# Patient Record
Sex: FEMALE | Race: WHITE | NOT HISPANIC OR LATINO
[De-identification: names, ages, dates, MRNs, and addresses within clinical notes are randomized per-mention and may not be internally consistent; named-entity substitution may affect disease eponyms.]

---

## 2018-03-03 ENCOUNTER — TRANSCRIPTION ENCOUNTER (OUTPATIENT)
Age: 33
End: 2018-03-03

## 2023-05-13 ENCOUNTER — INPATIENT (INPATIENT)
Facility: HOSPITAL | Age: 38
LOS: 2 days | Discharge: ANOTHER HOSPITAL | DRG: 871 | End: 2023-05-16
Attending: INTERNAL MEDICINE | Admitting: INTERNAL MEDICINE
Payer: MEDICAID

## 2023-05-13 VITALS
TEMPERATURE: 98 F | HEART RATE: 116 BPM | DIASTOLIC BLOOD PRESSURE: 66 MMHG | WEIGHT: 121.92 LBS | RESPIRATION RATE: 20 BRPM | OXYGEN SATURATION: 98 % | SYSTOLIC BLOOD PRESSURE: 107 MMHG | HEIGHT: 67 IN

## 2023-05-13 DIAGNOSIS — H05.239 HEMORRHAGE OF UNSPECIFIED ORBIT: ICD-10-CM

## 2023-05-13 DIAGNOSIS — E87.6 HYPOKALEMIA: ICD-10-CM

## 2023-05-13 DIAGNOSIS — E80.6 OTHER DISORDERS OF BILIRUBIN METABOLISM: ICD-10-CM

## 2023-05-13 DIAGNOSIS — J18.9 PNEUMONIA, UNSPECIFIED ORGANISM: ICD-10-CM

## 2023-05-13 DIAGNOSIS — R18.8 OTHER ASCITES: ICD-10-CM

## 2023-05-13 DIAGNOSIS — R10.9 UNSPECIFIED ABDOMINAL PAIN: ICD-10-CM

## 2023-05-13 DIAGNOSIS — E03.9 HYPOTHYROIDISM, UNSPECIFIED: ICD-10-CM

## 2023-05-13 DIAGNOSIS — Z29.9 ENCOUNTER FOR PROPHYLACTIC MEASURES, UNSPECIFIED: ICD-10-CM

## 2023-05-13 DIAGNOSIS — N39.0 URINARY TRACT INFECTION, SITE NOT SPECIFIED: ICD-10-CM

## 2023-05-13 LAB
ALBUMIN SERPL ELPH-MCNC: 1.3 G/DL — LOW (ref 3.5–5)
ALBUMIN SERPL ELPH-MCNC: 1.4 G/DL — LOW (ref 3.5–5)
ALBUMIN SERPL ELPH-MCNC: 1.4 G/DL — LOW (ref 3.5–5)
ALP SERPL-CCNC: 219 U/L — HIGH (ref 40–120)
ALP SERPL-CCNC: 221 U/L — HIGH (ref 40–120)
ALP SERPL-CCNC: 226 U/L — HIGH (ref 40–120)
ALT FLD-CCNC: 14 U/L DA — SIGNIFICANT CHANGE UP (ref 10–60)
ALT FLD-CCNC: 14 U/L DA — SIGNIFICANT CHANGE UP (ref 10–60)
ALT FLD-CCNC: 15 U/L DA — SIGNIFICANT CHANGE UP (ref 10–60)
ANION GAP SERPL CALC-SCNC: 6 MMOL/L — SIGNIFICANT CHANGE UP (ref 5–17)
ANION GAP SERPL CALC-SCNC: 6 MMOL/L — SIGNIFICANT CHANGE UP (ref 5–17)
ANION GAP SERPL CALC-SCNC: 7 MMOL/L — SIGNIFICANT CHANGE UP (ref 5–17)
ANION GAP SERPL CALC-SCNC: 9 MMOL/L — SIGNIFICANT CHANGE UP (ref 5–17)
ANISOCYTOSIS BLD QL: SLIGHT — SIGNIFICANT CHANGE UP
APAP SERPL-MCNC: <2 UG/ML — LOW (ref 10–30)
APPEARANCE UR: ABNORMAL
APTT BLD: 44.6 SEC — HIGH (ref 27.5–35.5)
AST SERPL-CCNC: 129 U/L — HIGH (ref 10–40)
AST SERPL-CCNC: 129 U/L — HIGH (ref 10–40)
AST SERPL-CCNC: 132 U/L — HIGH (ref 10–40)
B PERT IGG+IGM PNL SER: CLEAR — SIGNIFICANT CHANGE UP
BACTERIA # UR AUTO: ABNORMAL /HPF
BASE EXCESS BLDV CALC-SCNC: 1.3 MMOL/L — SIGNIFICANT CHANGE UP
BASE EXCESS BLDV CALC-SCNC: 6.1 MMOL/L — SIGNIFICANT CHANGE UP
BASOPHILS # BLD AUTO: 0.03 K/UL — SIGNIFICANT CHANGE UP (ref 0–0.2)
BASOPHILS NFR BLD AUTO: 0.2 % — SIGNIFICANT CHANGE UP (ref 0–2)
BILIRUB DIRECT SERPL-MCNC: 15.9 MG/DL — HIGH (ref 0–0.3)
BILIRUB SERPL-MCNC: 18.7 MG/DL — HIGH (ref 0.2–1.2)
BILIRUB SERPL-MCNC: 18.9 MG/DL — HIGH (ref 0.2–1.2)
BILIRUB SERPL-MCNC: 19.1 MG/DL — HIGH (ref 0.2–1.2)
BILIRUB UR-MCNC: ABNORMAL
BUN SERPL-MCNC: 2 MG/DL — LOW (ref 7–18)
BUN SERPL-MCNC: 3 MG/DL — LOW (ref 7–18)
CA-I SERPL-SCNC: SIGNIFICANT CHANGE UP MMOL/L (ref 1.15–1.33)
CA-I SERPL-SCNC: SIGNIFICANT CHANGE UP MMOL/L (ref 1.15–1.33)
CALCIUM SERPL-MCNC: 7.1 MG/DL — LOW (ref 8.4–10.5)
CALCIUM SERPL-MCNC: 7.3 MG/DL — LOW (ref 8.4–10.5)
CALCIUM SERPL-MCNC: 7.3 MG/DL — LOW (ref 8.4–10.5)
CALCIUM SERPL-MCNC: 8.1 MG/DL — LOW (ref 8.4–10.5)
CHLORIDE SERPL-SCNC: 100 MMOL/L — SIGNIFICANT CHANGE UP (ref 96–108)
CHLORIDE SERPL-SCNC: 100 MMOL/L — SIGNIFICANT CHANGE UP (ref 96–108)
CHLORIDE SERPL-SCNC: 93 MMOL/L — LOW (ref 96–108)
CHLORIDE SERPL-SCNC: 98 MMOL/L — SIGNIFICANT CHANGE UP (ref 96–108)
CO2 SERPL-SCNC: 23 MMOL/L — SIGNIFICANT CHANGE UP (ref 22–31)
CO2 SERPL-SCNC: 29 MMOL/L — SIGNIFICANT CHANGE UP (ref 22–31)
COLOR FLD: YELLOW — SIGNIFICANT CHANGE UP
COLOR SPEC: ABNORMAL
COMMENT - URINE: SIGNIFICANT CHANGE UP
CREAT SERPL-MCNC: 0.71 MG/DL — SIGNIFICANT CHANGE UP (ref 0.5–1.3)
CREAT SERPL-MCNC: 0.78 MG/DL — SIGNIFICANT CHANGE UP (ref 0.5–1.3)
CREAT SERPL-MCNC: 0.8 MG/DL — SIGNIFICANT CHANGE UP (ref 0.5–1.3)
CREAT SERPL-MCNC: 0.8 MG/DL — SIGNIFICANT CHANGE UP (ref 0.5–1.3)
DIFF PNL FLD: ABNORMAL
EGFR: 111 ML/MIN/1.73M2 — SIGNIFICANT CHANGE UP
EGFR: 96 ML/MIN/1.73M2 — SIGNIFICANT CHANGE UP
EGFR: 96 ML/MIN/1.73M2 — SIGNIFICANT CHANGE UP
EGFR: 99 ML/MIN/1.73M2 — SIGNIFICANT CHANGE UP
EOSINOPHIL # BLD AUTO: 0.02 K/UL — SIGNIFICANT CHANGE UP (ref 0–0.5)
EOSINOPHIL NFR BLD AUTO: 0.1 % — SIGNIFICANT CHANGE UP (ref 0–6)
EPI CELLS # UR: SIGNIFICANT CHANGE UP /HPF
ETHANOL SERPL-MCNC: <3 MG/DL — SIGNIFICANT CHANGE UP (ref 0–10)
FLUID INTAKE SUBSTANCE CLASS: SIGNIFICANT CHANGE UP
GAS PNL BLDV: 126 MMOL/L — LOW (ref 136–145)
GAS PNL BLDV: 127 MMOL/L — LOW (ref 136–145)
GAS PNL BLDV: SIGNIFICANT CHANGE UP
GAS PNL BLDV: SIGNIFICANT CHANGE UP
GLUCOSE BLDC GLUCOMTR-MCNC: 119 MG/DL — HIGH (ref 70–99)
GLUCOSE SERPL-MCNC: 100 MG/DL — HIGH (ref 70–99)
GLUCOSE SERPL-MCNC: 105 MG/DL — HIGH (ref 70–99)
GLUCOSE SERPL-MCNC: 106 MG/DL — HIGH (ref 70–99)
GLUCOSE SERPL-MCNC: 124 MG/DL — HIGH (ref 70–99)
GLUCOSE UR QL: NEGATIVE — SIGNIFICANT CHANGE UP
HCG SERPL-ACNC: <1 MIU/ML — SIGNIFICANT CHANGE UP
HCO3 BLDV-SCNC: 25 MMOL/L — SIGNIFICANT CHANGE UP (ref 22–29)
HCO3 BLDV-SCNC: 30 MMOL/L — HIGH (ref 22–29)
HCT VFR BLD CALC: 26.1 % — LOW (ref 34.5–45)
HGB BLD-MCNC: 9 G/DL — LOW (ref 11.5–15.5)
HIV 1 & 2 AB SERPL IA.RAPID: SIGNIFICANT CHANGE UP
HYALINE CASTS # UR AUTO: ABNORMAL /LPF
IMM GRANULOCYTES NFR BLD AUTO: 1.1 % — HIGH (ref 0–0.9)
INR BLD: 2.15 RATIO — HIGH (ref 0.88–1.16)
KETONES UR-MCNC: ABNORMAL
LACTATE BLDV-MCNC: 3.8 MMOL/L — HIGH (ref 0.5–2)
LACTATE BLDV-MCNC: 4.1 MMOL/L — CRITICAL HIGH (ref 0.5–2)
LEUKOCYTE ESTERASE UR-ACNC: ABNORMAL
LG PLATELETS BLD QL AUTO: SLIGHT — SIGNIFICANT CHANGE UP
LIDOCAIN IGE QN: 58 U/L — LOW (ref 73–393)
LYMPHOCYTES # BLD AUTO: 1.39 K/UL — SIGNIFICANT CHANGE UP (ref 1–3.3)
LYMPHOCYTES # BLD AUTO: 8.4 % — LOW (ref 13–44)
LYMPHOCYTES # FLD: 21 % — SIGNIFICANT CHANGE UP
MACROCYTES BLD QL: SIGNIFICANT CHANGE UP
MANUAL SMEAR VERIFICATION: SIGNIFICANT CHANGE UP
MCHC RBC-ENTMCNC: 34.5 GM/DL — SIGNIFICANT CHANGE UP (ref 32–36)
MCHC RBC-ENTMCNC: 37.2 PG — HIGH (ref 27–34)
MCV RBC AUTO: 107.9 FL — HIGH (ref 80–100)
MESOTHL CELL # FLD: 7 % — SIGNIFICANT CHANGE UP
MONOCYTES # BLD AUTO: 1.36 K/UL — HIGH (ref 0–0.9)
MONOCYTES NFR BLD AUTO: 8.2 % — SIGNIFICANT CHANGE UP (ref 2–14)
MONOS+MACROS # FLD: 45 % — SIGNIFICANT CHANGE UP
NEUTROPHILS # BLD AUTO: 13.51 K/UL — HIGH (ref 1.8–7.4)
NEUTROPHILS NFR BLD AUTO: 82 % — HIGH (ref 43–77)
NEUTROPHILS-BODY FLUID: 27 % — SIGNIFICANT CHANGE UP
NITRITE UR-MCNC: NEGATIVE — SIGNIFICANT CHANGE UP
NRBC # BLD: 0 /100 WBCS — SIGNIFICANT CHANGE UP (ref 0–0)
PCO2 BLDV: 36 MMHG — LOW (ref 39–42)
PCO2 BLDV: 38 MMHG — LOW (ref 39–42)
PH BLDV: 7.45 — HIGH (ref 7.32–7.43)
PH BLDV: 7.5 — HIGH (ref 7.32–7.43)
PH UR: 6.5 — SIGNIFICANT CHANGE UP (ref 5–8)
PLAT MORPH BLD: NORMAL — SIGNIFICANT CHANGE UP
PLATELET # BLD AUTO: 181 K/UL — SIGNIFICANT CHANGE UP (ref 150–400)
PLATELET COUNT - ESTIMATE: NORMAL — SIGNIFICANT CHANGE UP
PO2 BLDV: 35 MMHG — SIGNIFICANT CHANGE UP
PO2 BLDV: 37 MMHG — SIGNIFICANT CHANGE UP
POTASSIUM BLDV-SCNC: 2.6 MMOL/L — CRITICAL LOW (ref 3.5–5.1)
POTASSIUM BLDV-SCNC: 2.6 MMOL/L — CRITICAL LOW (ref 3.5–5.1)
POTASSIUM SERPL-MCNC: 2.5 MMOL/L — CRITICAL LOW (ref 3.5–5.3)
POTASSIUM SERPL-MCNC: 3 MMOL/L — LOW (ref 3.5–5.3)
POTASSIUM SERPL-MCNC: 4.1 MMOL/L — SIGNIFICANT CHANGE UP (ref 3.5–5.3)
POTASSIUM SERPL-MCNC: 4.1 MMOL/L — SIGNIFICANT CHANGE UP (ref 3.5–5.3)
POTASSIUM SERPL-SCNC: 2.5 MMOL/L — CRITICAL LOW (ref 3.5–5.3)
POTASSIUM SERPL-SCNC: 3 MMOL/L — LOW (ref 3.5–5.3)
POTASSIUM SERPL-SCNC: 4.1 MMOL/L — SIGNIFICANT CHANGE UP (ref 3.5–5.3)
POTASSIUM SERPL-SCNC: 4.1 MMOL/L — SIGNIFICANT CHANGE UP (ref 3.5–5.3)
PROT SERPL-MCNC: 6.4 G/DL — SIGNIFICANT CHANGE UP (ref 6–8.3)
PROT SERPL-MCNC: 6.5 G/DL — SIGNIFICANT CHANGE UP (ref 6–8.3)
PROT SERPL-MCNC: 6.6 G/DL — SIGNIFICANT CHANGE UP (ref 6–8.3)
PROT UR-MCNC: 100 MG/DL
PROTHROM AB SERPL-ACNC: 25.8 SEC — HIGH (ref 10.5–13.4)
RBC # BLD: 2.42 M/UL — LOW (ref 3.8–5.2)
RBC # FLD: 20.1 % — HIGH (ref 10.3–14.5)
RBC BLD AUTO: ABNORMAL
RBC CASTS # UR COMP ASSIST: ABNORMAL /HPF (ref 0–2)
RCV VOL RI: 39 /UL — HIGH (ref 0–5)
SAO2 % BLDV: 51.2 % — SIGNIFICANT CHANGE UP
SAO2 % BLDV: 55.2 % — SIGNIFICANT CHANGE UP
SODIUM SERPL-SCNC: 129 MMOL/L — LOW (ref 135–145)
SODIUM SERPL-SCNC: 130 MMOL/L — LOW (ref 135–145)
SP GR SPEC: 1.01 — SIGNIFICANT CHANGE UP (ref 1.01–1.02)
TARGETS BLD QL SMEAR: SLIGHT — SIGNIFICANT CHANGE UP
TOTAL NUCLEATED CELL COUNT, BODY FLUID: 50 /UL — SIGNIFICANT CHANGE UP
TROPONIN I, HIGH SENSITIVITY RESULT: 4.7 NG/L — SIGNIFICANT CHANGE UP
TUBE TYPE: SIGNIFICANT CHANGE UP
UROBILINOGEN FLD QL: 12 MG/DL
WBC # BLD: 16.49 K/UL — HIGH (ref 3.8–10.5)
WBC # FLD AUTO: 16.49 K/UL — HIGH (ref 3.8–10.5)
WBC UR QL: ABNORMAL /HPF (ref 0–5)

## 2023-05-13 PROCEDURE — 99285 EMERGENCY DEPT VISIT HI MDM: CPT

## 2023-05-13 PROCEDURE — 70486 CT MAXILLOFACIAL W/O DYE: CPT | Mod: 26,MA

## 2023-05-13 PROCEDURE — 72125 CT NECK SPINE W/O DYE: CPT | Mod: 26,MA

## 2023-05-13 PROCEDURE — 74177 CT ABD & PELVIS W/CONTRAST: CPT | Mod: 26,MA

## 2023-05-13 PROCEDURE — 70450 CT HEAD/BRAIN W/O DYE: CPT | Mod: 26,MA

## 2023-05-13 PROCEDURE — 71260 CT THORAX DX C+: CPT | Mod: 26,MA

## 2023-05-13 PROCEDURE — 76705 ECHO EXAM OF ABDOMEN: CPT | Mod: 26

## 2023-05-13 RX ORDER — AZITHROMYCIN 500 MG/1
500 TABLET, FILM COATED ORAL EVERY 24 HOURS
Refills: 0 | Status: COMPLETED | OUTPATIENT
Start: 2023-05-13 | End: 2023-05-15

## 2023-05-13 RX ORDER — POTASSIUM CHLORIDE 20 MEQ
40 PACKET (EA) ORAL ONCE
Refills: 0 | Status: DISCONTINUED | OUTPATIENT
Start: 2023-05-13 | End: 2023-05-13

## 2023-05-13 RX ORDER — SODIUM CHLORIDE 9 MG/ML
250 INJECTION INTRAMUSCULAR; INTRAVENOUS; SUBCUTANEOUS ONCE
Refills: 0 | Status: COMPLETED | OUTPATIENT
Start: 2023-05-13 | End: 2023-05-13

## 2023-05-13 RX ORDER — CEFTRIAXONE 500 MG/1
1000 INJECTION, POWDER, FOR SOLUTION INTRAMUSCULAR; INTRAVENOUS ONCE
Refills: 0 | Status: COMPLETED | OUTPATIENT
Start: 2023-05-13 | End: 2023-05-13

## 2023-05-13 RX ORDER — SODIUM CHLORIDE 9 MG/ML
1000 INJECTION INTRAMUSCULAR; INTRAVENOUS; SUBCUTANEOUS ONCE
Refills: 0 | Status: COMPLETED | OUTPATIENT
Start: 2023-05-13 | End: 2023-05-13

## 2023-05-13 RX ORDER — MORPHINE SULFATE 50 MG/1
4 CAPSULE, EXTENDED RELEASE ORAL ONCE
Refills: 0 | Status: DISCONTINUED | OUTPATIENT
Start: 2023-05-13 | End: 2023-05-13

## 2023-05-13 RX ORDER — POTASSIUM CHLORIDE 20 MEQ
40 PACKET (EA) ORAL ONCE
Refills: 0 | Status: COMPLETED | OUTPATIENT
Start: 2023-05-13 | End: 2023-05-13

## 2023-05-13 RX ORDER — LEVOTHYROXINE SODIUM 125 MCG
100 TABLET ORAL DAILY
Refills: 0 | Status: DISCONTINUED | OUTPATIENT
Start: 2023-05-13 | End: 2023-05-16

## 2023-05-13 RX ORDER — KETOROLAC TROMETHAMINE 30 MG/ML
15 SYRINGE (ML) INJECTION ONCE
Refills: 0 | Status: DISCONTINUED | OUTPATIENT
Start: 2023-05-13 | End: 2023-05-13

## 2023-05-13 RX ORDER — POTASSIUM CHLORIDE 20 MEQ
10 PACKET (EA) ORAL
Refills: 0 | Status: COMPLETED | OUTPATIENT
Start: 2023-05-13 | End: 2023-05-13

## 2023-05-13 RX ORDER — MORPHINE SULFATE 50 MG/1
2 CAPSULE, EXTENDED RELEASE ORAL ONCE
Refills: 0 | Status: DISCONTINUED | OUTPATIENT
Start: 2023-05-13 | End: 2023-05-13

## 2023-05-13 RX ORDER — CEFTRIAXONE 500 MG/1
2000 INJECTION, POWDER, FOR SOLUTION INTRAMUSCULAR; INTRAVENOUS EVERY 24 HOURS
Refills: 0 | Status: DISCONTINUED | OUTPATIENT
Start: 2023-05-13 | End: 2023-05-16

## 2023-05-13 RX ORDER — ENOXAPARIN SODIUM 100 MG/ML
40 INJECTION SUBCUTANEOUS EVERY 24 HOURS
Refills: 0 | Status: DISCONTINUED | OUTPATIENT
Start: 2023-05-13 | End: 2023-05-15

## 2023-05-13 RX ADMIN — MORPHINE SULFATE 4 MILLIGRAM(S): 50 CAPSULE, EXTENDED RELEASE ORAL at 13:02

## 2023-05-13 RX ADMIN — Medication 100 MILLIEQUIVALENT(S): at 12:37

## 2023-05-13 RX ADMIN — Medication 40 MILLIEQUIVALENT(S): at 14:05

## 2023-05-13 RX ADMIN — SODIUM CHLORIDE 250 MILLILITER(S): 9 INJECTION INTRAMUSCULAR; INTRAVENOUS; SUBCUTANEOUS at 11:08

## 2023-05-13 RX ADMIN — Medication 100 MILLIEQUIVALENT(S): at 14:33

## 2023-05-13 RX ADMIN — SODIUM CHLORIDE 1000 MILLILITER(S): 9 INJECTION INTRAMUSCULAR; INTRAVENOUS; SUBCUTANEOUS at 11:39

## 2023-05-13 RX ADMIN — CEFTRIAXONE 100 MILLIGRAM(S): 500 INJECTION, POWDER, FOR SOLUTION INTRAMUSCULAR; INTRAVENOUS at 16:19

## 2023-05-13 RX ADMIN — Medication 15 MILLIGRAM(S): at 11:45

## 2023-05-13 RX ADMIN — Medication 100 MILLIEQUIVALENT(S): at 13:04

## 2023-05-13 RX ADMIN — MORPHINE SULFATE 4 MILLIGRAM(S): 50 CAPSULE, EXTENDED RELEASE ORAL at 13:32

## 2023-05-13 RX ADMIN — Medication 15 MILLIGRAM(S): at 12:15

## 2023-05-13 RX ADMIN — SODIUM CHLORIDE 1000 MILLILITER(S): 9 INJECTION INTRAMUSCULAR; INTRAVENOUS; SUBCUTANEOUS at 11:46

## 2023-05-13 RX ADMIN — ENOXAPARIN SODIUM 40 MILLIGRAM(S): 100 INJECTION SUBCUTANEOUS at 17:34

## 2023-05-13 RX ADMIN — MORPHINE SULFATE 2 MILLIGRAM(S): 50 CAPSULE, EXTENDED RELEASE ORAL at 20:27

## 2023-05-13 RX ADMIN — AZITHROMYCIN 255 MILLIGRAM(S): 500 TABLET, FILM COATED ORAL at 17:36

## 2023-05-13 RX ADMIN — MORPHINE SULFATE 2 MILLIGRAM(S): 50 CAPSULE, EXTENDED RELEASE ORAL at 17:35

## 2023-05-13 RX ADMIN — Medication 40 MILLIEQUIVALENT(S): at 16:19

## 2023-05-13 NOTE — H&P ADULT - ATTENDING COMMENTS
PATIENT IS SEEN AND EXAMINED, D/W ER MD, RMD, PATIENT     # ABDOMINAL COLIC    # ACUTE HEPATITIS - LIKELY ALCOHOLIC HEPATITIS, R/O HEPATITIS DUE TO CONNECTIVE TISSUE DISORDER     # HYPOKALEMIA     # H/O HYPOTHYROIDISM     - GI AND DVT PROPHYLAXIS    DR.B. KELLEY PATIENT IS SEEN AND EXAMINED, D/W ER MD, GOYO, PATIENT     # ABDOMINAL COLIC    # ACUTE HEPATITIS - LIKELY ALCOHOLIC HEPATITIS, R/O HEPATITIS DUE TO CONNECTIVE TISSUE DISORDER , HEPATITIS PANEL - W/UP ORDERED , HEPATOLOGY CONSULT, MAY NEED OUT PATIENT REFERRAL FOR LIVER TRANSPLANT   # LIKELY CIRRHOSIS OF LIVER   # HYPOALBUMINEMIA, COAGULOPATHY DUE TO HEPATIC DISEASE   - MAY ORDER VIT K AS NEEDED     # LIKELY SEPSIS DUE TO SBP ( SPONTANEOUS BACTERIAL PERITONITIS ) - ORDERED ASCITIC FLUID CXS, ORDERED IV. ROCEPHIN     # HYPOKALEMIA     # H/O HYPOTHYROIDISM     - GI AND DVT PROPHYLAXIS    DR.B. KELLEY

## 2023-05-13 NOTE — ED PROVIDER NOTE - PROGRESS NOTE DETAILS
Felix Morgan, DO: Patient reassessed, NAD, non-toxic appearing. results dw pt/family, questions answered. improved sx. Consented to diagnostic paracentesis.  Low suspicion for SBP.  However does have white count.  All results explained to patient, unclear etiology.  Possible genetic component.  Patient once again denies alcohol abuse.  Denies drug abuse.  Endorsed to hospitalist.

## 2023-05-13 NOTE — H&P ADULT - NSHPPHYSICALEXAM_GEN_ALL_CORE
PHYSICAL EXAM:  GENERAL: NAD, speaks in full sentences, no signs of respiratory distress, noted with yellow skin   HEAD:  Atraumatic, Normocephalic, right periorbital hematoma noted   EYES: EOMI, PERRLA, yellow conjunctiva noted   NECK: Supple   CHEST/LUNG: Clear to auscultation bilaterally; No wheeze; No crackles; No accessory muscles used  HEART: Regular rate and rhythm; No murmurs; gallops or rubs  ABDOMEN: Tense distended, tender to touch. Bowel sounds present   EXTREMITIES:  2+ Peripheral Pulses, No cyanosis or edema  PSYCH: AAOx3  NEUROLOGY: non-focal  SKIN: spider angioma noted throughout body
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
## 2023-05-13 NOTE — ED PROVIDER NOTE - CARE PLAN
Principal Discharge DX:	Hypokalemia  Secondary Diagnosis:	Hyperbilirubinemia  Secondary Diagnosis:	Abdominal pain   1
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
## 2023-05-13 NOTE — H&P ADULT - PROBLEM SELECTOR PLAN 1
- patient presenting with diffuse abdominal pain   - abdominal examination noted to be tense diffuse tenderness noted  - CT abdomen showing hepatomegaly. signs of portal hypertension with ascites   - s/p diagnostic paracentesis by ED  - admit to medicine   - noted to have WBC of 16  - will start rocephin for SBP prophylaxis   - pain control with morphine   - f/u fluid cytology, cell count LDH protein   - f/u blood and urine cultures - patient presenting with diffuse abdominal pain   - abdominal examination noted to be tense diffuse tenderness noted  - CT abdomen showing hepatomegaly. 

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
## 2023-05-13 NOTE — H&P ADULT - PROBLEM SELECTOR PLAN 8
To Whom It May Concern:    Emy Gibson has been under our care. She is okay to return to work without restrictions on March 8, 2024.     Please feel free to contact us if there are any questions.      Sincerely,    Ktaie Fuller PA-C    [facility] [place] [address] [place] [address] [place] [id number]  [phone]        Document generated by:  Katie Fuller PA-C        
- Lovenox for DVT prophylaxis

## 2023-05-13 NOTE — ED ADULT NURSE NOTE - ED STAT RN HANDOFF DETAILS
Patient endorsed to 12N RN. pt. remained stable. admitted to medicine for hypokalemia,.  transfer to holding area. report given to maryjane elias. no acute distress noted

## 2023-05-13 NOTE — ED ADULT NURSE NOTE - NSFALLRISKINTERV_ED_ALL_ED

## 2023-05-13 NOTE — H&P ADULT - ASSESSMENT
Patient is a 39 yo female with hx of hypothyroidism presents with abdominal pain.  Upon evaluation in ED, patient afebrile and hemodynamically stable. Patient found to have juandice of he skin and scleral icterus with a tense and tender abdomen. ED performed diagnostic paracentesis. Labs significant for leukocytosis of 16, potassium noted to be 2.5, bilirubin noted to 18.7, alk phos of 226 with transaminitis of  and ALT14. EKG NSR at 95 bpm. CT head negative for acute infarct or hemorrhage. CT maxillofacial showing right lateral periorbital hematoma. CT cervical spine negative for fracture. CT chest showing MAY consolidation suspicious for PNA. CT abdomen showing contracted gallbladder, Hepatomegaly and diffuse upper extremity ptosis with signs of portal hypertension and moderate ascites. Patient admitted to medicine for hyperbilirubinemia r/o liver failure and hypokalemia.
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
## 2023-05-13 NOTE — H&P ADULT - NSHPSOCIALHISTORY_GEN_ALL_CORE
Patient denies illicit drug use, or tobacco use. Patient reports occasional ETOH use. Denies ETOH abuse.

## 2023-05-13 NOTE — H&P ADULT - NSHPREVIEWOFSYSTEMS_GEN_ALL_CORE
CONSTITUTIONAL: + decreased appetite, generalized weakness.  No fever, weight loss, or fatigue  EYES: No eye pain, visual disturbances, or discharge  ENT:  No difficulty hearing, tinnitus, vertigo; No sinus or throat pain  NECK: No pain or stiffness  RESPIRATORY: No cough, wheezing, chills or hemoptysis; No Shortness of Breath  CARDIOVASCULAR: No chest pain, palpitations, passing out, dizziness, or leg swelling  GASTROINTESTINAL: Has abdominal pain  nausea, vomiting. No hematemesis; No diarrhea or constipation. No melena or hematochezia.  GENITOURINARY: No dysuria, frequency, hematuria, or incontinence  NEUROLOGICAL: No headaches, memory loss, loss of strength, numbness, or tremors  SKIN: No skin lesions   LYMPH Nodes: No enlarged glands  ENDOCRINE: No heat or cold intolerance; No hair loss  MUSCULOSKELETAL: No joint pain or swelling; No muscle, back, No extremity pain  PSYCHIATRIC: No depression, anxiety, mood swings, or difficulty sleeping  HEME/LYMPH: No easy bruising, or bleeding gums  ALLERGY AND IMMUNOLOGIC: No hives or eczema

## 2023-05-13 NOTE — H&P ADULT - PROBLEM SELECTOR PLAN 3
- patient noted to have K of 3, repeat 2.5   - s/p 10meq x3 and 40meq x1  - repeat BMP   - replace if needed

## 2023-05-13 NOTE — H&P ADULT - PROBLEM SELECTOR PLAN 2
- patient with bilirubin of 18  - noted to have scleral icterus, jaundice of skin along with spider angioma on physical examination   - can be 2/2 to early onset liver failure   - MELD score noted to be 29 points 27-32% estimated 90 day mortality  - rocephin for SBP prophylaxis   - f/u bilirubin panel in AM   - Hepatology consult - patient with bilirubin of 18  - noted to have scleral icterus, jaundice of skin along with spider angioma on physical examination   - can be 2/2 to early onset liver failure   - MELD score noted to be 29 points 27-32% estimated 90 day mortality  - rocephin for SBP prophylaxis   - f/u bilirubin panel in AM   - Hepatology consulted Dr. Garcia  - f/u peth, ISHAN, ANCA hepatic panel, ammonia

## 2023-05-13 NOTE — ED PROVIDER NOTE - PHYSICAL EXAMINATION
General: jaundiced, cachectic  HEENT: EOMI, PERRLA, normal mucosa, normal oropharynx, no lesions on the lips or on oral mucosa, normal external ear  head: no CSF rhinorrhea, no mendez sign, raccoon eyes or csf rhinorrhea, no e/o entrapment, no diplopia on EOM, PERRL, EOMI. No facial tenderness over zygoma, mandible or maxilla. TMJ intact. Midface stable. Nose midline without significant deviation. No ML C-spine TTP , bruising r austin orbital area  Neck: supple, no lymphadenopathy, full range of motion, no nuchal rigidity  CV: RRR, normal S1 and S2 with no murmur, capillary refill less than two seconds  Resp: lungs CTA b/l, good aeration bilaterally, symmetric chest wall   Abd: distended, diffuse ttp  : no CVA tenderness  MSK: full range of motion, no cyanosis, no edema, no clubbing, no immobility  Neuro: CN II-XII grossly intact, muscle strength 5/5 in all extremities, normal gait  Skin: no rashes, skin intact

## 2023-05-13 NOTE — H&P ADULT - PROBLEM SELECTOR PLAN 5
- CT chest showing signs of MAY pneumonia  - has non productive cough with leukocytosis   - continue rocephin  - f/u procalcitonin, strep and legionella

## 2023-05-14 LAB
AFP-TM SERPL-MCNC: 2.9 NG/ML — SIGNIFICANT CHANGE UP
ALBUMIN FLD-MCNC: 0.3 G/DL — SIGNIFICANT CHANGE UP
ALBUMIN SERPL ELPH-MCNC: 1.3 G/DL — LOW (ref 3.5–5)
ALP SERPL-CCNC: 212 U/L — HIGH (ref 40–120)
ALT FLD-CCNC: 12 U/L DA — SIGNIFICANT CHANGE UP (ref 10–60)
AMMONIA BLD-MCNC: 34 UMOL/L — HIGH (ref 11–32)
ANION GAP SERPL CALC-SCNC: 8 MMOL/L — SIGNIFICANT CHANGE UP (ref 5–17)
AST SERPL-CCNC: 133 U/L — HIGH (ref 10–40)
BILIRUB DIRECT SERPL-MCNC: 15 MG/DL — HIGH (ref 0–0.3)
BILIRUB DIRECT SERPL-MCNC: 15 MG/DL — HIGH (ref 0–0.3)
BILIRUB INDIRECT FLD-MCNC: 2.4 MG/DL — HIGH (ref 0.2–1)
BILIRUB SERPL-MCNC: 17.4 MG/DL — HIGH (ref 0.2–1.2)
BILIRUB SERPL-MCNC: 17.4 MG/DL — HIGH (ref 0.2–1.2)
BUN SERPL-MCNC: 3 MG/DL — LOW (ref 7–18)
CALCIUM SERPL-MCNC: 7.3 MG/DL — LOW (ref 8.4–10.5)
CHLORIDE SERPL-SCNC: 101 MMOL/L — SIGNIFICANT CHANGE UP (ref 96–108)
CO2 SERPL-SCNC: 21 MMOL/L — LOW (ref 22–31)
CREAT SERPL-MCNC: 0.67 MG/DL — SIGNIFICANT CHANGE UP (ref 0.5–1.3)
EGFR: 114 ML/MIN/1.73M2 — SIGNIFICANT CHANGE UP
GLUCOSE FLD-MCNC: 115 MG/DL — SIGNIFICANT CHANGE UP
GLUCOSE SERPL-MCNC: 103 MG/DL — HIGH (ref 70–99)
GRAM STN FLD: SIGNIFICANT CHANGE UP
HAV IGM SER-ACNC: SIGNIFICANT CHANGE UP
HBV CORE IGM SER-ACNC: SIGNIFICANT CHANGE UP
HBV SURFACE AG SER-ACNC: SIGNIFICANT CHANGE UP
HCT VFR BLD CALC: 23.9 % — LOW (ref 34.5–45)
HCV AB S/CO SERPL IA: 0.15 S/CO — SIGNIFICANT CHANGE UP (ref 0–0.99)
HCV AB SERPL-IMP: SIGNIFICANT CHANGE UP
HGB BLD-MCNC: 8 G/DL — LOW (ref 11.5–15.5)
LDH SERPL L TO P-CCNC: 33 U/L — SIGNIFICANT CHANGE UP
LEGIONELLA AG UR QL: NEGATIVE — SIGNIFICANT CHANGE UP
MAGNESIUM SERPL-MCNC: 1.9 MG/DL — SIGNIFICANT CHANGE UP (ref 1.6–2.6)
MCHC RBC-ENTMCNC: 33.5 GM/DL — SIGNIFICANT CHANGE UP (ref 32–36)
MCHC RBC-ENTMCNC: 37.2 PG — HIGH (ref 27–34)
MCV RBC AUTO: 111.2 FL — HIGH (ref 80–100)
METHOD TYPE: SIGNIFICANT CHANGE UP
MRSA PCR RESULT.: SIGNIFICANT CHANGE UP
MSSA DNA SPEC QL NAA+PROBE: SIGNIFICANT CHANGE UP
NRBC # BLD: 0 /100 WBCS — SIGNIFICANT CHANGE UP (ref 0–0)
PHOSPHATE SERPL-MCNC: 1 MG/DL — CRITICAL LOW (ref 2.5–4.5)
PLATELET # BLD AUTO: 186 K/UL — SIGNIFICANT CHANGE UP (ref 150–400)
POTASSIUM SERPL-MCNC: 3.9 MMOL/L — SIGNIFICANT CHANGE UP (ref 3.5–5.3)
POTASSIUM SERPL-SCNC: 3.9 MMOL/L — SIGNIFICANT CHANGE UP (ref 3.5–5.3)
PROCALCITONIN SERPL-MCNC: 0.31 NG/ML — HIGH (ref 0.02–0.1)
PROT FLD-MCNC: 1 G/DL — SIGNIFICANT CHANGE UP
PROT SERPL-MCNC: 6.1 G/DL — SIGNIFICANT CHANGE UP (ref 6–8.3)
RBC # BLD: 2.15 M/UL — LOW (ref 3.8–5.2)
RBC # FLD: 20.7 % — HIGH (ref 10.3–14.5)
RHEUMATOID FACT SERPL-ACNC: <10 IU/ML — SIGNIFICANT CHANGE UP (ref 0–13)
S AUREUS DNA NOSE QL NAA+PROBE: DETECTED
S PNEUM AG UR QL: NEGATIVE — SIGNIFICANT CHANGE UP
SODIUM SERPL-SCNC: 130 MMOL/L — LOW (ref 135–145)
SPECIMEN SOURCE: SIGNIFICANT CHANGE UP
SPECIMEN SOURCE: SIGNIFICANT CHANGE UP
TSH SERPL-MCNC: 1.02 UU/ML — SIGNIFICANT CHANGE UP (ref 0.34–4.82)
WBC # BLD: 16.76 K/UL — HIGH (ref 3.8–10.5)
WBC # FLD AUTO: 16.76 K/UL — HIGH (ref 3.8–10.5)

## 2023-05-14 PROCEDURE — 99223 1ST HOSP IP/OBS HIGH 75: CPT

## 2023-05-14 PROCEDURE — 99255 IP/OBS CONSLTJ NEW/EST HI 80: CPT

## 2023-05-14 RX ORDER — MORPHINE SULFATE 50 MG/1
2 CAPSULE, EXTENDED RELEASE ORAL ONCE
Refills: 0 | Status: DISCONTINUED | OUTPATIENT
Start: 2023-05-14 | End: 2023-05-14

## 2023-05-14 RX ORDER — LACTULOSE 10 G/15ML
20 SOLUTION ORAL THREE TIMES A DAY
Refills: 0 | Status: DISCONTINUED | OUTPATIENT
Start: 2023-05-14 | End: 2023-05-16

## 2023-05-14 RX ORDER — ONDANSETRON 8 MG/1
8 TABLET, FILM COATED ORAL
Refills: 0 | Status: DISCONTINUED | OUTPATIENT
Start: 2023-05-14 | End: 2023-05-14

## 2023-05-14 RX ORDER — THIAMINE MONONITRATE (VIT B1) 100 MG
100 TABLET ORAL DAILY
Refills: 0 | Status: DISCONTINUED | OUTPATIENT
Start: 2023-05-14 | End: 2023-05-16

## 2023-05-14 RX ORDER — ONDANSETRON 8 MG/1
4 TABLET, FILM COATED ORAL EVERY 8 HOURS
Refills: 0 | Status: DISCONTINUED | OUTPATIENT
Start: 2023-05-14 | End: 2023-05-16

## 2023-05-14 RX ORDER — ACETYLCYSTEINE 200 MG/ML
8 VIAL (ML) MISCELLANEOUS ONCE
Refills: 0 | Status: COMPLETED | OUTPATIENT
Start: 2023-05-14 | End: 2023-05-14

## 2023-05-14 RX ORDER — MORPHINE SULFATE 50 MG/1
15 CAPSULE, EXTENDED RELEASE ORAL EVERY 8 HOURS
Refills: 0 | Status: DISCONTINUED | OUTPATIENT
Start: 2023-05-14 | End: 2023-05-16

## 2023-05-14 RX ORDER — KETOROLAC TROMETHAMINE 30 MG/ML
15 SYRINGE (ML) INJECTION ONCE
Refills: 0 | Status: DISCONTINUED | OUTPATIENT
Start: 2023-05-14 | End: 2023-05-14

## 2023-05-14 RX ORDER — ACETYLCYSTEINE 200 MG/ML
2.8 VIAL (ML) MISCELLANEOUS ONCE
Refills: 0 | Status: COMPLETED | OUTPATIENT
Start: 2023-05-14 | End: 2023-05-14

## 2023-05-14 RX ORDER — FOLIC ACID 0.8 MG
1 TABLET ORAL DAILY
Refills: 0 | Status: DISCONTINUED | OUTPATIENT
Start: 2023-05-14 | End: 2023-05-16

## 2023-05-14 RX ORDER — ACETYLCYSTEINE 200 MG/ML
8 VIAL (ML) MISCELLANEOUS EVERY 24 HOURS
Refills: 0 | Status: DISCONTINUED | OUTPATIENT
Start: 2023-05-14 | End: 2023-05-14

## 2023-05-14 RX ORDER — ACETYLCYSTEINE 200 MG/ML
8 VIAL (ML) MISCELLANEOUS EVERY 24 HOURS
Refills: 0 | Status: DISCONTINUED | OUTPATIENT
Start: 2023-05-14 | End: 2023-05-16

## 2023-05-14 RX ORDER — SODIUM CHLORIDE 9 MG/ML
1000 INJECTION, SOLUTION INTRAVENOUS
Refills: 0 | Status: DISCONTINUED | OUTPATIENT
Start: 2023-05-14 | End: 2023-05-15

## 2023-05-14 RX ADMIN — Medication 62.5 MILLIMOLE(S): at 13:32

## 2023-05-14 RX ADMIN — MORPHINE SULFATE 15 MILLIGRAM(S): 50 CAPSULE, EXTENDED RELEASE ORAL at 20:49

## 2023-05-14 RX ADMIN — Medication 100 MILLIGRAM(S): at 03:13

## 2023-05-14 RX ADMIN — MORPHINE SULFATE 2 MILLIGRAM(S): 50 CAPSULE, EXTENDED RELEASE ORAL at 13:45

## 2023-05-14 RX ADMIN — CEFTRIAXONE 100 MILLIGRAM(S): 500 INJECTION, POWDER, FOR SOLUTION INTRAMUSCULAR; INTRAVENOUS at 19:42

## 2023-05-14 RX ADMIN — ENOXAPARIN SODIUM 40 MILLIGRAM(S): 100 INJECTION SUBCUTANEOUS at 17:35

## 2023-05-14 RX ADMIN — MORPHINE SULFATE 15 MILLIGRAM(S): 50 CAPSULE, EXTENDED RELEASE ORAL at 21:21

## 2023-05-14 RX ADMIN — Medication 100 MILLIGRAM(S): at 15:04

## 2023-05-14 RX ADMIN — Medication 15 MILLIGRAM(S): at 03:41

## 2023-05-14 RX ADMIN — AZITHROMYCIN 255 MILLIGRAM(S): 500 TABLET, FILM COATED ORAL at 17:35

## 2023-05-14 RX ADMIN — MORPHINE SULFATE 2 MILLIGRAM(S): 50 CAPSULE, EXTENDED RELEASE ORAL at 13:30

## 2023-05-14 RX ADMIN — Medication 290 GRAM(S): at 17:02

## 2023-05-14 RX ADMIN — Medication 15 MILLIGRAM(S): at 03:13

## 2023-05-14 RX ADMIN — LACTULOSE 20 GRAM(S): 10 SOLUTION ORAL at 23:33

## 2023-05-14 RX ADMIN — Medication 128.5 GRAM(S): at 21:31

## 2023-05-14 RX ADMIN — Medication 100 MICROGRAM(S): at 05:58

## 2023-05-14 NOTE — PATIENT PROFILE ADULT - FALL HARM RISK - HARM RISK INTERVENTIONS
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
## 2023-05-14 NOTE — PHARMACOTHERAPY INTERVENTION NOTE - COMMENTS
Recommended consulting infectious diseases in the setting of MSSA bacteremia.    Miguel Lambert, PharmD  Clinical Pharmacy Specialist, Infectious Diseases  Tele-Antimicrobial Stewardship Program (Tele-ASP)  Tele-ASP Phone: [phone]
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
## 2023-05-14 NOTE — PATIENT PROFILE ADULT - FUNCTIONAL ASSESSMENT - DAILY ACTIVITY 2.
Dear Saritha Pearson    After reviewing your responses, I've been able to diagnose you with?a sinus infection caused by bacteria.?     Based on your responses and diagnosis, I have prescribed augmentin to treat your symptoms. I have sent this to your pharmacy.?     It is also important to stay well hydrated, get lots of rest and take over-the-counter decongestants,?tylenol?or ibuprofen if you?are able to?take those medications per your primary care provider to help relieve discomfort.?     It is important that you take?all of?your prescribed medication even if your symptoms are improving after a few doses.? Taking?all of?your medicine helps prevent the symptoms from returning.?     If your symptoms worsen, you develop severe headache, vomiting, high fever (>102), or are not improving in 7 days, please contact your primary care provider for an appointment or visit any of our convenient Walk-in Care or Urgent Care Centers to be seen which can be found on our website?here.?     Thanks again for choosing?us?as your health care partner,?   ?  Aristides Mcnally PA-C?   
4 = No assist / stand by assistance
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
## 2023-05-15 DIAGNOSIS — D64.9 ANEMIA, UNSPECIFIED: ICD-10-CM

## 2023-05-15 DIAGNOSIS — R78.81 BACTEREMIA: ICD-10-CM

## 2023-05-15 DIAGNOSIS — A41.9 SEPSIS, UNSPECIFIED ORGANISM: ICD-10-CM

## 2023-05-15 DIAGNOSIS — Z02.9 ENCOUNTER FOR ADMINISTRATIVE EXAMINATIONS, UNSPECIFIED: ICD-10-CM

## 2023-05-15 DIAGNOSIS — E83.39 OTHER DISORDERS OF PHOSPHORUS METABOLISM: ICD-10-CM

## 2023-05-15 DIAGNOSIS — K72.00 ACUTE AND SUBACUTE HEPATIC FAILURE WITHOUT COMA: ICD-10-CM

## 2023-05-15 DIAGNOSIS — R74.01 ELEVATION OF LEVELS OF LIVER TRANSAMINASE LEVELS: ICD-10-CM

## 2023-05-15 LAB
ALBUMIN SERPL ELPH-MCNC: 1.2 G/DL — LOW (ref 3.5–5)
ALP SERPL-CCNC: 205 U/L — HIGH (ref 40–120)
ALT FLD-CCNC: 15 U/L DA — SIGNIFICANT CHANGE UP (ref 10–60)
ANA PAT FLD IF-IMP: ABNORMAL
ANA TITR SER: ABNORMAL
ANION GAP SERPL CALC-SCNC: 11 MMOL/L — SIGNIFICANT CHANGE UP (ref 5–17)
APAP SERPL-MCNC: <2 UG/ML — LOW (ref 10–30)
AST SERPL-CCNC: 120 U/L — HIGH (ref 10–40)
AUTO DIFF PNL BLD: NEGATIVE — SIGNIFICANT CHANGE UP
BILIRUB DIRECT SERPL-MCNC: 13.6 MG/DL — HIGH (ref 0–0.3)
BILIRUB SERPL-MCNC: 16.9 MG/DL — HIGH (ref 0.2–1.2)
BUN SERPL-MCNC: 2 MG/DL — LOW (ref 7–18)
C-ANCA SER-ACNC: NEGATIVE — SIGNIFICANT CHANGE UP
CALCIUM SERPL-MCNC: 7.6 MG/DL — LOW (ref 8.4–10.5)
CERULOPLASMIN SERPL-MCNC: 25 MG/DL — SIGNIFICANT CHANGE UP (ref 16–45)
CHLORIDE SERPL-SCNC: 98 MMOL/L — SIGNIFICANT CHANGE UP (ref 96–108)
CMV DNA CSF QL NAA+PROBE: ABNORMAL IU/ML
CMV DNA SPEC NAA+PROBE-LOG#: ABNORMAL LOG10IU/ML
CO2 SERPL-SCNC: 20 MMOL/L — LOW (ref 22–31)
CREAT SERPL-MCNC: 0.7 MG/DL — SIGNIFICANT CHANGE UP (ref 0.5–1.3)
DSDNA AB SER-ACNC: 12 IU/ML — SIGNIFICANT CHANGE UP
EBV EA AB SER IA-ACNC: <5 U/ML — SIGNIFICANT CHANGE UP
EBV EA AB TITR SER IF: ABNORMAL
EBV EA IGG SER-ACNC: NEGATIVE — SIGNIFICANT CHANGE UP
EBV NA IGG SER IA-ACNC: 19.6 U/ML — HIGH
EBV PATRN SPEC IB-IMP: SIGNIFICANT CHANGE UP
EBV VCA IGG AVIDITY SER QL IA: POSITIVE
EBV VCA IGM SER IA-ACNC: 353 U/ML — HIGH
EBV VCA IGM SER IA-ACNC: <10 U/ML — SIGNIFICANT CHANGE UP
EBV VCA IGM TITR FLD: NEGATIVE — SIGNIFICANT CHANGE UP
EGFR: 113 ML/MIN/1.73M2 — SIGNIFICANT CHANGE UP
GLUCOSE SERPL-MCNC: 171 MG/DL — HIGH (ref 70–99)
GRAM STN FLD: SIGNIFICANT CHANGE UP
HAV IGG SER QL IA: SIGNIFICANT CHANGE UP
HBV CORE AB SER-ACNC: SIGNIFICANT CHANGE UP
HBV SURFACE AB SER-ACNC: REACTIVE
HCT VFR BLD CALC: 25.3 % — LOW (ref 34.5–45)
HCV RNA FLD QL NAA+PROBE: SIGNIFICANT CHANGE UP
HCV RNA SPEC QL PROBE+SIG AMP: SIGNIFICANT CHANGE UP
HGB BLD-MCNC: 8.4 G/DL — LOW (ref 11.5–15.5)
HSV DNA1: SIGNIFICANT CHANGE UP
HSV DNA2: SIGNIFICANT CHANGE UP
HSV1 DNA BLD QL NAA+PROBE: SIGNIFICANT CHANGE UP
HSV2 DNA BLD QL NAA+PROBE: SIGNIFICANT CHANGE UP
IGA FLD-MCNC: 870 MG/DL — HIGH (ref 84–499)
IGG FLD-MCNC: 2275 MG/DL — HIGH (ref 610–1660)
IGM SERPL-MCNC: 126 MG/DL — SIGNIFICANT CHANGE UP (ref 35–242)
INR BLD: 2.6 RATIO — HIGH (ref 0.88–1.16)
KAPPA LC SER QL IFE: 5.24 MG/DL — HIGH (ref 0.33–1.94)
KAPPA/LAMBDA FREE LIGHT CHAIN RATIO, SERUM: 1.44 RATIO — SIGNIFICANT CHANGE UP (ref 0.26–1.65)
LACTATE SERPL-SCNC: 2.5 MMOL/L — HIGH (ref 0.7–2)
LACTATE SERPL-SCNC: 3.5 MMOL/L — HIGH (ref 0.7–2)
LAMBDA LC SER QL IFE: 3.65 MG/DL — HIGH (ref 0.57–2.63)
MAGNESIUM SERPL-MCNC: 1.9 MG/DL — SIGNIFICANT CHANGE UP (ref 1.6–2.6)
MCHC RBC-ENTMCNC: 33.2 GM/DL — SIGNIFICANT CHANGE UP (ref 32–36)
MCHC RBC-ENTMCNC: 36.5 PG — HIGH (ref 27–34)
MCV RBC AUTO: 110 FL — HIGH (ref 80–100)
NRBC # BLD: 0 /100 WBCS — SIGNIFICANT CHANGE UP (ref 0–0)
P-ANCA SER-ACNC: NEGATIVE — SIGNIFICANT CHANGE UP
PHOSPHATE SERPL-MCNC: 0.9 MG/DL — CRITICAL LOW (ref 2.5–4.5)
PLATELET # BLD AUTO: 179 K/UL — SIGNIFICANT CHANGE UP (ref 150–400)
POTASSIUM SERPL-MCNC: 2.8 MMOL/L — CRITICAL LOW (ref 3.5–5.3)
POTASSIUM SERPL-MCNC: 3 MMOL/L — LOW (ref 3.5–5.3)
POTASSIUM SERPL-SCNC: 2.8 MMOL/L — CRITICAL LOW (ref 3.5–5.3)
POTASSIUM SERPL-SCNC: 3 MMOL/L — LOW (ref 3.5–5.3)
PROT SERPL-MCNC: 6 G/DL — SIGNIFICANT CHANGE UP (ref 6–8.3)
PROTHROM AB SERPL-ACNC: 31.2 SEC — HIGH (ref 10.5–13.4)
RBC # BLD: 2.3 M/UL — LOW (ref 3.8–5.2)
RBC # FLD: 19.6 % — HIGH (ref 10.3–14.5)
SALICYLATES SERPL-MCNC: <1.7 MG/DL — LOW (ref 2.8–20)
SODIUM SERPL-SCNC: 129 MMOL/L — LOW (ref 135–145)
WBC # BLD: 16.36 K/UL — HIGH (ref 3.8–10.5)
WBC # FLD AUTO: 16.36 K/UL — HIGH (ref 3.8–10.5)

## 2023-05-15 PROCEDURE — 99233 SBSQ HOSP IP/OBS HIGH 50: CPT

## 2023-05-15 PROCEDURE — 71045 X-RAY EXAM CHEST 1 VIEW: CPT | Mod: 26

## 2023-05-15 RX ORDER — MUPIROCIN 20 MG/G
1 OINTMENT TOPICAL
Refills: 0 | Status: DISCONTINUED | OUTPATIENT
Start: 2023-05-15 | End: 2023-05-16

## 2023-05-15 RX ORDER — POTASSIUM PHOSPHATE, MONOBASIC POTASSIUM PHOSPHATE, DIBASIC 236; 224 MG/ML; MG/ML
30 INJECTION, SOLUTION INTRAVENOUS ONCE
Refills: 0 | Status: COMPLETED | OUTPATIENT
Start: 2023-05-15 | End: 2023-05-15

## 2023-05-15 RX ORDER — CHLORHEXIDINE GLUCONATE 213 G/1000ML
1 SOLUTION TOPICAL
Refills: 0 | Status: DISCONTINUED | OUTPATIENT
Start: 2023-05-15 | End: 2023-05-16

## 2023-05-15 RX ORDER — SODIUM,POTASSIUM PHOSPHATES 278-250MG
1 POWDER IN PACKET (EA) ORAL
Refills: 0 | Status: DISCONTINUED | OUTPATIENT
Start: 2023-05-15 | End: 2023-05-16

## 2023-05-15 RX ORDER — SODIUM CHLORIDE 9 MG/ML
500 INJECTION INTRAMUSCULAR; INTRAVENOUS; SUBCUTANEOUS ONCE
Refills: 0 | Status: COMPLETED | OUTPATIENT
Start: 2023-05-15 | End: 2023-05-15

## 2023-05-15 RX ORDER — DIPHENHYDRAMINE HCL 50 MG
25 CAPSULE ORAL ONCE
Refills: 0 | Status: COMPLETED | OUTPATIENT
Start: 2023-05-15 | End: 2023-05-15

## 2023-05-15 RX ORDER — ALBUMIN HUMAN 25 %
50 VIAL (ML) INTRAVENOUS ONCE
Refills: 0 | Status: COMPLETED | OUTPATIENT
Start: 2023-05-15 | End: 2023-05-15

## 2023-05-15 RX ORDER — POTASSIUM CHLORIDE 20 MEQ
10 PACKET (EA) ORAL
Refills: 0 | Status: COMPLETED | OUTPATIENT
Start: 2023-05-15 | End: 2023-05-15

## 2023-05-15 RX ORDER — POTASSIUM CHLORIDE 20 MEQ
40 PACKET (EA) ORAL EVERY 4 HOURS
Refills: 0 | Status: COMPLETED | OUTPATIENT
Start: 2023-05-15 | End: 2023-05-15

## 2023-05-15 RX ORDER — POTASSIUM CHLORIDE 20 MEQ
10 PACKET (EA) ORAL
Refills: 0 | Status: DISCONTINUED | OUTPATIENT
Start: 2023-05-15 | End: 2023-05-15

## 2023-05-15 RX ADMIN — Medication 1 MILLIGRAM(S): at 12:57

## 2023-05-15 RX ADMIN — LACTULOSE 20 GRAM(S): 10 SOLUTION ORAL at 14:44

## 2023-05-15 RX ADMIN — MORPHINE SULFATE 15 MILLIGRAM(S): 50 CAPSULE, EXTENDED RELEASE ORAL at 15:50

## 2023-05-15 RX ADMIN — Medication 100 MILLIEQUIVALENT(S): at 13:02

## 2023-05-15 RX ADMIN — Medication 1 TABLET(S): at 13:01

## 2023-05-15 RX ADMIN — Medication 1 PACKET(S): at 11:53

## 2023-05-15 RX ADMIN — MORPHINE SULFATE 15 MILLIGRAM(S): 50 CAPSULE, EXTENDED RELEASE ORAL at 14:19

## 2023-05-15 RX ADMIN — CHLORHEXIDINE GLUCONATE 1 APPLICATION(S): 213 SOLUTION TOPICAL at 13:02

## 2023-05-15 RX ADMIN — Medication 50 MILLILITER(S): at 18:34

## 2023-05-15 RX ADMIN — POTASSIUM PHOSPHATE, MONOBASIC POTASSIUM PHOSPHATE, DIBASIC 83.33 MILLIMOLE(S): 236; 224 INJECTION, SOLUTION INTRAVENOUS at 14:44

## 2023-05-15 RX ADMIN — SODIUM CHLORIDE 75 MILLILITER(S): 9 INJECTION, SOLUTION INTRAVENOUS at 02:02

## 2023-05-15 RX ADMIN — AZITHROMYCIN 255 MILLIGRAM(S): 500 TABLET, FILM COATED ORAL at 18:36

## 2023-05-15 RX ADMIN — Medication 100 MILLIGRAM(S): at 12:58

## 2023-05-15 RX ADMIN — Medication 43.33 GRAM(S): at 02:01

## 2023-05-15 RX ADMIN — Medication 40 MILLIEQUIVALENT(S): at 14:27

## 2023-05-15 RX ADMIN — Medication 1 PACKET(S): at 21:34

## 2023-05-15 RX ADMIN — CEFTRIAXONE 100 MILLIGRAM(S): 500 INJECTION, POWDER, FOR SOLUTION INTRAMUSCULAR; INTRAVENOUS at 21:46

## 2023-05-15 RX ADMIN — MORPHINE SULFATE 15 MILLIGRAM(S): 50 CAPSULE, EXTENDED RELEASE ORAL at 22:05

## 2023-05-15 RX ADMIN — Medication 40 MILLIEQUIVALENT(S): at 18:35

## 2023-05-15 RX ADMIN — Medication 100 MILLIEQUIVALENT(S): at 10:58

## 2023-05-15 RX ADMIN — Medication 40 MILLIEQUIVALENT(S): at 21:34

## 2023-05-15 RX ADMIN — Medication 20 MILLIEQUIVALENT(S): at 14:22

## 2023-05-15 RX ADMIN — MORPHINE SULFATE 15 MILLIGRAM(S): 50 CAPSULE, EXTENDED RELEASE ORAL at 21:34

## 2023-05-15 RX ADMIN — Medication 1 PACKET(S): at 18:35

## 2023-05-15 RX ADMIN — MUPIROCIN 1 APPLICATION(S): 20 OINTMENT TOPICAL at 18:36

## 2023-05-15 RX ADMIN — LACTULOSE 20 GRAM(S): 10 SOLUTION ORAL at 21:36

## 2023-05-15 RX ADMIN — MORPHINE SULFATE 15 MILLIGRAM(S): 50 CAPSULE, EXTENDED RELEASE ORAL at 07:00

## 2023-05-15 RX ADMIN — Medication 100 MILLIEQUIVALENT(S): at 10:57

## 2023-05-15 RX ADMIN — Medication 25 MILLIGRAM(S): at 05:55

## 2023-05-15 RX ADMIN — MORPHINE SULFATE 15 MILLIGRAM(S): 50 CAPSULE, EXTENDED RELEASE ORAL at 05:56

## 2023-05-15 RX ADMIN — Medication 100 MICROGRAM(S): at 05:56

## 2023-05-15 RX ADMIN — LACTULOSE 20 GRAM(S): 10 SOLUTION ORAL at 05:56

## 2023-05-15 NOTE — PROGRESS NOTE ADULT - PROBLEM SELECTOR PLAN 6
Replete and monitor p/w Tbili 18.7  noted to have scleral icterus, jaundice of skin along with spider angioma on physical examination   can be 2/2 to early onset liver failure   possibly multifactorial, EtOH, alcoholic hepatitis, + cholestasis of sepsis? (BCx pos for Gram pos cocci in clusters) +/- DILI (unable to give estimate of daily use), and need to r/o viral autoimmune, vascular process etc.  MELD score noted to be 29 points 27-32% estimated 90 day mortality  Continue Rocephin for SBP prophylaxis  Continue NAC #3 dose over 24 hrs.   consult IR in am for therapeutic paracentesis

## 2023-05-15 NOTE — CONSULT NOTE ADULT - ASSESSMENT
ASSESSMENT            _________CNS___________  #Pain  #Sedation      _________CVS___________      _________RESP__________      ___________GI____________      ________ RENAL__________      __________MSK___________      ___________ID____________      _________ENDO__________      ______HEME/ONC_______      _________SKIN____________      ________PROPHY_______  #DVT  #GI     ______GOC/DISPO___________         ASSESSMENT    38 year old F with PMH of hypothyroidism p/w abdominal pain/distension associated with jaundice, s/p paracentesis in ED. Admitted for sepsis 2/2 acute liver failure w/ c/o SBP.         _________CNS___________  #no active issues      _________CVS___________  # no active issues    _________RESP__________    #dyspnea 2/2 abdominal ascites, fluid overload state confirmed on CXR with bilateral pulm edema  - dc IV hydration for now  - c/w oxygen supplementation as needed  - give IV lasix 40 mg push with albumin as needed  - monitor urine output for response to lasix    ___________GI____________    #hyperbilirubinemia (18.7 > 16.9) a/w transaminitis  -MELD 29  -Hepatology consulted, on ceftriaxone for c/o SBP. On NAC for liver failure.  -primary team planning for IR guided therapeutic paracentesis in AM    ________ RENAL__________    #hypokalemia/hypophosphatemia  - monitor and replete as necessary    #monitor urine output s/p lasix/albumin    __________MSK___________    no active issues noted  ___________ID____________    #UTI -  c/w ceftriaxone, follow up cultures and blood work  #c/o SBP - c/w ceftriaxone, follow up cultures and blood work    _________ENDO__________    #hypothyroidism  - c/w home dose of synthroid    ______HEME/ONC_______    #anemia  - Hgb low , stable >8  No overt bleeding noted, monitor CBC, maintain active T&S, transfuse for Hgb < 7.0  _________SKIN____________    no active issues   ________PROPHY_______  #DVT held due to elevated INR  #GI     ______GOC/DISPO___________    not for transfer to ICU, low threshold for call back if any change in respiratory status

## 2023-05-15 NOTE — CONSULT NOTE ADULT - GASTROINTESTINAL
details… soft/normal active bowel sounds/no guarding/no rigidity/no organomegaly/no masses palpable/tender/distended/ascites

## 2023-05-15 NOTE — PROGRESS NOTE ADULT - REASON FOR ADMISSION
Hyperbilirubinemia, Hypokalemia

## 2023-05-15 NOTE — PROGRESS NOTE ADULT - SUBJECTIVE AND OBJECTIVE BOX
Chief Complaint:  Patient is a 38y old  Female who presents with a chief complaint of Hyperbilirubinemia, Hypokalemia (15 May 2023 09:44)      Reason for consult:    Interval Events:     Hospital Medications:  acetylcysteine IVPB 8 Gram(s) IV Intermittent every 24 hours  azithromycin  IVPB 500 milliGRAM(s) IV Intermittent every 24 hours  cefTRIAXone   IVPB 2000 milliGRAM(s) IV Intermittent every 24 hours  chlorhexidine 2% Cloths 1 Application(s) Topical <User Schedule>  dextrose 5% + sodium chloride 0.9%. 1000 milliLiter(s) IV Continuous <Continuous>  enoxaparin Injectable 40 milliGRAM(s) SubCutaneous every 24 hours  folic acid 1 milliGRAM(s) Oral daily  guaiFENesin Oral Liquid (Sugar-Free) 100 milliGRAM(s) Oral every 6 hours PRN  lactulose Syrup 20 Gram(s) Oral three times a day  levothyroxine 100 MICROGram(s) Oral daily  LORazepam   Injectable 1 milliGRAM(s) IV Push every 4 hours PRN  morphine  IR 15 milliGRAM(s) Oral every 8 hours  multivitamin 1 Tablet(s) Oral daily  mupirocin 2% Ointment 1 Application(s) Both Nostrils two times a day  ondansetron Injectable 4 milliGRAM(s) IV Push every 8 hours PRN  potassium chloride    Tablet ER 40 milliEquivalent(s) Oral every 4 hours  potassium phosphate / sodium phosphate Powder (PHOS-NaK) 1 Packet(s) Oral three times a day with meals  potassium phosphate IVPB 30 milliMole(s) IV Intermittent once  thiamine 100 milliGRAM(s) Oral daily      ROS:   General:  No  fevers, chills, night sweats, fatigue  Eyes:  Good vision, no reported pain  ENT:  No sore throat, pain, runny nose  CV:  No pain, palpitations  Pulm:  No dyspnea, cough  GI:  See HPI, otherwise negative  :  No  incontinence, nocturia  Muscle:  No pain, weakness  Neuro:  No memory problems  Psych:  No insomnia, mood problems, depression  Endocrine:  No polyuria, polydipsia, cold/heat intolerance  Heme:  No petechiae, ecchymosis, easy bruisability  Skin:  No rash    PHYSICAL EXAM:   Vital Signs:  Vital Signs Last 24 Hrs  T(C): 36.8 (15 May 2023 11:32), Max: 37 (14 May 2023 20:59)  T(F): 98.2 (15 May 2023 11:32), Max: 98.6 (14 May 2023 20:59)  HR: 105 (15 May 2023 11:32) (100 - 117)  BP: 101/72 (15 May 2023 11:32) (101/72 - 124/76)  BP(mean): --  RR: 18 (15 May 2023 11:32) (18 - 20)  SpO2: 95% (15 May 2023 11:32) (94% - 98%)    Parameters below as of 15 May 2023 11:32  Patient On (Oxygen Delivery Method): room air      Daily     Daily     GENERAL: no acute distress  NEURO: alert, no asterixis  HEENT: anicteric sclera, no conjunctival pallor appreciated  CHEST: no respiratory distress, no accessory muscle use  CARDIAC: regular rate, rhythm  ABDOMEN: soft, non-tender, non-distended, no rebound or guarding  EXTREMITIES: warm, well perfused, no edema  SKIN: no lesions noted    LABS: reviewed                        8.4    16.36 )-----------( 179      ( 15 May 2023 07:32 )             25.3     05-15    129<L>  |  98  |  2<L>  ----------------------------<  171<H>  2.8<LL>   |  20<L>  |  0.70    Ca    7.6<L>      15 May 2023 07:32  Phos  0.9     05-15  Mg     1.9     05-15    TPro  6.0  /  Alb  1.2<L>  /  TBili  16.9<H>  /  DBili  13.6<H>  /  AST  120<H>  /  ALT  15  /  AlkPhos  205<H>  05-15    LIVER FUNCTIONS - ( 15 May 2023 07:32 )  Alb: 1.2 g/dL / Pro: 6.0 g/dL / ALK PHOS: 205 U/L / ALT: 15 U/L DA / AST: 120 U/L / GGT: x             Interval Diagnostic Studies: see sunrise for full report   Chief Complaint:  Patient is a 38y old  Female who presents with a chief complaint of Hyperbilirubinemia, Hypokalemia (15 May 2023 09:44)      Reason for consult:    Interval Events: Patient was seen and examined at bedside. Remains AAOx3. MELD-Na today 31. Bilirubin downtrending, but Na, INR worsened. She is on NAC. She was found to have MSSA in BCx, pending sensitivity.  She is on CIWA. She still requires morphine for abdominal pain.     Hospital Medications:  acetylcysteine IVPB 8 Gram(s) IV Intermittent every 24 hours  azithromycin  IVPB 500 milliGRAM(s) IV Intermittent every 24 hours  cefTRIAXone   IVPB 2000 milliGRAM(s) IV Intermittent every 24 hours  chlorhexidine 2% Cloths 1 Application(s) Topical <User Schedule>  dextrose 5% + sodium chloride 0.9%. 1000 milliLiter(s) IV Continuous <Continuous>  enoxaparin Injectable 40 milliGRAM(s) SubCutaneous every 24 hours  folic acid 1 milliGRAM(s) Oral daily  guaiFENesin Oral Liquid (Sugar-Free) 100 milliGRAM(s) Oral every 6 hours PRN  lactulose Syrup 20 Gram(s) Oral three times a day  levothyroxine 100 MICROGram(s) Oral daily  LORazepam   Injectable 1 milliGRAM(s) IV Push every 4 hours PRN  morphine  IR 15 milliGRAM(s) Oral every 8 hours  multivitamin 1 Tablet(s) Oral daily  mupirocin 2% Ointment 1 Application(s) Both Nostrils two times a day  ondansetron Injectable 4 milliGRAM(s) IV Push every 8 hours PRN  potassium chloride    Tablet ER 40 milliEquivalent(s) Oral every 4 hours  potassium phosphate / sodium phosphate Powder (PHOS-NaK) 1 Packet(s) Oral three times a day with meals  potassium phosphate IVPB 30 milliMole(s) IV Intermittent once  thiamine 100 milliGRAM(s) Oral daily      ROS:   General:  No  fevers, chills, night sweats, fatigue  Eyes:  Good vision, no reported pain  ENT:  No sore throat, pain, runny nose  CV:  No pain, palpitations  Pulm:  No dyspnea, cough  GI:  See HPI, otherwise negative  :  No  incontinence, nocturia  Muscle:  No pain, weakness  Neuro:  No memory problems  Psych:  No insomnia, mood problems, depression  Endocrine:  No polyuria, polydipsia, cold/heat intolerance  Heme:  No petechiae, ecchymosis, easy bruisability  Skin:  No rash    PHYSICAL EXAM:   Vital Signs:  Vital Signs Last 24 Hrs  T(C): 36.8 (15 May 2023 11:32), Max: 37 (14 May 2023 20:59)  T(F): 98.2 (15 May 2023 11:32), Max: 98.6 (14 May 2023 20:59)  HR: 105 (15 May 2023 11:32) (100 - 117)  BP: 101/72 (15 May 2023 11:32) (101/72 - 124/76)  BP(mean): --  RR: 18 (15 May 2023 11:32) (18 - 20)  SpO2: 95% (15 May 2023 11:32) (94% - 98%)    Parameters below as of 15 May 2023 11:32  Patient On (Oxygen Delivery Method): room air      Daily     Daily     GENERAL: no acute distress  NEURO: alert, no asterixis  HEENT: anicteric sclera, no conjunctival pallor appreciated  CHEST: no respiratory distress, no accessory muscle use  CARDIAC: regular rate, rhythm  ABDOMEN: soft, non-tender, non-distended, no rebound or guarding  EXTREMITIES: warm, well perfused, no edema  SKIN: no lesions noted    LABS: reviewed                        8.4    16.36 )-----------( 179      ( 15 May 2023 07:32 )             25.3     05-15    129<L>  |  98  |  2<L>  ----------------------------<  171<H>  2.8<LL>   |  20<L>  |  0.70    Ca    7.6<L>      15 May 2023 07:32  Phos  0.9     05-15  Mg     1.9     05-15    TPro  6.0  /  Alb  1.2<L>  /  TBili  16.9<H>  /  DBili  13.6<H>  /  AST  120<H>  /  ALT  15  /  AlkPhos  205<H>  05-15    LIVER FUNCTIONS - ( 15 May 2023 07:32 )  Alb: 1.2 g/dL / Pro: 6.0 g/dL / ALK PHOS: 205 U/L / ALT: 15 U/L DA / AST: 120 U/L / GGT: x             Interval Diagnostic Studies: see sunrise for full report   Chief Complaint:  Patient is a 38y old  Female who presents with a chief complaint of Hyperbilirubinemia, Hypokalemia (15 May 2023 09:44)      Reason for consult: Jaundice    Interval Events: Patient was seen and examined at bedside. Remains AAOx3. MELD-Na today 31. Bilirubin downtrending, but Na, INR worsened. She is on NAC. She was found to have MSSA in BCx, pending sensitivity.  She is on CIWA. She still requires morphine for abdominal pain.     Hospital Medications:  acetylcysteine IVPB 8 Gram(s) IV Intermittent every 24 hours  azithromycin  IVPB 500 milliGRAM(s) IV Intermittent every 24 hours  cefTRIAXone   IVPB 2000 milliGRAM(s) IV Intermittent every 24 hours  chlorhexidine 2% Cloths 1 Application(s) Topical <User Schedule>  dextrose 5% + sodium chloride 0.9%. 1000 milliLiter(s) IV Continuous <Continuous>  enoxaparin Injectable 40 milliGRAM(s) SubCutaneous every 24 hours  folic acid 1 milliGRAM(s) Oral daily  guaiFENesin Oral Liquid (Sugar-Free) 100 milliGRAM(s) Oral every 6 hours PRN  lactulose Syrup 20 Gram(s) Oral three times a day  levothyroxine 100 MICROGram(s) Oral daily  LORazepam   Injectable 1 milliGRAM(s) IV Push every 4 hours PRN  morphine  IR 15 milliGRAM(s) Oral every 8 hours  multivitamin 1 Tablet(s) Oral daily  mupirocin 2% Ointment 1 Application(s) Both Nostrils two times a day  ondansetron Injectable 4 milliGRAM(s) IV Push every 8 hours PRN  potassium chloride    Tablet ER 40 milliEquivalent(s) Oral every 4 hours  potassium phosphate / sodium phosphate Powder (PHOS-NaK) 1 Packet(s) Oral three times a day with meals  potassium phosphate IVPB 30 milliMole(s) IV Intermittent once  thiamine 100 milliGRAM(s) Oral daily      ROS:   General:  No  fevers, chills  Eyes:  Good vision, no reported pain. Icterus.   ENT:  No sore throat, pain, runny nose  CV:  No pain, palpitations  Pulm:  No dyspnea, cough on exam  GI:  Abdomen distended, still reports diffuse pain, no vomiting, poor appetite, reports large loose BM, denies blood, mucus or melena  :  No dysuria  Muscle:  Generalized  weakness  Neuro:  No memory problems  Skin:  Jaundice    PHYSICAL EXAM:   Vital Signs:  Vital Signs Last 24 Hrs  T(C): 36.8 (15 May 2023 11:32), Max: 37 (14 May 2023 20:59)  T(F): 98.2 (15 May 2023 11:32), Max: 98.6 (14 May 2023 20:59)  HR: 105 (15 May 2023 11:32) (100 - 117)  BP: 101/72 (15 May 2023 11:32) (101/72 - 124/76)  BP(mean): --  RR: 18 (15 May 2023 11:32) (18 - 20)  SpO2: 95% (15 May 2023 11:32) (94% - 98%)    Parameters below as of 15 May 2023 11:32  Patient On (Oxygen Delivery Method): room air      Daily     Daily     GENERAL: no acute distress but ill appearing  NEURO: awake, alert, oriented x3, no asterixis  HEENT: icteric sclera  CHEST: no respiratory distress, no accessory muscle use, on RA at the time of exam  CARDIAC: mildly tachycardic  ABDOMEN: soft, distended, and diffuse tenderness, no rebound or guarding, BS+  EXTREMITIES: warm, well perfused, no edema  SKIN: Icterus    LABS: reviewed                        8.4    16.36 )-----------( 179      ( 15 May 2023 07:32 )             25.3     05-15    129<L>  |  98  |  2<L>  ----------------------------<  171<H>  2.8<LL>   |  20<L>  |  0.70    Ca    7.6<L>      15 May 2023 07:32  Phos  0.9     05-15  Mg     1.9     05-15    TPro  6.0  /  Alb  1.2<L>  /  TBili  16.9<H>  /  DBili  13.6<H>  /  AST  120<H>  /  ALT  15  /  AlkPhos  205<H>  05-15    LIVER FUNCTIONS - ( 15 May 2023 07:32 )  Alb: 1.2 g/dL / Pro: 6.0 g/dL / ALK PHOS: 205 U/L / ALT: 15 U/L DA / AST: 120 U/L / GGT: x             Interval Diagnostic Studies: see sunrise for full report
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
## 2023-05-15 NOTE — PROGRESS NOTE ADULT - PROBLEM SELECTOR PLAN 3
UA (+)   Continue Ceftriaxone   F/u UC possibly multifactorial, EtOH, alcoholic hepatitis, + cholestasis of sepsis? (BCx pos for Gram pos cocci in clusters) +/- DILI (unable to give estimate of daily use), and need to r/o viral autoimmune, vascular process etc.  Continue NAC #3 dose   Continue abx per ID   Continue CIWA protocol   Consult IR for therapeutic paracentesis   Trend LFTs, INR  Accepted by Liver Transplant Team at  [facility]   Hepatology Dr. Garcia following
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
## 2023-05-15 NOTE — PROGRESS NOTE ADULT - PROBLEM SELECTOR PLAN 5
p/w Tbili 18.7  noted to have scleral icterus, jaundice of skin along with spider angioma on physical examination   - can be 2/2 to early onset liver failure   - MELD score noted to be 29 points 27-32% estimated 90 day mortality  - rocephin for SBP prophylaxis CT chest showing signs of MAY pneumonia  Procal elevated  Strep negative  Legionella negative.   continue rocephin  Continue supplemental oxygen.   Monitor oxygenation  CXR

## 2023-05-15 NOTE — CONSULT NOTE ADULT - CRITICAL CARE SERVICES PROVIDED
Patient is critically ill, requiring critical care services. S/P 5 days of chemotherapy. Remains pancytopenic. No new change. Will continue present care.

## 2023-05-15 NOTE — CONSULT NOTE ADULT - CRITICAL CARE ATTENDING COMMENT
dyspnea 2/2 abdominal ascites, fluid overload state confirmed on CXR with bilateral pulm edema  alert awke, no acute distress, need paracentesis, alb, lasix, abx  - dc IV hydration for now  - c/w oxygen supplementation as needed  - give IV lasix 40 mg push with albumin as needed  - monitor urine output for response to lasix    #hyperbilirubinemia (18.7 > 16.9) a/w transaminitis  -MELD 29  -Hepatology consulted, on ceftriaxone for c/o SBP. On NAC for liver failure.  -primary team planning for IR guided therapeutic paracentesis in AM      #hypokalemia/hypophosphatemia  - monitor and replete as necessary    #monitor urine output s/p lasix/albumin          #UTI -  c/w ceftriaxone, follow up cultures and blood work  #c/o SBP - c/w ceftriaxone, follow up cultures and blood work    _________ENDO__________    #hypothyroidism

## 2023-05-15 NOTE — CHART NOTE - NSCHARTNOTEFT_GEN_A_CORE
Called Dr. Conte to clarify prn ativan orders.  Lorazepam to be every 4 hours prn IVP for CIWA 8 or greater
CXR performed. Prelim read per  Radiologist Dr. Ritter: No pulm edema. small left pleural effusion.   -Hold off on lasix for now.   -F/u official report.     Roma Hall NP Medicine
Pt noted with mild tachypnea. Now on O2 2L NC. Pt endorses "some SOB"  Breath sounds with bilat crackles.   -IVF discontinued.   -CXR urgent   -Will give lasix 40mg IV if pulm congestion   -Consult IR in am for therapeutic paracentesis.   -Low thresh-hold for ICU.   -D/w attending Dr. Conte   -D/w Hepatolgoy.     Roma Hall NP Medicine
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
## 2023-05-15 NOTE — CONSULT NOTE ADULT - ASSESSMENT
Pneumonia  MSSA Bacteremia  No evidence  of SBP on tap  Leukocytosis      Plan - Cont Rocephin 2gms iv q24hrs for now will switch to Kefzol once treatment for pneumonia is complete  Cont Azithromycin 500mgs iv q24hrs  repeat blood cultures in 48hrs.

## 2023-05-15 NOTE — PROGRESS NOTE ADULT - PROBLEM SELECTOR PLAN 1
concerning for SBP    CT abdomen showing hepatomegaly. signs of portal hypertension with ascites   s/p diagnostic paracentesis by ED  Lactate 3.5. NS bolus 500ml  F/u repeat lactate  BC growing GP coccin in clusters.   f/u fluid cytology, cell count LDH protein    f/u blood and urine cultures    ID Dr. Boggs consulted.

## 2023-05-15 NOTE — PROVIDER CONTACT NOTE (CRITICAL VALUE NOTIFICATION) - TEST AND RESULT REPORTED:
K 2,8 PHOSPHORUS LEVEL 0.8
lact 3,5
Positive blood culture 5/13 - gram positive cocci in clusters in aerobic and anaerobic bottles

## 2023-05-15 NOTE — PROGRESS NOTE ADULT - PROBLEM SELECTOR PLAN 4
CT chest showing signs of MAY pneumonia  Procal elevated  Strep negative  Legionella negative.   continue rocephin  Continue supplemental oxygen.   Monitor oxygenation UA (+)   Continue Ceftriaxone   F/u UC

## 2023-05-15 NOTE — PROGRESS NOTE ADULT - PROBLEM SELECTOR PLAN 2
BC growing Gram Positive Cocci in Clusters in aerobic and anaerobic bottles.   Continue Ceftriaxone   F/u BC sensitivity  F/u ID consult with Dr. Boggs BC growing Gram Positive Cocci in Clusters in aerobic and anaerobic bottles.   Continue Ceftriaxone   F/u BC sensitivity  Echo   F/u ID consult with Dr. Boggs
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
## 2023-05-16 ENCOUNTER — INPATIENT (INPATIENT)
Facility: HOSPITAL | Age: 38
LOS: 40 days | Discharge: HOME HEALTH | DRG: 5 | End: 2023-06-26
Attending: TRANSPLANT SURGERY | Admitting: STUDENT IN AN ORGANIZED HEALTH CARE EDUCATION/TRAINING PROGRAM
Payer: MEDICAID

## 2023-05-16 VITALS
RESPIRATION RATE: 20 BRPM | DIASTOLIC BLOOD PRESSURE: 68 MMHG | HEART RATE: 114 BPM | TEMPERATURE: 98 F | OXYGEN SATURATION: 95 % | SYSTOLIC BLOOD PRESSURE: 110 MMHG

## 2023-05-16 VITALS
TEMPERATURE: 98 F | OXYGEN SATURATION: 98 % | RESPIRATION RATE: 16 BRPM | DIASTOLIC BLOOD PRESSURE: 67 MMHG | WEIGHT: 141.1 LBS | HEART RATE: 108 BPM | SYSTOLIC BLOOD PRESSURE: 104 MMHG | HEIGHT: 67 IN

## 2023-05-16 DIAGNOSIS — B25.9 CYTOMEGALOVIRAL DISEASE, UNSPECIFIED: ICD-10-CM

## 2023-05-16 DIAGNOSIS — Z29.9 ENCOUNTER FOR PROPHYLACTIC MEASURES, UNSPECIFIED: ICD-10-CM

## 2023-05-16 DIAGNOSIS — K72.00 ACUTE AND SUBACUTE HEPATIC FAILURE WITHOUT COMA: ICD-10-CM

## 2023-05-16 DIAGNOSIS — R78.81 BACTEREMIA: ICD-10-CM

## 2023-05-16 DIAGNOSIS — F10.10 ALCOHOL ABUSE, UNCOMPLICATED: ICD-10-CM

## 2023-05-16 DIAGNOSIS — K74.60 UNSPECIFIED CIRRHOSIS OF LIVER: ICD-10-CM

## 2023-05-16 DIAGNOSIS — J18.9 PNEUMONIA, UNSPECIFIED ORGANISM: ICD-10-CM

## 2023-05-16 DIAGNOSIS — K72.90 HEPATIC FAILURE, UNSPECIFIED WITHOUT COMA: ICD-10-CM

## 2023-05-16 PROBLEM — E03.9 HYPOTHYROIDISM, UNSPECIFIED: Chronic | Status: ACTIVE | Noted: 2023-05-13

## 2023-05-16 LAB
ALBUMIN SERPL ELPH-MCNC: 2.4 G/DL — LOW (ref 3.3–5)
ALP SERPL-CCNC: 240 U/L — HIGH (ref 40–120)
ALT FLD-CCNC: 18 U/L — SIGNIFICANT CHANGE UP (ref 10–45)
ANION GAP SERPL CALC-SCNC: 11 MMOL/L — SIGNIFICANT CHANGE UP (ref 5–17)
ANISOCYTOSIS BLD QL: SLIGHT — SIGNIFICANT CHANGE UP
APTT BLD: 43.9 SEC — HIGH (ref 27.5–35.5)
AST SERPL-CCNC: 146 U/L — HIGH (ref 10–40)
BASOPHILS # BLD AUTO: 0.17 K/UL — SIGNIFICANT CHANGE UP (ref 0–0.2)
BASOPHILS NFR BLD AUTO: 0.9 % — SIGNIFICANT CHANGE UP (ref 0–2)
BILIRUB DIRECT SERPL-MCNC: >10 MG/DL — HIGH (ref 0–0.3)
BILIRUB INDIRECT FLD-MCNC: <10.6 MG/DL — HIGH (ref 0.2–1)
BILIRUB SERPL-MCNC: 20.6 MG/DL — HIGH (ref 0.2–1.2)
BILIRUB SERPL-MCNC: 20.6 MG/DL — HIGH (ref 0.2–1.2)
BUN SERPL-MCNC: <4 MG/DL — LOW (ref 7–23)
CALCIUM SERPL-MCNC: 8.4 MG/DL — SIGNIFICANT CHANGE UP (ref 8.4–10.5)
CHLORIDE SERPL-SCNC: 96 MMOL/L — SIGNIFICANT CHANGE UP (ref 96–108)
CO2 SERPL-SCNC: 19 MMOL/L — LOW (ref 22–31)
CREAT SERPL-MCNC: 0.56 MG/DL — SIGNIFICANT CHANGE UP (ref 0.5–1.3)
EGFR: 119 ML/MIN/1.73M2 — SIGNIFICANT CHANGE UP
EOSINOPHIL # BLD AUTO: 0 K/UL — SIGNIFICANT CHANGE UP (ref 0–0.5)
EOSINOPHIL NFR BLD AUTO: 0 % — SIGNIFICANT CHANGE UP (ref 0–6)
GLUCOSE SERPL-MCNC: 118 MG/DL — HIGH (ref 70–99)
HCT VFR BLD CALC: 25.4 % — LOW (ref 34.5–45)
HGB BLD-MCNC: 8.5 G/DL — LOW (ref 11.5–15.5)
INR BLD: 2.28 RATIO — HIGH (ref 0.88–1.16)
LACTATE SERPL-SCNC: 3.2 MMOL/L — HIGH (ref 0.7–2)
LKM AB SER-ACNC: <20.1 UNITS — SIGNIFICANT CHANGE UP (ref 0–20)
LYMPHOCYTES # BLD AUTO: 0.49 K/UL — LOW (ref 1–3.3)
LYMPHOCYTES # BLD AUTO: 2.6 % — LOW (ref 13–44)
MACROCYTES BLD QL: SIGNIFICANT CHANGE UP
MAGNESIUM SERPL-MCNC: 1.5 MG/DL — LOW (ref 1.6–2.6)
MANUAL SMEAR VERIFICATION: SIGNIFICANT CHANGE UP
MCHC RBC-ENTMCNC: 33.5 GM/DL — SIGNIFICANT CHANGE UP (ref 32–36)
MCHC RBC-ENTMCNC: 37.3 PG — HIGH (ref 27–34)
MCV RBC AUTO: 111.4 FL — HIGH (ref 80–100)
METAMYELOCYTES # FLD: 0.9 % — HIGH (ref 0–0)
MONOCYTES # BLD AUTO: 1.79 K/UL — HIGH (ref 0–0.9)
MONOCYTES NFR BLD AUTO: 9.5 % — SIGNIFICANT CHANGE UP (ref 2–14)
NEUTROPHILS # BLD AUTO: 16.09 K/UL — HIGH (ref 1.8–7.4)
NEUTROPHILS NFR BLD AUTO: 84.3 % — HIGH (ref 43–77)
NEUTS BAND # BLD: 0.9 % — SIGNIFICANT CHANGE UP (ref 0–8)
PHOSPHATE SERPL-MCNC: 1.9 MG/DL — LOW (ref 2.5–4.5)
PLAT MORPH BLD: NORMAL — SIGNIFICANT CHANGE UP
PLATELET # BLD AUTO: 176 K/UL — SIGNIFICANT CHANGE UP (ref 150–400)
POIKILOCYTOSIS BLD QL AUTO: SLIGHT — SIGNIFICANT CHANGE UP
POTASSIUM SERPL-MCNC: 4.4 MMOL/L — SIGNIFICANT CHANGE UP (ref 3.5–5.3)
POTASSIUM SERPL-MCNC: 4.8 MMOL/L — SIGNIFICANT CHANGE UP (ref 3.5–5.3)
POTASSIUM SERPL-SCNC: 4.4 MMOL/L — SIGNIFICANT CHANGE UP (ref 3.5–5.3)
POTASSIUM SERPL-SCNC: 4.8 MMOL/L — SIGNIFICANT CHANGE UP (ref 3.5–5.3)
PROT SERPL-MCNC: 6.3 G/DL — SIGNIFICANT CHANGE UP (ref 6–8.3)
PROTHROM AB SERPL-ACNC: 26.4 SEC — HIGH (ref 10.5–13.4)
RBC # BLD: 2.28 M/UL — LOW (ref 3.8–5.2)
RBC # FLD: 19.7 % — HIGH (ref 10.3–14.5)
RBC BLD AUTO: ABNORMAL
SODIUM SERPL-SCNC: 126 MMOL/L — LOW (ref 135–145)
TARGETS BLD QL SMEAR: SLIGHT — SIGNIFICANT CHANGE UP
VARIANT LYMPHS # BLD: 0.9 % — SIGNIFICANT CHANGE UP (ref 0–6)
WBC # BLD: 18.89 K/UL — HIGH (ref 3.8–10.5)
WBC # FLD AUTO: 18.89 K/UL — HIGH (ref 3.8–10.5)

## 2023-05-16 PROCEDURE — 93005 ELECTROCARDIOGRAM TRACING: CPT

## 2023-05-16 PROCEDURE — 85025 COMPLETE CBC W/AUTO DIFF WBC: CPT

## 2023-05-16 PROCEDURE — 85027 COMPLETE CBC AUTOMATED: CPT

## 2023-05-16 PROCEDURE — 99285 EMERGENCY DEPT VISIT HI MDM: CPT | Mod: 25

## 2023-05-16 PROCEDURE — 87449 NOS EACH ORGANISM AG IA: CPT

## 2023-05-16 PROCEDURE — G0480: CPT

## 2023-05-16 PROCEDURE — 82945 GLUCOSE OTHER FLUID: CPT

## 2023-05-16 PROCEDURE — 82042 OTHER SOURCE ALBUMIN QUAN EA: CPT

## 2023-05-16 PROCEDURE — 86703 HIV-1/HIV-2 1 RESULT ANTBDY: CPT

## 2023-05-16 PROCEDURE — 83735 ASSAY OF MAGNESIUM: CPT

## 2023-05-16 PROCEDURE — 76705 ECHO EXAM OF ABDOMEN: CPT | Mod: 26

## 2023-05-16 PROCEDURE — 86664 EPSTEIN-BARR NUCLEAR ANTIGEN: CPT

## 2023-05-16 PROCEDURE — 86225 DNA ANTIBODY NATIVE: CPT

## 2023-05-16 PROCEDURE — 89051 BODY FLUID CELL COUNT: CPT

## 2023-05-16 PROCEDURE — 70486 CT MAXILLOFACIAL W/O DYE: CPT | Mod: MA

## 2023-05-16 PROCEDURE — 86663 EPSTEIN-BARR ANTIBODY: CPT

## 2023-05-16 PROCEDURE — 87899 AGENT NOS ASSAY W/OPTIC: CPT

## 2023-05-16 PROCEDURE — 86255 FLUORESCENT ANTIBODY SCREEN: CPT

## 2023-05-16 PROCEDURE — 87040 BLOOD CULTURE FOR BACTERIA: CPT

## 2023-05-16 PROCEDURE — 83690 ASSAY OF LIPASE: CPT

## 2023-05-16 PROCEDURE — 86431 RHEUMATOID FACTOR QUANT: CPT

## 2023-05-16 PROCEDURE — 82330 ASSAY OF CALCIUM: CPT

## 2023-05-16 PROCEDURE — 87075 CULTR BACTERIA EXCEPT BLOOD: CPT

## 2023-05-16 PROCEDURE — 87205 SMEAR GRAM STAIN: CPT

## 2023-05-16 PROCEDURE — 82962 GLUCOSE BLOOD TEST: CPT

## 2023-05-16 PROCEDURE — 99232 SBSQ HOSP IP/OBS MODERATE 35: CPT

## 2023-05-16 PROCEDURE — 99233 SBSQ HOSP IP/OBS HIGH 50: CPT

## 2023-05-16 PROCEDURE — 96376 TX/PRO/DX INJ SAME DRUG ADON: CPT

## 2023-05-16 PROCEDURE — 70450 CT HEAD/BRAIN W/O DYE: CPT | Mod: MA

## 2023-05-16 PROCEDURE — 86738 MYCOPLASMA ANTIBODY: CPT

## 2023-05-16 PROCEDURE — 87798 DETECT AGENT NOS DNA AMP: CPT

## 2023-05-16 PROCEDURE — 87070 CULTURE OTHR SPECIMN AEROBIC: CPT

## 2023-05-16 PROCEDURE — P9047: CPT

## 2023-05-16 PROCEDURE — 86381 MITOCHONDRIAL ANTIBODY EACH: CPT

## 2023-05-16 PROCEDURE — 80307 DRUG TEST PRSMV CHEM ANLYZR: CPT

## 2023-05-16 PROCEDURE — 86704 HEP B CORE ANTIBODY TOTAL: CPT

## 2023-05-16 PROCEDURE — 87186 SC STD MICRODIL/AGAR DIL: CPT

## 2023-05-16 PROCEDURE — 72125 CT NECK SPINE W/O DYE: CPT | Mod: MA

## 2023-05-16 PROCEDURE — 84157 ASSAY OF PROTEIN OTHER: CPT

## 2023-05-16 PROCEDURE — 80053 COMPREHEN METABOLIC PANEL: CPT

## 2023-05-16 PROCEDURE — 96374 THER/PROPH/DIAG INJ IV PUSH: CPT

## 2023-05-16 PROCEDURE — 86038 ANTINUCLEAR ANTIBODIES: CPT

## 2023-05-16 PROCEDURE — 36415 COLL VENOUS BLD VENIPUNCTURE: CPT

## 2023-05-16 PROCEDURE — 86706 HEP B SURFACE ANTIBODY: CPT

## 2023-05-16 PROCEDURE — 83615 LACTATE (LD) (LDH) ENZYME: CPT

## 2023-05-16 PROCEDURE — 80074 ACUTE HEPATITIS PANEL: CPT

## 2023-05-16 PROCEDURE — 87640 STAPH A DNA AMP PROBE: CPT

## 2023-05-16 PROCEDURE — 74177 CT ABD & PELVIS W/CONTRAST: CPT | Mod: MA

## 2023-05-16 PROCEDURE — 84484 ASSAY OF TROPONIN QUANT: CPT

## 2023-05-16 PROCEDURE — 86708 HEPATITIS A ANTIBODY: CPT

## 2023-05-16 PROCEDURE — 82105 ALPHA-FETOPROTEIN SERUM: CPT

## 2023-05-16 PROCEDURE — 87529 HSV DNA AMP PROBE: CPT

## 2023-05-16 PROCEDURE — 84100 ASSAY OF PHOSPHORUS: CPT

## 2023-05-16 PROCEDURE — 86665 EPSTEIN-BARR CAPSID VCA: CPT

## 2023-05-16 PROCEDURE — 82248 BILIRUBIN DIRECT: CPT

## 2023-05-16 PROCEDURE — 87641 MR-STAPH DNA AMP PROBE: CPT

## 2023-05-16 PROCEDURE — 83605 ASSAY OF LACTIC ACID: CPT

## 2023-05-16 PROCEDURE — 87799 DETECT AGENT NOS DNA QUANT: CPT

## 2023-05-16 PROCEDURE — 81001 URINALYSIS AUTO W/SCOPE: CPT

## 2023-05-16 PROCEDURE — 99253 IP/OBS CNSLTJ NEW/EST LOW 45: CPT

## 2023-05-16 PROCEDURE — 86036 ANCA SCREEN EACH ANTIBODY: CPT

## 2023-05-16 PROCEDURE — 84443 ASSAY THYROID STIM HORMONE: CPT

## 2023-05-16 PROCEDURE — 84145 PROCALCITONIN (PCT): CPT

## 2023-05-16 PROCEDURE — 85730 THROMBOPLASTIN TIME PARTIAL: CPT

## 2023-05-16 PROCEDURE — 96375 TX/PRO/DX INJ NEW DRUG ADDON: CPT

## 2023-05-16 PROCEDURE — 82247 BILIRUBIN TOTAL: CPT

## 2023-05-16 PROCEDURE — 86376 MICROSOMAL ANTIBODY EACH: CPT

## 2023-05-16 PROCEDURE — 85610 PROTHROMBIN TIME: CPT

## 2023-05-16 PROCEDURE — 84132 ASSAY OF SERUM POTASSIUM: CPT

## 2023-05-16 PROCEDURE — 87086 URINE CULTURE/COLONY COUNT: CPT

## 2023-05-16 PROCEDURE — 82803 BLOOD GASES ANY COMBINATION: CPT

## 2023-05-16 PROCEDURE — 76705 ECHO EXAM OF ABDOMEN: CPT

## 2023-05-16 PROCEDURE — 87150 DNA/RNA AMPLIFIED PROBE: CPT

## 2023-05-16 PROCEDURE — 82784 ASSAY IGA/IGD/IGG/IGM EACH: CPT

## 2023-05-16 PROCEDURE — 99255 IP/OBS CONSLTJ NEW/EST HI 80: CPT

## 2023-05-16 PROCEDURE — 82390 ASSAY OF CERULOPLASMIN: CPT

## 2023-05-16 PROCEDURE — 84702 CHORIONIC GONADOTROPIN TEST: CPT

## 2023-05-16 PROCEDURE — 71260 CT THORAX DX C+: CPT | Mod: MA

## 2023-05-16 PROCEDURE — 84295 ASSAY OF SERUM SODIUM: CPT

## 2023-05-16 PROCEDURE — 80048 BASIC METABOLIC PNL TOTAL CA: CPT

## 2023-05-16 PROCEDURE — 87521 HEPATITIS C PROBE&RVRS TRNSC: CPT

## 2023-05-16 PROCEDURE — 71045 X-RAY EXAM CHEST 1 VIEW: CPT

## 2023-05-16 PROCEDURE — 82140 ASSAY OF AMMONIA: CPT

## 2023-05-16 RX ORDER — ONDANSETRON 8 MG/1
4 TABLET, FILM COATED ORAL EVERY 6 HOURS
Refills: 0 | Status: DISCONTINUED | OUTPATIENT
Start: 2023-05-16 | End: 2023-06-09

## 2023-05-16 RX ORDER — ONDANSETRON 8 MG/1
4 TABLET, FILM COATED ORAL EVERY 8 HOURS
Refills: 0 | Status: DISCONTINUED | OUTPATIENT
Start: 2023-05-16 | End: 2023-05-16

## 2023-05-16 RX ORDER — LANOLIN ALCOHOL/MO/W.PET/CERES
3 CREAM (GRAM) TOPICAL AT BEDTIME
Refills: 0 | Status: DISCONTINUED | OUTPATIENT
Start: 2023-05-16 | End: 2023-05-16

## 2023-05-16 RX ORDER — FOLIC ACID 0.8 MG
1 TABLET ORAL DAILY
Refills: 0 | Status: DISCONTINUED | OUTPATIENT
Start: 2023-05-16 | End: 2023-06-09

## 2023-05-16 RX ORDER — LEVOTHYROXINE SODIUM 125 MCG
1 TABLET ORAL
Refills: 0 | DISCHARGE

## 2023-05-16 RX ORDER — MORPHINE SULFATE 50 MG/1
1 CAPSULE, EXTENDED RELEASE ORAL
Qty: 0 | Refills: 0 | DISCHARGE
Start: 2023-05-16

## 2023-05-16 RX ORDER — FOLIC ACID 0.8 MG
1 TABLET ORAL
Qty: 0 | Refills: 0 | DISCHARGE
Start: 2023-05-16

## 2023-05-16 RX ORDER — ONDANSETRON 8 MG/1
4 TABLET, FILM COATED ORAL
Qty: 0 | Refills: 0 | DISCHARGE
Start: 2023-05-16

## 2023-05-16 RX ORDER — MUPIROCIN 20 MG/G
1 OINTMENT TOPICAL
Qty: 0 | Refills: 0 | DISCHARGE
Start: 2023-05-16

## 2023-05-16 RX ORDER — MORPHINE SULFATE 50 MG/1
15 CAPSULE, EXTENDED RELEASE ORAL EVERY 6 HOURS
Refills: 0 | Status: DISCONTINUED | OUTPATIENT
Start: 2023-05-16 | End: 2023-05-18

## 2023-05-16 RX ORDER — ACETYLCYSTEINE 200 MG/ML
40 VIAL (ML) MISCELLANEOUS
Qty: 0 | Refills: 0 | DISCHARGE
Start: 2023-05-16

## 2023-05-16 RX ORDER — ACETYLCYSTEINE 200 MG/ML
6 VIAL (ML) MISCELLANEOUS ONCE
Refills: 0 | Status: COMPLETED | OUTPATIENT
Start: 2023-05-16 | End: 2023-05-16

## 2023-05-16 RX ORDER — AZITHROMYCIN 500 MG/1
500 TABLET, FILM COATED ORAL EVERY 24 HOURS
Refills: 0 | Status: DISCONTINUED | OUTPATIENT
Start: 2023-05-16 | End: 2023-05-16

## 2023-05-16 RX ORDER — THIAMINE MONONITRATE (VIT B1) 100 MG
100 TABLET ORAL DAILY
Refills: 0 | Status: DISCONTINUED | OUTPATIENT
Start: 2023-05-16 | End: 2023-06-09

## 2023-05-16 RX ORDER — CHLORHEXIDINE GLUCONATE 213 G/1000ML
1 SOLUTION TOPICAL
Qty: 0 | Refills: 0 | DISCHARGE
Start: 2023-05-16

## 2023-05-16 RX ORDER — CEFTRIAXONE 500 MG/1
2 INJECTION, POWDER, FOR SOLUTION INTRAMUSCULAR; INTRAVENOUS
Qty: 0 | Refills: 0 | DISCHARGE
Start: 2023-05-16

## 2023-05-16 RX ORDER — CEFTRIAXONE 500 MG/1
2000 INJECTION, POWDER, FOR SOLUTION INTRAMUSCULAR; INTRAVENOUS EVERY 24 HOURS
Refills: 0 | Status: DISCONTINUED | OUTPATIENT
Start: 2023-05-16 | End: 2023-05-16

## 2023-05-16 RX ORDER — MORPHINE SULFATE 50 MG/1
2 CAPSULE, EXTENDED RELEASE ORAL ONCE
Refills: 0 | Status: DISCONTINUED | OUTPATIENT
Start: 2023-05-16 | End: 2023-05-16

## 2023-05-16 RX ORDER — SODIUM,POTASSIUM PHOSPHATES 278-250MG
1 POWDER IN PACKET (EA) ORAL
Qty: 0 | Refills: 0 | DISCHARGE
Start: 2023-05-16

## 2023-05-16 RX ORDER — KETOROLAC TROMETHAMINE 30 MG/ML
15 SYRINGE (ML) INJECTION ONCE
Refills: 0 | Status: DISCONTINUED | OUTPATIENT
Start: 2023-05-16 | End: 2023-05-16

## 2023-05-16 RX ORDER — LACTULOSE 10 G/15ML
20 SOLUTION ORAL EVERY 6 HOURS
Refills: 0 | Status: DISCONTINUED | OUTPATIENT
Start: 2023-05-16 | End: 2023-05-21

## 2023-05-16 RX ORDER — HEPARIN SODIUM 5000 [USP'U]/ML
5000 INJECTION INTRAVENOUS; SUBCUTANEOUS EVERY 8 HOURS
Refills: 0 | Status: DISCONTINUED | OUTPATIENT
Start: 2023-05-16 | End: 2023-06-09

## 2023-05-16 RX ORDER — LEVOTHYROXINE SODIUM 125 MCG
100 TABLET ORAL DAILY
Refills: 0 | Status: DISCONTINUED | OUTPATIENT
Start: 2023-05-16 | End: 2023-06-09

## 2023-05-16 RX ORDER — CEFAZOLIN SODIUM 1 G
2000 VIAL (EA) INJECTION EVERY 8 HOURS
Refills: 0 | Status: DISCONTINUED | OUTPATIENT
Start: 2023-05-16 | End: 2023-05-19

## 2023-05-16 RX ORDER — LACTULOSE 10 G/15ML
30 SOLUTION ORAL
Qty: 0 | Refills: 0 | DISCHARGE
Start: 2023-05-16

## 2023-05-16 RX ORDER — LEVOTHYROXINE SODIUM 125 MCG
1 TABLET ORAL
Qty: 0 | Refills: 0 | DISCHARGE
Start: 2023-05-16

## 2023-05-16 RX ORDER — THIAMINE MONONITRATE (VIT B1) 100 MG
1 TABLET ORAL
Qty: 0 | Refills: 0 | DISCHARGE
Start: 2023-05-16

## 2023-05-16 RX ADMIN — HEPARIN SODIUM 5000 UNIT(S): 5000 INJECTION INTRAVENOUS; SUBCUTANEOUS at 14:16

## 2023-05-16 RX ADMIN — Medication 64.38 GRAM(S): at 14:17

## 2023-05-16 RX ADMIN — CEFTRIAXONE 100 MILLIGRAM(S): 500 INJECTION, POWDER, FOR SOLUTION INTRAMUSCULAR; INTRAVENOUS at 14:17

## 2023-05-16 RX ADMIN — Medication 100 MICROGRAM(S): at 14:17

## 2023-05-16 RX ADMIN — ONDANSETRON 4 MILLIGRAM(S): 8 TABLET, FILM COATED ORAL at 17:57

## 2023-05-16 RX ADMIN — MORPHINE SULFATE 15 MILLIGRAM(S): 50 CAPSULE, EXTENDED RELEASE ORAL at 15:49

## 2023-05-16 RX ADMIN — Medication 1 MILLIGRAM(S): at 17:40

## 2023-05-16 RX ADMIN — MORPHINE SULFATE 2 MILLIGRAM(S): 50 CAPSULE, EXTENDED RELEASE ORAL at 03:21

## 2023-05-16 RX ADMIN — LACTULOSE 20 GRAM(S): 10 SOLUTION ORAL at 23:02

## 2023-05-16 RX ADMIN — MORPHINE SULFATE 2 MILLIGRAM(S): 50 CAPSULE, EXTENDED RELEASE ORAL at 03:55

## 2023-05-16 RX ADMIN — MORPHINE SULFATE 15 MILLIGRAM(S): 50 CAPSULE, EXTENDED RELEASE ORAL at 14:16

## 2023-05-16 RX ADMIN — MUPIROCIN 1 APPLICATION(S): 20 OINTMENT TOPICAL at 05:32

## 2023-05-16 RX ADMIN — LACTULOSE 20 GRAM(S): 10 SOLUTION ORAL at 05:09

## 2023-05-16 RX ADMIN — Medication 100 MILLIGRAM(S): at 14:16

## 2023-05-16 RX ADMIN — Medication 100 MICROGRAM(S): at 05:08

## 2023-05-16 RX ADMIN — CHLORHEXIDINE GLUCONATE 1 APPLICATION(S): 213 SOLUTION TOPICAL at 05:33

## 2023-05-16 RX ADMIN — LACTULOSE 20 GRAM(S): 10 SOLUTION ORAL at 17:40

## 2023-05-16 RX ADMIN — MORPHINE SULFATE 15 MILLIGRAM(S): 50 CAPSULE, EXTENDED RELEASE ORAL at 05:45

## 2023-05-16 RX ADMIN — MORPHINE SULFATE 15 MILLIGRAM(S): 50 CAPSULE, EXTENDED RELEASE ORAL at 22:19

## 2023-05-16 RX ADMIN — HEPARIN SODIUM 5000 UNIT(S): 5000 INJECTION INTRAVENOUS; SUBCUTANEOUS at 21:20

## 2023-05-16 RX ADMIN — MORPHINE SULFATE 15 MILLIGRAM(S): 50 CAPSULE, EXTENDED RELEASE ORAL at 05:08

## 2023-05-16 RX ADMIN — Medication 100 MILLIGRAM(S): at 21:20

## 2023-05-16 RX ADMIN — MORPHINE SULFATE 15 MILLIGRAM(S): 50 CAPSULE, EXTENDED RELEASE ORAL at 21:19

## 2023-05-16 NOTE — PATIENT PROFILE ADULT - FUNCTIONAL ASSESSMENT - BASIC MOBILITY 6.
3 = A little assistance Olanzapine Counseling- I discussed with the patient the common side effects of olanzapine including but are not limited to: lack of energy, dry mouth, increased appetite, sleepiness, tremor, constipation, dizziness, changes in behavior, or restlessness.  Explained that teenagers are more likely to experience headaches, abdominal pain, pain in the arms or legs, tiredness, and sleepiness.  Serious side effects include but are not limited: increased risk of death in elderly patients who are confused, have memory loss, or dementia-related psychosis; hyperglycemia; increased cholesterol and triglycerides; and weight gain.
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
## 2023-05-16 NOTE — H&P ADULT - PROBLEM SELECTOR PLAN 2
Distended abdomen, s/p paracentesis ( was only Dx in ED), SAAG 1.1, total protein 1.0, consistent w/ cirrhotic/portal hypertensive  - already on IV CTX  - repeat US abdomen ordered, may need repeat paracentesis. CT abd- moderate ascites  - IR consult called

## 2023-05-16 NOTE — PATIENT PROFILE ADULT - FALL HARM RISK - HARM RISK INTERVENTIONS

## 2023-05-16 NOTE — ACUTE INTERFACILITY TRANSFER NOTE - HOSPITAL COURSE
Patient is a 37 yo female with hx of hypothyroidism presents with abdominal pain.  Upon evaluation in ED, patient afebrile and hemodynamically stable. Patient found to have juandice of he skin and scleral icterus with a tense and tender abdomen. ED performed diagnostic paracentesis. Labs significant for leukocytosis of 16, potassium noted to be 2.5, bilirubin noted to 18.7, alk phos of 226 with transaminitis of  and ALT14. EKG NSR at 95 bpm. CT head negative for acute infarct or hemorrhage. CT maxillofacial showing right lateral periorbital hematoma. CT cervical spine negative for fracture. CT chest showing MAY consolidation suspicious for PNA. CT abdomen showing contracted gallbladder, Hepatomegaly and diffuse upper extremity ptosis with signs of portal hypertension and moderate ascites. Patient admitted to medicine for hyperbilirubinemia r/o liver failure and hypokalemia.   # Hyperbilirubinemia. Pt  p/w Tbili 18.7 noted to have scleral icterus, jaundice of skin along with spider angioma on physical examination   can be 2/2 to early onset liver failure possibly multifactorial, EtOH, alcoholic hepatitis, + cholestasis of sepsis? (BCx pos for Gram pos cocci in clusters) +/- DILI (unable to give estimate of daily use), and need to r/o viral autoimmune, vascular process etc. MELD score noted to be 29 points 27-32% estimated 90 day mortality  Continue Rocephin for SBP prophylaxis; continue NAC #3 dose over 24 hrs.  IR to be consulted in AM for therapeutic paracentesis.  Hepatology, Dr Garcia was consulted & is following pt.    # Acute liver failure. Possibly multifactorial, EtOH, alcoholic hepatitis, + cholestasis of sepsis? (BCx pos for Gram pos cocci in clusters) +/- DILI (unable to give estimate of daily use), and need to r/o viral autoimmune, vascular process etc. Accepted by Liver Transplant Team at  [facility]. Continue NAC #3 dose   Continue abx per ID. Continue CIWA protocol. Trend INR & LFT's.    #  Bacteremia.  BC growing Gram Positive Cocci in Clusters in aerobic and anaerobic bottles. Continue Ceftriaxone. F/u BC sensitivity  Echo. F/u ID consult with Dr. Boggs. Cont ceftriaxone 2GM Q24 hours. F/u UCx.     # Left upper lobe pneumonia. CT chest showing signs of MAY pneumonia.  Procal elevated. Cont Rocephin & Oxygen supplementation.                                                                                                                                                                                                                                                                                                                                                                                                                                                                                                                                                                                                                                                                                             Strep negative  Legionella negative.   continue rocephin  Continue supplemental oxygen.

## 2023-05-16 NOTE — H&P ADULT - NEUROLOGICAL
AAOx 3, no tremor/cranial nerves II-XII intact/sensation intact/responds to pain/responds to verbal commands/deep reflexes intact/cranial nerves intact details…
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
## 2023-05-16 NOTE — H&P ADULT - PROBLEM SELECTOR PLAN 5
Has been drinking alcohol- ALEKSANDRA Camarillo neg, admitted at [facility] on 5/13, no s/s of withdrawal  - SW eval  - advised to stop drinking alcohol  - Thiamin, Folate MAY consolidation on CT chest  - on IV CTX and Azithromycin per ID at [facility]
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
## 2023-05-16 NOTE — H&P ADULT - ASSESSMENT
This is a 39 yo Polish Female with Hx of hypothyroidism (on Levothyroxine), and no other known medical Hx, presented to [facility] ED on 5/13/23 with ~ 2 weeks Hx of progressively worsening abdominal pain ("stabbing, diffuse, non radiating") and distention,  as well as 1 day Hx of jaundice, and was admitted to medicine with liver failure (possibly acute on chronic), with MELD-Na 29 on admission, hyperbilirubinemia (Se bi 18.9, direct 15.9), coagulopathy (INR 2.15), mild liver enzyme elevation in AST>>ALT pattern (, ALT 14, ), severe hypoalbuminemia (alb 1.3), as well as leukocytosis (WBC 16k), anemia (Hb 8.0).     She was empirically started on NAC per protocol, hepatology evaluated. ID evaluated for MAY Pna and later MSSA bacteremia, has been on IV ceftriaxone and azithromycin. Ascitic fluid analysis (Dx paracentesis 5/13), was portal hypertensive (SAAG 1.1, total protein 1.0), and was not suggestive of SBP (PMN < 50). However, she continued to c/o abdominal pain, requiring morphine, getting every 8 hours. The patient is transferred to [facility] for Liver Transplant eval.

## 2023-05-16 NOTE — H&P ADULT - PROBLEM SELECTOR PLAN 6
Heparin Sc Has been drinking alcohol- ALEKSANDRA Camarillo neg, admitted at [facility] on 5/13, no s/s of withdrawal  - SW eval  - advised to stop drinking alcohol  - Thiamin, Folate

## 2023-05-16 NOTE — CONSULT NOTE ADULT - ASSESSMENT
Interventional Radiology    Evaluate for Procedure: Paracentesis      HPI: 38y Female with This is a 37 yo Polish Female with Hx of hypothyroidism (on Levothyroxine), and no other known medical Hx, presented to [facility] ED on 5/13/23 with ~ 2 weeks Hx of progressively worsening abdominal pain ("stabbing, diffuse, non radiating") and distention,  as well as 1 day Hx of jaundice, and was admitted to medicine with liver failure (possibly acute on chronic), with MELD-Na 29 on admission, hyperbilirubinemia (Se bi 18.9, direct 15.9), coagulopathy (INR 2.15), mild liver enzyme elevation in AST>>ALT pattern (, ALT 14, ), severe hypoalbuminemia (alb 1.3), as well as leukocytosis (WBC 16k), anemia (Hb 8.0).     She was empirically started on NAC per protocol, hepatology evaluated. ID evaluated for MAY Pna and later MSSA bacteremia, has been on IV ceftriaxone and azithromycin. Ascitic fluid analysis (Dx paracentesis 5/13), was portal hypertensive (SAAG 1.1, total protein 1.0), and was not suggestive of SBP (PMN < 50). However, she continued to c/o abdominal pain, requiring morphine, getting every 8 hours. The patient is transferred to [facility] for Liver Transplant eval    Allergies: No Known Allergies    Medications (Abx/Cardiac/Anticoagulation/Blood Products)    azithromycin  IVPB: 255 mL/Hr IV Intermittent (05-15 @ 18:36)  cefTRIAXone   IVPB: 100 mL/Hr IV Intermittent (05-16 @ 14:17)  cefTRIAXone   IVPB: 100 mL/Hr IV Intermittent (05-15 @ 21:46)  enoxaparin Injectable: 40 milliGRAM(s) SubCutaneous (05-14 @ 17:35)  heparin   Injectable: 5000 Unit(s) SubCutaneous (05-16 @ 14:16)    Data:  170.2  64  T(C): 36.6  HR: 111  BP: 103/69  RR: 16  SpO2: 98%    -WBC 16.36 / HgB 8.4 / Hct 25.3 / Plt 179  -Na -- / Cl -- / BUN -- / Glucose --  -K 4.4 / CO2 -- / Cr --  -ALT -- / Alk Phos -- / T.Bili --  -INR 2.60 / PTT --      Radiology:     Assessment/Plan: This is a 37 yo Polish Female with Hx of hypothyroidism (on Levothyroxine), and no other known medical Hx, presented to [facility] ED on 5/13/23 with ~ 2 weeks Hx of progressively worsening abdominal pain ("stabbing, diffuse, non radiating") and distention,  as well as 1 day Hx of jaundice, and was admitted to medicine with liver failure (possibly acute on chronic), with MELD-Na 29 on admission, hyperbilirubinemia (Se bi 18.9, direct 15.9), coagulopathy (INR 2.15), mild liver enzyme elevation in AST>>ALT pattern (, ALT 14, ), severe hypoalbuminemia (alb 1.3), as well as leukocytosis (WBC 16k), anemia (Hb 8.0).     She was empirically started on NAC per protocol, hepatology evaluated. ID evaluated for MAY Pna and later MSSA bacteremia, has been on IV ceftriaxone and azithromycin. Ascitic fluid analysis (Dx paracentesis 5/13), was portal hypertensive (SAAG 1.1, total protein 1.0), and was not suggestive of SBP (PMN < 50). However, she continued to c/o abdominal pain, requiring morphine, getting every 8 hours. The patient is transferred to [facility] for Liver Transplant eval.    IR consulted for paracentesis.    -- IR will plan to perform paracentesis   -- No NPO required  -- hold a.m. anticoagulation at midnight on 5/17/2023   -- please place IR procedure request order under [facility] [place]    --  Tino Mauricio MD PGY-2  Vascular and Interventional Radiology   Available on [employer] Teams    - Non-emergent consults: Place IR consult order in [place]  - Emergent issues (pager): [facility] [phone]; [facility] [phone]; [phone]  - Scheduling questions: [facility] [phone]; [facility] [phone]  - Clinic/outpatient booking: [facility] [phone]; [facility] [phone]

## 2023-05-16 NOTE — H&P ADULT - HISTORY OF PRESENT ILLNESS
This is a 39 yo Polish Female with Hx of hypothyroidism (on Levothyroxine), and no other known medical Hx, presented to [facility] ED on 5/13/23 with ~ 2 weeks Hx of progressively worsening abdominal pain ("stabbing, diffuse, non radiating") and distention,  as well as 1 day Hx of jaundice, and was admitted to medicine with liver failure (possibly acute on chronic), with MELD-Na 29 on admission, hyperbilirubinemia (Se bi 18.9, direct 15.9), coagulopathy (INR 2.15), mild liver enzyme elevation in AST>>ALT pattern (, ALT 14, ), severe hypoalbuminemia (alb 1.3), as well as leukocytosis (WBC 16k), anemia (Hb 8.0).     She was empirically started on NAC per protocol, hepatology evaluated. ID evaluated for MAY Pna and later MSSA bacteremia, has been on IV ceftriaxone and azithromycin. Ascitic fluid analysis (Dx paracentesis 5/13), was portal hypertensive (SAAG 1.1, total protein 1.0), and was not suggestive of SBP (PMN < 50). However, she continued to c/o abdominal pain, requiring morphine, getting every 8 hours. The patient is transferred to [facility] for Liver Transplant eval. This is a 39 yo Polish Female with Hx of hypothyroidism (on Levothyroxine), and no other known medical Hx, presented to [facility] ED on 5/13/23 with ~ 2 weeks Hx of progressively worsening abdominal pain ("stabbing, diffuse, non radiating") and distention,  as well as 1 day Hx of jaundice, and was admitted to medicine with liver failure (possibly acute on chronic), with MELD-Na 29 on admission, hyperbilirubinemia (Se bi 18.9, direct 15.9), coagulopathy (INR 2.15), mild liver enzyme elevation in AST>>ALT pattern (, ALT 14, ), severe hypoalbuminemia (alb 1.3), as well as leukocytosis (WBC 16k), anemia (Hb 8.0).     She was empirically started on NAC per protocol, hepatology evaluated. ID evaluated for MAY Pna and later MSSA bacteremia, has been on IV ceftriaxone and azithromycin. Ascitic fluid analysis (Dx paracentesis 5/13), was portal hypertensive (SAAG 1.1, total protein 1.0), and was not suggestive of SBP (PMN < 50). However, she continued to c/o abdominal pain, requiring morphine, getting every 8 hours. The patient is transferred to [facility] for Liver Transplant eval.

## 2023-05-16 NOTE — H&P ADULT - PROBLEM SELECTOR PLAN 1
Acute vs acute on chronic liver failure. Bili 16, PT/INR 31/2.6  - Etiology is multifactorial, EtOH, alcoholic hepatitis, sepsis from MSSA bacteremia, PNA).  - Was started on NAC on 5/14/23. C/w liver failure protocol NAC  - Liver workup so far at [facility] per Hepatology-:   ---Hep A IgM neg / IgG neg (not immune), HBsAg neg / HBcAb neg/HBcAb IgM neg, HBsAb pos (immune, vaccinated), HCV ab neg.   ---EBV recent or past infection?, CMV PCR detectable, but technically under the detection limit. HSV 1/2 PCR neg. Legionella neg.   ---Utox neg, BAL neg. Tylenol, salicylate levels neg.   ---Lipase 58.   ---ISHAN weak pos 1:80, IgG and IgA elevated, ANCA, dsDNA neg  ---Ceruloplasmin 25  - Hepatology consult requested  - will start Rifaximin, Lactulose 10gm po q 8 hrs Acute vs acute on chronic liver failure. Bili 16, PT/INR 31/2.6  - Etiology is multifactorial, EtOH, alcoholic hepatitis, sepsis from MSSA bacteremia, PNA).  - Was started on NAC on 5/14/23.   - Liver workup so far at [facility] per Hepatology-:   ---Hep A IgM neg / IgG neg (not immune), HBsAg neg / HBcAb neg/HBcAb IgM neg, HBsAb pos (immune, vaccinated), HCV ab neg.   ---EBV recent or past infection?, CMV PCR detectable, but technically under the detection limit. HSV 1/2 PCR neg. Legionella neg.   ---Utox neg, BAL neg. Tylenol, salicylate levels neg.   ---Lipase 58. ISHAN weak pos 1:80, IgG and IgA elevated, ANCA, dsDNA neg, Ceruloplasmin 25    Plan at [facility]-   Hepatology/Transplant consult.. MELD- Na 31, no steroid indicated for bacteremia/PNA  - C/w liver failure protocol NAC  - c/w Rifaximin, Lactulose 20gm po q 6 hrs  - IR consult for therapeutic Paracentesis

## 2023-05-16 NOTE — ACUTE INTERFACILITY TRANSFER NOTE - SECONDARY DIAGNOSIS.
Hyperbilirubinemia Abdominal pain You can access the FollowMyHealth Patient Portal offered by [facility] by registering at the following website: [email or web address]. By joining [facility]’s FollowMyHealth portal, you will also be able to view your health information using other applications (apps) compatible with our system.
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
## 2023-05-16 NOTE — H&P ADULT - PROBLEM SELECTOR PLAN 3
Afebrile, WBC 16, repeat blood c/s neg  - c/w IV CTX  - ID consult called,   - Echo ordered to r/o Saryg
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
## 2023-05-16 NOTE — H&P ADULT - PROBLEM SELECTOR PLAN 4
MAY consolidation on CT chest  - on IV CTX and Azithromycin per ID at [facility] detected <34.5 on PCR 5/14/2023 at [facility]  - ID input awaiting

## 2023-05-17 DIAGNOSIS — K70.10 ALCOHOLIC HEPATITIS WITHOUT ASCITES: ICD-10-CM

## 2023-05-17 LAB
-  AMPICILLIN/SULBACTAM: SIGNIFICANT CHANGE UP
-  CEFAZOLIN: SIGNIFICANT CHANGE UP
-  CLINDAMYCIN: SIGNIFICANT CHANGE UP
-  ERYTHROMYCIN: SIGNIFICANT CHANGE UP
-  GENTAMICIN: SIGNIFICANT CHANGE UP
-  OXACILLIN: SIGNIFICANT CHANGE UP
-  PENICILLIN: SIGNIFICANT CHANGE UP
-  RIFAMPIN: SIGNIFICANT CHANGE UP
-  TETRACYCLINE: SIGNIFICANT CHANGE UP
-  TRIMETHOPRIM/SULFAMETHOXAZOLE: SIGNIFICANT CHANGE UP
-  VANCOMYCIN: SIGNIFICANT CHANGE UP
A1C WITH ESTIMATED AVERAGE GLUCOSE RESULT: 4.6 % — SIGNIFICANT CHANGE UP (ref 4–5.6)
ALBUMIN FLD-MCNC: 0.3 G/DL — SIGNIFICANT CHANGE UP
ALBUMIN SERPL ELPH-MCNC: 2.1 G/DL — LOW (ref 3.3–5)
ALP SERPL-CCNC: 214 U/L — HIGH (ref 40–120)
ALT FLD-CCNC: 15 U/L — SIGNIFICANT CHANGE UP (ref 10–45)
AMMONIA BLD-MCNC: 70 UMOL/L — HIGH (ref 11–55)
ANION GAP SERPL CALC-SCNC: 18 MMOL/L — HIGH (ref 5–17)
ANNOTATION COMMENT IMP: SIGNIFICANT CHANGE UP
APPEARANCE UR: ABNORMAL
AST SERPL-CCNC: 115 U/L — HIGH (ref 10–40)
AT III ACT/NOR PPP CHRO: 20 % — LOW (ref 85–135)
B PERT IGG+IGM PNL SER: CLEAR — SIGNIFICANT CHANGE UP
BACTERIA # UR AUTO: NEGATIVE — SIGNIFICANT CHANGE UP
BASOPHILS # BLD AUTO: 0.03 K/UL — SIGNIFICANT CHANGE UP (ref 0–0.2)
BASOPHILS NFR BLD AUTO: 0.2 % — SIGNIFICANT CHANGE UP (ref 0–2)
BILIRUB DIRECT SERPL-MCNC: >10 MG/DL — HIGH (ref 0–0.3)
BILIRUB SERPL-MCNC: 18.2 MG/DL — HIGH (ref 0.2–1.2)
BILIRUB UR-MCNC: ABNORMAL
BLD GP AB SCN SERPL QL: NEGATIVE — SIGNIFICANT CHANGE UP
BUN SERPL-MCNC: <4 MG/DL — LOW (ref 7–23)
CALCIUM SERPL-MCNC: 7.9 MG/DL — LOW (ref 8.4–10.5)
CERULOPLASMIN SERPL-MCNC: 25 MG/DL — SIGNIFICANT CHANGE UP (ref 16–45)
CHLORIDE SERPL-SCNC: 88 MMOL/L — LOW (ref 96–108)
CHOLEST SERPL-MCNC: 127 MG/DL — SIGNIFICANT CHANGE UP
CMV DNA CSF QL NAA+PROBE: ABNORMAL IU/ML
CMV DNA SPEC NAA+PROBE-LOG#: ABNORMAL LOG10IU/ML
CO2 SERPL-SCNC: 17 MMOL/L — LOW (ref 22–31)
COLOR FLD: YELLOW — SIGNIFICANT CHANGE UP
COLOR SPEC: ABNORMAL
COVID-19 SPIKE DOMAIN AB INTERP: POSITIVE
COVID-19 SPIKE DOMAIN ANTIBODY RESULT: 4.82 U/ML — HIGH
CREAT SERPL-MCNC: 0.58 MG/DL — SIGNIFICANT CHANGE UP (ref 0.5–1.3)
CULTURE RESULTS: SIGNIFICANT CHANGE UP
CULTURE RESULTS: SIGNIFICANT CHANGE UP
DIFF PNL FLD: NEGATIVE — SIGNIFICANT CHANGE UP
EBV DNA SERPL NAA+PROBE-ACNC: SIGNIFICANT CHANGE UP IU/ML
EBVPCR LOG: SIGNIFICANT CHANGE UP LOG10IU/ML
EGFR: 118 ML/MIN/1.73M2 — SIGNIFICANT CHANGE UP
EOSINOPHIL # BLD AUTO: 0.06 K/UL — SIGNIFICANT CHANGE UP (ref 0–0.5)
EOSINOPHIL NFR BLD AUTO: 0.3 % — SIGNIFICANT CHANGE UP (ref 0–6)
EPI CELLS # UR: 4 /HPF — SIGNIFICANT CHANGE UP
ESTIMATED AVERAGE GLUCOSE: 85 MG/DL — SIGNIFICANT CHANGE UP (ref 68–114)
ETHANOL SERPL-MCNC: <10 MG/DL — SIGNIFICANT CHANGE UP (ref 0–10)
FACT V ACT/NOR PPP: 68 % — SIGNIFICANT CHANGE UP (ref 50–150)
FACT VIII ACT/NOR PPP: 238 % — HIGH (ref 60–125)
FERRITIN SERPL-MCNC: 349 NG/ML — HIGH (ref 15–150)
FIBRINOGEN PPP-MCNC: 161 MG/DL — LOW (ref 200–445)
FLUID INTAKE SUBSTANCE CLASS: SIGNIFICANT CHANGE UP
GGT SERPL-CCNC: 333 U/L — HIGH (ref 8–40)
GI PCR PANEL: SIGNIFICANT CHANGE UP
GLUCOSE FLD-MCNC: 146 MG/DL — SIGNIFICANT CHANGE UP
GLUCOSE SERPL-MCNC: 355 MG/DL — HIGH (ref 70–99)
GLUCOSE UR QL: ABNORMAL
GRAM STN FLD: SIGNIFICANT CHANGE UP
HBV DNA # SERPL NAA+PROBE: SIGNIFICANT CHANGE UP
HBV DNA SERPL NAA+PROBE-LOG#: SIGNIFICANT CHANGE UP LOGIU/ML
HCG SERPL-ACNC: <2 MIU/ML — SIGNIFICANT CHANGE UP
HCT VFR BLD CALC: 24 % — LOW (ref 34.5–45)
HCV RNA FLD QL NAA+PROBE: SIGNIFICANT CHANGE UP
HCV RNA SPEC QL PROBE+SIG AMP: SIGNIFICANT CHANGE UP
HCYS SERPL-MCNC: <3 UMOL/L — SIGNIFICANT CHANGE UP
HDLC SERPL-MCNC: 11 MG/DL — LOW
HEV RNA SERPL NAA+PROBE-ACNC: SIGNIFICANT CHANGE UP IU/ML
HEV RNA SERPL NAA+PROBE-LOG IU: <3.3 — SIGNIFICANT CHANGE UP
HGB BLD-MCNC: 7.9 G/DL — LOW (ref 11.5–15.5)
HIV 1+2 AB+HIV1 P24 AG SERPL QL IA: SIGNIFICANT CHANGE UP
HIV 1+2 AB+HIV1 P24 AG SERPL QL IA: SIGNIFICANT CHANGE UP
HYALINE CASTS # UR AUTO: 5 /LPF — SIGNIFICANT CHANGE UP (ref 0–7)
IMM GRANULOCYTES NFR BLD AUTO: 1.5 % — HIGH (ref 0–0.9)
INR BLD: 2.75 RATIO — HIGH (ref 0.88–1.16)
IRON SATN MFR SERPL: 78 UG/DL — SIGNIFICANT CHANGE UP (ref 30–160)
IRON SATN MFR SERPL: SIGNIFICANT CHANGE UP % (ref 14–50)
KETONES UR-MCNC: SIGNIFICANT CHANGE UP
LACTATE SERPL-SCNC: 1.5 MMOL/L — SIGNIFICANT CHANGE UP (ref 0.5–2)
LDH SERPL L TO P-CCNC: 37 U/L — SIGNIFICANT CHANGE UP
LEUKOCYTE ESTERASE UR-ACNC: NEGATIVE — SIGNIFICANT CHANGE UP
LIPID PNL WITH DIRECT LDL SERPL: 91 MG/DL — SIGNIFICANT CHANGE UP
LYMPHOCYTES # BLD AUTO: 1.54 K/UL — SIGNIFICANT CHANGE UP (ref 1–3.3)
LYMPHOCYTES # BLD AUTO: 8.9 % — LOW (ref 13–44)
LYMPHOCYTES # FLD: 10 % — SIGNIFICANT CHANGE UP
M PNEUMO IGG SER IA-ACNC: 0.5 INDEX — SIGNIFICANT CHANGE UP (ref 0–0.9)
M PNEUMO IGG SER IA-ACNC: NEGATIVE — SIGNIFICANT CHANGE UP
MCHC RBC-ENTMCNC: 32.9 GM/DL — SIGNIFICANT CHANGE UP (ref 32–36)
MCHC RBC-ENTMCNC: 37.6 PG — HIGH (ref 27–34)
MCV RBC AUTO: 114.3 FL — HIGH (ref 80–100)
MELD SCORE WITH DIALYSIS: >40 POINTS — SIGNIFICANT CHANGE UP
MELD SCORE WITHOUT DIALYSIS: 33 POINTS — SIGNIFICANT CHANGE UP
MESOTHL CELL # FLD: 8 % — SIGNIFICANT CHANGE UP
METHOD TYPE: SIGNIFICANT CHANGE UP
MITOCHONDRIA AB SER-ACNC: SIGNIFICANT CHANGE UP
MONOCYTES # BLD AUTO: 1.27 K/UL — HIGH (ref 0–0.9)
MONOCYTES NFR BLD AUTO: 7.4 % — SIGNIFICANT CHANGE UP (ref 2–14)
MONOS+MACROS # FLD: 79 % — SIGNIFICANT CHANGE UP
NEUTROPHILS # BLD AUTO: 14.05 K/UL — HIGH (ref 1.8–7.4)
NEUTROPHILS NFR BLD AUTO: 81.7 % — HIGH (ref 43–77)
NEUTROPHILS-BODY FLUID: 3 % — SIGNIFICANT CHANGE UP
NITRITE UR-MCNC: NEGATIVE — SIGNIFICANT CHANGE UP
NON HDL CHOLESTEROL: 116 MG/DL — SIGNIFICANT CHANGE UP
NRBC # BLD: 0 /100 WBCS — SIGNIFICANT CHANGE UP (ref 0–0)
ORGANISM # SPEC MICROSCOPIC CNT: SIGNIFICANT CHANGE UP
PH UR: 5.5 — SIGNIFICANT CHANGE UP (ref 5–8)
PLATELET # BLD AUTO: 175 K/UL — SIGNIFICANT CHANGE UP (ref 150–400)
POTASSIUM SERPL-MCNC: 3.6 MMOL/L — SIGNIFICANT CHANGE UP (ref 3.5–5.3)
POTASSIUM SERPL-SCNC: 3.6 MMOL/L — SIGNIFICANT CHANGE UP (ref 3.5–5.3)
PROT C ACT/NOR PPP: 25 % — LOW (ref 74–150)
PROT FLD-MCNC: 0.7 G/DL — SIGNIFICANT CHANGE UP
PROT S FREE AG PPP IA-ACNC: 64 % — SIGNIFICANT CHANGE UP (ref 61–131)
PROT SERPL-MCNC: 5.5 G/DL — LOW (ref 6–8.3)
PROT UR-MCNC: SIGNIFICANT CHANGE UP
PROTHROM AB SERPL-ACNC: 32.2 SEC — HIGH (ref 10.5–13.4)
PSA SERPL-MCNC: <0.01 NG/ML — SIGNIFICANT CHANGE UP (ref 0–4)
RBC # BLD: 2.1 M/UL — LOW (ref 3.8–5.2)
RBC # FLD: 19.7 % — HIGH (ref 10.3–14.5)
RBC CASTS # UR COMP ASSIST: 8 /HPF — HIGH (ref 0–4)
RCV VOL RI: < 2000 /UL — SIGNIFICANT CHANGE UP (ref 0–0)
RH IG SCN BLD-IMP: POSITIVE — SIGNIFICANT CHANGE UP
SARS-COV-2 IGG+IGM SERPL QL IA: 4.82 U/ML — HIGH
SARS-COV-2 IGG+IGM SERPL QL IA: POSITIVE
SMOOTH MUSCLE AB SER-ACNC: ABNORMAL
SODIUM SERPL-SCNC: 123 MMOL/L — LOW (ref 135–145)
SP GR SPEC: 1.02 — SIGNIFICANT CHANGE UP (ref 1.01–1.02)
SPECIMEN SOURCE: SIGNIFICANT CHANGE UP
T PALLIDUM AB TITR SER: NEGATIVE — SIGNIFICANT CHANGE UP
T4 FREE SERPL-MCNC: 1.8 NG/DL — SIGNIFICANT CHANGE UP (ref 0.9–1.8)
TIBC SERPL-MCNC: SIGNIFICANT CHANGE UP UG/DL (ref 220–430)
TOTAL NUCLEATED CELL COUNT, BODY FLUID: 28 /UL — SIGNIFICANT CHANGE UP
TRIGL SERPL-MCNC: 128 MG/DL — SIGNIFICANT CHANGE UP
TSH SERPL-MCNC: 0.62 UIU/ML — SIGNIFICANT CHANGE UP (ref 0.27–4.2)
TUBE TYPE: SIGNIFICANT CHANGE UP
UIBC SERPL-MCNC: <20 UG/DL — LOW (ref 110–370)
URATE SERPL-MCNC: 2 MG/DL — LOW (ref 2.5–7)
UROBILINOGEN FLD QL: NEGATIVE — SIGNIFICANT CHANGE UP
WBC # BLD: 17.21 K/UL — HIGH (ref 3.8–10.5)
WBC # FLD AUTO: 17.21 K/UL — HIGH (ref 3.8–10.5)
WBC UR QL: 1 /HPF — SIGNIFICANT CHANGE UP (ref 0–5)

## 2023-05-17 PROCEDURE — 49083 ABD PARACENTESIS W/IMAGING: CPT

## 2023-05-17 PROCEDURE — 99252 IP/OBS CONSLTJ NEW/EST SF 35: CPT

## 2023-05-17 PROCEDURE — 93970 EXTREMITY STUDY: CPT | Mod: 26

## 2023-05-17 PROCEDURE — 99232 SBSQ HOSP IP/OBS MODERATE 35: CPT

## 2023-05-17 PROCEDURE — 93356 MYOCRD STRAIN IMG SPCKL TRCK: CPT

## 2023-05-17 PROCEDURE — 93306 TTE W/DOPPLER COMPLETE: CPT | Mod: 26

## 2023-05-17 PROCEDURE — 99233 SBSQ HOSP IP/OBS HIGH 50: CPT

## 2023-05-17 PROCEDURE — 99221 1ST HOSP IP/OBS SF/LOW 40: CPT

## 2023-05-17 RX ORDER — ALBUMIN HUMAN 25 %
50 VIAL (ML) INTRAVENOUS ONCE
Refills: 0 | Status: DISCONTINUED | OUTPATIENT
Start: 2023-05-17 | End: 2023-05-17

## 2023-05-17 RX ORDER — FUROSEMIDE 40 MG
40 TABLET ORAL DAILY
Refills: 0 | Status: DISCONTINUED | OUTPATIENT
Start: 2023-05-17 | End: 2023-05-19

## 2023-05-17 RX ADMIN — HEPARIN SODIUM 5000 UNIT(S): 5000 INJECTION INTRAVENOUS; SUBCUTANEOUS at 05:51

## 2023-05-17 RX ADMIN — Medication 1 MILLIGRAM(S): at 12:03

## 2023-05-17 RX ADMIN — Medication 40 MILLIGRAM(S): at 12:03

## 2023-05-17 RX ADMIN — HEPARIN SODIUM 5000 UNIT(S): 5000 INJECTION INTRAVENOUS; SUBCUTANEOUS at 15:46

## 2023-05-17 RX ADMIN — LACTULOSE 20 GRAM(S): 10 SOLUTION ORAL at 05:55

## 2023-05-17 RX ADMIN — MORPHINE SULFATE 15 MILLIGRAM(S): 50 CAPSULE, EXTENDED RELEASE ORAL at 15:30

## 2023-05-17 RX ADMIN — MORPHINE SULFATE 15 MILLIGRAM(S): 50 CAPSULE, EXTENDED RELEASE ORAL at 02:23

## 2023-05-17 RX ADMIN — Medication 100 MILLIGRAM(S): at 12:03

## 2023-05-17 RX ADMIN — Medication 100 MILLIGRAM(S): at 05:50

## 2023-05-17 RX ADMIN — MORPHINE SULFATE 15 MILLIGRAM(S): 50 CAPSULE, EXTENDED RELEASE ORAL at 03:23

## 2023-05-17 RX ADMIN — MORPHINE SULFATE 15 MILLIGRAM(S): 50 CAPSULE, EXTENDED RELEASE ORAL at 14:59

## 2023-05-17 RX ADMIN — Medication 1 TABLET(S): at 15:40

## 2023-05-17 RX ADMIN — HEPARIN SODIUM 5000 UNIT(S): 5000 INJECTION INTRAVENOUS; SUBCUTANEOUS at 21:43

## 2023-05-17 RX ADMIN — MORPHINE SULFATE 15 MILLIGRAM(S): 50 CAPSULE, EXTENDED RELEASE ORAL at 21:45

## 2023-05-17 RX ADMIN — Medication 100 MILLIGRAM(S): at 21:43

## 2023-05-17 RX ADMIN — Medication 100 MILLIGRAM(S): at 15:40

## 2023-05-17 RX ADMIN — MORPHINE SULFATE 15 MILLIGRAM(S): 50 CAPSULE, EXTENDED RELEASE ORAL at 22:15

## 2023-05-17 RX ADMIN — Medication 100 MICROGRAM(S): at 05:50
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
## 2023-05-17 NOTE — PROGRESS NOTE ADULT - SUBJECTIVE AND OBJECTIVE BOX
Patient is a 38y old  Female who presents with a chief complaint of Liver Failure (17 May 2023 09:05)      SUBJECTIVE / OVERNIGHT EVENTS:    feels ok. no fevers, chills, abdominal pain. feels a lot of swelling     ROS:  14 point ROS negative in detail except stated as above    MEDICATIONS  (STANDING):  ceFAZolin   IVPB 2000 milliGRAM(s) IV Intermittent every 8 hours  folic acid 1 milliGRAM(s) Oral daily  furosemide   Injectable 40 milliGRAM(s) IV Push daily  heparin   Injectable 5000 Unit(s) SubCutaneous every 8 hours  lactulose Syrup 20 Gram(s) Oral every 6 hours  levothyroxine 100 MICROGram(s) Oral daily  multivitamin 1 Tablet(s) Oral daily  rifAXIMin 550 milliGRAM(s) Oral two times a day  thiamine 100 milliGRAM(s) Oral daily    MEDICATIONS  (PRN):  guaiFENesin Oral Liquid (Sugar-Free) 100 milliGRAM(s) Oral every 6 hours PRN Cough  LORazepam   Injectable 1 milliGRAM(s) IV Push every 6 hours PRN withdrawal ETOH  morphine  IR 15 milliGRAM(s) Oral every 6 hours PRN Severe Pain (7 - 10)  ondansetron Injectable 4 milliGRAM(s) IV Push every 6 hours PRN Nausea and/or Vomiting      CAPILLARY BLOOD GLUCOSE        I&O's Summary    16 May 2023 07:01  -  17 May 2023 07:00  --------------------------------------------------------  IN: 1210 mL / OUT: 0 mL / NET: 1210 mL        PHYSICAL EXAM:  Vital Signs Last 24 Hrs  T(C): 36.8 (17 May 2023 05:39), Max: 36.8 (17 May 2023 05:39)  T(F): 98.2 (17 May 2023 05:39), Max: 98.2 (17 May 2023 05:39)  HR: 109 (17 May 2023 05:39) (109 - 114)  BP: 111/70 (17 May 2023 05:39) (107/71 - 111/70)  BP(mean): --  RR: 18 (17 May 2023 05:39) (18 - 18)  SpO2: 95% (17 May 2023 05:39) (94% - 95%)    Parameters below as of 17 May 2023 05:39  Patient On (Oxygen Delivery Method): room air    GENERAL: no acute distress  NEURO: alert  HEENT: scleral icterus, NCAT, no conjunctival pallor appreciated  CHEST: no respiratory distress, no accessory muscle use  CARDIAC: regular rate, +S1/S2  ABDOMEN: soft, distended, nontender, no rebound or guarding  EXTREMITIES: warm, well perfused  SKIN: no rashes    LABS:                        7.9    17. )-----------( 175      ( 17 May 2023 07:12 )             24.0     05-17    123<L>  |  88<L>  |  <4<L>  ----------------------------<  355<H>  3.6   |  17<L>  |  0.58    Ca    7.9<L>      17 May 2023 07:12  Phos  1.9     05-16  Mg     1.5     05-16    TPro  5.5<L>  /  Alb  2.1<L>  /  TBili  18.2<H>  /  DBili  >10.0<H>  /  AST  115<H>  /  ALT  15  /  AlkPhos  214<H>  -    PT/INR - ( 17 May 2023 07:20 )   PT: 32.2 sec;   INR: 2.75 ratio         PTT - ( 16 May 2023 14:57 )  PTT:43.9 sec      Urinalysis Basic - ( 17 May 2023 09:37 )    Color: Dark Yellow / Appearance: Slightly Turbid / S.020 / pH: x  Gluc: x / Ketone: Trace  / Bili: Large >10 / Urobili: Negative   Blood: x / Protein: Trace / Nitrite: Negative   Leuk Esterase: Negative / RBC: 8 /hpf / WBC 1 /HPF   Sq Epi: x / Non Sq Epi: x / Bacteria: Negative        RADIOLOGY & ADDITIONAL TESTS:    Imaging Personally Reviewed:    Consultant(s) Notes Reviewed:      Care Discussed with Consultants/Other Providers:  med NP Abby

## 2023-05-17 NOTE — BH CONSULTATION LIAISON ASSESSMENT NOTE - SUMMARY
This is a 39 yo [place] Female with Hx of hypothyroidism (on Levothyroxine), and no other known medical Hx, presented to [facility] ED on 5/13/23 with ~ 2 weeks Hx of progressively worsening abdominal pain ("stabbing, diffuse, non radiating") and distention,  as well as 1 day Hx of jaundice, and was admitted to medicine with liver failure (possibly acute on chronic), with MELD-Na 29 on admission, hyperbilirubinemia (Se bi 18.9, direct 15.9), coagulopathy (INR 2.15), mild liver enzyme elevation in AST>>ALT pattern (, ALT 14, ), severe hypoalbuminemia (alb 1.3), as well as leukocytosis (WBC 16k), anemia (Hb 8.0).  Pt was seen for consultation because she is a liver transplant candidate and stated that she is aware of all that is required and has been speaking to multiple team members for clearance but is not sure she would be accepted as a candidate.  She states that she stopped drinking alcohol three weeks ago.  She has attended alcohol rehab and AA meetings in the past but is not very interested in attending in the future.  She currently lives with her father who is not in good health.  She says she has two children ages 6 and 9 who live with their father.  She complains that she is short of breath and unable to speak much more. She agrees that she may continue this evaluation at another time.  This is a 39 yo [place] Female with Hx of hypothyroidism (on Levothyroxine), and no other known medical Hx, presented to [facility] ED on 5/13/23 with ~ 2 weeks Hx of progressively worsening abdominal pain ("stabbing, diffuse, non radiating") and distention,  as well as 1 day Hx of jaundice, and was admitted to medicine with liver failure (possibly acute on chronic), with MELD-Na 29 on admission, hyperbilirubinemia (Se bi 18.9, direct 15.9), coagulopathy (INR 2.15), mild liver enzyme elevation in AST>>ALT pattern (, ALT 14, ), severe hypoalbuminemia (alb 1.3), as well as leukocytosis (WBC 16k), anemia (Hb 8.0).  Pt was seen for consultation because she is a liver transplant candidate and stated that she is aware of all that is required and has been speaking to multiple team members for clearance but is not sure she would be accepted as a candidate.  She states that she stopped drinking alcohol three weeks ago.  She has attended alcohol rehab and AA meetings in the past but is not very interested in attending in the future.  She currently lives with her father who is not in good health.  She says she has two children ages 6 and 9 who live with their father.    She complained that she is short of breath and unable to speak much more. She agreed that she may continue this evaluation and was able to speak more on a second meeting but then said she was too tired because she had been up much of the night because of lactulose.  She has some understanding of the requirements for liver transplant and some knowledge but says no one discussed with her the nature of being on immunosuppressive medications and details about the surgery.   Pt may benefit from having further evaluation after she has been able to speak to the liver transplant team and ask questions about the surgery and management after surgery.

## 2023-05-17 NOTE — BH CONSULTATION LIAISON ASSESSMENT NOTE - HPI (INCLUDE ILLNESS QUALITY, SEVERITY, DURATION, TIMING, CONTEXT, MODIFYING FACTORS, ASSOCIATED SIGNS AND SYMPTOMS)
This is a 39 yo Polish Female with Hx of hypothyroidism (on Levothyroxine), and no other known medical Hx, presented to [facility] ED on 5/13/23 with ~ 2 weeks Hx of progressively worsening abdominal pain ("stabbing, diffuse, non radiating") and distention,  as well as 1 day Hx of jaundice, and was admitted to medicine with liver failure (possibly acute on chronic), with MELD-Na 29 on admission, hyperbilirubinemia (Se bi 18.9, direct 15.9), coagulopathy (INR 2.15), mild liver enzyme elevation in AST>>ALT pattern (, ALT 14, ), severe hypoalbuminemia (alb 1.3), as well as leukocytosis (WBC 16k), anemia (Hb 8.0).  Pt was seen for consultation because she is a liver transplant candidate and stated that she is aware of all that is required and has been speaking to multiple team members for clearance but is not sure she would be accepted as a candidate.  She states that she stopped drinking alcohol three weeks ago.  She has attended alcohol rehab and AA meetings in the past but is not very interested in attending in the future.  She currently lives with her father who is not in good health.  She says she has two children ages 6 and 9 who live with their father.  She complains that she is short of breath and unable to speak much more. She agrees that she may continue this evaluation at another time.  This is a 39 yo Polish Female with Hx of hypothyroidism (on Levothyroxine), and no other known medical Hx, presented to [facility] ED on 5/13/23 with ~ 2 weeks Hx of progressively worsening abdominal pain ("stabbing, diffuse, non radiating") and distention,  as well as 1 day Hx of jaundice, and was admitted to medicine with liver failure (possibly acute on chronic), with MELD-Na 29 on admission, hyperbilirubinemia (Se bi 18.9, direct 15.9), coagulopathy (INR 2.15), mild liver enzyme elevation in AST>>ALT pattern (, ALT 14, ), severe hypoalbuminemia (alb 1.3), as well as leukocytosis (WBC 16k), anemia (Hb 8.0).  Pt was seen for consultation because she is a liver transplant candidate and stated that she is aware of all that is required and has been speaking to multiple team members for clearance but is not sure she would be accepted as a candidate.  She states that she stopped drinking alcohol three weeks ago.  She has attended alcohol rehab and AA meetings in the past but is not very interested in attending in the future.  She currently lives with her father who is not in good health.  She says she has two children ages 6 and 9 who live with their father.  She complained that she is short of breath and unable to speak much more. She agreed that she may continue this evaluation and was able to speak more on a second meeting but then said she was too tired because she had been up much of the night because of lactulose.  She has some understanding of the requirements for liver transplant and some knowledge but says no one discussed with her the nature of being on immunosuppressive medications and details about the surgery.

## 2023-05-17 NOTE — PROGRESS NOTE ADULT - SUBJECTIVE AND OBJECTIVE BOX
Patient is a 38y old  Female who presents with a chief complaint of Liver Failure (16 May 2023 14:43)    f/u MSSA bacteremia    Interval History/ROS:  feels bloated.  no fever.  Remainder of ROS otherwise negative.    PAST MEDICAL & SURGICAL HISTORY:  Hypothyroidism  Alcoholic liver disease    Allergies  No Known Allergies    ANTIMICROBIALS:  azithromycin  IVPB 500 every 24 hours (5/13-5/15)  cefTRIAXone   IVPB 2000 every 24 hours (5/13-5/16)    active:  ceFAZolin   IVPB 2000 every 8 hours (5/16-)  rifAXIMin 550 two times a day    MEDICATIONS  (STANDING):  furosemide   Injectable 40 daily  heparin   Injectable 5000 every 8 hours  lactulose Syrup 20 every 6 hours  levothyroxine 100 daily    Vital Signs Last 24 Hrs  T(F): 98.2 (05-17-23 @ 05:39), Max: 98.2 (05-17-23 @ 05:39)  HR: 109 (05-17-23 @ 05:39)  BP: 111/70 (05-17-23 @ 05:39)  RR: 18 (05-17-23 @ 05:39)  SpO2: 95% (05-17-23 @ 05:39) (94% - 95%)    PHYSICAL EXAM:  Constitutional: deeply jaundiced  HEAD/EYES:  icteric  ENT:  supple  Cardiovascular:   normal S1, S2  Respiratory:  clear BS bilaterally  GI:  soft, distended  :  no [place]  Musculoskeletal:  no synovitis  Neurologic: awake and alert, normal strength  Skin:  jaundice  Psychiatric:  awake, agitated                                 7.9    17.21 )-----------( 175      ( 17 May 2023 07:12 )             24.0 05-17    123  |  88  |  <4  ----------------------------<  355  3.6   |  17  |  0.58  Ca    7.9      17 May 2023 07:12Phos  1.9     05-16Mg     1.5     05-16  TPro  5.5  /  Alb  2.1  /  TBili  18.2  /  DBili  >10.0  /  AST  115  /  ALT  15  /  AlkPhos  214  05-17    MICROBIOLOGY:  5/17 BC pending    Culture - Body Fluid with Gram Stain (collected 13 May 2023 15:07)  Source: Ascites Fl Ascites Fluid  Gram Stain (14 May 2023 00:06):    No polymorphonuclear cells seen seen    No organisms seen    by cytocentrifuge  Preliminary Report (14 May 2023 18:07):    No growth    Culture - Blood (collected 13 May 2023 10:15)  Source: .Blood Blood-Peripheral  Gram Stain (15 May 2023 03:08):    Growth in aerobic bottle: Gram Positive Cocci in Clusters    Growth in anaerobic bottle: Gram Positive Cocci in Clusters  Preliminary Report (15 May 2023 07:43):    Growth in anaerobic bottle: Gram Positive Cocci in Clusters    Growth in aerobic bottle: Staphylococcus aureus Susceptibility to follow.    ***Blood Panel PCR results on this specimen are available    approximately 3 hours after the Gram stain result.***    Gram stain, PCR, and/or culture results may not always    correspond due to difference in methodologies.    ************************************************************    This PCR assay was performed by multiplex PCR. This    Assay tests for 66 bacterial and resistance gene targets.    Please refer to the [facility] test directory    at [email or web address] for details.  Organism: Blood Culture PCR (14 May 2023 14:36)  Organism: Blood Culture PCR (14 May 2023 14:36)      Method Type: PCR      -  Methicillin SENSITIVE Staphylococcus aureus (MSSA): Detec Any isolate of Staphylococcus aureus from a blood culture is NOT considered a contaminant.    Culture - Blood (collected 13 May 2023 10:00)  Source: .Blood Blood-Peripheral  Gram Stain (14 May 2023 17:38):    Growth in aerobic bottle: Gram Positive Cocci in Clusters  Preliminary Report (15 May 2023 10:39):    Growth in aerobic bottle: Staphylococcus aureus    See previous culture [id number]    RADIOLOGY:  imaging below personally reviewed and agree with findings    US Abdomen Limited (05.16.23 @ 13:11) >  Findings/impression: moderate ascites is seen in the lower quadrants with a small to moderate amount of ascites in the right upper quadrant.  Note is made of left pleural effusion    US Abdomen Upper Quadrant Right (05.13.23 @ 20:22) >  IMPRESSION:  Enlarged, fatty liver. Ascites. Contracted gallbladder.    CT Chest w/ IV Cont (05.13.23 @ 12:26) >  IMPRESSION:  *  Small focal consolidation in the posterior left upper lobe. Given history of trauma, small pulmonary contusion cannot be excluded. Correlate for pneumonia.  *  Hepatomegaly and diffuse upper extremity ptosis with signs of portal hypertension. Correlate for steatohepatitis. Moderate ascites.    CT Head No Cont (05.13.23 @ 12:19) >  IMPRESSION:  CT BRAIN  1. No evidence of acute calvarial fracture or intracranial hemorrhage.  2. Additional findings above.  CT FACIAL BONES  1. No definitive evidence of acute facial bone fracture. Right lateral periorbital hematoma.  2. Additional findings, including dental and paranasal sinus disease, as above; the significance of which should be determined on a clinical basis.  CT CERVICAL SPINE  1. No evidence of acute fracture or subluxation.  2. Straightening of lordosis may reflect muscle spasm.  3. Additional findings above.        ECHO  TTE Congenital Anomalies W or WO Ultrasound Enhancing Agent (05.17.23 @ 09:29) >  CONCLUSIONS:   1. The left ventricular systolic function is normal with an ejection fraction of 69 % by Ibrahim's method of disks.   2. Normal right ventricular cavity size and normal systolic function.   3. Unable to rule out endocarditis.   4. No prior echocardiogram is available for comparison.   5. Global longitudinal strain is -24.8 % (normal < -18%). GLS was assessed on [employer] echo machine with a heart rate of 104 bpm and a blood pressure of 104/67 mmHg.    < end of copied text >
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
## 2023-05-17 NOTE — PROGRESS NOTE ADULT - ASSESSMENT
This is a 39 yo [place] Female with Hx of hypothyroidism (on Levothyroxine), and no other known medical Hx, presented to [facility] ED on 5/13/23 with ~ 2 weeks Hx of progressively worsening abdominal pain ("stabbing, diffuse, non radiating") and distention,  as well as 1 day Hx of jaundice, and was admitted to medicine with liver failure (possibly acute on chronic), with MELD-Na 29 on admission, hyperbilirubinemia (Se bi 18.9, direct 15.9), coagulopathy (INR 2.15), mild liver enzyme elevation in AST>>ALT pattern (, ALT 14, ), severe hypoalbuminemia (alb 1.3), as well as leukocytosis (WBC 16k), anemia (Hb 8.0). She was empirically started on NAC per protocol, hepatology evaluated. ID evaluated for MAY Pna and later MSSA bacteremia, has been on IV ceftriaxone and azithromycin. Ascitic fluid analysis (Dx paracentesis 5/13), was portal hypertensive (SAAG 1.1, total protein 1.0), and was not suggestive of SBP

## 2023-05-17 NOTE — BH CONSULTATION LIAISON ASSESSMENT NOTE - CURRENT MEDICATION
MEDICATIONS  (STANDING):  ceFAZolin   IVPB 2000 milliGRAM(s) IV Intermittent every 8 hours  folic acid 1 milliGRAM(s) Oral daily  furosemide   Injectable 40 milliGRAM(s) IV Push daily  heparin   Injectable 5000 Unit(s) SubCutaneous every 8 hours  lactulose Syrup 20 Gram(s) Oral every 6 hours  levothyroxine 100 MICROGram(s) Oral daily  multivitamin 1 Tablet(s) Oral daily  rifAXIMin 550 milliGRAM(s) Oral two times a day  thiamine 100 milliGRAM(s) Oral daily    MEDICATIONS  (PRN):  guaiFENesin Oral Liquid (Sugar-Free) 100 milliGRAM(s) Oral every 6 hours PRN Cough  morphine  IR 15 milliGRAM(s) Oral every 6 hours PRN Severe Pain (7 - 10)  ondansetron Injectable 4 milliGRAM(s) IV Push every 6 hours PRN Nausea and/or Vomiting
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
## 2023-05-17 NOTE — PROGRESS NOTE ADULT - SUBJECTIVE AND OBJECTIVE BOX
Interval Events:   No acute events overnight.  Patient without acute symptoms at this time.    ROS:   12 point review of systems performed and negative except otherwise noted in HPI.    Hospital Medications:  ceFAZolin   IVPB 2000 milliGRAM(s) IV Intermittent every 8 hours  folic acid 1 milliGRAM(s) Oral daily  guaiFENesin Oral Liquid (Sugar-Free) 100 milliGRAM(s) Oral every 6 hours PRN  heparin   Injectable 5000 Unit(s) SubCutaneous every 8 hours  lactulose Syrup 20 Gram(s) Oral every 6 hours  levothyroxine 100 MICROGram(s) Oral daily  LORazepam   Injectable 1 milliGRAM(s) IV Push every 6 hours PRN  morphine  IR 15 milliGRAM(s) Oral every 6 hours PRN  multivitamin 1 Tablet(s) Oral daily  ondansetron Injectable 4 milliGRAM(s) IV Push every 6 hours PRN  rifAXIMin 550 milliGRAM(s) Oral two times a day  thiamine 100 milliGRAM(s) Oral daily    MAR over past 24 hours:  acetylcysteine IVPB   64.38 mL/Hr IV Intermittent (05-16-23 @ 14:17)    ceFAZolin   IVPB   100 mL/Hr IV Intermittent (05-17-23 @ 05:50)   100 mL/Hr IV Intermittent (05-16-23 @ 21:20)    cefTRIAXone   IVPB   100 mL/Hr IV Intermittent (05-16-23 @ 14:17)    folic acid   1 milliGRAM(s) Oral (05-16-23 @ 17:40)    heparin   Injectable   5000 Unit(s) SubCutaneous (05-17-23 @ 05:51)   5000 Unit(s) SubCutaneous (05-16-23 @ 21:20)   5000 Unit(s) SubCutaneous (05-16-23 @ 14:16)    lactulose Syrup   20 Gram(s) Oral (05-17-23 @ 05:55)   20 Gram(s) Oral (05-16-23 @ 23:02)   20 Gram(s) Oral (05-16-23 @ 17:40)    levothyroxine   100 MICROGram(s) Oral (05-17-23 @ 05:50)   100 MICROGram(s) Oral (05-16-23 @ 14:17)    morphine  IR   15 milliGRAM(s) Oral (05-17-23 @ 02:23)   15 milliGRAM(s) Oral (05-16-23 @ 21:19)   15 milliGRAM(s) Oral (05-16-23 @ 14:16)    ondansetron Injectable   4 milliGRAM(s) IV Push (05-16-23 @ 17:57)    rifAXIMin   550 milliGRAM(s) Oral (05-17-23 @ 05:50)   550 milliGRAM(s) Oral (05-16-23 @ 17:40)    thiamine   100 milliGRAM(s) Oral (05-16-23 @ 14:16)      Home Medications:  Last Order Reconciliation Date: 05-16-23 @ 13:29 (Admission Reconciliation)  acetylcysteine 20% intravenous solution: 40 milliliter(s) intravenous every 24 hours  cefTRIAXone: 2 gram(s) intravenously once a day  Constulose 10 g/15 mL oral liquid: 30 milliliter(s) orally 3 times a day  folic acid 1 mg oral tablet: 1 tab(s) orally once a day  guaiFENesin 100 mg/5 mL oral liquid: 5 milliliter(s) orally every 6 hours As needed Cough  levothyroxine 100 mcg (0.1 mg) oral tablet: 1 tab(s) orally once a day  LORazepam: 1 milliliter(s) intravenous every 4 hours as needed for Anxiety  morphine 15 mg oral tablet: 1 tab(s) orally every 8 hours  Multiple Vitamins oral tablet: 1 tab(s) orally once a day  mupirocin 2% topical ointment: Apply topically to affected area 2 times a day to both nostrils  ondansetron 2 mg/mL injectable solution: 4 milligram(s) injectable every 8 hours as needed for as needed  sodium/potassium/phosphorus 160 mg-280 mg-250 mg oral powder for reconstitution: 1 packet(s) orally 3 times a day (with meals)  thiamine 100 mg oral tablet: 1 tab(s) orally once a day    PHYSICAL EXAM:   Vital Signs last 24 hours:  T(F): 98.2 (05-17-23 @ 05:39), Max: 98.2 (05-17-23 @ 05:39)  HR: 109 (05-17-23 @ 05:39) (109 - 114)  BP: 111/70 (05-17-23 @ 05:39) (103/69 - 111/70)  BP(mean): --  ABP: --  ABP(mean): --  RR: 18 (05-17-23 @ 05:39) (16 - 18)  SpO2: 95% (05-17-23 @ 05:39) (94% - 98%)    I&Os:    05-16-23 @ 07:01  -  05-17-23 @ 07:00  --------------------------------------------------------  IN:    IV PiggyBack: 100 mL    IV PiggyBack: 750 mL    Oral Fluid: 360 mL  Total IN: 1210 mL    OUT:  Total OUT: 0 mL    Total NET: 1210 mL        BMI (kg/m2): 22.1 (05-16-23 @ 06:46)  GENERAL: no acute distress  NEURO: alert  HEENT: NCAT, no conjunctival pallor appreciated  CHEST: no respiratory distress, no accessory muscle use  CARDIAC: regular rate, +S1/S2  ABDOMEN: soft, distended, nontender, no rebound or guarding  EXTREMITIES: warm, well perfused  SKIN: facial bruising/wound noted    DIAGNOSTICS:  WBC      Hg       PLT      Na       K        CO2     BUN      Cr       ALT      AST      TB       ALP  17.21    7.9      175      123      3.6      17       <4       0.58     15       115      18.2     214      05-17-23 @ 07:12  18.89    8.5      176      126      4.8      19       <4       0.56     18       146      20.6     240      05-16-23 @ 14:57  ------   ------   ------   ------   4.4      ------   ------   ------   ------   ------   ------   ------   05-16-23 @ 00:26  ------   ------   ------   ------   3.0      ------   ------   ------   ------   ------   ------   ------   05-15-23 @ 16:10  16.36    8.4      179      129      2.8      20       2        0.70     15       120      16.9     205      05-15-23 @ 07:32    PT             INR            MELDwith  MELDw/o  32.2           2.75           --             --             05-17-23 @ 07:20  --             --             >40            33             05-17-23 @ 07:12  26.4           2.28           --             --             05-16-23 @ 14:57  31.2           2.60           --             --             05-15-23 @ 07:32  25.8           2.15           --             --             05-13-23 @ 10:00
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
## 2023-05-18 DIAGNOSIS — E87.1 HYPO-OSMOLALITY AND HYPONATREMIA: ICD-10-CM

## 2023-05-18 DIAGNOSIS — R06.02 SHORTNESS OF BREATH: ICD-10-CM

## 2023-05-18 LAB
-  AMIKACIN: SIGNIFICANT CHANGE UP
-  AMOXICILLIN/CLAVULANIC ACID: SIGNIFICANT CHANGE UP
-  AMPICILLIN/SULBACTAM: SIGNIFICANT CHANGE UP
-  AMPICILLIN: SIGNIFICANT CHANGE UP
-  AZTREONAM: SIGNIFICANT CHANGE UP
-  CEFAZOLIN: SIGNIFICANT CHANGE UP
-  CEFEPIME: SIGNIFICANT CHANGE UP
-  CEFOXITIN: SIGNIFICANT CHANGE UP
-  CEFTRIAXONE: SIGNIFICANT CHANGE UP
-  CEFUROXIME: SIGNIFICANT CHANGE UP
-  CIPROFLOXACIN: SIGNIFICANT CHANGE UP
-  ERTAPENEM: SIGNIFICANT CHANGE UP
-  GENTAMICIN: SIGNIFICANT CHANGE UP
-  IMIPENEM: SIGNIFICANT CHANGE UP
-  LEVOFLOXACIN: SIGNIFICANT CHANGE UP
-  MEROPENEM: SIGNIFICANT CHANGE UP
-  NITROFURANTOIN: SIGNIFICANT CHANGE UP
-  PIPERACILLIN/TAZOBACTAM: SIGNIFICANT CHANGE UP
-  TOBRAMYCIN: SIGNIFICANT CHANGE UP
-  TRIMETHOPRIM/SULFAMETHOXAZOLE: SIGNIFICANT CHANGE UP
ALBUMIN SERPL ELPH-MCNC: 2.1 G/DL — LOW (ref 3.3–5)
ALP SERPL-CCNC: 254 U/L — HIGH (ref 40–120)
ALT FLD-CCNC: 10 U/L — SIGNIFICANT CHANGE UP (ref 10–45)
ANION GAP SERPL CALC-SCNC: 14 MMOL/L — SIGNIFICANT CHANGE UP (ref 5–17)
ANION GAP SERPL CALC-SCNC: 15 MMOL/L — SIGNIFICANT CHANGE UP (ref 5–17)
AST SERPL-CCNC: 130 U/L — HIGH (ref 10–40)
AT III AG PPP IA-MCNC: 6 MG/DL — LOW (ref 19–31)
BASOPHILS # BLD AUTO: 0.07 K/UL — SIGNIFICANT CHANGE UP (ref 0–0.2)
BASOPHILS NFR BLD AUTO: 0.3 % — SIGNIFICANT CHANGE UP (ref 0–2)
BILIRUB SERPL-MCNC: 19.8 MG/DL — HIGH (ref 0.2–1.2)
BUN SERPL-MCNC: <4 MG/DL — LOW (ref 7–23)
BUN SERPL-MCNC: <4 MG/DL — LOW (ref 7–23)
CALCIUM SERPL-MCNC: 8.4 MG/DL — SIGNIFICANT CHANGE UP (ref 8.4–10.5)
CALCIUM SERPL-MCNC: 8.5 MG/DL — SIGNIFICANT CHANGE UP (ref 8.4–10.5)
CHLORIDE SERPL-SCNC: 91 MMOL/L — LOW (ref 96–108)
CHLORIDE SERPL-SCNC: 92 MMOL/L — LOW (ref 96–108)
CMV IGG FLD QL: >10 U/ML — HIGH
CMV IGG SERPL-IMP: POSITIVE
CO2 SERPL-SCNC: 19 MMOL/L — LOW (ref 22–31)
CO2 SERPL-SCNC: 20 MMOL/L — LOW (ref 22–31)
CREAT SERPL-MCNC: 0.95 MG/DL — SIGNIFICANT CHANGE UP (ref 0.5–1.3)
CREAT SERPL-MCNC: 0.99 MG/DL — SIGNIFICANT CHANGE UP (ref 0.5–1.3)
CULTURE RESULTS: SIGNIFICANT CHANGE UP
CULTURE RESULTS: SIGNIFICANT CHANGE UP
EBV EA AB SER IA-ACNC: <5 U/ML — SIGNIFICANT CHANGE UP
EBV EA AB TITR SER IF: ABNORMAL
EBV EA IGG SER-ACNC: NEGATIVE — SIGNIFICANT CHANGE UP
EBV NA IGG SER IA-ACNC: 20 U/ML — HIGH
EBV PATRN SPEC IB-IMP: SIGNIFICANT CHANGE UP
EBV VCA IGG AVIDITY SER QL IA: POSITIVE
EBV VCA IGM SER IA-ACNC: 401 U/ML — HIGH
EBV VCA IGM SER IA-ACNC: <10 U/ML — SIGNIFICANT CHANGE UP
EBV VCA IGM TITR FLD: NEGATIVE — SIGNIFICANT CHANGE UP
EGFR: 74 ML/MIN/1.73M2 — SIGNIFICANT CHANGE UP
EGFR: 78 ML/MIN/1.73M2 — SIGNIFICANT CHANGE UP
EOSINOPHIL # BLD AUTO: 0.06 K/UL — SIGNIFICANT CHANGE UP (ref 0–0.5)
EOSINOPHIL NFR BLD AUTO: 0.3 % — SIGNIFICANT CHANGE UP (ref 0–6)
GLUCOSE SERPL-MCNC: 113 MG/DL — HIGH (ref 70–99)
GLUCOSE SERPL-MCNC: 92 MG/DL — SIGNIFICANT CHANGE UP (ref 70–99)
HCT VFR BLD CALC: 24.5 % — LOW (ref 34.5–45)
HGB BLD-MCNC: 8.1 G/DL — LOW (ref 11.5–15.5)
HSV1 IGG SER-ACNC: 31 INDEX — HIGH
HSV1 IGG SER-ACNC: 31 INDEX — HIGH
HSV1 IGG SERPL QL IA: POSITIVE
HSV1 IGG SERPL QL IA: POSITIVE
HSV2 IGG FLD-ACNC: 2.75 INDEX — HIGH
HSV2 IGG FLD-ACNC: 2.75 INDEX — HIGH
HSV2 IGG SERPL QL IA: POSITIVE
HSV2 IGG SERPL QL IA: POSITIVE
IMM GRANULOCYTES NFR BLD AUTO: 1.8 % — HIGH (ref 0–0.9)
INR BLD: 2.53 RATIO — HIGH (ref 0.88–1.16)
LYMPHOCYTES # BLD AUTO: 1.72 K/UL — SIGNIFICANT CHANGE UP (ref 1–3.3)
LYMPHOCYTES # BLD AUTO: 8.3 % — LOW (ref 13–44)
MCHC RBC-ENTMCNC: 33.1 GM/DL — SIGNIFICANT CHANGE UP (ref 32–36)
MCHC RBC-ENTMCNC: 37 PG — HIGH (ref 27–34)
MCV RBC AUTO: 111.9 FL — HIGH (ref 80–100)
METHOD TYPE: SIGNIFICANT CHANGE UP
MEV IGG SER-ACNC: <5 AU/ML — SIGNIFICANT CHANGE UP
MEV IGG SER-ACNC: <5 AU/ML — SIGNIFICANT CHANGE UP
MEV IGG+IGM SER-IMP: NEGATIVE — SIGNIFICANT CHANGE UP
MEV IGG+IGM SER-IMP: NEGATIVE — SIGNIFICANT CHANGE UP
MONOCYTES # BLD AUTO: 1.69 K/UL — HIGH (ref 0–0.9)
MONOCYTES NFR BLD AUTO: 8.1 % — SIGNIFICANT CHANGE UP (ref 2–14)
MUV AB SER-ACNC: NEGATIVE — SIGNIFICANT CHANGE UP
MUV IGG FLD-ACNC: 6.7 AU/ML — SIGNIFICANT CHANGE UP
NEUTROPHILS # BLD AUTO: 16.82 K/UL — HIGH (ref 1.8–7.4)
NEUTROPHILS NFR BLD AUTO: 81.2 % — HIGH (ref 43–77)
NRBC # BLD: 0 /100 WBCS — SIGNIFICANT CHANGE UP (ref 0–0)
ORGANISM # SPEC MICROSCOPIC CNT: SIGNIFICANT CHANGE UP
ORGANISM # SPEC MICROSCOPIC CNT: SIGNIFICANT CHANGE UP
OSMOLALITY SERPL: 262 MOSMOL/KG — LOW (ref 275–300)
PLATELET # BLD AUTO: 214 K/UL — SIGNIFICANT CHANGE UP (ref 150–400)
POTASSIUM SERPL-MCNC: 2.6 MMOL/L — CRITICAL LOW (ref 3.5–5.3)
POTASSIUM SERPL-MCNC: 3.2 MMOL/L — LOW (ref 3.5–5.3)
POTASSIUM SERPL-SCNC: 2.6 MMOL/L — CRITICAL LOW (ref 3.5–5.3)
POTASSIUM SERPL-SCNC: 3.2 MMOL/L — LOW (ref 3.5–5.3)
PROT SERPL-MCNC: 5.8 G/DL — LOW (ref 6–8.3)
PROTHROM AB SERPL-ACNC: 29.4 SEC — HIGH (ref 10.5–13.4)
RBC # BLD: 2.19 M/UL — LOW (ref 3.8–5.2)
RBC # FLD: 19.2 % — HIGH (ref 10.3–14.5)
RUBV IGG SER-ACNC: 1.8 INDEX — SIGNIFICANT CHANGE UP
RUBV IGG SER-ACNC: 1.8 INDEX — SIGNIFICANT CHANGE UP
RUBV IGG SER-IMP: POSITIVE — SIGNIFICANT CHANGE UP
RUBV IGG SER-IMP: POSITIVE — SIGNIFICANT CHANGE UP
SODIUM SERPL-SCNC: 125 MMOL/L — LOW (ref 135–145)
SODIUM SERPL-SCNC: 126 MMOL/L — LOW (ref 135–145)
SPECIMEN SOURCE: SIGNIFICANT CHANGE UP
SPECIMEN SOURCE: SIGNIFICANT CHANGE UP
T GONDII IGG SER QL: <3 IU/ML — SIGNIFICANT CHANGE UP
T GONDII IGG SER QL: <3 IU/ML — SIGNIFICANT CHANGE UP
T GONDII IGG SER QL: NEGATIVE — SIGNIFICANT CHANGE UP
T GONDII IGG SER QL: NEGATIVE — SIGNIFICANT CHANGE UP
T PALLIDUM AB TITR SER: NEGATIVE — SIGNIFICANT CHANGE UP
VZV IGG FLD QL IA: 91.8 INDEX — SIGNIFICANT CHANGE UP
VZV IGG FLD QL IA: NEGATIVE — SIGNIFICANT CHANGE UP
WBC # BLD: 20.74 K/UL — HIGH (ref 3.8–10.5)
WBC # FLD AUTO: 20.74 K/UL — HIGH (ref 3.8–10.5)

## 2023-05-18 PROCEDURE — 99232 SBSQ HOSP IP/OBS MODERATE 35: CPT

## 2023-05-18 PROCEDURE — 99223 1ST HOSP IP/OBS HIGH 75: CPT

## 2023-05-18 PROCEDURE — 71275 CT ANGIOGRAPHY CHEST: CPT | Mod: 26

## 2023-05-18 PROCEDURE — 93010 ELECTROCARDIOGRAM REPORT: CPT

## 2023-05-18 PROCEDURE — 99233 SBSQ HOSP IP/OBS HIGH 50: CPT

## 2023-05-18 RX ORDER — POTASSIUM CHLORIDE 20 MEQ
40 PACKET (EA) ORAL EVERY 4 HOURS
Refills: 0 | Status: COMPLETED | OUTPATIENT
Start: 2023-05-18 | End: 2023-05-18

## 2023-05-18 RX ORDER — CIPROFLOXACIN LACTATE 400MG/40ML
500 VIAL (ML) INTRAVENOUS DAILY
Refills: 0 | Status: DISCONTINUED | OUTPATIENT
Start: 2023-05-18 | End: 2023-05-18

## 2023-05-18 RX ORDER — PIPERACILLIN AND TAZOBACTAM 4; .5 G/20ML; G/20ML
3.38 INJECTION, POWDER, LYOPHILIZED, FOR SOLUTION INTRAVENOUS ONCE
Refills: 0 | Status: COMPLETED | OUTPATIENT
Start: 2023-05-18 | End: 2023-05-18

## 2023-05-18 RX ORDER — PIPERACILLIN AND TAZOBACTAM 4; .5 G/20ML; G/20ML
3.38 INJECTION, POWDER, LYOPHILIZED, FOR SOLUTION INTRAVENOUS EVERY 6 HOURS
Refills: 0 | Status: DISCONTINUED | OUTPATIENT
Start: 2023-05-19 | End: 2023-05-19

## 2023-05-18 RX ORDER — PIPERACILLIN AND TAZOBACTAM 4; .5 G/20ML; G/20ML
3.38 INJECTION, POWDER, LYOPHILIZED, FOR SOLUTION INTRAVENOUS ONCE
Refills: 0 | Status: COMPLETED | OUTPATIENT
Start: 2023-05-19 | End: 2023-05-19

## 2023-05-18 RX ORDER — ALBUMIN HUMAN 25 %
100 VIAL (ML) INTRAVENOUS EVERY 8 HOURS
Refills: 0 | Status: COMPLETED | OUTPATIENT
Start: 2023-05-18 | End: 2023-05-20

## 2023-05-18 RX ADMIN — HEPARIN SODIUM 5000 UNIT(S): 5000 INJECTION INTRAVENOUS; SUBCUTANEOUS at 05:47

## 2023-05-18 RX ADMIN — Medication 100 MILLIGRAM(S): at 05:53

## 2023-05-18 RX ADMIN — Medication 50 MILLILITER(S): at 22:39

## 2023-05-18 RX ADMIN — Medication 40 MILLIEQUIVALENT(S): at 17:59

## 2023-05-18 RX ADMIN — PIPERACILLIN AND TAZOBACTAM 200 GRAM(S): 4; .5 INJECTION, POWDER, LYOPHILIZED, FOR SOLUTION INTRAVENOUS at 20:47

## 2023-05-18 RX ADMIN — Medication 100 MILLIGRAM(S): at 12:33

## 2023-05-18 RX ADMIN — Medication 100 MILLIGRAM(S): at 22:40

## 2023-05-18 RX ADMIN — Medication 100 MICROGRAM(S): at 05:47

## 2023-05-18 RX ADMIN — Medication 1 TABLET(S): at 12:33

## 2023-05-18 RX ADMIN — MORPHINE SULFATE 15 MILLIGRAM(S): 50 CAPSULE, EXTENDED RELEASE ORAL at 05:45

## 2023-05-18 RX ADMIN — Medication 1 MILLIGRAM(S): at 12:33

## 2023-05-18 RX ADMIN — HEPARIN SODIUM 5000 UNIT(S): 5000 INJECTION INTRAVENOUS; SUBCUTANEOUS at 22:40

## 2023-05-18 RX ADMIN — Medication 100 MILLIGRAM(S): at 22:49

## 2023-05-18 RX ADMIN — MORPHINE SULFATE 15 MILLIGRAM(S): 50 CAPSULE, EXTENDED RELEASE ORAL at 06:41

## 2023-05-18 RX ADMIN — LACTULOSE 20 GRAM(S): 10 SOLUTION ORAL at 18:01

## 2023-05-18 RX ADMIN — Medication 100 MILLIGRAM(S): at 13:49

## 2023-05-18 RX ADMIN — LACTULOSE 20 GRAM(S): 10 SOLUTION ORAL at 05:53

## 2023-05-18 RX ADMIN — Medication 40 MILLIEQUIVALENT(S): at 22:41

## 2023-05-18 RX ADMIN — Medication 50 MILLILITER(S): at 17:59

## 2023-05-18 RX ADMIN — Medication 40 MILLIGRAM(S): at 05:48

## 2023-05-18 RX ADMIN — HEPARIN SODIUM 5000 UNIT(S): 5000 INJECTION INTRAVENOUS; SUBCUTANEOUS at 13:49

## 2023-05-18 RX ADMIN — LACTULOSE 20 GRAM(S): 10 SOLUTION ORAL at 12:32

## 2023-05-18 NOTE — DIETITIAN INITIAL EVALUATION ADULT - PROBLEM SELECTOR PLAN 5
MAY consolidation on CT chest  - on IV CTX and Azithromycin per ID at [facility]

## 2023-05-18 NOTE — DIETITIAN INITIAL EVALUATION ADULT - PROBLEM SELECTOR PLAN 6
Has been drinking alcohol- ALEKSANDRA [place] neg, admitted at [facility] on 5/13, no s/s of withdrawal  - SW eval  - advised to stop drinking alcohol  - Thiamin, Folate

## 2023-05-18 NOTE — DIETITIAN INITIAL EVALUATION ADULT - ADD RECOMMEND
3) Encourage PO intake, obtain food preferences, provide feeding assistance as needed.   4) Continue micronutrient supplementation: multivitamin, folic acid , thiamine  5) Monitor nutritional intake/tolerance, weights, labs, hydration status, skin integrity, BM, GI symptoms.   6) New malnutrition notification sent.

## 2023-05-18 NOTE — DIETITIAN INITIAL EVALUATION ADULT - PROBLEM SELECTOR PLAN 1
Acute vs acute on chronic liver failure. Bili 16, PT/INR 31/2.6  - Etiology is multifactorial, EtOH, alcoholic hepatitis, sepsis from MSSA bacteremia, PNA).  - Was started on NAC on 5/14/23.   - Liver workup so far at [facility] per Hepatology-:   ---Hep A IgM neg / IgG neg (not immune), HBsAg neg / HBcAb neg/HBcAb IgM neg, HBsAb pos (immune, vaccinated), HCV ab neg.   ---EBV recent or past infection?, CMV PCR detectable, but technically under the detection limit. HSV 1/2 PCR neg. Legionella neg.   ---Utox neg, BAL neg. Tylenol, salicylate levels neg.   ---Lipase 58. ISHAN weak pos 1:80, IgG and IgA elevated, ANCA, dsDNA neg, Ceruloplasmin 25    Plan at [facility]-   Hepatology/Transplant consult.. MELD- Na 31, no steroid indicated for bacteremia/PNA  - C/w liver failure protocol NAC  - c/w Rifaximin, Lactulose 20gm po q 6 hrs  - IR consult for therapeutic Paracentesis

## 2023-05-18 NOTE — PROGRESS NOTE ADULT - SUBJECTIVE AND OBJECTIVE BOX
Patient is a 38y old  Female who presents with a chief complaint of Liver Failure (18 May 2023 08:46)      SUBJECTIVE / OVERNIGHT EVENTS:    endorsing sob this morning. no chest pain, palpitations, headache.     ROS:  14 point ROS negative in detail except stated as above    MEDICATIONS  (STANDING):  ceFAZolin   IVPB 2000 milliGRAM(s) IV Intermittent every 8 hours  folic acid 1 milliGRAM(s) Oral daily  furosemide   Injectable 40 milliGRAM(s) IV Push daily  heparin   Injectable 5000 Unit(s) SubCutaneous every 8 hours  lactulose Syrup 20 Gram(s) Oral every 6 hours  levothyroxine 100 MICROGram(s) Oral daily  multivitamin 1 Tablet(s) Oral daily  rifAXIMin 550 milliGRAM(s) Oral two times a day  thiamine 100 milliGRAM(s) Oral daily    MEDICATIONS  (PRN):  guaiFENesin Oral Liquid (Sugar-Free) 100 milliGRAM(s) Oral every 6 hours PRN Cough  ondansetron Injectable 4 milliGRAM(s) IV Push every 6 hours PRN Nausea and/or Vomiting      CAPILLARY BLOOD GLUCOSE        I&O's Summary    17 May 2023 07:01  -  18 May 2023 07:00  --------------------------------------------------------  IN: 480 mL / OUT: 0 mL / NET: 480 mL        PHYSICAL EXAM:  Vital Signs Last 24 Hrs  T(C): 36.7 (18 May 2023 06:26), Max: 36.7 (17 May 2023 13:12)  T(F): 98 (18 May 2023 06:26), Max: 98 (17 May 2023 13:12)  HR: 116 (18 May 2023 06:26) (110 - 116)  BP: 102/65 (18 May 2023 06:26) (100/63 - 114/74)  BP(mean): --  RR: 18 (18 May 2023 06:26) (18 - 18)  SpO2: 96% (18 May 2023 06:26) (96% - 97%)    Parameters below as of 18 May 2023 06:26  Patient On (Oxygen Delivery Method): room air      GENERAL: no acute distress, comfortable. jaundiced  HEENT: scleral icterus, NCAT, no conjunctival pallor appreciated  CHEST: no respiratory distress, CTA b/l no wheezes, rales or rhonchil no accessory muscle use  CARDIAC: regular rate, +S1/S2 no murmurs   ABDOMEN: soft, distended, nontender, no rebound or guarding +BS in all 4 quadrants   EXTREMITIES: warm, well perfuse, + 1pitting edema b/l to knee  SKIN: no rashes      LABS:                        8.1    20.74 )-----------( 214      ( 18 May 2023 07:17 )             24.5     05-18    126<L>  |  92<L>  |  <4<L>  ----------------------------<  92  3.2<L>   |  19<L>  |  0.95    Ca    8.5      18 May 2023 07:17  Phos  1.9     05-16  Mg     1.5     05-16    TPro  5.8<L>  /  Alb  2.1<L>  /  TBili  19.8<H>  /  DBili  x   /  AST  130<H>  /  ALT  10  /  AlkPhos  254<H>  05-18    PT/INR - ( 18 May 2023 07:17 )   PT: 29.4 sec;   INR: 2.53 ratio         PTT - ( 16 May 2023 14:57 )  PTT:43.9 sec      Urinalysis Basic - ( 17 May 2023 09:37 )    Color: Dark Yellow / Appearance: Slightly Turbid / S.020 / pH: x  Gluc: x / Ketone: Trace  / Bili: Large >10 / Urobili: Negative   Blood: x / Protein: Trace / Nitrite: Negative   Leuk Esterase: Negative / RBC: 8 /hpf / WBC 1 /HPF   Sq Epi: x / Non Sq Epi: x / Bacteria: Negative        RADIOLOGY & ADDITIONAL TESTS:    Imaging Personally Reviewed:    Consultant(s) Notes Reviewed:      Care Discussed with Consultants/Other Providers:  constantine Kebede

## 2023-05-18 NOTE — DIETITIAN INITIAL EVALUATION ADULT - PERTINENT MEDS FT
MEDICATIONS  (STANDING):  ceFAZolin   IVPB 2000 milliGRAM(s) IV Intermittent every 8 hours  ciprofloxacin     Tablet 500 milliGRAM(s) Oral daily  folic acid 1 milliGRAM(s) Oral daily  furosemide   Injectable 40 milliGRAM(s) IV Push daily  heparin   Injectable 5000 Unit(s) SubCutaneous every 8 hours  lactulose Syrup 20 Gram(s) Oral every 6 hours  levothyroxine 100 MICROGram(s) Oral daily  multivitamin 1 Tablet(s) Oral daily  rifAXIMin 550 milliGRAM(s) Oral two times a day  thiamine 100 milliGRAM(s) Oral daily    MEDICATIONS  (PRN):  guaiFENesin Oral Liquid (Sugar-Free) 100 milliGRAM(s) Oral every 6 hours PRN Cough  ondansetron Injectable 4 milliGRAM(s) IV Push every 6 hours PRN Nausea and/or Vomiting
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
## 2023-05-18 NOTE — DIETITIAN INITIAL EVALUATION ADULT - DIET TYPE
1) Continue diet as ordered/tolerated: Regular.   2) Add oral nutrition supplement: Ensure Shake 2x/day

## 2023-05-18 NOTE — PROGRESS NOTE ADULT - SUBJECTIVE AND OBJECTIVE BOX
This is a 38 year old female being evaluated for a liver transplant. PMH ETOH cirrhosis. Currently MSSA bacteremia. No known allergies.    Home Medications:  acetylcysteine 20% intravenous solution: 40 milliliter(s) intravenous every 24 hours (16 May 2023 05:59)  cefTRIAXone: 2 gram(s) intravenously once a day (16 May 2023 05:59)  Constulose 10 g/15 mL oral liquid: 30 milliliter(s) orally 3 times a day (16 May 2023 02:51)  folic acid 1 mg oral tablet: 1 tab(s) orally once a day (16 May 2023 02:51)  guaiFENesin 100 mg/5 mL oral liquid: 5 milliliter(s) orally every 6 hours As needed Cough (16 May 2023 02:51)  levothyroxine 100 mcg (0.1 mg) oral tablet: 1 tab(s) orally once a day (16 May 2023 05:59)  LORazepam: 1 milliliter(s) intravenous every 4 hours as needed for Anxiety (16 May 2023 05:59)  morphine 15 mg oral tablet: 1 tab(s) orally every 8 hours (16 May 2023 02:51)  Multiple Vitamins oral tablet: 1 tab(s) orally once a day (16 May 2023 02:51)  mupirocin 2% topical ointment: Apply topically to affected area 2 times a day to both nostrils (16 May 2023 05:59)  ondansetron 2 mg/mL injectable solution: 4 milligram(s) injectable every 8 hours as needed for as needed (16 May 2023 05:59)  sodium/potassium/phosphorus 160 mg-280 mg-250 mg oral powder for reconstitution: 1 packet(s) orally 3 times a day (with meals) (16 May 2023 02:51)  thiamine 100 mg oral tablet: 1 tab(s) orally once a day (16 May 2023 02:51)    MEDICATIONS  (STANDING):  ceFAZolin   IVPB 2000 milliGRAM(s) IV Intermittent every 8 hours  folic acid 1 milliGRAM(s) Oral daily  furosemide   Injectable 40 milliGRAM(s) IV Push daily  heparin   Injectable 5000 Unit(s) SubCutaneous every 8 hours  lactulose Syrup 20 Gram(s) Oral every 6 hours  levothyroxine 100 MICROGram(s) Oral daily  multivitamin 1 Tablet(s) Oral daily  rifAXIMin 550 milliGRAM(s) Oral two times a day  thiamine 100 milliGRAM(s) Oral daily    The patient has no pharmacologic contraindication to transplant and is cleared.

## 2023-05-18 NOTE — PROGRESS NOTE ADULT - SUBJECTIVE AND OBJECTIVE BOX
INFECTIOUS DISEASE FOLLOW UP NOTE:    Interval History/ROS: Patient is a 38y old  Female who presents with a chief complaint of Liver Failure (18 May 2023 07:49)      Overnight events:    REVIEW OF SYSTEMS:  CONSTITUTIONAL: No fever or chills  HEENT: No sore throat  RESPIRATORY: No cough, no shortness of breath  CARDIOVASCULAR: No chest pain or palpitations  GASTROINTESTINAL: No abdominal or epigastric pain  GENITOURINARY: No dysuria  NEUROLOGICAL: No headache/dizziness  MSK: No joint pain, erythema, or swelling; no back pain  SKIN: No itching, rashes  All other ROS negative except noted above    Prior hospital charts reviewed [Yes]  Primary team notes reviewed [Yes]  Other consultant notes reviewed [Yes]    Allergies:  No Known Allergies      ANTIMICROBIALS:   ceFAZolin   IVPB 2000 every 8 hours  rifAXIMin 550 two times a day      OTHER MEDS: MEDICATIONS  (STANDING):  furosemide   Injectable 40 daily  guaiFENesin Oral Liquid (Sugar-Free) 100 every 6 hours PRN  heparin   Injectable 5000 every 8 hours  lactulose Syrup 20 every 6 hours  levothyroxine 100 daily  morphine  IR 15 every 6 hours PRN  ondansetron Injectable 4 every 6 hours PRN      Vital Signs Last 24 Hrs  T(F): 98 (23 @ 06:26), Max: 98.6 (23 @ 20:59)    Vital Signs Last 24 Hrs  HR: 116 (23 @ 06:26) (110 - 116)  BP: 102/65 (23 @ 06:26) (100/63 - 114/74)  RR: 18 (23 @ 06:26)  SpO2: 96% (23 @ 06:26) (96% - 97%)  Wt(kg): --    EXAM:  GENERAL: NAD, lying in bed comfortably  HEAD: No head lesions  EYES: Conjunctiva pink and cornea white  ENT: Normal external ears and nose, no discharges; moist mucous membranes  NECK: Supple, nontender to palpation  CHEST/LUNG: Clear to auscultation bilaterally  HEART: S1 S2  ABDOMEN: Soft, nontender, nondistended; normoactive bowel sounds  EXTREMITIES: No clubbing, cyanosis, or petal edema  NERVOUS SYSTEM: Alert and oriented to person, time, place and situation, speech clear. No focal deficits   MSK: No joint erythema, swelling or pain  SKIN: No rashes or lesions, no superficial thrombophlebitis    Labs:                        8.1    20.74 )-----------( 214      ( 18 May 2023 07:17 )             24.5         126<L>  |  92<L>  |  <4<L>  ----------------------------<  92  3.2<L>   |  19<L>  |  0.95    Ca    8.5      18 May 2023 07:17  Phos  1.9     -  Mg     1.5         TPro  5.8<L>  /  Alb  2.1<L>  /  TBili  19.8<H>  /  DBili  x   /  AST  130<H>  /  ALT  10  /  AlkPhos  254<H>        WBC Trend:  WBC Count: 20.74 (23 @ 07:17)  WBC Count: 17.21 (23 @ 07:12)  WBC Count: 18.89 (23 @ 14:57)  WBC Count: 16.36 (05-15-23 @ 07:32)      Creatine Trend:  Creatinine, Serum: 0.95 ()  Creatinine, Serum: 0.58 ()  Creatinine, Serum: 0.56 ()  Creatinine, Serum: 0.70 (05-15)      Liver Biochemical Testing Trend:  Alanine Aminotransferase (ALT/SGPT): 10 ()  Alanine Aminotransferase (ALT/SGPT): 15 (17)  Alanine Aminotransferase (ALT/SGPT): 18 ()  Alanine Aminotransferase (ALT/SGPT): 15 (05-15)  Alanine Aminotransferase (ALT/SGPT): 12 (14)  Aspartate Aminotransferase (AST/SGOT): 130 (23 @ 07:17)  Aspartate Aminotransferase (AST/SGOT): 115 (23 @ 07:12)  Aspartate Aminotransferase (AST/SGOT): 146 (23 @ 14:57)  Aspartate Aminotransferase (AST/SGOT): 120 (05-15-23 @ 07:32)  Aspartate Aminotransferase (AST/SGOT): 133 (23 @ 08:26)  Bilirubin Total, Serum: 19.8 (05-18)  Bilirubin Total, Serum: 18.2 (05-17)  Bilirubin Direct, Serum: >10.0 (05-17)  Bilirubin Total, Serum: 20.6 (05-16)  Bilirubin Total, Serum: 20.6 (05-16)  Trend LDH  Urinalysis Basic - ( 17 May 2023 09:37 )    Color: Dark Yellow / Appearance: Slightly Turbid / S.020 / pH: x  Gluc: x / Ketone: Trace  / Bili: Large >10 / Urobili: Negative   Blood: x / Protein: Trace / Nitrite: Negative   Leuk Esterase: Negative / RBC: 8 /hpf / WBC 1 /HPF   Sq Epi: x / Non Sq Epi: x / Bacteria: Negative  MICROBIOLOGY:    MRSA PCR Result.: NotDetec (23 @ 07:42)      Culture - Body Fluid with Gram Stain (collected 17 May 2023 17:20)  Source: Peritoneal Peritoneal Fluid    Culture - Blood (collected 16 May 2023 15:22)  Source: .Blood Blood-Peripheral  Preliminary Report:    No growth to date.    Culture - Blood (collected 16 May 2023 15:22)  Source: .Blood Blood-Peripheral  Preliminary Report:    No growth to date.    Culture - Body Fluid with Gram Stain (collected 13 May 2023 15:07)  Source: Ascites Fl Ascites Fluid  Preliminary Report:    No growth    Culture - Urine (collected 13 May 2023 12:36)  Source: Clean Catch Clean Catch (Midstream)  Preliminary Report:    >100,000 CFU/ml Escherichia coli Susceptibility to follow.    Culture - Blood (collected 13 May 2023 10:15)  Source: .Blood Blood-Peripheral  Final Report:    Growth in aerobic and anaerobic bottles: Staphylococcus aureus    ***Blood Panel PCR results on this specimen are available    approximately 3 hours after the Gram stain result.***    Gram stain, PCR, and/or culture results may not always    correspond dueto difference in methodologies.    ************************************************************    This PCR assay was performed by multiplex PCR. This    Assay tests for 66 bacterial and resistance gene targets.    Please refer to the [facility] test directory    at [email or web address] for details.  Organism: Blood Culture PCR  Staphylococcus aureus  Organism: Staphylococcus aureus    Sensitivities:      -  Clindamycin: R 0.5 This isolate is presumed to be clindamycin resistant based on detection of inducible resistance. Clindamycin may still be effective in some patients.      -  Oxacillin: S 0.5 Oxacillin predicts susceptibility for dicloxacillin, methicillin, and nafcillin      -  Gentamicin: S <=1 Should not be used as monotherapy      -  Cefazolin: S <=4      -  Vancomycin: S 2      -  Tetracycline: S <=1      Method Type: BAKARI      -  Ampicillin/Sulbactam: S <=8/4      -  Penicillin: R >8      -  Rifampin: S <=1 Should not be used as monotherapy      -  Erythromycin: R >4      -  Trimethoprim/Sulfamethoxazole: S <=0.5/9.5  Organism: Blood Culture PCR    Sensitivities:      Method Type: PCR      -  Methicillin SENSITIVE Staphylococcus aureus (MSSA): Detec Any isolate of Staphylococcus aureus from a blood culture is NOT considered a contaminant.    Culture - Blood (collected 13 May 2023 10:00)  Source: .Blood Blood-Peripheral  Final Report:    Growth in aerobic bottle: Staphylococcus aureus    See previous culture [id number]    HIV-1/2 Combo Result: Nonreact (23 @ 07:31)  HIV-1/2 Combo Result: Nonreact (23 @ 07:14)  Cytomegalovirus By PCR: Det <34.5 IU/mL (23 @ 07:20)  Treponema Pallidum Antibody Interpretation: Negative (23 @ 07:14)  Toxoplasma IgG Interpretation: Negative (23 @ 07:14)  CMV IgG Interpretation: Positive (23 @ 07:14)  Treponema Pallidum Antibody Interpretation: Negative (23 @ 07:14)  Toxoplasma IgG Interpretation: Negative (23 @ 07:14)  Cytomegalovirus By PCR: Det <34.5 IU/mL (23 @ 08:26)  Legionella Antigen, Urine: Negative ( @ 23:20)  COVID-19 Abhi Domain AB Interp: Positive (23 @ 07:14)  Procalcitonin, Serum: 0.31 ()  Ferritin, Serum: 349 ()  Lactate, Blood: 1.5 ( @ 07:20)  Lactate, Blood: 3.2 ( @ 00:26)  Lactate, Blood: 2.5 (05-15 @ 16:10)      RADIOLOGY:    US Abdomen Limited (23 @ 13:11) >  Findings/impression: moderate ascites is seen in the lower quadrants with a small to moderate amount of ascites in the right upper quadrant.  Note is made of left pleural effusion    US Abdomen Upper Quadrant Right (23 @ 20:22) >  IMPRESSION:  Enlarged, fatty liver. Ascites. Contracted gallbladder.    CT Chest w/ IV Cont (23 @ 12:26) >  IMPRESSION:  *  Small focal consolidation in the posterior left upper lobe. Given history of trauma, small pulmonary contusion cannot be excluded. Correlate for pneumonia.  *  Hepatomegaly and diffuse upper extremity ptosis with signs of portal hypertension. Correlate for steatohepatitis. Moderate ascites.    CT Head No Cont (23 @ 12:19) >  IMPRESSION:  CT BRAIN  1. No evidence of acute calvarial fracture or intracranial hemorrhage.  2. Additional findings above.  CT FACIAL BONES  1. No definitive evidence of acute facial bone fracture. Right lateral periorbital hematoma.  2. Additional findings, including dental and paranasal sinus disease, as above; the significance of which should be determined on a clinical basis.  CT CERVICAL SPINE  1. No evidence of acute fracture or subluxation.  2. Straightening of lordosis may reflect muscle spasm.  3. Additional findings above.        ECHO  TTE Congenital Anomalies W or WO Ultrasound Enhancing Agent (23 @ 09:29) >  CONCLUSIONS:   1. The left ventricular systolic function is normal with an ejection fraction of 69 % by Ibrahim's method of disks.   2. Normal right ventricular cavity size and normal systolic function.   3. Unable to rule out endocarditis.   4. No prior echocardiogram is available for comparison.   5. Global longitudinal strain is -24.8 % (normal < -18%). GLS was assessed on [employer] echo machine with a heart rate of 104 bpm and a blood pressure of 104/67 mmHg.     INFECTIOUS DISEASE FOLLOW UP NOTE:    Interval History/ROS: Patient is a 38y old  Female who presents with a chief complaint of Liver Failure (18 May 2023 07:49)      Overnight events:    REVIEW OF SYSTEMS:  CONSTITUTIONAL: No fever or chills  HEENT: No sore throat  RESPIRATORY: No cough, + shortness of breath  CARDIOVASCULAR: No chest pain or palpitations  GASTROINTESTINAL: No abdominal or epigastric pain  GENITOURINARY: No dysuria  NEUROLOGICAL: No headache/dizziness  MSK: No joint pain, erythema, + swelling; no back pain  SKIN: No itching, rashes  All other ROS negative except noted above    Prior hospital charts reviewed [Yes]  Primary team notes reviewed [Yes]  Other consultant notes reviewed [Yes]    Allergies:  No Known Allergies      ANTIMICROBIALS:   ceFAZolin   IVPB 2000 every 8 hours  rifAXIMin 550 two times a day      OTHER MEDS: MEDICATIONS  (STANDING):  furosemide   Injectable 40 daily  guaiFENesin Oral Liquid (Sugar-Free) 100 every 6 hours PRN  heparin   Injectable 5000 every 8 hours  lactulose Syrup 20 every 6 hours  levothyroxine 100 daily  morphine  IR 15 every 6 hours PRN  ondansetron Injectable 4 every 6 hours PRN      Vital Signs Last 24 Hrs  T(F): 98 (23 @ 06:26), Max: 98.6 (23 @ 20:59)    Vital Signs Last 24 Hrs  HR: 116 (23 @ 06:26) (110 - 116)  BP: 102/65 (23 @ 06:26) (100/63 - 114/74)  RR: 18 (23 @ 06:26)  SpO2: 96% (23 @ 06:26) (96% - 97%)  Wt(kg): --    EXAM:  GENERAL: NAD, lying in bed comfortably  HEAD: No head lesions  EYES: Conjunctiva pink and cornea white  ENT: Normal external ears and nose, no discharges; moist mucous membranes  NECK: Supple, nontender to palpation  CHEST/LUNG: Clear to auscultation bilaterally  HEART: S1 S2  ABDOMEN: Soft, nontender, nondistended; normoactive bowel sounds  EXTREMITIES: No clubbing, cyanosis, or petal edema  NERVOUS SYSTEM: Alert and oriented to person, time, place and situation, speech clear. No focal deficits   MSK: No joint erythema, +swelling b/l upper extrem, + lower back  pain  SKIN: No rashes + lesions R forehead- healing, no superficial thrombophlebitis-jaundiced    Labs:                        8.1    20.74 )-----------( 214      ( 18 May 2023 07:17 )             24.5         126<L>  |  92<L>  |  <4<L>  ----------------------------<  92  3.2<L>   |  19<L>  |  0.95    Ca    8.5      18 May 2023 07:17  Phos  1.9     -  Mg     1.5     -    TPro  5.8<L>  /  Alb  2.1<L>  /  TBili  19.8<H>  /  DBili  x   /  AST  130<H>  /  ALT  10  /  AlkPhos  254<H>        WBC Trend:  WBC Count: 20.74 (23 @ 07:17)  WBC Count: 17.21 (23 @ 07:12)  WBC Count: 18.89 (23 @ 14:57)  WBC Count: 16.36 (05-15-23 @ 07:32)      Creatine Trend:  Creatinine, Serum: 0.95 ()  Creatinine, Serum: 0.58 ()  Creatinine, Serum: 0.56 ()  Creatinine, Serum: 0.70 (05-15)      Liver Biochemical Testing Trend:  Alanine Aminotransferase (ALT/SGPT): 10 (-18)  Alanine Aminotransferase (ALT/SGPT): 15 (17)  Alanine Aminotransferase (ALT/SGPT): 18 (16)  Alanine Aminotransferase (ALT/SGPT): 15 (-15)  Alanine Aminotransferase (ALT/SGPT): 12 (-14)  Aspartate Aminotransferase (AST/SGOT): 130 (23 @ 07:17)  Aspartate Aminotransferase (AST/SGOT): 115 (23 @ 07:12)  Aspartate Aminotransferase (AST/SGOT): 146 (23 @ 14:57)  Aspartate Aminotransferase (AST/SGOT): 120 (05-15-23 @ 07:32)  Aspartate Aminotransferase (AST/SGOT): 133 (05-14-23 @ 08:26)  Bilirubin Total, Serum: 19.8 (05-18)  Bilirubin Total, Serum: 18.2 (05-17)  Bilirubin Direct, Serum: >10.0 (05-17)  Bilirubin Total, Serum: 20.6 (05-16)  Bilirubin Total, Serum: 20.6 (05-16)  Trend LDH  Urinalysis Basic - ( 17 May 2023 09:37 )    Color: Dark Yellow / Appearance: Slightly Turbid / S.020 / pH: x  Gluc: x / Ketone: Trace  / Bili: Large >10 / Urobili: Negative   Blood: x / Protein: Trace / Nitrite: Negative   Leuk Esterase: Negative / RBC: 8 /hpf / WBC 1 /HPF   Sq Epi: x / Non Sq Epi: x / Bacteria: Negative  MICROBIOLOGY:    MRSA PCR Result.: NotDetec (23 @ 07:42)      Culture - Body Fluid with Gram Stain (collected 17 May 2023 17:20)  Source: Peritoneal Peritoneal Fluid    Culture - Blood (collected 16 May 2023 15:22)  Source: .Blood Blood-Peripheral  Preliminary Report:    No growth to date.    Culture - Blood (collected 16 May 2023 15:22)  Source: .Blood Blood-Peripheral  Preliminary Report:    No growth to date.    Culture - Body Fluid with Gram Stain (collected 13 May 2023 15:07)  Source: Ascites Fl Ascites Fluid  Preliminary Report:    No growth    Culture - Urine (collected 13 May 2023 12:36)  Source: Clean Catch Clean Catch (Midstream)  Preliminary Report:    >100,000 CFU/ml Escherichia coli Susceptibility to follow.    Culture - Blood (collected 13 May 2023 10:15)  Source: .Blood Blood-Peripheral  Final Report:    Growth in aerobic and anaerobic bottles: Staphylococcus aureus    ***Blood Panel PCR results on this specimen are available    approximately 3 hours after the Gram stain result.***    Gram stain, PCR, and/or culture results may not always    correspond dueto difference in methodologies.    ************************************************************    This PCR assay was performed by multiplex PCR. This    Assay tests for 66 bacterial and resistance gene targets.    Please refer to the [facility] test directory    at [email or web address] for details.  Organism: Blood Culture PCR  Staphylococcus aureus  Organism: Staphylococcus aureus    Sensitivities:      -  Clindamycin: R 0.5 This isolate is presumed to be clindamycin resistant based on detection of inducible resistance. Clindamycin may still be effective in some patients.      -  Oxacillin: S 0.5 Oxacillin predicts susceptibility for dicloxacillin, methicillin, and nafcillin      -  Gentamicin: S <=1 Should not be used as monotherapy      -  Cefazolin: S <=4      -  Vancomycin: S 2      -  Tetracycline: S <=1      Method Type: BAKARI      -  Ampicillin/Sulbactam: S <=8/4      -  Penicillin: R >8      -  Rifampin: S <=1 Should not be used as monotherapy      -  Erythromycin: R >4      -  Trimethoprim/Sulfamethoxazole: S <=0.5/9.5  Organism: Blood Culture PCR    Sensitivities:      Method Type: PCR      -  Methicillin SENSITIVE Staphylococcus aureus (MSSA): Detec Any isolate of Staphylococcus aureus from a blood culture is NOT considered a contaminant.    Culture - Blood (collected 13 May 2023 10:00)  Source: .Blood Blood-Peripheral  Final Report:    Growth in aerobic bottle: Staphylococcus aureus    See previous culture [id number]    HIV-1/2 Combo Result: Nonreact (23 @ 07:31)  HIV-1/2 Combo Result: Nonreact (23 @ 07:14)  Cytomegalovirus By PCR: Det <34.5 IU/mL (23 @ 07:20)  Treponema Pallidum Antibody Interpretation: Negative (23 @ 07:14)  Toxoplasma IgG Interpretation: Negative (23 @ 07:14)  CMV IgG Interpretation: Positive (23 @ 07:14)  Treponema Pallidum Antibody Interpretation: Negative (23 @ 07:14)  Toxoplasma IgG Interpretation: Negative (23 @ 07:14)  Cytomegalovirus By PCR: Det <34.5 IU/mL (23 @ 08:26)  Legionella Antigen, Urine: Negative ( @ 23:20)  COVID-19 Abhi Domain AB Interp: Positive (23 @ 07:14)  Procalcitonin, Serum: 0.31 (05-14)  Ferritin, Serum: 349 (-17)  Lactate, Blood: 1.5 (17 @ 07:20)  Lactate, Blood: 3.2 (16 @ 00:26)  Lactate, Blood: 2.5 (15 @ 16:10)      RADIOLOGY:    US Abdomen Limited (23 @ 13:11) >  Findings/impression: moderate ascites is seen in the lower quadrants with a small to moderate amount of ascites in the right upper quadrant.  Note is made of left pleural effusion    US Abdomen Upper Quadrant Right (23 @ 20:22) >  IMPRESSION:  Enlarged, fatty liver. Ascites. Contracted gallbladder.    CT Chest w/ IV Cont (23 @ 12:26) >  IMPRESSION:  *  Small focal consolidation in the posterior left upper lobe. Given history of trauma, small pulmonary contusion cannot be excluded. Correlate for pneumonia.  *  Hepatomegaly and diffuse upper extremity ptosis with signs of portal hypertension. Correlate for steatohepatitis. Moderate ascites.    CT Head No Cont (23 @ 12:19) >  IMPRESSION:  CT BRAIN  1. No evidence of acute calvarial fracture or intracranial hemorrhage.  2. Additional findings above.  CT FACIAL BONES  1. No definitive evidence of acute facial bone fracture. Right lateral periorbital hematoma.  2. Additional findings, including dental and paranasal sinus disease, as above; the significance of which should be determined on a clinical basis.  CT CERVICAL SPINE  1. No evidence of acute fracture or subluxation.  2. Straightening of lordosis may reflect muscle spasm.  3. Additional findings above.        ECHO  TTE Congenital Anomalies W or WO Ultrasound Enhancing Agent (23 @ 09:29) >  CONCLUSIONS:   1. The left ventricular systolic function is normal with an ejection fraction of 69 % by Ibrahim's method of disks.   2. Normal right ventricular cavity size and normal systolic function.   3. Unable to rule out endocarditis.   4. No prior echocardiogram is available for comparison.   5. Global longitudinal strain is -24.8 % (normal < -18%). GLS was assessed on [employer] echo machine with a heart rate of 104 bpm and a blood pressure of 104/67 mmHg.     INFECTIOUS DISEASE FOLLOW UP NOTE:    Interval History/ROS: Patient is a 38y old  Female who presents with a chief complaint of Liver Failure (18 May 2023 07:49)    Overnight events:    REVIEW OF SYSTEMS:  CONSTITUTIONAL: No fever or chills  HEENT: No sore throat  RESPIRATORY: No cough, + shortness of breath  CARDIOVASCULAR: No chest pain or palpitations  GASTROINTESTINAL: No abdominal or epigastric pain  GENITOURINARY: No dysuria  NEUROLOGICAL: No headache/dizziness  MSK: No joint pain, erythema, + swelling; no back pain  SKIN: No itching, rashes  All other ROS negative except noted above    PAST MEDICAL & SURGICAL HISTORY:  Hypothyroidism  Alcoholic liver disease    Allergies  No Known Allergies    ANTIMICROBIALS:  azithromycin  IVPB 500 every 24 hours (5/13-5/15)  cefTRIAXone   IVPB 2000 every 24 hours (5/13-5/16)    active:  ceFAZolin   IVPB 2000 every 8 hours (5/16-)  rifAXIMin 550 two times a day    MEDICATIONS  (STANDING):  furosemide   Injectable 40 daily  heparin   Injectable 5000 every 8 hours  lactulose Syrup 20 every 6 hours  levothyroxine 100 daily    Vital Signs Last 24 Hrs  T(F): 98 (05-18-23 @ 06:26), Max: 98 (05-17-23 @ 13:12)  HR: 116 (05-18-23 @ 06:26)  BP: 102/65 (05-18-23 @ 06:26)  RR: 18 (05-18-23 @ 06:26)  SpO2: 96% (05-18-23 @ 06:26) (96% - 97%)  Wt(kg): --    GENERAL: NAD, lying in bed comfortably  HEAD: No head lesions  EYES: Conjunctiva pink and cornea white  ENT: Normal external ears and nose, no discharges; moist mucous membranes  NECK: Supple, nontender to palpation  CHEST/LUNG: Clear to auscultation bilaterally  HEART: S1 S2  ABDOMEN: Soft, nontender, nondistended; normoactive bowel sounds  EXTREMITIES: No clubbing, cyanosis, or petal edema  NERVOUS SYSTEM: Alert and oriented to person, time, place and situation, speech clear. No focal deficits   MSK: No joint erythema, +swelling b/l upper extrem, + lower back  pain  SKIN: No rashes + lesions R forehead- healing, no superficial thrombophlebitis-jaundiced                        8.1    20.74 )-----------( 214      ( 18 May 2023 07:17 )             24.5 05-18    126  |  92  |  <4  ----------------------------<  92  3.2   |  19  |  0.95  Ca    8.5      18 May 2023 07:17Phos  1.9     05-16Mg     1.5     05-16  TPro  5.8  /  Alb  2.1  /  TBili  19.8  /  DBili  x   /  AST  130  /  ALT  10  /  AlkPhos  254  05-18 5/17/2023  Total Nucleated Cell Count, Body Fluid: 28: Reference Ranges:   5/13/2023  Total Nucleated Cell Count, Body Fluid: 50: Reference Ranges:     MICROBIOLOGY:  MRSA PCR Result.: Britton (05-14-23 @ 07:42)    Culture - Body Fluid with Gram Stain (collected 17 May 2023 17:20)  Source: Peritoneal Peritoneal Fluid    5/16 BC (-)  5/13 Ascites Fl Ascites Fluid (-)  5/13 UC  >100,000 CFU/ml Escherichia coli Susceptibility to follow.  5/13 BC (+) MSSA    HIV-1/2 Combo Result: Nonreact (05-17-23 @ 07:31)  HIV-1/2 Combo Result: Nonreact (05-17-23 @ 07:14)    Cytomegalovirus By PCR: Det <34.5 IU/mL (05-17-23 @ 07:20)    Treponema Pallidum Antibody Interpretation: Negative (05-17-23 @ 07:14)  Toxoplasma IgG Interpretation: Negative (05-17-23 @ 07:14)  CMV IgG Interpretation: Positive (05-17-23 @ 07:14)  Treponema Pallidum Antibody Interpretation: Negative (05-17-23 @ 07:14)  Toxoplasma IgG Interpretation: Negative (05-17-23 @ 07:14)  Cytomegalovirus By PCR: Det <34.5 IU/mL (05-14-23 @ 08:26)    RADIOLOGY:  US Abdomen Limited (05.16.23 @ 13:11) >  Findings/impression: moderate ascites is seen in the lower quadrants with a small to moderate amount of ascites in the right upper quadrant.  Note is made of left pleural effusion    US Abdomen Upper Quadrant Right (05.13.23 @ 20:22) >  IMPRESSION:  Enlarged, fatty liver. Ascites. Contracted gallbladder.    CT Chest w/ IV Cont (05.13.23 @ 12:26) >  IMPRESSION:  *  Small focal consolidation in the posterior left upper lobe. Given history of trauma, small pulmonary contusion cannot be excluded. Correlate for pneumonia.  *  Hepatomegaly and diffuse upper extremity ptosis with signs of portal hypertension. Correlate for steatohepatitis. Moderate ascites.    CT Head No Cont (05.13.23 @ 12:19) >  IMPRESSION:  CT BRAIN  1. No evidence of acute calvarial fracture or intracranial hemorrhage.  2. Additional findings above.  CT FACIAL BONES  1. No definitive evidence of acute facial bone fracture. Right lateral periorbital hematoma.  2. Additional findings, including dental and paranasal sinus disease, as above; the significance of which should be determined on a clinical basis.  CT CERVICAL SPINE  1. No evidence of acute fracture or subluxation.  2. Straightening of lordosis may reflect muscle spasm.  3. Additional findings above.        ECHO  TTE Congenital Anomalies W or WO Ultrasound Enhancing Agent (05.17.23 @ 09:29) >  CONCLUSIONS:   1. The left ventricular systolic function is normal with an ejection fraction of 69 % by Ibrahim's method of disks.   2. Normal right ventricular cavity size and normal systolic function.   3. Unable to rule out endocarditis.   4. No prior echocardiogram is available for comparison.   5. Global longitudinal strain is -24.8 % (normal < -18%). GLS was assessed on [employer] echo machine with a heart rate of 104 bpm and a blood pressure of 104/67 mmHg.
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
## 2023-05-18 NOTE — DIETITIAN INITIAL EVALUATION ADULT - PHYSCIAL ASSESSMENT
Weight Hx Per:  - Source: Pt   - UBW: 115 pounds    - Reported weight changes: unsure     Current Admission Weights:  - Dosing weight: 141.2  pounds (05/16) ? accuracy, present with ascites now s/p paracentesis  - Unable to obtain accurate bed scale weight at time of RD visit (? bed calibration)    Weight Change:  - Unable to assess. Will continue to monitor weight trends as available/able.     IBW: 142 pounds   %IBW: 81% (based on reported  pounds )/underweight
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
## 2023-05-18 NOTE — DIETITIAN INITIAL EVALUATION ADULT - ORAL INTAKE PTA/DIET HISTORY
Pt reports suboptimal appetite/PO intake at baseline, eats 2-3x/day but consumes ~50% of meals.  - lives with father who grocery shops and prepares meals  - NKFA/intolerances reported. No therapeutic/Druze restrictions.   - Micronutrient/Other supplementation: none, Protein-energy supplementation: none
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
## 2023-05-18 NOTE — DIETITIAN INITIAL EVALUATION ADULT - PERSON TAUGHT/METHOD
Provided brief introduction to education on post transplant nutrition therapy and food safety guidelines for transplant recipients. Discussed needs to follow food safety guidelines with medications, increased needs for proper post-surgical healing, and moderate intake of sodium and carbohydrates; pt made aware detailed education with written material will be provided post-transplant. Pt amenable - denies having questions/concerns about diet and nutrition at this time; made aware RD remains available. Discussed importance of adequate consumption of meals/supplements to optimize protein-energy intake. Encouraged small/frequent meals, nutrient dense snacks, prioritizing protein foods at meal time./verbal instruction/individual instruction/patient instructed

## 2023-05-18 NOTE — DIETITIAN INITIAL EVALUATION ADULT - PERTINENT LABORATORY DATA
05-18    126<L>  |  92<L>  |  <4<L>  ----------------------------<  92  3.2<L>   |  19<L>  |  0.95    Ca    8.5      18 May 2023 07:17  Phos  1.9     05-16  Mg     1.5     05-16    TPro  5.8<L>  /  Alb  2.1<L>  /  TBili  19.8<H>  /  DBili  x   /  AST  130<H>  /  ALT  10  /  AlkPhos  254<H>  05-18  A1C with Estimated Average Glucose Result: 4.6 % (05-17-23 @ 07:12)

## 2023-05-18 NOTE — DIETITIAN INITIAL EVALUATION ADULT - ENERGY INTAKE
Pt reports poor appetite/PO intake, <50% of meals consumed x 3 days since admission.  - Amenable to receiving oral nutritional supplement to optimize PO intake: Ensure Shake 2x/day   - RD provided menu to Pt at bedside to order preferred foods PRN.  Poor (<50%)

## 2023-05-18 NOTE — CONSULT NOTE ADULT - ASSESSMENT
Patient is a 38 year-old woman with history as above including alcohol use disorder, now admitted with worsening liver function, being evaluated for liver transplant.   ECG and TTE reviewed.  Patient is low risk for ischemic heart disease.    No further cardiovascular testing is necessary prior to consideration for liver transplant.

## 2023-05-18 NOTE — PROGRESS NOTE ADULT - ASSESSMENT
This is a 39 yo Polish Female with Hx of hypothyroidism (on Levothyroxine), and no other known medical Hx, presented to [facility] ED on 5/13/23 with ~ 2 weeks Hx of progressively worsening abdominal pain ("stabbing, diffuse, non radiating") and distention,  as well as 1 day Hx of jaundice, and was admitted to medicine with liver failure (possibly acute on chronic), with MELD-Na 29 on admission, hyperbilirubinemia (Se bi 18.9, direct 15.9), coagulopathy (INR 2.15), mild liver enzyme elevation in AST>>ALT pattern (, ALT 14, ), severe hypoalbuminemia (alb 1.3), as well as leukocytosis (WBC 16k), anemia (Hb 8.0). She was empirically started on NAC per protocol, hepatology evaluated. ID evaluated for MAY Pna and later MSSA bacteremia, has been on IV ceftriaxone and azithromycin. Ascitic fluid analysis (Dx paracentesis 5/13), was portal hypertensive (SAAG 1.1, total protein 1.0), and was not suggestive of SBP
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
## 2023-05-18 NOTE — DIETITIAN INITIAL EVALUATION ADULT - OTHER INFO
- AUD p/w decompensated cirrhosis C/b ascites (last paracentesis 05/17 2L removed), Liver transplant evaluation started in-house.     - GI: No GI distress reported at time of RD visit. Last BM: 05/17. Bowel regimen: lactulose syrup.    - Renal: hyponatremia in setting of fluid retention, K+ low today 05/18 (replete PRN). Lasix for diuresis.     - Endo: hypothyroidism, continues on PO synthroid     - Micronutrient/Other supplementation: multivitamin, thiamine, folic acid     * LIVER TRANSPLANT EVALUATION *   -Pt resides with father who grocery shops and prepares meals at home. Of note, Pt reports plan to live with friend Fausto after discharge.  -BMI: 18 kg/m2 (based on reported  pounds)  -HbA1c:  4.6%  -Frailty: [pending PT evaluation]   -MELD: 33 (05/18)    Education:    -Reviewed post-transplant nutrition therapy and food safety guidelines for transplant recipients   -Reviewed recommendations to avoid grapefruit, pomegranate and star fruit while taking immunosuppressant medication.    -Reviewed recommendations for moderate intake of sodium and carbohydrates with transplant medications.      Pt was receptive and expressed understanding.     Pt with no absolute nutrition contraindication to transplant. Of concern are Malnutrition status which is being addressed with PO diet, oral nutritional supplement, diet education reinforcement. Pt advised RD to remain available for ongoing nutrition interventions PRN; will follow-up per protocol.     Nutrition assessment communicated with Transplant Hepatology Team and outpatient Transplant RDs

## 2023-05-18 NOTE — PROGRESS NOTE ADULT - SUBJECTIVE AND OBJECTIVE BOX
Interval Events:   No acute events overnight following paracentesis.  Patient anxious however without acute symptoms at this time.    ROS:   12 point review of systems performed and negative except otherwise noted in HPI.    Hospital Medications:  ceFAZolin   IVPB 2000 milliGRAM(s) IV Intermittent every 8 hours  folic acid 1 milliGRAM(s) Oral daily  furosemide   Injectable 40 milliGRAM(s) IV Push daily  guaiFENesin Oral Liquid (Sugar-Free) 100 milliGRAM(s) Oral every 6 hours PRN  heparin   Injectable 5000 Unit(s) SubCutaneous every 8 hours  lactulose Syrup 20 Gram(s) Oral every 6 hours  levothyroxine 100 MICROGram(s) Oral daily  morphine  IR 15 milliGRAM(s) Oral every 6 hours PRN  multivitamin 1 Tablet(s) Oral daily  ondansetron Injectable 4 milliGRAM(s) IV Push every 6 hours PRN  rifAXIMin 550 milliGRAM(s) Oral two times a day  thiamine 100 milliGRAM(s) Oral daily    MAR over past 24 hours:    ceFAZolin   IVPB   100 mL/Hr IV Intermittent (05-18-23 @ 05:53)   100 mL/Hr IV Intermittent (05-17-23 @ 21:43)   100 mL/Hr IV Intermittent (05-17-23 @ 15:40)    folic acid   1 milliGRAM(s) Oral (05-17-23 @ 12:03)    furosemide   Injectable   40 milliGRAM(s) IV Push (05-18-23 @ 05:48)   40 milliGRAM(s) IV Push (05-17-23 @ 12:03)    heparin   Injectable   5000 Unit(s) SubCutaneous (05-18-23 @ 05:47)   5000 Unit(s) SubCutaneous (05-17-23 @ 21:43)   5000 Unit(s) SubCutaneous (05-17-23 @ 15:46)    lactulose Syrup   20 Gram(s) Oral (05-18-23 @ 05:53)    levothyroxine   100 MICROGram(s) Oral (05-18-23 @ 05:47)    morphine  IR   15 milliGRAM(s) Oral (05-18-23 @ 05:45)   15 milliGRAM(s) Oral (05-17-23 @ 21:45)   15 milliGRAM(s) Oral (05-17-23 @ 14:59)    multivitamin   1 Tablet(s) Oral (05-17-23 @ 15:40)    rifAXIMin   550 milliGRAM(s) Oral (05-18-23 @ 05:47)   550 milliGRAM(s) Oral (05-17-23 @ 17:43)    thiamine   100 milliGRAM(s) Oral (05-17-23 @ 12:03)      Home Medications:  Last Order Reconciliation Date: 05-16-23 @ 13:29 (Admission Reconciliation)  acetylcysteine 20% intravenous solution: 40 milliliter(s) intravenous every 24 hours  cefTRIAXone: 2 gram(s) intravenously once a day  Constulose 10 g/15 mL oral liquid: 30 milliliter(s) orally 3 times a day  folic acid 1 mg oral tablet: 1 tab(s) orally once a day  guaiFENesin 100 mg/5 mL oral liquid: 5 milliliter(s) orally every 6 hours As needed Cough  levothyroxine 100 mcg (0.1 mg) oral tablet: 1 tab(s) orally once a day  LORazepam: 1 milliliter(s) intravenous every 4 hours as needed for Anxiety  morphine 15 mg oral tablet: 1 tab(s) orally every 8 hours  Multiple Vitamins oral tablet: 1 tab(s) orally once a day  mupirocin 2% topical ointment: Apply topically to affected area 2 times a day to both nostrils  ondansetron 2 mg/mL injectable solution: 4 milligram(s) injectable every 8 hours as needed for as needed  sodium/potassium/phosphorus 160 mg-280 mg-250 mg oral powder for reconstitution: 1 packet(s) orally 3 times a day (with meals)  thiamine 100 mg oral tablet: 1 tab(s) orally once a day    PHYSICAL EXAM:   Vital Signs last 24 hours:  T(F): 98 (05-18-23 @ 06:26), Max: 98 (05-17-23 @ 13:12)  HR: 116 (05-18-23 @ 06:26) (110 - 116)  BP: 102/65 (05-18-23 @ 06:26) (100/63 - 114/74)  BP(mean): --  ABP: --  ABP(mean): --  RR: 18 (05-18-23 @ 06:26) (18 - 18)  SpO2: 96% (05-18-23 @ 06:26) (96% - 97%)    I&Os:    05-17-23 @ 07:01  -  05-18-23 @ 07:00  --------------------------------------------------------  IN:    Oral Fluid: 480 mL  Total IN: 480 mL    OUT:  Total OUT: 0 mL    Total NET: 480 mL        BMI (kg/m2): 22.1 (05-16-23 @ 06:46)  GENERAL: no acute distress  NEURO: alert  HEENT: bruises noted over face, NCAT, no conjunctival pallor appreciated  CHEST: no respiratory distress, no accessory muscle use  CARDIAC: regular rate, +S1/S2  ABDOMEN: soft, nontender, no rebound or guarding  EXTREMITIES: warm, well perfused  SKIN: no lesions noted    DIAGNOSTICS:  WBC      Hg       PLT      Na       K        CO2     BUN      Cr       ALT      AST      TB       ALP  20.74    8.1      214      ------   ------   ------   ------   ------   ------   ------   ------   ------   05-18-23 @ 07:17  17.21    7.9      175      123      3.6      17       <4       0.58     15       115      18.2     214      05-17-23 @ 07:12  18.89    8.5      176      126      4.8      19       <4       0.56     18       146      20.6     240      05-16-23 @ 14:57  ------   ------   ------   ------   4.4      ------   ------   ------   ------   ------   ------   ------   05-16-23 @ 00:26  ------   ------   ------   ------   3.0      ------   ------   ------   ------   ------   ------   ------   05-15-23 @ 16:10    PT             INR            MELDwith  MELDw/o  32.2           2.75           --             --             05-17-23 @ 07:20  --             --             >40            33             05-17-23 @ 07:12  26.4           2.28           --             --             05-16-23 @ 14:57  31.2           2.60           --             --             05-15-23 @ 07:32  25.8           2.15           --             --             05-13-23 @ 10:00

## 2023-05-18 NOTE — DIETITIAN INITIAL EVALUATION ADULT - REASON INDICATOR FOR ASSESSMENT
RD assessment warranted for: liver transplant evaluation  Source: electronic medical record, Pt interview

## 2023-05-19 LAB
ANION GAP SERPL CALC-SCNC: 15 MMOL/L — SIGNIFICANT CHANGE UP (ref 5–17)
APPEARANCE UR: CLEAR — SIGNIFICANT CHANGE UP
BILIRUB UR-MCNC: ABNORMAL
BUN SERPL-MCNC: <4 MG/DL — LOW (ref 7–23)
CALCIUM SERPL-MCNC: 8.8 MG/DL — SIGNIFICANT CHANGE UP (ref 8.4–10.5)
CARDIOLIPIN AB SER-ACNC: NEGATIVE — SIGNIFICANT CHANGE UP
CHLORIDE SERPL-SCNC: 94 MMOL/L — LOW (ref 96–108)
CO2 SERPL-SCNC: 20 MMOL/L — LOW (ref 22–31)
COLOR SPEC: ABNORMAL
CREAT ?TM UR-MCNC: 70 MG/DL — SIGNIFICANT CHANGE UP
CREAT SERPL-MCNC: 0.92 MG/DL — SIGNIFICANT CHANGE UP (ref 0.5–1.3)
DIFF PNL FLD: NEGATIVE — SIGNIFICANT CHANGE UP
EBV DNA SERPL NAA+PROBE-ACNC: SIGNIFICANT CHANGE UP IU/ML
EBVPCR LOG: SIGNIFICANT CHANGE UP LOG10IU/ML
EGFR: 81 ML/MIN/1.73M2 — SIGNIFICANT CHANGE UP
GLUCOSE SERPL-MCNC: 101 MG/DL — HIGH (ref 70–99)
GLUCOSE UR QL: NEGATIVE — SIGNIFICANT CHANGE UP
HCT VFR BLD CALC: 22.4 % — LOW (ref 34.5–45)
HEV IGM SER QL: NEGATIVE — SIGNIFICANT CHANGE UP
HGB BLD-MCNC: 7.8 G/DL — LOW (ref 11.5–15.5)
KETONES UR-MCNC: NEGATIVE — SIGNIFICANT CHANGE UP
LEUKOCYTE ESTERASE UR-ACNC: NEGATIVE — SIGNIFICANT CHANGE UP
MAGNESIUM SERPL-MCNC: 1.7 MG/DL — SIGNIFICANT CHANGE UP (ref 1.6–2.6)
MCHC RBC-ENTMCNC: 34.8 GM/DL — SIGNIFICANT CHANGE UP (ref 32–36)
MCHC RBC-ENTMCNC: 37.9 PG — HIGH (ref 27–34)
MCV RBC AUTO: 108.7 FL — HIGH (ref 80–100)
NITRITE UR-MCNC: NEGATIVE — SIGNIFICANT CHANGE UP
NRBC # BLD: 0 /100 WBCS — SIGNIFICANT CHANGE UP (ref 0–0)
OSMOLALITY UR: 360 MOS/KG — SIGNIFICANT CHANGE UP (ref 300–900)
PH UR: 6 — SIGNIFICANT CHANGE UP (ref 5–8)
PHOSPHATE SERPL-MCNC: 2.6 MG/DL — SIGNIFICANT CHANGE UP (ref 2.5–4.5)
PLATELET # BLD AUTO: 197 K/UL — SIGNIFICANT CHANGE UP (ref 150–400)
POTASSIUM SERPL-MCNC: 3.4 MMOL/L — LOW (ref 3.5–5.3)
POTASSIUM SERPL-SCNC: 3.4 MMOL/L — LOW (ref 3.5–5.3)
POTASSIUM UR-SCNC: 57 MMOL/L — SIGNIFICANT CHANGE UP
PROT ?TM UR-MCNC: 36 MG/DL — HIGH (ref 0–12)
PROT UR-MCNC: ABNORMAL
PROT/CREAT UR-RTO: 0.5 RATIO — HIGH (ref 0–0.2)
RBC # BLD: 2.06 M/UL — LOW (ref 3.8–5.2)
RBC # FLD: 19.5 % — HIGH (ref 10.3–14.5)
SODIUM SERPL-SCNC: 129 MMOL/L — LOW (ref 135–145)
SODIUM UR-SCNC: 13 MMOL/L — SIGNIFICANT CHANGE UP
SP GR SPEC: 1.03 — HIGH (ref 1.01–1.02)
STRONGYLOIDES AB SER-ACNC: NEGATIVE — SIGNIFICANT CHANGE UP
UROBILINOGEN FLD QL: NEGATIVE — SIGNIFICANT CHANGE UP
UUN UR-MCNC: 86 MG/DL — SIGNIFICANT CHANGE UP
VZV DNA, PCR RESULT: NEGATIVE — SIGNIFICANT CHANGE UP
WBC # BLD: 19.53 K/UL — HIGH (ref 3.8–10.5)
WBC # FLD AUTO: 19.53 K/UL — HIGH (ref 3.8–10.5)

## 2023-05-19 PROCEDURE — 99232 SBSQ HOSP IP/OBS MODERATE 35: CPT

## 2023-05-19 PROCEDURE — 99233 SBSQ HOSP IP/OBS HIGH 50: CPT

## 2023-05-19 PROCEDURE — 76705 ECHO EXAM OF ABDOMEN: CPT | Mod: 26

## 2023-05-19 RX ORDER — CEFAZOLIN SODIUM 1 G
2000 VIAL (EA) INJECTION EVERY 8 HOURS
Refills: 0 | Status: DISCONTINUED | OUTPATIENT
Start: 2023-05-19 | End: 2023-06-06

## 2023-05-19 RX ORDER — MORPHINE SULFATE 50 MG/1
15 CAPSULE, EXTENDED RELEASE ORAL ONCE
Refills: 0 | Status: DISCONTINUED | OUTPATIENT
Start: 2023-05-19 | End: 2023-05-19

## 2023-05-19 RX ORDER — POTASSIUM CHLORIDE 20 MEQ
40 PACKET (EA) ORAL ONCE
Refills: 0 | Status: COMPLETED | OUTPATIENT
Start: 2023-05-19 | End: 2023-05-19

## 2023-05-19 RX ORDER — MORPHINE SULFATE 50 MG/1
15 CAPSULE, EXTENDED RELEASE ORAL ONCE
Refills: 0 | Status: DISCONTINUED | OUTPATIENT
Start: 2023-05-19 | End: 2023-05-20

## 2023-05-19 RX ADMIN — Medication 40 MILLIEQUIVALENT(S): at 17:44

## 2023-05-19 RX ADMIN — LACTULOSE 20 GRAM(S): 10 SOLUTION ORAL at 17:42

## 2023-05-19 RX ADMIN — Medication 1 MILLIGRAM(S): at 11:06

## 2023-05-19 RX ADMIN — PIPERACILLIN AND TAZOBACTAM 25 GRAM(S): 4; .5 INJECTION, POWDER, LYOPHILIZED, FOR SOLUTION INTRAVENOUS at 00:53

## 2023-05-19 RX ADMIN — Medication 50 MILLILITER(S): at 05:03

## 2023-05-19 RX ADMIN — Medication 100 MILLIGRAM(S): at 17:40

## 2023-05-19 RX ADMIN — PIPERACILLIN AND TAZOBACTAM 25 GRAM(S): 4; .5 INJECTION, POWDER, LYOPHILIZED, FOR SOLUTION INTRAVENOUS at 06:23

## 2023-05-19 RX ADMIN — Medication 100 MILLIGRAM(S): at 11:05

## 2023-05-19 RX ADMIN — LACTULOSE 20 GRAM(S): 10 SOLUTION ORAL at 05:04

## 2023-05-19 RX ADMIN — Medication 50 MILLILITER(S): at 14:29

## 2023-05-19 RX ADMIN — MORPHINE SULFATE 15 MILLIGRAM(S): 50 CAPSULE, EXTENDED RELEASE ORAL at 06:22

## 2023-05-19 RX ADMIN — HEPARIN SODIUM 5000 UNIT(S): 5000 INJECTION INTRAVENOUS; SUBCUTANEOUS at 22:15

## 2023-05-19 RX ADMIN — PIPERACILLIN AND TAZOBACTAM 25 GRAM(S): 4; .5 INJECTION, POWDER, LYOPHILIZED, FOR SOLUTION INTRAVENOUS at 13:04

## 2023-05-19 RX ADMIN — Medication 1 TABLET(S): at 11:06

## 2023-05-19 RX ADMIN — Medication 100 MILLIGRAM(S): at 06:23

## 2023-05-19 RX ADMIN — Medication 50 MILLILITER(S): at 22:15

## 2023-05-19 RX ADMIN — HEPARIN SODIUM 5000 UNIT(S): 5000 INJECTION INTRAVENOUS; SUBCUTANEOUS at 05:04

## 2023-05-19 RX ADMIN — LACTULOSE 20 GRAM(S): 10 SOLUTION ORAL at 00:53

## 2023-05-19 RX ADMIN — Medication 100 MICROGRAM(S): at 05:04

## 2023-05-19 NOTE — PROGRESS NOTE ADULT - SUBJECTIVE AND OBJECTIVE BOX
Carolina Ruiz MD  Division of Hospital Medicine  Reachable on MS Teams  After hours please page [phone]    PROGRESS NOTE:     Patient is a 38y old  Female who presents with a chief complaint of Liver Failure (19 May 2023 17:11)      SUBJECTIVE / OVERNIGHT EVENTS: No acute events overnight, pt seen and evaluated this AM, main concern she voiced was reaccumulation of her ascites causing SOB.     ADDITIONAL REVIEW OF SYSTEMS: neg    MEDICATIONS  (STANDING):  albumin human 25% IVPB 100 milliLiter(s) IV Intermittent every 8 hours  ceFAZolin   IVPB 2000 milliGRAM(s) IV Intermittent every 8 hours  folic acid 1 milliGRAM(s) Oral daily  heparin   Injectable 5000 Unit(s) SubCutaneous every 8 hours  lactulose Syrup 20 Gram(s) Oral every 6 hours  levothyroxine 100 MICROGram(s) Oral daily  multivitamin 1 Tablet(s) Oral daily  rifAXIMin 550 milliGRAM(s) Oral two times a day  thiamine 100 milliGRAM(s) Oral daily    MEDICATIONS  (PRN):  guaiFENesin Oral Liquid (Sugar-Free) 100 milliGRAM(s) Oral every 6 hours PRN Cough  ondansetron Injectable 4 milliGRAM(s) IV Push every 6 hours PRN Nausea and/or Vomiting      CAPILLARY BLOOD GLUCOSE        I&O's Summary    18 May 2023 07:01  -  19 May 2023 07:00  --------------------------------------------------------  IN: 290 mL / OUT: 0 mL / NET: 290 mL        PHYSICAL EXAM:  Vital Signs Last 24 Hrs  T(C): 36.4 (19 May 2023 10:49), Max: 36.7 (18 May 2023 20:56)  T(F): 97.5 (19 May 2023 10:49), Max: 98 (18 May 2023 20:56)  HR: 105 (19 May 2023 10:49) (102 - 105)  BP: 106/65 (19 May 2023 10:49) (100/65 - 106/65)  BP(mean): --  RR: 18 (19 May 2023 10:49) (17 - 18)  SpO2: 97% (19 May 2023 10:49) (95% - 97%)    Parameters below as of 19 May 2023 10:49  Patient On (Oxygen Delivery Method): room air        CONSTITUTIONAL: NAD, jaundiced, scleral icterus  RESPIRATORY: Normal respiratory effort; lungs are clear to auscultation bilaterally  CARDIOVASCULAR: Regular rate and rhythm, normal S1 and S2, no murmur/rub/gallop; b/l LE edema; Peripheral pulses are 2+ bilaterally  ABDOMEN: distended, Nontender to palpation, normoactive bowel sounds, no rebound/guarding; No hepatosplenomegaly  MUSCLOSKELETAL: no clubbing or cyanosis of digits; no joint swelling or tenderness to palpation  PSYCH: A+O to person, place, and time; affect appropriate    LABS:                        7.8    19.53 )-----------( 197      ( 19 May 2023 09:49 )             22.4     05-19    129<L>  |  94<L>  |  <4<L>  ----------------------------<  101<H>  3.4<L>   |  20<L>  |  0.92    Ca    8.8      19 May 2023 09:49  Phos  2.6     05-19  Mg     1.7     05-19    TPro  5.8<L>  /  Alb  2.1<L>  /  TBili  19.8<H>  /  DBili  x   /  AST  130<H>  /  ALT  10  /  AlkPhos  254<H>  05-18    PT/INR - ( 18 May 2023 07:17 )   PT: 29.4 sec;   INR: 2.53 ratio               Urinalysis Basic - ( 19 May 2023 05:21 )    Color: Dark Yellow / Appearance: Clear / S.034 / pH: x  Gluc: x / Ketone: Negative  / Bili: Large >10 / Urobili: Negative   Blood: x / Protein: Trace / Nitrite: Negative   Leuk Esterase: Negative / RBC: 6 /hpf / WBC 7 /HPF   Sq Epi: x / Non Sq Epi: x / Bacteria: Negative        Culture - Blood (collected 18 May 2023 06:00)  Source: .Blood Blood-Peripheral  Preliminary Report (19 May 2023 12:01):    No growth to date.    Culture - Fungal, Body Fluid (collected 17 May 2023 17:20)  Source: Peritoneal Peritoneal Fluid  Preliminary Report (18 May 2023 11:40):    Testing in progress    Culture - Body Fluid with Gram Stain (collected 17 May 2023 17:20)  Source: Peritoneal Peritoneal Fluid  Gram Stain (17 May 2023 23:41):    No polymorphonuclear cells seen    No organisms seen    by cytocentrifuge  Preliminary Report (18 May 2023 15:40):    No growth    Culture - Blood (collected 17 May 2023 07:07)  Source: .Blood Blood-Peripheral  Preliminary Report (18 May 2023 10:04):    No growth to date.        RADIOLOGY & ADDITIONAL TESTS:  Results Reviewed:   Imaging Personally Reviewed:  Electrocardiogram Personally Reviewed:    COORDINATION OF CARE:  Care Discussed with Consultants/Other Providers [Y/N]:  Prior or Outpatient Records Reviewed [Y/N]:

## 2023-05-19 NOTE — PROGRESS NOTE ADULT - SUBJECTIVE AND OBJECTIVE BOX
Follow Up: Bacteremia     Interval History/ROS: Examined at bedside     REVIEW OF SYSTEMS  Limited i/s/o lethargy     Allergies  No Known Allergies    ANTIMICROBIALS:    ceFAZolin   IVPB 2000 every 8 hours  piperacillin/tazobactam IVPB.. 3.375 every 6 hours  rifAXIMin 550 two times a day    OTHER MEDS: MEDICATIONS  (STANDING):  guaiFENesin Oral Liquid (Sugar-Free) 100 every 6 hours PRN  heparin   Injectable 5000 every 8 hours  lactulose Syrup 20 every 6 hours  levothyroxine 100 daily  ondansetron Injectable 4 every 6 hours PRN    Vital Signs Last 24 Hrs  T(F): 97.9 (23 @ 05:44), Max: 98.6 (23 @ 20:59)    Vital Signs Last 24 Hrs  HR: 103 (23 @ 05:44) (97 - 103)  BP: 100/65 (23 @ 05:44) (91/50 - 104/68)  RR: 18 (23 @ 05:44)  SpO2: 96% (23 @ 05:44) (95% - 99%)  Wt(kg): --    EXAM:  GENERAL: NAD  ENT: Normal external ears and nose, no discharges; moist mucous membranes  NECK: Supple  CHEST/LUNG: Clear to auscultation bilaterally  HEART: S1 S2  ABDOMEN: Soft, no guarding   EXTREMITIES: No cyanosis   NERVOUS SYSTEM:  Lethargic but responds appropriately to verbal stimuli, A&O  SKIN: No rash, jaundiced    Labs:                        7.8    .53 )-----------( 197      ( 19 May 2023 09:49 )             22.4         129<L>  |  94<L>  |  <4<L>  ----------------------------<  101<H>  3.4<L>   |  20<L>  |  0.92    Ca    8.8      19 May 2023 09:49  Phos  2.6       Mg     1.7         TPro  5.8<L>  /  Alb  2.1<L>  /  TBili  19.8<H>  /  DBili  x   /  AST  130<H>  /  ALT  10  /  AlkPhos  254<H>  05-18    WBC Trend:  WBC Count: 19.53 (23 @ 09:49)  WBC Count: 20.74 (23 @ 07:17)  WBC Count: 17.21 (23 @ 07:12)  WBC Count: 18.89 (23 @ 14:57)    Creatine Trend:  Creatinine, Serum: 0.92 ()  Creatinine, Serum: 0.99 ()  Creatinine, Serum: 0.95 ()  Creatinine, Serum: 0.58 (-17)    Liver Biochemical Testing Trend:  Alanine Aminotransferase (ALT/SGPT): 10 (-18)  Alanine Aminotransferase (ALT/SGPT): 15 (-17)  Alanine Aminotransferase (ALT/SGPT): 18 (-16)  Alanine Aminotransferase (ALT/SGPT): 15 (-15)  Alanine Aminotransferase (ALT/SGPT): 12 (-14)  Aspartate Aminotransferase (AST/SGOT): 130 (23 @ 07:17)  Aspartate Aminotransferase (AST/SGOT): 115 (23 @ 07:12)  Aspartate Aminotransferase (AST/SGOT): 146 (23 @ 14:57)  Aspartate Aminotransferase (AST/SGOT): 120 (05-15-23 @ 07:32)  Aspartate Aminotransferase (AST/SGOT): 133 (23 @ 08:26)  Bilirubin Total, Serum: 19.8 (05-18)  Bilirubin Total, Serum: 18.2 (05-17)  Bilirubin Direct, Serum: >10.0 (-17)  Bilirubin Total, Serum: 20.6 (05-16)  Bilirubin Total, Serum: 20.6 (05-16)    Urinalysis Basic - ( 19 May 2023 05:21 )    Color: Dark Yellow / Appearance: Clear / S.034 / pH: x  Gluc: x / Ketone: Negative  / Bili: Large >10 / Urobili: Negative   Blood: x / Protein: Trace / Nitrite: Negative   Leuk Esterase: Negative / RBC: 6 /hpf / WBC 7 /HPF   Sq Epi: x / Non Sq Epi: x / Bacteria: Negative    MICROBIOLOGY:    MRSA PCR Result.: Britton (23 @ 07:42)    Culture - Fungal, Body Fluid (collected 17 May 2023 17:20)  Source: Peritoneal Peritoneal Fluid  Preliminary Report:    Testing in progress    Culture - Body Fluid with Gram Stain (collected 17 May 2023 17:20)  Source: Peritoneal Peritoneal Fluid  Preliminary Report:    No growth    Culture - Blood (collected 17 May 2023 07:07)  Source: .Blood Blood-Peripheral  Preliminary Report:    No growth to date.    Culture - Blood (collected 16 May 2023 15:22)  Source: .Blood Blood-Peripheral  Preliminary Report:    No growth to date.    Culture - Blood (collected 16 May 2023 15:22)  Source: .Blood Blood-Peripheral  Preliminary Report:    No growth to date.    Culture - Body Fluid with Gram Stain (collected 13 May 2023 15:07)  Source: Ascites Fl Ascites Fluid  Final Report:    No growth at 5 days    Culture - Urine (collected 13 May 2023 12:36)  Source: Clean Catch Clean Catch (Midstream)  Final Report:    >100,000 CFU/ml Escherichia coli  Organism: Escherichia coli  Organism: Escherichia coli    Sensitivities:      Method Type: BAKARI      -  Amikacin: S <=16      -  Amoxicillin/Clavulanic Acid: R >16/8      -  Ampicillin: R >16 These ampicillin results predict results for amoxicillin      -  Ampicillin/Sulbactam: S 8/4 Enterobacter, Klebsiella aerogenes, Citrobacter, and Serratia may develop resistance during prolonged therapy (3-4 days)      -  Aztreonam: S <=4      -  Cefazolin: S 4 For uncomplicated UTI with K. pneumoniae, E. coli, or P. mirablis: BAKARI <=16 is sensitive and BAKARI >=32 is resistant. This also predicts results for oral agents cefaclor, cefdinir, cefpodoxime, cefprozil, cefuroxime axetil, cephalexin and locarbef for uncomplicated UTI. Note that some isolates may be susceptible to these agents while testing resistant to cefazolin.      -  Cefepime: S <=2      -  Cefoxitin: S <=8      -  Ceftriaxone: S <=1 Enterobacter, Klebsiella aerogenes, Citrobacter, and Serratia may develop resistance during prolonged therapy      -  Cefuroxime: S <=4      -  Ciprofloxacin: S <=0.25      -  Ertapenem: S <=0.5      -  Gentamicin: S <=2      -  Imipenem: S <=1      -  Levofloxacin: S <=0.5      -  Meropenem: S <=1      -  Nitrofurantoin: S <=32 Should not be used to treat pyelonephritis      -  Piperacillin/Tazobactam: S <=8      -  Tobramycin: S <=2      -  Trimethoprim/Sulfamethoxazole: S <=0.5/9.5    Culture - Blood (collected 13 May 2023 10:15)  Source: .Blood Blood-Peripheral  Final Report:    Growth in aerobic and anaerobic bottles: Staphylococcus aureus    ***Blood Panel PCR results on this specimen are available    approximately 3 hours after the Gram stain result.***    Gram stain, PCR, and/or culture results may not always    correspond dueto difference in methodologies.    ************************************************************    This PCR assay was performed by multiplex PCR. This    Assay tests for 66 bacterial and resistance gene targets.    Please refer to the [facility] test directory    at [email or web address] for details.  Organism: Blood Culture PCR  Staphylococcus aureus  Organism: Staphylococcus aureus    Sensitivities:      Method Type: BAKARI      -  Ampicillin/Sulbactam: S <=8/4      -  Cefazolin: S <=4      -  Clindamycin: R 0.5 This isolate is presumed to be clindamycin resistant based on detection of inducible resistance. Clindamycin may still be effective in some patients.      -  Erythromycin: R >4      -  Gentamicin: S <=1 Should not be used as monotherapy      -  Oxacillin: S 0.5 Oxacillin predicts susceptibility for dicloxacillin, methicillin, and nafcillin      -  Penicillin: R >8      -  Rifampin: S <=1 Should not be used as monotherapy      -  Tetracycline: S <=1      -  Trimethoprim/Sulfamethoxazole: S <=0.5/9.5      -  Vancomycin: S 2  Organism: Blood Culture PCR    Sensitivities:      Method Type: PCR      -  Methicillin SENSITIVE Staphylococcus aureus (MSSA): Detec Any isolate of Staphylococcus aureus from a blood culture is NOT considered a contaminant.    Culture - Blood (collected 13 May 2023 10:00)  Source: .Blood Blood-Peripheral  Final Report:    Growth in aerobic bottle: Staphylococcus aureus    See previous culture [id number]    HIV-1/2 Combo Result: Nonreact (23 @ 07:31)  HIV-1/2 Combo Result: Nonreact (23 07:14)    Cytomegalovirus By PCR: Det <34.5 IU/mL (23 @ 07:20)  Treponema Pallidum Antibody Interpretation: Negative (23 @ 07:14)  Toxoplasma IgG Interpretation: Negative (23 07:14)  CMV IgG Interpretation: Positive (23 07:14)  Treponema Pallidum Antibody Interpretation: Negative (23 07:14)  Toxoplasma IgG Interpretation: Negative (23 07:14)  Cytomegalovirus By PCR: Det <34.5 IU/mL (23 @ 08:26)    Legionella Antigen, Urine: Negative ( @ 23:20)    COVID-19 Abhi Domain AB Interp: Positive (23 @ 07:14)    Procalcitonin, Serum: 0.31 ()    Ferritin, Serum: 349 ()    Lactate, Blood: 1.5 ( @ 07:20)    RADIOLOGY:  imaging below personally reviewed    < from: CT Angio Chest PE Protocol w/ IV Cont (23 @ 14:24) >  IMPRESSION:  Multifocal bilateral groundglass opacities, suspicious for pneumonia.    Small bilateral pleural effusions, left greater the right, increased   compared toprior exam.    No pulmonary embolism.    --- End of Report ---    < end of copied text >

## 2023-05-19 NOTE — PROGRESS NOTE ADULT - ATTENDING COMMENTS
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    Suspect source is either respiratory or right forehead soft tissue lesion  CTA Chest (5/18) with ground glass infiltrates and increase in pleural effusions  Favor that this is secondary to pulmonary edema  Would switch Zosyn back to Cefazolin  If worsening clinical status would rather add Zosyn on top of Cefazolin  Pending Collection of QuantiFeron gold    I will continue to follow. Please feel free to contact me with any further questions.    Irving Deras M.D.  [facility] Division of Infectious Disease  8AM-5PM Monday - Friday: Available on [employer] Teams  After Hours and Holidays (or if no response on Microsoft Teams): Please contact the Infectious Diseases Office at [phone]

## 2023-05-19 NOTE — PROGRESS NOTE ADULT - ASSESSMENT
This is a 39 yo [place] Female with Hx of hypothyroidism (on Levothyroxine), and no other known medical Hx, presented to [facility] ED on 5/13/23 with ~ 2 weeks Hx of progressively worsening abdominal pain ("stabbing, diffuse, non radiating") and distention,  as well as 1 day Hx of jaundice, and was admitted to medicine with liver failure (possibly acute on chronic), with MELD-Na 29 on admission, hyperbilirubinemia (Se bi 18.9, direct 15.9), coagulopathy (INR 2.15), mild liver enzyme elevation in AST>>ALT pattern (, ALT 14, ), severe hypoalbuminemia (alb 1.3), as well as leukocytosis (WBC 16k), anemia (Hb 8.0). She was empirically started on NAC per protocol, hepatology evaluated. ID evaluated for MAY Pna and later MSSA bacteremia, has been on IV ceftriaxone and azithromycin. Ascitic fluid analysis (Dx paracentesis 5/13), was portal hypertensive (SAAG 1.1, total protein 1.0), and was not suggestive of SBP

## 2023-05-19 NOTE — CONSULT NOTE ADULT - SUBJECTIVE AND OBJECTIVE BOX
Interventional Radiology    Evaluate for Procedure: paracentesis    HPI:  39 yo [place] Female with Hx of hypothyroidism (on Levothyroxine), presented to [facility] ED on 5/13/23 with ~ 2 weeks Hx of progressively worsening abdominal pain and distention, admitted to medicine with liver failure (possibly acute on chronic), hyperbilirubinemia, coagulopathy, mild liver enzyme elevation, severe hypoalbuminemia, leukocytosis, anemia.  Empirically started on NAC per protocol, hepatology evaluated. ID evaluated for MAY Pna and later MSSA bacteremia, has been on IV ceftriaxone and azithromycin. Ascitic fluid analysis (Dx paracentesis 5/13), was portal hypertensive (SAAG 1.1, total protein 1.0), and was not suggestive of SBP (PMN < 50). However, continued to c/o abdominal pain, requiring morphine. Transferred to [facility] for Liver Transplant eval.  Last paracentesis done 5/17 with 2L removed. 5/19 u/s showing small to moderate ascites. IR consulted for repeat paracentesis.     Allergies: No Known Allergies    Medications (Abx/Cardiac/Anticoagulation/Blood Products)    ceFAZolin   IVPB: 100 mL/Hr IV Intermittent (05-19 @ 06:23)  furosemide   Injectable: 40 milliGRAM(s) IV Push (05-18 @ 05:48)  heparin   Injectable: 5000 Unit(s) SubCutaneous (05-19 @ 05:04)  piperacillin/tazobactam IVPB.: 200 mL/Hr IV Intermittent (05-18 @ 20:47)  piperacillin/tazobactam IVPB.-: 25 mL/Hr IV Intermittent (05-19 @ 00:53)  piperacillin/tazobactam IVPB..: 25 mL/Hr IV Intermittent (05-19 @ 13:04)  rifAXIMin: 550 milliGRAM(s) Oral (05-19 @ 05:03)    Data:    T(C): 36.4  HR: 105  BP: 106/65  RR: 18  SpO2: 97%    -WBC 19.53 / HgB 7.8 / Hct 22.4 / Plt 197  -Na 129 / Cl 94 / BUN <4 / Glucose 101  -K 3.4 / CO2 20 / Cr 0.92  -ALT -- / Alk Phos -- / T.Bili --  -INR 2.53 / PTT --      Radiology: reviewed      Assessment/Plan: 39 yo Polish Female with Hx of hypothyroidism (on Levothyroxine), presented to [facility] ED on 5/13/23 with ~ 2 weeks Hx of progressively worsening abdominal pain and distention, admitted to medicine with liver failure (possibly acute on chronic), hyperbilirubinemia, coagulopathy, mild liver enzyme elevation, severe hypoalbuminemia, leukocytosis, anemia.  Empirically started on NAC per protocol, hepatology evaluated. ID evaluated for MAY Pna and later MSSA bacteremia, has been on IV ceftriaxone and azithromycin. Ascitic fluid analysis (Dx paracentesis 5/13), was portal hypertensive (SAAG 1.1, total protein 1.0), and was not suggestive of SBP (PMN < 50). However, continued to c/o abdominal pain, requiring morphine. Transferred to [facility] for Liver Transplant eval.  Last paracentesis done 5/17 with 2L removed. 5/19 u/s showing small to moderate ascites. IR consulted for repeat paracentesis.     Given paracentesis just performed on 5/17 with 2L removed, recommend waiting to repeat paracentesis until next week to reduce infection risk.   - IR will plan to perform paracentesis Tuesday 5/23   - Please place order for IR Procedure, approving SALO Blackman  - No need to be NPO  - hold DVT ppx on day of procedure  - maintain active type and screen x 2  - Please draw AM labs on day of procedure - CBC/coags/BMP  - d/w primary team    --  Please call IR extension [phone] with any questions, concerns or issues regarding above.

## 2023-05-19 NOTE — PROGRESS NOTE ADULT - SUBJECTIVE AND OBJECTIVE BOX
Interval Events:   No acute events overnight.  Underwent CTPA negative for PE.  Remains mildly tachycardic however afebrile and hemodynamically stable.    ROS:   12 point review of systems performed and negative except otherwise noted in HPI.    Hospital Medications:  albumin human 25% IVPB 100 milliLiter(s) IV Intermittent every 8 hours  ceFAZolin   IVPB 2000 milliGRAM(s) IV Intermittent every 8 hours  folic acid 1 milliGRAM(s) Oral daily  guaiFENesin Oral Liquid (Sugar-Free) 100 milliGRAM(s) Oral every 6 hours PRN  heparin   Injectable 5000 Unit(s) SubCutaneous every 8 hours  lactulose Syrup 20 Gram(s) Oral every 6 hours  levothyroxine 100 MICROGram(s) Oral daily  multivitamin 1 Tablet(s) Oral daily  ondansetron Injectable 4 milliGRAM(s) IV Push every 6 hours PRN  piperacillin/tazobactam IVPB.. 3.375 Gram(s) IV Intermittent every 6 hours  rifAXIMin 550 milliGRAM(s) Oral two times a day  thiamine 100 milliGRAM(s) Oral daily    MAR over past 24 hours:    albumin human 25% IVPB   50 mL/Hr IV Intermittent (05-19-23 @ 05:03)   50 mL/Hr IV Intermittent (05-18-23 @ 22:39)   50 mL/Hr IV Intermittent (05-18-23 @ 17:59)    ceFAZolin   IVPB   100 mL/Hr IV Intermittent (05-19-23 @ 06:23)   100 mL/Hr IV Intermittent (05-18-23 @ 22:40)   100 mL/Hr IV Intermittent (05-18-23 @ 13:49)    folic acid   1 milliGRAM(s) Oral (05-18-23 @ 12:33)    guaiFENesin Oral Liquid (Sugar-Free)   100 milliGRAM(s) Oral (05-18-23 @ 22:49)    heparin   Injectable   5000 Unit(s) SubCutaneous (05-19-23 @ 05:04)   5000 Unit(s) SubCutaneous (05-18-23 @ 22:40)   5000 Unit(s) SubCutaneous (05-18-23 @ 13:49)    lactulose Syrup   20 Gram(s) Oral (05-19-23 @ 05:04)   20 Gram(s) Oral (05-19-23 @ 00:53)   20 Gram(s) Oral (05-18-23 @ 18:01)   20 Gram(s) Oral (05-18-23 @ 12:32)    levothyroxine   100 MICROGram(s) Oral (05-19-23 @ 05:04)    morphine  IR   15 milliGRAM(s) Oral (05-19-23 @ 06:22)    multivitamin   1 Tablet(s) Oral (05-18-23 @ 12:33)    piperacillin/tazobactam IVPB.   200 mL/Hr IV Intermittent (05-18-23 @ 20:47)    piperacillin/tazobactam IVPB.-   25 mL/Hr IV Intermittent (05-19-23 @ 00:53)    piperacillin/tazobactam IVPB..   25 mL/Hr IV Intermittent (05-19-23 @ 06:23)    potassium chloride    Tablet ER   40 milliEquivalent(s) Oral (05-18-23 @ 22:41)   40 milliEquivalent(s) Oral (05-18-23 @ 17:59)    rifAXIMin   550 milliGRAM(s) Oral (05-19-23 @ 05:03)   550 milliGRAM(s) Oral (05-18-23 @ 18:01)    thiamine   100 milliGRAM(s) Oral (05-18-23 @ 12:33)      Home Medications:  Last Order Reconciliation Date: 05-16-23 @ 13:29 (Admission Reconciliation)  acetylcysteine 20% intravenous solution: 40 milliliter(s) intravenous every 24 hours  cefTRIAXone: 2 gram(s) intravenously once a day  Constulose 10 g/15 mL oral liquid: 30 milliliter(s) orally 3 times a day  folic acid 1 mg oral tablet: 1 tab(s) orally once a day  guaiFENesin 100 mg/5 mL oral liquid: 5 milliliter(s) orally every 6 hours As needed Cough  levothyroxine 100 mcg (0.1 mg) oral tablet: 1 tab(s) orally once a day  LORazepam: 1 milliliter(s) intravenous every 4 hours as needed for Anxiety  morphine 15 mg oral tablet: 1 tab(s) orally every 8 hours  Multiple Vitamins oral tablet: 1 tab(s) orally once a day  mupirocin 2% topical ointment: Apply topically to affected area 2 times a day to both nostrils  ondansetron 2 mg/mL injectable solution: 4 milligram(s) injectable every 8 hours as needed for as needed  sodium/potassium/phosphorus 160 mg-280 mg-250 mg oral powder for reconstitution: 1 packet(s) orally 3 times a day (with meals)  thiamine 100 mg oral tablet: 1 tab(s) orally once a day    PHYSICAL EXAM:   Vital Signs last 24 hours:  T(F): 97.9 (05-19-23 @ 05:44), Max: 98 (05-18-23 @ 13:18)  HR: 103 (05-19-23 @ 05:44) (97 - 103)  BP: 100/65 (05-19-23 @ 05:44) (91/50 - 104/68)  BP(mean): --  ABP: --  ABP(mean): --  RR: 18 (05-19-23 @ 05:44) (17 - 18)  SpO2: 96% (05-19-23 @ 05:44) (95% - 99%)    I&Os:    05-18-23 @ 07:01  -  05-19-23 @ 07:00  --------------------------------------------------------  IN:    Oral Fluid: 290 mL  Total IN: 290 mL    OUT:  Total OUT: 0 mL    Total NET: 290 mL        BMI (kg/m2): 22.1 (05-16-23 @ 06:46)  GENERAL: no acute distress  NEURO: alert  HEENT: ecchymoses noted over right sided of face, NCAT, no conjunctival pallor appreciated  CHEST: no respiratory distress, no accessory muscle use  CARDIAC: regular rate, +S1/S2  ABDOMEN: soft, nontender, no rebound or guarding  EXTREMITIES: warm, well perfused  SKIN: no lesions noted    DIAGNOSTICS:  WBC      Hg       PLT      Na       K        CO2     BUN      Cr       ALT      AST      TB       ALP  19.53    7.8      197      ------   ------   ------   ------   ------   ------   ------   ------   ------   05-19-23 @ 09:49  ------   ------   ------   125      2.6      20       <4       0.99     ------   ------   ------   ------   05-18-23 @ 17:03  20.74    8.1      214      126      3.2      19       <4       0.95     10       130      19.8     254      05-18-23 @ 07:17  17.21    7.9      175      123      3.6      17       <4       0.58     15       115      18.2     214      05-17-23 @ 07:12  18.89    8.5      176      126      4.8      19       <4       0.56     18       146      20.6     240      05-16-23 @ 14:57    PT             INR            MELDwith  MELDw/o  29.4           2.53           --             --             05-18-23 @ 07:17  32.2           2.75           --             --             05-17-23 @ 07:20  --             --             >40            33             05-17-23 @ 07:12  26.4           2.28           --             --             05-16-23 @ 14:57  31.2           2.60           --             --             05-15-23 @ 07:32
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
## 2023-05-19 NOTE — PROGRESS NOTE ADULT - ASSESSMENT
39 yo F with hypothyroidism (on Levothyroxine) who initially presented to [facility] on 5/13 with abd pain.     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)  And MSSA bacteremia  On Ancef 2g IV Q8h, + blood cultures on 5/13, negative on 5/16  TTE unable to r/o endocarditis   S/p paracentesis x 2, negative for SBP  Recent CT Chest with pneumonia, now on Zosyn (5/18-)    Suggest;  Continue Ancef   SAHARA  Zosyn 5/18- for pneumonia, suspect aspiration    -----------------------------------------------------------------------------------------    Pre Transplant Evaluation    Social Hx:  Born in [place], moved to  22 years ago  Lives in [place] with Father  Not working currently     Vaccination Hx:  S/p Td booster 2021  Denies having received any other vaccines including but not limited to Covid-19 vaccine, Influenza vaccine     Serologies;  Hep A non immune   HBsAg negative   HBsAb +  HBc Ab negative   Hep C Ab negative   HSV 1/2 IgG +/+  VZV IgG negative   EBV IgG +  CMV IgG +  Toxoplasma IgG negative   MMR titres negative except for Rubella   HIV screen negative   Syphilis screen negative   QuantiFeron pending collection 39 yo F with hypothyroidism (on Levothyroxine) who initially presented to [facility] on 5/13 with abd pain.     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)  And MSSA bacteremia  On Ancef 2g IV Q8h, + blood cultures on 5/13, negative on 5/16  TTE unable to r/o endocarditis   S/p paracentesis x 2, negative for SBP  Recent CT Chest with pneumonia, now on Zosyn (5/18-)    Suggest;  Please continue Ancef 2g IV Q8h for MSSA bacteremia  SAHARA  On Zosyn (5/18-) for pneumonia by Primary Team     -----------------------------------------------------------------------------------------    Pre Transplant Evaluation    Social Hx:  Born in [place], moved to  22 years ago  Lives in [place] with Father  Not working currently     Vaccination Hx:  S/p Td booster 2021  Denies having received any other vaccines including but not limited to Covid-19 vaccine, Influenza vaccine     Serologies;  Hep A non immune   HBsAg negative   HBsAb +  HBc Ab negative   Hep C Ab negative   HSV 1/2 IgG +/+  VZV IgG negative   EBV IgG +  CMV IgG +  Toxoplasma IgG negative   MMR titres negative except for Rubella   HIV screen negative   Syphilis screen negative   QuantiFeron pending collection 39 yo F with hypothyroidism (on Levothyroxine) who initially presented to [facility] on 5/13 with abd pain.     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)  And MSSA bacteremia  On Ancef 2g IV Q8h, + blood cultures on 5/13, negative on 5/16  TTE unable to r/o endocarditis   S/p paracentesis x 2, negative for SBP  Recent CT Chest with pneumonia, now on Zosyn (5/18-)    Suggest;  Please continue Ancef 2g IV Q8h for MSSA bacteremia  SAHARA    -----------------------------------------------------------------------------------------    Pre Transplant Evaluation    Social Hx:  Born in [place], moved to  22 years ago  Lives in [place] with Father  Not working currently     Vaccination Hx:  S/p Td booster 2021  Denies having received any other vaccines including but not limited to Covid-19 vaccine, Influenza vaccine     Serologies;  Hep A non immune   HBsAg negative   HBsAb +  HBc Ab negative   Hep C Ab negative   HSV 1/2 IgG +/+  VZV IgG negative   EBV IgG +  CMV IgG +  Toxoplasma IgG negative   MMR titres negative except for Rubella   HIV screen negative   Syphilis screen negative   QuantiFeron pending collection 37 yo F with hypothyroidism (on Levothyroxine) who initially presented to [facility] on 5/13 with abd pain.     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)  And MSSA bacteremia  On Ancef 2g IV Q8h, + blood cultures on 5/13, negative on 5/16  TTE unable to r/o endocarditis   S/p paracentesis x 2, negative for SBP  Recent CT Chest with ? pneumonia vs ? fluid     Suggest;  Please continue Ancef 2g IV Q8h for MSSA bacteremia  SAHARA    -----------------------------------------------------------------------------------------    Pre Transplant Evaluation    Social Hx:  Born in [place], moved to  22 years ago  Lives in [place] with Father  Not working currently     Vaccination Hx:  S/p Td booster 2021  Denies having received any other vaccines including but not limited to Covid-19 vaccine, Influenza vaccine     Serologies;  Hep A non immune   HBsAg negative   HBsAb +  HBc Ab negative   Hep C Ab negative   HSV 1/2 IgG +/+  VZV IgG negative   EBV IgG +  CMV IgG +  Toxoplasma IgG negative   MMR titres negative except for Rubella   HIV screen negative   Syphilis screen negative   QuantiFeron pending collection 37 yo F with hypothyroidism (on Levothyroxine) who initially presented to [facility] on 5/13 with abd pain.     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)  And MSSA bacteremia  On Ancef 2g IV Q8h, + blood cultures on 5/13, negative on 5/16  TTE unable to r/o endocarditis   S/p paracentesis x 2, negative for SBP  Recent CT Chest with ground glass infiltrates (favor pulmonary edema) and increase in pleural effusions    Suggest;  Switch Zosyn back to Cefazolin 2g IV Q8H to optimize MSSA coverage  If any further clinical decline would add Zosyn in combination with Cefazolin  SAHARA    -----------------------------------------------------------------------------------------    Pre Transplant Evaluation    Social Hx:  Born in [place], moved to  22 years ago  Lives in [place] with Father  Not working currently     Vaccination Hx:  S/p Td booster 2021  Denies having received any other vaccines including but not limited to Covid-19 vaccine, Influenza vaccine     Serologies;  Hep A non immune   HBsAg negative   HBsAb +  HBc Ab negative   Hep C Ab negative   HSV 1/2 IgG +/+  VZV IgG negative   EBV IgG +  CMV IgG +  Toxoplasma IgG negative   MMR titres negative except for Rubella   HIV screen negative   Syphilis screen negative   QuantiFeron pending collection

## 2023-05-19 NOTE — PROGRESS NOTE ADULT - ATTENDING COMMENTS
I have reviewed the fellow's note and the patient's history. The 38-year-old female with a history of hypothyroidism and AUD presents with abdominal pain since 5/13/2023, along with worsening distention for the past few weeks. She also reports intermittent fevers over the past 4 weeks, dyspnea without a cough, and a history of alcohol use disorder (AUD).    Imaging studies, including a CT A/P with IV contrast on 5/13/2023, revealed hepatomegaly, diffuse hepatic steatosis with varices, and a recanalized umbilical vein. Moderate ascites was also observed. The patient has been diagnosed with alcohol-associated hepatitis, now s/p N-acetylcysteine (NAC). The MELD-Na score on 5/18/2023 was 33.    Further investigations revealed MSSA bacteremia (with the first positive blood culture on 5/13/2023), pneumonia on CT chest, and CMV viremia detected through PCR on 5/14/2023. The patient has not undergone prior esophagogastroduodenoscopy (EGD) to evaluate the varices.    Her current management includes rifaximin 550mg orally twice a day since 05-16-23 for hepatic encephalopathy, as well as prophylaxis for spontaneous bacterial peritonitis (SBP), hepatic encephalopathy (HE), and variceal bleeding. Paracentesis performed on 5/13/2023 was negative for SBP, and a repeat paracentesis on 5/17/2023 removed 2L of ascitic fluid, which was also negative for SBP.    Patient has been consented for a potential for liver transplantation. Work up in progress.   Infectious disease (ID) consultation for MSSA bacteremia and pneumonia.   Considering the inability to rule out endocarditis on transthoracic echocardiography (TTE), a transesophageal echocardiography (SAHARA) may be required.     The patient has already received NAC therapy; however, no steroids were administered due to  bacteremia.

## 2023-05-20 LAB
ALBUMIN SERPL ELPH-MCNC: 2.9 G/DL — LOW (ref 3.3–5)
ALBUMIN SERPL ELPH-MCNC: 3 G/DL — LOW (ref 3.3–5)
ALP SERPL-CCNC: 184 U/L — HIGH (ref 40–120)
ALP SERPL-CCNC: 202 U/L — HIGH (ref 40–120)
ALT FLD-CCNC: <5 U/L — LOW (ref 10–45)
ALT FLD-CCNC: <5 U/L — LOW (ref 10–45)
ANA TITR SER: NEGATIVE — SIGNIFICANT CHANGE UP
ANION GAP SERPL CALC-SCNC: 12 MMOL/L — SIGNIFICANT CHANGE UP (ref 5–17)
ANION GAP SERPL CALC-SCNC: 15 MMOL/L — SIGNIFICANT CHANGE UP (ref 5–17)
AST SERPL-CCNC: 103 U/L — HIGH (ref 10–40)
AST SERPL-CCNC: 111 U/L — HIGH (ref 10–40)
BILIRUB SERPL-MCNC: 20 MG/DL — HIGH (ref 0.2–1.2)
BILIRUB SERPL-MCNC: 21.4 MG/DL — HIGH (ref 0.2–1.2)
BLD GP AB SCN SERPL QL: NEGATIVE — SIGNIFICANT CHANGE UP
BUN SERPL-MCNC: <4 MG/DL — LOW (ref 7–23)
BUN SERPL-MCNC: <4 MG/DL — LOW (ref 7–23)
CALCIUM SERPL-MCNC: 8.5 MG/DL — SIGNIFICANT CHANGE UP (ref 8.4–10.5)
CALCIUM SERPL-MCNC: 8.9 MG/DL — SIGNIFICANT CHANGE UP (ref 8.4–10.5)
CHLORIDE SERPL-SCNC: 95 MMOL/L — LOW (ref 96–108)
CHLORIDE SERPL-SCNC: 98 MMOL/L — SIGNIFICANT CHANGE UP (ref 96–108)
CO2 SERPL-SCNC: 20 MMOL/L — LOW (ref 22–31)
CO2 SERPL-SCNC: 21 MMOL/L — LOW (ref 22–31)
CREAT SERPL-MCNC: 0.61 MG/DL — SIGNIFICANT CHANGE UP (ref 0.5–1.3)
CREAT SERPL-MCNC: 0.72 MG/DL — SIGNIFICANT CHANGE UP (ref 0.5–1.3)
EGFR: 109 ML/MIN/1.73M2 — SIGNIFICANT CHANGE UP
EGFR: 117 ML/MIN/1.73M2 — SIGNIFICANT CHANGE UP
GLUCOSE SERPL-MCNC: 115 MG/DL — HIGH (ref 70–99)
GLUCOSE SERPL-MCNC: 91 MG/DL — SIGNIFICANT CHANGE UP (ref 70–99)
HCT VFR BLD CALC: 20.5 % — CRITICAL LOW (ref 34.5–45)
HCT VFR BLD CALC: 21.9 % — LOW (ref 34.5–45)
HGB BLD-MCNC: 6.8 G/DL — CRITICAL LOW (ref 11.5–15.5)
HGB BLD-MCNC: 7.2 G/DL — LOW (ref 11.5–15.5)
INR BLD: 3.79 RATIO — HIGH (ref 0.88–1.16)
MAGNESIUM SERPL-MCNC: 1.6 MG/DL — SIGNIFICANT CHANGE UP (ref 1.6–2.6)
MAGNESIUM SERPL-MCNC: 1.8 MG/DL — SIGNIFICANT CHANGE UP (ref 1.6–2.6)
MCHC RBC-ENTMCNC: 32.9 GM/DL — SIGNIFICANT CHANGE UP (ref 32–36)
MCHC RBC-ENTMCNC: 33.2 GM/DL — SIGNIFICANT CHANGE UP (ref 32–36)
MCHC RBC-ENTMCNC: 36.5 PG — HIGH (ref 27–34)
MCHC RBC-ENTMCNC: 37 PG — HIGH (ref 27–34)
MCV RBC AUTO: 111.2 FL — HIGH (ref 80–100)
MCV RBC AUTO: 111.4 FL — HIGH (ref 80–100)
NRBC # BLD: 0 /100 WBCS — SIGNIFICANT CHANGE UP (ref 0–0)
NRBC # BLD: 0 /100 WBCS — SIGNIFICANT CHANGE UP (ref 0–0)
PHOSPHATE SERPL-MCNC: 1.7 MG/DL — LOW (ref 2.5–4.5)
PHOSPHATE SERPL-MCNC: 2.5 MG/DL — SIGNIFICANT CHANGE UP (ref 2.5–4.5)
PLATELET # BLD AUTO: 177 K/UL — SIGNIFICANT CHANGE UP (ref 150–400)
PLATELET # BLD AUTO: 188 K/UL — SIGNIFICANT CHANGE UP (ref 150–400)
POTASSIUM SERPL-MCNC: 3.2 MMOL/L — LOW (ref 3.5–5.3)
POTASSIUM SERPL-MCNC: 3.8 MMOL/L — SIGNIFICANT CHANGE UP (ref 3.5–5.3)
POTASSIUM SERPL-SCNC: 3.2 MMOL/L — LOW (ref 3.5–5.3)
POTASSIUM SERPL-SCNC: 3.8 MMOL/L — SIGNIFICANT CHANGE UP (ref 3.5–5.3)
PROT SERPL-MCNC: 5.6 G/DL — LOW (ref 6–8.3)
PROT SERPL-MCNC: 5.8 G/DL — LOW (ref 6–8.3)
PROTHROM AB SERPL-ACNC: 44.6 SEC — HIGH (ref 10.5–13.4)
RBC # BLD: 1.84 M/UL — LOW (ref 3.8–5.2)
RBC # BLD: 1.97 M/UL — LOW (ref 3.8–5.2)
RBC # FLD: 19.8 % — HIGH (ref 10.3–14.5)
RBC # FLD: 19.9 % — HIGH (ref 10.3–14.5)
RH IG SCN BLD-IMP: POSITIVE — SIGNIFICANT CHANGE UP
SODIUM SERPL-SCNC: 130 MMOL/L — LOW (ref 135–145)
SODIUM SERPL-SCNC: 131 MMOL/L — LOW (ref 135–145)
WBC # BLD: 15.6 K/UL — HIGH (ref 3.8–10.5)
WBC # BLD: 17.37 K/UL — HIGH (ref 3.8–10.5)
WBC # FLD AUTO: 15.6 K/UL — HIGH (ref 3.8–10.5)
WBC # FLD AUTO: 17.37 K/UL — HIGH (ref 3.8–10.5)

## 2023-05-20 PROCEDURE — 70450 CT HEAD/BRAIN W/O DYE: CPT | Mod: 26

## 2023-05-20 PROCEDURE — 99232 SBSQ HOSP IP/OBS MODERATE 35: CPT

## 2023-05-20 PROCEDURE — 99233 SBSQ HOSP IP/OBS HIGH 50: CPT

## 2023-05-20 RX ORDER — MAGNESIUM SULFATE 500 MG/ML
1 VIAL (ML) INJECTION ONCE
Refills: 0 | Status: COMPLETED | OUTPATIENT
Start: 2023-05-20 | End: 2023-05-20

## 2023-05-20 RX ORDER — POTASSIUM CHLORIDE 20 MEQ
40 PACKET (EA) ORAL ONCE
Refills: 0 | Status: COMPLETED | OUTPATIENT
Start: 2023-05-20 | End: 2023-05-20

## 2023-05-20 RX ORDER — MORPHINE SULFATE 50 MG/1
2 CAPSULE, EXTENDED RELEASE ORAL ONCE
Refills: 0 | Status: DISCONTINUED | OUTPATIENT
Start: 2023-05-20 | End: 2023-05-20

## 2023-05-20 RX ORDER — POTASSIUM PHOSPHATE, MONOBASIC POTASSIUM PHOSPHATE, DIBASIC 236; 224 MG/ML; MG/ML
30 INJECTION, SOLUTION INTRAVENOUS ONCE
Refills: 0 | Status: COMPLETED | OUTPATIENT
Start: 2023-05-20 | End: 2023-05-20

## 2023-05-20 RX ADMIN — LACTULOSE 20 GRAM(S): 10 SOLUTION ORAL at 06:42

## 2023-05-20 RX ADMIN — Medication 50 MILLILITER(S): at 06:42

## 2023-05-20 RX ADMIN — LACTULOSE 20 GRAM(S): 10 SOLUTION ORAL at 17:04

## 2023-05-20 RX ADMIN — Medication 100 MILLIGRAM(S): at 17:03

## 2023-05-20 RX ADMIN — MORPHINE SULFATE 2 MILLIGRAM(S): 50 CAPSULE, EXTENDED RELEASE ORAL at 20:08

## 2023-05-20 RX ADMIN — Medication 100 MILLIGRAM(S): at 23:35

## 2023-05-20 RX ADMIN — Medication 100 MILLIGRAM(S): at 17:02

## 2023-05-20 RX ADMIN — Medication 100 GRAM(S): at 10:23

## 2023-05-20 RX ADMIN — LACTULOSE 20 GRAM(S): 10 SOLUTION ORAL at 12:51

## 2023-05-20 RX ADMIN — Medication 100 MILLIGRAM(S): at 00:18

## 2023-05-20 RX ADMIN — Medication 100 MILLIGRAM(S): at 12:51

## 2023-05-20 RX ADMIN — MORPHINE SULFATE 2 MILLIGRAM(S): 50 CAPSULE, EXTENDED RELEASE ORAL at 19:46

## 2023-05-20 RX ADMIN — Medication 1 TABLET(S): at 12:51

## 2023-05-20 RX ADMIN — HEPARIN SODIUM 5000 UNIT(S): 5000 INJECTION INTRAVENOUS; SUBCUTANEOUS at 12:50

## 2023-05-20 RX ADMIN — Medication 100 MILLIGRAM(S): at 09:04

## 2023-05-20 RX ADMIN — HEPARIN SODIUM 5000 UNIT(S): 5000 INJECTION INTRAVENOUS; SUBCUTANEOUS at 06:41

## 2023-05-20 RX ADMIN — Medication 100 MICROGRAM(S): at 06:41

## 2023-05-20 RX ADMIN — MORPHINE SULFATE 15 MILLIGRAM(S): 50 CAPSULE, EXTENDED RELEASE ORAL at 01:04

## 2023-05-20 RX ADMIN — MORPHINE SULFATE 15 MILLIGRAM(S): 50 CAPSULE, EXTENDED RELEASE ORAL at 00:04

## 2023-05-20 RX ADMIN — HEPARIN SODIUM 5000 UNIT(S): 5000 INJECTION INTRAVENOUS; SUBCUTANEOUS at 21:43

## 2023-05-20 RX ADMIN — LACTULOSE 20 GRAM(S): 10 SOLUTION ORAL at 00:04

## 2023-05-20 RX ADMIN — Medication 1 MILLIGRAM(S): at 12:51

## 2023-05-20 RX ADMIN — POTASSIUM PHOSPHATE, MONOBASIC POTASSIUM PHOSPHATE, DIBASIC 83.33 MILLIMOLE(S): 236; 224 INJECTION, SOLUTION INTRAVENOUS at 12:30

## 2023-05-20 RX ADMIN — Medication 40 MILLIEQUIVALENT(S): at 10:23

## 2023-05-20 NOTE — PROGRESS NOTE ADULT - SUBJECTIVE AND OBJECTIVE BOX
Carolina Ruiz MD  Division of Hospital Medicine  Reachable on MS Teams  After hours please page [phone]    PROGRESS NOTE:     Patient is a 38y old  Female who presents with a chief complaint of Liver Failure (20 May 2023 14:58)      SUBJECTIVE / OVERNIGHT EVENTS: No acute events overnight, seen this AM, drowsy. Still having abdominal discomfort and subsequent mild shortness of breath.    ADDITIONAL REVIEW OF SYSTEMS: neg     MEDICATIONS  (STANDING):  ceFAZolin   IVPB 2000 milliGRAM(s) IV Intermittent every 8 hours  folic acid 1 milliGRAM(s) Oral daily  heparin   Injectable 5000 Unit(s) SubCutaneous every 8 hours  lactulose Syrup 20 Gram(s) Oral every 6 hours  levothyroxine 100 MICROGram(s) Oral daily  multivitamin 1 Tablet(s) Oral daily  rifAXIMin 550 milliGRAM(s) Oral two times a day  thiamine 100 milliGRAM(s) Oral daily    MEDICATIONS  (PRN):  guaiFENesin Oral Liquid (Sugar-Free) 100 milliGRAM(s) Oral every 6 hours PRN Cough  ondansetron Injectable 4 milliGRAM(s) IV Push every 6 hours PRN Nausea and/or Vomiting      CAPILLARY BLOOD GLUCOSE        I&O's Summary    19 May 2023 07:01  -  20 May 2023 07:00  --------------------------------------------------------  IN: 850 mL / OUT: 0 mL / NET: 850 mL        PHYSICAL EXAM:  Vital Signs Last 24 Hrs  T(C): 36.7 (20 May 2023 13:56), Max: 37 (19 May 2023 21:03)  T(F): 98.1 (20 May 2023 13:56), Max: 98.6 (19 May 2023 21:03)  HR: 102 (20 May 2023 13:56) (68 - 102)  BP: 104/67 (20 May 2023 13:56) (101/66 - 104/67)  BP(mean): --  RR: 18 (20 May 2023 13:56) (18 - 18)  SpO2: 96% (20 May 2023 13:56) (96% - 96%)    Parameters below as of 20 May 2023 13:56  Patient On (Oxygen Delivery Method): room air        CONSTITUTIONAL: NAD, jaundiced, scleral icterus, lesion on temple w/ central eschar  RESPIRATORY: Normal respiratory effort; lungs are clear to auscultation bilaterally  CARDIOVASCULAR: Regular rate and rhythm, normal S1 and S2, no murmur/rub/gallop; b/l LE edema; Peripheral pulses are 2+ bilaterally  ABDOMEN: distended, Nontender to palpation, normoactive bowel sounds, no rebound/guarding; No hepatosplenomegaly  MUSCLOSKELETAL: no clubbing or cyanosis of digits; no joint swelling or tenderness to palpation  PSYCH: A+O to person, place, and time; affect appropriate      LABS:                        7.2    15.60 )-----------( 188      ( 20 May 2023 07:19 )             21.9     05-20    130<L>  |  95<L>  |  <4<L>  ----------------------------<  91  3.2<L>   |  20<L>  |  0.72    Ca    8.9      20 May 2023 07:17  Phos  1.7     05-20  Mg     1.6     05-20    TPro  5.8<L>  /  Alb  3.0<L>  /  TBili  21.4<H>  /  DBili  x   /  AST  111<H>  /  ALT  <5<L>  /  AlkPhos  202<H>  05-20    PT/INR - ( 20 May 2023 07:21 )   PT: 44.6 sec;   INR: 3.79 ratio               Urinalysis Basic - ( 19 May 2023 05:21 )    Color: Dark Yellow / Appearance: Clear / S.034 / pH: x  Gluc: x / Ketone: Negative  / Bili: Large >10 / Urobili: Negative   Blood: x / Protein: Trace / Nitrite: Negative   Leuk Esterase: Negative / RBC: 6 /hpf / WBC 7 /HPF   Sq Epi: x / Non Sq Epi: x / Bacteria: Negative        Culture - Blood (collected 18 May 2023 06:00)  Source: .Blood Blood-Peripheral  Preliminary Report (19 May 2023 12:01):    No growth to date.    Culture - Fungal, Body Fluid (collected 17 May 2023 17:20)  Source: Peritoneal Peritoneal Fluid  Preliminary Report (20 May 2023 15:03):    Culture is being performed. Fungal cultures are held for 4 weeks.    Culture - Body Fluid with Gram Stain (collected 17 May 2023 17:20)  Source: Peritoneal Peritoneal Fluid  Gram Stain (17 May 2023 23:41):    No polymorphonuclear cells seen    No organisms seen    by cytocentrifuge  Preliminary Report (18 May 2023 15:40):    No growth        RADIOLOGY & ADDITIONAL TESTS:  Results Reviewed:   Imaging Personally Reviewed:  Electrocardiogram Personally Reviewed:    COORDINATION OF CARE:  Care Discussed with Consultants/Other Providers [Y/N]:  Prior or Outpatient Records Reviewed [Y/N]:

## 2023-05-20 NOTE — PROGRESS NOTE ADULT - ASSESSMENT
This is a 39 yo Polish Female with Hx of hypothyroidism (on Levothyroxine), and no other known medical Hx, presented to [facility] ED on 5/13/23 with ~ 2 weeks Hx of progressively worsening abdominal pain ("stabbing, diffuse, non radiating") and distention,  as well as 1 day Hx of jaundice, and was admitted to medicine with liver failure (possibly acute on chronic), with MELD-Na 29 on admission, hyperbilirubinemia (Se bi 18.9, direct 15.9), coagulopathy (INR 2.15), mild liver enzyme elevation in AST>>ALT pattern (, ALT 14, ), severe hypoalbuminemia (alb 1.3), as well as leukocytosis (WBC 16k), anemia (Hb 8.0). She was empirically started on NAC per protocol, hepatology evaluated. ID evaluated for MAY Pna and later MSSA bacteremia, has been on IV ceftriaxone and azithromycin. Ascitic fluid analysis (Dx paracentesis 5/13), was portal hypertensive (SAAG 1.1, total protein 1.0), and was not suggestive of SBP

## 2023-05-20 NOTE — PROGRESS NOTE ADULT - SUBJECTIVE AND OBJECTIVE BOX
Follow Up:      Interval History:    REVIEW OF SYSTEMS  [  ] ROS unobtainable because:    [  ] All other systems negative except as noted below    Constitutional:  [ ] fever [ ] chills  [ ] weight loss  [ ] weakness  Skin:  [ ] rash [ ] phlebitis	  Eyes: [ ] icterus [ ] pain  [ ] discharge	  ENMT: [ ] sore throat  [ ] thrush [ ] ulcers [ ] exudates  Respiratory: [ ] dyspnea [ ] hemoptysis [ ] cough [ ] sputum	  Cardiovascular:  [ ] chest pain [ ] palpitations [ ] edema	  Gastrointestinal:  [ ] nausea [ ] vomiting [ ] diarrhea [ ] constipation [ ] pain	  Genitourinary:  [ ] dysuria [ ] frequency [ ] hematuria [ ] discharge [ ] flank pain  [ ] incontinence  Musculoskeletal:  [ ] myalgias [ ] arthralgias [ ] arthritis  [ ] back pain  Neurological:  [ ] headache [ ] seizures  [ ] confusion/altered mental status    Allergies  No Known Allergies        ANTIMICROBIALS:  ceFAZolin   IVPB 2000 every 8 hours  rifAXIMin 550 two times a day      OTHER MEDS:  MEDICATIONS  (STANDING):  guaiFENesin Oral Liquid (Sugar-Free) 100 every 6 hours PRN  heparin   Injectable 5000 every 8 hours  lactulose Syrup 20 every 6 hours  levothyroxine 100 daily  ondansetron Injectable 4 every 6 hours PRN      Vital Signs Last 24 Hrs  T(C): 36.7 (20 May 2023 13:56), Max: 37 (19 May 2023 21:03)  T(F): 98.1 (20 May 2023 13:56), Max: 98.6 (19 May 2023 21:03)  HR: 102 (20 May 2023 13:56) (68 - 102)  BP: 104/67 (20 May 2023 13:56) (101/66 - 104/67)  BP(mean): --  RR: 18 (20 May 2023 13:56) (18 - 18)  SpO2: 96% (20 May 2023 13:56) (96% - 96%)    Parameters below as of 20 May 2023 13:56  Patient On (Oxygen Delivery Method): room air        PHYSICAL EXAMINATION:  General: Alert and Awake, NAD  HEENT: PERRL, EOMI  Neck: Supple  Cardiac: RRR, No M/R/G  Resp: CTAB, No Wh/Rh/Ra  Abdomen: NBS, NT/ND, No HSM, No rigidity or guarding  MSK: No LE edema. No Calf tenderness  : No [place]  Skin: No rashes or lesions. Skin is warm and dry to the touch.   Neuro: Alert and Awake. CN 2-12 Grossly intact. Moves all four extremities spontaneously.  Psych: Calm, Pleasant, Cooperative                          7.2    15.60 )-----------( 188      ( 20 May 2023 07:19 )             21.9       05-20    130<L>  |  95<L>  |  <4<L>  ----------------------------<  91  3.2<L>   |  20<L>  |  0.72    Ca    8.9      20 May 2023 07:17  Phos  1.7       Mg     1.6         TPro  5.8<L>  /  Alb  3.0<L>  /  TBili  21.4<H>  /  DBili  x   /  AST  111<H>  /  ALT  <5<L>  /  AlkPhos  202<H>        Urinalysis Basic - ( 19 May 2023 05:21 )    Color: Dark Yellow / Appearance: Clear / S.034 / pH: x  Gluc: x / Ketone: Negative  / Bili: Large >10 / Urobili: Negative   Blood: x / Protein: Trace / Nitrite: Negative   Leuk Esterase: Negative / RBC: 6 /hpf / WBC 7 /HPF   Sq Epi: x / Non Sq Epi: x / Bacteria: Negative        MICROBIOLOGY:  v  .Blood Blood-Peripheral  23   No growth to date.  --  --      Peritoneal Peritoneal Fluid  23   No growth  --    No polymorphonuclear cells seen  No organisms seen  by cytocentrifuge      .Blood Blood-Peripheral  23   No growth to date.  --  --      .Blood Blood-Peripheral  23   No growth to date.  --  --      Ascites Fl Ascites Fluid  23   No growth at 5 days  --    No polymorphonuclear cells seen seen  No organisms seen  by cytocentrifuge      Clean Catch Clean Catch (Midstream)  23   >100,000 CFU/ml Escherichia coli  --  Escherichia coli      .Blood Blood-Peripheral  23   Growth in aerobic and anaerobic bottles: Staphylococcus aureus  ***Blood Panel PCR results on this specimen are available  approximately 3 hours after the Gram stain result.***  Gram stain, PCR, and/or culture results may not always  correspond dueto difference in methodologies.  ************************************************************  This PCR assay was performed by multiplex PCR. This  Assay tests for 66 bacterial and resistance gene targets.  Please refer to the [facility] test directory  at [email or web address] for details.  --  Blood Culture PCR  Staphylococcus aureus      .Blood Blood-Peripheral  23   Growth in aerobic bottle: Staphylococcus aureus  See previous culture [id number]  --    Growth in aerobic bottle: Gram Positive Cocci in Clusters        Toxoplasma IgG Screen: <3.0 IU/mL (23 @ 07:14)  CMV IgG Antibody: >10.00 U/mL (23 @ 07:14)  Toxoplasma IgG Screen: <3.0 IU/mL (23 @ 07:14)    CMVPCR Log: Det <1.54 Assay Dynamic Range: 34.5 to 1.0E+07 IU/mL (1.54 to 7.00 Log10 IU/mL)  Assay lower limit of quantification (LLOQ) is 34.5 IU/mL (1.54 Log10  IU/mL)  CMV DNA detected below the LLOQ will be reported as Detected < 34.5 IU/mL  (<1.54 Log10 IU/mL)  [facility] Cytomegalovirus (CMV) is an FDA-cleared quantitative test that  enables the detection and quantitation of CMV DNA in EDTA plasma of  infected transplant patients on the harvey 8800 system. This test was  verified by [employer]. Results should be interpreted  with consideration of all clinical findings and laboratory findings and  should not form the sole basis for a diagnosis or treatment decision. Efk34NY/mL ( @ 07:20)  CMVPCR Log: Det <1.54 Assay Dynamic Range: 34.5 to 1.0E+07 IU/mL (1.54 to 7.00 Log10 IU/mL)  Assay lower limit of quantification (LLOQ) is 34.5 IU/mL (1.54 Log10  IU/mL)  CMV DNA detected below the LLOQ will be reported as Detected < 34.5 IU/mL  (<1.54 Log10 IU/mL)  [facility] Cytomegalovirus (CMV) is an FDA-cleared quantitative test that  enables the detection and quantitation of CMV DNA in EDTA plasma of  infected transplant patients on the harvey 8800 system. This test was  verified by [employer]. Results should be interpreted  with consideration of all clinical findings and laboratory findings and  should not form the sole basis for a diagnosis or treatment decision. Xjq11KO/mL ( @ 08:26)        RADIOLOGY:    <The imaging below has been reviewed and visualized by me independently. Findings as detailed in report below> Follow Up:  Pre-OLT    Interval History: minimal cough. afebrile. continued abdominal discomfort.     REVIEW OF SYSTEMS  [  ] ROS unobtainable because:    [x  ] All other systems negative except as noted below    Constitutional:  [ ] fever [ ] chills  [ ] weight loss  [ ] weakness  Skin:  [ ] rash [ ] phlebitis	  Eyes: [ ] icterus [ ] pain  [ ] discharge	  ENMT: [ ] sore throat  [ ] thrush [ ] ulcers [ ] exudates  Respiratory: [ ] dyspnea [ ] hemoptysis [ ] cough [ ] sputum	  Cardiovascular:  [ ] chest pain [ ] palpitations [ ] edema	  Gastrointestinal:  [ ] nausea [ ] vomiting [ ] diarrhea [ ] constipation [ ] pain	  Genitourinary:  [ ] dysuria [ ] frequency [ ] hematuria [ ] discharge [ ] flank pain  [ ] incontinence  Musculoskeletal:  [ ] myalgias [ ] arthralgias [ ] arthritis  [ ] back pain  Neurological:  [ ] headache [ ] seizures  [ ] confusion/altered mental status    Allergies  No Known Allergies        ANTIMICROBIALS:  ceFAZolin   IVPB 2000 every 8 hours  rifAXIMin 550 two times a day      OTHER MEDS:  MEDICATIONS  (STANDING):  guaiFENesin Oral Liquid (Sugar-Free) 100 every 6 hours PRN  heparin   Injectable 5000 every 8 hours  lactulose Syrup 20 every 6 hours  levothyroxine 100 daily  ondansetron Injectable 4 every 6 hours PRN      Vital Signs Last 24 Hrs  T(C): 36.7 (20 May 2023 13:56), Max: 37 (19 May 2023 21:03)  T(F): 98.1 (20 May 2023 13:56), Max: 98.6 (19 May 2023 21:03)  HR: 102 (20 May 2023 13:56) (68 - 102)  BP: 104/67 (20 May 2023 13:56) (101/66 - 104/67)  BP(mean): --  RR: 18 (20 May 2023 13:56) (18 - 18)  SpO2: 96% (20 May 2023 13:56) (96% - 96%)    Parameters below as of 20 May 2023 13:56  Patient On (Oxygen Delivery Method): room air        PHYSICAL EXAMINATION:  General: Alert and Awake, NAD  HEENT: PERRL, EOMI  Neck: Supple  Cardiac: RRR, No M/R/G  Resp: CTAB, No Wh/Rh/Ra  Abdomen: NBS, NT/ND, No HSM, No rigidity or guarding  MSK: No LE edema. No Calf tenderness  : No [place]  Skin: No rashes or lesions. Skin is warm and dry to the touch.   Neuro: Alert and Awake. CN 2-12 Grossly intact. Moves all four extremities spontaneously.  Psych: Calm, Pleasant, Cooperative                          7.2    15.60 )-----------( 188      ( 20 May 2023 07:19 )             21.9       05-20    130<L>  |  95<L>  |  <4<L>  ----------------------------<  91  3.2<L>   |  20<L>  |  0.72    Ca    8.9      20 May 2023 07:17  Phos  1.7       Mg     1.6         TPro  5.8<L>  /  Alb  3.0<L>  /  TBili  21.4<H>  /  DBili  x   /  AST  111<H>  /  ALT  <5<L>  /  AlkPhos  202<H>        Urinalysis Basic - ( 19 May 2023 05:21 )    Color: Dark Yellow / Appearance: Clear / S.034 / pH: x  Gluc: x / Ketone: Negative  / Bili: Large >10 / Urobili: Negative   Blood: x / Protein: Trace / Nitrite: Negative   Leuk Esterase: Negative / RBC: 6 /hpf / WBC 7 /HPF   Sq Epi: x / Non Sq Epi: x / Bacteria: Negative        MICROBIOLOGY:  v  .Blood Blood-Peripheral  23   No growth to date.  --  --      Peritoneal Peritoneal Fluid  23   No growth  --    No polymorphonuclear cells seen  No organisms seen  by cytocentrifuge      .Blood Blood-Peripheral  23   No growth to date.  --  --      .Blood Blood-Peripheral  23   No growth to date.  --  --      Ascites Fl Ascites Fluid  23   No growth at 5 days  --    No polymorphonuclear cells seen seen  No organisms seen  by cytocentrifuge      Clean Catch Clean Catch (Midstream)  23   >100,000 CFU/ml Escherichia coli  --  Escherichia coli      .Blood Blood-Peripheral  23   Growth in aerobic and anaerobic bottles: Staphylococcus aureus  ***Blood Panel PCR results on this specimen are available  approximately 3 hours after the Gram stain result.***  Gram stain, PCR, and/or culture results may not always  correspond dueto difference in methodologies.  ************************************************************  This PCR assay was performed by multiplex PCR. This  Assay tests for 66 bacterial and resistance gene targets.  Please refer to the [facility] [employer] test directory  at [email or web address] for details.  --  Blood Culture PCR  Staphylococcus aureus      .Blood Blood-Peripheral  23   Growth in aerobic bottle: Staphylococcus aureus  See previous culture [id number]  --    Growth in aerobic bottle: Gram Positive Cocci in Clusters        Toxoplasma IgG Screen: <3.0 IU/mL (23 @ 07:14)  CMV IgG Antibody: >10.00 U/mL (23 @ 07:14)  Toxoplasma IgG Screen: <3.0 IU/mL (23 @ 07:14)    CMVPCR Log: Det <1.54 Assay Dynamic Range: 34.5 to 1.0E+07 IU/mL (1.54 to 7.00 Log10 IU/mL)  Assay lower limit of quantification (LLOQ) is 34.5 IU/mL (1.54 Log10  IU/mL)  CMV DNA detected below the LLOQ will be reported as Detected < 34.5 IU/mL  (<1.54 Log10 IU/mL)  [facility] Cytomegalovirus (CMV) is an FDA-cleared quantitative test that  enables the detection and quantitation of CMV DNA in EDTA plasma of  infected transplant patients on the harvey 8800 system. This test was  verified by [employer]. Results should be interpreted  with consideration of all clinical findings and laboratory findings and  should not form the sole basis for a diagnosis or treatment decision. Avm19UP/mL ( @ 07:20)  CMVPCR Log: Det <1.54 Assay Dynamic Range: 34.5 to 1.0E+07 IU/mL (1.54 to 7.00 Log10 IU/mL)  Assay lower limit of quantification (LLOQ) is 34.5 IU/mL (1.54 Log10  IU/mL)  CMV DNA detected below the LLOQ will be reported as Detected < 34.5 IU/mL  (<1.54 Log10 IU/mL)  [facility] Cytomegalovirus (CMV) is an FDA-cleared quantitative test that  enables the detection and quantitation of CMV DNA in EDTA plasma of  infected transplant patients on the harvey 8800 system. This test was  verified by [employer]. Results should be interpreted  with consideration of all clinical findings and laboratory findings and  should not form the sole basis for a diagnosis or treatment decision. Swi59FM/mL ( @ 08:26)        RADIOLOGY:    <The imaging below has been reviewed and visualized by me independently. Findings as detailed in report below> Follow Up:  Pre-OLT    Interval History: minimal cough. afebrile. continued abdominal discomfort.     REVIEW OF SYSTEMS  [  ] ROS unobtainable because:    [x  ] All other systems negative except as noted below    Constitutional:  [ ] fever [ ] chills  [ ] weight loss  [ ] weakness  Skin:  [ ] rash [ ] phlebitis	  Eyes: [ ] icterus [ ] pain  [ ] discharge	  ENMT: [ ] sore throat  [ ] thrush [ ] ulcers [ ] exudates  Respiratory: [ ] dyspnea [ ] hemoptysis [ ] cough [ ] sputum	  Cardiovascular:  [ ] chest pain [ ] palpitations [ ] edema	  Gastrointestinal:  [ ] nausea [ ] vomiting [ ] diarrhea [ ] constipation [ ] pain	  Genitourinary:  [ ] dysuria [ ] frequency [ ] hematuria [ ] discharge [ ] flank pain  [ ] incontinence  Musculoskeletal:  [ ] myalgias [ ] arthralgias [ ] arthritis  [ ] back pain  Neurological:  [ ] headache [ ] seizures  [ ] confusion/altered mental status    Allergies  No Known Allergies        ANTIMICROBIALS:  ceFAZolin   IVPB 2000 every 8 hours  rifAXIMin 550 two times a day      OTHER MEDS:  MEDICATIONS  (STANDING):  guaiFENesin Oral Liquid (Sugar-Free) 100 every 6 hours PRN  heparin   Injectable 5000 every 8 hours  lactulose Syrup 20 every 6 hours  levothyroxine 100 daily  ondansetron Injectable 4 every 6 hours PRN      Vital Signs Last 24 Hrs  T(C): 36.7 (20 May 2023 13:56), Max: 37 (19 May 2023 21:03)  T(F): 98.1 (20 May 2023 13:56), Max: 98.6 (19 May 2023 21:03)  HR: 102 (20 May 2023 13:56) (68 - 102)  BP: 104/67 (20 May 2023 13:56) (101/66 - 104/67)  BP(mean): --  RR: 18 (20 May 2023 13:56) (18 - 18)  SpO2: 96% (20 May 2023 13:56) (96% - 96%)    Parameters below as of 20 May 2023 13:56  Patient On (Oxygen Delivery Method): room air    PHYSICAL EXAMINATION:  General: Alert and Awake, NAD, jaundice  HEENT: Normocephalic / Atraumatic, Lesion superior to right eyebrow with some associated erythema and central eschar.   Resp: No accessory muscles of respiration utilized  Abdomen: Moderate abdominal distension  MSK: No LE edema.   : No [place]  Skin: No rashes or lesions.    Neuro: Alert and Awake. CN 2-12 Grossly intact. Moves all four extremities spontaneously.  Psych: Calm, Pleasant, Cooperative                          7.2    15.60 )-----------( 188      ( 20 May 2023 07:19 )             21.9       05-20    130<L>  |  95<L>  |  <4<L>  ----------------------------<  91  3.2<L>   |  20<L>  |  0.72    Ca    8.9      20 May 2023 07:17  Phos  1.7       Mg     1.6         TPro  5.8<L>  /  Alb  3.0<L>  /  TBili  21.4<H>  /  DBili  x   /  AST  111<H>  /  ALT  <5<L>  /  AlkPhos  202<H>        Urinalysis Basic - ( 19 May 2023 05:21 )    Color: Dark Yellow / Appearance: Clear / S.034 / pH: x  Gluc: x / Ketone: Negative  / Bili: Large >10 / Urobili: Negative   Blood: x / Protein: Trace / Nitrite: Negative   Leuk Esterase: Negative / RBC: 6 /hpf / WBC 7 /HPF   Sq Epi: x / Non Sq Epi: x / Bacteria: Negative        MICROBIOLOGY:  v  .Blood Blood-Peripheral  23   No growth to date.  --  --      Peritoneal Peritoneal Fluid  23   No growth  --    No polymorphonuclear cells seen  No organisms seen  by cytocentrifuge      .Blood Blood-Peripheral  23   No growth to date.  --  --      .Blood Blood-Peripheral  23   No growth to date.  --  --      Ascites Fl Ascites Fluid  23   No growth at 5 days  --    No polymorphonuclear cells seen seen  No organisms seen  by cytocentrifuge      Clean Catch Clean Catch (Midstream)  23   >100,000 CFU/ml Escherichia coli  --  Escherichia coli      .Blood Blood-Peripheral  23   Growth in aerobic and anaerobic bottles: Staphylococcus aureus  ***Blood Panel PCR results on this specimen are available  approximately 3 hours after the Gram stain result.***  Gram stain, PCR, and/or culture results may not always  correspond dueto difference in methodologies.  ************************************************************  This PCR assay was performed by multiplex PCR. This  Assay tests for 66 bacterial and resistance gene targets.  Please refer to the [facility] [employer] test directory  at [email or web address] for details.  --  Blood Culture PCR  Staphylococcus aureus      .Blood Blood-Peripheral  23   Growth in aerobic bottle: Staphylococcus aureus  See previous culture [id number]  --    Growth in aerobic bottle: Gram Positive Cocci in Clusters        Toxoplasma IgG Screen: <3.0 IU/mL (23 @ 07:14)  CMV IgG Antibody: >10.00 U/mL (23 @ 07:14)  Toxoplasma IgG Screen: <3.0 IU/mL (23 @ 07:14)    CMVPCR Log: Det <1.54 Assay Dynamic Range: 34.5 to 1.0E+07 IU/mL (1.54 to 7.00 Log10 IU/mL)  Assay lower limit of quantification (LLOQ) is 34.5 IU/mL (1.54 Log10  IU/mL)  CMV DNA detected below the LLOQ will be reported as Detected < 34.5 IU/mL  (<1.54 Log10 IU/mL)  [facility] Cytomegalovirus (CMV) is an FDA-cleared quantitative test that  enables the detection and quantitation of CMV DNA in EDTA plasma of  infected transplant patients on the harvey 8800 system. This test was  verified by [employer]. Results should be interpreted  with consideration of all clinical findings and laboratory findings and  should not form the sole basis for a diagnosis or treatment decision. [id number]/mL ( @ 07:20)  CMVPCR Log: Det <1.54 Assay Dynamic Range: 34.5 to 1.0E+07 IU/mL (1.54 to 7.00 Log10 IU/mL)  Assay lower limit of quantification (LLOQ) is 34.5 IU/mL (1.54 Log10  IU/mL)  CMV DNA detected below the LLOQ will be reported as Detected < 34.5 IU/mL  (<1.54 Log10 IU/mL)  [facility] Cytomegalovirus (CMV) is an FDA-cleared quantitative test that  enables the detection and quantitation of CMV DNA in EDTA plasma of  infected transplant patients on the harvey 8800 system. This test was  verified by [employer]. Results should be interpreted  with consideration of all clinical findings and laboratory findings and  should not form the sole basis for a diagnosis or treatment decision. Lhe99IJ/mL ( @ 08:26)        RADIOLOGY:    <The imaging below has been reviewed and visualized by me independently. Findings as detailed in report below>    < from: US Abdomen Limited (23 @ 13:37) >  IMPRESSION:  Small to moderate volume ascites.    < end of copied text >

## 2023-05-20 NOTE — PROGRESS NOTE ADULT - ASSESSMENT
39 yo F with hypothyroidism (on Levothyroxine) who initially presented to [facility] on 5/13 with abd pain.     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)  And MSSA bacteremia  On Ancef 2g IV Q8h, + blood cultures on 5/13, negative on 5/16  TTE unable to r/o endocarditis   S/p paracentesis x 2, negative for SBP  Recent CT Chest with ground glass infiltrates (favor pulmonary edema) and increase in pleural effusions    Suggest;  --Would consult plastic surgery for possible drainage of lesion above right eyebrow (possible initial skin / soft tissue source of MSSA)   --Will require SAHARA  --Continue Cefazolin 2g IV Q8H  --Continue to follow CBC with diff  --Continue to follow temperature curve  --Follow up on preliminary blood cultures    -----------------------------------------------------------------------------------------    Pre Transplant Evaluation    Social Hx:  Born in [place], moved to [place] 22 years ago  Lives in [place] with Father  Not working currently     Vaccination Hx:  S/p Td booster 2021  Denies having received any other vaccines including but not limited to Covid-19 vaccine, Influenza vaccine     Serologies;  Hep A non immune   HBsAg negative   HBsAb +  HBc Ab negative   Hep C Ab negative   HSV 1/2 IgG +/+  VZV IgG negative   EBV IgG +  CMV IgG +  Toxoplasma IgG negative   MMR titres negative except for Rubella   HIV screen negative   Syphilis screen negative   QuantiFeron pending     I will continue to follow. Please feel free to contact me with any further questions.    Irving Deras M.D.  [facility] Division of Infectious Disease  8AM-5PM Monday - Friday: Available on [employer]  After Hours and Holidays (or if no response on [employer] Teams): Please contact the Infectious Diseases Office at [phone]    The above assessment and plan were discussed with medicine NP

## 2023-05-21 DIAGNOSIS — D64.9 ANEMIA, UNSPECIFIED: ICD-10-CM

## 2023-05-21 LAB
ALBUMIN SERPL ELPH-MCNC: 3.1 G/DL — LOW (ref 3.3–5)
ALP SERPL-CCNC: 189 U/L — HIGH (ref 40–120)
ALT FLD-CCNC: 7 U/L — LOW (ref 10–45)
ANION GAP SERPL CALC-SCNC: 17 MMOL/L — SIGNIFICANT CHANGE UP (ref 5–17)
AST SERPL-CCNC: 140 U/L — HIGH (ref 10–40)
BILIRUB SERPL-MCNC: 20.9 MG/DL — HIGH (ref 0.2–1.2)
BUN SERPL-MCNC: <4 MG/DL — LOW (ref 7–23)
CALCIUM SERPL-MCNC: 8.6 MG/DL — SIGNIFICANT CHANGE UP (ref 8.4–10.5)
CHLORIDE SERPL-SCNC: 94 MMOL/L — LOW (ref 96–108)
CO2 SERPL-SCNC: 18 MMOL/L — LOW (ref 22–31)
CREAT SERPL-MCNC: 0.58 MG/DL — SIGNIFICANT CHANGE UP (ref 0.5–1.3)
CULTURE RESULTS: SIGNIFICANT CHANGE UP
CULTURE RESULTS: SIGNIFICANT CHANGE UP
EGFR: 118 ML/MIN/1.73M2 — SIGNIFICANT CHANGE UP
GLUCOSE SERPL-MCNC: 85 MG/DL — SIGNIFICANT CHANGE UP (ref 70–99)
HCT VFR BLD CALC: 26.8 % — LOW (ref 34.5–45)
HGB BLD-MCNC: 9.1 G/DL — LOW (ref 11.5–15.5)
MAGNESIUM SERPL-MCNC: 1.7 MG/DL — SIGNIFICANT CHANGE UP (ref 1.6–2.6)
MCHC RBC-ENTMCNC: 34 GM/DL — SIGNIFICANT CHANGE UP (ref 32–36)
MCHC RBC-ENTMCNC: 36.3 PG — HIGH (ref 27–34)
MCV RBC AUTO: 106.8 FL — HIGH (ref 80–100)
MITOCHONDRIA AB SER-ACNC: SIGNIFICANT CHANGE UP
NRBC # BLD: 0 /100 WBCS — SIGNIFICANT CHANGE UP (ref 0–0)
PHOSPHATE SERPL-MCNC: 2 MG/DL — LOW (ref 2.5–4.5)
PLATELET # BLD AUTO: 184 K/UL — SIGNIFICANT CHANGE UP (ref 150–400)
POTASSIUM SERPL-MCNC: 4.2 MMOL/L — SIGNIFICANT CHANGE UP (ref 3.5–5.3)
POTASSIUM SERPL-SCNC: 4.2 MMOL/L — SIGNIFICANT CHANGE UP (ref 3.5–5.3)
PROT SERPL-MCNC: 5.9 G/DL — LOW (ref 6–8.3)
RBC # BLD: 2.51 M/UL — LOW (ref 3.8–5.2)
RBC # FLD: 21.2 % — HIGH (ref 10.3–14.5)
SMOOTH MUSCLE AB SER-ACNC: ABNORMAL
SODIUM SERPL-SCNC: 129 MMOL/L — LOW (ref 135–145)
SPECIMEN SOURCE: SIGNIFICANT CHANGE UP
SPECIMEN SOURCE: SIGNIFICANT CHANGE UP
WBC # BLD: 18.55 K/UL — HIGH (ref 3.8–10.5)
WBC # FLD AUTO: 18.55 K/UL — HIGH (ref 3.8–10.5)

## 2023-05-21 PROCEDURE — 99233 SBSQ HOSP IP/OBS HIGH 50: CPT | Mod: GC

## 2023-05-21 PROCEDURE — 99233 SBSQ HOSP IP/OBS HIGH 50: CPT

## 2023-05-21 RX ORDER — ALBUMIN HUMAN 25 %
100 VIAL (ML) INTRAVENOUS EVERY 12 HOURS
Refills: 0 | Status: COMPLETED | OUTPATIENT
Start: 2023-05-21 | End: 2023-05-22

## 2023-05-21 RX ORDER — FUROSEMIDE 40 MG
40 TABLET ORAL DAILY
Refills: 0 | Status: DISCONTINUED | OUTPATIENT
Start: 2023-05-21 | End: 2023-05-22

## 2023-05-21 RX ORDER — LACTULOSE 10 G/15ML
20 SOLUTION ORAL EVERY 12 HOURS
Refills: 0 | Status: DISCONTINUED | OUTPATIENT
Start: 2023-05-21 | End: 2023-05-22

## 2023-05-21 RX ORDER — PANTOPRAZOLE SODIUM 20 MG/1
40 TABLET, DELAYED RELEASE ORAL
Refills: 0 | Status: DISCONTINUED | OUTPATIENT
Start: 2023-05-21 | End: 2023-06-09

## 2023-05-21 RX ORDER — PHYTONADIONE (VIT K1) 5 MG
10 TABLET ORAL DAILY
Refills: 0 | Status: COMPLETED | OUTPATIENT
Start: 2023-05-21 | End: 2023-05-23

## 2023-05-21 RX ORDER — MORPHINE SULFATE 50 MG/1
2 CAPSULE, EXTENDED RELEASE ORAL ONCE
Refills: 0 | Status: DISCONTINUED | OUTPATIENT
Start: 2023-05-21 | End: 2023-05-21

## 2023-05-21 RX ADMIN — Medication 100 MILLIGRAM(S): at 12:35

## 2023-05-21 RX ADMIN — Medication 100 MICROGRAM(S): at 05:07

## 2023-05-21 RX ADMIN — Medication 100 MILLIGRAM(S): at 05:06

## 2023-05-21 RX ADMIN — MORPHINE SULFATE 2 MILLIGRAM(S): 50 CAPSULE, EXTENDED RELEASE ORAL at 21:00

## 2023-05-21 RX ADMIN — Medication 1 TABLET(S): at 12:34

## 2023-05-21 RX ADMIN — MORPHINE SULFATE 2 MILLIGRAM(S): 50 CAPSULE, EXTENDED RELEASE ORAL at 12:34

## 2023-05-21 RX ADMIN — MORPHINE SULFATE 2 MILLIGRAM(S): 50 CAPSULE, EXTENDED RELEASE ORAL at 20:25

## 2023-05-21 RX ADMIN — Medication 100 MILLIGRAM(S): at 17:10

## 2023-05-21 RX ADMIN — MORPHINE SULFATE 2 MILLIGRAM(S): 50 CAPSULE, EXTENDED RELEASE ORAL at 05:06

## 2023-05-21 RX ADMIN — Medication 1 MILLIGRAM(S): at 12:34

## 2023-05-21 RX ADMIN — Medication 102 MILLIGRAM(S): at 13:52

## 2023-05-21 RX ADMIN — Medication 50 MILLILITER(S): at 17:10

## 2023-05-21 RX ADMIN — Medication 100 MILLIGRAM(S): at 01:03

## 2023-05-21 RX ADMIN — HEPARIN SODIUM 5000 UNIT(S): 5000 INJECTION INTRAVENOUS; SUBCUTANEOUS at 05:07

## 2023-05-21 RX ADMIN — LACTULOSE 20 GRAM(S): 10 SOLUTION ORAL at 12:34

## 2023-05-21 RX ADMIN — HEPARIN SODIUM 5000 UNIT(S): 5000 INJECTION INTRAVENOUS; SUBCUTANEOUS at 13:53

## 2023-05-21 RX ADMIN — Medication 40 MILLIGRAM(S): at 13:52

## 2023-05-21 RX ADMIN — MORPHINE SULFATE 2 MILLIGRAM(S): 50 CAPSULE, EXTENDED RELEASE ORAL at 05:30

## 2023-05-21 RX ADMIN — Medication 100 MILLIGRAM(S): at 08:58

## 2023-05-21 RX ADMIN — PANTOPRAZOLE SODIUM 40 MILLIGRAM(S): 20 TABLET, DELAYED RELEASE ORAL at 13:52

## 2023-05-21 RX ADMIN — HEPARIN SODIUM 5000 UNIT(S): 5000 INJECTION INTRAVENOUS; SUBCUTANEOUS at 21:20

## 2023-05-21 RX ADMIN — Medication 100 MILLIGRAM(S): at 21:19
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
## 2023-05-21 NOTE — PROGRESS NOTE ADULT - SUBJECTIVE AND OBJECTIVE BOX
Hepatology Progress Note    Interval Events:   Pt had hematoma from her right forehead evacuated by surgery team with no underlying purulence. Complaining of a lot of abd pain and distention. Denies any overt bleeding with no episodes of melena or hematochezia     Allergies:  No Known Allergies      Hospital Medications:  ceFAZolin   IVPB 2000 milliGRAM(s) IV Intermittent every 8 hours  folic acid 1 milliGRAM(s) Oral daily  guaiFENesin Oral Liquid (Sugar-Free) 100 milliGRAM(s) Oral every 6 hours PRN  heparin   Injectable 5000 Unit(s) SubCutaneous every 8 hours  lactulose Syrup 20 Gram(s) Oral every 6 hours  levothyroxine 100 MICROGram(s) Oral daily  multivitamin 1 Tablet(s) Oral daily  ondansetron Injectable 4 milliGRAM(s) IV Push every 6 hours PRN  rifAXIMin 550 milliGRAM(s) Oral two times a day  thiamine 100 milliGRAM(s) Oral daily      ROS: 14 point ROS negative unless otherwise state in subjective    PHYSICAL EXAM:   Vital Signs:  Vital Signs Last 24 Hrs  T(C): 36.9 (21 May 2023 04:41), Max: 37.1 (20 May 2023 22:15)  T(F): 98.5 (21 May 2023 04:41), Max: 98.7 (20 May 2023 22:15)  HR: 100 (21 May 2023 04:41) (98 - 102)  BP: 106/69 (21 May 2023 04:41) (100/68 - 108/68)  BP(mean): --  RR: 18 (21 May 2023 04:41) (18 - 18)  SpO2: 94% (21 May 2023 04:41) (94% - 96%)    Parameters below as of 21 May 2023 04:41  Patient On (Oxygen Delivery Method): room air      GENERAL:  No acute distress  HEENT:  +scleral icterus  CHEST: coasre breath sounds v/l   HEART:  RRR  ABDOMEN:  [place] with fluid large fluid wave   EXTREMITIES:  1+ edema b/l   NEURO:  Alert and oriented x 3, no asterixis    LABS:                        9.1    18.55 )-----------( 184      ( 21 May 2023 07:23 )             26.8     Mean Cell Volume: 106.8 fl (05-21-23 @ 07:23)    05-21    129<L>  |  94<L>  |  <4<L>  ----------------------------<  85  4.2   |  18<L>  |  0.58    Ca    8.6      21 May 2023 07:19  Phos  2.0     05-21  Mg     1.7     05-21    TPro  5.9<L>  /  Alb  3.1<L>  /  TBili  20.9<H>  /  DBili  x   /  AST  140<H>  /  ALT  7<L>  /  AlkPhos  189<H>  05-21    LIVER FUNCTIONS - ( 21 May 2023 07:19 )  Alb: 3.1 g/dL / Pro: 5.9 g/dL / ALK PHOS: 189 U/L / ALT: 7 U/L / AST: 140 U/L / GGT: x           PT/INR - ( 20 May 2023 07:21 )   PT: 44.6 sec;   INR: 3.79 ratio          Imaging:  < from: CT Head No Cont (05.20.23 @ 18:20) >  IMPRESSION:    No hydrocephalus, acute intracranial hemorrhage, mass effect, or brain   edema.    Atrophy for the patient's stated age, likely related to chronic illness   and EtOH abuse.    Similar-appearing 1.5 x 1.1 cm soft tissue density extra calvarial lesion   in the right superolateral periorbital soft tissues. New small defect   within the lateral aspect of the lesion, suggesting interval biopsy.    < end of copied text >

## 2023-05-21 NOTE — PROGRESS NOTE ADULT - TIME BILLING
Chart review, discussion with patient and family, counseling, management changes, discussion with consultants, and documentation.

## 2023-05-21 NOTE — PROGRESS NOTE ADULT - ASSESSMENT
This is a 39 yo Polish Female with Hx of hypothyroidism (on Levothyroxine), and no other known medical Hx, presented to [facility] ED on 5/13/23 with ~ 2 weeks Hx of progressively worsening abdominal pain ("stabbing, diffuse, non radiating") and distention,  as well as 1 day Hx of jaundice, and was admitted to medicine with liver failure (possibly acute on chronic), with MELD-Na 29 on admission, hyperbilirubinemia (Se bi 18.9, direct 15.9), coagulopathy (INR 2.15), mild liver enzyme elevation in AST>>ALT pattern (, ALT 14, ), severe hypoalbuminemia (alb 1.3), as well as leukocytosis (WBC 16k), anemia (Hb 8.0). She was empirically started on NAC per protocol, hepatology evaluated. ID evaluated for MAY Pna and later MSSA bacteremia, has been on IV ceftriaxone and azithromycin. Ascitic fluid analysis (Dx paracentesis 5/13), was portal hypertensive (SAAG 1.1, total protein 1.0), and was not suggestive of SBP
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
## 2023-05-21 NOTE — PROGRESS NOTE ADULT - SUBJECTIVE AND OBJECTIVE BOX
Carolina Ruiz MD  Division of Hospital Medicine  Reachable on MS Teams  After hours please page [phone]    PROGRESS NOTE:     Patient is a 38y old  Female who presents with a chief complaint of Liver Failure (21 May 2023 09:35)      SUBJECTIVE / OVERNIGHT EVENTS: seen by plastics this AM, hematoma evacuated. Pt still having stomach discomfort, only had two small BMs so far today. Got 1U pRBC last night for anemia to 6.8    ADDITIONAL REVIEW OF SYSTEMS: neg     MEDICATIONS  (STANDING):  albumin human 25% IVPB 100 milliLiter(s) IV Intermittent every 12 hours  ceFAZolin   IVPB 2000 milliGRAM(s) IV Intermittent every 8 hours  folic acid 1 milliGRAM(s) Oral daily  furosemide   Injectable 40 milliGRAM(s) IV Push daily  heparin   Injectable 5000 Unit(s) SubCutaneous every 8 hours  lactulose Syrup 20 Gram(s) Oral every 12 hours  levothyroxine 100 MICROGram(s) Oral daily  multivitamin 1 Tablet(s) Oral daily  pantoprazole    Tablet 40 milliGRAM(s) Oral before breakfast  phytonadione  IVPB 10 milliGRAM(s) IV Intermittent daily  thiamine 100 milliGRAM(s) Oral daily    MEDICATIONS  (PRN):  guaiFENesin Oral Liquid (Sugar-Free) 100 milliGRAM(s) Oral every 6 hours PRN Cough  ondansetron Injectable 4 milliGRAM(s) IV Push every 6 hours PRN Nausea and/or Vomiting      CAPILLARY BLOOD GLUCOSE        I&O's Summary    20 May 2023 07:01  -  21 May 2023 07:00  --------------------------------------------------------  IN: 1150 mL / OUT: 750 mL / NET: 400 mL        PHYSICAL EXAM:  Vital Signs Last 24 Hrs  T(C): 36.8 (21 May 2023 11:14), Max: 37.1 (20 May 2023 22:15)  T(F): 98.3 (21 May 2023 11:14), Max: 98.7 (20 May 2023 22:15)  HR: 97 (21 May 2023 11:14) (97 - 100)  BP: 101/65 (21 May 2023 11:14) (100/68 - 108/68)  BP(mean): --  RR: 18 (21 May 2023 11:14) (18 - 18)  SpO2: 95% (21 May 2023 11:14) (94% - 96%)    Parameters below as of 21 May 2023 11:14  Patient On (Oxygen Delivery Method): room air        CONSTITUTIONAL: NAD, jaundiced, scleral icterus, lesion on temple w/ dressing in place  RESPIRATORY: Normal respiratory effort; lungs are clear to auscultation bilaterally  CARDIOVASCULAR: Regular rate and rhythm, normal S1 and S2, no murmur/rub/gallop; b/l LE edema; Peripheral pulses are 2+ bilaterally  ABDOMEN: distended, Nontender to palpation, normoactive bowel sounds, no rebound/guarding; No hepatosplenomegaly  MUSCLOSKELETAL: no clubbing or cyanosis of digits; no joint swelling or tenderness to palpation  PSYCH: A+O to person, place, and time; affect appropriate      LABS:                        9.1    18.55 )-----------( 184      ( 21 May 2023 07:23 )             26.8     05-21    129<L>  |  94<L>  |  <4<L>  ----------------------------<  85  4.2   |  18<L>  |  0.58    Ca    8.6      21 May 2023 07:19  Phos  2.0     05-21  Mg     1.7     05-21    TPro  5.9<L>  /  Alb  3.1<L>  /  TBili  20.9<H>  /  DBili  x   /  AST  140<H>  /  ALT  7<L>  /  AlkPhos  189<H>  05-21    PT/INR - ( 20 May 2023 07:21 )   PT: 44.6 sec;   INR: 3.79 ratio                     RADIOLOGY & ADDITIONAL TESTS:  Results Reviewed:   Imaging Personally Reviewed:  Electrocardiogram Personally Reviewed:    COORDINATION OF CARE:  Care Discussed with Consultants/Other Providers [Y/N]:  Prior or Outpatient Records Reviewed [Y/N]:

## 2023-05-22 LAB
ALBUMIN SERPL ELPH-MCNC: 2.9 G/DL — LOW (ref 3.3–5)
ALP SERPL-CCNC: 175 U/L — HIGH (ref 40–120)
ALT FLD-CCNC: <5 U/L — LOW (ref 10–45)
ANION GAP SERPL CALC-SCNC: 17 MMOL/L — SIGNIFICANT CHANGE UP (ref 5–17)
APPEARANCE UR: ABNORMAL
APTT BLD: 57.4 SEC — HIGH (ref 27.5–35.5)
AST SERPL-CCNC: 110 U/L — HIGH (ref 10–40)
BACTERIA # UR AUTO: NEGATIVE — SIGNIFICANT CHANGE UP
BILIRUB SERPL-MCNC: 21.5 MG/DL — HIGH (ref 0.2–1.2)
BILIRUB UR-MCNC: ABNORMAL
BUN SERPL-MCNC: <4 MG/DL — LOW (ref 7–23)
CALCIUM SERPL-MCNC: 8.5 MG/DL — SIGNIFICANT CHANGE UP (ref 8.4–10.5)
CHLORIDE SERPL-SCNC: 93 MMOL/L — LOW (ref 96–108)
CO2 SERPL-SCNC: 21 MMOL/L — LOW (ref 22–31)
COLOR SPEC: ABNORMAL
CREAT SERPL-MCNC: 0.62 MG/DL — SIGNIFICANT CHANGE UP (ref 0.5–1.3)
CULTURE RESULTS: SIGNIFICANT CHANGE UP
CULTURE RESULTS: SIGNIFICANT CHANGE UP
DIFF PNL FLD: NEGATIVE — SIGNIFICANT CHANGE UP
EGFR: 116 ML/MIN/1.73M2 — SIGNIFICANT CHANGE UP
EPI CELLS # UR: 14 /HPF — HIGH
GLUCOSE SERPL-MCNC: 105 MG/DL — HIGH (ref 70–99)
GLUCOSE UR QL: NEGATIVE — SIGNIFICANT CHANGE UP
HCG UR QL: NEGATIVE — SIGNIFICANT CHANGE UP
HCT VFR BLD CALC: 23.5 % — LOW (ref 34.5–45)
HGB BLD-MCNC: 8.1 G/DL — LOW (ref 11.5–15.5)
HYALINE CASTS # UR AUTO: 11 /LPF — HIGH (ref 0–2)
INR BLD: 2.46 RATIO — HIGH (ref 0.88–1.16)
KETONES UR-MCNC: NEGATIVE — SIGNIFICANT CHANGE UP
LEUKOCYTE ESTERASE UR-ACNC: NEGATIVE — SIGNIFICANT CHANGE UP
MAGNESIUM SERPL-MCNC: 1.5 MG/DL — LOW (ref 1.6–2.6)
MCHC RBC-ENTMCNC: 34.5 GM/DL — SIGNIFICANT CHANGE UP (ref 32–36)
MCHC RBC-ENTMCNC: 36.5 PG — HIGH (ref 27–34)
MCV RBC AUTO: 105.9 FL — HIGH (ref 80–100)
NITRITE UR-MCNC: NEGATIVE — SIGNIFICANT CHANGE UP
NRBC # BLD: 0 /100 WBCS — SIGNIFICANT CHANGE UP (ref 0–0)
PH UR: 6 — SIGNIFICANT CHANGE UP (ref 5–8)
PHOSPHATE SERPL-MCNC: 1.5 MG/DL — LOW (ref 2.5–4.5)
PLATELET # BLD AUTO: 168 K/UL — SIGNIFICANT CHANGE UP (ref 150–400)
POTASSIUM SERPL-MCNC: 3 MMOL/L — LOW (ref 3.5–5.3)
POTASSIUM SERPL-SCNC: 3 MMOL/L — LOW (ref 3.5–5.3)
PROT SERPL-MCNC: 5.2 G/DL — LOW (ref 6–8.3)
PROT UR-MCNC: ABNORMAL
PROTHROM AB SERPL-ACNC: 28.8 SEC — HIGH (ref 10.5–13.4)
RBC # BLD: 2.22 M/UL — LOW (ref 3.8–5.2)
RBC # FLD: 21.6 % — HIGH (ref 10.3–14.5)
RBC CASTS # UR COMP ASSIST: 8 /HPF — HIGH (ref 0–4)
SODIUM SERPL-SCNC: 131 MMOL/L — LOW (ref 135–145)
SP GR SPEC: 1.02 — SIGNIFICANT CHANGE UP (ref 1.01–1.02)
SPECIMEN SOURCE: SIGNIFICANT CHANGE UP
SPECIMEN SOURCE: SIGNIFICANT CHANGE UP
UROBILINOGEN FLD QL: NEGATIVE — SIGNIFICANT CHANGE UP
WBC # BLD: 22.02 K/UL — HIGH (ref 3.8–10.5)
WBC # FLD AUTO: 22.02 K/UL — HIGH (ref 3.8–10.5)
WBC UR QL: 2 /HPF — SIGNIFICANT CHANGE UP (ref 0–5)

## 2023-05-22 PROCEDURE — 99233 SBSQ HOSP IP/OBS HIGH 50: CPT

## 2023-05-22 PROCEDURE — 99358 PROLONG SERVICE W/O CONTACT: CPT

## 2023-05-22 PROCEDURE — 99233 SBSQ HOSP IP/OBS HIGH 50: CPT | Mod: GC

## 2023-05-22 PROCEDURE — 99359 PROLONG SERV W/O CONTACT ADD: CPT

## 2023-05-22 PROCEDURE — 71045 X-RAY EXAM CHEST 1 VIEW: CPT | Mod: 26

## 2023-05-22 RX ORDER — FUROSEMIDE 40 MG
80 TABLET ORAL DAILY
Refills: 0 | Status: DISCONTINUED | OUTPATIENT
Start: 2023-05-23 | End: 2023-05-25

## 2023-05-22 RX ORDER — MORPHINE SULFATE 50 MG/1
15 CAPSULE, EXTENDED RELEASE ORAL ONCE
Refills: 0 | Status: DISCONTINUED | OUTPATIENT
Start: 2023-05-22 | End: 2023-05-22

## 2023-05-22 RX ORDER — PIPERACILLIN AND TAZOBACTAM 4; .5 G/20ML; G/20ML
3.38 INJECTION, POWDER, LYOPHILIZED, FOR SOLUTION INTRAVENOUS EVERY 8 HOURS
Refills: 0 | Status: DISCONTINUED | OUTPATIENT
Start: 2023-05-23 | End: 2023-05-23

## 2023-05-22 RX ORDER — MORPHINE SULFATE 50 MG/1
2 CAPSULE, EXTENDED RELEASE ORAL ONCE
Refills: 0 | Status: DISCONTINUED | OUTPATIENT
Start: 2023-05-22 | End: 2023-05-22

## 2023-05-22 RX ORDER — MAGNESIUM SULFATE 500 MG/ML
1 VIAL (ML) INJECTION ONCE
Refills: 0 | Status: COMPLETED | OUTPATIENT
Start: 2023-05-22 | End: 2023-05-22

## 2023-05-22 RX ORDER — POTASSIUM CHLORIDE 20 MEQ
40 PACKET (EA) ORAL EVERY 4 HOURS
Refills: 0 | Status: COMPLETED | OUTPATIENT
Start: 2023-05-22 | End: 2023-05-22

## 2023-05-22 RX ORDER — LACTULOSE 10 G/15ML
10 SOLUTION ORAL
Refills: 0 | Status: DISCONTINUED | OUTPATIENT
Start: 2023-05-22 | End: 2023-06-09

## 2023-05-22 RX ORDER — PIPERACILLIN AND TAZOBACTAM 4; .5 G/20ML; G/20ML
3.38 INJECTION, POWDER, LYOPHILIZED, FOR SOLUTION INTRAVENOUS ONCE
Refills: 0 | Status: COMPLETED | OUTPATIENT
Start: 2023-05-22 | End: 2023-05-22

## 2023-05-22 RX ORDER — CIPROFLOXACIN LACTATE 400MG/40ML
500 VIAL (ML) INTRAVENOUS DAILY
Refills: 0 | Status: DISCONTINUED | OUTPATIENT
Start: 2023-05-22 | End: 2023-05-23

## 2023-05-22 RX ORDER — POTASSIUM PHOSPHATE, MONOBASIC POTASSIUM PHOSPHATE, DIBASIC 236; 224 MG/ML; MG/ML
15 INJECTION, SOLUTION INTRAVENOUS ONCE
Refills: 0 | Status: COMPLETED | OUTPATIENT
Start: 2023-05-22 | End: 2023-05-22

## 2023-05-22 RX ORDER — SPIRONOLACTONE 25 MG/1
100 TABLET, FILM COATED ORAL DAILY
Refills: 0 | Status: DISCONTINUED | OUTPATIENT
Start: 2023-05-22 | End: 2023-05-23

## 2023-05-22 RX ADMIN — POTASSIUM PHOSPHATE, MONOBASIC POTASSIUM PHOSPHATE, DIBASIC 62.5 MILLIMOLE(S): 236; 224 INJECTION, SOLUTION INTRAVENOUS at 14:20

## 2023-05-22 RX ADMIN — MORPHINE SULFATE 2 MILLIGRAM(S): 50 CAPSULE, EXTENDED RELEASE ORAL at 08:50

## 2023-05-22 RX ADMIN — Medication 1 TABLET(S): at 11:45

## 2023-05-22 RX ADMIN — MORPHINE SULFATE 2 MILLIGRAM(S): 50 CAPSULE, EXTENDED RELEASE ORAL at 02:12

## 2023-05-22 RX ADMIN — Medication 40 MILLIEQUIVALENT(S): at 09:42

## 2023-05-22 RX ADMIN — PIPERACILLIN AND TAZOBACTAM 200 GRAM(S): 4; .5 INJECTION, POWDER, LYOPHILIZED, FOR SOLUTION INTRAVENOUS at 19:37

## 2023-05-22 RX ADMIN — Medication 100 MICROGRAM(S): at 04:58

## 2023-05-22 RX ADMIN — Medication 100 MILLIGRAM(S): at 08:31

## 2023-05-22 RX ADMIN — SPIRONOLACTONE 100 MILLIGRAM(S): 25 TABLET, FILM COATED ORAL at 17:39

## 2023-05-22 RX ADMIN — Medication 50 MILLILITER(S): at 05:01

## 2023-05-22 RX ADMIN — Medication 100 GRAM(S): at 09:19

## 2023-05-22 RX ADMIN — Medication 40 MILLIGRAM(S): at 05:01

## 2023-05-22 RX ADMIN — MORPHINE SULFATE 2 MILLIGRAM(S): 50 CAPSULE, EXTENDED RELEASE ORAL at 08:31

## 2023-05-22 RX ADMIN — MORPHINE SULFATE 2 MILLIGRAM(S): 50 CAPSULE, EXTENDED RELEASE ORAL at 02:42

## 2023-05-22 RX ADMIN — Medication 500 MILLIGRAM(S): at 18:48

## 2023-05-22 RX ADMIN — PANTOPRAZOLE SODIUM 40 MILLIGRAM(S): 20 TABLET, DELAYED RELEASE ORAL at 05:01

## 2023-05-22 RX ADMIN — Medication 1 MILLIGRAM(S): at 11:45

## 2023-05-22 RX ADMIN — Medication 100 MILLIGRAM(S): at 15:34

## 2023-05-22 RX ADMIN — HEPARIN SODIUM 5000 UNIT(S): 5000 INJECTION INTRAVENOUS; SUBCUTANEOUS at 13:30

## 2023-05-22 RX ADMIN — Medication 100 MILLIGRAM(S): at 11:52

## 2023-05-22 RX ADMIN — Medication 100 MILLIGRAM(S): at 04:57

## 2023-05-22 RX ADMIN — Medication 100 MILLIGRAM(S): at 18:09

## 2023-05-22 RX ADMIN — Medication 100 MILLIGRAM(S): at 11:44

## 2023-05-22 RX ADMIN — MORPHINE SULFATE 15 MILLIGRAM(S): 50 CAPSULE, EXTENDED RELEASE ORAL at 22:55

## 2023-05-22 RX ADMIN — PIPERACILLIN AND TAZOBACTAM 25 GRAM(S): 4; .5 INJECTION, POWDER, LYOPHILIZED, FOR SOLUTION INTRAVENOUS at 22:56

## 2023-05-22 RX ADMIN — Medication 102 MILLIGRAM(S): at 11:45

## 2023-05-22 RX ADMIN — MORPHINE SULFATE 15 MILLIGRAM(S): 50 CAPSULE, EXTENDED RELEASE ORAL at 23:54

## 2023-05-22 RX ADMIN — HEPARIN SODIUM 5000 UNIT(S): 5000 INJECTION INTRAVENOUS; SUBCUTANEOUS at 23:21

## 2023-05-22 RX ADMIN — Medication 50 MILLILITER(S): at 17:38

## 2023-05-22 RX ADMIN — Medication 100 MILLIGRAM(S): at 00:43

## 2023-05-22 NOTE — CHART NOTE - NSCHARTNOTEFT_GEN_A_CORE
Patient is a 38y old  Female who presents with a chief complaint of Liver Failure now presents with Leukocytosis workup as per hepatology (blood cultures, UA/UC, CXR, Ascitic fluid cultures), empiric Zosyn added to regimen as per recommendations from ID.  Discussed with attending.     Vital Signs Last 24 Hrs  T(C): 36.9 (22 May 2023 05:32), Max: 37.1 (21 May 2023 21:06)  T(F): 98.4 (22 May 2023 05:32), Max: 98.8 (21 May 2023 21:06)  HR: 90 (22 May 2023 17:15) (90 - 109)  BP: 110/68 (22 May 2023 17:15) (96/54 - 111/66)  BP(mean): --  RR: 18 (22 May 2023 13:52) (17 - 18)  SpO2: 95% (22 May 2023 13:52) (95% - 98%)    Carmita Yip PA-C  Spectralink: [id number]
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
## 2023-05-22 NOTE — PROGRESS NOTE ADULT - ASSESSMENT
This is a 39 yo Polish Female with Hx of hypothyroidism (on Levothyroxine), and no other known medical Hx, presented to [facility] ED on 5/13/23 with ~ 2 weeks Hx of progressively worsening abdominal pain ("stabbing, diffuse, non radiating") and distention,  as well as 1 day Hx of jaundice, and was admitted to medicine with liver failure (possibly acute on chronic), with MELD-Na 29 on admission, hyperbilirubinemia (Se bi 18.9, direct 15.9), coagulopathy (INR 2.15), mild liver enzyme elevation in AST>>ALT pattern (, ALT 14, ), severe hypoalbuminemia (alb 1.3), as well as leukocytosis (WBC 16k), anemia (Hb 8.0). She was empirically started on NAC per protocol, hepatology evaluated. ID evaluated for MAY Pna and later MSSA bacteremia, has been on IV ceftriaxone and azithromycin. Ascitic fluid analysis (Dx paracentesis 5/13), was portal hypertensive (SAAG 1.1, total protein 1.0), and was not suggestive of SBP

## 2023-05-22 NOTE — PROGRESS NOTE ADULT - SUBJECTIVE AND OBJECTIVE BOX
WBC up to 22 today from 17   Tmax 37.1    ____________________________________________________  ROS  GENERAL: denies chills, , night sweats, weight loss.   PSYCH: denies depression, anxiety, suicidal ideation, hallucination, and delusions  SKIN: no rash or lesions; no color changes, no abnormal nevi,no  dryness, and nojaundice    EYES: denies visual changes, floaters, pain, inflammation, blurred vision, and discharge  ENT: denies tinnitus, vertigo, epistaxis, oral lesion, and decreased acuity  PULM: denies, hemoptysis, pleurisy  CVS: denies angina, palpitations,+ orthopnea, no syncope, or heart murmur  GI: denies constipation, diarrhea, melena, abdominal pain, nausea.   : denies dysuria, frequency, discharge, incontinence, stones or macroscopic hematuria  MS: no arthralgias, no erythema or swelling, no myalgias, noedema, or lower back pain.   CNS: denies numbness, dizziness, seizure, or tremor  ENDO: denies heat/cold intolerance, polyuria, polydipsia, malaise.    HEME: denies bruising, bleeding, lymphadenopathy, anemia, and calf pain    Allergies  No Known Allergies    __________________________________________________  MEDS:  MEDICATIONS  (STANDING):  furosemide   Injectable 40 daily  guaiFENesin Oral Liquid (Sugar-Free) 100 every 6 hours PRN  heparin   Injectable 5000 every 8 hours  levothyroxine 100 daily  ondansetron Injectable 4 every 6 hours PRN  pantoprazole    Tablet 40 before breakfast    _________________________________________________  ANTIMICOBIALS  ceFAZolin   IVPB 2000 every 8 hours      GENERAL LABS              8.1                  131  | 21   | <4           22.02 >-----------< 168     ------------------------< 105                   23.5                 3.0  | 93   | 0.62                                         Ca 8.5   Mg 1.5   Ph 1.5            _________________________________________________  MICROBIOLOGY  -----------    Culture - Blood (collected 18 May 2023 06:00)  Source: .Blood Blood-Peripheral  Preliminary Report (19 May 2023 12:01):    No growth to date.    Culture - Fungal, Body Fluid (collected 17 May 2023 17:20)  Source: Peritoneal Peritoneal Fluid  Preliminary Report (20 May 2023 15:03):    Culture is being performed. Fungal cultures are held for 4 weeks.    Culture - Body Fluid with Gram Stain (collected 17 May 2023 17:20)  Source: Peritoneal Peritoneal Fluid  Gram Stain (17 May 2023 23:41):    No polymorphonuclear cells seen    No organisms seen    by cytocentrifuge  Preliminary Report (18 May 2023 15:40):    No growth            CMVPCR Log: Det <1.54 Assay Dynamic Range: 34.5 to 1.0E+07 IU/mL (1.54 to 7.00 Log10 IU/mL)  Assay lower limit of quantification (LLOQ) is 34.5 IU/mL (1.54 Log10  IU/mL)  CMV DNA detected below the LLOQ will be reported as Detected < 34.5 IU/mL  (<1.54 Log10 IU/mL)  Harvey Cytomegalovirus (CMV) is an FDA-cleared quantitative test that  enables the detection and quantitation of CMV DNA in EDTA plasma of  infected transplant patients on the harvey 8800 system. This test was  verified by [employer]. Results should be interpreted  with consideration of all clinical findings and laboratory findings and  should not form the sole basis for a diagnosis or treatment decision. Fob24BE/mL (05-17 @ 07:20)      Legionella Antigen, Urine: Negative (05-13-23 @ 23:20)      _______________________________________________  PERTINENT IMAGING      _________________________________________________  Physical Exam:   T(C): 36.9 (05-22-23 @ 05:32), Max: 37.1 (05-21-23 @ 21:06)  HR: 104 (05-22-23 @ 05:32) (104 - 109)  BP: 111/66 (05-22-23 @ 05:32) (96/54 - 111/66)  RR: 17 (05-22-23 @ 05:32) (17 - 17)  SpO2: 98% (05-22-23 @ 05:32) (96% - 98%)    05-21-23 @ 07:01  -  05-22-23 @ 07:00  --------------------------------------------------------  IN: 420 mL / OUT: 0 mL / NET: 420 mL      · Constitutional	well-groomed; no distress  · Eyes	PERRL; EOMI; conjunctiva clear  · ENMT	no gross abnormalities  · Respiratory	clear to auscultation bilaterally; no wheezes; no rales; no rhonchi; no respiratory distress  · Cardiovascular	regular rate and rhythm; S1 S2 present; no gallops; no rub; no murmur; no JVD; normal PMI; no pedal edema  · Cardiovascular Comments	no S3/s4  · Gastrointestinal	soft; nontender; nondistended; normal active bowel sounds; no guarding; no organomegaly; no palpable julia  · Genitourinary Female	normal external genitalia  · Neurological	cranial nerves II-XII intact; sensation intact; responds to pain; responds to verbal commands; deep reflexes intact; no meningismus  · Mental Status	Alert and oriented x3, appropriate  · Skin	warm and dry; color normal; no rashes; no ulcers; no subcutaneous nodules; no induration  · Lymphatic	No lymphadedenopathy  · Musculoskeletal	normal; ROM intact; strength 5/5 bilateral upper extremities; strength 5/5 bilateral lower extremities; no joint swelling; no joint erythema; no joint warmth; no calf tenderness; no chest wall tenderness   WBC up to 22 today from 17 - on steroids  Tmax 37.1  had 20 Bm over the course of yesterday, lactulose and rifaximin [id number] off, now 4-5 Bm this am    Denies abdominal pain, HA  Denies metal hardware in her body, repeat BC NGTD  ____________________________________________________  ROS  GENERAL: denies chills, , night sweats, weight loss.   PSYCH: denies depression, anxiety, suicidal ideation, hallucination, and delusions  SKIN: no rash or lesions; no color changes, no abnormal nevi,no  dryness, and nojaundice    EYES: denies visual changes, floaters, pain, inflammation, blurred vision, and discharge  ENT: denies tinnitus, vertigo, epistaxis, oral lesion, and decreased acuity  PULM: denies, hemoptysis, pleurisy  CVS: denies angina, palpitations,+ orthopnea, no syncope, or heart murmur  GI: denies constipation, diarrhea, melena, abdominal pain, nausea.   : denies dysuria, frequency, discharge, incontinence, stones or macroscopic hematuria  MS: no arthralgias, no erythema or swelling, no myalgias, noedema, or lower back pain.   CNS: denies numbness, dizziness, seizure, or tremor  ENDO: denies heat/cold intolerance, polyuria, polydipsia, malaise.    HEME: denies bruising, bleeding, lymphadenopathy, anemia, and calf pain    Allergies  No Known Allergies    __________________________________________________  MEDS:  MEDICATIONS  (STANDING):  furosemide   Injectable 40 daily  guaiFENesin Oral Liquid (Sugar-Free) 100 every 6 hours PRN  heparin   Injectable 5000 every 8 hours  levothyroxine 100 daily  ondansetron Injectable 4 every 6 hours PRN  pantoprazole    Tablet 40 before breakfast    _________________________________________________  ANTIMICOBIALS  ceFAZolin   IVPB 2000 every 8 hours      GENERAL LABS              8.1                  131  | 21   | <4           22.02 >-----------< 168     ------------------------< 105                   23.5                 3.0  | 93   | 0.62                                         Ca 8.5   Mg 1.5   Ph 1.5            _________________________________________________  MICROBIOLOGY  -----------    Culture - Blood (collected 18 May 2023 06:00)  Source: .Blood Blood-Peripheral  Preliminary Report (19 May 2023 12:01):    No growth to date.    Culture - Fungal, Body Fluid (collected 17 May 2023 17:20)  Source: Peritoneal Peritoneal Fluid  Preliminary Report (20 May 2023 15:03):    Culture is being performed. Fungal cultures are held for 4 weeks.    Culture - Body Fluid with Gram Stain (collected 17 May 2023 17:20)  Source: Peritoneal Peritoneal Fluid  Gram Stain (17 May 2023 23:41):    No polymorphonuclear cells seen    No organisms seen    by cytocentrifuge  Preliminary Report (18 May 2023 15:40):    No growth          Legionella Antigen, Urine: Negative (05-13-23 @ 23:20)      _______________________________________________  PERTINENT IMAGING  reviewed    _________________________________________________  Physical Exam:   T(C): 36.9 (05-22-23 @ 05:32), Max: 37.1 (05-21-23 @ 21:06)  HR: 104 (05-22-23 @ 05:32) (104 - 109)  BP: 111/66 (05-22-23 @ 05:32) (96/54 - 111/66)  RR: 17 (05-22-23 @ 05:32) (17 - 17)  SpO2: 98% (05-22-23 @ 05:32) (96% - 98%)    05-21-23 @ 07:01  -  05-22-23 @ 07:00  --------------------------------------------------------  IN: 420 mL / OUT: 0 mL / NET: 420 mL      · Constitutional	well-groomed; no distress,  · Eyes	PERRL; EOMI; conjunctiva clear  · ENMT	no gross abnormalities- FRONTAL wound scabbing, with minor surrounding erythema  · Respiratory	clear to auscultation bilaterally; no wheezes; no rales; no rhonchi; no respiratory distress  · Cardiovascular	regular rate and rhythm; S1 S2 present; no gallops; no rub; no murmur; no JVD; normal PMI; no pedal edema  · Cardiovascular Comments	no S3/s4  · Gastrointestinal	soft; nontender; nondistended; normal active bowel sounds; no guarding; no organomegaly; no palpable julia  · Genitourinary Female	normal external genitalia  · Neurological	cranial nerves II-XII intact; sensation intact; responds to pain; responds to verbal commands; deep reflexes intact; no meningismus  · Mental Status	Alert and oriented x3, appropriate  · Skin	warm and dry; color normal; no rashes; no ulcers; no subcutaneous nodules; no induration  · Lymphatic	No lymphadedenopathy  · Musculoskeletal	normal; ROM intact; strength 5/5 bilateral upper extremities; strength 5/5 bilateral lower extremities; no joint swelling; no joint erythema; no joint warmth; no calf tenderness; no chest wall tenderness

## 2023-05-22 NOTE — PROGRESS NOTE ADULT - TIME BILLING
face-to-face encounter, review of extensive medical records in this and prior charts, laboratory findings, radiographic and microbiology results; documentation as noted above adn discussion of diagnostic impressions and plan with the patient and team face-to-face encounter, review of extensive medical records in this and prior charts, laboratory findings, radiographic and microbiology results; documentation as noted above and discussion of diagnostic impressions and plan with the patient and team
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
## 2023-05-22 NOTE — PROGRESS NOTE ADULT - SUBJECTIVE AND OBJECTIVE BOX
Chief Complaint:  Patient is a 38y old  Female who presents with a chief complaint of Liver Failure (21 May 2023 15:38)      Interval Events: Reports feeling well. Denies any N/V/D/C, abd pain, melena or hematochezia.      Hospital Medications:  albumin human 25% IVPB 100 milliLiter(s) IV Intermittent every 12 hours  ceFAZolin   IVPB 2000 milliGRAM(s) IV Intermittent every 8 hours  folic acid 1 milliGRAM(s) Oral daily  furosemide   Injectable 40 milliGRAM(s) IV Push daily  guaiFENesin Oral Liquid (Sugar-Free) 100 milliGRAM(s) Oral every 6 hours PRN  heparin   Injectable 5000 Unit(s) SubCutaneous every 8 hours  lactulose Syrup 20 Gram(s) Oral every 12 hours  levothyroxine 100 MICROGram(s) Oral daily  multivitamin 1 Tablet(s) Oral daily  ondansetron Injectable 4 milliGRAM(s) IV Push every 6 hours PRN  pantoprazole    Tablet 40 milliGRAM(s) Oral before breakfast  phytonadione  IVPB 10 milliGRAM(s) IV Intermittent daily  thiamine 100 milliGRAM(s) Oral daily      PMHX/PSHX:  Hypothyroidism    Alcoholic liver disease    No significant past surgical history            ROS: 14 point ROS negative unless otherwise stated in subjective      PHYSICAL EXAM:     GENERAL:  Well developed, no distress  HEENT:  NC/AT,  conjunctivae clear, sclera anicteric  CHEST:  Full & symmetric excursion, no increased effort w/ respirations  HEART:  Regular rhythm & rate  ABDOMEN:  Soft, non-tender, non-distended  EXTREMITIES:  no LE  edema  SKIN:  No rash/erythema/ecchymoses/petechiae/wounds/jaundice  NEURO:  Alert, oriented    Vital Signs:  Vital Signs Last 24 Hrs  T(C): 36.9 (22 May 2023 05:32), Max: 37.1 (21 May 2023 21:06)  T(F): 98.4 (22 May 2023 05:32), Max: 98.8 (21 May 2023 21:06)  HR: 104 (22 May 2023 05:32) (97 - 109)  BP: 111/66 (22 May 2023 05:32) (96/54 - 111/66)  BP(mean): --  RR: 17 (22 May 2023 05:32) (17 - 18)  SpO2: 98% (22 May 2023 05:32) (95% - 98%)    Parameters below as of 22 May 2023 05:32  Patient On (Oxygen Delivery Method): room air      Daily     Daily     LABS:                        9.1    18.55 )-----------( 184      ( 21 May 2023 07:23 )             26.8     05-21    129<L>  |  94<L>  |  <4<L>  ----------------------------<  85  4.2   |  18<L>  |  0.58    Ca    8.6      21 May 2023 07:19  Phos  2.0     05-21  Mg     1.7     05-21    TPro  5.9<L>  /  Alb  3.1<L>  /  TBili  20.9<H>  /  DBili  x   /  AST  140<H>  /  ALT  7<L>  /  AlkPhos  189<H>  05-21    LIVER FUNCTIONS - ( 21 May 2023 07:19 )  Alb: 3.1 g/dL / Pro: 5.9 g/dL / ALK PHOS: 189 U/L / ALT: 7 U/L / AST: 140 U/L / GGT: x           PT/INR - ( 20 May 2023 07:21 )   PT: 44.6 sec;   INR: 3.79 ratio                 Imaging: No new abdominal imaging              Chief Complaint:  Patient is a 38y old  Female who presents with a chief complaint of Liver Failure (21 May 2023 15:38)      Interval Events:   No acute events overnight. Reports some LLQ abd pain and discomfort from abd distension.      Hospital Medications:  albumin human 25% IVPB 100 milliLiter(s) IV Intermittent every 12 hours  ceFAZolin   IVPB 2000 milliGRAM(s) IV Intermittent every 8 hours  folic acid 1 milliGRAM(s) Oral daily  furosemide   Injectable 40 milliGRAM(s) IV Push daily  guaiFENesin Oral Liquid (Sugar-Free) 100 milliGRAM(s) Oral every 6 hours PRN  heparin   Injectable 5000 Unit(s) SubCutaneous every 8 hours  lactulose Syrup 20 Gram(s) Oral every 12 hours  levothyroxine 100 MICROGram(s) Oral daily  multivitamin 1 Tablet(s) Oral daily  ondansetron Injectable 4 milliGRAM(s) IV Push every 6 hours PRN  pantoprazole    Tablet 40 milliGRAM(s) Oral before breakfast  phytonadione  IVPB 10 milliGRAM(s) IV Intermittent daily  thiamine 100 milliGRAM(s) Oral daily      PMHX/PSHX:  Hypothyroidism    Alcoholic liver disease    No significant past surgical history            ROS: 14 point ROS negative unless otherwise stated in subjective      PHYSICAL EXAM:     GENERAL:  Well developed, +chronically ill appearing  HEENT:  NC/AT,  +sclera icteric; +R forehead hematoma  CHEST:  Full & symmetric excursion, no increased effort w/ respirations  HEART:  Regular rhythm & rate  ABDOMEN:  Soft, +diffusely-tender- most notably in LLQ, non-distended; +BS  EXTREMITIES:  no LE  edema  SKIN:  +jaundice  NEURO:  Alert, orientedx3, no asterixis    Vital Signs:  Vital Signs Last 24 Hrs  T(C): 36.9 (22 May 2023 05:32), Max: 37.1 (21 May 2023 21:06)  T(F): 98.4 (22 May 2023 05:32), Max: 98.8 (21 May 2023 21:06)  HR: 104 (22 May 2023 05:32) (97 - 109)  BP: 111/66 (22 May 2023 05:32) (96/54 - 111/66)  BP(mean): --  RR: 17 (22 May 2023 05:32) (17 - 18)  SpO2: 98% (22 May 2023 05:32) (95% - 98%)    Parameters below as of 22 May 2023 05:32  Patient On (Oxygen Delivery Method): room air      Daily     Daily     LABS:                        9.1    18.55 )-----------( 184      ( 21 May 2023 07:23 )             26.8     05-21    129<L>  |  94<L>  |  <4<L>  ----------------------------<  85  4.2   |  18<L>  |  0.58    Ca    8.6      21 May 2023 07:19  Phos  2.0     05-21  Mg     1.7     05-21    TPro  5.9<L>  /  Alb  3.1<L>  /  TBili  20.9<H>  /  DBili  x   /  AST  140<H>  /  ALT  7<L>  /  AlkPhos  189<H>  05-21    LIVER FUNCTIONS - ( 21 May 2023 07:19 )  Alb: 3.1 g/dL / Pro: 5.9 g/dL / ALK PHOS: 189 U/L / ALT: 7 U/L / AST: 140 U/L / GGT: x           PT/INR - ( 20 May 2023 07:21 )   PT: 44.6 sec;   INR: 3.79 ratio                 Imaging: No new abdominal imaging              Chief Complaint:  Patient is a 38y old  Female who presents with a chief complaint of Liver Failure (21 May 2023 15:38)      Interval Events:   No acute events overnight. Reports some LLQ abd pain and discomfort from abd distension.   Ate a few potato chips and salsa as snack (seen at bedside).      Hospital Medications:  albumin human 25% IVPB 100 milliLiter(s) IV Intermittent every 12 hours  ceFAZolin   IVPB 2000 milliGRAM(s) IV Intermittent every 8 hours  folic acid 1 milliGRAM(s) Oral daily  furosemide   Injectable 40 milliGRAM(s) IV Push daily  guaiFENesin Oral Liquid (Sugar-Free) 100 milliGRAM(s) Oral every 6 hours PRN  heparin   Injectable 5000 Unit(s) SubCutaneous every 8 hours  lactulose Syrup 20 Gram(s) Oral every 12 hours  levothyroxine 100 MICROGram(s) Oral daily  multivitamin 1 Tablet(s) Oral daily  ondansetron Injectable 4 milliGRAM(s) IV Push every 6 hours PRN  pantoprazole    Tablet 40 milliGRAM(s) Oral before breakfast  phytonadione  IVPB 10 milliGRAM(s) IV Intermittent daily  thiamine 100 milliGRAM(s) Oral daily      PMHX/PSHX:  Hypothyroidism    Alcoholic liver disease    No significant past surgical history            ROS: 14 point ROS negative unless otherwise stated in subjective      PHYSICAL EXAM:     GENERAL:  Well developed, +chronically ill appearing  HEENT:  NC/AT,  +sclera icteric; +R forehead hematoma without active bleeding  CHEST:  Full & symmetric excursion, no increased effort w/ respirations  HEART:  Regular rhythm & rate  ABDOMEN:  Soft, +diffusely-tender- most notably in LLQ, +moderately distended; +BS, +prior paracentesis site at RLQ appears C/D but with healing ecchymosis at site  EXTREMITIES:  no LE  edema, +sarcopenia  SKIN:  +jaundice  NEURO:  Alert, orientedx3, no asterixis    Vital Signs:  Vital Signs Last 24 Hrs  T(C): 36.9 (22 May 2023 05:32), Max: 37.1 (21 May 2023 21:06)  T(F): 98.4 (22 May 2023 05:32), Max: 98.8 (21 May 2023 21:06)  HR: 104 (22 May 2023 05:32) (97 - 109)  BP: 111/66 (22 May 2023 05:32) (96/54 - 111/66)  BP(mean): --  RR: 17 (22 May 2023 05:32) (17 - 18)  SpO2: 98% (22 May 2023 05:32) (95% - 98%)    Parameters below as of 22 May 2023 05:32  Patient On (Oxygen Delivery Method): room air      Daily     Daily     LABS:                        9.1    18.55 )-----------( 184      ( 21 May 2023 07:23 )             26.8     05-21    129<L>  |  94<L>  |  <4<L>  ----------------------------<  85  4.2   |  18<L>  |  0.58    Ca    8.6      21 May 2023 07:19  Phos  2.0     05-21  Mg     1.7     05-21    TPro  5.9<L>  /  Alb  3.1<L>  /  TBili  20.9<H>  /  DBili  x   /  AST  140<H>  /  ALT  7<L>  /  AlkPhos  189<H>  05-21    LIVER FUNCTIONS - ( 21 May 2023 07:19 )  Alb: 3.1 g/dL / Pro: 5.9 g/dL / ALK PHOS: 189 U/L / ALT: 7 U/L / AST: 140 U/L / GGT: x           PT/INR - ( 20 May 2023 07:21 )   PT: 44.6 sec;   INR: 3.79 ratio                 Imaging: No new abdominal imaging

## 2023-05-22 NOTE — PROGRESS NOTE ADULT - NSPROGADDITIONALINFOA_GEN_ALL_CORE
Plan d/w ACP Thea. Patient stated that she herself will update family
undergoing liver transplant eval
pt declined me reaching out to family to update
Plan d/w SNEHAL Cuevas
Plan d/w SNEHAL Crowell

## 2023-05-22 NOTE — PROGRESS NOTE ADULT - SUBJECTIVE AND OBJECTIVE BOX
Patient is a 38y old  Female who presents with a chief complaint of Liver Failure (22 May 2023 11:38)      SUBJECTIVE / OVERNIGHT EVENTS:    feels OK. much better than last week. no sob, fevers, chills. some abdominal distention, passing gas, having BMs    ROS:  14 point ROS negative in detail except stated as above    MEDICATIONS  (STANDING):  albumin human 25% IVPB 100 milliLiter(s) IV Intermittent every 12 hours  ceFAZolin   IVPB 2000 milliGRAM(s) IV Intermittent every 8 hours  folic acid 1 milliGRAM(s) Oral daily  furosemide   Injectable 40 milliGRAM(s) IV Push daily  heparin   Injectable 5000 Unit(s) SubCutaneous every 8 hours  levothyroxine 100 MICROGram(s) Oral daily  multivitamin 1 Tablet(s) Oral daily  pantoprazole    Tablet 40 milliGRAM(s) Oral before breakfast  phytonadione  IVPB 10 milliGRAM(s) IV Intermittent daily  potassium phosphate IVPB 15 milliMole(s) IV Intermittent once  thiamine 100 milliGRAM(s) Oral daily    MEDICATIONS  (PRN):  guaiFENesin Oral Liquid (Sugar-Free) 100 milliGRAM(s) Oral every 6 hours PRN Cough  ondansetron Injectable 4 milliGRAM(s) IV Push every 6 hours PRN Nausea and/or Vomiting      CAPILLARY BLOOD GLUCOSE        I&O's Summary    21 May 2023 07:01  -  22 May 2023 07:00  --------------------------------------------------------  IN: 420 mL / OUT: 0 mL / NET: 420 mL        PHYSICAL EXAM:  Vital Signs Last 24 Hrs  T(C): 36.9 (22 May 2023 05:32), Max: 37.1 (21 May 2023 21:06)  T(F): 98.4 (22 May 2023 05:32), Max: 98.8 (21 May 2023 21:06)  HR: 104 (22 May 2023 05:32) (104 - 109)  BP: 111/66 (22 May 2023 05:32) (96/54 - 111/66)  BP(mean): --  RR: 17 (22 May 2023 05:32) (17 - 17)  SpO2: 98% (22 May 2023 05:32) (96% - 98%)    Parameters below as of 22 May 2023 05:32  Patient On (Oxygen Delivery Method): room air      CONSTITUTIONAL: NAD, jaundiced, scleral icterus, lesion on temple w/ dressing in place  RESPIRATORY: Normal respiratory effort; lungs are clear to auscultation bilaterally  CARDIOVASCULAR: Regular rate and rhythm, normal S1 and S2, no murmur/rub/gallop; b/l LE edema; Peripheral pulses are 2+ bilaterally  ABDOMEN: distended, Nontender to palpation, normoactive bowel sounds, no rebound/guarding; No hepatosplenomegaly  MUSCLOSKELETAL: no clubbing or cyanosis of digits; no joint swelling or tenderness to palpation  PSYCH: A+O to person, place, and time; affect appropriate      LABS:                        8.1    22.02 )-----------( 168      ( 22 May 2023 07:09 )             23.5     05-22    131<L>  |  93<L>  |  <4<L>  ----------------------------<  105<H>  3.0<L>   |  21<L>  |  0.62    Ca    8.5      22 May 2023 07:09  Phos  1.5     05-22  Mg     1.5     05-22    TPro  5.2<L>  /  Alb  2.9<L>  /  TBili  21.5<H>  /  DBili  x   /  AST  110<H>  /  ALT  <5<L>  /  AlkPhos  175<H>  05-22    PT/INR - ( 22 May 2023 07:09 )   PT: 28.8 sec;   INR: 2.46 ratio         PTT - ( 22 May 2023 07:09 )  PTT:57.4 sec          RADIOLOGY & ADDITIONAL TESTS:    Imaging Personally Reviewed:    Consultant(s) Notes Reviewed:      Care Discussed with Consultants/Other Providers:  constantine Esposito

## 2023-05-22 NOTE — PROGRESS NOTE ADULT - ATTENDING COMMENTS
37 yo F with hypothyroidism, AUD (with last reported drink 3 weeks prior to presentation), [facility] due to acute on chronic liver failure (ACLF) secondary to severe alcohol-associated hepatitis (AH) versus MAY pneumonia and MSSA bacteremia (with 3/4 bottles positive on blood cultures from 5/13), also with >100k E. coli on urine culture (5/13) and low level CMV viremia. S/p NAC (completed 5/16), but not eligible for steroids for her AH given recent severe infection. Currently receiving IV vitamin K daily x3 days (5/21-23). S/p Zosyn (5/18-19) and currently on cefazolin (5/16- ). Repeat blood cultures (5/16 x2, 5/17, and 5/18) negative. No SBP on paracenteses (5/13 and 5/17) but awaiting repeat paracentesis given reaccumulated ascites. S/p albumin and currently on Lasix 40 mg iv daily, with plan to transition to oral diuretic regimen with addition of spironolactone today. Recommend oral fluid restriction given hyponatremia. Hemodynamics and renal function stable. S/p 1 unit PRBCs (5/20) and has some healing ecchymoses including on abdomen as well as known right forehead hematoma, but no overt GI bleeding. We will tentatively plan for EGD on Wednesday (5/24) to screen for varices, pending discussion with Cardiology as ideally would do SAHARA first so that if she has large varices, these could be banded during the EGD (cannot band varices shortly prior to SAHARA as the SAHARA could then cause band dislodgement). Recommend MRI/MRCP to rule out HCC or biliary obstruction. ABO A with current MELD-Na 30. Liver transplant evaluation in progress.    Fbay Montes M.D., Ph.D.  Transplant Hepatology

## 2023-05-23 LAB
ALBUMIN FLD-MCNC: 1.2 G/DL — SIGNIFICANT CHANGE UP
ALBUMIN SERPL ELPH-MCNC: 3.2 G/DL — LOW (ref 3.3–5)
ALP SERPL-CCNC: 150 U/L — HIGH (ref 40–120)
ALT FLD-CCNC: <5 U/L — LOW (ref 10–45)
ANION GAP SERPL CALC-SCNC: 13 MMOL/L — SIGNIFICANT CHANGE UP (ref 5–17)
AST SERPL-CCNC: 105 U/L — HIGH (ref 10–40)
B PERT IGG+IGM PNL SER: CLEAR — SIGNIFICANT CHANGE UP
BILIRUB SERPL-MCNC: 24.3 MG/DL — HIGH (ref 0.2–1.2)
BLD GP AB SCN SERPL QL: NEGATIVE — SIGNIFICANT CHANGE UP
BUN SERPL-MCNC: <4 MG/DL — LOW (ref 7–23)
CALCIUM SERPL-MCNC: 8.7 MG/DL — SIGNIFICANT CHANGE UP (ref 8.4–10.5)
CHLORIDE SERPL-SCNC: 94 MMOL/L — LOW (ref 96–108)
CO2 SERPL-SCNC: 25 MMOL/L — SIGNIFICANT CHANGE UP (ref 22–31)
COLOR FLD: YELLOW — SIGNIFICANT CHANGE UP
CREAT SERPL-MCNC: 0.61 MG/DL — SIGNIFICANT CHANGE UP (ref 0.5–1.3)
CULTURE RESULTS: SIGNIFICANT CHANGE UP
EGFR: 117 ML/MIN/1.73M2 — SIGNIFICANT CHANGE UP
FLUID INTAKE SUBSTANCE CLASS: SIGNIFICANT CHANGE UP
GAMMA INTERFERON BACKGROUND BLD IA-ACNC: 0.02 IU/ML — SIGNIFICANT CHANGE UP
GLUCOSE FLD-MCNC: 117 MG/DL — SIGNIFICANT CHANGE UP
GLUCOSE SERPL-MCNC: 90 MG/DL — SIGNIFICANT CHANGE UP (ref 70–99)
HCT VFR BLD CALC: 22.2 % — LOW (ref 34.5–45)
HGB BLD-MCNC: 7.5 G/DL — LOW (ref 11.5–15.5)
LDH SERPL L TO P-CCNC: 57 U/L — SIGNIFICANT CHANGE UP
LYMPHOCYTES # FLD: 42 % — SIGNIFICANT CHANGE UP
M TB IFN-G BLD-IMP: ABNORMAL
M TB IFN-G CD4+ BCKGRND COR BLD-ACNC: 0 IU/ML — SIGNIFICANT CHANGE UP
M TB IFN-G CD4+CD8+ BCKGRND COR BLD-ACNC: 0 IU/ML — SIGNIFICANT CHANGE UP
MAGNESIUM SERPL-MCNC: 1.6 MG/DL — SIGNIFICANT CHANGE UP (ref 1.6–2.6)
MCHC RBC-ENTMCNC: 33.8 GM/DL — SIGNIFICANT CHANGE UP (ref 32–36)
MCHC RBC-ENTMCNC: 36.1 PG — HIGH (ref 27–34)
MCV RBC AUTO: 106.7 FL — HIGH (ref 80–100)
MESOTHL CELL # FLD: 2 % — SIGNIFICANT CHANGE UP
MONOS+MACROS # FLD: 47 % — SIGNIFICANT CHANGE UP
NEUTROPHILS-BODY FLUID: 9 % — SIGNIFICANT CHANGE UP
NRBC # BLD: 0 /100 WBCS — SIGNIFICANT CHANGE UP (ref 0–0)
PHOSPHATE SERPL-MCNC: 1.6 MG/DL — LOW (ref 2.5–4.5)
PLATELET # BLD AUTO: 143 K/UL — LOW (ref 150–400)
POTASSIUM SERPL-MCNC: 2.9 MMOL/L — CRITICAL LOW (ref 3.5–5.3)
POTASSIUM SERPL-SCNC: 2.9 MMOL/L — CRITICAL LOW (ref 3.5–5.3)
PROT FLD-MCNC: 1.7 G/DL — SIGNIFICANT CHANGE UP
PROT SERPL-MCNC: 5.6 G/DL — LOW (ref 6–8.3)
QUANT TB PLUS MITOGEN MINUS NIL: 0.38 IU/ML — SIGNIFICANT CHANGE UP
RBC # BLD: 2.08 M/UL — LOW (ref 3.8–5.2)
RBC # FLD: 21.2 % — HIGH (ref 10.3–14.5)
RCV VOL RI: < 2000 /UL — SIGNIFICANT CHANGE UP (ref 0–0)
RH IG SCN BLD-IMP: POSITIVE — SIGNIFICANT CHANGE UP
SODIUM SERPL-SCNC: 132 MMOL/L — LOW (ref 135–145)
SPECIMEN SOURCE: SIGNIFICANT CHANGE UP
TOTAL NUCLEATED CELL COUNT, BODY FLUID: 70 /UL — SIGNIFICANT CHANGE UP
TUBE TYPE: SIGNIFICANT CHANGE UP
WBC # BLD: 22.13 K/UL — HIGH (ref 3.8–10.5)
WBC # FLD AUTO: 22.13 K/UL — HIGH (ref 3.8–10.5)

## 2023-05-23 PROCEDURE — 99232 SBSQ HOSP IP/OBS MODERATE 35: CPT | Mod: GC

## 2023-05-23 PROCEDURE — 99233 SBSQ HOSP IP/OBS HIGH 50: CPT

## 2023-05-23 PROCEDURE — 99232 SBSQ HOSP IP/OBS MODERATE 35: CPT

## 2023-05-23 PROCEDURE — 49083 ABD PARACENTESIS W/IMAGING: CPT

## 2023-05-23 PROCEDURE — 49083 ABD PARACENTESIS W/IMAGING: CPT | Mod: RT

## 2023-05-23 RX ORDER — POTASSIUM CHLORIDE 20 MEQ
40 PACKET (EA) ORAL ONCE
Refills: 0 | Status: COMPLETED | OUTPATIENT
Start: 2023-05-23 | End: 2023-05-23

## 2023-05-23 RX ORDER — POTASSIUM CHLORIDE 20 MEQ
10 PACKET (EA) ORAL
Refills: 0 | Status: COMPLETED | OUTPATIENT
Start: 2023-05-23 | End: 2023-05-23

## 2023-05-23 RX ORDER — CIPROFLOXACIN LACTATE 400MG/40ML
500 VIAL (ML) INTRAVENOUS DAILY
Refills: 0 | Status: DISCONTINUED | OUTPATIENT
Start: 2023-05-23 | End: 2023-05-26

## 2023-05-23 RX ORDER — ALBUMIN HUMAN 25 %
50 VIAL (ML) INTRAVENOUS ONCE
Refills: 0 | Status: COMPLETED | OUTPATIENT
Start: 2023-05-23 | End: 2023-05-23

## 2023-05-23 RX ORDER — SPIRONOLACTONE 25 MG/1
200 TABLET, FILM COATED ORAL DAILY
Refills: 0 | Status: DISCONTINUED | OUTPATIENT
Start: 2023-05-23 | End: 2023-05-26

## 2023-05-23 RX ORDER — SPIRONOLACTONE 25 MG/1
100 TABLET, FILM COATED ORAL
Refills: 0 | Status: DISCONTINUED | OUTPATIENT
Start: 2023-05-23 | End: 2023-05-23

## 2023-05-23 RX ADMIN — Medication 500 MILLIGRAM(S): at 11:25

## 2023-05-23 RX ADMIN — Medication 100 MICROGRAM(S): at 06:33

## 2023-05-23 RX ADMIN — PANTOPRAZOLE SODIUM 40 MILLIGRAM(S): 20 TABLET, DELAYED RELEASE ORAL at 06:33

## 2023-05-23 RX ADMIN — HEPARIN SODIUM 5000 UNIT(S): 5000 INJECTION INTRAVENOUS; SUBCUTANEOUS at 21:03

## 2023-05-23 RX ADMIN — Medication 100 MILLIGRAM(S): at 11:25

## 2023-05-23 RX ADMIN — Medication 100 MILLIGRAM(S): at 16:20

## 2023-05-23 RX ADMIN — Medication 100 MILLIGRAM(S): at 20:36

## 2023-05-23 RX ADMIN — HEPARIN SODIUM 5000 UNIT(S): 5000 INJECTION INTRAVENOUS; SUBCUTANEOUS at 13:14

## 2023-05-23 RX ADMIN — Medication 100 MILLIEQUIVALENT(S): at 10:09

## 2023-05-23 RX ADMIN — Medication 100 MILLIEQUIVALENT(S): at 11:26

## 2023-05-23 RX ADMIN — Medication 102 MILLIGRAM(S): at 12:01

## 2023-05-23 RX ADMIN — Medication 1 MILLIGRAM(S): at 11:25

## 2023-05-23 RX ADMIN — Medication 100 MILLIGRAM(S): at 08:37

## 2023-05-23 RX ADMIN — Medication 150 MILLILITER(S): at 15:15

## 2023-05-23 RX ADMIN — Medication 100 MILLIGRAM(S): at 23:33

## 2023-05-23 RX ADMIN — SPIRONOLACTONE 100 MILLIGRAM(S): 25 TABLET, FILM COATED ORAL at 06:33

## 2023-05-23 RX ADMIN — PIPERACILLIN AND TAZOBACTAM 25 GRAM(S): 4; .5 INJECTION, POWDER, LYOPHILIZED, FOR SOLUTION INTRAVENOUS at 06:33

## 2023-05-23 RX ADMIN — Medication 80 MILLIGRAM(S): at 06:33

## 2023-05-23 RX ADMIN — Medication 1 TABLET(S): at 11:25

## 2023-05-23 RX ADMIN — Medication 100 MILLIGRAM(S): at 16:51

## 2023-05-23 RX ADMIN — Medication 100 MILLIEQUIVALENT(S): at 16:20

## 2023-05-23 RX ADMIN — Medication 40 MILLIEQUIVALENT(S): at 10:08

## 2023-05-23 RX ADMIN — Medication 100 MILLIGRAM(S): at 01:01

## 2023-05-23 NOTE — PROGRESS NOTE ADULT - ASSESSMENT
This is a 37 yo [place] Female with Hx of hypothyroidism (on Levothyroxine), and no other known medical Hx, presented to [facility] ED on 5/13/23 with ~ 2 weeks Hx of progressively worsening abdominal pain ("stabbing, diffuse, non radiating") and distention,  as well as 1 day Hx of jaundice, and was admitted to medicine with liver failure (possibly acute on chronic), with MELD-Na 29 on admission, hyperbilirubinemia (Se bi 18.9, direct 15.9), coagulopathy (INR 2.15), mild liver enzyme elevation in AST>>ALT pattern (, ALT 14, ), severe hypoalbuminemia (alb 1.3), as well as leukocytosis (WBC 16k), anemia (Hb 8.0). She was empirically started on NAC per protocol, hepatology evaluated. ID evaluated for MAY Pna and later MSSA bacteremia, has been on IV ceftriaxone and azithromycin. Ascitic fluid analysis (Dx paracentesis 5/13), was portal hypertensive (SAAG 1.1, total protein 1.0), and was not suggestive of SBP

## 2023-05-23 NOTE — CHART NOTE - NSCHARTNOTEFT_GEN_A_CORE
Gender: [] male  [x] female    Hand  Dynamometer (dominant hand): right [] attempted   [  ] unable to attempt  attempt #1 (kg): 14  attempt #2 (kg): 10  attempt #3 (kg): 12    5 Chair Stands:  [x] attempted   [] unable to attempt  time to complete (seconds): 19.86    3-Position Balance:  [x] attempted   [] unable to attempt  wyit-pb-qblm (0-10 seconds): 10  semi tandem (0-10 seconds): 10  tandem (0-10 seconds): 1.8    Comments:     LIVER FRAILTY INDEX SCORE: 5.01    Based on suggested cut-offs of the Liver Frailty Index™, a patient with this Liver Frailty Index™ score is considered:   [x] Frail  [] Pre Frail  [] Robust    This Liver Frailty Index™ score falls within the ** percentile of outpatients with cirrhosis who are listed for liver transplantation.    6MWT: 200 ft      *Pt 6MWT limited by abdominal pain, only able to complete 1 min 51 sec of test.

## 2023-05-23 NOTE — PROGRESS NOTE ADULT - SUBJECTIVE AND OBJECTIVE BOX
Patient is a 38y old  Female who presents with a chief complaint of Liver Failure (23 May 2023 12:39)      SUBJECTIVE / OVERNIGHT EVENTS:    feels well. just abdominal distention. no fevers, chills.    ROS:  14 point ROS negative in detail except stated as above    MEDICATIONS  (STANDING):  ceFAZolin   IVPB 2000 milliGRAM(s) IV Intermittent every 8 hours  ciprofloxacin     Tablet 500 milliGRAM(s) Oral daily  folic acid 1 milliGRAM(s) Oral daily  furosemide    Tablet 80 milliGRAM(s) Oral daily  heparin   Injectable 5000 Unit(s) SubCutaneous every 8 hours  lactulose Syrup 10 Gram(s) Oral two times a day  levothyroxine 100 MICROGram(s) Oral daily  multivitamin 1 Tablet(s) Oral daily  pantoprazole    Tablet 40 milliGRAM(s) Oral before breakfast  potassium chloride  10 mEq/100 mL IVPB 10 milliEquivalent(s) IV Intermittent every 1 hour  spironolactone 100 milliGRAM(s) Oral daily  thiamine 100 milliGRAM(s) Oral daily    MEDICATIONS  (PRN):  guaiFENesin Oral Liquid (Sugar-Free) 100 milliGRAM(s) Oral every 6 hours PRN Cough  ondansetron Injectable 4 milliGRAM(s) IV Push every 6 hours PRN Nausea and/or Vomiting      CAPILLARY BLOOD GLUCOSE        I&O's Summary    22 May 2023 07:01  -  23 May 2023 07:00  --------------------------------------------------------  IN: 720 mL / OUT: 0 mL / NET: 720 mL        PHYSICAL EXAM:  Vital Signs Last 24 Hrs  T(C): 36.7 (23 May 2023 11:56), Max: 36.8 (23 May 2023 04:50)  T(F): 98 (23 May 2023 11:56), Max: 98.2 (23 May 2023 04:50)  HR: 100 (23 May 2023 11:56) (90 - 104)  BP: 100/63 (23 May 2023 11:56) (95/59 - 110/68)  BP(mean): --  RR: 18 (23 May 2023 11:56) (17 - 18)  SpO2: 95% (23 May 2023 11:56) (94% - 95%)    Parameters below as of 23 May 2023 11:56  Patient On (Oxygen Delivery Method): room air      CONSTITUTIONAL: NAD, jaundiced, scleral icterus, lesion on temple w/ dressing in place  RESPIRATORY: Normal respiratory effort; lungs are clear to auscultation bilaterally  CARDIOVASCULAR: Regular rate and rhythm, normal S1 and S2, no murmur/rub/gallop; b/l LE edema; Peripheral pulses are 2+ bilaterally  ABDOMEN: distended, Nontender to palpation, normoactive bowel sounds, no rebound/guarding; No hepatosplenomegaly  MUSCLOSKELETAL: no clubbing or cyanosis of digits; no joint swelling or tenderness to palpation  PSYCH: A+O to person, place, and time; affect appropriate        LABS:                        7.5    22.13 )-----------( 143      ( 23 May 2023 06:20 )             22.2     05-23    132<L>  |  94<L>  |  <4<L>  ----------------------------<  90  2.9<LL>   |  25  |  0.61    Ca    8.7      23 May 2023 06:20  Phos  1.6       Mg     1.6         TPro  5.6<L>  /  Alb  3.2<L>  /  TBili  24.3<H>  /  DBili  x   /  AST  105<H>  /  ALT  <5<L>  /  AlkPhos  150<H>      PT/INR - ( 22 May 2023 07:09 )   PT: 28.8 sec;   INR: 2.46 ratio         PTT - ( 22 May 2023 07:09 )  PTT:57.4 sec      Urinalysis Basic - ( 22 May 2023 21:17 )    Color: Dark Yellow / Appearance: Slightly Turbid / S.025 / pH: x  Gluc: x / Ketone: Negative  / Bili: Large >10 / Urobili: Negative   Blood: x / Protein: 30 mg/dL / Nitrite: Negative   Leuk Esterase: Negative / RBC: 8 /hpf / WBC 2 /HPF   Sq Epi: x / Non Sq Epi: x / Bacteria: Negative        RADIOLOGY & ADDITIONAL TESTS:    Imaging Personally Reviewed:    Consultant(s) Notes Reviewed:      Care Discussed with Consultants/Other Providers:  constantine Vizcarra

## 2023-05-23 NOTE — PROGRESS NOTE ADULT - ATTENDING COMMENTS
37 yo F with hypothyroidism, AUD (with last reported drink 3 weeks prior to presentation), transferred from [facility] to [facility] due to acute on chronic liver failure (ACLF) secondary to severe alcohol-associated hepatitis (AH) versus MAY pneumonia and MSSA bacteremia (with 3/4 bottles positive on blood cultures from 5/13), also with >100k E. coli on urine culture (5/13) and low level CMV viremia. S/p NAC (completed 5/16), but not eligible for steroids for her AH given recent severe infection. Currently receiving IV vitamin K daily x3 days (5/21-23). S/p Zosyn (5/18-19) and currently on cefazolin (5/16- ), plus prophylactic ciprofloxacin. Repeat blood cultures (5/16 x2, 5/17, and 5/18) negative. No SBP on paracenteses (5/13 and 5/17) but awaiting repeat paracentesis today given reaccumulated ascites. Given stable hemodynamics and renal function as well as hypokalemia, recommend increasing spironolactone to 200 mg po daily today and continuing furosemide 80 mg po daily. S/p 1 unit PRBCs (5/20) and has some healing ecchymoses including on abdomen as well as known right forehead hematoma, but no overt GI bleeding. We will tentatively plan for EGD on Wednesday (5/24) to screen for varices, pending discussion with Cardiology as ideally would do SAHARA first so that if she has large varices, these could be banded during the EGD (cannot band varices shortly prior to SAHARA as the SAHARA could then cause band dislodgement). Awaiting MRI/MRCP to rule out HCC or biliary obstruction. ABO A with current MELD-Na 30. Liver transplant evaluation in progress.    Faby Montes M.D., Ph.D.  Transplant Hepatology

## 2023-05-23 NOTE — PHYSICAL THERAPY INITIAL EVALUATION ADULT - PERTINENT HX OF CURRENT PROBLEM, REHAB EVAL
39 yo Polish Female with Hx of hypothyroidism (on Levothyroxine), and no other known medical Hx, liver cirrhosis 2/2 ETOH abuse---->  presented to [facility] on 5/13/23 with ~ 2 weeks Hx of progressively worsening abdominal pain ("stabbing, diffuse, non radiating") and distention          Abdominal pain 2/2 Alcohol-associated hepatitis c/b moderate ascites and         varices on imaging---  Liver Transplant w/u; s/p paracentesis 2L fluid removed, paracentesis scheduled 5/23; on Lasix PO, 5/21 albumin and Vk x3 days started. c/w PPI, lactulose d/c, low salt diet, Fluid Restriction 1.5L        MSSA bacteria unclear source: c/w Cefaxolin        Leukocytosis: f/u w/u as per Hepatology, start Zosyn x5 days        Rt forehead lump wound--CTH neg; hematoma drained by surgery 5/21

## 2023-05-23 NOTE — PROGRESS NOTE ADULT - ASSESSMENT
Antimicrobials  Cefazolin 2 g q8h -  Prior:    - Zosyn -  - Cipro 5/18 x1  - Azithromycin 5/16 x1,  - Ctx 5/16 x1    Microbiology:   BC NGTD   ascitic fluid bacterial, fungal cx NGTD  ,  BC NGTD   ascitic fluid cx neg   2/2 BC pos MSSA    Imagin/19 US abdomen: small to mod ascites  Ct chest iv con: Multifocal bilateral groundglass opacities, suspicious for pneumonia.  Small bilateral pleural effusions, left greater the right, increased compared to prior exam.no PE  Ct maxillofacial , Head and Cervical spine     CT FACIAL BONES  Bilateral mastoid air cells and middle ear regions well-aerated. Paranasal sinus mucosal thickening with near complete opacification of the right sphenoid sinus. Bilateral maxillary sinus septations. No air-fluid levels. S-shaped deviation of the nasal septum with hypertrophy of bilateral inferior greater than middle nasal turbinates. Narrowing ofbilateral ostiomeatal complexes. Periapical lucencies bilateral maxillarypremolars.    CT C/A/P   * Small focal consolidation in the posterior left upper lobe. Givenhistory of trauma, small pulmonary contusion cannot be excluded.Correlate for pneumonia.  * Hepatomegaly and diffuse upper extremity ptosis with signs of portalhypertension. Correlate for steatohepatitis. Moderate ascites.    Assessment and plan    1. 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  2. MSSA bacteremia  BC  2/; repeat BC  neg. TTE no vegetations   CT face, chest/abdomen and pelvis post trauma noting sinus opacification, bilateral apical maxillary premolar lucencies. CT chest with small consolidation  Sources: traumatic /SSTI in s/o facial hematoma vs pneumonia vs less likely ENT. No evidence of metastatic infection. ? hardware  Cefazolin -    3. Leucocytosis  Peaked at 20à stable 17-18, likely combination if bacteremia and alcoholic hepatitis    4. Bilateral GGO on CT chest -  More likely pulmonary edema    5. Ecoli bacteruria . No symptoms of UTI, UA blunt, regardless, susceptible to cefazolin  6. Facial trauma with hematoma s/p evacuation - no purulence  7. Pre-transplant screening  HIV ag/Ab neg; HCV Ab neg, HBV s Ag neg, HBV s Ab pos, HBV c Ab neg, HEV IgG neg, EBV IgG pos, CMV IgG pos, HSV1/2 IgG pos, VZV IgG NEG; Measles/Mumps IgG neg, Rubella igG pos ([place] measles), Syphilis screen neg, Strongyliodes IgG neg. Quantiferon and toxoplasma igGpending.  8. Need for vaccinations.  Only received Td . Positive titers for HBV non-quantitative    Recommendations:  - Continue cefazolin 2g q8h for MSSA bacteremia.  - Anticipate total of 4 weeks of therapy since cleared blood cultures with at least 2 weeks of iv therapy  - s/p evacuation of facial hematoma- no cultures sent  - TTE negative for endocarditis, awaiting for SAHARA  - Follow quantiferon  - No clinical suspicion for pneumonia, should she deteriorate clinically, could add zosyn to current regimen for brief 5 day period  - Will benefit from vaccinations prior to transplant if latter not anticipated imminently or within 2 weeks- otherwise, higher benefit to delay:  Havrix, Shingrix 2 doses 6 months apart; Tdap (once in adulthood), COVID-19, Prevnar 20, HPV Gardisil 9 3 doses at 0-1-6 months  - Of note, non-immune to MM of MR nor to varicella. If she were to not be transplanted or delayed for > 1 month, would benefit from MMR+Varivax. However, if plan for or anticipated transplantation within a month, then live vaccination would be contraindicated    Thank you for involving ID in this consult,  We will continue following peripherally  Please call or page with additional questions  Pager: [phone]  Teams: from 8 am to 5 pm  Samanta Soto MD   Antimicrobials  Cefazolin 2 g q8h -  cipro 500  mg daily ppx    Prior:    - Zosyn -  - Cipro 5/18 x1  - Azithromycin 5/16 x1,  - Ctx 5/16 x1    Microbiology:   BC NGTD   ascitic fluid bacterial, fungal cx NGTD  ,  BC NGTD   ascitic fluid cx neg   2/ BC pos MSSA    Imagin/19 US abdomen: small to mod ascites  Ct chest iv con: Multifocal bilateral groundglass opacities, suspicious for pneumonia.  Small bilateral pleural effusions, left greater the right, increased compared to prior exam.no PE  Ct maxillofacial , Head and Cervical spine     CT FACIAL BONES  Bilateral mastoid air cells and middle ear regions well-aerated. Paranasal sinus mucosal thickening with near complete opacification of the right sphenoid sinus. Bilateral maxillary sinus septations. No air-fluid levels. S-shaped deviation of the nasal septum with hypertrophy of bilateral inferior greater than middle nasal turbinates. Narrowing ofbilateral ostiomeatal complexes. Periapical lucencies bilateral maxillarypremolars.    CT C/A/P   * Small focal consolidation in the posterior left upper lobe. Givenhistory of trauma, small pulmonary contusion cannot be excluded.Correlate for pneumonia.  * Hepatomegaly and diffuse upper extremity ptosis with signs of portalhypertension. Correlate for steatohepatitis. Moderate ascites.    Assessment and plan    1. 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  2. MSSA bacteremia  BC  2/; repeat BC  neg. TTE no vegetations   CT face, chest/abdomen and pelvis post trauma noting sinus opacification, bilateral apical maxillary premolar lucencies. CT chest with small consolidation  Sources: traumatic /SSTI in s/o facial hematoma vs pneumonia vs less likely ENT. No evidence of metastatic infection. ? hardware  Cefazolin -    3. Leucocytosis  Peaked at 20à stable 17-18, likely combination if bacteremia and alcoholic hepatitis    4. Bilateral GGO on CT chest -  More likely pulmonary edema    5. Ecoli bacteruria . No symptoms of UTI, UA blunt, regardless, susceptible to cefazolin  6. Facial trauma with hematoma s/p evacuation 5/20- no purulence  7. Pre-transplant screening  HIV ag/Ab neg; HCV Ab neg, HBV s Ag neg, HBV s Ab pos, HBV c Ab neg, HEV IgG neg, EBV IgG pos, CMV IgG pos, HSV1/2 IgG pos, VZV IgG NEG; Measles/Mumps IgG neg, Rubella igG pos ([place] measles), Syphilis screen neg, Strongyliodes IgG neg. Quantiferon and toxoplasma igGpending.  8. Need for vaccinations.  Only received Td . Positive titers for HBV non-quantitative    Recommendations:  - Continue cefazolin 2g q8h for MSSA bacteremia.  - Anticipate total of 4 weeks of therapy since cleared blood cultures with at least 2 weeks of iv therapy  - s/p evacuation of facial hematoma- no cultures sent  - TTE negative for endocarditis, awaiting for SAHARA  - Follow quantiferon  - abdominal distention today; Agree with paracentesis : please send bacterial cultures and fungal cultures in addition to cell count  - Will benefit from vaccinations prior to transplant if latter not anticipated imminently or within 2 weeks- otherwise, higher benefit to delay:  Havrix, Shingrix 2 doses 6 months apart; Tdap (once in adulthood), COVID-19, Prevnar 20, HPV Gardisil 9 3 doses at 0-1-6 months  - Of note, non-immune to MM of MR nor to varicella. If she were to not be transplanted or delayed for > 1 month, would benefit from MMR+Varivax. However, if plan for or anticipated transplantation within a month, then live vaccination would be contraindicated    Thank you for involving ID in this consult,  We will continue following peripherally  Please call or page with additional questions  Pager: [phone]  Teams: from 8 am to 5 pm  Samanta Soto MD

## 2023-05-23 NOTE — PROGRESS NOTE ADULT - SUBJECTIVE AND OBJECTIVE BOX
Chief Complaint:  Patient is a 38y old  Female who presents with a chief complaint of Liver Failure (22 May 2023 13:32)      Interval Events: Reports feeling well. Denies any N/V/D/C, abd pain, melena or hematochezia.      Hospital Medications:  ceFAZolin   IVPB 2000 milliGRAM(s) IV Intermittent every 8 hours  ciprofloxacin     Tablet 500 milliGRAM(s) Oral daily  folic acid 1 milliGRAM(s) Oral daily  furosemide    Tablet 80 milliGRAM(s) Oral daily  guaiFENesin Oral Liquid (Sugar-Free) 100 milliGRAM(s) Oral every 6 hours PRN  heparin   Injectable 5000 Unit(s) SubCutaneous every 8 hours  lactulose Syrup 10 Gram(s) Oral two times a day  levothyroxine 100 MICROGram(s) Oral daily  multivitamin 1 Tablet(s) Oral daily  ondansetron Injectable 4 milliGRAM(s) IV Push every 6 hours PRN  pantoprazole    Tablet 40 milliGRAM(s) Oral before breakfast  phytonadione  IVPB 10 milliGRAM(s) IV Intermittent daily  piperacillin/tazobactam IVPB.. 3.375 Gram(s) IV Intermittent every 8 hours  spironolactone 100 milliGRAM(s) Oral daily  thiamine 100 milliGRAM(s) Oral daily      PMHX/PSHX:  Hypothyroidism    Alcoholic liver disease    No significant past surgical history            ROS: 14 point ROS negative unless otherwise stated in subjective      PHYSICAL EXAM:     GENERAL:  Well developed, +chronically ill appearing  HEENT:  NC/AT,  +sclera icteric; +R forehead hematoma without active bleeding  CHEST:  Full & symmetric excursion, no increased effort w/ respirations  HEART:  Regular rhythm & rate  ABDOMEN:  Soft, +diffusely-tender- most notably in LLQ, +moderately distended; +BS, +prior paracentesis site at RLQ appears C/D but with healing ecchymosis at site  EXTREMITIES:  no LE  edema, +sarcopenia  SKIN:  +jaundice  NEURO:  Alert, orientedx3, no asterixis    Vital Signs:  Vital Signs Last 24 Hrs  T(C): 36.8 (23 May 2023 04:50), Max: 36.8 (23 May 2023 04:50)  T(F): 98.2 (23 May 2023 04:50), Max: 98.2 (23 May 2023 04:50)  HR: 97 (23 May 2023 04:50) (90 - 104)  BP: 108/67 (23 May 2023 04:50) (95/59 - 110/68)  BP(mean): --  RR: 17 (23 May 2023 04:50) (17 - 18)  SpO2: 95% (23 May 2023 04:50) (94% - 95%)    Parameters below as of 23 May 2023 04:50  Patient On (Oxygen Delivery Method): room air      Daily     Daily     LABS:                        8.1    .02 )-----------( 168      ( 22 May 2023 07:09 )             23.5     05-22    131<L>  |  93<L>  |  <4<L>  ----------------------------<  105<H>  3.0<L>   |  21<L>  |  0.62    Ca    8.5      22 May 2023 07:09  Phos  1.5     05-22  Mg     1.5     -    TPro  5.2<L>  /  Alb  2.9<L>  /  TBili  21.5<H>  /  DBili  x   /  AST  110<H>  /  ALT  <5<L>  /  AlkPhos  175<H>  05-22    LIVER FUNCTIONS - ( 22 May 2023 07:09 )  Alb: 2.9 g/dL / Pro: 5.2 g/dL / ALK PHOS: 175 U/L / ALT: <5 U/L / AST: 110 U/L / GGT: x           PT/INR - ( 22 May 2023 07:09 )   PT: 28.8 sec;   INR: 2.46 ratio         PTT - ( 22 May 2023 07:09 )  PTT:57.4 sec  Urinalysis Basic - ( 22 May 2023 21:17 )    Color: Dark Yellow / Appearance: Slightly Turbid / S.025 / pH: x  Gluc: x / Ketone: Negative  / Bili: Large >10 / Urobili: Negative   Blood: x / Protein: 30 mg/dL / Nitrite: Negative   Leuk Esterase: Negative / RBC: 8 /hpf / WBC 2 /HPF   Sq Epi: x / Non Sq Epi: x / Bacteria: Negative          Imaging: No new abdominal imaging              Chief Complaint:  Patient is a 38y old  Female who presents with a chief complaint of Liver Failure (22 May 2023 13:32)      Interval Events:   Pending IR para today.  Reports abd pain 2/2 distension today. No further bleeding from R forehead hematoma or leakage from prior LLQ para site.    Hospital Medications:  ceFAZolin   IVPB 2000 milliGRAM(s) IV Intermittent every 8 hours  ciprofloxacin     Tablet 500 milliGRAM(s) Oral daily  folic acid 1 milliGRAM(s) Oral daily  furosemide    Tablet 80 milliGRAM(s) Oral daily  guaiFENesin Oral Liquid (Sugar-Free) 100 milliGRAM(s) Oral every 6 hours PRN  heparin   Injectable 5000 Unit(s) SubCutaneous every 8 hours  lactulose Syrup 10 Gram(s) Oral two times a day  levothyroxine 100 MICROGram(s) Oral daily  multivitamin 1 Tablet(s) Oral daily  ondansetron Injectable 4 milliGRAM(s) IV Push every 6 hours PRN  pantoprazole    Tablet 40 milliGRAM(s) Oral before breakfast  phytonadione  IVPB 10 milliGRAM(s) IV Intermittent daily  piperacillin/tazobactam IVPB.. 3.375 Gram(s) IV Intermittent every 8 hours  spironolactone 100 milliGRAM(s) Oral daily  thiamine 100 milliGRAM(s) Oral daily      PMHX/PSHX:  Hypothyroidism    Alcoholic liver disease    No significant past surgical history            ROS: 14 point ROS negative unless otherwise stated in subjective      PHYSICAL EXAM:     GENERAL:  Well developed, +chronically ill appearing  HEENT:  NC/AT,  +sclera icteric; +R forehead hematoma without active bleeding  CHEST:  Full & symmetric excursion, no increased effort w/ respirations  HEART:  Regular rhythm & rate  ABDOMEN:  Soft, +diffusely-tender- most notably in LLQ, +moderately distended; +BS, +prior paracentesis site at RLQ appears C/D but with healing ecchymosis at site  EXTREMITIES:  no LE edema, +sarcopenia  SKIN:  +jaundice  NEURO:  Alert, orientedx3, no asterixis    Vital Signs:  Vital Signs Last 24 Hrs  T(C): 36.8 (23 May 2023 04:50), Max: 36.8 (23 May 2023 04:50)  T(F): 98.2 (23 May 2023 04:50), Max: 98.2 (23 May 2023 04:50)  HR: 97 (23 May 2023 04:50) (90 - 104)  BP: 108/67 (23 May 2023 04:50) (95/59 - 110/68)  BP(mean): --  RR: 17 (23 May 2023 04:50) (17 - 18)  SpO2: 95% (23 May 2023 04:50) (94% - 95%)    Parameters below as of 23 May 2023 04:50  Patient On (Oxygen Delivery Method): room air      Daily     Daily     LABS:                        8.1    .02 )-----------( 168      ( 22 May 2023 07:09 )             23.5     05-22    131<L>  |  93<L>  |  <4<L>  ----------------------------<  105<H>  3.0<L>   |  21<L>  |  0.62    Ca    8.5      22 May 2023 07:09  Phos  1.5     05-  Mg     1.5     -    TPro  5.2<L>  /  Alb  2.9<L>  /  TBili  21.5<H>  /  DBili  x   /  AST  110<H>  /  ALT  <5<L>  /  AlkPhos  175<H>      LIVER FUNCTIONS - ( 22 May 2023 07:09 )  Alb: 2.9 g/dL / Pro: 5.2 g/dL / ALK PHOS: 175 U/L / ALT: <5 U/L / AST: 110 U/L / GGT: x           PT/INR - ( 22 May 2023 07:09 )   PT: 28.8 sec;   INR: 2.46 ratio         PTT - ( 22 May 2023 07:09 )  PTT:57.4 sec  Urinalysis Basic - ( 22 May 2023 21:17 )    Color: Dark Yellow / Appearance: Slightly Turbid / S.025 / pH: x  Gluc: x / Ketone: Negative  / Bili: Large >10 / Urobili: Negative   Blood: x / Protein: 30 mg/dL / Nitrite: Negative   Leuk Esterase: Negative / RBC: 8 /hpf / WBC 2 /HPF   Sq Epi: x / Non Sq Epi: x / Bacteria: Negative          Imaging: No new abdominal imaging

## 2023-05-23 NOTE — PROGRESS NOTE ADULT - TIME BILLING
face-to-face encounter, review of extensive medical records in this and prior charts, laboratory findings, radiographic and microbiology results; documentation as noted above and discussion of diagnostic impressions and plan with the patient and team
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
## 2023-05-24 LAB
ALBUMIN SERPL ELPH-MCNC: 2.9 G/DL — LOW (ref 3.3–5)
ALP SERPL-CCNC: 148 U/L — HIGH (ref 40–120)
ALT FLD-CCNC: 5 U/L — LOW (ref 10–45)
ANION GAP SERPL CALC-SCNC: 17 MMOL/L — SIGNIFICANT CHANGE UP (ref 5–17)
APTT BLD: 53.3 SEC — HIGH (ref 27.5–35.5)
AST SERPL-CCNC: 117 U/L — HIGH (ref 10–40)
BILIRUB SERPL-MCNC: 26.4 MG/DL — HIGH (ref 0.2–1.2)
BUN SERPL-MCNC: <4 MG/DL — LOW (ref 7–23)
CALCIUM SERPL-MCNC: 8.6 MG/DL — SIGNIFICANT CHANGE UP (ref 8.4–10.5)
CHLORIDE SERPL-SCNC: 92 MMOL/L — LOW (ref 96–108)
CO2 SERPL-SCNC: 22 MMOL/L — SIGNIFICANT CHANGE UP (ref 22–31)
CREAT SERPL-MCNC: 0.58 MG/DL — SIGNIFICANT CHANGE UP (ref 0.5–1.3)
EGFR: 118 ML/MIN/1.73M2 — SIGNIFICANT CHANGE UP
GLUCOSE SERPL-MCNC: 87 MG/DL — SIGNIFICANT CHANGE UP (ref 70–99)
GRAM STN FLD: SIGNIFICANT CHANGE UP
HCG SERPL-ACNC: <2 MIU/ML — SIGNIFICANT CHANGE UP
HCG SERPL-ACNC: <2 MIU/ML — SIGNIFICANT CHANGE UP
HCG UR QL: NEGATIVE — SIGNIFICANT CHANGE UP
HCT VFR BLD CALC: 23.8 % — LOW (ref 34.5–45)
HGB BLD-MCNC: 7.8 G/DL — LOW (ref 11.5–15.5)
INR BLD: 2.39 RATIO — HIGH (ref 0.88–1.16)
MCHC RBC-ENTMCNC: 32.8 GM/DL — SIGNIFICANT CHANGE UP (ref 32–36)
MCHC RBC-ENTMCNC: 36.8 PG — HIGH (ref 27–34)
MCV RBC AUTO: 112.3 FL — HIGH (ref 80–100)
NRBC # BLD: 0 /100 WBCS — SIGNIFICANT CHANGE UP (ref 0–0)
PLATELET # BLD AUTO: 147 K/UL — LOW (ref 150–400)
POTASSIUM SERPL-MCNC: 3.4 MMOL/L — LOW (ref 3.5–5.3)
POTASSIUM SERPL-SCNC: 3.4 MMOL/L — LOW (ref 3.5–5.3)
PROT C AG PPP-MCNC: 21 % — LOW (ref 80–184)
PROT SERPL-MCNC: 5.3 G/DL — LOW (ref 6–8.3)
PROTHROM AB SERPL-ACNC: 27.7 SEC — HIGH (ref 10.5–13.4)
RBC # BLD: 2.12 M/UL — LOW (ref 3.8–5.2)
RBC # FLD: 22.1 % — HIGH (ref 10.3–14.5)
SODIUM SERPL-SCNC: 131 MMOL/L — LOW (ref 135–145)
SPECIMEN SOURCE: SIGNIFICANT CHANGE UP
WBC # BLD: 26.89 K/UL — HIGH (ref 3.8–10.5)
WBC # FLD AUTO: 26.89 K/UL — HIGH (ref 3.8–10.5)

## 2023-05-24 PROCEDURE — 99233 SBSQ HOSP IP/OBS HIGH 50: CPT | Mod: GC

## 2023-05-24 PROCEDURE — 99233 SBSQ HOSP IP/OBS HIGH 50: CPT

## 2023-05-24 PROCEDURE — 74183 MRI ABD W/O CNTR FLWD CNTR: CPT | Mod: 26

## 2023-05-24 PROCEDURE — 93010 ELECTROCARDIOGRAM REPORT: CPT

## 2023-05-24 PROCEDURE — 88141 CYTOPATH C/V INTERPRET: CPT

## 2023-05-24 PROCEDURE — 99252 IP/OBS CONSLTJ NEW/EST SF 35: CPT

## 2023-05-24 RX ORDER — ALBUMIN HUMAN 25 %
50 VIAL (ML) INTRAVENOUS EVERY 6 HOURS
Refills: 0 | Status: COMPLETED | OUTPATIENT
Start: 2023-05-24 | End: 2023-05-25

## 2023-05-24 RX ORDER — ACETAMINOPHEN 500 MG
650 TABLET ORAL ONCE
Refills: 0 | Status: COMPLETED | OUTPATIENT
Start: 2023-05-24 | End: 2023-05-24

## 2023-05-24 RX ADMIN — Medication 100 MICROGRAM(S): at 05:20

## 2023-05-24 RX ADMIN — Medication 1 TABLET(S): at 11:42

## 2023-05-24 RX ADMIN — Medication 100 MILLIGRAM(S): at 23:10

## 2023-05-24 RX ADMIN — Medication 100 MILLIGRAM(S): at 08:47

## 2023-05-24 RX ADMIN — Medication 50 MILLILITER(S): at 17:18

## 2023-05-24 RX ADMIN — Medication 100 MILLIGRAM(S): at 07:46

## 2023-05-24 RX ADMIN — Medication 100 MILLIGRAM(S): at 18:35

## 2023-05-24 RX ADMIN — Medication 650 MILLIGRAM(S): at 17:18

## 2023-05-24 RX ADMIN — Medication 1 MILLIGRAM(S): at 11:42

## 2023-05-24 RX ADMIN — Medication 100 MILLIGRAM(S): at 11:42

## 2023-05-24 RX ADMIN — HEPARIN SODIUM 5000 UNIT(S): 5000 INJECTION INTRAVENOUS; SUBCUTANEOUS at 13:51

## 2023-05-24 RX ADMIN — Medication 50 MILLILITER(S): at 23:10

## 2023-05-24 RX ADMIN — HEPARIN SODIUM 5000 UNIT(S): 5000 INJECTION INTRAVENOUS; SUBCUTANEOUS at 21:18

## 2023-05-24 RX ADMIN — Medication 80 MILLIGRAM(S): at 08:47

## 2023-05-24 RX ADMIN — Medication 500 MILLIGRAM(S): at 11:42

## 2023-05-24 RX ADMIN — SPIRONOLACTONE 200 MILLIGRAM(S): 25 TABLET, FILM COATED ORAL at 08:47

## 2023-05-24 RX ADMIN — PANTOPRAZOLE SODIUM 40 MILLIGRAM(S): 20 TABLET, DELAYED RELEASE ORAL at 05:20

## 2023-05-24 RX ADMIN — LACTULOSE 10 GRAM(S): 10 SOLUTION ORAL at 17:20

## 2023-05-24 RX ADMIN — HEPARIN SODIUM 5000 UNIT(S): 5000 INJECTION INTRAVENOUS; SUBCUTANEOUS at 05:19

## 2023-05-24 RX ADMIN — Medication 100 MILLIGRAM(S): at 02:25

## 2023-05-24 RX ADMIN — Medication 100 MILLIGRAM(S): at 16:04

## 2023-05-24 NOTE — PROGRESS NOTE ADULT - SUBJECTIVE AND OBJECTIVE BOX
Patient is a 38y old  Female who presents with a chief complaint of Liver Failure (23 May 2023 13:12)      SUBJECTIVE / OVERNIGHT EVENTS:    feels better. tired of the tests. no fevers, chills, abdominal pain.  s/p paracentesis  1.7L removed    ROS:  14 point ROS negative in detail except stated as above    MEDICATIONS  (STANDING):  ceFAZolin   IVPB 2000 milliGRAM(s) IV Intermittent every 8 hours  ciprofloxacin     Tablet 500 milliGRAM(s) Oral daily  folic acid 1 milliGRAM(s) Oral daily  furosemide    Tablet 80 milliGRAM(s) Oral daily  heparin   Injectable 5000 Unit(s) SubCutaneous every 8 hours  lactulose Syrup 10 Gram(s) Oral two times a day  levothyroxine 100 MICROGram(s) Oral daily  multivitamin 1 Tablet(s) Oral daily  pantoprazole    Tablet 40 milliGRAM(s) Oral before breakfast  spironolactone 200 milliGRAM(s) Oral daily  thiamine 100 milliGRAM(s) Oral daily    MEDICATIONS  (PRN):  guaiFENesin Oral Liquid (Sugar-Free) 100 milliGRAM(s) Oral every 6 hours PRN Cough  ondansetron Injectable 4 milliGRAM(s) IV Push every 6 hours PRN Nausea and/or Vomiting      CAPILLARY BLOOD GLUCOSE        I&O's Summary    23 May 2023 07:01  -  24 May 2023 07:00  --------------------------------------------------------  IN: 180 mL / OUT: 0 mL / NET: 180 mL        PHYSICAL EXAM:  Vital Signs Last 24 Hrs  T(C): 37.1 (24 May 2023 04:06), Max: 37.4 (23 May 2023 20:25)  T(F): 98.7 (24 May 2023 04:06), Max: 99.3 (23 May 2023 20:25)  HR: 106 (24 May 2023 07:53) (105 - 106)  BP: 100/57 (24 May 2023 08:11) (92/51 - 104/70)  BP(mean): --  RR: 18 (24 May 2023 07:53) (18 - 22)  SpO2: 93% (24 May 2023 07:53) (92% - 95%)    Parameters below as of 24 May 2023 07:53  Patient On (Oxygen Delivery Method): room air    CONSTITUTIONAL: NAD, jaundiced, scleral icterus, lesion on temple w/ dressing in place  RESPIRATORY: Normal respiratory effort; lungs are clear to auscultation bilaterally  CARDIOVASCULAR: Regular rate and rhythm, normal S1 and S2, no murmur/rub/gallop; b/l LE edema; Peripheral pulses are 2+ bilaterally  ABDOMEN: distended, Nontender to palpation, normoactive bowel sounds, no rebound/guarding; No hepatosplenomegaly  MUSCLOSKELETAL: no clubbing or cyanosis of digits; no joint swelling or tenderness to palpation  PSYCH: A+O to person, place, and time; affect appropriate      LABS:                        7.8    26.89 )-----------( 147      ( 24 May 2023 07:16 )             23.8     05-24    131<L>  |  92<L>  |  <4<L>  ----------------------------<  87  3.4<L>   |  22  |  0.58    Ca    8.6      24 May 2023 07:15  Phos  1.6     05-23  Mg     1.6     05-23    TPro  5.3<L>  /  Alb  2.9<L>  /  TBili  26.4<H>  /  DBili  x   /  AST  117<H>  /  ALT  5<L>  /  AlkPhos  148<H>  05-24    PT/INR - ( 24 May 2023 07:16 )   PT: 27.7 sec;   INR: 2.39 ratio         PTT - ( 24 May 2023 07:16 )  PTT:53.3 sec      Urinalysis Basic - ( 22 May 2023 21:17 )    Color: Dark Yellow / Appearance: Slightly Turbid / S.025 / pH: x  Gluc: x / Ketone: Negative  / Bili: Large >10 / Urobili: Negative   Blood: x / Protein: 30 mg/dL / Nitrite: Negative   Leuk Esterase: Negative / RBC: 8 /hpf / WBC 2 /HPF   Sq Epi: x / Non Sq Epi: x / Bacteria: Negative        RADIOLOGY & ADDITIONAL TESTS:    Imaging Personally Reviewed:    Consultant(s) Notes Reviewed:      Care Discussed with Consultants/Other Providers:  constantine Sidhu

## 2023-05-24 NOTE — PROGRESS NOTE ADULT - SUBJECTIVE AND OBJECTIVE BOX
Afebrile, WBC up to 26 today  s/p paracentesis of 1.6 L , cell count not consistent with SBP  Had 6 BM , formed  reporting pain, still somewhat abdominal distention  cough, non productive  no dysuria no congestion  ____________________________________________________  ROS  GENERAL: denies chills, , night sweats, weight loss.   PSYCH: denies depression, anxiety, suicidal ideation, hallucination, and delusions  SKIN: no rash or lesions; no color changes, no abnormal nevi,no  dryness, and nojaundice    EYES: denies visual changes, floaters, pain, inflammation, blurred vision, and discharge  ENT: denies tinnitus, vertigo, epistaxis, oral lesion, and decreased acuity  PULM: denies, hemoptysis, pleurisy  CVS: denies angina, palpitations,+ orthopnea, no syncope, or heart murmur  GI: denies constipation, diarrhea, melena, abdominal pain, nausea.   : denies dysuria, frequency, discharge, incontinence, stones or macroscopic hematuria  MS: no arthralgias, no erythema or swelling, no myalgias, noedema, or lower back pain.   CNS: denies numbness, dizziness, seizure, or tremor  ENDO: denies heat/cold intolerance, polyuria, polydipsia, malaise.    HEME: denies bruising, bleeding, lymphadenopathy, anemia, and calf pain    Allergies  No Known Allergies    __________________________________________________  MEDS:  MEDICATIONS  (STANDING):  furosemide    Tablet 80 daily  guaiFENesin Oral Liquid (Sugar-Free) 100 every 6 hours PRN  heparin   Injectable 5000 every 8 hours  lactulose Syrup 10 two times a day  levothyroxine 100 daily  ondansetron Injectable 4 every 6 hours PRN  pantoprazole    Tablet 40 before breakfast  spironolactone 200 daily    _________________________________________________  ANTIMICOBIALS  ceFAZolin   IVPB 2000 every 8 hours  ciprofloxacin     Tablet 500 daily      GENERAL LABS              7.8                  x    | x    | x            26.89 >-----------< 147     ------------------------< x                     23.8                 x    | x    | x                                            Ca x     Mg x     Ph x          Urinalysis Basic - ( 22 May 2023 21:17 )    Color: Dark Yellow / Appearance: Slightly Turbid / S.025 / pH: x  Gluc: x / Ketone: Negative  / Bili: Large >10 / Urobili: Negative   Blood: x / Protein: 30 mg/dL / Nitrite: Negative   Leuk Esterase: Negative / RBC: 8 /hpf / WBC 2 /HPF   Sq Epi: x / Non Sq Epi: x / Bacteria: Negative        _________________________________________________  MICROBIOLOGY  -----------    Culture - Fungal, Body Fluid (collected 23 May 2023 17:21)  Source: Peritoneal Peritoneal Fluid  Preliminary Report (24 May 2023 07:18):    Testing in progress    Culture - Body Fluid with Gram Stain (collected 23 May 2023 17:21)  Source: Peritoneal Peritoneal Fluid  Gram Stain (24 May 2023 04:28):    polymorphonuclear leukocytes seen    No organisms seen    by cytocentrifuge    Culture - Urine (collected 22 May 2023 21:17)  Source: Clean Catch Clean Catch (Midstream)  Preliminary Report (24 May 2023 00:22):    50,000 - 99,000 CFU/mL Gram positive organisms                Legionella Antigen, Urine: Negative (23 @ 23:20)      Fungitell:   _______________________________________________  PERTINENT IMAGING  _________________________________________________  Physical Exam:   T(C): 36.4 (23 @ 12:11), Max: 37.4 (23 @ 20:25)  HR: 99 (23 @ 12:11) (99 - 106)  BP: 148/52 (23 @ 12:11) (92/51 - 148/52)  RR: 18 (23 @ 12:11) (18 - 22)  SpO2: 95% (23 @ 12:11) (92% - 95%)    23 @ 07:01  -  23 @ 07:00  --------------------------------------------------------  IN: 180 mL / OUT: 0 mL / NET: 180 mL      · Constitutional	well-groomed; no distress  · Eyes	PERRL; EOMI; conjunctiva clear- icteric  Front; nodular hematoma with central scab, non painfull, minimal erythema  · ENMT	no gross abnormalities  · Respiratory	clear to auscultation bilaterally; no wheezes; no rales; no rhonchi; no respiratory distress  · Cardiovascular	regular rate and rhythm; S1 S2 present; no gallops; no rub; no murmur; no JVD; normal PMI; no pedal edema  · Cardiovascular Comments	no S3/s4  · Gastrointestinal	abdomen distended, non tender, fluid shift  · Genitourinary Female	normal external genitalia  · Neurological	cranial nerves II-XII intact; sensation intact; responds to pain; responds to verbal commands; deep reflexes intact; no meningismus  · Mental Status	Alert and oriented x3, appropriate  · Skin	warm and dry; color normal; no rashes; no ulcers; no subcutaneous nodules; no induration  · Lymphatic	No lymphadedenopathy  · Musculoskeletal	normal; ROM intact; strength 5/5 bilateral upper extremities; strength 5/5 bilateral lower extremities; no joint swelling; no joint erythema; no joint warmth; no calf tenderness; no chest wall tenderness

## 2023-05-24 NOTE — PROGRESS NOTE ADULT - SUBJECTIVE AND OBJECTIVE BOX
Chief Complaint:  Patient is a 38y old  Female who presents with a chief complaint of Liver Failure (24 May 2023 11:55)      Interval Events:   EGD today cancelled as pt ate crackers/cookies/coffee despite NPO after MN order.  Had para () with 1.7 L removed, neg for SBP.    Hospital Medications:  ceFAZolin   IVPB 2000 milliGRAM(s) IV Intermittent every 8 hours  ciprofloxacin     Tablet 500 milliGRAM(s) Oral daily  folic acid 1 milliGRAM(s) Oral daily  furosemide    Tablet 80 milliGRAM(s) Oral daily  guaiFENesin Oral Liquid (Sugar-Free) 100 milliGRAM(s) Oral every 6 hours PRN  heparin   Injectable 5000 Unit(s) SubCutaneous every 8 hours  lactulose Syrup 10 Gram(s) Oral two times a day  levothyroxine 100 MICROGram(s) Oral daily  multivitamin 1 Tablet(s) Oral daily  ondansetron Injectable 4 milliGRAM(s) IV Push every 6 hours PRN  pantoprazole    Tablet 40 milliGRAM(s) Oral before breakfast  spironolactone 200 milliGRAM(s) Oral daily  thiamine 100 milliGRAM(s) Oral daily      PMHX/PSHX:  Hypothyroidism    Alcoholic liver disease    No significant past surgical history            ROS: 14 point ROS negative unless otherwise stated in subjective      PHYSICAL EXAM:     GENERAL:  Well developed, +chronically ill appearing  HEENT:  NC/AT,  +sclera icteric; +R forehead hematoma without active bleeding  CHEST:  Full & symmetric excursion, no increased effort w/ respirations  HEART:  Regular rhythm & rate  ABDOMEN:  Soft, +diffusely-tender, +moderately distended; +BS  EXTREMITIES:  no LE edema, +sarcopenia  SKIN:  +jaundice  NEURO:  Alert, orientedx3, no asterixis    Vital Signs:  Vital Signs Last 24 Hrs  T(C): 36.4 (24 May 2023 12:11), Max: 37.4 (23 May 2023 20:25)  T(F): 97.5 (24 May 2023 12:11), Max: 99.3 (23 May 2023 20:25)  HR: 99 (24 May 2023 12:11) (99 - 106)  BP: 148/52 (24 May 2023 12:11) (92/51 - 148/52)  BP(mean): --  RR: 18 (24 May 2023 12:11) (18 - 22)  SpO2: 95% (24 May 2023 12:11) (92% - 95%)    Parameters below as of 24 May 2023 12:11  Patient On (Oxygen Delivery Method): room air      Daily     Daily     LABS:                        7.8    26.89 )-----------( 147      ( 24 May 2023 07:16 )             23.8     05-24    131<L>  |  92<L>  |  <4<L>  ----------------------------<  87  3.4<L>   |  22  |  0.58    Ca    8.6      24 May 2023 07:15  Phos  1.6     05-23  Mg     1.6     05-23    TPro  5.3<L>  /  Alb  2.9<L>  /  TBili  26.4<H>  /  DBili  x   /  AST  117<H>  /  ALT  5<L>  /  AlkPhos  148<H>  05-24    LIVER FUNCTIONS - ( 24 May 2023 07:15 )  Alb: 2.9 g/dL / Pro: 5.3 g/dL / ALK PHOS: 148 U/L / ALT: 5 U/L / AST: 117 U/L / GGT: x           PT/INR - ( 24 May 2023 07:16 )   PT: 27.7 sec;   INR: 2.39 ratio         PTT - ( 24 May 2023 07:16 )  PTT:53.3 sec  Urinalysis Basic - ( 22 May 2023 21:17 )    Color: Dark Yellow / Appearance: Slightly Turbid / S.025 / pH: x  Gluc: x / Ketone: Negative  / Bili: Large >10 / Urobili: Negative   Blood: x / Protein: 30 mg/dL / Nitrite: Negative   Leuk Esterase: Negative / RBC: 8 /hpf / WBC 2 /HPF   Sq Epi: x / Non Sq Epi: x / Bacteria: Negative          Imaging: Pending MRCP             Chief Complaint:  Patient is a 38y old  Female who presents with a chief complaint of Liver Failure (24 May 2023 11:55)      Interval Events:   EGD today cancelled as pt ate crackers/cookies/coffee despite NPO after MN order.  Had para () with 1.7 L removed, neg for SBP.    Hospital Medications:  ceFAZolin   IVPB 2000 milliGRAM(s) IV Intermittent every 8 hours  ciprofloxacin     Tablet 500 milliGRAM(s) Oral daily  folic acid 1 milliGRAM(s) Oral daily  furosemide    Tablet 80 milliGRAM(s) Oral daily  guaiFENesin Oral Liquid (Sugar-Free) 100 milliGRAM(s) Oral every 6 hours PRN  heparin   Injectable 5000 Unit(s) SubCutaneous every 8 hours  lactulose Syrup 10 Gram(s) Oral two times a day  levothyroxine 100 MICROGram(s) Oral daily  multivitamin 1 Tablet(s) Oral daily  ondansetron Injectable 4 milliGRAM(s) IV Push every 6 hours PRN  pantoprazole    Tablet 40 milliGRAM(s) Oral before breakfast  spironolactone 200 milliGRAM(s) Oral daily  thiamine 100 milliGRAM(s) Oral daily      PMHX/PSHX:  Hypothyroidism    Alcoholic liver disease    No significant past surgical history    ROS: 14 point ROS negative unless otherwise stated in subjective      PHYSICAL EXAM:     GENERAL:  Well developed, +chronically ill appearing  HEENT:  NC/AT,  +sclera icteric; +R forehead hematoma without active bleeding  CHEST:  Full & symmetric excursion, no increased effort w/ respirations  HEART:  Regular rhythm & rate  ABDOMEN:  Soft, +diffusely but mildly tender, +mildly distended; +BS, no rebound tenderness or guarding, +dependent flank edema  EXTREMITIES:  no LE edema, +sarcopenia  SKIN:  +jaundice  NEURO:  Alert, orientedx3, no asterixis    Vital Signs:  Vital Signs Last 24 Hrs  T(C): 36.4 (24 May 2023 12:11), Max: 37.4 (23 May 2023 20:25)  T(F): 97.5 (24 May 2023 12:11), Max: 99.3 (23 May 2023 20:25)  HR: 99 (24 May 2023 12:11) (99 - 106)  BP: 148/52 (24 May 2023 12:11) (92/51 - 148/52)  BP(mean): --  RR: 18 (24 May 2023 12:11) (18 - 22)  SpO2: 95% (24 May 2023 12:11) (92% - 95%)    Parameters below as of 24 May 2023 12:11  Patient On (Oxygen Delivery Method): room air      Daily     Daily     LABS:                        7.8    26.89 )-----------( 147      ( 24 May 2023 07:16 )             23.8     05-24    131<L>  |  92<L>  |  <4<L>  ----------------------------<  87  3.4<L>   |  22  |  0.58    Ca    8.6      24 May 2023 07:15  Phos  1.6     05-23  Mg     1.6     05-    TPro  5.3<L>  /  Alb  2.9<L>  /  TBili  26.4<H>  /  DBili  x   /  AST  117<H>  /  ALT  5<L>  /  AlkPhos  148<H>  05-24    LIVER FUNCTIONS - ( 24 May 2023 07:15 )  Alb: 2.9 g/dL / Pro: 5.3 g/dL / ALK PHOS: 148 U/L / ALT: 5 U/L / AST: 117 U/L / GGT: x           PT/INR - ( 24 May 2023 07:16 )   PT: 27.7 sec;   INR: 2.39 ratio         PTT - ( 24 May 2023 07:16 )  PTT:53.3 sec  Urinalysis Basic - ( 22 May 2023 21:17 )    Color: Dark Yellow / Appearance: Slightly Turbid / S.025 / pH: x  Gluc: x / Ketone: Negative  / Bili: Large >10 / Urobili: Negative   Blood: x / Protein: 30 mg/dL / Nitrite: Negative   Leuk Esterase: Negative / RBC: 8 /hpf / WBC 2 /HPF   Sq Epi: x / Non Sq Epi: x / Bacteria: Negative          Imaging: Pending MRCP

## 2023-05-24 NOTE — PROGRESS NOTE ADULT - ASSESSMENT
Antimicrobials  Cefazolin 2 g q8h -  cipro 500  mg daily ppx    Prior:    - Zosyn -  - Cipro 5/18 x1  - Azithromycin 5/16 x1,  - Ctx 5/16 x1    Microbiology:   BC NGTD   ascitic fluid bacterial, fungal cx NGTD  ,  BC NGTD   ascitic fluid cx neg   2/ BC pos MSSA    Imagin/19 US abdomen: small to mod ascites  Ct chest iv con: Multifocal bilateral groundglass opacities, suspicious for pneumonia.  Small bilateral pleural effusions, left greater the right, increased compared to prior exam.no PE  Ct maxillofacial , Head and Cervical spine     CT FACIAL BONES  Bilateral mastoid air cells and middle ear regions well-aerated. Paranasal sinus mucosal thickening with near complete opacification of the right sphenoid sinus. Bilateral maxillary sinus septations. No air-fluid levels. S-shaped deviation of the nasal septum with hypertrophy of bilateral inferior greater than middle nasal turbinates. Narrowing ofbilateral ostiomeatal complexes. Periapical lucencies bilateral maxillarypremolars.    CT C/A/P   * Small focal consolidation in the posterior left upper lobe. Givenhistory of trauma, small pulmonary contusion cannot be excluded.Correlate for pneumonia.  * Hepatomegaly and diffuse upper extremity ptosis with signs of portalhypertension. Correlate for steatohepatitis. Moderate ascites.    Assessment and plan    1. 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  2. MSSA bacteremia  BC  2/; repeat BC  neg. TTE no vegetations   CT face, chest/abdomen and pelvis post trauma noting sinus opacification, bilateral apical maxillary premolar lucencies. CT chest with small consolidation  Sources: traumatic /SSTI in s/o facial hematoma vs pneumonia vs less likely ENT. No evidence of metastatic infection. ? hardware  Cefazolin -    3. Leucocytosis  Peaked at 20à stable 17-18, likely combination if bacteremia and alcoholic hepatitis    4. Bilateral GGO on CT chest -  More likely pulmonary edema    5. Ecoli bacteruria . No symptoms of UTI, UA blunt, regardless, susceptible to cefazolin  6. Facial trauma with hematoma s/p evacuation 5/20- no purulence  7. Pre-transplant screening  HIV ag/Ab neg; HCV Ab neg, HBV s Ag neg, HBV s Ab pos, HBV c Ab neg, HEV IgG neg, EBV IgG pos, CMV IgG pos, HSV1/2 IgG pos, VZV IgG NEG; Measles/Mumps IgG neg, Rubella igG pos ([place] measles), Syphilis screen neg, Strongyliodes IgG neg. Quantiferon and toxoplasma igGpending.  8. Need for vaccinations.  Only received Td . Positive titers for HBV non-quantitative    Recommendations:  - Continue cefazolin 2g q8h for MSSA bacteremia.  - Anticipate total of 4 weeks of therapy since cleared blood cultures with at least 2 weeks of iv therapy  - s/p evacuation of facial hematoma- no cultures sent  - TTE negative for endocarditis, awaiting for SAHARA  - Repeat quantiferon- as it was indeterminate Low suspicion for TB.   - follow peritoneal cultures- cell count not consistent with SBP.  -  no signs of infection but leukemoid reaction that could be reactive.   - if white blood cell count continues rising, check surveillance blood cultures and UA with urine culture, chest xray  - Will benefit from vaccinations prior to transplant if latter not anticipated imminently or within 2 weeks- otherwise, higher benefit to delay:  Havrix, Shingrix 2 doses 6 months apart; Tdap (once in adulthood), COVID-19, Prevnar 20, HPV Gardisil 9 3 doses at 0-1-6 months  - Of note, non-immune to MM of MR nor to varicella. If she were to not be transplanted or delayed for > 1 month, would benefit from MMR+Varivax. However, if plan for or anticipated transplantation within a month, then live vaccination would be contraindicated    Thank you for involving ID in this consult,  We will continue following peripherally  Please call or page with additional questions  Pager: [phone]  Teams: from 8 am to 5 pm  Samanta Soto MD

## 2023-05-24 NOTE — PROGRESS NOTE ADULT - ASSESSMENT
This is a 39 yo [place] Female with Hx of hypothyroidism (on Levothyroxine), and no other known medical Hx, presented to [facility] ED on 5/13/23 with ~ 2 weeks Hx of progressively worsening abdominal pain ("stabbing, diffuse, non radiating") and distention,  as well as 1 day Hx of jaundice, and was admitted to medicine with liver failure (possibly acute on chronic), with MELD-Na 29 on admission, hyperbilirubinemia (Se bi 18.9, direct 15.9), coagulopathy (INR 2.15), mild liver enzyme elevation in AST>>ALT pattern (, ALT 14, ), severe hypoalbuminemia (alb 1.3), as well as leukocytosis (WBC 16k), anemia (Hb 8.0). She was empirically started on NAC per protocol, hepatology evaluated. ID evaluated for MAY Pna and later MSSA bacteremia, has been on IV ceftriaxone and azithromycin. Ascitic fluid analysis (Dx paracentesis 5/13), was portal hypertensive (SAAG 1.1, total protein 1.0), and was not suggestive of SBP

## 2023-05-24 NOTE — PROGRESS NOTE ADULT - ATTENDING COMMENTS
39 yo F with hypothyroidism, AUD (with last reported drink 3 weeks prior to presentation), [facility] due to acute on chronic liver failure (ACLF) secondary to severe alcohol-associated hepatitis (AH) versus sepsis, with infectious work-up notable for: MAY pneumonia, MSSA bacteremia (with 3/4 bottles positive on blood cultures from 5/13) with repeat blood cultures negative (most recently with blood cultures from 5/23 x2 with NGTD), urine culture (5/13) with >100k E. coli, repeat urine culture (5/23) with 50-99k E. faecium, and low level CMV viremia. No SBP on paracenteses (5/13, 5/17, and 5/23). S/p NAC (completed 5/16) and IV vitamin K (5/21-23), but not eligible for steroids for her AH given recent bacteremia and concern for ongoing infection risks. S/p Zosyn (5/18-19) and currently on cefazolin (5/16- ) plus prophylactic ciprofloxacin. Her antimicrobial coverage was discussed with transplant ID Dr. Soto today and will continue unchanged for now, but if leukocytosis continues to increase or she develops other signs of active infection then will need dedicated CT chest and likely broadened coverage, especially as MRI/MRCP today (5/24) shows patchy opacities at the lung bases that could represent pneumonia, edema, or atelectasis. Recent imaging with small ascites and small bilateral pleural effusions, but continuing furosemide 80 mg po daily and spironolactone 200 mg po daily for anasarca and to decrease reaccumulation of her ascites. No PVT or HCC on MRI (5/24). Planned for EGD and SAHARA tomorrow. ABO A with current MELD-Na 31. Liver transplant evaluation in progress and anticipate discussing her candidacy at our multidisciplinary liver transplant selection meeting on Friday (5/26). We will transfer her to our primary service on [facility] once bed available.    Faby Montes M.D., Ph.D.  Transplant Hepatology

## 2023-05-24 NOTE — CONSULT NOTE ADULT - SUBJECTIVE AND OBJECTIVE BOX
JAEL ESTES  38y  Female [id number]    HPI: 38 year old  LMP 4 months ago initially presented to the ED from urgent care with worsening abdominal pain. In the ED, patient was found to have liver failure believed to be 2/2 alcoholism. Of note patient has a long standing history of drinking a pint of alcohol every day for multiple years. Patient denies any other complaints. Denies lightheadedness, dizziness, chest pain, shortness of breath, nausea, or vomiting. Patient denies having a GYN and cannot recall the last time she had a pap smear-states it was multiple years ago. GYN consulted for Pap smear prior to liver transplant    Name of GYN Physician: MEGAN    POB:  x2    Pgyn: +remove history of ovarian cysts, Denies fibroids, endometriosis, STI's, Abnormal pap smears   PMHx: Denies  Surgeries: [facility] ovarian cystectomy  All: NKDA  Social: History of drinking a pint of alcohol per day    Vital Signs Last 24 Hrs  T(C): 36.4 (24 May 2023 12:11), Max: 37.4 (23 May 2023 20:25)  T(F): 97.5 (24 May 2023 12:11), Max: 99.3 (23 May 2023 20:25)  HR: 99 (24 May 2023 12:11) (99 - 106)  BP: 148/52 (24 May 2023 12:11) (92/51 - 148/52)  BP(mean): --  RR: 18 (24 May 2023 12:11) (18 - 22)  SpO2: 95% (24 May 2023 12:11) (92% - 95%)    Parameters below as of 24 May 2023 12:11  Patient On (Oxygen Delivery Method): room air        Physical Exam:   General: sitting comfortably in bed, NAD   CV: RR S1S2 no m/r/g  Lungs: CTA b/l, good air flow b/l   Abd: Soft, non-tender, non-distended.  Bowel sounds present.    :  No bleeding on pad.    External labia wnl.  Bimanual exam deferred   Speculum Exam: No active bleeding from os.  Physiologic discharge.    Ext: non-tender b/l, no edema     LABS:      Pregnancy Profile, Urine: Negative (23 @ 22:17)                          7.8    26.89 )-----------( 147      ( 24 May 2023 07:16 )             23.8     05-    131<L>  |  92<L>  |  <4<L>  ----------------------------<  87  3.4<L>   |  22  |  0.58    Ca    8.6      24 May 2023 07:15  Phos  1.6       Mg     1.6         TPro  5.3<L>  /  Alb  2.9<L>  /  TBili  26.4<H>  /  DBili  x   /  AST  117<H>  /  ALT  5<L>  /  AlkPhos  148<H>      I&O's Detail    23 May 2023 07:01  -  24 May 2023 07:00  --------------------------------------------------------  IN:    Oral Fluid: 180 mL  Total IN: 180 mL    OUT:  Total OUT: 0 mL    Total NET: 180 mL        PT/INR - ( 24 May 2023 07:16 )   PT: 27.7 sec;   INR: 2.39 ratio         PTT - ( 24 May 2023 07:16 )  PTT:53.3 sec  Urinalysis Basic - ( 22 May 2023 21:17 )    Color: Dark Yellow / Appearance: Slightly Turbid / S.025 / pH: x  Gluc: x / Ketone: Negative  / Bili: Large >10 / Urobili: Negative   Blood: x / Protein: 30 mg/dL / Nitrite: Negative   Leuk Esterase: Negative / RBC: 8 /hpf / WBC 2 /HPF   Sq Epi: x / Non Sq Epi: x / Bacteria: Negative        RADIOLOGY & ADDITIONAL STUDIES:

## 2023-05-24 NOTE — CONSULT NOTE ADULT - ASSESSMENT
38 year old  LMP 4 months ago admitted to the hospital with acute liver failure 2/2 alcoholism. Since delivering her two children patient has no followed with a OBGYN. Patient endorses having pap smear in the past-denies them ever being abnormal. GYN consulted for pap smear prior to liver transplant    -HPV/Pap Smear collected (2022) and sent to pathology      to be discussed w Dr Nicolas Knight PGY2
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
## 2023-05-25 LAB
-  AMPICILLIN: SIGNIFICANT CHANGE UP
-  CIPROFLOXACIN: SIGNIFICANT CHANGE UP
-  DAPTOMYCIN: SIGNIFICANT CHANGE UP
-  LEVOFLOXACIN: SIGNIFICANT CHANGE UP
-  LINEZOLID: SIGNIFICANT CHANGE UP
-  NITROFURANTOIN: SIGNIFICANT CHANGE UP
-  TETRACYCLINE: SIGNIFICANT CHANGE UP
-  VANCOMYCIN: SIGNIFICANT CHANGE UP
ANION GAP SERPL CALC-SCNC: 16 MMOL/L — SIGNIFICANT CHANGE UP (ref 5–17)
ANION GAP SERPL CALC-SCNC: 18 MMOL/L — HIGH (ref 5–17)
APPEARANCE UR: ABNORMAL
APTT BLD: 61.2 SEC — HIGH (ref 27.5–35.5)
BILIRUB UR-MCNC: ABNORMAL
BUN SERPL-MCNC: 5 MG/DL — LOW (ref 7–23)
BUN SERPL-MCNC: 6 MG/DL — LOW (ref 7–23)
CALCIUM SERPL-MCNC: 8.6 MG/DL — SIGNIFICANT CHANGE UP (ref 8.4–10.5)
CALCIUM SERPL-MCNC: 8.7 MG/DL — SIGNIFICANT CHANGE UP (ref 8.4–10.5)
CHLORIDE SERPL-SCNC: 88 MMOL/L — LOW (ref 96–108)
CHLORIDE SERPL-SCNC: 91 MMOL/L — LOW (ref 96–108)
CO2 SERPL-SCNC: 23 MMOL/L — SIGNIFICANT CHANGE UP (ref 22–31)
CO2 SERPL-SCNC: 26 MMOL/L — SIGNIFICANT CHANGE UP (ref 22–31)
COLOR SPEC: ABNORMAL
CREAT SERPL-MCNC: 0.61 MG/DL — SIGNIFICANT CHANGE UP (ref 0.5–1.3)
CREAT SERPL-MCNC: 0.66 MG/DL — SIGNIFICANT CHANGE UP (ref 0.5–1.3)
CULTURE RESULTS: SIGNIFICANT CHANGE UP
DIFF PNL FLD: ABNORMAL
EGFR: 114 ML/MIN/1.73M2 — SIGNIFICANT CHANGE UP
EGFR: 117 ML/MIN/1.73M2 — SIGNIFICANT CHANGE UP
GLUCOSE SERPL-MCNC: 89 MG/DL — SIGNIFICANT CHANGE UP (ref 70–99)
GLUCOSE SERPL-MCNC: 91 MG/DL — SIGNIFICANT CHANGE UP (ref 70–99)
GLUCOSE UR QL: NEGATIVE — SIGNIFICANT CHANGE UP
HCT VFR BLD CALC: 24.3 % — LOW (ref 34.5–45)
HGB BLD-MCNC: 8.2 G/DL — LOW (ref 11.5–15.5)
HPV HIGH+LOW RISK DNA PNL CVX: DETECTED
INR BLD: 2.4 RATIO — HIGH (ref 0.88–1.16)
KETONES UR-MCNC: SIGNIFICANT CHANGE UP
LEUKOCYTE ESTERASE UR-ACNC: NEGATIVE — SIGNIFICANT CHANGE UP
MCHC RBC-ENTMCNC: 33.7 GM/DL — SIGNIFICANT CHANGE UP (ref 32–36)
MCHC RBC-ENTMCNC: 37.8 PG — HIGH (ref 27–34)
MCV RBC AUTO: 112 FL — HIGH (ref 80–100)
METHOD TYPE: SIGNIFICANT CHANGE UP
NITRITE UR-MCNC: NEGATIVE — SIGNIFICANT CHANGE UP
NRBC # BLD: 0 /100 WBCS — SIGNIFICANT CHANGE UP (ref 0–0)
ORGANISM # SPEC MICROSCOPIC CNT: SIGNIFICANT CHANGE UP
ORGANISM # SPEC MICROSCOPIC CNT: SIGNIFICANT CHANGE UP
PH UR: 6 — SIGNIFICANT CHANGE UP (ref 5–8)
PLATELET # BLD AUTO: 135 K/UL — LOW (ref 150–400)
POTASSIUM SERPL-MCNC: 2.6 MMOL/L — CRITICAL LOW (ref 3.5–5.3)
POTASSIUM SERPL-MCNC: 3.4 MMOL/L — LOW (ref 3.5–5.3)
POTASSIUM SERPL-SCNC: 2.6 MMOL/L — CRITICAL LOW (ref 3.5–5.3)
POTASSIUM SERPL-SCNC: 3.4 MMOL/L — LOW (ref 3.5–5.3)
PROT UR-MCNC: ABNORMAL
PROTHROM AB SERPL-ACNC: 28.1 SEC — HIGH (ref 10.5–13.4)
RBC # BLD: 2.17 M/UL — LOW (ref 3.8–5.2)
RBC # FLD: 22.6 % — HIGH (ref 10.3–14.5)
SODIUM SERPL-SCNC: 129 MMOL/L — LOW (ref 135–145)
SODIUM SERPL-SCNC: 133 MMOL/L — LOW (ref 135–145)
SP GR SPEC: 1.03 — HIGH (ref 1.01–1.02)
SPECIMEN SOURCE: SIGNIFICANT CHANGE UP
UROBILINOGEN FLD QL: NEGATIVE — SIGNIFICANT CHANGE UP
WBC # BLD: 24.01 K/UL — HIGH (ref 3.8–10.5)
WBC # FLD AUTO: 24.01 K/UL — HIGH (ref 3.8–10.5)

## 2023-05-25 PROCEDURE — 43235 EGD DIAGNOSTIC BRUSH WASH: CPT | Mod: GC

## 2023-05-25 PROCEDURE — 93312 ECHO TRANSESOPHAGEAL: CPT | Mod: 26

## 2023-05-25 PROCEDURE — 93320 DOPPLER ECHO COMPLETE: CPT | Mod: 26

## 2023-05-25 PROCEDURE — 71250 CT THORAX DX C-: CPT | Mod: 26

## 2023-05-25 PROCEDURE — 93325 DOPPLER ECHO COLOR FLOW MAPG: CPT | Mod: 26

## 2023-05-25 PROCEDURE — 99233 SBSQ HOSP IP/OBS HIGH 50: CPT

## 2023-05-25 RX ORDER — POTASSIUM CHLORIDE 20 MEQ
10 PACKET (EA) ORAL
Refills: 0 | Status: DISCONTINUED | OUTPATIENT
Start: 2023-05-25 | End: 2023-05-25

## 2023-05-25 RX ORDER — POTASSIUM CHLORIDE 20 MEQ
40 PACKET (EA) ORAL ONCE
Refills: 0 | Status: COMPLETED | OUTPATIENT
Start: 2023-05-25 | End: 2023-05-25

## 2023-05-25 RX ORDER — FUROSEMIDE 40 MG
40 TABLET ORAL DAILY
Refills: 0 | Status: DISCONTINUED | OUTPATIENT
Start: 2023-05-26 | End: 2023-05-26

## 2023-05-25 RX ORDER — ACETAMINOPHEN 500 MG
650 TABLET ORAL ONCE
Refills: 0 | Status: COMPLETED | OUTPATIENT
Start: 2023-05-25 | End: 2023-05-25

## 2023-05-25 RX ADMIN — Medication 100 MILLIEQUIVALENT(S): at 11:54

## 2023-05-25 RX ADMIN — Medication 50 MILLILITER(S): at 17:01

## 2023-05-25 RX ADMIN — HEPARIN SODIUM 5000 UNIT(S): 5000 INJECTION INTRAVENOUS; SUBCUTANEOUS at 23:13

## 2023-05-25 RX ADMIN — HEPARIN SODIUM 5000 UNIT(S): 5000 INJECTION INTRAVENOUS; SUBCUTANEOUS at 05:16

## 2023-05-25 RX ADMIN — PANTOPRAZOLE SODIUM 40 MILLIGRAM(S): 20 TABLET, DELAYED RELEASE ORAL at 05:16

## 2023-05-25 RX ADMIN — Medication 80 MILLIGRAM(S): at 06:03

## 2023-05-25 RX ADMIN — Medication 650 MILLIGRAM(S): at 21:05

## 2023-05-25 RX ADMIN — Medication 40 MILLIEQUIVALENT(S): at 10:16

## 2023-05-25 RX ADMIN — Medication 500 MILLIGRAM(S): at 11:06

## 2023-05-25 RX ADMIN — Medication 100 MILLIGRAM(S): at 03:24

## 2023-05-25 RX ADMIN — Medication 100 MILLIGRAM(S): at 11:06

## 2023-05-25 RX ADMIN — HEPARIN SODIUM 5000 UNIT(S): 5000 INJECTION INTRAVENOUS; SUBCUTANEOUS at 17:02

## 2023-05-25 RX ADMIN — Medication 1 TABLET(S): at 11:06

## 2023-05-25 RX ADMIN — Medication 50 MILLILITER(S): at 05:17

## 2023-05-25 RX ADMIN — Medication 100 MICROGRAM(S): at 05:17

## 2023-05-25 RX ADMIN — ONDANSETRON 4 MILLIGRAM(S): 8 TABLET, FILM COATED ORAL at 22:29

## 2023-05-25 RX ADMIN — Medication 650 MILLIGRAM(S): at 20:19

## 2023-05-25 RX ADMIN — Medication 1 MILLIGRAM(S): at 11:07

## 2023-05-25 RX ADMIN — Medication 50 MILLILITER(S): at 11:15

## 2023-05-25 RX ADMIN — Medication 40 MILLIEQUIVALENT(S): at 09:41

## 2023-05-25 RX ADMIN — Medication 100 MILLIGRAM(S): at 19:24

## 2023-05-25 RX ADMIN — Medication 100 MILLIGRAM(S): at 17:01

## 2023-05-25 RX ADMIN — Medication 100 MILLIGRAM(S): at 08:40

## 2023-05-25 RX ADMIN — Medication 100 MILLIGRAM(S): at 09:15

## 2023-05-25 RX ADMIN — Medication 100 MILLIGRAM(S): at 23:13

## 2023-05-25 RX ADMIN — SPIRONOLACTONE 200 MILLIGRAM(S): 25 TABLET, FILM COATED ORAL at 06:04

## 2023-05-25 NOTE — PROGRESS NOTE ADULT - ASSESSMENT
Antimicrobials  Cefazolin 2 g q8h -  cipro 500  mg daily ppx    Prior:    - Zosyn -  - Cipro 5/18 x1  - Azithromycin 5/16 x1,  - Ctx 5/16 x1    Microbiology:   BC NGTD   ascitic fluid bacterial, fungal cx NGTD  ,  BC NGTD   ascitic fluid cx neg   2/ BC pos MSSA    Imagin/19 US abdomen: small to mod ascites  Ct chest iv con: Multifocal bilateral groundglass opacities, suspicious for pneumonia.  Small bilateral pleural effusions, left greater the right, increased compared to prior exam.no PE  Ct maxillofacial , Head and Cervical spine     CT FACIAL BONES  Bilateral mastoid air cells and middle ear regions well-aerated. Paranasal sinus mucosal thickening with near complete opacification of the right sphenoid sinus. Bilateral maxillary sinus septations. No air-fluid levels. S-shaped deviation of the nasal septum with hypertrophy of bilateral inferior greater than middle nasal turbinates. Narrowing ofbilateral ostiomeatal complexes. Periapical lucencies bilateral maxillarypremolars.    CT C/A/P   * Small focal consolidation in the posterior left upper lobe. Givenhistory of trauma, small pulmonary contusion cannot be excluded.Correlate for pneumonia.  * Hepatomegaly and diffuse upper extremity ptosis with signs of portalhypertension. Correlate for steatohepatitis. Moderate ascites.    Assessment and plan    1. 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  2. MSSA bacteremia  BC  2/; repeat BC  neg. TTE no vegetations   CT face, chest/abdomen and pelvis post trauma noting sinus opacification, bilateral apical maxillary premolar lucencies. CT chest with small consolidation  Sources: traumatic /SSTI in s/o facial hematoma vs pneumonia vs less likely ENT. No evidence of metastatic infection. ? hardware  Cefazolin -    3. Leucocytosis  Initially: peaked at 20à stable 17-18, likely combination if bacteremia and alcoholic hepatitis  Rising over the last few days, only symptoms is occasional cough    4. Bilateral GGO on CT chest -  Pulmonary edema more likely  vs pneumonia    5. Ecoli bacteruria ;VRE enterococcus bacteruria : No symptoms of UTI, UA blunt.  6. Facial trauma with hematoma s/p evacuation - no purulence  7. Pre-transplant screening  HIV ag/Ab neg; HCV Ab neg, HBV s Ag neg, HBV s Ab pos, HBV c Ab neg, HEV IgG neg, EBV IgG pos, CMV IgG pos, HSV1/2 IgG pos, VZV IgG NEG; Measles/Mumps IgG neg, Rubella igG pos ([place] measles), Syphilis screen neg, Strongyliodes IgG neg. Quantiferon and toxoplasma igG pending.  8. Need for vaccinations.  Only received Td . Positive titers for HBV non-quantitative    Recommendations:  - Continue cefazolin 2g q8h for MSSA bacteremia.  - Anticipate total of 4 weeks of therapy since cleared blood cultures with at least 2 weeks of iv therapy  - EGD today followed by SAHARA  -Follow repeat quantiferon from    - follow peritoneal cultures- cell count not consistent with SBP.  -WBC remains elevated at 24: pelase  check surveillance blood cultures repeat UA with urine culture  - would also repeat Ct chest.   - if clinically deteriorate, begin zosyn 3.375 g q8h in addition to cefazolin  - Will benefit from vaccinations prior to transplant if latter not anticipated imminently or within 2 weeks- otherwise, higher benefit to delay:  Havrix, Shingrix 2 doses 6 months apart; Tdap (once in adulthood), COVID-19, Prevnar 20, HPV Gardisil 9 3 doses at 0-1-6 months  - Of note, non-immune to MM of MR nor to varicella. If she were to not be transplanted or delayed for > 1 month, would benefit from MMR+Varivax. However, if plan for or anticipated transplantation within a month, then live vaccination would be contraindicated    Thank you for involving ID in this consult,  We will continue following peripherally  Please call or page with additional questions  Pager: [phone]  Teams: from 8 am to 5 pm  Samanta Soto MD   Antimicrobials  Cefazolin 2 g q8h -  cipro 500  mg daily ppx    Prior:    - Zosyn -  - Cipro 5/18 x1  - Azithromycin 5/16 x1,  - Ctx 5/16 x1    Microbiology:   BC NGTD   ascitic fluid bacterial, fungal cx NGTD  ,  BC NGTD   ascitic fluid cx neg   2/ BC pos MSSA    Imagin/19 US abdomen: small to mod ascites  Ct chest iv con: Multifocal bilateral groundglass opacities, suspicious for pneumonia.  Small bilateral pleural effusions, left greater the right, increased compared to prior exam.no PE  Ct maxillofacial , Head and Cervical spine     CT FACIAL BONES  Bilateral mastoid air cells and middle ear regions well-aerated. Paranasal sinus mucosal thickening with near complete opacification of the right sphenoid sinus. Bilateral maxillary sinus septations. No air-fluid levels. S-shaped deviation of the nasal septum with hypertrophy of bilateral inferior greater than middle nasal turbinates. Narrowing ofbilateral ostiomeatal complexes. Periapical lucencies bilateral maxillarypremolars.    CT C/A/P   * Small focal consolidation in the posterior left upper lobe. Givenhistory of trauma, small pulmonary contusion cannot be excluded.Correlate for pneumonia.  * Hepatomegaly and diffuse upper extremity ptosis with signs of portalhypertension. Correlate for steatohepatitis. Moderate ascites.    Assessment and plan    1. 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  2. MSSA bacteremia  BC  2/; repeat BC  neg. TTE no vegetations   CT face, chest/abdomen and pelvis post trauma noting sinus opacification, bilateral apical maxillary premolar lucencies. CT chest with small consolidation  Sources: traumatic /SSTI in s/o facial hematoma vs pneumonia vs less likely ENT. No evidence of metastatic infection. ? hardware  Cefazolin -    3. Leucocytosis  Initially: peaked at 20à stable 17-18, likely combination if bacteremia and alcoholic hepatitis  Rising over the last few days, only symptoms is occasional cough    4. Bilateral GGO on CT chest -  Pulmonary edema more likely  vs pneumonia    5. Ecoli bacteruria ;VRE enterococcus bacteruria : No symptoms of UTI, UA blunt.  6. Facial trauma with hematoma s/p evacuation - no purulence  7. Pre-transplant screening  HIV ag/Ab neg; HCV Ab neg, HBV s Ag neg, HBV s Ab pos, HBV c Ab neg, HEV IgG neg, EBV IgG pos, CMV IgG pos, HSV1/2 IgG pos, VZV IgG NEG; Measles/Mumps IgG neg, Rubella igG pos ([place] measles), Syphilis screen neg, Strongyliodes IgG neg. Quantiferon and toxoplasma igG pending.  8. Need for vaccinations.  Only received Td . Positive titers for HBV non-quantitative    Recommendations:  - Continue cefazolin 2g q8h for MSSA bacteremia.  - Anticipate total of 4 weeks of therapy since cleared blood cultures with at least 2 weeks of iv therapy  - EGD today followed by SAHARA  -Follow repeat quantiferon from    - follow peritoneal cultures- cell count not consistent with SBP.  -WBC remains elevated at 24: pelase  check surveillance blood cultures repeat UA with urine culture  - would also repeat Ct chest.   - if clinically deteriorate, begin zosyn 3.375 g q8h in addition to cefazolin  - Will benefit from vaccinations prior to transplant if latter not anticipated imminently or within 2 weeks- otherwise, higher benefit to delay:  Havrix, Shingrix 2 doses 6 months apart; Tdap (once in adulthood), COVID-19, Prevnar 20, HPV Gardisil 9 3 doses at 0-1-6 months  - Of note, non-immune to Measlesor mumps,  nor to varicella. immune to [place]ian measles (Rubella) If she were to not be transplanted or delayed for > 1 month, would benefit from MMR+Varivax. However, if plan for or anticipated transplantation within a month, then live vaccination would be contraindicated.    Thank you for involving ID in this consult,  We will continue following peripherally  Please call or page with additional questions  Pager: [phone]  Teams: from 8 am to 5 pm  Samanta Soto MD

## 2023-05-25 NOTE — PROGRESS NOTE ADULT - SUBJECTIVE AND OBJECTIVE BOX
Afebriel, WBC 24, Endorsing occasional cough, non productive and shortness of breath as well as pain and swelling  ____________________________________________________  ROS  GENERAL: denies chills, , night sweats, weight loss.   PSYCH: denies depression, anxiety, suicidal ideation, hallucination, and delusions  SKIN: no rash or lesions; no color changes, no abnormal nevi,no  dryness, and nojaundice    EYES: denies visual changes, floaters, pain, inflammation, blurred vision, and discharge  ENT: denies tinnitus, vertigo, epistaxis, oral lesion, and decreased acuity  PULM: denies, hemoptysis, pleurisy  CVS: denies angina, palpitations,+ orthopnea, no syncope, or heart murmur  GI: denies constipation, diarrhea, melena, abdominal pain, nausea.   : denies dysuria, frequency, discharge, incontinence, stones or macroscopic hematuria  MS: no arthralgias, no erythema or swelling, no myalgias, noedema, or lower back pain.   CNS: denies numbness, dizziness, seizure, or tremor  ENDO: denies heat/cold intolerance, polyuria, polydipsia, malaise.    HEME: denies bruising, bleeding, lymphadenopathy, anemia, and calf pain    Allergies  No Known Allergies    __________________________________________________  MEDS:  MEDICATIONS  (STANDING):  guaiFENesin Oral Liquid (Sugar-Free) 100 every 6 hours PRN  heparin   Injectable 5000 every 8 hours  lactulose Syrup 10 two times a day  levothyroxine 100 daily  ondansetron Injectable 4 every 6 hours PRN  pantoprazole    Tablet 40 before breakfast  spironolactone 200 daily    _________________________________________________  ANTIMICOBIALS  ceFAZolin   IVPB 2000 every 8 hours  ciprofloxacin     Tablet 500 daily      GENERAL LABS              x                    133  | 26   | 6            x     >-----------< x       ------------------------< 89                    x                    3.4  | 91   | 0.66                                         Ca 8.6   Mg x     Ph x          Urinalysis Basic - ( 25 May 2023 07:30 )    Color: Dark Yellow / Appearance: Turbid / S.028 / pH: x  Gluc: x / Ketone: Trace  / Bili: Large >10 / Urobili: Negative   Blood: x / Protein: 30 mg/dL / Nitrite: Negative   Leuk Esterase: Negative / RBC: 8 /hpf / WBC 6 /HPF   Sq Epi: x / Non Sq Epi: x / Bacteria: Negative        _________________________________________________  MICROBIOLOGY  -----------    Culture - Fungal, Body Fluid (collected 23 May 2023 17:21)  Source: Peritoneal Peritoneal Fluid  Preliminary Report (24 May 2023 07:18):    Testing in progress    Culture - Body Fluid with Gram Stain (collected 23 May 2023 17:21)  Source: Peritoneal Peritoneal Fluid  Gram Stain (24 May 2023 04:28):    polymorphonuclear leukocytes seen    No organisms seen    by cytocentrifuge  Preliminary Report (24 May 2023 19:28):    No growth    Culture - Blood (collected 23 May 2023 10:41)  Source: .Blood Blood-Peripheral  Preliminary Report (24 May 2023 14:02):    No growth to date.    Culture - Blood (collected 23 May 2023 05:22)  Source: .Blood Blood  Preliminary Report (24 May 2023 14:02):    No growth to date.    Culture - Urine (collected 22 May 2023 21:17)  Source: Clean Catch Clean Catch (Midstream)  Final Report (25 May 2023 03:54):    50,000 - 99,000 CFU/mL Enterococcus faecium (vancomycin resistant)  Organism: Enterococcus faecium (vancomycin resistant) (25 May 2023 03:54)  Organism: Enterococcus faecium (vancomycin resistant) (25 May 2023 03:54)      Method Type: BAKARI      -  Ampicillin: R >8 Predicts results to ampicillin/sulbactam, amoxacillin-clavulanate and  piperacillin-tazobactam.      -  Ciprofloxacin: R >2      -  Daptomycin: SDD 4 The breakpoint for SDD (susceptible dose dependent)is based on a dosage regimen of 8-12 mg/kg administered every 24 h in adults and is intended for serious infections due to E. faecium. Consultation with an infectious diseases specialist is recommended.      -  Levofloxacin: R >4      -  Linezolid: S 2      -  Nitrofurantoin: S <=32 Should not be used to treat pyelonephritis.      -  Tetracycline: R >8      -  Vancomycin: R >16                Legionella Antigen, Urine: Negative (23 @ 23:20)      Fungitell:   _______________________________________________  PERTINENT IMAGING  _________________________________________________  Physical Exam:   T(C): 36.2 (23 @ 14:16), Max: 37.2 (23 @ 21:34)  HR: 97 (23 @ 15:40) (92 - 101)  BP: 98/56 (23 @ 15:40) (94/61 - 104/65)  RR: 19 (23 @ 15:40) (17 - 20)  SpO2: 95% (23 @ 15:40) (95% - 96%)    · Constitutional	icteric , comfortable  · Eyes	PERRL; EOMI; conjunctiva clear  · ENMT	no gross abnormalities  · Respiratory	lower lobe crackles  · Cardiovascular	regular rate and rhythm; S1 S2 present; no gallops; no rub; no murmur; no JVD; normal PMI; no pedal edema  · Cardiovascular Comments	no S3/s4  · Gastrointestinal	soft; distended   · Genitourinary Female	normal external genitalia  · Neurological	cranial nerves II-XII intact; sensation intact; responds to pain; responds to verbal commands; deep reflexes intact; no meningismus  · Mental Status	Alert and oriented x3, appropriate  · Skin	warm and dry; color normal; no rashes; no ulcers; no subcutaneous nodules; no induration  · Lymphatic	No lymphadedenopathy  · Musculoskeletal	normal; ROM intact; strength 5/5 bilateral upper extremities; strength 5/5 bilateral lower extremities; no joint swelling; no joint erythema; no joint warmth; no calf tenderness; no chest wall tenderness

## 2023-05-25 NOTE — PROGRESS NOTE ADULT - SUBJECTIVE AND OBJECTIVE BOX
Patient is a 38y old  Female who presents with a chief complaint of Liver Failure (24 May 2023 13:16)      SUBJECTIVE / OVERNIGHT EVENTS:    feels ok. the IV potassium burnt a bit so wants to take more oral. no fevers. chills, abdominal pain    ROS:  14 point ROS negative in detail except stated as above    MEDICATIONS  (STANDING):  albumin human 25% IVPB 50 milliLiter(s) IV Intermittent every 6 hours  ceFAZolin   IVPB 2000 milliGRAM(s) IV Intermittent every 8 hours  ciprofloxacin     Tablet 500 milliGRAM(s) Oral daily  folic acid 1 milliGRAM(s) Oral daily  furosemide    Tablet 80 milliGRAM(s) Oral daily  heparin   Injectable 5000 Unit(s) SubCutaneous every 8 hours  lactulose Syrup 10 Gram(s) Oral two times a day  levothyroxine 100 MICROGram(s) Oral daily  multivitamin 1 Tablet(s) Oral daily  pantoprazole    Tablet 40 milliGRAM(s) Oral before breakfast  potassium chloride  10 mEq/100 mL IVPB 10 milliEquivalent(s) IV Intermittent every 1 hour  spironolactone 200 milliGRAM(s) Oral daily  thiamine 100 milliGRAM(s) Oral daily    MEDICATIONS  (PRN):  guaiFENesin Oral Liquid (Sugar-Free) 100 milliGRAM(s) Oral every 6 hours PRN Cough  ondansetron Injectable 4 milliGRAM(s) IV Push every 6 hours PRN Nausea and/or Vomiting      CAPILLARY BLOOD GLUCOSE        I&O's Summary      PHYSICAL EXAM:  Vital Signs Last 24 Hrs  T(C): 37.2 (25 May 2023 05:57), Max: 37.2 (24 May 2023 21:34)  T(F): 99 (25 May 2023 05:57), Max: 99 (24 May 2023 21:34)  HR: 94 (25 May 2023 05:57) (94 - 101)  BP: 104/65 (25 May 2023 05:57) (94/61 - 148/52)  BP(mean): --  RR: 18 (25 May 2023 05:57) (17 - 18)  SpO2: 96% (25 May 2023 05:57) (95% - 96%)    Parameters below as of 25 May 2023 05:57  Patient On (Oxygen Delivery Method): room air      CONSTITUTIONAL: NAD, jaundiced, scleral icterus, lesion on temple w/ dressing in place  RESPIRATORY: Normal respiratory effort; lungs are clear to auscultation bilaterally  CARDIOVASCULAR: Regular rate and rhythm, normal S1 and S2, no murmur/rub/gallop; b/l LE edema; Peripheral pulses are 2+ bilaterally  ABDOMEN: distended, Nontender to palpation, normoactive bowel sounds, no rebound/guarding;  MUSCLOSKELETAL: no clubbing or cyanosis of digits; no joint swelling or tenderness to palpation  PSYCH: A+O to person, place, and time; affect appropriate    LABS:                        8.2    24.01 )-----------( 135      ( 25 May 2023 07:22 )             24.3     05    129<L>  |  88<L>  |  5<L>  ----------------------------<  91  2.6<LL>   |  23  |  0.61    Ca    8.7      25 May 2023 07:22    TPro  5.3<L>  /  Alb  2.9<L>  /  TBili  26.4<H>  /  DBili  x   /  AST  117<H>  /  ALT  5<L>  /  AlkPhos  148<H>  05-24    PT/INR - ( 25 May 2023 07:22 )   PT: 28.1 sec;   INR: 2.40 ratio         PTT - ( 25 May 2023 07:22 )  PTT:61.2 sec      Urinalysis Basic - ( 25 May 2023 07:30 )    Color: Dark Yellow / Appearance: Turbid / S.028 / pH: x  Gluc: x / Ketone: Trace  / Bili: Large >10 / Urobili: Negative   Blood: x / Protein: 30 mg/dL / Nitrite: Negative   Leuk Esterase: Negative / RBC: 8 /hpf / WBC 6 /HPF   Sq Epi: x / Non Sq Epi: x / Bacteria: Negative        RADIOLOGY & ADDITIONAL TESTS:    Imaging Personally Reviewed:    < from: MR MRCP w/wo IV Cont (23 @ 11:11) >  BONES: Within normal limits.    IMPRESSION:  No suspicious liver lesions. Marked hepatomegaly and hepatic steatosis   with additional findings suggestive of cirrhosisand resultant portal   hypertension.    < end of copied text >      Consultant(s) Notes Reviewed:      Care Discussed with Consultants/Other Providers:  constantine Sidhu

## 2023-05-25 NOTE — PROGRESS NOTE ADULT - ASSESSMENT
This is a 39 yo [place] Female with Hx of hypothyroidism (on Levothyroxine), and no other known medical Hx, presented to [facility] ED on 5/13/23 with ~ 2 weeks Hx of progressively worsening abdominal pain ("stabbing, diffuse, non radiating") and distention,  as well as 1 day Hx of jaundice, and was admitted to medicine with liver failure (possibly acute on chronic), with MELD-Na 29 on admission, hyperbilirubinemia (Se bi 18.9, direct 15.9), coagulopathy (INR 2.15), mild liver enzyme elevation in AST>>ALT pattern (, ALT 14, ), severe hypoalbuminemia (alb 1.3), as well as leukocytosis (WBC 16k), anemia (Hb 8.0). She was empirically started on NAC per protocol, hepatology evaluated. ID evaluated for MAY Pna and later MSSA bacteremia, has been on IV ceftriaxone and azithromycin. Ascitic fluid analysis (Dx paracentesis 5/13), was portal hypertensive (SAAG 1.1, total protein 1.0), and was not suggestive of SBP

## 2023-05-26 ENCOUNTER — NON-APPOINTMENT (OUTPATIENT)
Age: 38
End: 2023-05-26

## 2023-05-26 LAB
ALBUMIN SERPL ELPH-MCNC: 3.1 G/DL — LOW (ref 3.3–5)
ALP SERPL-CCNC: 141 U/L — HIGH (ref 40–120)
ALT FLD-CCNC: 5 U/L — LOW (ref 10–45)
ANION GAP SERPL CALC-SCNC: 15 MMOL/L — SIGNIFICANT CHANGE UP (ref 5–17)
APTT BLD: 59.9 SEC — HIGH (ref 27.5–35.5)
AST SERPL-CCNC: 105 U/L — HIGH (ref 10–40)
BILIRUB SERPL-MCNC: 28.2 MG/DL — HIGH (ref 0.2–1.2)
BUN SERPL-MCNC: 6 MG/DL — LOW (ref 7–23)
CALCIUM SERPL-MCNC: 8.2 MG/DL — LOW (ref 8.4–10.5)
CHLORIDE SERPL-SCNC: 94 MMOL/L — LOW (ref 96–108)
CO2 SERPL-SCNC: 23 MMOL/L — SIGNIFICANT CHANGE UP (ref 22–31)
CREAT SERPL-MCNC: 0.64 MG/DL — SIGNIFICANT CHANGE UP (ref 0.5–1.3)
EGFR: 115 ML/MIN/1.73M2 — SIGNIFICANT CHANGE UP
GLUCOSE SERPL-MCNC: 102 MG/DL — HIGH (ref 70–99)
HCT VFR BLD CALC: 22.2 % — LOW (ref 34.5–45)
HGB BLD-MCNC: 7.4 G/DL — LOW (ref 11.5–15.5)
INR BLD: 2.29 RATIO — HIGH (ref 0.88–1.16)
MCHC RBC-ENTMCNC: 33.3 GM/DL — SIGNIFICANT CHANGE UP (ref 32–36)
MCHC RBC-ENTMCNC: 36.6 PG — HIGH (ref 27–34)
MCV RBC AUTO: 109.9 FL — HIGH (ref 80–100)
NRBC # BLD: 0 /100 WBCS — SIGNIFICANT CHANGE UP (ref 0–0)
PLATELET # BLD AUTO: 148 K/UL — LOW (ref 150–400)
POTASSIUM SERPL-MCNC: 3 MMOL/L — LOW (ref 3.5–5.3)
POTASSIUM SERPL-SCNC: 3 MMOL/L — LOW (ref 3.5–5.3)
PROT SERPL-MCNC: 5.4 G/DL — LOW (ref 6–8.3)
PROTHROM AB SERPL-ACNC: 26.5 SEC — HIGH (ref 10.5–13.4)
RBC # BLD: 2.02 M/UL — LOW (ref 3.8–5.2)
RBC # FLD: 22.6 % — HIGH (ref 10.3–14.5)
SODIUM SERPL-SCNC: 132 MMOL/L — LOW (ref 135–145)
WBC # BLD: 24.07 K/UL — HIGH (ref 3.8–10.5)
WBC # FLD AUTO: 24.07 K/UL — HIGH (ref 3.8–10.5)

## 2023-05-26 PROCEDURE — 99233 SBSQ HOSP IP/OBS HIGH 50: CPT

## 2023-05-26 PROCEDURE — 99232 SBSQ HOSP IP/OBS MODERATE 35: CPT

## 2023-05-26 PROCEDURE — 99233 SBSQ HOSP IP/OBS HIGH 50: CPT | Mod: GC

## 2023-05-26 RX ORDER — PIPERACILLIN AND TAZOBACTAM 4; .5 G/20ML; G/20ML
3.38 INJECTION, POWDER, LYOPHILIZED, FOR SOLUTION INTRAVENOUS ONCE
Refills: 0 | Status: COMPLETED | OUTPATIENT
Start: 2023-05-27 | End: 2023-05-27

## 2023-05-26 RX ORDER — SPIRONOLACTONE 25 MG/1
100 TABLET, FILM COATED ORAL ONCE
Refills: 0 | Status: COMPLETED | OUTPATIENT
Start: 2023-05-26 | End: 2023-05-26

## 2023-05-26 RX ORDER — PIPERACILLIN AND TAZOBACTAM 4; .5 G/20ML; G/20ML
3.38 INJECTION, POWDER, LYOPHILIZED, FOR SOLUTION INTRAVENOUS EVERY 8 HOURS
Refills: 0 | Status: DISCONTINUED | OUTPATIENT
Start: 2023-05-27 | End: 2023-06-02

## 2023-05-26 RX ORDER — POTASSIUM CHLORIDE 20 MEQ
40 PACKET (EA) ORAL EVERY 4 HOURS
Refills: 0 | Status: COMPLETED | OUTPATIENT
Start: 2023-05-26 | End: 2023-05-27

## 2023-05-26 RX ORDER — MIDODRINE HYDROCHLORIDE 2.5 MG/1
10 TABLET ORAL EVERY 8 HOURS
Refills: 0 | Status: DISCONTINUED | OUTPATIENT
Start: 2023-05-26 | End: 2023-06-08

## 2023-05-26 RX ORDER — SPIRONOLACTONE 25 MG/1
300 TABLET, FILM COATED ORAL DAILY
Refills: 0 | Status: DISCONTINUED | OUTPATIENT
Start: 2023-05-27 | End: 2023-05-28

## 2023-05-26 RX ORDER — ACETAMINOPHEN 500 MG
650 TABLET ORAL ONCE
Refills: 0 | Status: COMPLETED | OUTPATIENT
Start: 2023-05-26 | End: 2023-05-26

## 2023-05-26 RX ORDER — FUROSEMIDE 40 MG
40 TABLET ORAL ONCE
Refills: 0 | Status: COMPLETED | OUTPATIENT
Start: 2023-05-26 | End: 2023-05-26

## 2023-05-26 RX ORDER — PIPERACILLIN AND TAZOBACTAM 4; .5 G/20ML; G/20ML
3.38 INJECTION, POWDER, LYOPHILIZED, FOR SOLUTION INTRAVENOUS ONCE
Refills: 0 | Status: COMPLETED | OUTPATIENT
Start: 2023-05-26 | End: 2023-05-26

## 2023-05-26 RX ORDER — OXYCODONE HYDROCHLORIDE 5 MG/1
2.5 TABLET ORAL EVERY 6 HOURS
Refills: 0 | Status: DISCONTINUED | OUTPATIENT
Start: 2023-05-26 | End: 2023-06-02

## 2023-05-26 RX ORDER — FUROSEMIDE 40 MG
40 TABLET ORAL DAILY
Refills: 0 | Status: DISCONTINUED | OUTPATIENT
Start: 2023-05-27 | End: 2023-05-28

## 2023-05-26 RX ADMIN — PIPERACILLIN AND TAZOBACTAM 200 GRAM(S): 4; .5 INJECTION, POWDER, LYOPHILIZED, FOR SOLUTION INTRAVENOUS at 14:50

## 2023-05-26 RX ADMIN — Medication 100 MILLIGRAM(S): at 16:15

## 2023-05-26 RX ADMIN — OXYCODONE HYDROCHLORIDE 2.5 MILLIGRAM(S): 5 TABLET ORAL at 11:48

## 2023-05-26 RX ADMIN — OXYCODONE HYDROCHLORIDE 2.5 MILLIGRAM(S): 5 TABLET ORAL at 17:12

## 2023-05-26 RX ADMIN — Medication 1 MILLIGRAM(S): at 11:53

## 2023-05-26 RX ADMIN — Medication 100 MILLIGRAM(S): at 23:24

## 2023-05-26 RX ADMIN — Medication 100 MICROGRAM(S): at 06:03

## 2023-05-26 RX ADMIN — PIPERACILLIN AND TAZOBACTAM 25 GRAM(S): 4; .5 INJECTION, POWDER, LYOPHILIZED, FOR SOLUTION INTRAVENOUS at 17:12

## 2023-05-26 RX ADMIN — LACTULOSE 10 GRAM(S): 10 SOLUTION ORAL at 06:04

## 2023-05-26 RX ADMIN — OXYCODONE HYDROCHLORIDE 2.5 MILLIGRAM(S): 5 TABLET ORAL at 23:33

## 2023-05-26 RX ADMIN — Medication 100 MILLIGRAM(S): at 11:52

## 2023-05-26 RX ADMIN — Medication 500 MILLIGRAM(S): at 11:52

## 2023-05-26 RX ADMIN — SPIRONOLACTONE 200 MILLIGRAM(S): 25 TABLET, FILM COATED ORAL at 06:03

## 2023-05-26 RX ADMIN — Medication 100 MILLIGRAM(S): at 23:33

## 2023-05-26 RX ADMIN — Medication 40 MILLIEQUIVALENT(S): at 19:07

## 2023-05-26 RX ADMIN — MIDODRINE HYDROCHLORIDE 10 MILLIGRAM(S): 2.5 TABLET ORAL at 21:14

## 2023-05-26 RX ADMIN — Medication 40 MILLIGRAM(S): at 11:52

## 2023-05-26 RX ADMIN — HEPARIN SODIUM 5000 UNIT(S): 5000 INJECTION INTRAVENOUS; SUBCUTANEOUS at 14:23

## 2023-05-26 RX ADMIN — Medication 40 MILLIEQUIVALENT(S): at 23:24

## 2023-05-26 RX ADMIN — Medication 40 MILLIGRAM(S): at 06:04

## 2023-05-26 RX ADMIN — OXYCODONE HYDROCHLORIDE 2.5 MILLIGRAM(S): 5 TABLET ORAL at 18:21

## 2023-05-26 RX ADMIN — HEPARIN SODIUM 5000 UNIT(S): 5000 INJECTION INTRAVENOUS; SUBCUTANEOUS at 06:05

## 2023-05-26 RX ADMIN — PANTOPRAZOLE SODIUM 40 MILLIGRAM(S): 20 TABLET, DELAYED RELEASE ORAL at 06:03

## 2023-05-26 RX ADMIN — Medication 100 MILLIGRAM(S): at 08:45

## 2023-05-26 RX ADMIN — Medication 650 MILLIGRAM(S): at 07:15

## 2023-05-26 RX ADMIN — OXYCODONE HYDROCHLORIDE 2.5 MILLIGRAM(S): 5 TABLET ORAL at 10:12

## 2023-05-26 RX ADMIN — SPIRONOLACTONE 100 MILLIGRAM(S): 25 TABLET, FILM COATED ORAL at 16:15

## 2023-05-26 RX ADMIN — Medication 1 TABLET(S): at 11:52

## 2023-05-26 RX ADMIN — Medication 650 MILLIGRAM(S): at 06:52

## 2023-05-26 RX ADMIN — Medication 100 MILLIGRAM(S): at 03:35

## 2023-05-26 RX ADMIN — HEPARIN SODIUM 5000 UNIT(S): 5000 INJECTION INTRAVENOUS; SUBCUTANEOUS at 21:15

## 2023-05-26 RX ADMIN — Medication 100 MILLIGRAM(S): at 16:59

## 2023-05-26 NOTE — PROGRESS NOTE ADULT - SUBJECTIVE AND OBJECTIVE BOX
Patient is a 38y old  Female who presents with a chief complaint of Liver Failure (25 May 2023 15:33)      SUBJECTIVE / OVERNIGHT EVENTS:    feels ok. wants to know if she will get the liver.     ROS:  14 point ROS negative in detail except stated as above    MEDICATIONS  (STANDING):  ceFAZolin   IVPB 2000 milliGRAM(s) IV Intermittent every 8 hours  ciprofloxacin     Tablet 500 milliGRAM(s) Oral daily  folic acid 1 milliGRAM(s) Oral daily  furosemide    Tablet 40 milliGRAM(s) Oral once  heparin   Injectable 5000 Unit(s) SubCutaneous every 8 hours  lactulose Syrup 10 Gram(s) Oral two times a day  levothyroxine 100 MICROGram(s) Oral daily  multivitamin 1 Tablet(s) Oral daily  pantoprazole    Tablet 40 milliGRAM(s) Oral before breakfast  spironolactone 200 milliGRAM(s) Oral daily  thiamine 100 milliGRAM(s) Oral daily    MEDICATIONS  (PRN):  guaiFENesin Oral Liquid (Sugar-Free) 100 milliGRAM(s) Oral every 6 hours PRN Cough  ondansetron Injectable 4 milliGRAM(s) IV Push every 6 hours PRN Nausea and/or Vomiting  oxyCODONE    IR 2.5 milliGRAM(s) Oral every 6 hours PRN Moderate Pain (4 - 6)      CAPILLARY BLOOD GLUCOSE        I&O's Summary    25 May 2023 07:01  -  26 May 2023 07:00  --------------------------------------------------------  IN: 650 mL / OUT: 0 mL / NET: 650 mL        PHYSICAL EXAM:  Vital Signs Last 24 Hrs  T(C): 37.1 (26 May 2023 11:01), Max: 37.2 (25 May 2023 20:19)  T(F): 98.8 (26 May 2023 11:01), Max: 99 (26 May 2023 04:37)  HR: 98 (26 May 2023 11:01) (92 - 98)  BP: 96/56 (26 May 2023 11:01) (96/55 - 102/67)  BP(mean): --  RR: 18 (26 May 2023 11:01) (18 - 20)  SpO2: 92% (26 May 2023 11:01) (92% - 96%)    Parameters below as of 26 May 2023 11:01  Patient On (Oxygen Delivery Method): room air    CONSTITUTIONAL: NAD, jaundiced, scleral icterus, lesion on temple w/ dressing in place  RESPIRATORY: Normal respiratory effort; lungs are clear to auscultation bilaterally  CARDIOVASCULAR: Regular rate and rhythm, normal S1 and S2, no murmur/rub/gallop; b/l LE edema; Peripheral pulses are 2+ bilaterally  ABDOMEN: distended, Nontender to palpation, normoactive bowel sounds, no rebound/guarding;  MUSCLOSKELETAL: no clubbing or cyanosis of digits; no joint swelling or tenderness to palpation  PSYCH: A+O to person, place, and time; affect appropriate      LABS:                        7.4    24.07 )-----------( 148      ( 26 May 2023 07:16 )             22.2     05-26    132<L>  |  94<L>  |  6<L>  ----------------------------<  102<H>  3.0<L>   |  23  |  0.64    Ca    8.2<L>      26 May 2023 07:35    TPro  5.4<L>  /  Alb  3.1<L>  /  TBili  28.2<H>  /  DBili  x   /  AST  105<H>  /  ALT  5<L>  /  AlkPhos  141<H>  05-    PT/INR - ( 26 May 2023 07:16 )   PT: 26.5 sec;   INR: 2.29 ratio         PTT - ( 26 May 2023 07:16 )  PTT:59.9 sec      Urinalysis Basic - ( 25 May 2023 07:30 )    Color: Dark Yellow / Appearance: Turbid / S.028 / pH: x  Gluc: x / Ketone: Trace  / Bili: Large >10 / Urobili: Negative   Blood: x / Protein: 30 mg/dL / Nitrite: Negative   Leuk Esterase: Negative / RBC: 8 /hpf / WBC 6 /HPF   Sq Epi: x / Non Sq Epi: x / Bacteria: Negative        RADIOLOGY & ADDITIONAL TESTS:    Imaging Personally Reviewed:    Consultant(s) Notes Reviewed:      Care Discussed with Consultants/Other Providers:  constantine Esposito
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
## 2023-05-26 NOTE — PROGRESS NOTE ADULT - SUBJECTIVE AND OBJECTIVE BOX
Afebrile, tmax 37.2, WBC 24 still  SAHARA with no vegetatinos, has been endorsing more cry cough but robitussin helped    ____________________________________________________  ROS  GENERAL: denies chills, , night sweats, weight loss.   PSYCH: denies depression, anxiety, suicidal ideation, hallucination, and delusions  SKIN: no rash or lesions; no color changes, no abnormal nevi,no  dryness, and nojaundice    EYES: denies visual changes, floaters, pain, inflammation, blurred vision, and discharge  ENT: denies tinnitus, vertigo, epistaxis, oral lesion, and decreased acuity  PULM: denies, hemoptysis, pleurisy  CVS: denies angina, palpitations,+ orthopnea, no syncope, or heart murmur  GI: denies constipation, diarrhea, melena, abdominal pain, nausea.   : denies dysuria, frequency, discharge, incontinence, stones or macroscopic hematuria  MS: no arthralgias, no erythema or swelling, no myalgias, noedema, or lower back pain.   CNS: denies numbness, dizziness, seizure, or tremor  ENDO: denies heat/cold intolerance, polyuria, polydipsia, malaise.    HEME: denies bruising, bleeding, lymphadenopathy, anemia, and calf pain    Allergies  No Known Allergies    __________________________________________________  MEDS:  MEDICATIONS  (STANDING):  guaiFENesin Oral Liquid (Sugar-Free) 100 every 6 hours PRN  heparin   Injectable 5000 every 8 hours  lactulose Syrup 10 two times a day  levothyroxine 100 daily  ondansetron Injectable 4 every 6 hours PRN  oxyCODONE    IR 2.5 every 6 hours PRN  pantoprazole    Tablet 40 before breakfast  spironolactone 200 daily    _________________________________________________  ANTIMICOBIALS  ceFAZolin   IVPB 2000 every 8 hours  ciprofloxacin     Tablet 500 daily      GENERAL LABS              x                    132  | 23   | 6            x     >-----------< x       ------------------------< 102                   x                    3.0  | 94   | 0.64                                         Ca 8.2   Mg x     Ph x          Urinalysis Basic - ( 25 May 2023 07:30 )    Color: Dark Yellow / Appearance: Turbid / S.028 / pH: x  Gluc: x / Ketone: Trace  / Bili: Large >10 / Urobili: Negative   Blood: x / Protein: 30 mg/dL / Nitrite: Negative   Leuk Esterase: Negative / RBC: 8 /hpf / WBC 6 /HPF   Sq Epi: x / Non Sq Epi: x / Bacteria: Negative        _________________________________________________  MICROBIOLOGY  -----------    Culture - Blood (collected 24 May 2023 22:39)  Source: .Blood Blood-Peripheral  Preliminary Report (26 May 2023 02:01):    No growth to date.    Culture - Blood (collected 24 May 2023 22:39)  Source: .Blood Blood-Peripheral  Preliminary Report (26 May 2023 02:01):    No growth to date.    Culture - Fungal, Body Fluid (collected 23 May 2023 17:21)  Source: Peritoneal Peritoneal Fluid  Preliminary Report (24 May 2023 07:18):    Testing in progress    Culture - Body Fluid with Gram Stain (collected 23 May 2023 17:21)  Source: Peritoneal Peritoneal Fluid  Gram Stain (24 May 2023 04:28):    polymorphonuclear leukocytes seen    No organisms seen    by cytocentrifuge  Preliminary Report (24 May 2023 19:28):    No growth    Culture - Blood (collected 23 May 2023 10:41)  Source: .Blood Blood-Peripheral  Preliminary Report (24 May 2023 14:02):    No growth to date.    Culture - Blood (collected 23 May 2023 05:22)  Source: .Blood Blood  Preliminary Report (24 May 2023 14:02):    No growth to date.    Culture - Urine (collected 22 May 2023 21:17)  Source: Clean Catch Clean Catch (Midstream)  Final Report (25 May 2023 03:54):    50,000 - 99,000 CFU/mL Enterococcus faecium (vancomycin resistant)  Organism: Enterococcus faecium (vancomycin resistant) (25 May 2023 03:54)  Organism: Enterococcus faecium (vancomycin resistant) (25 May 2023 03:54)      Method Type: BAKARI      -  Ampicillin: R >8 Predicts results to ampicillin/sulbactam, amoxacillin-clavulanate and  piperacillin-tazobactam.      -  Ciprofloxacin: R >2      -  Daptomycin: SDD 4 The breakpoint for SDD (susceptible dose dependent)is based on a dosage regimen of 8-12 mg/kg administered every 24 h in adults and is intended for serious infections due to E. faecium. Consultation with an infectious diseases specialist is recommended.      -  Levofloxacin: R >4      -  Linezolid: S 2      -  Nitrofurantoin: S <=32 Should not be used to treat pyelonephritis.      -  Tetracycline: R >8      -  Vancomycin: R >16                Legionella Antigen, Urine: Negative (23 @ 23:20)      Fungitell:   _______________________________________________  PERTINENT IMAGING  _________________________________________________  Physical Exam:   T(C): 37.1 (23 @ 11:01), Max: 37.2 (23 @ 20:19)  HR: 98 (23 @ 11:01) (92 - 98)  BP: 96/56 (23 @ 11:01) (96/55 - 102/67)  RR: 18 (23 @ 11:01) (18 - 20)  SpO2: 92% (23 @ 11:01) (92% - 96%)    23 @ 07:01  -  23 @ 07:00  --------------------------------------------------------  IN: 650 mL / OUT: 0 mL / NET: 650 mL      · Constitutional	well-groomed; icteric  · Eyes	PERRL; EOMI;   · ENMT	no gross abnormalities  · Respiratory	decreased BS bilaterally and some rales  · Cardiovascular	regular rate and rhythm; S1 S2 present; no gallops; no rub; no murmur; no JVD; normal PMI; no pedal edema  · Cardiovascular Comments	no S3/s4  · Gastrointestinal	soft; distended,   · Genitourinary Female	normal external genitalia  · Neurological	cranial nerves II-XII intact; sensation intact; responds to pain; responds to verbal commands; deep reflexes intact; no meningismus  · Mental Status	Alert and oriented x3, appropriate  ·icteric  · Lymphatic	No lymphadedenopathy    R frontal lateral umbilicated scar on nodular lesion/hematoma?

## 2023-05-26 NOTE — PROGRESS NOTE ADULT - SUBJECTIVE AND OBJECTIVE BOX
Chief Complaint:  Patient is a 38y old  Female who presents with a chief complaint of Liver Failure (25 May 2023 15:33)      Interval Events:   - Reports abd discomfort 2/2 distension. No other acute c/o or overnight events.  - s/p EGD () showing  Small (< 5 mm) esophageal varices. Z-line regular. Gastroesophageal flap valve classified as Hill Grade III (minimal fold, loose to endoscope, hiatal hernia likely). PHG, Mucosal changes in the duodenum.  - s/p SAHARA () EF 64%; no e/o vegetations, agitated saline injection showing bubbles in L heart c/w pulmonary AVM      Hospital Medications:  ceFAZolin   IVPB 2000 milliGRAM(s) IV Intermittent every 8 hours  ciprofloxacin     Tablet 500 milliGRAM(s) Oral daily  folic acid 1 milliGRAM(s) Oral daily  furosemide    Tablet 40 milliGRAM(s) Oral daily  guaiFENesin Oral Liquid (Sugar-Free) 100 milliGRAM(s) Oral every 6 hours PRN  heparin   Injectable 5000 Unit(s) SubCutaneous every 8 hours  lactulose Syrup 10 Gram(s) Oral two times a day  levothyroxine 100 MICROGram(s) Oral daily  multivitamin 1 Tablet(s) Oral daily  ondansetron Injectable 4 milliGRAM(s) IV Push every 6 hours PRN  oxyCODONE    IR 2.5 milliGRAM(s) Oral every 6 hours PRN  pantoprazole    Tablet 40 milliGRAM(s) Oral before breakfast  spironolactone 200 milliGRAM(s) Oral daily  thiamine 100 milliGRAM(s) Oral daily      PMHX/PSHX:  Hypothyroidism    Alcoholic liver disease    No significant past surgical history            ROS: 14 point ROS negative unless otherwise stated in subjective      PHYSICAL EXAM:     GENERAL:  Well developed, +chronically ill appearing  HEENT:  NC/AT,  +sclera icteric; +R forehead hematoma without active bleeding  CHEST:  Full & symmetric excursion, no increased effort w/ respirations  HEART:  Regular rhythm & rate  ABDOMEN:  Soft, +diffusely but mildly tender, +mildly distended; +BS, no rebound tenderness or guarding, +dependent flank edema  EXTREMITIES:  no LE edema, +sarcopenia  SKIN:  +jaundice  NEURO:  Alert, orientedx3, no asterixis    Vital Signs:  Vital Signs Last 24 Hrs  T(C): 37.2 (26 May 2023 04:37), Max: 37.2 (25 May 2023 20:19)  T(F): 99 (26 May 2023 04:37), Max: 99 (26 May 2023 04:37)  HR: 97 (26 May 2023 04:37) (92 - 98)  BP: 99/62 (26 May 2023 04:37) (96/55 - 102/67)  BP(mean): --  RR: 18 (26 May 2023 04:37) (18 - 20)  SpO2: 95% (26 May 2023 04:37) (95% - 96%)    Parameters below as of 26 May 2023 04:37  Patient On (Oxygen Delivery Method): room air      Daily Height in cm: 170.18 (25 May 2023 14:16)    Daily     LABS:                        7.4    24.07 )-----------( 148      ( 26 May 2023 07:16 )             22.2     05-26    132<L>  |  94<L>  |  6<L>  ----------------------------<  102<H>  3.0<L>   |  23  |  0.64    Ca    8.2<L>      26 May 2023 07:35    TPro  5.4<L>  /  Alb  3.1<L>  /  TBili  28.2<H>  /  DBili  x   /  AST  105<H>  /  ALT  5<L>  /  AlkPhos  141<H>  05-26    LIVER FUNCTIONS - ( 26 May 2023 07:35 )  Alb: 3.1 g/dL / Pro: 5.4 g/dL / ALK PHOS: 141 U/L / ALT: 5 U/L / AST: 105 U/L / GGT: x           PT/INR - ( 26 May 2023 07:16 )   PT: 26.5 sec;   INR: 2.29 ratio         PTT - ( 26 May 2023 07:16 )  PTT:59.9 sec  Urinalysis Basic - ( 25 May 2023 07:30 )    Color: Dark Yellow / Appearance: Turbid / S.028 / pH: x  Gluc: x / Ketone: Trace  / Bili: Large >10 / Urobili: Negative   Blood: x / Protein: 30 mg/dL / Nitrite: Negative   Leuk Esterase: Negative / RBC: 8 /hpf / WBC 6 /HPF   Sq Epi: x / Non Sq Epi: x / Bacteria: Negative          Imaging:       ACC: [id number] EXAM:  MR MRCP WAW IC   ORDERED BY:  MARI ROBERT     PROCEDURE DATE:  2023          INTERPRETATION:  CLINICAL INFORMATION: Liver failure in the setting of   alcoholic cirrhosis. Elevated LFTs.    COMPARISON: CT abdomen and pelvis 2023.    CONTRAST/COMPLICATIONS:  IV Contrast: Gadavist  6.5 cc administered   1 cc discarded  Oral Contrast: NONE  Complications: None reported at time of study completion    PROCEDURE:  MRI of the abdomen was performed.  MRCP was performed.    FINDINGS:  LOWER CHEST: Small bilateral pleural effusions. Patchy airspace opacities   in the lung bases.    LIVER: Hepatomegaly, measuring 24 cm. Mild surface nodularity and   enlarged caudate lobe. Heterogeneous appearance with hepatic steatosis.   Mild periportal edema. No suspicious lesions.  BILE DUCTS: Normal caliber.  GALLBLADDER: Within normal limits.  SPLEEN: Upper limits of normal in size measuring 12.6 cm in length.  PANCREAS: Within normal limits.  ADRENALS: Within normal limits.  KIDNEYS/URETERS: Symmetric renal enhancement. No hydronephrosis.    VISUALIZED PORTIONS:  BOWEL: Within normal limits.  PERITONEUM: Small volume ascites.  VESSELS: Recanalized umbilical vein. Patent hepatic and portal veins.   Mild paraesophageal varices.  RETROPERITONEUM/LYMPH NODES: No lymphadenopathy.  ABDOMINAL WALL: Anasarca.  BONES: Within normal limits.    IMPRESSION:  No suspicious liver lesions. Marked hepatomegaly and hepatic steatosis   with additional findings suggestive of cirrhosisand resultant portal   hypertension.

## 2023-05-26 NOTE — PROGRESS NOTE ADULT - ASSESSMENT
Antimicrobials  Cefazolin 2 g q8h -  cipro 500  mg daily ppx    Prior:    - Zosyn -  - Cipro 5/18 x1  - Azithromycin 5/16 x1,  - Ctx 5/16 x1    Microbiology:   BC NGTd   UC 50-99K CRE, UA non micro   NGTD   ascitic fluid bacterial, fungal cx NGTD  ,  BC NGTD   ascitic fluid cx neg   BC pos MSSA    Imagin/19 US abdomen: small to mod ascites  Ct chest iv con: Multifocal bilateral groundglass opacities, suspicious for pneumonia.  Small bilateral pleural effusions, left greater the right, increased compared to prior exam.no PE  Ct maxillofacial , Head and Cervical spine     CT FACIAL BONES  Bilateral mastoid air cells and middle ear regions well-aerated. Paranasal sinus mucosal thickening with near complete opacification of the right sphenoid sinus. Bilateral maxillary sinus septations. No air-fluid levels. S-shaped deviation of the nasal septum with hypertrophy of bilateral inferior greater than middle nasal turbinates. Narrowing ofbilateral ostiomeatal complexes. Periapical lucencies bilateral maxillarypremolars.    CT C/A/P   * Small focal consolidation in the posterior left upper lobe. Givenhistory of trauma, small pulmonary contusion cannot be excluded.Correlate for pneumonia.  * Hepatomegaly and diffuse upper extremity ptosis with signs of portalhypertension. Correlate for steatohepatitis. Moderate ascites.    Assessment and plan    1. 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  2. MSSA bacteremia  BC  2/; repeat BC  neg. TTE no vegetations ; SAHARA neg on   CT face, chest/abdomen and pelvis post trauma noting sinus opacification, bilateral apical maxillary premolar lucencies. CT chest with small consolidation  Sources: traumatic /SSTI in s/o facial hematoma vs pneumonia vs less likely ENT. No evidence of metastatic infection. ? hardware  Cefazolin -    3. Leucocytosis  Initially: peaked at 20à stable 17-18, likely combination if bacteremia and alcoholic hepatitis  Rising over the last few days, only symptoms is occasional cough    4. Bilateral GGO on CT chest -  Pulmonary edema more likely  vs pneumonia  Repeat CT chest  with worsening GGo and some areas more consolidated on my review  ? aspiration  Cirrhotic patients also at risk of PCP, cryptococcosis- pattern not classical for the later, not classical host for mold infection except endemics- neither would cause leucocytosis      5. Ecoli bacteruria ;VRE enterococcus bacteruria : No symptoms of UTI, UA blunt.  6. Facial trauma with hematoma s/p evacuation - no purulence  7. Pre-transplant screening  HIV ag/Ab neg; HCV Ab neg, HBV s Ag neg, HBV s Ab pos, HBV c Ab neg, HEV IgG neg, EBV IgG pos, CMV IgG pos, HSV1/2 IgG pos, VZV IgG NEG; Measles/Mumps IgG neg, Rubella igG pos ([place] measles), Syphilis screen neg, Strongyliodes IgG neg. Quantiferon and toxoplasma igG pending.  8. Need for vaccinations.  Only received Td . Positive titers for HBV non-quantitative    Recommendations:  - Continue cefazolin 2g q8h for MSSA bacteremia.  - Anticipate total of 4 weeks of therapy since cleared blood cultures with at least 2 weeks of iv therapy  - Ct with worsening GGo with more confluence edema vs infection, in setting of WBC, will cover for bacterial PNA given cough but remains on RA  -please send cryptococcus antigen, histoplasma antigen in urine, fungitell and Aspergillus galactomannan in serum  - please send induced sputum for bacterial, fungal and afb cultrues (non TB, no need to isoalte) - Please send PCP PCR from induced sputum  - follow peritoneal cultures- cell count not consistent with SBP.  - folloe repat UC, UA was not with micro on   - hold ciprofloxacin  - begin zosyn 3.375 g q8h in addition to cefazolin  -----     -Follow repeat quantiferon from    - Will benefit from vaccinations prior to transplant if latter not anticipated imminently or within 2 weeks- otherwise, higher benefit to delay:  Havrix, Shingrix 2 doses 6 months apart; Tdap (once in adulthood), COVID-19, Prevnar 20, HPV Gardisil 9 3 doses at 0-1-6 months  - Of note, non-immune to Measlesor mumps,  nor to varicella. immune to [place] measles (Rubella) If she were to not be transplanted or delayed for > 1 month, would benefit from MMR+Varivax. However, if plan for or anticipated transplantation within a month, then live vaccination would be contraindicated.  - Also will benefit from HPV vaccine gardisil 9 kem given positive HPV     Thank you for involving ID in this consult,  We will continue following on monday or PRN  Please call or page with additional questions  Pager: [phone]  Teams: from 8 am to 5 pm  Samanta Soto MD

## 2023-05-26 NOTE — PROGRESS NOTE ADULT - ASSESSMENT
This is a 39 yo [place] Female with Hx of hypothyroidism (on Levothyroxine), and no other known medical Hx, presented to [facility] ED on 5/13/23 with ~ 2 weeks Hx of progressively worsening abdominal pain ("stabbing, diffuse, non radiating") and distention,  as well as 1 day Hx of jaundice, and was admitted to medicine with liver failure (possibly acute on chronic), with MELD-Na 29 on admission, hyperbilirubinemia (Se bi 18.9, direct 15.9), coagulopathy (INR 2.15), mild liver enzyme elevation in AST>>ALT pattern (, ALT 14, ), severe hypoalbuminemia (alb 1.3), as well as leukocytosis (WBC 16k), anemia (Hb 8.0). She was empirically started on NAC per protocol, hepatology evaluated. ID evaluated for MAY Pna and later MSSA bacteremia, has been on IV ceftriaxone and azithromycin. Ascitic fluid analysis (Dx paracentesis 5/13), was portal hypertensive (SAAG 1.1, total protein 1.0), and was not suggestive of SBP

## 2023-05-27 LAB
ALBUMIN SERPL ELPH-MCNC: 3.2 G/DL — LOW (ref 3.3–5)
ALP SERPL-CCNC: 141 U/L — HIGH (ref 40–120)
ALT FLD-CCNC: 5 U/L — LOW (ref 10–45)
ANION GAP SERPL CALC-SCNC: 14 MMOL/L — SIGNIFICANT CHANGE UP (ref 5–17)
AST SERPL-CCNC: 117 U/L — HIGH (ref 10–40)
BILIRUB SERPL-MCNC: 29.4 MG/DL — HIGH (ref 0.2–1.2)
BUN SERPL-MCNC: 6 MG/DL — LOW (ref 7–23)
CALCIUM SERPL-MCNC: 8.2 MG/DL — LOW (ref 8.4–10.5)
CHLORIDE SERPL-SCNC: 94 MMOL/L — LOW (ref 96–108)
CO2 SERPL-SCNC: 22 MMOL/L — SIGNIFICANT CHANGE UP (ref 22–31)
CREAT SERPL-MCNC: 0.66 MG/DL — SIGNIFICANT CHANGE UP (ref 0.5–1.3)
CRYPTOC AG FLD QL: NEGATIVE — SIGNIFICANT CHANGE UP
EGFR: 114 ML/MIN/1.73M2 — SIGNIFICANT CHANGE UP
GAMMA INTERFERON BACKGROUND BLD IA-ACNC: 0.01 IU/ML — SIGNIFICANT CHANGE UP
GLUCOSE SERPL-MCNC: 100 MG/DL — HIGH (ref 70–99)
GRAM STN FLD: SIGNIFICANT CHANGE UP
HCT VFR BLD CALC: 22.4 % — LOW (ref 34.5–45)
HGB BLD-MCNC: 7.3 G/DL — LOW (ref 11.5–15.5)
INR BLD: 2.39 RATIO — HIGH (ref 0.88–1.16)
M TB IFN-G BLD-IMP: NEGATIVE — SIGNIFICANT CHANGE UP
M TB IFN-G CD4+ BCKGRND COR BLD-ACNC: 0 IU/ML — SIGNIFICANT CHANGE UP
M TB IFN-G CD4+CD8+ BCKGRND COR BLD-ACNC: 0 IU/ML — SIGNIFICANT CHANGE UP
MAGNESIUM SERPL-MCNC: 1.3 MG/DL — LOW (ref 1.6–2.6)
MCHC RBC-ENTMCNC: 32.6 GM/DL — SIGNIFICANT CHANGE UP (ref 32–36)
MCHC RBC-ENTMCNC: 36.7 PG — HIGH (ref 27–34)
MCV RBC AUTO: 112.6 FL — HIGH (ref 80–100)
NIGHT BLUE STAIN TISS: SIGNIFICANT CHANGE UP
NRBC # BLD: 0 /100 WBCS — SIGNIFICANT CHANGE UP (ref 0–0)
PHOSPHATE SERPL-MCNC: 1.3 MG/DL — LOW (ref 2.5–4.5)
PLATELET # BLD AUTO: 159 K/UL — SIGNIFICANT CHANGE UP (ref 150–400)
POTASSIUM SERPL-MCNC: 4.1 MMOL/L — SIGNIFICANT CHANGE UP (ref 3.5–5.3)
POTASSIUM SERPL-SCNC: 4.1 MMOL/L — SIGNIFICANT CHANGE UP (ref 3.5–5.3)
PROT SERPL-MCNC: 5.4 G/DL — LOW (ref 6–8.3)
PROTHROM AB SERPL-ACNC: 27.7 SEC — HIGH (ref 10.5–13.4)
QUANT TB PLUS MITOGEN MINUS NIL: 0.68 IU/ML — SIGNIFICANT CHANGE UP
RBC # BLD: 1.99 M/UL — LOW (ref 3.8–5.2)
RBC # FLD: 23.4 % — HIGH (ref 10.3–14.5)
SODIUM SERPL-SCNC: 130 MMOL/L — LOW (ref 135–145)
SPECIMEN SOURCE: SIGNIFICANT CHANGE UP
SPECIMEN SOURCE: SIGNIFICANT CHANGE UP
WBC # BLD: 27.19 K/UL — HIGH (ref 3.8–10.5)
WBC # FLD AUTO: 27.19 K/UL — HIGH (ref 3.8–10.5)

## 2023-05-27 PROCEDURE — 99233 SBSQ HOSP IP/OBS HIGH 50: CPT

## 2023-05-27 RX ADMIN — OXYCODONE HYDROCHLORIDE 2.5 MILLIGRAM(S): 5 TABLET ORAL at 05:31

## 2023-05-27 RX ADMIN — Medication 100 MILLIGRAM(S): at 15:42

## 2023-05-27 RX ADMIN — PANTOPRAZOLE SODIUM 40 MILLIGRAM(S): 20 TABLET, DELAYED RELEASE ORAL at 05:34

## 2023-05-27 RX ADMIN — HEPARIN SODIUM 5000 UNIT(S): 5000 INJECTION INTRAVENOUS; SUBCUTANEOUS at 21:40

## 2023-05-27 RX ADMIN — Medication 100 MICROGRAM(S): at 05:34

## 2023-05-27 RX ADMIN — MIDODRINE HYDROCHLORIDE 10 MILLIGRAM(S): 2.5 TABLET ORAL at 04:46

## 2023-05-27 RX ADMIN — Medication 100 MILLIGRAM(S): at 21:32

## 2023-05-27 RX ADMIN — OXYCODONE HYDROCHLORIDE 2.5 MILLIGRAM(S): 5 TABLET ORAL at 17:41

## 2023-05-27 RX ADMIN — MIDODRINE HYDROCHLORIDE 10 MILLIGRAM(S): 2.5 TABLET ORAL at 14:07

## 2023-05-27 RX ADMIN — Medication 100 MILLIGRAM(S): at 07:48

## 2023-05-27 RX ADMIN — Medication 1 TABLET(S): at 11:48

## 2023-05-27 RX ADMIN — MIDODRINE HYDROCHLORIDE 10 MILLIGRAM(S): 2.5 TABLET ORAL at 21:32

## 2023-05-27 RX ADMIN — PIPERACILLIN AND TAZOBACTAM 25 GRAM(S): 4; .5 INJECTION, POWDER, LYOPHILIZED, FOR SOLUTION INTRAVENOUS at 15:45

## 2023-05-27 RX ADMIN — Medication 40 MILLIEQUIVALENT(S): at 04:29

## 2023-05-27 RX ADMIN — OXYCODONE HYDROCHLORIDE 2.5 MILLIGRAM(S): 5 TABLET ORAL at 18:10

## 2023-05-27 RX ADMIN — OXYCODONE HYDROCHLORIDE 2.5 MILLIGRAM(S): 5 TABLET ORAL at 00:33

## 2023-05-27 RX ADMIN — HEPARIN SODIUM 5000 UNIT(S): 5000 INJECTION INTRAVENOUS; SUBCUTANEOUS at 14:07

## 2023-05-27 RX ADMIN — SPIRONOLACTONE 300 MILLIGRAM(S): 25 TABLET, FILM COATED ORAL at 08:30

## 2023-05-27 RX ADMIN — PIPERACILLIN AND TAZOBACTAM 25 GRAM(S): 4; .5 INJECTION, POWDER, LYOPHILIZED, FOR SOLUTION INTRAVENOUS at 00:29

## 2023-05-27 RX ADMIN — Medication 100 MILLIGRAM(S): at 05:31

## 2023-05-27 RX ADMIN — Medication 100 MILLIGRAM(S): at 11:48

## 2023-05-27 RX ADMIN — HEPARIN SODIUM 5000 UNIT(S): 5000 INJECTION INTRAVENOUS; SUBCUTANEOUS at 05:34

## 2023-05-27 RX ADMIN — Medication 1 MILLIGRAM(S): at 11:47

## 2023-05-27 RX ADMIN — OXYCODONE HYDROCHLORIDE 2.5 MILLIGRAM(S): 5 TABLET ORAL at 12:15

## 2023-05-27 RX ADMIN — Medication 100 MILLIGRAM(S): at 15:49

## 2023-05-27 RX ADMIN — PIPERACILLIN AND TAZOBACTAM 25 GRAM(S): 4; .5 INJECTION, POWDER, LYOPHILIZED, FOR SOLUTION INTRAVENOUS at 08:28

## 2023-05-27 RX ADMIN — OXYCODONE HYDROCHLORIDE 2.5 MILLIGRAM(S): 5 TABLET ORAL at 11:47

## 2023-05-27 RX ADMIN — OXYCODONE HYDROCHLORIDE 2.5 MILLIGRAM(S): 5 TABLET ORAL at 06:26

## 2023-05-27 RX ADMIN — PIPERACILLIN AND TAZOBACTAM 25 GRAM(S): 4; .5 INJECTION, POWDER, LYOPHILIZED, FOR SOLUTION INTRAVENOUS at 21:33

## 2023-05-27 NOTE — PROGRESS NOTE ADULT - PROBLEM SELECTOR PLAN 3
-baseline Hgb around 8  -Hgb was dropping gradually, on 5/20 noted to be 6.8, received 1U pRBC with improvement to 9.1  -no overt source of bleeding, plan for EGD 5/25  -maintain active T&S  -send iron studies in few days as they will be inaccurate at this time given transfusion  -started on Vit K x 3 days per hepatology given worsening INR
-baseline Hgb around 8  -Hgb was dropping gradually, on 5/20 noted to be 6.8, received 1U pRBC with improvement to 9.1  -no overt source of bleeding, plan for EGD 5/25  -maintain active T&S  -send iron studies in few days as they will be inaccurate at this time given transfusion  -started on Vit K x 3 days per hepatology given worsening INR
likely hypervolemic hyponatremia  send urine osmol, urine studies, serum osmol  ins and out strict  lasix held per hepatology, sodium improving at 130 today  -lytes repleted, will repeat at 6pm
likely hypervolemic hyponatremia  send urine osmol, urine studies, serum osmol  ins and out strict  lasix restarted per hepatology, sodium improving, monitor BMP bid  stop rifaxamin and lactulose as having 8 BMs
-baseline Hgb around 8  -Hgb was dropping gradually, on 5/20 noted to be 6.8, received 1U pRBC with improvement to 9.1  -no overt source of bleeding, plan for EGD 5/25  -maintain active T&S  -send iron studies in few days as they will be inaccurate at this time given transfusion  -started on Vit K x 3 days per hepatology given worsening INR
-baseline Hgb around 8  -Hgb was dropping gradually, on 5/20 noted to be 6.8, received 1U pRBC with improvement to 9.1  -no overt source of bleeding  -maintain active T&S  -send iron studies in few days as they will be inaccurate at this time given transfusion  -started on Vit K x 3 days per hepatology given worsening INR
-baseline Hgb around 8  -Hgb was dropping gradually, on 5/20 noted to be 6.8, received 1U pRBC with improvement to 9.1  -no overt source of bleeding, plan for EGD 5/25  -maintain active T&S  -send iron studies in few days as they will be inaccurate at this time given transfusion  -started on Vit K x 3 days per hepatology given worsening INR
Has been drinking alcohol- ALEKSANDRA [place] neg, admitted at [facility] on 5/13, no s/s of withdrawal  - SW eval  - advised to stop drinking alcohol  - Thiamin, Folate
likely hypervolemic hyponatremia  send urine osmol, urine studies, serum osmol  ins and out strict  lasix restarted per hepatology, sodium 129 today
likely hypervolemic hyponatremia  send urine osmol, urine studies, serum osmol  monitor BMP q8hr  ins and out strict-  not recorded overnight  improving with diuresis, c/w lasix 40mg IV daily-
likely hypervolemic hyponatremia  send urine osmol, urine studies, serum osmol  monitor BMP q8hr  ins and out strict-  not recorded overnight  improving with diuresis, lasix held per hepatology

## 2023-05-27 NOTE — PROGRESS NOTE ADULT - PROBLEM SELECTOR PROBLEM 5
Alcohol abuse
MSSA bacteremia
Alcohol abuse
MSSA bacteremia

## 2023-05-27 NOTE — PROGRESS NOTE ADULT - PROBLEM SELECTOR PROBLEM 2
MSSA bacteremia
SOB (shortness of breath)
SOB (shortness of breath)
Hyponatremia
SOB (shortness of breath)
SOB (shortness of breath)
Hyponatremia
SOB (shortness of breath)
Hyponatremia

## 2023-05-27 NOTE — PROGRESS NOTE ADULT - PROBLEM SELECTOR PLAN 2
Wells score elevated, tachycardia + SOB  CTA r/o PE negative
likely hypervolemic hyponatremia  ins and out strict  lasix restarted per hepatology, sodium improving and stable monitor BMP daily  stop rifaxamin and lactulose q12hr
leukocytosis improving; BCX 4/4 + MSSA  - repeat BCX pending  - c/w IV Cefazolin 2gm q8hr, 2d echo EF 68% but cannot r/o endocarditis, ? SAHARA  CT chest with ? PNA unclear if this is the source
likely hypervolemic hyponatremia  ins and out strict  lasix restarted per hepatology, sodium improving and stable monitor BMP daily  stop rifaxamin and lactulose q12hr
Wells score elevated, tachycardia + SOB  CTA r/o PE negative
likely hypervolemic hyponatremia  ins and out strict  lasix restarted per hepatology, sodium improving and stable monitor BMP daily  stop rifaxamin and lactulose q12hr
Wells score elevated, tachycardia + SOB  CTA r/o PE
likely hypervolemic hyponatremia  ins and out strict  lasix restarted per hepatology, sodium improving, monitor BMP daily  stop rifaxamin and lactulose q12hr   plan for IR paracentesis
likely hypervolemic hyponatremia  ins and out strict  lasix restarted per hepatology, sodium improving and stable monitor BMP daily  stop rifaxamin and lactulose q12hr
Wells score elevated, tachycardia + SOB  CTA r/o PE negative
Wells score elevated, tachycardia + SOB  CTA r/o PE

## 2023-05-27 NOTE — PROGRESS NOTE ADULT - ASSESSMENT
Impression/suggestions  Acute on chronic liver failure with acute alcoholic hepatitis in the context of cirrhosis  Listed for liver transplant  Transplant education was provided to her, her father and her friend after obtaining her permission   MSSA bacteremia on IV antibiotics  Pneumonia on IV Zosyn  Check Fungitell  Please check urine sodium and potassium  Importance of continuous alcohol avoidance was discussed with patient in details  Low-salt high-protein diet  We will transfer to hepatology service once bed is available

## 2023-05-27 NOTE — PROGRESS NOTE ADULT - PROBLEM SELECTOR PROBLEM 6
Alcohol abuse
Encounter for deep vein thrombosis (DVT) prophylaxis
Alcohol abuse
Encounter for deep vein thrombosis (DVT) prophylaxis

## 2023-05-27 NOTE — PROGRESS NOTE ADULT - PROBLEM SELECTOR PLAN 6
Heparin Sc
Heparin Sc
Heparin Sc  liver transplant workup-listed awaiting 6mon bed
Heparin Sc
Has been drinking alcohol- ALEKSANDRA Camar[place] neg, admitted at [facility] on 5/13, no s/s of withdrawal  - SW eval  - advised to stop drinking alcohol  - Thiamin, Folate
Has been drinking alcohol- ALEKSANDRA Camar[place] neg, admitted at [facility] on 5/13, no s/s of withdrawal  - SW eval  - advised to stop drinking alcohol  - Thiamin, Folate
Heparin Sc

## 2023-05-27 NOTE — PROGRESS NOTE ADULT - PROBLEM SELECTOR PROBLEM 3
Hyponatremia
Hyponatremia
Anemia
Hyponatremia
Anemia
Anemia
Hyponatremia
Anemia
Anemia
Alcohol abuse
Hyponatremia

## 2023-05-27 NOTE — PROGRESS NOTE ADULT - PROBLEM SELECTOR PLAN 5
Has been drinking alcohol- ALEKSANDRA Camar[place] neg, admitted at [facility] on 5/13, no s/s of withdrawal  - SW eval  - advised to stop drinking alcohol  - Thiamin, Folate
Has been drinking alcohol- ALEKSANDRA Camar[place] neg, admitted at [facility] on 5/13, no s/s of withdrawal  - SW eval  - advised to stop drinking alcohol  - Thiamin, Folate
Has been drinking alcohol- ALEKSANDRA Camar[place] neg, admitted at [facility] on 5/13, no s/s of withdrawal  - SW eval  - advised to stop drinking alcohol  - Thiamin, Folate
Has been drinking alcohol- ALEKSANDRA [place]ar[place] neg, admitted at [facility] on 5/13, no s/s of withdrawal  - SW eval  - advised to stop drinking alcohol  - Thiamin, Folate
Has been drinking alcohol- ALEKSANDRA Camar[place] neg, admitted at [facility] on 5/13, no s/s of withdrawal  - SW eval  - advised to stop drinking alcohol  - Thiamin, Folate
Has been drinking alcohol- ALEKSANDRA Camar[place] neg, admitted at [facility] on 5/13, no s/s of withdrawal  - SW eval  - advised to stop drinking alcohol  - Thiamin, Folate
Has been drinking alcohol- ALEKSANDRA Camar[place] neg, admitted at [facility] on 5/13, no s/s of withdrawal  - SW eval  - advised to stop drinking alcohol  - Thiamin, Folate
Has been drinking alcohol- ALEKSANDRA Camar[place] neg, admitted at [facility] on 5/13, no s/s of withdrawal  - SW eval  - advised to stop drinking alcohol  - Thiamin, Folate
leukocytosis improving; BCX 4/4 + MSSA  - repeat BCX pending 5/16, 5/17  - c/w IV Cefazolin 2gm q8hr, 2d echo EF 68% but cannot r/o endocarditis, reached out to ID for end date, likely 4 weeks  SAHARA ordered  CT chest with ? PNA unclear if this is the source-more likely pulm edema  Alternative source is temple lesion with central necrosis, CT ordered for further eval, lesion appeared smaller. Plastics saw today, hematoma evacuated.
leukocytosis improving; BCX 4/4 + MSSA  - repeat BCX pending 5/16, 5/17  - c/w IV Cefazolin 2gm q8hr, 2d echo EF 68% but cannot r/o endocarditis, reached out to ID for end date, likely 4 weeks  SAHARA ordered- NPO at midnight  CT chest with ? PNA unclear if this is the source-more likely pulm edema

## 2023-05-27 NOTE — PROGRESS NOTE ADULT - ASSESSMENT
This is a 39 yo [place] Female with Hx of hypothyroidism (on Levothyroxine), and no other known medical Hx, presented to [facility] ED on 5/13/23 with ~ 2 weeks Hx of progressively worsening abdominal pain ("stabbing, diffuse, non radiating") and distention,  as well as 1 day Hx of jaundice, and was admitted to medicine with liver failure (possibly acute on chronic), with MELD-Na 29 on admission, hyperbilirubinemia (Se bi 18.9, direct 15.9), coagulopathy (INR 2.15), mild liver enzyme elevation in AST>>ALT pattern (, ALT 14, ), severe hypoalbuminemia (alb 1.3), as well as leukocytosis (WBC 16k), anemia (Hb 8.0). She was empirically started on NAC per protocol, hepatology evaluated. ID evaluated for MAY Pna and later MSSA bacteremia, has been on IV ceftriaxone and azithromycin. Ascitic fluid analysis (Dx paracentesis 5/13), was portal hypertensive (SAAG 1.1, total protein 1.0), and was not suggestive of SBP

## 2023-05-27 NOTE — PROGRESS NOTE ADULT - PROBLEM SELECTOR PROBLEM 4
Anemia
MSSA bacteremia
Encounter for deep vein thrombosis (DVT) prophylaxis
MSSA bacteremia
MSSA bacteremia
Anemia
MSSA bacteremia

## 2023-05-27 NOTE — PROGRESS NOTE ADULT - SUBJECTIVE AND OBJECTIVE BOX
Problem list   AUD with cirrhosis and alcoholic hepatitis   No history of treatment with steroids  MSSA bacteremia positive culture 5/13/2023, repeat blood cultures negative  Pneumonia on CT chest  Low level CMV viremia  Facial trauma secondary to fall    MRCP did not show any malignancy or biliary stricture  Moderate to large ascites  Small esophageal varices on EGD done May 2023  Listed for OLT blood type A Problem list   AUD with cirrhosis and alcoholic hepatitis   No history of treatment with steroids  MSSA bacteremia positive culture 5/13/2023, repeat blood cultures negative  Pneumonia on CT chest  Low level CMV viremia  Facial trauma secondary to fall    MRCP did not show any malignancy or biliary stricture  Moderate to large ascites  Small esophageal varices on EGD done May 2023  Listed for OLT blood type A    Patient is seen at bedside  Complains of discomfort on her abdomen because of ascites  No nausea, vomiting, diarrhea  No rectal bleeding  No confusion  No chest pain or shortness of breath  Cough is better  No fever  Vital signs are stable  Significant scleral icterus   significant muscle loss of face and upper body  Moderate ascites with no tenderness  Awake alert and oriented    Labs and imaging were reviewed

## 2023-05-27 NOTE — PROGRESS NOTE ADULT - PROBLEM SELECTOR PLAN 4
leukocytosis improving; BCX 4/4 + MSSA  - repeat BCX pending 5/16, 5/17  - c/w IV Cefazolin 2gm q8hr, 2d echo EF 68% but cannot r/o endocarditis, reached out to ID for end date  CT chest with ? PNA unclear if this is the source  d/w ID likely 4 weeks
leukocytosis improving; BCX 4/4 + MSSA  - repeat BCX pending 5/16, 5/17  - c/w IV Cefazolin 2gm q8hr, 2d echo EF 68%   SAHARA no endocarditis/   CT chest with pulmonary edema
leukocytosis improving; BCX 4/4 + MSSA  - repeat BCX pending 5/16, 5/17  - c/w IV Cefazolin 2gm q8hr, 2d echo EF 68% but cannot r/o endocarditis plan for SAHARA 5/25  SAHARA ordered- NPO at midnight  CT chest with ? PNA unclear if this is the source-more likely pulm edema
Heparin Sc
-baseline Hgb around 8  -Hgb was dropping gradually, on 5/20 noted to be 6.8, received 1U pRBC with improvement to 9.1  -no overt source of bleeding  -maintain active T&S  -send iron studies in few days as they will be inaccurate at this time given transfusion  -started on Vit K x 3 days per hepatology given worsening INR
leukocytosis improving; BCX 4/4 + MSSA  - repeat BCX pending 5/16, 5/17  - c/w IV Cefazolin 2gm q8hr, 2d echo EF 68% but cannot r/o endocarditis plan for SAHARA 5/25  SAHARA ordered- NPO at midnight  CT chest with ? PNA unclear if this is the source-more likely pulm edema
leukocytosis improving; BCX 4/4 + MSSA  - repeat BCX pending 5/16, 5/17  - c/w IV Cefazolin 2gm q8hr, 2d echo EF 68% but cannot r/o endocarditis, reached out to ID for end date, likely 4 weeks  SAHARA ordered  CT chest with ? PNA unclear if this is the source-more likely pulm edema  Alternative source is temple lesion with central necrosis, CT ordered for further eval, plastics to see 5/21
leukocytosis improving; BCX 4/4 + MSSA  - repeat BCX pending 5/16, 5/17  - c/w IV Cefazolin 2gm q8hr, 2d echo EF 68%   SAHARA no endocarditis/   CT chest with pulmonary edema
leukocytosis improving; BCX 4/4 + MSSA  - repeat BCX pending 5/16, 5/17  - c/w IV Cefazolin 2gm q8hr, 2d echo EF 68% but cannot r/o endocarditis, reached out to ID for end date, likely 4 weeks  SAHARA ordered  CT chest with ? PNA unclear if this is the source-more likely pulm edema
leukocytosis improving; BCX 4/4 + MSSA  - repeat BCX pending 5/16, 5/17  - c/w IV Cefazolin 2gm q8hr, 2d echo EF 68% but cannot r/o endocarditis plan for SAHARA  SAHARA ordered- NPO at midnight  CT chest with ? PNA unclear if this is the source-more likely pulm edema
-baseline Hgb around 8  -Hgb was dropping gradually, on 5/20 noted to be 6.8, received 1U pRBC with improvement to 9.1  -no overt source of bleeding  -maintain active T&S  -send iron studies in few days as they will be inaccurate at this time given transfusion  -started on Vit K x 3 days per hepatology given worsening INR

## 2023-05-27 NOTE — PROGRESS NOTE ADULT - SUBJECTIVE AND OBJECTIVE BOX
Patient is a 38y old  Female who presents with a chief complaint of Liver Failure (26 May 2023 13:08)      SUBJECTIVE / OVERNIGHT EVENTS:    feels ok wants to know when she will get a liver    ROS:  14 point ROS negative in detail except stated as above    MEDICATIONS  (STANDING):  ceFAZolin   IVPB 2000 milliGRAM(s) IV Intermittent every 8 hours  folic acid 1 milliGRAM(s) Oral daily  furosemide    Tablet 40 milliGRAM(s) Oral daily  heparin   Injectable 5000 Unit(s) SubCutaneous every 8 hours  lactulose Syrup 10 Gram(s) Oral two times a day  levothyroxine 100 MICROGram(s) Oral daily  midodrine. 10 milliGRAM(s) Oral every 8 hours  multivitamin 1 Tablet(s) Oral daily  pantoprazole    Tablet 40 milliGRAM(s) Oral before breakfast  piperacillin/tazobactam IVPB.. 3.375 Gram(s) IV Intermittent every 8 hours  spironolactone 300 milliGRAM(s) Oral daily  thiamine 100 milliGRAM(s) Oral daily    MEDICATIONS  (PRN):  guaiFENesin Oral Liquid (Sugar-Free) 100 milliGRAM(s) Oral every 6 hours PRN Cough  ondansetron Injectable 4 milliGRAM(s) IV Push every 6 hours PRN Nausea and/or Vomiting  oxyCODONE    IR 2.5 milliGRAM(s) Oral every 6 hours PRN Moderate Pain (4 - 6)      CAPILLARY BLOOD GLUCOSE        I&O's Summary    26 May 2023 07:01  -  27 May 2023 07:00  --------------------------------------------------------  IN: 810 mL / OUT: 0 mL / NET: 810 mL        PHYSICAL EXAM:  Vital Signs Last 24 Hrs  T(C): 37.6 (27 May 2023 04:09), Max: 37.6 (27 May 2023 04:09)  T(F): 99.6 (27 May 2023 04:09), Max: 99.6 (27 May 2023 04:09)  HR: 105 (27 May 2023 08:33) (101 - 106)  BP: 95/60 (27 May 2023 08:33) (94/55 - 96/56)  BP(mean): --  RR: 18 (27 May 2023 08:33) (18 - 18)  SpO2: 95% (27 May 2023 08:33) (95% - 97%)    Parameters below as of 27 May 2023 08:33  Patient On (Oxygen Delivery Method): room air      CONSTITUTIONAL: NAD, jaundiced, scleral icterus, lesion on temple w/ dressing in place  RESPIRATORY: Normal respiratory effort; lungs are clear to auscultation bilaterally  CARDIOVASCULAR: Regular rate and rhythm, normal S1 and S2, no murmur/rub/gallop; b/l LE edema; Peripheral pulses are 2+ bilaterally  ABDOMEN: distended, Nontender to palpation, normoactive bowel sounds, no rebound/guarding;  MUSCLOSKELETAL: no clubbing or cyanosis of digits; no joint swelling or tenderness to palpation  PSYCH: A+O to person, place, and time; affect appropriate    LABS:                        7.3    27.19 )-----------( 159      ( 27 May 2023 07:23 )             22.4     05-27    130<L>  |  94<L>  |  6<L>  ----------------------------<  100<H>  4.1   |  22  |  0.66    Ca    8.2<L>      27 May 2023 07:23  Phos  1.3     05-27  Mg     1.3     05-27    TPro  5.4<L>  /  Alb  3.2<L>  /  TBili  29.4<H>  /  DBili  x   /  AST  117<H>  /  ALT  5<L>  /  AlkPhos  141<H>  05-27    PT/INR - ( 27 May 2023 07:23 )   PT: 27.7 sec;   INR: 2.39 ratio         PTT - ( 26 May 2023 07:16 )  PTT:59.9 sec          RADIOLOGY & ADDITIONAL TESTS:    Imaging Personally Reviewed:    Consultant(s) Notes Reviewed:      Care Discussed with Consultants/Other Providers:

## 2023-05-27 NOTE — PROGRESS NOTE ADULT - PROBLEM SELECTOR PROBLEM 1
Alcoholic hepatitis

## 2023-05-28 LAB
ALBUMIN SERPL ELPH-MCNC: 2.9 G/DL — LOW (ref 3.3–5)
ALP SERPL-CCNC: 148 U/L — HIGH (ref 40–120)
ALT FLD-CCNC: <5 U/L — LOW (ref 10–45)
ANION GAP SERPL CALC-SCNC: 12 MMOL/L — SIGNIFICANT CHANGE UP (ref 5–17)
ANISOCYTOSIS BLD QL: SIGNIFICANT CHANGE UP
APPEARANCE UR: CLEAR — SIGNIFICANT CHANGE UP
APTT BLD: 54.1 SEC — HIGH (ref 27.5–35.5)
AST SERPL-CCNC: 103 U/L — HIGH (ref 10–40)
BACTERIA # UR AUTO: NEGATIVE — SIGNIFICANT CHANGE UP
BASOPHILS # BLD AUTO: 0 K/UL — SIGNIFICANT CHANGE UP (ref 0–0.2)
BASOPHILS NFR BLD AUTO: 0 % — SIGNIFICANT CHANGE UP (ref 0–2)
BILIRUB SERPL-MCNC: 30.9 MG/DL — HIGH (ref 0.2–1.2)
BILIRUB UR-MCNC: ABNORMAL
BLD GP AB SCN SERPL QL: NEGATIVE — SIGNIFICANT CHANGE UP
BUN SERPL-MCNC: 7 MG/DL — SIGNIFICANT CHANGE UP (ref 7–23)
BURR CELLS BLD QL SMEAR: PRESENT — SIGNIFICANT CHANGE UP
CALCIUM SERPL-MCNC: 8.4 MG/DL — SIGNIFICANT CHANGE UP (ref 8.4–10.5)
CHLORIDE SERPL-SCNC: 96 MMOL/L — SIGNIFICANT CHANGE UP (ref 96–108)
CO2 SERPL-SCNC: 21 MMOL/L — LOW (ref 22–31)
COLOR SPEC: ABNORMAL
CREAT ?TM UR-MCNC: 33 MG/DL — SIGNIFICANT CHANGE UP
CREAT SERPL-MCNC: 0.67 MG/DL — SIGNIFICANT CHANGE UP (ref 0.5–1.3)
CRYPTOC AG FLD QL: NEGATIVE — SIGNIFICANT CHANGE UP
CULTURE RESULTS: SIGNIFICANT CHANGE UP
DIFF PNL FLD: NEGATIVE — SIGNIFICANT CHANGE UP
EGFR: 114 ML/MIN/1.73M2 — SIGNIFICANT CHANGE UP
EOSINOPHIL # BLD AUTO: 0 K/UL — SIGNIFICANT CHANGE UP (ref 0–0.5)
EOSINOPHIL NFR BLD AUTO: 0 % — SIGNIFICANT CHANGE UP (ref 0–6)
EPI CELLS # UR: 0 /HPF — SIGNIFICANT CHANGE UP
GLUCOSE SERPL-MCNC: 95 MG/DL — SIGNIFICANT CHANGE UP (ref 70–99)
GLUCOSE UR QL: NEGATIVE — SIGNIFICANT CHANGE UP
HCT VFR BLD CALC: 21.5 % — LOW (ref 34.5–45)
HGB BLD-MCNC: 7.1 G/DL — LOW (ref 11.5–15.5)
HYALINE CASTS # UR AUTO: 1 /LPF — SIGNIFICANT CHANGE UP (ref 0–7)
INR BLD: 2.19 RATIO — HIGH (ref 0.88–1.16)
KETONES UR-MCNC: NEGATIVE — SIGNIFICANT CHANGE UP
LEUKOCYTE ESTERASE UR-ACNC: NEGATIVE — SIGNIFICANT CHANGE UP
LYMPHOCYTES # BLD AUTO: 0.47 K/UL — LOW (ref 1–3.3)
LYMPHOCYTES # BLD AUTO: 1.7 % — LOW (ref 13–44)
MACROCYTES BLD QL: SIGNIFICANT CHANGE UP
MAGNESIUM SERPL-MCNC: 1.4 MG/DL — LOW (ref 1.6–2.6)
MANUAL SMEAR VERIFICATION: SIGNIFICANT CHANGE UP
MCHC RBC-ENTMCNC: 33 GM/DL — SIGNIFICANT CHANGE UP (ref 32–36)
MCHC RBC-ENTMCNC: 37 PG — HIGH (ref 27–34)
MCV RBC AUTO: 112 FL — HIGH (ref 80–100)
MONOCYTES # BLD AUTO: 0.97 K/UL — HIGH (ref 0–0.9)
MONOCYTES NFR BLD AUTO: 3.5 % — SIGNIFICANT CHANGE UP (ref 2–14)
NEUTROPHILS # BLD AUTO: 26.22 K/UL — HIGH (ref 1.8–7.4)
NEUTROPHILS NFR BLD AUTO: 94.8 % — HIGH (ref 43–77)
NITRITE UR-MCNC: NEGATIVE — SIGNIFICANT CHANGE UP
OSMOLALITY UR: 275 MOS/KG — LOW (ref 300–900)
PH UR: 7.5 — SIGNIFICANT CHANGE UP (ref 5–8)
PHOSPHATE SERPL-MCNC: 1.5 MG/DL — LOW (ref 2.5–4.5)
PLAT MORPH BLD: NORMAL — SIGNIFICANT CHANGE UP
PLATELET # BLD AUTO: 167 K/UL — SIGNIFICANT CHANGE UP (ref 150–400)
POIKILOCYTOSIS BLD QL AUTO: SIGNIFICANT CHANGE UP
POLYCHROMASIA BLD QL SMEAR: SLIGHT — SIGNIFICANT CHANGE UP
POTASSIUM SERPL-MCNC: 4.3 MMOL/L — SIGNIFICANT CHANGE UP (ref 3.5–5.3)
POTASSIUM SERPL-SCNC: 4.3 MMOL/L — SIGNIFICANT CHANGE UP (ref 3.5–5.3)
POTASSIUM UR-SCNC: 57 MMOL/L — SIGNIFICANT CHANGE UP
PROCALCITONIN SERPL-MCNC: 0.32 NG/ML — HIGH (ref 0.02–0.1)
PROT ?TM UR-MCNC: 38 MG/DL — HIGH (ref 0–12)
PROT SERPL-MCNC: 5.3 G/DL — LOW (ref 6–8.3)
PROT UR-MCNC: NEGATIVE — SIGNIFICANT CHANGE UP
PROT/CREAT UR-RTO: 1.2 RATIO — HIGH (ref 0–0.2)
PROTHROM AB SERPL-ACNC: 25.6 SEC — HIGH (ref 10.5–13.4)
RBC # BLD: 1.92 M/UL — LOW (ref 3.8–5.2)
RBC # FLD: 23.6 % — HIGH (ref 10.3–14.5)
RBC BLD AUTO: ABNORMAL
RBC CASTS # UR COMP ASSIST: 34 /HPF — HIGH (ref 0–4)
RH IG SCN BLD-IMP: POSITIVE — SIGNIFICANT CHANGE UP
SCHISTOCYTES BLD QL AUTO: SLIGHT — SIGNIFICANT CHANGE UP
SODIUM SERPL-SCNC: 129 MMOL/L — LOW (ref 135–145)
SODIUM UR-SCNC: 68 MMOL/L — SIGNIFICANT CHANGE UP
SP GR SPEC: 1.01 — LOW (ref 1.01–1.02)
SPECIMEN SOURCE: SIGNIFICANT CHANGE UP
TARGETS BLD QL SMEAR: SIGNIFICANT CHANGE UP
UROBILINOGEN FLD QL: NEGATIVE — SIGNIFICANT CHANGE UP
UUN UR-MCNC: 123 MG/DL — SIGNIFICANT CHANGE UP
WBC # BLD: 27.66 K/UL — HIGH (ref 3.8–10.5)
WBC # FLD AUTO: 27.66 K/UL — HIGH (ref 3.8–10.5)
WBC UR QL: 0 /HPF — SIGNIFICANT CHANGE UP (ref 0–5)

## 2023-05-28 PROCEDURE — 76705 ECHO EXAM OF ABDOMEN: CPT | Mod: 26

## 2023-05-28 PROCEDURE — 99233 SBSQ HOSP IP/OBS HIGH 50: CPT

## 2023-05-28 RX ORDER — MAGNESIUM SULFATE 500 MG/ML
2 VIAL (ML) INJECTION ONCE
Refills: 0 | Status: COMPLETED | OUTPATIENT
Start: 2023-05-28 | End: 2023-05-28

## 2023-05-28 RX ORDER — TRAMADOL HYDROCHLORIDE 50 MG/1
50 TABLET ORAL ONCE
Refills: 0 | Status: DISCONTINUED | OUTPATIENT
Start: 2023-05-28 | End: 2023-05-28

## 2023-05-28 RX ORDER — ONDANSETRON 8 MG/1
4 TABLET, FILM COATED ORAL ONCE
Refills: 0 | Status: DISCONTINUED | OUTPATIENT
Start: 2023-05-28 | End: 2023-06-09

## 2023-05-28 RX ORDER — HYDROMORPHONE HYDROCHLORIDE 2 MG/ML
0.5 INJECTION INTRAMUSCULAR; INTRAVENOUS; SUBCUTANEOUS ONCE
Refills: 0 | Status: DISCONTINUED | OUTPATIENT
Start: 2023-05-28 | End: 2023-05-28

## 2023-05-28 RX ORDER — MAGNESIUM SULFATE 500 MG/ML
1 VIAL (ML) INJECTION
Refills: 0 | Status: DISCONTINUED | OUTPATIENT
Start: 2023-05-28 | End: 2023-05-28

## 2023-05-28 RX ORDER — SIMETHICONE 80 MG/1
80 TABLET, CHEWABLE ORAL ONCE
Refills: 0 | Status: DISCONTINUED | OUTPATIENT
Start: 2023-05-28 | End: 2023-06-09

## 2023-05-28 RX ADMIN — OXYCODONE HYDROCHLORIDE 2.5 MILLIGRAM(S): 5 TABLET ORAL at 08:20

## 2023-05-28 RX ADMIN — TRAMADOL HYDROCHLORIDE 50 MILLIGRAM(S): 50 TABLET ORAL at 01:53

## 2023-05-28 RX ADMIN — HYDROMORPHONE HYDROCHLORIDE 0.5 MILLIGRAM(S): 2 INJECTION INTRAMUSCULAR; INTRAVENOUS; SUBCUTANEOUS at 05:29

## 2023-05-28 RX ADMIN — Medication 1 TABLET(S): at 12:40

## 2023-05-28 RX ADMIN — MIDODRINE HYDROCHLORIDE 10 MILLIGRAM(S): 2.5 TABLET ORAL at 22:05

## 2023-05-28 RX ADMIN — HEPARIN SODIUM 5000 UNIT(S): 5000 INJECTION INTRAVENOUS; SUBCUTANEOUS at 05:28

## 2023-05-28 RX ADMIN — Medication 100 MICROGRAM(S): at 05:09

## 2023-05-28 RX ADMIN — HEPARIN SODIUM 5000 UNIT(S): 5000 INJECTION INTRAVENOUS; SUBCUTANEOUS at 13:52

## 2023-05-28 RX ADMIN — Medication 1 MILLIGRAM(S): at 12:40

## 2023-05-28 RX ADMIN — Medication 100 MILLIGRAM(S): at 08:16

## 2023-05-28 RX ADMIN — OXYCODONE HYDROCHLORIDE 2.5 MILLIGRAM(S): 5 TABLET ORAL at 01:30

## 2023-05-28 RX ADMIN — Medication 25 GRAM(S): at 08:16

## 2023-05-28 RX ADMIN — ONDANSETRON 4 MILLIGRAM(S): 8 TABLET, FILM COATED ORAL at 01:23

## 2023-05-28 RX ADMIN — PANTOPRAZOLE SODIUM 40 MILLIGRAM(S): 20 TABLET, DELAYED RELEASE ORAL at 05:09

## 2023-05-28 RX ADMIN — OXYCODONE HYDROCHLORIDE 2.5 MILLIGRAM(S): 5 TABLET ORAL at 01:01

## 2023-05-28 RX ADMIN — TRAMADOL HYDROCHLORIDE 50 MILLIGRAM(S): 50 TABLET ORAL at 01:23

## 2023-05-28 RX ADMIN — OXYCODONE HYDROCHLORIDE 2.5 MILLIGRAM(S): 5 TABLET ORAL at 09:20

## 2023-05-28 RX ADMIN — HYDROMORPHONE HYDROCHLORIDE 0.5 MILLIGRAM(S): 2 INJECTION INTRAMUSCULAR; INTRAVENOUS; SUBCUTANEOUS at 06:03

## 2023-05-28 RX ADMIN — Medication 100 MILLIGRAM(S): at 20:04

## 2023-05-28 RX ADMIN — Medication 100 MILLIGRAM(S): at 01:01

## 2023-05-28 RX ADMIN — SPIRONOLACTONE 300 MILLIGRAM(S): 25 TABLET, FILM COATED ORAL at 05:08

## 2023-05-28 RX ADMIN — PIPERACILLIN AND TAZOBACTAM 25 GRAM(S): 4; .5 INJECTION, POWDER, LYOPHILIZED, FOR SOLUTION INTRAVENOUS at 05:09

## 2023-05-28 RX ADMIN — Medication 100 MILLIGRAM(S): at 12:41

## 2023-05-28 RX ADMIN — MIDODRINE HYDROCHLORIDE 10 MILLIGRAM(S): 2.5 TABLET ORAL at 13:52

## 2023-05-28 RX ADMIN — OXYCODONE HYDROCHLORIDE 2.5 MILLIGRAM(S): 5 TABLET ORAL at 22:54

## 2023-05-28 RX ADMIN — OXYCODONE HYDROCHLORIDE 2.5 MILLIGRAM(S): 5 TABLET ORAL at 16:01

## 2023-05-28 RX ADMIN — Medication 40 MILLIGRAM(S): at 05:09

## 2023-05-28 RX ADMIN — HEPARIN SODIUM 5000 UNIT(S): 5000 INJECTION INTRAVENOUS; SUBCUTANEOUS at 22:05

## 2023-05-28 RX ADMIN — PIPERACILLIN AND TAZOBACTAM 25 GRAM(S): 4; .5 INJECTION, POWDER, LYOPHILIZED, FOR SOLUTION INTRAVENOUS at 22:05

## 2023-05-28 RX ADMIN — OXYCODONE HYDROCHLORIDE 2.5 MILLIGRAM(S): 5 TABLET ORAL at 17:01

## 2023-05-28 RX ADMIN — Medication 100 MILLIGRAM(S): at 05:10

## 2023-05-28 RX ADMIN — PIPERACILLIN AND TAZOBACTAM 25 GRAM(S): 4; .5 INJECTION, POWDER, LYOPHILIZED, FOR SOLUTION INTRAVENOUS at 13:47

## 2023-05-28 RX ADMIN — Medication 100 MILLIGRAM(S): at 16:01

## 2023-05-28 RX ADMIN — OXYCODONE HYDROCHLORIDE 2.5 MILLIGRAM(S): 5 TABLET ORAL at 22:05

## 2023-05-28 RX ADMIN — MIDODRINE HYDROCHLORIDE 10 MILLIGRAM(S): 2.5 TABLET ORAL at 05:08

## 2023-05-28 RX ADMIN — Medication 63.75 MILLIMOLE(S): at 08:17

## 2023-05-28 RX ADMIN — LACTULOSE 10 GRAM(S): 10 SOLUTION ORAL at 05:10

## 2023-05-28 NOTE — PROGRESS NOTE ADULT - SUBJECTIVE AND OBJECTIVE BOX
· Subjective and Objective:   Problem list   AUD with cirrhosis and alcoholic hepatitis   No history of treatment with steroids  MSSA bacteremia positive culture 5/13/2023, repeat blood cultures negative  Pneumonia on CT chest  Low level CMV viremia  Facial trauma secondary to fall    MRCP did not show any malignancy or biliary stricture  Moderate to large ascites  Small esophageal varices on EGD done May 2023  Listed for OLT blood type A    Patient is seen at bedside  Complains of discomfort on her abdomen because of ascites  No nausea, vomiting, diarrhea  No rectal bleeding  No confusion  No chest pain or shortness of breath  Cough is better  No fever  Vital signs are stable  Significant scleral icterus   significant muscle loss of face and upper body  Moderate ascites with no tenderness  Awake alert and oriented    Labs and imaging were reviewed        Assessment and Plan:   · Assessment	  Impression/suggestions  Acute on chronic liver failure with acute alcoholic hepatitis in the context of cirrhosis  Listed for liver transplant with MELD 32   Transplant education was provided to her, her father and her friend after obtaining her permission   MSSA bacteremia on IV antibiotics  Pneumonia on IV Zosyn  Check Fungitell  Please check urine sodium and potassium  Importance of continuous alcohol avoidance was discussed with patient in details  Low-salt high-protein diet  Hold diuretics as her serum na has decreased   On oxycodone for abdominal pain. The risks associated with narcotics were discussed

## 2023-05-29 LAB
ALBUMIN SERPL ELPH-MCNC: 2.8 G/DL — LOW (ref 3.3–5)
ALP SERPL-CCNC: 164 U/L — HIGH (ref 40–120)
ALT FLD-CCNC: <5 U/L — LOW (ref 10–45)
ANION GAP SERPL CALC-SCNC: 14 MMOL/L — SIGNIFICANT CHANGE UP (ref 5–17)
APTT BLD: 55.8 SEC — HIGH (ref 27.5–35.5)
AST SERPL-CCNC: 100 U/L — HIGH (ref 10–40)
BASOPHILS # BLD AUTO: 0.08 K/UL — SIGNIFICANT CHANGE UP (ref 0–0.2)
BASOPHILS NFR BLD AUTO: 0.3 % — SIGNIFICANT CHANGE UP (ref 0–2)
BILIRUB SERPL-MCNC: 30.8 MG/DL — HIGH (ref 0.2–1.2)
BUN SERPL-MCNC: 8 MG/DL — SIGNIFICANT CHANGE UP (ref 7–23)
CALCIUM SERPL-MCNC: 8.4 MG/DL — SIGNIFICANT CHANGE UP (ref 8.4–10.5)
CHLORIDE SERPL-SCNC: 93 MMOL/L — LOW (ref 96–108)
CO2 SERPL-SCNC: 20 MMOL/L — LOW (ref 22–31)
CREAT SERPL-MCNC: 0.78 MG/DL — SIGNIFICANT CHANGE UP (ref 0.5–1.3)
CULTURE RESULTS: SIGNIFICANT CHANGE UP
EGFR: 99 ML/MIN/1.73M2 — SIGNIFICANT CHANGE UP
EOSINOPHIL # BLD AUTO: 0.17 K/UL — SIGNIFICANT CHANGE UP (ref 0–0.5)
EOSINOPHIL NFR BLD AUTO: 0.7 % — SIGNIFICANT CHANGE UP (ref 0–6)
ETHANOL SERPL-MCNC: <10 MG/DL — SIGNIFICANT CHANGE UP (ref 0–10)
GLUCOSE SERPL-MCNC: 92 MG/DL — SIGNIFICANT CHANGE UP (ref 70–99)
HCT VFR BLD CALC: 21.5 % — LOW (ref 34.5–45)
HGB BLD-MCNC: 7.3 G/DL — LOW (ref 11.5–15.5)
IMM GRANULOCYTES NFR BLD AUTO: 2.2 % — HIGH (ref 0–0.9)
INR BLD: 2.05 RATIO — HIGH (ref 0.88–1.16)
LYMPHOCYTES # BLD AUTO: 1.65 K/UL — SIGNIFICANT CHANGE UP (ref 1–3.3)
LYMPHOCYTES # BLD AUTO: 7.1 % — LOW (ref 13–44)
MAGNESIUM SERPL-MCNC: 1.8 MG/DL — SIGNIFICANT CHANGE UP (ref 1.6–2.6)
MCHC RBC-ENTMCNC: 34 GM/DL — SIGNIFICANT CHANGE UP (ref 32–36)
MCHC RBC-ENTMCNC: 36.9 PG — HIGH (ref 27–34)
MCV RBC AUTO: 108.6 FL — HIGH (ref 80–100)
MONOCYTES # BLD AUTO: 1.84 K/UL — HIGH (ref 0–0.9)
MONOCYTES NFR BLD AUTO: 7.9 % — SIGNIFICANT CHANGE UP (ref 2–14)
NEUTROPHILS # BLD AUTO: 19.03 K/UL — HIGH (ref 1.8–7.4)
NEUTROPHILS NFR BLD AUTO: 81.8 % — HIGH (ref 43–77)
NRBC # BLD: 0 /100 WBCS — SIGNIFICANT CHANGE UP (ref 0–0)
PHOSPHATE SERPL-MCNC: 2.3 MG/DL — LOW (ref 2.5–4.5)
PLATELET # BLD AUTO: 185 K/UL — SIGNIFICANT CHANGE UP (ref 150–400)
POTASSIUM SERPL-MCNC: 3.5 MMOL/L — SIGNIFICANT CHANGE UP (ref 3.5–5.3)
POTASSIUM SERPL-SCNC: 3.5 MMOL/L — SIGNIFICANT CHANGE UP (ref 3.5–5.3)
PROT SERPL-MCNC: 5.3 G/DL — LOW (ref 6–8.3)
PROTHROM AB SERPL-ACNC: 23.8 SEC — HIGH (ref 10.5–13.4)
RBC # BLD: 1.98 M/UL — LOW (ref 3.8–5.2)
RBC # FLD: 23.5 % — HIGH (ref 10.3–14.5)
SODIUM SERPL-SCNC: 127 MMOL/L — LOW (ref 135–145)
SPECIMEN SOURCE: SIGNIFICANT CHANGE UP
WBC # BLD: 23.28 K/UL — HIGH (ref 3.8–10.5)
WBC # FLD AUTO: 23.28 K/UL — HIGH (ref 3.8–10.5)

## 2023-05-29 PROCEDURE — 99232 SBSQ HOSP IP/OBS MODERATE 35: CPT

## 2023-05-29 RX ADMIN — Medication 100 MILLIGRAM(S): at 05:47

## 2023-05-29 RX ADMIN — OXYCODONE HYDROCHLORIDE 2.5 MILLIGRAM(S): 5 TABLET ORAL at 18:42

## 2023-05-29 RX ADMIN — OXYCODONE HYDROCHLORIDE 2.5 MILLIGRAM(S): 5 TABLET ORAL at 05:41

## 2023-05-29 RX ADMIN — OXYCODONE HYDROCHLORIDE 2.5 MILLIGRAM(S): 5 TABLET ORAL at 06:40

## 2023-05-29 RX ADMIN — Medication 100 MILLIGRAM(S): at 17:38

## 2023-05-29 RX ADMIN — Medication 100 MILLIGRAM(S): at 08:35

## 2023-05-29 RX ADMIN — Medication 100 MICROGRAM(S): at 05:41

## 2023-05-29 RX ADMIN — PANTOPRAZOLE SODIUM 40 MILLIGRAM(S): 20 TABLET, DELAYED RELEASE ORAL at 05:42

## 2023-05-29 RX ADMIN — PIPERACILLIN AND TAZOBACTAM 25 GRAM(S): 4; .5 INJECTION, POWDER, LYOPHILIZED, FOR SOLUTION INTRAVENOUS at 21:45

## 2023-05-29 RX ADMIN — Medication 1 MILLIGRAM(S): at 12:29

## 2023-05-29 RX ADMIN — OXYCODONE HYDROCHLORIDE 2.5 MILLIGRAM(S): 5 TABLET ORAL at 12:28

## 2023-05-29 RX ADMIN — HEPARIN SODIUM 5000 UNIT(S): 5000 INJECTION INTRAVENOUS; SUBCUTANEOUS at 14:55

## 2023-05-29 RX ADMIN — Medication 100 MILLIGRAM(S): at 12:29

## 2023-05-29 RX ADMIN — MIDODRINE HYDROCHLORIDE 10 MILLIGRAM(S): 2.5 TABLET ORAL at 14:56

## 2023-05-29 RX ADMIN — OXYCODONE HYDROCHLORIDE 2.5 MILLIGRAM(S): 5 TABLET ORAL at 19:42

## 2023-05-29 RX ADMIN — HEPARIN SODIUM 5000 UNIT(S): 5000 INJECTION INTRAVENOUS; SUBCUTANEOUS at 21:45

## 2023-05-29 RX ADMIN — OXYCODONE HYDROCHLORIDE 2.5 MILLIGRAM(S): 5 TABLET ORAL at 13:28

## 2023-05-29 RX ADMIN — HEPARIN SODIUM 5000 UNIT(S): 5000 INJECTION INTRAVENOUS; SUBCUTANEOUS at 05:42

## 2023-05-29 RX ADMIN — MIDODRINE HYDROCHLORIDE 10 MILLIGRAM(S): 2.5 TABLET ORAL at 21:46

## 2023-05-29 RX ADMIN — PIPERACILLIN AND TAZOBACTAM 25 GRAM(S): 4; .5 INJECTION, POWDER, LYOPHILIZED, FOR SOLUTION INTRAVENOUS at 05:41

## 2023-05-29 RX ADMIN — PIPERACILLIN AND TAZOBACTAM 25 GRAM(S): 4; .5 INJECTION, POWDER, LYOPHILIZED, FOR SOLUTION INTRAVENOUS at 14:55

## 2023-05-29 RX ADMIN — Medication 100 MILLIGRAM(S): at 00:00

## 2023-05-29 RX ADMIN — Medication 1 TABLET(S): at 12:29

## 2023-05-29 RX ADMIN — MIDODRINE HYDROCHLORIDE 10 MILLIGRAM(S): 2.5 TABLET ORAL at 05:41

## 2023-05-29 RX ADMIN — Medication 100 MILLIGRAM(S): at 18:42

## 2023-05-29 RX ADMIN — LACTULOSE 10 GRAM(S): 10 SOLUTION ORAL at 05:42

## 2023-05-29 NOTE — PROGRESS NOTE ADULT - ASSESSMENT
38F with Hx of hypothyroidism (on Levothyroxine), liver cirrhosis 2/2 ETOH abuse---->  presented to [facility] ED on 5/13/23 with ~ 2 weeks Hx of progressively worsening abdominal pain and distention.  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     [] Decompensated ETOH Cirrhosis - Listed for OLT with MELD 32   - MSSA bacteremia (5/13)repeat bl cx neg; currently on Cefazolin (5/13 ->); s/p SAHARA (5/25) w/o e/o vegetations  - Ascites: diuretics on hold; IR consult for paracentesis   - Varices: s/p EGD (5/25) Small (< 5 mm) esophageal varices not amenable to banding  - Hepatic encephalopathy: A/Ox3. Decrease Lactulose to BID and stop Rifaxamin.   - HCC: Alpha Fetoprotein - Tumor Marker: 2.9 ng/mL (05-14-23 @ 08:26)  - PT /OOB

## 2023-05-29 NOTE — PROGRESS NOTE ADULT - SUBJECTIVE AND OBJECTIVE BOX
Problem list   AUD with cirrhosis and alcoholic hepatitis   No history of treatment with steroids  MSSA bacteremia positive culture 5/13/2023, repeat blood cultures negative  Pneumonia on CT chest  Low level CMV viremia  Facial trauma secondary to fall    MRCP did not show any malignancy or biliary stricture  Moderate to large ascites  Small esophageal varices on EGD done May 2023  Listed for OLT blood type A    Patient is seen at bedside  Complains of discomfort on her abdomen because of ascites  No nausea, vomiting, diarrhea  No rectal bleeding  No confusion  No chest pain or shortness of breath  Cough is better  No fever  Vital signs are stable  Significant scleral icterus  significant muscle loss of face and upper body  Moderate ascites with no tenderness  Awake alert and oriented    Labs and imaging were reviewed        Assessment and Plan: 	  Impression/suggestions  Acute on chronic liver failure with acute alcoholic hepatitis in the context of cirrhosis  Listed for liver transplant with MELD 32. Re-MELD today    Transplant education was provided to her, her father and her friend after obtaining her permission   MSSA bacteremia on IV antibiotics  Pneumonia on IV Zosyn    Please check urine sodium and potassium  Importance of continuous alcohol avoidance was discussed with patient in details  Low-salt high-protein diet  Hold diuretics as her serum Na has decreased   On oxycodone for abdominal pain. Lower the dose to TID

## 2023-05-29 NOTE — PROGRESS NOTE ADULT - SUBJECTIVE AND OBJECTIVE BOX
Interval Events:   - Listed for OLT with MELD 32   - diuretics held  - IR c/s for LVP (US with mod ascites)  - AO x 3      MEDICATIONS  (STANDING):  ceFAZolin   IVPB 2000 milliGRAM(s) IV Intermittent every 8 hours  folic acid 1 milliGRAM(s) Oral daily  heparin   Injectable 5000 Unit(s) SubCutaneous every 8 hours  lactulose Syrup 10 Gram(s) Oral two times a day  levothyroxine 100 MICROGram(s) Oral daily  midodrine. 10 milliGRAM(s) Oral every 8 hours  multivitamin 1 Tablet(s) Oral daily  ondansetron Injectable 4 milliGRAM(s) IV Push once  pantoprazole    Tablet 40 milliGRAM(s) Oral before breakfast  piperacillin/tazobactam IVPB.. 3.375 Gram(s) IV Intermittent every 8 hours  simethicone 80 milliGRAM(s) Chew once  thiamine 100 milliGRAM(s) Oral daily    MEDICATIONS  (PRN):  guaiFENesin Oral Liquid (Sugar-Free) 100 milliGRAM(s) Oral every 6 hours PRN Cough  ondansetron Injectable 4 milliGRAM(s) IV Push every 6 hours PRN Nausea and/or Vomiting  oxyCODONE    IR 2.5 milliGRAM(s) Oral every 6 hours PRN Moderate Pain (4 - 6)      PAST MEDICAL & SURGICAL HISTORY:  Hypothyroidism  Alcoholic liver disease  No significant past surgical history    Vital Signs Last 24 Hrs  T(C): 36.9 (29 May 2023 13:00), Max: 37.2 (28 May 2023 17:00)  T(F): 98.5 (29 May 2023 13:00), Max: 99 (28 May 2023 21:00)  HR: 94 (29 May 2023 13:00) (79 - 96)  BP: 95/56 (29 May 2023 13:00) (90/52 - 96/57)  BP(mean): --  RR: 18 (29 May 2023 13:00) (18 - 18)  SpO2: 98% (29 May 2023 13:00) (94% - 98%)    Parameters below as of 29 May 2023 13:00  Patient On (Oxygen Delivery Method): room air    I&O's Summary    28 May 2023 07:01  -  29 May 2023 07:00  --------------------------------------------------------  IN: 2040 mL / OUT: 1250 mL / NET: 790 mL                          7.3    23.28 )-----------( 185      ( 29 May 2023 06:19 )             21.5     05-29    127<L>  |  93<L>  |  8   ----------------------------<  92  3.5   |  20<L>  |  0.78    Ca    8.4      29 May 2023 06:18  Phos  2.3     05-29  Mg     1.8     05-29    TPro  5.3<L>  /  Alb  2.8<L>  /  TBili  30.8<H>  /  DBili  x   /  AST  100<H>  /  ALT  <5<L>  /  AlkPhos  164<H>  05-29      Culture - Urine (collected 05-28-23 @ 10:27)  Source: Clean Catch Clean Catch (Midstream)  Final Report (05-29-23 @ 11:58):    <10,000 CFU/mL Normal Urogenital Aga    Culture - Sputum (collected 05-26-23 @ 18:05)  Source: .Sputum Sputum  Gram Stain (05-27-23 @ 07:50):    Few polymorphonuclear leukocytes per low power field    Moderate Squamous epithelial cells per low power field    Moderate Yeast like cells seen per oil power field  Final Report (05-28-23 @ 18:19):    Normal Respiratory Aga present    Culture - Acid Fast - Sputum w/Smear (collected 05-26-23 @ 18:05)  Source: .Sputum Sputum    Culture - Blood (collected 05-26-23 @ 07:16)  Source: .Blood Blood-Peripheral  Preliminary Report (05-27-23 @ 11:02):    No growth to date.    Culture - Blood (collected 05-26-23 @ 07:16)  Source: .Blood Blood-Peripheral  Preliminary Report (05-27-23 @ 11:02):    No growth to date.    Culture - Blood (collected 05-24-23 @ 22:39)  Source: .Blood Blood-Peripheral  Preliminary Report (05-26-23 @ 02:01):    No growth to date.    Culture - Blood (collected 05-24-23 @ 22:39)  Source: .Blood Blood-Peripheral  Preliminary Report (05-26-23 @ 02:01):    No growth to date.    Culture - Fungal, Body Fluid (collected 05-23-23 @ 17:21)  Source: Peritoneal Peritoneal Fluid  Preliminary Report (05-27-23 @ 15:03):    Culture is being performed. Fungal cultures are held for 4 weeks.    Culture - Body Fluid with Gram Stain (collected 05-23-23 @ 17:21)  Source: Peritoneal Peritoneal Fluid  Gram Stain (05-24-23 @ 04:28):    polymorphonuclear leukocytes seen    No organisms seen    by cytocentrifuge  Final Report (05-28-23 @ 15:07):    No growth at 5 days    Culture - Blood (collected 05-23-23 @ 10:41)  Source: .Blood Blood-Peripheral  Final Report (05-28-23 @ 14:00):    No Growth Final    Culture - Blood (collected 05-23-23 @ 05:22)  Source: .Blood Blood  Final Report (05-28-23 @ 14:01):    No Growth Final    Culture - Urine (collected 05-22-23 @ 21:17)  Source: Clean Catch Clean Catch (Midstream)  Final Report (05-25-23 @ 03:54):    50,000 - 99,000 CFU/mL Enterococcus faecium (vancomycin resistant)  Organism: Enterococcus faecium (vancomycin resistant) (05-25-23 @ 03:54)  Organism: Enterococcus faecium (vancomycin resistant) (05-25-23 @ 03:54)      ROS: 14 point ROS negative unless otherwise stated in subjective      PHYSICAL EXAM:   GENERAL:  Well developed, +chronically ill appearing  HEENT:  NC/AT,  +sclera icteric; +R forehead hematoma without active bleeding  CHEST:  Full & symmetric excursion, no increased effort w/ respirations  HEART:  Regular rhythm & rate  ABDOMEN:  Soft, +diffusely but mildly tender, +mildly distended; +BS, no rebound tenderness or guarding, +dependent flank edema  EXTREMITIES:  no LE edema, +sarcopenia  SKIN:  +jaundice  NEURO:  Alert, orientedx3, no asterixis

## 2023-05-29 NOTE — CONSULT NOTE ADULT - SUBJECTIVE AND OBJECTIVE BOX
38 F  Decompensated ETOH Cirrhosis - Listed for OLT with MELD 32   IR consulted for paracentesis.    plan for  above procedure tenatively Tues/Wed depending on schedule . can put order for IR procedure under Dr Halaibeh.  please recheck CBC BMP Coags 4 AM

## 2023-05-30 LAB
ALBUMIN SERPL ELPH-MCNC: 2.9 G/DL — LOW (ref 3.3–5)
ALP SERPL-CCNC: 165 U/L — HIGH (ref 40–120)
ALT FLD-CCNC: <5 U/L — LOW (ref 10–45)
ANION GAP SERPL CALC-SCNC: 13 MMOL/L — SIGNIFICANT CHANGE UP (ref 5–17)
APTT BLD: 49.4 SEC — HIGH (ref 27.5–35.5)
AST SERPL-CCNC: 106 U/L — HIGH (ref 10–40)
BASOPHILS # BLD AUTO: 0.09 K/UL — SIGNIFICANT CHANGE UP (ref 0–0.2)
BASOPHILS NFR BLD AUTO: 0.4 % — SIGNIFICANT CHANGE UP (ref 0–2)
BILIRUB SERPL-MCNC: 31.3 MG/DL — HIGH (ref 0.2–1.2)
BUN SERPL-MCNC: 9 MG/DL — SIGNIFICANT CHANGE UP (ref 7–23)
CALCIUM SERPL-MCNC: 8.7 MG/DL — SIGNIFICANT CHANGE UP (ref 8.4–10.5)
CHLORIDE SERPL-SCNC: 94 MMOL/L — LOW (ref 96–108)
CO2 SERPL-SCNC: 21 MMOL/L — LOW (ref 22–31)
CREAT SERPL-MCNC: 0.88 MG/DL — SIGNIFICANT CHANGE UP (ref 0.5–1.3)
CULTURE RESULTS: SIGNIFICANT CHANGE UP
CULTURE RESULTS: SIGNIFICANT CHANGE UP
CYTOLOGY SPEC DOC CYTO: SIGNIFICANT CHANGE UP
EGFR: 86 ML/MIN/1.73M2 — SIGNIFICANT CHANGE UP
EOSINOPHIL # BLD AUTO: 0.15 K/UL — SIGNIFICANT CHANGE UP (ref 0–0.5)
EOSINOPHIL NFR BLD AUTO: 0.7 % — SIGNIFICANT CHANGE UP (ref 0–6)
FUNGITELL: 38 PG/ML — SIGNIFICANT CHANGE UP
GLUCOSE SERPL-MCNC: 102 MG/DL — HIGH (ref 70–99)
HCT VFR BLD CALC: 21.3 % — LOW (ref 34.5–45)
HGB BLD-MCNC: 7.3 G/DL — LOW (ref 11.5–15.5)
IMM GRANULOCYTES NFR BLD AUTO: 1.7 % — HIGH (ref 0–0.9)
INR BLD: 2.1 RATIO — HIGH (ref 0.88–1.16)
LYMPHOCYTES # BLD AUTO: 1.62 K/UL — SIGNIFICANT CHANGE UP (ref 1–3.3)
LYMPHOCYTES # BLD AUTO: 8 % — LOW (ref 13–44)
MAGNESIUM SERPL-MCNC: 1.8 MG/DL — SIGNIFICANT CHANGE UP (ref 1.6–2.6)
MCHC RBC-ENTMCNC: 34.3 GM/DL — SIGNIFICANT CHANGE UP (ref 32–36)
MCHC RBC-ENTMCNC: 37.2 PG — HIGH (ref 27–34)
MCV RBC AUTO: 108.7 FL — HIGH (ref 80–100)
MONOCYTES # BLD AUTO: 1.69 K/UL — HIGH (ref 0–0.9)
MONOCYTES NFR BLD AUTO: 8.4 % — SIGNIFICANT CHANGE UP (ref 2–14)
NEUTROPHILS # BLD AUTO: 16.31 K/UL — HIGH (ref 1.8–7.4)
NEUTROPHILS NFR BLD AUTO: 80.8 % — HIGH (ref 43–77)
NRBC # BLD: 0 /100 WBCS — SIGNIFICANT CHANGE UP (ref 0–0)
PHOSPHATE SERPL-MCNC: 2.3 MG/DL — LOW (ref 2.5–4.5)
PLATELET # BLD AUTO: 212 K/UL — SIGNIFICANT CHANGE UP (ref 150–400)
POTASSIUM SERPL-MCNC: 3.6 MMOL/L — SIGNIFICANT CHANGE UP (ref 3.5–5.3)
POTASSIUM SERPL-SCNC: 3.6 MMOL/L — SIGNIFICANT CHANGE UP (ref 3.5–5.3)
PROT SERPL-MCNC: 5.3 G/DL — LOW (ref 6–8.3)
PROTHROM AB SERPL-ACNC: 24.5 SEC — HIGH (ref 10.5–13.4)
RBC # BLD: 1.96 M/UL — LOW (ref 3.8–5.2)
RBC # FLD: 23.2 % — HIGH (ref 10.3–14.5)
SODIUM SERPL-SCNC: 128 MMOL/L — LOW (ref 135–145)
SPECIMEN SOURCE: SIGNIFICANT CHANGE UP
SPECIMEN SOURCE: SIGNIFICANT CHANGE UP
WBC # BLD: 20.21 K/UL — HIGH (ref 3.8–10.5)
WBC # FLD AUTO: 20.21 K/UL — HIGH (ref 3.8–10.5)

## 2023-05-30 PROCEDURE — 99233 SBSQ HOSP IP/OBS HIGH 50: CPT

## 2023-05-30 PROCEDURE — 99232 SBSQ HOSP IP/OBS MODERATE 35: CPT

## 2023-05-30 RX ADMIN — Medication 100 MICROGRAM(S): at 05:47

## 2023-05-30 RX ADMIN — Medication 100 MILLIGRAM(S): at 15:14

## 2023-05-30 RX ADMIN — MIDODRINE HYDROCHLORIDE 10 MILLIGRAM(S): 2.5 TABLET ORAL at 05:47

## 2023-05-30 RX ADMIN — HEPARIN SODIUM 5000 UNIT(S): 5000 INJECTION INTRAVENOUS; SUBCUTANEOUS at 21:40

## 2023-05-30 RX ADMIN — PANTOPRAZOLE SODIUM 40 MILLIGRAM(S): 20 TABLET, DELAYED RELEASE ORAL at 06:09

## 2023-05-30 RX ADMIN — OXYCODONE HYDROCHLORIDE 2.5 MILLIGRAM(S): 5 TABLET ORAL at 22:05

## 2023-05-30 RX ADMIN — Medication 1 MILLIGRAM(S): at 12:37

## 2023-05-30 RX ADMIN — OXYCODONE HYDROCHLORIDE 2.5 MILLIGRAM(S): 5 TABLET ORAL at 16:14

## 2023-05-30 RX ADMIN — Medication 1 TABLET(S): at 12:37

## 2023-05-30 RX ADMIN — OXYCODONE HYDROCHLORIDE 2.5 MILLIGRAM(S): 5 TABLET ORAL at 08:08

## 2023-05-30 RX ADMIN — MIDODRINE HYDROCHLORIDE 10 MILLIGRAM(S): 2.5 TABLET ORAL at 15:14

## 2023-05-30 RX ADMIN — Medication 100 MILLIGRAM(S): at 01:24

## 2023-05-30 RX ADMIN — OXYCODONE HYDROCHLORIDE 2.5 MILLIGRAM(S): 5 TABLET ORAL at 15:14

## 2023-05-30 RX ADMIN — Medication 100 MILLIGRAM(S): at 08:08

## 2023-05-30 RX ADMIN — PIPERACILLIN AND TAZOBACTAM 25 GRAM(S): 4; .5 INJECTION, POWDER, LYOPHILIZED, FOR SOLUTION INTRAVENOUS at 15:14

## 2023-05-30 RX ADMIN — OXYCODONE HYDROCHLORIDE 2.5 MILLIGRAM(S): 5 TABLET ORAL at 21:31

## 2023-05-30 RX ADMIN — HEPARIN SODIUM 5000 UNIT(S): 5000 INJECTION INTRAVENOUS; SUBCUTANEOUS at 05:47

## 2023-05-30 RX ADMIN — Medication 100 MILLIGRAM(S): at 23:40

## 2023-05-30 RX ADMIN — MIDODRINE HYDROCHLORIDE 10 MILLIGRAM(S): 2.5 TABLET ORAL at 21:32

## 2023-05-30 RX ADMIN — Medication 100 MILLIGRAM(S): at 21:31

## 2023-05-30 RX ADMIN — Medication 100 MILLIGRAM(S): at 00:58

## 2023-05-30 RX ADMIN — HEPARIN SODIUM 5000 UNIT(S): 5000 INJECTION INTRAVENOUS; SUBCUTANEOUS at 15:14

## 2023-05-30 RX ADMIN — PIPERACILLIN AND TAZOBACTAM 25 GRAM(S): 4; .5 INJECTION, POWDER, LYOPHILIZED, FOR SOLUTION INTRAVENOUS at 05:48

## 2023-05-30 RX ADMIN — Medication 100 MILLIGRAM(S): at 12:38

## 2023-05-30 RX ADMIN — OXYCODONE HYDROCHLORIDE 2.5 MILLIGRAM(S): 5 TABLET ORAL at 09:08

## 2023-05-30 RX ADMIN — OXYCODONE HYDROCHLORIDE 2.5 MILLIGRAM(S): 5 TABLET ORAL at 02:04

## 2023-05-30 RX ADMIN — PIPERACILLIN AND TAZOBACTAM 25 GRAM(S): 4; .5 INJECTION, POWDER, LYOPHILIZED, FOR SOLUTION INTRAVENOUS at 21:31

## 2023-05-30 RX ADMIN — OXYCODONE HYDROCHLORIDE 2.5 MILLIGRAM(S): 5 TABLET ORAL at 01:04

## 2023-05-30 NOTE — PROGRESS NOTE ADULT - ATTENDING COMMENTS
Patient is listed for OLT  No major clinical change  On IV antibiotics for pneumonia and MSSA septicemia  Nutritional support  Paracentesis today  Restart the diuretics tomorrow

## 2023-05-30 NOTE — PROGRESS NOTE ADULT - SUBJECTIVE AND OBJECTIVE BOX
afebrile, WBC still in the 20's, peaked at 27 over the weekend, awaiting repeat paracentesis  denies diarrhea. Still dry cough but no sputum production, remains on RA  no dysuria./ main complaint is abdominal pain  ____________________________________________________  ROS  GENERAL: denies chills, , night sweats, weight loss.   PSYCH: denies depression, anxiety, suicidal ideation, hallucination, and delusions  SKIN: no rash or lesions; no color changes, no abnormal nevi,no  dryness, and nojaundice    EYES: denies visual changes, floaters, pain, inflammation, blurred vision, and discharge  ENT: denies tinnitus, vertigo, epistaxis, oral lesion, and decreased acuity  PULM: denies, hemoptysis, pleurisy  CVS: denies angina, palpitations,+ orthopnea, no syncope, or heart murmur  GI: denies constipation, diarrhea, melena, abdominal pain, nausea.   : denies dysuria, frequency, discharge, incontinence, stones or macroscopic hematuria  MS: no arthralgias, no erythema or swelling, no myalgias, noedema, or lower back pain.   CNS: denies numbness, dizziness, seizure, or tremor  ENDO: denies heat/cold intolerance, polyuria, polydipsia, malaise.    HEME: denies bruising, bleeding, lymphadenopathy, anemia, and calf pain    Allergies  No Known Allergies    __________________________________________________  MEDS:  MEDICATIONS  (STANDING):  benzonatate 100 every 8 hours PRN  guaiFENesin Oral Liquid (Sugar-Free) 100 every 6 hours PRN  heparin   Injectable 5000 every 8 hours  lactulose Syrup 10 two times a day  levothyroxine 100 daily  midodrine. 10 every 8 hours  ondansetron Injectable 4 every 6 hours PRN  ondansetron Injectable 4 once  oxyCODONE    IR 2.5 every 6 hours PRN  pantoprazole    Tablet 40 before breakfast  simethicone 80 once    _________________________________________________  ANTIMICOBIALS  ceFAZolin   IVPB 2000 every 8 hours  piperacillin/tazobactam IVPB.. 3.375 every 8 hours      GENERAL LABS              7.3                  128  | 21   | 9            20.21 >-----------< 212     ------------------------< 102                   21.3                 3.6  | 94   | 0.88                                         Ca 8.7   Mg 1.8   Ph 2.3        Urinalysis Basic - ( 28 May 2023 10:27 )    Color: Dark Yellow / Appearance: Clear / S.009 / pH: x  Gluc: x / Ketone: Negative  / Bili: Moderate / Urobili: Negative   Blood: x / Protein: Negative / Nitrite: Negative   Leuk Esterase: Negative / RBC: 34 /hpf / WBC 0 /HPF   Sq Epi: x / Non Sq Epi: x / Bacteria: Negative        _________________________________________________  MICROBIOLOGY  -----------    Culture - Urine (collected 28 May 2023 10:27)  Source: Clean Catch Clean Catch (Midstream)  Final Report (29 May 2023 11:58):    <10,000 CFU/mL Normal Urogenital Aga    Culture - Blood (collected 28 May 2023 06:53)  Source: .Blood Blood-Peripheral  Preliminary Report (30 May 2023 08:01):    No growth to date.    Culture - Blood (collected 28 May 2023 05:40)  Source: .Blood Blood-Peripheral  Preliminary Report (30 May 2023 08:01):    No growth to date.    Culture - Sputum (collected 26 May 2023 18:05)  Source: .Sputum Sputum  Gram Stain (27 May 2023 07:50):    Few polymorphonuclear leukocytes per low power field    Moderate Squamous epithelial cells per low power field    Moderate Yeast like cells seen per oil power field  Final Report (28 May 2023 18:19):    Normal Respiratory Aga present    Culture - Acid Fast - Sputum w/Smear (collected 26 May 2023 18:05)  Source: .Sputum Sputum    Culture - Blood (collected 26 May 2023 07:16)  Source: .Blood Blood-Peripheral  Preliminary Report (27 May 2023 11:02):    No growth to date.    Culture - Blood (collected 26 May 2023 07:16)  Source: .Blood Blood-Peripheral  Preliminary Report (27 May 2023 11:02):    No growth to date.                Legionella Antigen, Urine: Negative (23 @ 23:20)      Fungitell:   _______________________________________________  PERTINENT IMAGING  _________________________________________________  Physical Exam:   T(C): 37.2 (23 @ 17:00), Max: 37.2 (23 @ 01:08)  HR: 92 (23 @ 17:00) (69 - 99)  BP: 99/58 (23 @ 17:00) (92/51 - 99/58)  RR: 18 (23 @ 17:00) (18 - 18)  SpO2: 95% (23 @ 17:00) (94% - 95%)    23 @ 07:01  -  23 @ 07:00  --------------------------------------------------------  IN: 1540 mL / OUT: 1350 mL / NET: 190 mL      HEENT: neck supple  Eyes PEERLA, icteric  · Respiratory	lower lobe crackles  · Cardiovascular	regular rate and rhythm; S1 S2 present; no gallops; no rub; no murmur; no JVD; normal PMI; no pedal edema  · Cardiovascular Comments	no S3/s4  · Gastrointestinal	soft; distended   · Genitourinary Female	normal external genitalia  · Neurological	cranial nerves II-XII intact; sensation intact; responds to pain; responds to verbal commands; deep reflexes intact; no meningismus  · Mental Status	Alert and oriented x3, appropriate  · Skin	nodular lesion of the R front with central scab, non erythematous, non fluctuant  · Lymphatic	No lymphadedenopathy  · Musculoskeletal	+_ edema

## 2023-05-30 NOTE — PROGRESS NOTE ADULT - SUBJECTIVE AND OBJECTIVE BOX
Chief Complaint:  Patient is a 38y old  Female who presents with a chief complaint of Liver Failure (29 May 2023 18:38)      Interval Events: Reports feeling well. Denies any N/V/D/C, abd pain, melena or hematochezia.      Hospital Medications:  ceFAZolin   IVPB 2000 milliGRAM(s) IV Intermittent every 8 hours  folic acid 1 milliGRAM(s) Oral daily  guaiFENesin Oral Liquid (Sugar-Free) 100 milliGRAM(s) Oral every 6 hours PRN  heparin   Injectable 5000 Unit(s) SubCutaneous every 8 hours  lactulose Syrup 10 Gram(s) Oral two times a day  levothyroxine 100 MICROGram(s) Oral daily  midodrine. 10 milliGRAM(s) Oral every 8 hours  multivitamin 1 Tablet(s) Oral daily  ondansetron Injectable 4 milliGRAM(s) IV Push every 6 hours PRN  ondansetron Injectable 4 milliGRAM(s) IV Push once  oxyCODONE    IR 2.5 milliGRAM(s) Oral every 6 hours PRN  pantoprazole    Tablet 40 milliGRAM(s) Oral before breakfast  piperacillin/tazobactam IVPB.. 3.375 Gram(s) IV Intermittent every 8 hours  simethicone 80 milliGRAM(s) Chew once  thiamine 100 milliGRAM(s) Oral daily      PMHX/PSHX:  Hypothyroidism    Alcoholic liver disease    No significant past surgical history            ROS: 14 point ROS negative unless otherwise stated in subjective      PHYSICAL EXAM:     GENERAL:  Well developed, +chronically ill appearing  HEENT:  NC/AT,  +sclera icteric; +R forehead hematoma without active bleeding  CHEST:  Full & symmetric excursion, no increased effort w/ respirations  HEART:  Regular rhythm & rate  ABDOMEN:  Soft, +diffusely but mildly tender, +mildly distended; +BS, no rebound tenderness or guarding, +dependent flank edema  EXTREMITIES:  no LE edema, +sarcopenia  SKIN:  +jaundice  NEURO:  Alert, orientedx3, no asterixis    Vital Signs:  Vital Signs Last 24 Hrs  T(C): 36.7 (30 May 2023 05:43), Max: 37.2 (30 May 2023 01:08)  T(F): 98 (30 May 2023 05:43), Max: 98.9 (30 May 2023 01:08)  HR: 99 (30 May 2023 05:43) (87 - 99)  BP: 95/57 (30 May 2023 05:43) (95/56 - 100/58)  BP(mean): --  RR: 18 (30 May 2023 05:43) (18 - 18)  SpO2: 94% (30 May 2023 05:43) (94% - 98%)    Parameters below as of 30 May 2023 05:43  Patient On (Oxygen Delivery Method): room air      Daily     Daily Weight in k.8 (30 May 2023 05:43)    LABS:                        7.3    23.28 )-----------( 185      ( 29 May 2023 06:19 )             21.5     05-29    127<L>  |  93<L>  |  8   ----------------------------<  92  3.5   |  20<L>  |  0.78    Ca    8.4      29 May 2023 06:18  Phos  2.3     05-  Mg     1.8         TPro  5.3<L>  /  Alb  2.8<L>  /  TBili  30.8<H>  /  DBili  x   /  AST  100<H>  /  ALT  <5<L>  /  AlkPhos  164<H>      LIVER FUNCTIONS - ( 29 May 2023 06:18 )  Alb: 2.8 g/dL / Pro: 5.3 g/dL / ALK PHOS: 164 U/L / ALT: <5 U/L / AST: 100 U/L / GGT: x           PT/INR - ( 29 May 2023 06:20 )   PT: 23.8 sec;   INR: 2.05 ratio         PTT - ( 29 May 2023 06:20 )  PTT:55.8 sec  Urinalysis Basic - ( 28 May 2023 10:27 )    Color: Dark Yellow / Appearance: Clear / S.009 / pH: x  Gluc: x / Ketone: Negative  / Bili: Moderate / Urobili: Negative   Blood: x / Protein: Negative / Nitrite: Negative   Leuk Esterase: Negative / RBC: 34 /hpf / WBC 0 /HPF   Sq Epi: x / Non Sq Epi: x / Bacteria: Negative          Imaging: No new abdominal imaging              Chief Complaint:  Patient is a 38y old  Female who presents with a chief complaint of Liver Failure (29 May 2023 18:38)      Interval Events: Reports abd pain 2/2 distension. No other acute c/o.      Hospital Medications:  ceFAZolin   IVPB 2000 milliGRAM(s) IV Intermittent every 8 hours  folic acid 1 milliGRAM(s) Oral daily  guaiFENesin Oral Liquid (Sugar-Free) 100 milliGRAM(s) Oral every 6 hours PRN  heparin   Injectable 5000 Unit(s) SubCutaneous every 8 hours  lactulose Syrup 10 Gram(s) Oral two times a day  levothyroxine 100 MICROGram(s) Oral daily  midodrine. 10 milliGRAM(s) Oral every 8 hours  multivitamin 1 Tablet(s) Oral daily  ondansetron Injectable 4 milliGRAM(s) IV Push every 6 hours PRN  ondansetron Injectable 4 milliGRAM(s) IV Push once  oxyCODONE    IR 2.5 milliGRAM(s) Oral every 6 hours PRN  pantoprazole    Tablet 40 milliGRAM(s) Oral before breakfast  piperacillin/tazobactam IVPB.. 3.375 Gram(s) IV Intermittent every 8 hours  simethicone 80 milliGRAM(s) Chew once  thiamine 100 milliGRAM(s) Oral daily      PMHX/PSHX:  Hypothyroidism    Alcoholic liver disease    No significant past surgical history            ROS: 14 point ROS negative unless otherwise stated in subjective      PHYSICAL EXAM:     GENERAL:  Well developed, +chronically ill appearing  HEENT:  NC/AT,  +sclera icteric; +R forehead hematoma without active bleeding  CHEST:  Full & symmetric excursion, no increased effort w/ respirations  HEART:  Regular rhythm & rate  ABDOMEN:  Soft, +diffusely but mildly tender, +mildly distended; +BS, no rebound tenderness or guarding, +dependent flank edema  EXTREMITIES:  no LE edema, +sarcopenic extremities  SKIN:  +jaundice  NEURO:  Alert, orientedx3, no asterixis    Vital Signs:  Vital Signs Last 24 Hrs  T(C): 36.7 (30 May 2023 05:43), Max: 37.2 (30 May 2023 01:08)  T(F): 98 (30 May 2023 05:43), Max: 98.9 (30 May 2023 01:08)  HR: 99 (30 May 2023 05:43) (87 - 99)  BP: 95/57 (30 May 2023 05:43) (95/56 - 100/58)  BP(mean): --  RR: 18 (30 May 2023 05:43) (18 - 18)  SpO2: 94% (30 May 2023 05:43) (94% - 98%)    Parameters below as of 30 May 2023 05:43  Patient On (Oxygen Delivery Method): room air      Daily     Daily Weight in k.8 (30 May 2023 05:43)    LABS:                        7.3    23.28 )-----------( 185      ( 29 May 2023 06:19 )             21.5     05-29    127<L>  |  93<L>  |  8   ----------------------------<  92  3.5   |  20<L>  |  0.78    Ca    8.4      29 May 2023 06:18  Phos  2.3     -  Mg     1.8         TPro  5.3<L>  /  Alb  2.8<L>  /  TBili  30.8<H>  /  DBili  x   /  AST  100<H>  /  ALT  <5<L>  /  AlkPhos  164<H>      LIVER FUNCTIONS - ( 29 May 2023 06:18 )  Alb: 2.8 g/dL / Pro: 5.3 g/dL / ALK PHOS: 164 U/L / ALT: <5 U/L / AST: 100 U/L / GGT: x           PT/INR - ( 29 May 2023 06:20 )   PT: 23.8 sec;   INR: 2.05 ratio         PTT - ( 29 May 2023 06:20 )  PTT:55.8 sec  Urinalysis Basic - ( 28 May 2023 10:27 )    Color: Dark Yellow / Appearance: Clear / S.009 / pH: x  Gluc: x / Ketone: Negative  / Bili: Moderate / Urobili: Negative   Blood: x / Protein: Negative / Nitrite: Negative   Leuk Esterase: Negative / RBC: 34 /hpf / WBC 0 /HPF   Sq Epi: x / Non Sq Epi: x / Bacteria: Negative          Imaging:     ACC: [id number] EXAM:  US ABDOMEN LIMITED   ORDERED BY: ALANA SUBRAMANIAN     PROCEDURE DATE:  2023          INTERPRETATION:  CLINICAL INFORMATION: Evaluate for ascites.    COMPARISON: Abdominal ultrasound dated 2023.    TECHNIQUE: Limited ultrasound of the abdomen to evaluate for ascites.    IMPRESSION:  There is mild to moderate amount of ascites.

## 2023-05-30 NOTE — PROGRESS NOTE ADULT - ASSESSMENT
Antimicrobials  Cefazolin 2 g q8h -  cipro 500  mg daily ppx    Prior:    - Zosyn -  - Cipro 5/18 x1  - Azithromycin 5/16 x1,  - Ctx 5/16 x1    Microbiology:   BC NGTd   UC 50-99K CRE, UA non micro   NGTD   ascitic fluid bacterial, fungal cx NGTD  ,  BC NGTD   ascitic fluid cx neg   BC pos MSSA    Imagin/19 US abdomen: small to mod ascites  Ct chest iv con: Multifocal bilateral groundglass opacities, suspicious for pneumonia.  Small bilateral pleural effusions, left greater the right, increased compared to prior exam.no PE  Ct maxillofacial , Head and Cervical spine     CT FACIAL BONES  Bilateral mastoid air cells and middle ear regions well-aerated. Paranasal sinus mucosal thickening with near complete opacification of the right sphenoid sinus. Bilateral maxillary sinus septations. No air-fluid levels. S-shaped deviation of the nasal septum with hypertrophy of bilateral inferior greater than middle nasal turbinates. Narrowing ofbilateral ostiomeatal complexes. Periapical lucencies bilateral maxillarypremolars.    CT C/A/P   * Small focal consolidation in the posterior left upper lobe. Givenhistory of trauma, small pulmonary contusion cannot be excluded.Correlate for pneumonia.  * Hepatomegaly and diffuse upper extremity ptosis with signs of portalhypertension. Correlate for steatohepatitis. Moderate ascites.    Assessment and plan    1. 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  2. MSSA bacteremia  BC  2/; repeat BC  neg. TTE no vegetations ; SAHARA neg on   CT face, chest/abdomen and pelvis post trauma noting sinus opacification, bilateral apical maxillary premolar lucencies. CT chest with small consolidation  Sources: traumatic /SSTI in s/o facial hematoma vs pneumonia vs less likely ENT. No evidence of metastatic infection. ? hardware  Cefazolin -    3. Leucocytosis  Initially: peaked at 20à stable 17-18, likely combination if bacteremia and alcoholic hepatitis  Rising over the last few days, only symptoms is occasional cough    4. Bilateral GGO on CT chest -  Pulmonary edema more likely  vs pneumonia  Repeat CT chest  with worsening GGo and some areas more consolidated on my review  ? aspiration  Cirrhotic patients also at risk of PCP, cryptococcosis- pattern not classical for the later, not classical host for mold infection except endemics- neither would cause leucocytosis      5. Ecoli bacteruria ;VRE enterococcus bacteruria : No symptoms of UTI, UA blunt.  6. Facial trauma with hematoma s/p evacuation - no purulence  7. Pre-transplant screening  HIV ag/Ab neg; HCV Ab neg, HBV s Ag neg, HBV s Ab pos, HBV c Ab neg, HEV IgG neg, EBV IgG pos, CMV IgG pos, HSV1/2 IgG pos, VZV IgG NEG; Measles/Mumps IgG neg, Rubella igG pos ([place] measles), Syphilis screen neg, Strongyliodes IgG neg. Quantiferon and toxoplasma igG pending.  8. Need for vaccinations.  Only received Td . Positive titers for HBV non-quantitative    Recommendations:  - Continue cefazolin 2g q8h for MSSA bacteremia.  - Anticipate total of 4 weeks of therapy since cleared blood cultures with at least 2 weeks of iv therapy  - Ct with worsening GGo with more confluence edema vs infection, in setting of WBC,being for bacterial PNA given cough but remains on RA  follow histoplasma antigen in urine, fungitell and Aspergillus galactomannan in serum  - fungal induced sputum cx not sent, bacterial cx with cameron, AFb NGTD  - follow peritoneal culture  (fungal)  - plan to repeat paracentesis today; please send bacterial, fungal and afb cultures  - follow repeat BC  NGTD, UC <10K cameron  -continue zosyn 3.375 g q8h in addition to cefazolin - complete a 7 day course on  for ? HAP  - In s/o persistent leucocytosis, consider CT A/P   - Would also consider DVT rule out  -----     - Will benefit from vaccinations prior to transplant if latter not anticipated imminently or within 2 weeks- otherwise, higher benefit to delay:  Havrix, Shingrix 2 doses 6 months apart; Tdap (once in adulthood), COVID-19, Prevnar 20, HPV Gardisil 9 3 doses at 0-1-6 months  - Of note, non-immune to Measles or mumps,  nor to varicella. immune to [place] measles (Rubella) If she were to not be transplanted or delayed for > 1 month, would benefit from MMR+Varivax. However, if plan for or anticipated transplantation within a month, then live vaccination would be contraindicated.  - Also will benefit from HPV vaccine gardisil 9 kem given positive HPV - she agrees to this    Thank you for involving ID in this consult,  We will continue following on monday or PRN  Please call or page with additional questions  Pager: [phone]  Teams: from 8 am to 5 pm  Samanta Soto MD   Antimicrobials  Cefazolin 2 g q8h -  cipro 500  mg daily ppx--. zosyn -    Prior:    - Zosyn -  - Cipro 5/18 x1  - Azithromycin 5/16 x1,  - Ctx 5/16 x1    Microbiology:   BC NGTd   UC 50-99K CRE, UA non micro   BC NGTD   ascitic fluid bacterial, fungal cx NGTD  ,  BC NGTD   ascitic fluid cx neg   BC pos MSSA    Imagin/25 Ct chest: findings c/w pulmonary edema- (areas of confluent GGo)   MRCP No suspicious liver lesions. Marked hepatomegaly and hepatic steatosis   with additional findings suggestive of cirrhosis and resultant portal   hypertension.   US abdomen: small to mod ascites  Ct chest iv con: Multifocal bilateral groundglass opacities, suspicious for pneumonia.  Small bilateral pleural effusions, left greater the right, increased compared to prior exam.no PE  Ct maxillofacial , Head and Cervical spine     CT FACIAL BONES  Bilateral mastoid air cells and middle ear regions well-aerated. Paranasal sinus mucosal thickening with near complete opacification of the right sphenoid sinus. Bilateral maxillary sinus septations. No air-fluid levels. S-shaped deviation of the nasal septum with hypertrophy of bilateral inferior greater than middle nasal turbinates. Narrowing ofbilateral ostiomeatal complexes. Periapical lucencies bilateral maxillarypremolars.    CT C/A/P   * Small focal consolidation in the posterior left upper lobe. Givenhistory of trauma, small pulmonary contusion cannot be excluded.Correlate for pneumonia.  * Hepatomegaly and diffuse upper extremity ptosis with signs of portalhypertension. Correlate for steatohepatitis. Moderate ascites.    Assessment and plan    1. 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  2. MSSA bacteremia  BC ; repeat BC  neg. TTE no vegetations ; SAHARA neg on   CT face, chest/abdomen and pelvis post trauma noting sinus opacification, bilateral apical maxillary premolar lucencies. CT chest with small consolidation  Sources: traumatic /SSTI in s/o facial hematoma vs pneumonia vs less likely ENT. No evidence of metastatic infection. ? hardware  Cefazolin -    3. Leucocytosis  Initially: peaked at 20à stable 17-18, likely combination if bacteremia and alcoholic hepatitis  Rising over the last few days, only symptoms is occasional cough    4. Bilateral GGO on CT chest -  Pulmonary edema more likely  vs pneumonia  Repeat CT chest  with worsening GGo and some areas more consolidated on my review  ? aspiration  Cirrhotic patients also at risk of PCP, cryptococcosis- pattern not classical for the later, not classical host for mold infection except endemics- neither would cause leucocytosis      5. Ecoli bacteruria ;VRE enterococcus bacteruria : No symptoms of UTI, UA blunt.  6. Facial trauma with hematoma s/p evacuation - no purulence  7. Pre-transplant screening  HIV ag/Ab neg; HCV Ab neg, HBV s Ag neg, HBV s Ab pos, HBV c Ab neg, HEV IgG neg, EBV IgG pos, CMV IgG pos, HSV1/2 IgG pos, VZV IgG NEG; Measles/Mumps IgG neg, Rubella igG pos (Turk[place] measles), Syphilis screen neg, Strongyliodes IgG neg. Quantiferon and toxoplasma igG pending.  8. Need for vaccinations.  Only received Td . Positive titers for HBV non-quantitative    Recommendations:  - Continue cefazolin 2g q8h for MSSA bacteremia.  - Anticipate total of 4 weeks of therapy since cleared blood cultures with at least 2 weeks of iv therapy  - Ct with worsening GGo with more confluence edema vs infection, in setting of WBC,being for bacterial PNA given cough but remains on RA  follow histoplasma antigen in urine, fungitell and Aspergillus galactomannan in serum  - fungal induced sputum cx not sent, bacterial cx with cameron, AFb NGTD  - follow peritoneal culture  (fungal)  - plan to repeat paracentesis today; please send bacterial, fungal and afb cultures  - follow repeat BC  NGTD, UC <10K cameron  -continue zosyn 3.375 g q8h in addition to cefazolin - complete a 7 day course on  for ? HAP  - In s/o persistent leucocytosis, consider repeat CT A/P (noted MRCP  with no clear abscess)   - Would also consider DVT rule out  -----     - Will benefit from vaccinations prior to transplant if latter not anticipated imminently or within 2 weeks- otherwise, higher benefit to delay:  Havrix, Shingrix 2 doses 6 months apart; Tdap (once in adulthood), COVID-19, Prevnar 20, HPV Gardisil 9 3 doses at 0-1-6 months  - Of note, non-immune to Measles or mumps,  nor to varicella. immune to [place] measles (Rubella) If she were to not be transplanted or delayed for > 1 month, would benefit from MMR+Varivax. However, if plan for or anticipated transplantation within a month, then live vaccination would be contraindicated.  - Also will benefit from HPV vaccine gardisil 9 kem given positive HPV - she agrees to this    Thank you for involving ID in this consult,  We will continue following on monday or PRN  Please call or page with additional questions  Pager: [phone]  Teams: from 8 am to 5 pm  Samanta Soto MD
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
## 2023-05-31 LAB
ALBUMIN FLD-MCNC: 1.2 G/DL — SIGNIFICANT CHANGE UP
ALBUMIN SERPL ELPH-MCNC: 2.8 G/DL — LOW (ref 3.3–5)
ALP SERPL-CCNC: 165 U/L — HIGH (ref 40–120)
ALT FLD-CCNC: <5 U/L — LOW (ref 10–45)
ANION GAP SERPL CALC-SCNC: 14 MMOL/L — SIGNIFICANT CHANGE UP (ref 5–17)
APTT BLD: 49.2 SEC — HIGH (ref 27.5–35.5)
AST SERPL-CCNC: 104 U/L — HIGH (ref 10–40)
B PERT IGG+IGM PNL SER: CLEAR — SIGNIFICANT CHANGE UP
BASOPHILS # BLD AUTO: 0.07 K/UL — SIGNIFICANT CHANGE UP (ref 0–0.2)
BASOPHILS NFR BLD AUTO: 0.4 % — SIGNIFICANT CHANGE UP (ref 0–2)
BILIRUB SERPL-MCNC: 29.8 MG/DL — HIGH (ref 0.2–1.2)
BUN SERPL-MCNC: 10 MG/DL — SIGNIFICANT CHANGE UP (ref 7–23)
CALCIUM SERPL-MCNC: 8.8 MG/DL — SIGNIFICANT CHANGE UP (ref 8.4–10.5)
CHLORIDE SERPL-SCNC: 91 MMOL/L — LOW (ref 96–108)
CO2 SERPL-SCNC: 20 MMOL/L — LOW (ref 22–31)
COLOR FLD: YELLOW — SIGNIFICANT CHANGE UP
CREAT SERPL-MCNC: 0.97 MG/DL — SIGNIFICANT CHANGE UP (ref 0.5–1.3)
CULTURE RESULTS: SIGNIFICANT CHANGE UP
CULTURE RESULTS: SIGNIFICANT CHANGE UP
EGFR: 76 ML/MIN/1.73M2 — SIGNIFICANT CHANGE UP
EOSINOPHIL # BLD AUTO: 0.07 K/UL — SIGNIFICANT CHANGE UP (ref 0–0.5)
EOSINOPHIL # FLD: 1 % — SIGNIFICANT CHANGE UP
EOSINOPHIL NFR BLD AUTO: 0.4 % — SIGNIFICANT CHANGE UP (ref 0–6)
FLUID INTAKE SUBSTANCE CLASS: SIGNIFICANT CHANGE UP
FUNGITELL: 43 PG/ML — SIGNIFICANT CHANGE UP
GLUCOSE FLD-MCNC: 113 MG/DL — SIGNIFICANT CHANGE UP
GLUCOSE SERPL-MCNC: 105 MG/DL — HIGH (ref 70–99)
GRAM STN FLD: SIGNIFICANT CHANGE UP
H CAPSUL AG SPEC-ACNC: SIGNIFICANT CHANGE UP
H CAPSUL AG UR IA-ACNC: SIGNIFICANT CHANGE UP NG/ML
H CAPSUL AG UR QL IA: SIGNIFICANT CHANGE UP
HCT VFR BLD CALC: 21.7 % — LOW (ref 34.5–45)
HGB BLD-MCNC: 7.1 G/DL — LOW (ref 11.5–15.5)
IMM GRANULOCYTES NFR BLD AUTO: 2 % — HIGH (ref 0–0.9)
INR BLD: 2.2 RATIO — HIGH (ref 0.88–1.16)
LDH SERPL L TO P-CCNC: 57 U/L — SIGNIFICANT CHANGE UP
LYMPHOCYTES # BLD AUTO: 0.85 K/UL — LOW (ref 1–3.3)
LYMPHOCYTES # BLD AUTO: 4.8 % — LOW (ref 13–44)
LYMPHOCYTES # FLD: 12 % — SIGNIFICANT CHANGE UP
MAGNESIUM SERPL-MCNC: 1.8 MG/DL — SIGNIFICANT CHANGE UP (ref 1.6–2.6)
MCHC RBC-ENTMCNC: 32.7 GM/DL — SIGNIFICANT CHANGE UP (ref 32–36)
MCHC RBC-ENTMCNC: 36.4 PG — HIGH (ref 27–34)
MCV RBC AUTO: 111.3 FL — HIGH (ref 80–100)
MESOTHL CELL # FLD: 2 % — SIGNIFICANT CHANGE UP
MONOCYTES # BLD AUTO: 1.34 K/UL — HIGH (ref 0–0.9)
MONOCYTES NFR BLD AUTO: 7.6 % — SIGNIFICANT CHANGE UP (ref 2–14)
MONOS+MACROS # FLD: 84 % — SIGNIFICANT CHANGE UP
NEUTROPHILS # BLD AUTO: 14.96 K/UL — HIGH (ref 1.8–7.4)
NEUTROPHILS NFR BLD AUTO: 84.8 % — HIGH (ref 43–77)
NEUTROPHILS-BODY FLUID: 1 % — SIGNIFICANT CHANGE UP
NRBC # BLD: 0 /100 WBCS — SIGNIFICANT CHANGE UP (ref 0–0)
PHOSPHATE SERPL-MCNC: 2.3 MG/DL — LOW (ref 2.5–4.5)
PLATELET # BLD AUTO: 220 K/UL — SIGNIFICANT CHANGE UP (ref 150–400)
POTASSIUM SERPL-MCNC: 3.2 MMOL/L — LOW (ref 3.5–5.3)
POTASSIUM SERPL-SCNC: 3.2 MMOL/L — LOW (ref 3.5–5.3)
PROT FLD-MCNC: 1.8 G/DL — SIGNIFICANT CHANGE UP
PROT SERPL-MCNC: 5.4 G/DL — LOW (ref 6–8.3)
PROTHROM AB SERPL-ACNC: 25.7 SEC — HIGH (ref 10.5–13.4)
RBC # BLD: 1.95 M/UL — LOW (ref 3.8–5.2)
RBC # FLD: 23.6 % — HIGH (ref 10.3–14.5)
RCV VOL RI: < 2000 /UL — SIGNIFICANT CHANGE UP (ref 0–0)
SODIUM SERPL-SCNC: 125 MMOL/L — LOW (ref 135–145)
SPECIMEN SOURCE: SIGNIFICANT CHANGE UP
TOTAL NUCLEATED CELL COUNT, BODY FLUID: 49 /UL — SIGNIFICANT CHANGE UP
TUBE TYPE: SIGNIFICANT CHANGE UP
WBC # BLD: 17.65 K/UL — HIGH (ref 3.8–10.5)
WBC # FLD AUTO: 17.65 K/UL — HIGH (ref 3.8–10.5)

## 2023-05-31 PROCEDURE — 88305 TISSUE EXAM BY PATHOLOGIST: CPT | Mod: 26

## 2023-05-31 PROCEDURE — 88112 CYTOPATH CELL ENHANCE TECH: CPT | Mod: 26

## 2023-05-31 PROCEDURE — 49082 ABD PARACENTESIS: CPT

## 2023-05-31 PROCEDURE — 49083 ABD PARACENTESIS W/IMAGING: CPT

## 2023-05-31 PROCEDURE — 99233 SBSQ HOSP IP/OBS HIGH 50: CPT

## 2023-05-31 PROCEDURE — 93010 ELECTROCARDIOGRAM REPORT: CPT

## 2023-05-31 RX ORDER — POTASSIUM CHLORIDE 20 MEQ
40 PACKET (EA) ORAL ONCE
Refills: 0 | Status: COMPLETED | OUTPATIENT
Start: 2023-05-31 | End: 2023-05-31

## 2023-05-31 RX ORDER — ALBUMIN HUMAN 25 %
50 VIAL (ML) INTRAVENOUS ONCE
Refills: 0 | Status: DISCONTINUED | OUTPATIENT
Start: 2023-05-31 | End: 2023-06-01

## 2023-05-31 RX ORDER — BASILIXIMAB 20 MG/5ML
20 INJECTION, POWDER, FOR SOLUTION INTRAVENOUS ONCE
Refills: 0 | Status: DISCONTINUED | OUTPATIENT
Start: 2023-05-31 | End: 2023-06-01

## 2023-05-31 RX ORDER — SODIUM,POTASSIUM PHOSPHATES 278-250MG
2 POWDER IN PACKET (EA) ORAL
Refills: 0 | Status: COMPLETED | OUTPATIENT
Start: 2023-05-31 | End: 2023-06-01

## 2023-05-31 RX ADMIN — Medication 40 MILLIEQUIVALENT(S): at 13:57

## 2023-05-31 RX ADMIN — PIPERACILLIN AND TAZOBACTAM 25 GRAM(S): 4; .5 INJECTION, POWDER, LYOPHILIZED, FOR SOLUTION INTRAVENOUS at 05:37

## 2023-05-31 RX ADMIN — MIDODRINE HYDROCHLORIDE 10 MILLIGRAM(S): 2.5 TABLET ORAL at 21:56

## 2023-05-31 RX ADMIN — HEPARIN SODIUM 5000 UNIT(S): 5000 INJECTION INTRAVENOUS; SUBCUTANEOUS at 21:56

## 2023-05-31 RX ADMIN — Medication 1 MILLIGRAM(S): at 13:57

## 2023-05-31 RX ADMIN — Medication 100 MILLIGRAM(S): at 03:32

## 2023-05-31 RX ADMIN — PIPERACILLIN AND TAZOBACTAM 25 GRAM(S): 4; .5 INJECTION, POWDER, LYOPHILIZED, FOR SOLUTION INTRAVENOUS at 13:58

## 2023-05-31 RX ADMIN — MIDODRINE HYDROCHLORIDE 10 MILLIGRAM(S): 2.5 TABLET ORAL at 13:57

## 2023-05-31 RX ADMIN — OXYCODONE HYDROCHLORIDE 2.5 MILLIGRAM(S): 5 TABLET ORAL at 04:28

## 2023-05-31 RX ADMIN — HEPARIN SODIUM 5000 UNIT(S): 5000 INJECTION INTRAVENOUS; SUBCUTANEOUS at 17:26

## 2023-05-31 RX ADMIN — Medication 100 MILLIGRAM(S): at 17:25

## 2023-05-31 RX ADMIN — MIDODRINE HYDROCHLORIDE 10 MILLIGRAM(S): 2.5 TABLET ORAL at 05:37

## 2023-05-31 RX ADMIN — OXYCODONE HYDROCHLORIDE 2.5 MILLIGRAM(S): 5 TABLET ORAL at 17:25

## 2023-05-31 RX ADMIN — PANTOPRAZOLE SODIUM 40 MILLIGRAM(S): 20 TABLET, DELAYED RELEASE ORAL at 05:37

## 2023-05-31 RX ADMIN — PIPERACILLIN AND TAZOBACTAM 25 GRAM(S): 4; .5 INJECTION, POWDER, LYOPHILIZED, FOR SOLUTION INTRAVENOUS at 21:56

## 2023-05-31 RX ADMIN — Medication 100 MICROGRAM(S): at 05:37

## 2023-05-31 RX ADMIN — Medication 1 TABLET(S): at 13:57

## 2023-05-31 RX ADMIN — OXYCODONE HYDROCHLORIDE 2.5 MILLIGRAM(S): 5 TABLET ORAL at 05:01

## 2023-05-31 RX ADMIN — LACTULOSE 10 GRAM(S): 10 SOLUTION ORAL at 05:36

## 2023-05-31 RX ADMIN — Medication 2 TABLET(S): at 17:25

## 2023-05-31 RX ADMIN — OXYCODONE HYDROCHLORIDE 2.5 MILLIGRAM(S): 5 TABLET ORAL at 18:10

## 2023-05-31 RX ADMIN — Medication 100 MILLIGRAM(S): at 03:33

## 2023-05-31 RX ADMIN — Medication 100 MILLIGRAM(S): at 13:57

## 2023-05-31 RX ADMIN — Medication 100 MILLIGRAM(S): at 09:15

## 2023-05-31 RX ADMIN — HEPARIN SODIUM 5000 UNIT(S): 5000 INJECTION INTRAVENOUS; SUBCUTANEOUS at 05:37
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
## 2023-05-31 NOTE — PROGRESS NOTE ADULT - ATTENDING COMMENTS
She feels the same.  No major symptom  No dizziness  No fever  Paracentesis was done and there was no SBP  She is hyponatremic.  Albumin will be given  Hold the diuretics  MELD is 33 today  Transplant education was provided

## 2023-05-31 NOTE — CHART NOTE - NSCHARTNOTEFT_GEN_A_CORE
R3 Chart Note    Pathology reviewed for patients Pap that was performed on 5/24: LSIL with ASCUS in background with +HPVHR.  According to the ASCCP guidelines, patient will need a colposcopy to be performed    to be discussed with Dr. Michael Odom,PGY3 R3 Chart Note    Pathology reviewed for patients Pap that was performed on 5/24: LSIL with ASCUS in background with +HPVHR.  According to the ASCCP guidelines, patient will need a colposcopy to be performed       discussed with Dr. Michael Odom,PGY3

## 2023-05-31 NOTE — PROGRESS NOTE ADULT - SUBJECTIVE AND OBJECTIVE BOX
Chief Complaint:  Patient is a 38y old  Female who presents with a chief complaint of Liver Failure (30 May 2023 17:16)      Interval Events: Reports feeling well. Denies any N/V/D/C, abd pain, melena or hematochezia.      Hospital Medications:  benzonatate 100 milliGRAM(s) Oral every 8 hours PRN  ceFAZolin   IVPB 2000 milliGRAM(s) IV Intermittent every 8 hours  folic acid 1 milliGRAM(s) Oral daily  guaiFENesin Oral Liquid (Sugar-Free) 100 milliGRAM(s) Oral every 6 hours PRN  heparin   Injectable 5000 Unit(s) SubCutaneous every 8 hours  lactulose Syrup 10 Gram(s) Oral two times a day  levothyroxine 100 MICROGram(s) Oral daily  midodrine. 10 milliGRAM(s) Oral every 8 hours  multivitamin 1 Tablet(s) Oral daily  ondansetron Injectable 4 milliGRAM(s) IV Push once  ondansetron Injectable 4 milliGRAM(s) IV Push every 6 hours PRN  oxyCODONE    IR 2.5 milliGRAM(s) Oral every 6 hours PRN  pantoprazole    Tablet 40 milliGRAM(s) Oral before breakfast  piperacillin/tazobactam IVPB.. 3.375 Gram(s) IV Intermittent every 8 hours  simethicone 80 milliGRAM(s) Chew once  thiamine 100 milliGRAM(s) Oral daily      PMHX/PSHX:  Hypothyroidism    Alcoholic liver disease    No significant past surgical history            ROS: 14 point ROS negative unless otherwise stated in subjective      PHYSICAL EXAM:       GENERAL:  Well developed, +chronically ill appearing  HEENT:  NC/AT,  +sclera icteric; +R forehead hematoma without active bleeding  CHEST:  Full & symmetric excursion, no increased effort w/ respirations  HEART:  Regular rhythm & rate  ABDOMEN:  Soft, +diffusely but mildly tender, +mildly distended; +BS, no rebound tenderness or guarding, +dependent flank edema  EXTREMITIES:  no LE edema, +sarcopenic extremities  SKIN:  +jaundice  NEURO:  Alert, orientedx3, no asterixis    Vital Signs:  Vital Signs Last 24 Hrs  T(C): 36.8 (31 May 2023 05:00), Max: 37.2 (30 May 2023 17:00)  T(F): 98.3 (31 May 2023 05:00), Max: 99 (30 May 2023 17:00)  HR: 98 (31 May 2023 05:00) (69 - 98)  BP: 94/58 (31 May 2023 05:00) (92/51 - 99/58)  BP(mean): --  RR: 18 (31 May 2023 05:00) (18 - 18)  SpO2: 94% (31 May 2023 05:00) (93% - 97%)    Parameters below as of 31 May 2023 05:00  Patient On (Oxygen Delivery Method): room air      Daily     Daily     LABS:                        7.3    20.21 )-----------( 212      ( 30 May 2023 06:27 )             21.3     05-30    128<L>  |  94<L>  |  9   ----------------------------<  102<H>  3.6   |  21<L>  |  0.88    Ca    8.7      30 May 2023 06:27  Phos  2.3     05-30  Mg     1.8     05-30    TPro  5.3<L>  /  Alb  2.9<L>  /  TBili  31.3<H>  /  DBili  x   /  AST  106<H>  /  ALT  <5<L>  /  AlkPhos  165<H>  05-30    LIVER FUNCTIONS - ( 30 May 2023 06:27 )  Alb: 2.9 g/dL / Pro: 5.3 g/dL / ALK PHOS: 165 U/L / ALT: <5 U/L / AST: 106 U/L / GGT: x           PT/INR - ( 30 May 2023 06:27 )   PT: 24.5 sec;   INR: 2.10 ratio         PTT - ( 30 May 2023 06:27 )  PTT:49.4 sec        Imaging: No new abdominal imaging              Chief Complaint:  Patient is a 38y old  Female who presents with a chief complaint of Liver Failure (30 May 2023 17:16)      Interval Events:   - s/p para (5/31) with 1.05 L removed; neg SBP  - pt reports this AM she was picking at her R forehead hematoma which started rebleeding  - path from pap (5/24)- Atypical squamous cells - cannot exclude high grade squamous intraepithelial lesion (ASC-H) in a background of LSIL (low-grade squamous intraepithelial lesion)   - (+)HR HPV from 5/24      Hospital Medications:  benzonatate 100 milliGRAM(s) Oral every 8 hours PRN  ceFAZolin   IVPB 2000 milliGRAM(s) IV Intermittent every 8 hours  folic acid 1 milliGRAM(s) Oral daily  guaiFENesin Oral Liquid (Sugar-Free) 100 milliGRAM(s) Oral every 6 hours PRN  heparin   Injectable 5000 Unit(s) SubCutaneous every 8 hours  lactulose Syrup 10 Gram(s) Oral two times a day  levothyroxine 100 MICROGram(s) Oral daily  midodrine. 10 milliGRAM(s) Oral every 8 hours  multivitamin 1 Tablet(s) Oral daily  ondansetron Injectable 4 milliGRAM(s) IV Push once  ondansetron Injectable 4 milliGRAM(s) IV Push every 6 hours PRN  oxyCODONE    IR 2.5 milliGRAM(s) Oral every 6 hours PRN  pantoprazole    Tablet 40 milliGRAM(s) Oral before breakfast  piperacillin/tazobactam IVPB.. 3.375 Gram(s) IV Intermittent every 8 hours  simethicone 80 milliGRAM(s) Chew once  thiamine 100 milliGRAM(s) Oral daily      PMHX/PSHX:  Hypothyroidism    Alcoholic liver disease    No significant past surgical history            ROS: 14 point ROS negative unless otherwise stated in subjective      PHYSICAL EXAM:       GENERAL:  Well developed, +chronically ill appearing  HEENT:  NC/AT,  +sclera icteric; +R forehead hematoma with active oozing  CHEST:  Full & symmetric excursion, no increased effort w/ respirations  HEART:  Regular rhythm & rate  ABDOMEN:  Soft, +diffusely but mildly tender, +mildly distended; +BS, no rebound tenderness or guarding  EXTREMITIES:  no LE edema, +sarcopenic extremities  SKIN:  +jaundice  NEURO:  Alert, orientedx3, no asterixis    Vital Signs:  Vital Signs Last 24 Hrs  T(C): 36.8 (31 May 2023 05:00), Max: 37.2 (30 May 2023 17:00)  T(F): 98.3 (31 May 2023 05:00), Max: 99 (30 May 2023 17:00)  HR: 98 (31 May 2023 05:00) (69 - 98)  BP: 94/58 (31 May 2023 05:00) (92/51 - 99/58)  BP(mean): --  RR: 18 (31 May 2023 05:00) (18 - 18)  SpO2: 94% (31 May 2023 05:00) (93% - 97%)    Parameters below as of 31 May 2023 05:00  Patient On (Oxygen Delivery Method): room air      Daily     Daily     LABS:                        7.3    20.21 )-----------( 212      ( 30 May 2023 06:27 )             21.3     05-30    128<L>  |  94<L>  |  9   ----------------------------<  102<H>  3.6   |  21<L>  |  0.88    Ca    8.7      30 May 2023 06:27  Phos  2.3     05-30  Mg     1.8     05-30    TPro  5.3<L>  /  Alb  2.9<L>  /  TBili  31.3<H>  /  DBili  x   /  AST  106<H>  /  ALT  <5<L>  /  AlkPhos  165<H>  05-30    LIVER FUNCTIONS - ( 30 May 2023 06:27 )  Alb: 2.9 g/dL / Pro: 5.3 g/dL / ALK PHOS: 165 U/L / ALT: <5 U/L / AST: 106 U/L / GGT: x           PT/INR - ( 30 May 2023 06:27 )   PT: 24.5 sec;   INR: 2.10 ratio         PTT - ( 30 May 2023 06:27 )  PTT:49.4 sec        Imaging: No new abdominal imaging

## 2023-05-31 NOTE — PROCEDURE NOTE - ADDITIONAL PROCEDURE DETAILS
.     LLQ periumbilical ascites fluid collxn removed under image guidance.   1050cc removed.   vitals stable. dx + tx para.

## 2023-06-01 ENCOUNTER — APPOINTMENT (OUTPATIENT)
Dept: TRANSPLANT | Facility: HOSPITAL | Age: 38
End: 2023-06-01

## 2023-06-01 LAB
ALBUMIN SERPL ELPH-MCNC: 2.7 G/DL — LOW (ref 3.3–5)
ALP SERPL-CCNC: 174 U/L — HIGH (ref 40–120)
ALT FLD-CCNC: <5 U/L — LOW (ref 10–45)
AMMONIA BLD-MCNC: 72 UMOL/L — HIGH (ref 11–55)
ANION GAP SERPL CALC-SCNC: 15 MMOL/L — SIGNIFICANT CHANGE UP (ref 5–17)
ANISOCYTOSIS BLD QL: SIGNIFICANT CHANGE UP
APPEARANCE UR: ABNORMAL
APTT BLD: 48.2 SEC — HIGH (ref 27.5–35.5)
AST SERPL-CCNC: 99 U/L — HIGH (ref 10–40)
BACTERIA # UR AUTO: NEGATIVE — SIGNIFICANT CHANGE UP
BASOPHILS # BLD AUTO: 0 K/UL — SIGNIFICANT CHANGE UP (ref 0–0.2)
BASOPHILS NFR BLD AUTO: 0 % — SIGNIFICANT CHANGE UP (ref 0–2)
BILIRUB SERPL-MCNC: 29.7 MG/DL — HIGH (ref 0.2–1.2)
BILIRUB UR-MCNC: ABNORMAL
BLD GP AB SCN SERPL QL: NEGATIVE — SIGNIFICANT CHANGE UP
BUN SERPL-MCNC: 10 MG/DL — SIGNIFICANT CHANGE UP (ref 7–23)
BURR CELLS BLD QL SMEAR: PRESENT — SIGNIFICANT CHANGE UP
CALCIUM SERPL-MCNC: 8.7 MG/DL — SIGNIFICANT CHANGE UP (ref 8.4–10.5)
CHLORIDE SERPL-SCNC: 95 MMOL/L — LOW (ref 96–108)
CMV IGG FLD QL: >10 U/ML — HIGH
CMV IGG SERPL-IMP: POSITIVE
CO2 SERPL-SCNC: 18 MMOL/L — LOW (ref 22–31)
COLOR SPEC: ABNORMAL
CREAT SERPL-MCNC: 1.02 MG/DL — SIGNIFICANT CHANGE UP (ref 0.5–1.3)
DACRYOCYTES BLD QL SMEAR: SLIGHT — SIGNIFICANT CHANGE UP
DAT C3-SP REAG RBC QL: NEGATIVE — SIGNIFICANT CHANGE UP
DIFF PNL FLD: NEGATIVE — SIGNIFICANT CHANGE UP
EBV EA AB SER IA-ACNC: <5 U/ML — SIGNIFICANT CHANGE UP
EBV EA AB TITR SER IF: NEGATIVE — SIGNIFICANT CHANGE UP
EBV EA IGG SER-ACNC: NEGATIVE — SIGNIFICANT CHANGE UP
EBV NA IGG SER IA-ACNC: 17.9 U/ML — SIGNIFICANT CHANGE UP
EBV PATRN SPEC IB-IMP: SIGNIFICANT CHANGE UP
EBV VCA IGG AVIDITY SER QL IA: POSITIVE
EBV VCA IGM SER IA-ACNC: 254 U/ML — HIGH
EBV VCA IGM SER IA-ACNC: <10 U/ML — SIGNIFICANT CHANGE UP
EBV VCA IGM TITR FLD: NEGATIVE — SIGNIFICANT CHANGE UP
EGFR: 72 ML/MIN/1.73M2 — SIGNIFICANT CHANGE UP
ELLIPTOCYTES BLD QL SMEAR: SLIGHT — SIGNIFICANT CHANGE UP
EOSINOPHIL # BLD AUTO: 0.16 K/UL — SIGNIFICANT CHANGE UP (ref 0–0.5)
EOSINOPHIL NFR BLD AUTO: 0.9 % — SIGNIFICANT CHANGE UP (ref 0–6)
EPI CELLS # UR: 1 /HPF — SIGNIFICANT CHANGE UP
FIBRINOGEN PPP-MCNC: 171 MG/DL — LOW (ref 200–445)
GGT SERPL-CCNC: 267 U/L — HIGH (ref 8–40)
GLUCOSE SERPL-MCNC: 102 MG/DL — HIGH (ref 70–99)
GLUCOSE UR QL: ABNORMAL
HBV CORE AB SER-ACNC: SIGNIFICANT CHANGE UP
HBV SURFACE AB SER-ACNC: 380.9 MIU/ML — SIGNIFICANT CHANGE UP
HBV SURFACE AG SER-ACNC: SIGNIFICANT CHANGE UP
HCG SERPL-ACNC: <2 MIU/ML — SIGNIFICANT CHANGE UP
HCT VFR BLD CALC: 19.8 % — CRITICAL LOW (ref 34.5–45)
HCT VFR BLD CALC: 22.4 % — LOW (ref 34.5–45)
HCV AB S/CO SERPL IA: 0.14 S/CO — SIGNIFICANT CHANGE UP (ref 0–0.99)
HCV AB SERPL-IMP: SIGNIFICANT CHANGE UP
HCV RNA SPEC NAA+PROBE-LOG IU: SIGNIFICANT CHANGE UP
HCV RNA SPEC NAA+PROBE-LOG IU: SIGNIFICANT CHANGE UP LOGIU/ML
HGB BLD-MCNC: 6.8 G/DL — CRITICAL LOW (ref 11.5–15.5)
HGB BLD-MCNC: 7.7 G/DL — LOW (ref 11.5–15.5)
HIV 1+2 AB+HIV1 P24 AG SERPL QL IA: SIGNIFICANT CHANGE UP
HYALINE CASTS # UR AUTO: ABNORMAL /LPF
HYPOCHROMIA BLD QL: SIGNIFICANT CHANGE UP
INR BLD: 1.99 RATIO — HIGH (ref 0.88–1.16)
KETONES UR-MCNC: SIGNIFICANT CHANGE UP
LEUKOCYTE ESTERASE UR-ACNC: NEGATIVE — SIGNIFICANT CHANGE UP
LYMPHOCYTES # BLD AUTO: 0 % — LOW (ref 13–44)
LYMPHOCYTES # BLD AUTO: 0 K/UL — LOW (ref 1–3.3)
MACROCYTES BLD QL: SIGNIFICANT CHANGE UP
MAGNESIUM SERPL-MCNC: 1.8 MG/DL — SIGNIFICANT CHANGE UP (ref 1.6–2.6)
MANUAL SMEAR VERIFICATION: SIGNIFICANT CHANGE UP
MCHC RBC-ENTMCNC: 34.3 GM/DL — SIGNIFICANT CHANGE UP (ref 32–36)
MCHC RBC-ENTMCNC: 34.4 GM/DL — SIGNIFICANT CHANGE UP (ref 32–36)
MCHC RBC-ENTMCNC: 35.3 PG — HIGH (ref 27–34)
MCHC RBC-ENTMCNC: 37.8 PG — HIGH (ref 27–34)
MCV RBC AUTO: 102.8 FL — HIGH (ref 80–100)
MCV RBC AUTO: 110 FL — HIGH (ref 80–100)
MONOCYTES # BLD AUTO: 1.58 K/UL — HIGH (ref 0–0.9)
MONOCYTES NFR BLD AUTO: 8.7 % — SIGNIFICANT CHANGE UP (ref 2–14)
NEUTROPHILS # BLD AUTO: 16.42 K/UL — HIGH (ref 1.8–7.4)
NEUTROPHILS NFR BLD AUTO: 90.4 % — HIGH (ref 43–77)
NITRITE UR-MCNC: NEGATIVE — SIGNIFICANT CHANGE UP
NRBC # BLD: 0 /100 WBCS — SIGNIFICANT CHANGE UP (ref 0–0)
OVALOCYTES BLD QL SMEAR: SLIGHT — SIGNIFICANT CHANGE UP
PH UR: 6 — SIGNIFICANT CHANGE UP (ref 5–8)
PHOSPHATE SERPL-MCNC: 2.8 MG/DL — SIGNIFICANT CHANGE UP (ref 2.5–4.5)
PHOSPHATIDYLETHANOL (PETH) - RESULT: SIGNIFICANT CHANGE UP
PLAT MORPH BLD: NORMAL — SIGNIFICANT CHANGE UP
PLATELET # BLD AUTO: 224 K/UL — SIGNIFICANT CHANGE UP (ref 150–400)
PLATELET # BLD AUTO: 237 K/UL — SIGNIFICANT CHANGE UP (ref 150–400)
POIKILOCYTOSIS BLD QL AUTO: SIGNIFICANT CHANGE UP
POLYCHROMASIA BLD QL SMEAR: SLIGHT — SIGNIFICANT CHANGE UP
POTASSIUM SERPL-MCNC: 4 MMOL/L — SIGNIFICANT CHANGE UP (ref 3.5–5.3)
POTASSIUM SERPL-SCNC: 4 MMOL/L — SIGNIFICANT CHANGE UP (ref 3.5–5.3)
PROT SERPL-MCNC: 5 G/DL — LOW (ref 6–8.3)
PROT UR-MCNC: ABNORMAL
PROTHROM AB SERPL-ACNC: 23.3 SEC — HIGH (ref 10.5–13.4)
RAPID RVP RESULT: SIGNIFICANT CHANGE UP
RAPIDTEG MAXIMUM AMPLITUDE: 67.1 MM — SIGNIFICANT CHANGE UP (ref 52–70)
RBC # BLD: 1.8 M/UL — LOW (ref 3.8–5.2)
RBC # BLD: 2.18 M/UL — LOW (ref 3.8–5.2)
RBC # FLD: 23.5 % — HIGH (ref 10.3–14.5)
RBC # FLD: SIGNIFICANT CHANGE UP (ref 10.3–14.5)
RBC BLD AUTO: ABNORMAL
RBC CASTS # UR COMP ASSIST: 11 /HPF — HIGH (ref 0–4)
RH IG SCN BLD-IMP: POSITIVE — SIGNIFICANT CHANGE UP
SARS-COV-2 RNA SPEC QL NAA+PROBE: SIGNIFICANT CHANGE UP
SARS-COV-2 RNA SPEC QL NAA+PROBE: SIGNIFICANT CHANGE UP
SODIUM SERPL-SCNC: 128 MMOL/L — LOW (ref 135–145)
SP GR SPEC: 1.02 — SIGNIFICANT CHANGE UP (ref 1.01–1.02)
TARGETS BLD QL SMEAR: SLIGHT — SIGNIFICANT CHANGE UP
TEG FUNCTIONAL FIBRINOGEN: 30.8 MM — SIGNIFICANT CHANGE UP (ref 15–32)
TEG LY30 (LYSIS): 1.3 % — SIGNIFICANT CHANGE UP (ref 0–2.6)
TEG REACTION TIME: 12.8 MIN (ref 4.6–9.1)
UROBILINOGEN FLD QL: NEGATIVE — SIGNIFICANT CHANGE UP
VZV IGG FLD QL IA: 82.8 INDEX — SIGNIFICANT CHANGE UP
VZV IGG FLD QL IA: NEGATIVE — SIGNIFICANT CHANGE UP
WBC # BLD: 17.51 K/UL — HIGH (ref 3.8–10.5)
WBC # BLD: 18.16 K/UL — HIGH (ref 3.8–10.5)
WBC # FLD AUTO: 17.51 K/UL — HIGH (ref 3.8–10.5)
WBC # FLD AUTO: 18.16 K/UL — HIGH (ref 3.8–10.5)
WBC UR QL: 1 /HPF — SIGNIFICANT CHANGE UP (ref 0–5)

## 2023-06-01 PROCEDURE — 99233 SBSQ HOSP IP/OBS HIGH 50: CPT

## 2023-06-01 PROCEDURE — 71250 CT THORAX DX C-: CPT | Mod: 26

## 2023-06-01 PROCEDURE — 99232 SBSQ HOSP IP/OBS MODERATE 35: CPT

## 2023-06-01 PROCEDURE — 71045 X-RAY EXAM CHEST 1 VIEW: CPT | Mod: 26

## 2023-06-01 RX ORDER — FUROSEMIDE 40 MG
80 TABLET ORAL DAILY
Refills: 0 | Status: DISCONTINUED | OUTPATIENT
Start: 2023-06-01 | End: 2023-06-02

## 2023-06-01 RX ORDER — SPIRONOLACTONE 25 MG/1
300 TABLET, FILM COATED ORAL DAILY
Refills: 0 | Status: DISCONTINUED | OUTPATIENT
Start: 2023-06-01 | End: 2023-06-02

## 2023-06-01 RX ADMIN — Medication 1 MILLIGRAM(S): at 13:54

## 2023-06-01 RX ADMIN — OXYCODONE HYDROCHLORIDE 2.5 MILLIGRAM(S): 5 TABLET ORAL at 17:50

## 2023-06-01 RX ADMIN — Medication 100 MILLIGRAM(S): at 08:42

## 2023-06-01 RX ADMIN — OXYCODONE HYDROCHLORIDE 2.5 MILLIGRAM(S): 5 TABLET ORAL at 09:30

## 2023-06-01 RX ADMIN — Medication 100 MILLIGRAM(S): at 00:40

## 2023-06-01 RX ADMIN — Medication 100 MILLIGRAM(S): at 16:44

## 2023-06-01 RX ADMIN — Medication 100 MILLIGRAM(S): at 18:45

## 2023-06-01 RX ADMIN — PIPERACILLIN AND TAZOBACTAM 25 GRAM(S): 4; .5 INJECTION, POWDER, LYOPHILIZED, FOR SOLUTION INTRAVENOUS at 13:53

## 2023-06-01 RX ADMIN — PANTOPRAZOLE SODIUM 40 MILLIGRAM(S): 20 TABLET, DELAYED RELEASE ORAL at 06:24

## 2023-06-01 RX ADMIN — Medication 100 MILLIGRAM(S): at 00:26

## 2023-06-01 RX ADMIN — MIDODRINE HYDROCHLORIDE 10 MILLIGRAM(S): 2.5 TABLET ORAL at 13:54

## 2023-06-01 RX ADMIN — PIPERACILLIN AND TAZOBACTAM 25 GRAM(S): 4; .5 INJECTION, POWDER, LYOPHILIZED, FOR SOLUTION INTRAVENOUS at 06:23

## 2023-06-01 RX ADMIN — MIDODRINE HYDROCHLORIDE 10 MILLIGRAM(S): 2.5 TABLET ORAL at 06:23

## 2023-06-01 RX ADMIN — Medication 100 MILLIGRAM(S): at 13:54

## 2023-06-01 RX ADMIN — OXYCODONE HYDROCHLORIDE 2.5 MILLIGRAM(S): 5 TABLET ORAL at 16:51

## 2023-06-01 RX ADMIN — MIDODRINE HYDROCHLORIDE 10 MILLIGRAM(S): 2.5 TABLET ORAL at 22:05

## 2023-06-01 RX ADMIN — Medication 1 TABLET(S): at 13:54

## 2023-06-01 RX ADMIN — SPIRONOLACTONE 300 MILLIGRAM(S): 25 TABLET, FILM COATED ORAL at 13:53

## 2023-06-01 RX ADMIN — Medication 80 MILLIGRAM(S): at 13:53

## 2023-06-01 RX ADMIN — PIPERACILLIN AND TAZOBACTAM 25 GRAM(S): 4; .5 INJECTION, POWDER, LYOPHILIZED, FOR SOLUTION INTRAVENOUS at 22:05

## 2023-06-01 RX ADMIN — Medication 100 MICROGRAM(S): at 06:23

## 2023-06-01 RX ADMIN — Medication 100 MILLIGRAM(S): at 12:20

## 2023-06-01 RX ADMIN — HEPARIN SODIUM 5000 UNIT(S): 5000 INJECTION INTRAVENOUS; SUBCUTANEOUS at 22:05

## 2023-06-01 RX ADMIN — OXYCODONE HYDROCHLORIDE 2.5 MILLIGRAM(S): 5 TABLET ORAL at 08:42

## 2023-06-01 RX ADMIN — HEPARIN SODIUM 5000 UNIT(S): 5000 INJECTION INTRAVENOUS; SUBCUTANEOUS at 13:54

## 2023-06-01 NOTE — PROGRESS NOTE ADULT - ATTENDING COMMENTS
No major clinical change   Cough on and off  Repeat CT of chest   Case has been discussed with Dr. Rodriguez from Gynecology. Colposcopy will help to diagnose or rule out CIN3, however, carries the risk of bleeding. Patient is acutely ill and any complication can potentially prevent her from receiving a transplant. According to gynecology, the likelihood of an advanced malignancy is low at this time.
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
## 2023-06-01 NOTE — CHART NOTE - NSCHARTNOTEFT_GEN_A_CORE
Patient is scheduled for colposcopy tomorrow (6/2/23).     Risks, benefits, alternatives, and procedure details discussed with patient. Patient expressed understanding    Consents signed at bedside and placed in patient's chart.    E Antonia PGY2

## 2023-06-01 NOTE — PROGRESS NOTE ADULT - SUBJECTIVE AND OBJECTIVE BOX
Follow Up:      Interval History:    REVIEW OF SYSTEMS  [  ] ROS unobtainable because:    [  ] All other systems negative except as noted below    Constitutional:  [ ] fever [ ] chills  [ ] weight loss  [ ] weakness  Skin:  [ ] rash [ ] phlebitis	  Eyes: [ ] icterus [ ] pain  [ ] discharge	  ENMT: [ ] sore throat  [ ] thrush [ ] ulcers [ ] exudates  Respiratory: [ ] dyspnea [ ] hemoptysis [ ] cough [ ] sputum	  Cardiovascular:  [ ] chest pain [ ] palpitations [ ] edema	  Gastrointestinal:  [ ] nausea [ ] vomiting [ ] diarrhea [ ] constipation [ ] pain	  Genitourinary:  [ ] dysuria [ ] frequency [ ] hematuria [ ] discharge [ ] flank pain  [ ] incontinence  Musculoskeletal:  [ ] myalgias [ ] arthralgias [ ] arthritis  [ ] back pain  Neurological:  [ ] headache [ ] seizures  [ ] confusion/altered mental status    Allergies  No Known Allergies        ANTIMICROBIALS:  ceFAZolin   IVPB 2000 every 8 hours  piperacillin/tazobactam IVPB.. 3.375 every 8 hours      OTHER MEDS:  MEDICATIONS  (STANDING):  benzonatate 100 every 8 hours PRN  furosemide    Tablet 80 daily  guaiFENesin Oral Liquid (Sugar-Free) 100 every 6 hours PRN  heparin   Injectable 5000 every 8 hours  lactulose Syrup 10 two times a day  levothyroxine 100 daily  midodrine. 10 every 8 hours  ondansetron Injectable 4 every 6 hours PRN  ondansetron Injectable 4 once  oxyCODONE    IR 2.5 every 6 hours PRN  pantoprazole    Tablet 40 before breakfast  simethicone 80 once  spironolactone 300 daily      Vital Signs Last 24 Hrs  T(C): 37.1 (2023 17:00), Max: 37.2 (2023 09:00)  T(F): 98.8 (2023 17:00), Max: 98.9 (2023 09:00)  HR: 99 (2023 17:00) (86 - 102)  BP: 104/62 (2023 17:00) (91/59 - 104/62)  BP(mean): --  RR: 18 (2023 17:00) (18 - 18)  SpO2: 96% (2023 17:00) (93% - 98%)    Parameters below as of 2023 17:00  Patient On (Oxygen Delivery Method): room air        PHYSICAL EXAMINATION:  General: Alert and Awake, NAD  HEENT: PERRL, EOMI  Neck: Supple  Cardiac: RRR, No M/R/G  Resp: CTAB, No Wh/Rh/Ra  Abdomen: NBS, NT/ND, No HSM, No rigidity or guarding  MSK: No LE edema. No Calf tenderness  : No [place]  Skin: No rashes or lesions. Skin is warm and dry to the touch.   Neuro: Alert and Awake. CN 2-12 Grossly intact. Moves all four extremities spontaneously.  Psych: Calm, Pleasant, Cooperative                          6.8    18.16 )-----------( 237      ( 2023 00:44 )             19.8       06-    128<L>  |  95<L>  |  10  ----------------------------<  102<H>  4.0   |  18<L>  |  1.02    Ca    8.7      2023 00:45  Phos  2.8     06  Mg     1.8     06    TPro  5.0<L>  /  Alb  2.7<L>  /  TBili  29.7<H>  /  DBili  x   /  AST  99<H>  /  ALT  <5<L>  /  AlkPhos  174<H>        Urinalysis Basic - ( 2023 00:34 )    Color: Dark Yellow / Appearance: Slightly Turbid / S.024 / pH: x  Gluc: x / Ketone: Trace  / Bili: Large >10 / Urobili: Negative   Blood: x / Protein: 30 mg/dL / Nitrite: Negative   Leuk Esterase: Negative / RBC: 11 /hpf / WBC 1 /HPF   Sq Epi: x / Non Sq Epi: x / Bacteria: Negative        MICROBIOLOGY:  v  Abdominal Fl Abdominal Fluid  23   No growth  --    polymorphonuclear leukocytes seen  No organisms seen  by cytocentrifuge      Clean Catch Clean Catch (Midstream)  23   <10,000 CFU/mL Normal Urogenital Aga  --  --      .Blood Blood-Peripheral  23   No growth to date.  --  --      .Blood Blood-Peripheral  23   No growth to date.  --  --      .Sputum Sputum  23   Culture is being performed.  --    Few polymorphonuclear leukocytes per low power field  Moderate Squamous epithelial cells per low power field  Moderate Yeast like cells seen per oil power field      .Blood Blood-Peripheral  23   No Growth Final  --  --      .Blood Blood-Peripheral  23   No Growth Final  --  --      Peritoneal Peritoneal Fluid  23   No growth at 5 days  --    polymorphonuclear leukocytes seen  No organisms seen  by cytocentrifuge      .Blood Blood-Peripheral  23   No Growth Final  --  --      .Blood Blood  23   No Growth Final  --  --      Clean Catch Clean Catch (Midstream)  23   50,000 - 99,000 CFU/mL Enterococcus faecium (vancomycin resistant)  --  Enterococcus faecium (vancomycin resistant)      .Blood Blood-Peripheral  23   No Growth Final  --  --      Peritoneal Peritoneal Fluid  23   No growth at 5 days  --    No polymorphonuclear cells seen  No organisms seen  by cytocentrifuge      .Blood Blood-Peripheral  23   No Growth Final  --  --      .Blood Blood-Peripheral  23   No Growth Final  --  --      Ascites Fl Ascites Fluid  23   No growth at 5 days  --    No polymorphonuclear cells seen seen  No organisms seen  by cytocentrifuge      Clean Catch Clean Catch (Midstream)  23   >100,000 CFU/ml Escherichia coli  --  Escherichia coli      .Blood Blood-Peripheral  23   Growth in aerobic and anaerobic bottles: Staphylococcus aureus  ***Blood Panel PCR results on this specimen are available  approximately 3 hours after the Gram stain result.***  Gram stain, PCR, and/or culture results may not always  correspond dueto difference in methodologies.  ************************************************************  This PCR assay was performed by multiplex PCR. This  Assay tests for 66 bacterial and resistance gene targets.  Please refer to the [facility] test directory  at [email or web address] for details.  --  Blood Culture PCR  Staphylococcus aureus      .Blood Blood-Peripheral  23   Growth in aerobic bottle: Staphylococcus aureus  See previous culture [id number]  --    Growth in aerobic bottle: Gram Positive Cocci in Clusters        CMV IgG Antibody: >10.00 U/mL (23 @ 00:45)  Toxoplasma IgG Screen: <3.0 IU/mL (23 @ 07:14)  CMV IgG Antibody: >10.00 U/mL (23 @ 07:14)  Toxoplasma IgG Screen: <3.0 IU/mL (23 @ 07:14)    Rapid RVP Result: NotDetec ( @ 09:17)        RADIOLOGY:    <The imaging below has been reviewed and visualized by me independently. Findings as detailed in report below> Follow Up:  mssa bacteremia    Interval History: afebrile. stable cough.     REVIEW OF SYSTEMS  [  ] ROS unobtainable because:    [ x ] All other systems negative except as noted below    Constitutional:  [ ] fever [ ] chills  [ ] weight loss  [ ] weakness  Skin:  [ ] rash [ ] phlebitis	  Eyes: [ ] icterus [ ] pain  [ ] discharge	  ENMT: [ ] sore throat  [ ] thrush [ ] ulcers [ ] exudates  Respiratory: [ ] dyspnea [ ] hemoptysis [x ] cough [ ] sputum	  Cardiovascular:  [ ] chest pain [ ] palpitations [ ] edema	  Gastrointestinal:  [ ] nausea [ ] vomiting [ ] diarrhea [ ] constipation [ ] pain	  Genitourinary:  [ ] dysuria [ ] frequency [ ] hematuria [ ] discharge [ ] flank pain  [ ] incontinence  Musculoskeletal:  [ ] myalgias [ ] arthralgias [ ] arthritis  [ ] back pain  Neurological:  [ ] headache [ ] seizures  [ ] confusion/altered mental status    Allergies  No Known Allergies        ANTIMICROBIALS:  ceFAZolin   IVPB 2000 every 8 hours  piperacillin/tazobactam IVPB.. 3.375 every 8 hours      OTHER MEDS:  MEDICATIONS  (STANDING):  benzonatate 100 every 8 hours PRN  furosemide    Tablet 80 daily  guaiFENesin Oral Liquid (Sugar-Free) 100 every 6 hours PRN  heparin   Injectable 5000 every 8 hours  lactulose Syrup 10 two times a day  levothyroxine 100 daily  midodrine. 10 every 8 hours  ondansetron Injectable 4 every 6 hours PRN  ondansetron Injectable 4 once  oxyCODONE    IR 2.5 every 6 hours PRN  pantoprazole    Tablet 40 before breakfast  simethicone 80 once  spironolactone 300 daily      Vital Signs Last 24 Hrs  T(C): 37.1 (2023 17:00), Max: 37.2 (2023 09:00)  T(F): 98.8 (2023 17:00), Max: 98.9 (2023 09:00)  HR: 99 (2023 17:00) (86 - 102)  BP: 104/62 (2023 17:00) (91/59 - 104/62)  BP(mean): --  RR: 18 (2023 17:00) (18 - 18)  SpO2: 96% (2023 17:00) (93% - 98%)    Parameters below as of 2023 17:00  Patient On (Oxygen Delivery Method): room air        PHYSICAL EXAMINATION:  General: Alert and Awake, NAD, jaundice  HEENT: PERRL, EOMI, scleral icterus  Cardiac: RRR, No M/R/G  Resp: CTAB, No Wh/Rh/Ra  Abdomen: NBS, moderate abdominal distension, No HSM, No rigidity or guarding  MSK: No LE edema. No Calf tenderness  Skin: No rashes or lesions. Skin is warm and dry to the touch.   Neuro: Alert and Awake. CN 2-12 Grossly intact. Moves all four extremities spontaneously.  Psych: Calm, Pleasant, Cooperative                          6.8    18.16 )-----------( 237      ( 2023 00:44 )             19.8       06-    128<L>  |  95<L>  |  10  ----------------------------<  102<H>  4.0   |  18<L>  |  1.02    Ca    8.7      2023 00:45  Phos  2.8     06-  Mg     1.8     06-    TPro  5.0<L>  /  Alb  2.7<L>  /  TBili  29.7<H>  /  DBili  x   /  AST  99<H>  /  ALT  <5<L>  /  AlkPhos  174<H>  06-      Urinalysis Basic - ( 2023 00:34 )    Color: Dark Yellow / Appearance: Slightly Turbid / S.024 / pH: x  Gluc: x / Ketone: Trace  / Bili: Large >10 / Urobili: Negative   Blood: x / Protein: 30 mg/dL / Nitrite: Negative   Leuk Esterase: Negative / RBC: 11 /hpf / WBC 1 /HPF   Sq Epi: x / Non Sq Epi: x / Bacteria: Negative        MICROBIOLOGY:  v  Abdominal Fl Abdominal Fluid  23   No growth  --    polymorphonuclear leukocytes seen  No organisms seen  by cytocentrifuge      Clean Catch Clean Catch (Midstream)  23   <10,000 CFU/mL Normal Urogenital Aga  --  --      .Blood Blood-Peripheral  23   No growth to date.  --  --      .Blood Blood-Peripheral  23   No growth to date.  --  --      .Sputum Sputum  23   Culture is being performed.  --    Few polymorphonuclear leukocytes per low power field  Moderate Squamous epithelial cells per low power field  Moderate Yeast like cells seen per oil power field      .Blood Blood-Peripheral  23   No Growth Final  --  --      .Blood Blood-Peripheral  23   No Growth Final  --  --      Peritoneal Peritoneal Fluid  23   No growth at 5 days  --    polymorphonuclear leukocytes seen  No organisms seen  by cytocentrifuge      .Blood Blood-Peripheral  23   No Growth Final  --  --      .Blood Blood  23   No Growth Final  --  --      Clean Catch Clean Catch (Midstream)  23   50,000 - 99,000 CFU/mL Enterococcus faecium (vancomycin resistant)  --  Enterococcus faecium (vancomycin resistant)      .Blood Blood-Peripheral  23   No Growth Final  --  --      Peritoneal Peritoneal Fluid  23   No growth at 5 days  --    No polymorphonuclear cells seen  No organisms seen  by cytocentrifuge      .Blood Blood-Peripheral  23   No Growth Final  --  --      .Blood Blood-Peripheral  23   No Growth Final  --  --      Ascites Fl Ascites Fluid  23   No growth at 5 days  --    No polymorphonuclear cells seen seen  No organisms seen  by cytocentrifuge      Clean Catch Clean Catch (Midstream)  23   >100,000 CFU/ml Escherichia coli  --  Escherichia coli      .Blood Blood-Peripheral  23   Growth in aerobic and anaerobic bottles: Staphylococcus aureus  ***Blood Panel PCR results on this specimen are available  approximately 3 hours after the Gram stain result.***  Gram stain, PCR, and/or culture results may not always  correspond dueto difference in methodologies.  ************************************************************  This PCR assay was performed by multiplex PCR. This  Assay tests for 66 bacterial and resistance gene targets.  Please refer to the [facility] Labs test directory  at [email or web address] for details.  --  Blood Culture PCR  Staphylococcus aureus      .Blood Blood-Peripheral  23   Growth in aerobic bottle: Staphylococcus aureus  See previous culture [id number]  --    Growth in aerobic bottle: Gram Positive Cocci in Clusters        CMV IgG Antibody: >10.00 U/mL (23 @ 00:45)  Toxoplasma IgG Screen: <3.0 IU/mL (23 @ 07:14)  CMV IgG Antibody: >10.00 U/mL (23 @ 07:14)  Toxoplasma IgG Screen: <3.0 IU/mL (23 @ 07:14)    Rapid RVP Result: NotDetec ( @ 09:17)        RADIOLOGY:    <The imaging below has been reviewed and visualized by me independently. Findings as detailed in report below>    < from: CT Chest No Cont (23 @ 18:14) >  INTERPRETATION:  Slight interval decrease in bilateral ground glass   opacities with slight interval increase in bilateral pleural effusions   (R>L)    No additional significant interval changes.    No emergent findings.    Follow up full report in AM.    < end of copied text >

## 2023-06-01 NOTE — PROGRESS NOTE ADULT - SUBJECTIVE AND OBJECTIVE BOX
Chief Complaint:  Patient is a 38y old  Female who presents with a chief complaint of Liver Failure (31 May 2023 06:17)      Interval Events:   Hgb 6.8 overnight s/p 1 u PRBC. Had bleeding from R forehead hematoma yesterday. No signs of overt GIB.  No acute c/o other than chronic abd pain.    Hospital Medications:  albumin human 25% IVPB 50 milliLiter(s) IV Intermittent once PRN  albumin human 25% IVPB 50 milliLiter(s) IV Intermittent once PRN  benzonatate 100 milliGRAM(s) Oral every 8 hours PRN  ceFAZolin   IVPB 2000 milliGRAM(s) IV Intermittent every 8 hours  folic acid 1 milliGRAM(s) Oral daily  guaiFENesin Oral Liquid (Sugar-Free) 100 milliGRAM(s) Oral every 6 hours PRN  heparin   Injectable 5000 Unit(s) SubCutaneous every 8 hours  lactulose Syrup 10 Gram(s) Oral two times a day  levothyroxine 100 MICROGram(s) Oral daily  midodrine. 10 milliGRAM(s) Oral every 8 hours  multivitamin 1 Tablet(s) Oral daily  ondansetron Injectable 4 milliGRAM(s) IV Push every 6 hours PRN  ondansetron Injectable 4 milliGRAM(s) IV Push once  oxyCODONE    IR 2.5 milliGRAM(s) Oral every 6 hours PRN  pantoprazole    Tablet 40 milliGRAM(s) Oral before breakfast  piperacillin/tazobactam IVPB.. 3.375 Gram(s) IV Intermittent every 8 hours  simethicone 80 milliGRAM(s) Chew once  thiamine 100 milliGRAM(s) Oral daily      PMHX/PSHX:  Hypothyroidism    Alcoholic liver disease    No significant past surgical history            ROS: 14 point ROS negative unless otherwise stated in subjective      PHYSICAL EXAM:     GENERAL:  Well developed, +chronically ill appearing  HEENT:  NC/AT,  +sclera icteric; +R forehead hematoma w/o active bleeding  CHEST:  Full & symmetric excursion, no increased effort w/ respirations  HEART:  Regular rhythm & rate  ABDOMEN:  Soft, +diffusely but mildly tender, +mildly distended; +BS, no rebound tenderness or guarding  EXTREMITIES:  no LE edema, +sarcopenic extremities  SKIN:  +jaundice  NEURO:  Alert, orientedx3, no asterixis      Vital Signs:  Vital Signs Last 24 Hrs  T(C): 37.2 (2023 09:00), Max: 37.4 (31 May 2023 17:00)  T(F): 98.9 (2023 09:00), Max: 99.3 (31 May 2023 17:00)  HR: 86 (2023 09:00) (86 - 102)  BP: 101/63 (2023 09:00) (91/59 - 101/63)  BP(mean): --  RR: 18 (2023 09:00) (18 - 18)  SpO2: 97% (2023 09:00) (93% - 98%)    Parameters below as of 2023 09:00  Patient On (Oxygen Delivery Method): room air      Daily     Daily Weight in k.6 (2023 06:00)    LABS:                        6.8    18.16 )-----------( 237      ( 2023 00:44 )             19.8     06-01    128<L>  |  95<L>  |  10  ----------------------------<  102<H>  4.0   |  18<L>  |  1.02    Ca    8.7      2023 00:45  Phos  2.8     06-  Mg     1.8     06-    TPro  5.0<L>  /  Alb  2.7<L>  /  TBili  29.7<H>  /  DBili  x   /  AST  99<H>  /  ALT  <5<L>  /  AlkPhos  174<H>  06-    LIVER FUNCTIONS - ( 2023 00:45 )  Alb: 2.7 g/dL / Pro: 5.0 g/dL / ALK PHOS: 174 U/L / ALT: <5 U/L / AST: 99 U/L / GGT: x           PT/INR - ( 2023 00:42 )   PT: 23.3 sec;   INR: 1.99 ratio         PTT - ( 2023 00:42 )  PTT:48.2 sec  Urinalysis Basic - ( 2023 00:34 )    Color: Dark Yellow / Appearance: Slightly Turbid / S.024 / pH: x  Gluc: x / Ketone: Trace  / Bili: Large >10 / Urobili: Negative   Blood: x / Protein: 30 mg/dL / Nitrite: Negative   Leuk Esterase: Negative / RBC: 11 /hpf / WBC 1 /HPF   Sq Epi: x / Non Sq Epi: x / Bacteria: Negative      Amylase Serum--      Lipase serum--       [id number]      Imaging: No new abdominal imaging

## 2023-06-02 ENCOUNTER — NON-APPOINTMENT (OUTPATIENT)
Age: 38
End: 2023-06-02

## 2023-06-02 LAB
ALBUMIN SERPL ELPH-MCNC: 2.9 G/DL — LOW (ref 3.3–5)
ALP SERPL-CCNC: 191 U/L — HIGH (ref 40–120)
ALT FLD-CCNC: <5 U/L — LOW (ref 10–45)
ANION GAP SERPL CALC-SCNC: 16 MMOL/L — SIGNIFICANT CHANGE UP (ref 5–17)
APTT BLD: 45.8 SEC — HIGH (ref 27.5–35.5)
AST SERPL-CCNC: 95 U/L — HIGH (ref 10–40)
BASOPHILS # BLD AUTO: 0.16 K/UL — SIGNIFICANT CHANGE UP (ref 0–0.2)
BASOPHILS NFR BLD AUTO: 0.8 % — SIGNIFICANT CHANGE UP (ref 0–2)
BILIRUB SERPL-MCNC: 31.7 MG/DL — HIGH (ref 0.2–1.2)
BUN SERPL-MCNC: 11 MG/DL — SIGNIFICANT CHANGE UP (ref 7–23)
CALCIUM SERPL-MCNC: 9.2 MG/DL — SIGNIFICANT CHANGE UP (ref 8.4–10.5)
CHLORIDE SERPL-SCNC: 95 MMOL/L — LOW (ref 96–108)
CO2 SERPL-SCNC: 19 MMOL/L — LOW (ref 22–31)
CREAT SERPL-MCNC: 1.22 MG/DL — SIGNIFICANT CHANGE UP (ref 0.5–1.3)
EGFR: 58 ML/MIN/1.73M2 — LOW
EOSINOPHIL # BLD AUTO: 0.23 K/UL — SIGNIFICANT CHANGE UP (ref 0–0.5)
EOSINOPHIL NFR BLD AUTO: 1.2 % — SIGNIFICANT CHANGE UP (ref 0–6)
GALACTOMANNAN AG SERPL-ACNC: 0.05 INDEX — SIGNIFICANT CHANGE UP (ref 0–0.49)
GLUCOSE SERPL-MCNC: 100 MG/DL — HIGH (ref 70–99)
HCT VFR BLD CALC: 26.2 % — LOW (ref 34.5–45)
HGB BLD-MCNC: 9 G/DL — LOW (ref 11.5–15.5)
IMM GRANULOCYTES NFR BLD AUTO: 1.7 % — HIGH (ref 0–0.9)
INR BLD: 2.08 RATIO — HIGH (ref 0.88–1.16)
LYMPHOCYTES # BLD AUTO: 1.58 K/UL — SIGNIFICANT CHANGE UP (ref 1–3.3)
LYMPHOCYTES # BLD AUTO: 8.1 % — LOW (ref 13–44)
MAGNESIUM SERPL-MCNC: 1.6 MG/DL — SIGNIFICANT CHANGE UP (ref 1.6–2.6)
MCHC RBC-ENTMCNC: 34.4 GM/DL — SIGNIFICANT CHANGE UP (ref 32–36)
MCHC RBC-ENTMCNC: 35.3 PG — HIGH (ref 27–34)
MCV RBC AUTO: 102.7 FL — HIGH (ref 80–100)
MONOCYTES # BLD AUTO: 1.72 K/UL — HIGH (ref 0–0.9)
MONOCYTES NFR BLD AUTO: 8.8 % — SIGNIFICANT CHANGE UP (ref 2–14)
NEUTROPHILS # BLD AUTO: 15.6 K/UL — HIGH (ref 1.8–7.4)
NEUTROPHILS NFR BLD AUTO: 79.4 % — HIGH (ref 43–77)
NRBC # BLD: 0 /100 WBCS — SIGNIFICANT CHANGE UP (ref 0–0)
PHOSPHATE SERPL-MCNC: 2.4 MG/DL — LOW (ref 2.5–4.5)
PHOSPHATIDYLETHANOL (PETH) - RESULT: POSITIVE — SIGNIFICANT CHANGE UP
PLATELET # BLD AUTO: 278 K/UL — SIGNIFICANT CHANGE UP (ref 150–400)
POTASSIUM SERPL-MCNC: 3.1 MMOL/L — LOW (ref 3.5–5.3)
POTASSIUM SERPL-SCNC: 3.1 MMOL/L — LOW (ref 3.5–5.3)
PROT SERPL-MCNC: 5.6 G/DL — LOW (ref 6–8.3)
PROTHROM AB SERPL-ACNC: 24.3 SEC — HIGH (ref 10.5–13.4)
RBC # BLD: 2.55 M/UL — LOW (ref 3.8–5.2)
RBC # FLD: 25.7 % — HIGH (ref 10.3–14.5)
SODIUM SERPL-SCNC: 130 MMOL/L — LOW (ref 135–145)
WBC # BLD: 19.62 K/UL — HIGH (ref 3.8–10.5)
WBC # FLD AUTO: 19.62 K/UL — HIGH (ref 3.8–10.5)

## 2023-06-02 PROCEDURE — 99232 SBSQ HOSP IP/OBS MODERATE 35: CPT

## 2023-06-02 RX ORDER — ALBUMIN HUMAN 25 %
100 VIAL (ML) INTRAVENOUS EVERY 12 HOURS
Refills: 0 | Status: COMPLETED | OUTPATIENT
Start: 2023-06-02 | End: 2023-06-02

## 2023-06-02 RX ORDER — POTASSIUM PHOSPHATE, MONOBASIC POTASSIUM PHOSPHATE, DIBASIC 236; 224 MG/ML; MG/ML
30 INJECTION, SOLUTION INTRAVENOUS ONCE
Refills: 0 | Status: COMPLETED | OUTPATIENT
Start: 2023-06-02 | End: 2023-06-02

## 2023-06-02 RX ORDER — MAGNESIUM SULFATE 500 MG/ML
2 VIAL (ML) INJECTION ONCE
Refills: 0 | Status: COMPLETED | OUTPATIENT
Start: 2023-06-02 | End: 2023-06-02

## 2023-06-02 RX ORDER — OXYCODONE HYDROCHLORIDE 5 MG/1
2.5 TABLET ORAL EVERY 12 HOURS
Refills: 0 | Status: DISCONTINUED | OUTPATIENT
Start: 2023-06-02 | End: 2023-06-09

## 2023-06-02 RX ORDER — POTASSIUM CHLORIDE 20 MEQ
40 PACKET (EA) ORAL ONCE
Refills: 0 | Status: COMPLETED | OUTPATIENT
Start: 2023-06-02 | End: 2023-06-02

## 2023-06-02 RX ORDER — SODIUM,POTASSIUM PHOSPHATES 278-250MG
2 POWDER IN PACKET (EA) ORAL ONCE
Refills: 0 | Status: COMPLETED | OUTPATIENT
Start: 2023-06-02 | End: 2023-06-02

## 2023-06-02 RX ORDER — OXYCODONE HYDROCHLORIDE 5 MG/1
2.5 TABLET ORAL ONCE
Refills: 0 | Status: DISCONTINUED | OUTPATIENT
Start: 2023-06-02 | End: 2023-06-02

## 2023-06-02 RX ADMIN — OXYCODONE HYDROCHLORIDE 2.5 MILLIGRAM(S): 5 TABLET ORAL at 23:41

## 2023-06-02 RX ADMIN — PIPERACILLIN AND TAZOBACTAM 25 GRAM(S): 4; .5 INJECTION, POWDER, LYOPHILIZED, FOR SOLUTION INTRAVENOUS at 06:18

## 2023-06-02 RX ADMIN — Medication 100 MILLIGRAM(S): at 17:30

## 2023-06-02 RX ADMIN — Medication 100 MILLIGRAM(S): at 03:17

## 2023-06-02 RX ADMIN — Medication 50 MILLILITER(S): at 17:31

## 2023-06-02 RX ADMIN — HEPARIN SODIUM 5000 UNIT(S): 5000 INJECTION INTRAVENOUS; SUBCUTANEOUS at 15:05

## 2023-06-02 RX ADMIN — Medication 2 PACKET(S): at 16:25

## 2023-06-02 RX ADMIN — Medication 1 TABLET(S): at 12:46

## 2023-06-02 RX ADMIN — MIDODRINE HYDROCHLORIDE 10 MILLIGRAM(S): 2.5 TABLET ORAL at 06:19

## 2023-06-02 RX ADMIN — Medication 100 MILLIGRAM(S): at 23:16

## 2023-06-02 RX ADMIN — MIDODRINE HYDROCHLORIDE 10 MILLIGRAM(S): 2.5 TABLET ORAL at 15:05

## 2023-06-02 RX ADMIN — Medication 100 MILLIGRAM(S): at 16:35

## 2023-06-02 RX ADMIN — Medication 100 MILLIGRAM(S): at 10:55

## 2023-06-02 RX ADMIN — Medication 80 MILLIGRAM(S): at 06:18

## 2023-06-02 RX ADMIN — LACTULOSE 10 GRAM(S): 10 SOLUTION ORAL at 06:19

## 2023-06-02 RX ADMIN — LACTULOSE 10 GRAM(S): 10 SOLUTION ORAL at 17:32

## 2023-06-02 RX ADMIN — OXYCODONE HYDROCHLORIDE 2.5 MILLIGRAM(S): 5 TABLET ORAL at 17:30

## 2023-06-02 RX ADMIN — Medication 1 MILLIGRAM(S): at 12:46

## 2023-06-02 RX ADMIN — Medication 100 MICROGRAM(S): at 06:19

## 2023-06-02 RX ADMIN — OXYCODONE HYDROCHLORIDE 2.5 MILLIGRAM(S): 5 TABLET ORAL at 16:55

## 2023-06-02 RX ADMIN — Medication 100 MILLIGRAM(S): at 12:46

## 2023-06-02 RX ADMIN — Medication 100 MILLIGRAM(S): at 23:30

## 2023-06-02 RX ADMIN — MIDODRINE HYDROCHLORIDE 10 MILLIGRAM(S): 2.5 TABLET ORAL at 21:14

## 2023-06-02 RX ADMIN — OXYCODONE HYDROCHLORIDE 2.5 MILLIGRAM(S): 5 TABLET ORAL at 07:40

## 2023-06-02 RX ADMIN — OXYCODONE HYDROCHLORIDE 2.5 MILLIGRAM(S): 5 TABLET ORAL at 00:40

## 2023-06-02 RX ADMIN — Medication 100 MILLIGRAM(S): at 00:03

## 2023-06-02 RX ADMIN — Medication 50 MILLILITER(S): at 10:55

## 2023-06-02 RX ADMIN — HEPARIN SODIUM 5000 UNIT(S): 5000 INJECTION INTRAVENOUS; SUBCUTANEOUS at 06:19

## 2023-06-02 RX ADMIN — OXYCODONE HYDROCHLORIDE 2.5 MILLIGRAM(S): 5 TABLET ORAL at 00:04

## 2023-06-02 RX ADMIN — OXYCODONE HYDROCHLORIDE 2.5 MILLIGRAM(S): 5 TABLET ORAL at 06:28

## 2023-06-02 RX ADMIN — Medication 100 MILLIGRAM(S): at 11:24

## 2023-06-02 RX ADMIN — Medication 40 MILLIEQUIVALENT(S): at 11:24

## 2023-06-02 RX ADMIN — SPIRONOLACTONE 300 MILLIGRAM(S): 25 TABLET, FILM COATED ORAL at 06:18

## 2023-06-02 RX ADMIN — PANTOPRAZOLE SODIUM 40 MILLIGRAM(S): 20 TABLET, DELAYED RELEASE ORAL at 06:18

## 2023-06-02 NOTE — PROGRESS NOTE ADULT - ATTENDING COMMENTS
No sign of active infection   Case was discussed with ID, transplant surgery and Gyn.  Letter of medical necessity is written for insurance approval.
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
## 2023-06-02 NOTE — CHART NOTE - NSCHARTNOTEFT_GEN_A_CORE
Gender: [] male  [x] female    Hand  Dynamometer (dominant hand): right [] attempted   [  ] unable to attempt  attempt #1 (kg): 10  attempt #2 (kg): 9  attempt #3 (kg): 11    5 Chair Stands:  [x] attempted   [] unable to attempt  time to complete (seconds): 0    3-Position Balance:  [] attempted   [] unable to attempt  lild-px-hfsk (0-10 seconds): 10  semi tandem (0-10 seconds): 10  tandem (0-10 seconds): 2.91    Comments:     LIVER FRAILTY INDEX SCORE: 5.70    Based on suggested cut-offs of the Liver Frailty Index™, a patient with this Liver Frailty Index™ score is considered:   [x] Frail  [] Pre Frail  [] Robust    This Liver Frailty Index™ score falls within the 99 percentile of outpatients with cirrhosis who are listed for liver transplantation.    6MWT: 257 ft    6MWT ended at 2:31 sec as pt required to use toilet

## 2023-06-02 NOTE — PROGRESS NOTE ADULT - SUBJECTIVE AND OBJECTIVE BOX
To the Medical Director of [employer] insurance     To Whom it May Concern:     I am writing this letter to appeal the decision of declining coverage for liver transplantation for Ms. Toshia Miller.  She is a 38-year-old female with acute on chronic liver failure who is currently admitted to [facility]. Her disease is complicated by ascites, liver failure and VIVIANA.   I am a transplant hepatology attending and board-certified in gastroenterology and transplant hepatology. She is under my care.    This patient is sick and does not have a chance of recovery without liver transplantation.   Patient has been treated with IV antibiotics for pneumonia and there is no evidence of active infection at this time. Blood cultures are negative and clinically she has improved.   Leukocytosis is related to acute liver injury and liver failure.  Patient has been seen by infectious diseases specialist with expertise in pre and post liver transplant care. As per ID consultant, there is no contraindication for liver transplantation.   Patient has been seen by our psychosocial team with expertise in the management of alcohol use disorder. Based on the assessment of the psychosocial team, patient has enough insight and capacity to participate in treatment for alcohol use disorder after liver transplant. We have already initiated a plan for her to undergo a program which support her mental health issues.  This patient has a model for end-stage liver disease score of 33 and without transplant, has a high chance of mortality within 30 days. She does not have enough time to attend an alcohol use disorder program prior to the liver transplant.    In my opinion, this patient needs liver transplantation to survive and I would like to appeal the decision of the insurance company to decline him in life saving liver transplant procedure and treatment. A body of literature has suggested that such patients have a good chance of achieving a good outcome with liver transplantation, equal to all other causes of liver failure.     Sincerely,      Celine Day M.D.   Transplant Hepatologist    .     Sincerely,               Electronically signed by : CELINE DAY MD; Jun 2 2023  3:25PM EST (Author)

## 2023-06-02 NOTE — CHART NOTE - NSCHARTNOTEFT_GEN_A_CORE
Discussed plan of care with primary Hepatology attending, Dr. Day and GYN attendings Dr. Gan and Dr. Rodriguez    Given patient's acute clinical state and the possibility of catastrophic bleeding during a biopsy of any sort, the risk of a colposcopy outweigh the benefits at this time. Given this, the Hepatology team would like to defer colposcopy for a later time when the patient's clinical status is more stable.     TBD w/ Patient  RFrankel PGY4 Discussed plan of care with primary Hepatology attending, Dr. Day and GYN attendings Dr. Gan and Dr. Rodriguez    Given patient's acute clinical state and the possibility of catastrophic bleeding during a biopsy of any sort, the risk of a colposcopy outweigh the benefits at this time. Given this, the Hepatology team would like to defer colposcopy for a later time when the patient's clinical status is more stable.     TBD w/ Patient  RFrankel PGY4      Discussed POC with the patient. Explained that due to risk of bleeding, it was unsafe to proceed with procedure at this time. Expressed that GYN would continue to follow her in the hospital and would make sure that she got appropriate follow up outpatient for colposcopy after her liver transplant. Patient expressed understanding of the plan.    PARISH Knight PGY2
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
## 2023-06-02 NOTE — PROGRESS NOTE ADULT - SUBJECTIVE AND OBJECTIVE BOX
Follow Up:      Interval History:    REVIEW OF SYSTEMS  [  ] ROS unobtainable because:    [  ] All other systems negative except as noted below    Constitutional:  [ ] fever [ ] chills  [ ] weight loss  [ ] weakness  Skin:  [ ] rash [ ] phlebitis	  Eyes: [ ] icterus [ ] pain  [ ] discharge	  ENMT: [ ] sore throat  [ ] thrush [ ] ulcers [ ] exudates  Respiratory: [ ] dyspnea [ ] hemoptysis [ ] cough [ ] sputum	  Cardiovascular:  [ ] chest pain [ ] palpitations [ ] edema	  Gastrointestinal:  [ ] nausea [ ] vomiting [ ] diarrhea [ ] constipation [ ] pain	  Genitourinary:  [ ] dysuria [ ] frequency [ ] hematuria [ ] discharge [ ] flank pain  [ ] incontinence  Musculoskeletal:  [ ] myalgias [ ] arthralgias [ ] arthritis  [ ] back pain  Neurological:  [ ] headache [ ] seizures  [ ] confusion/altered mental status    Allergies  No Known Allergies        ANTIMICROBIALS:  ceFAZolin   IVPB 2000 every 8 hours      OTHER MEDS:  MEDICATIONS  (STANDING):  benzonatate 100 every 8 hours PRN  guaiFENesin Oral Liquid (Sugar-Free) 100 every 6 hours PRN  heparin   Injectable 5000 every 8 hours  lactulose Syrup 10 two times a day  levothyroxine 100 daily  midodrine. 10 every 8 hours  ondansetron Injectable 4 once  ondansetron Injectable 4 every 6 hours PRN  oxyCODONE    IR 2.5 every 12 hours PRN  pantoprazole    Tablet 40 before breakfast  simethicone 80 once      Vital Signs Last 24 Hrs  T(C): 37.7 (2023 16:57), Max: 37.7 (2023 16:57)  T(F): 99.9 (2023 16:57), Max: 99.9 (2023 16:57)  HR: 95 (2023 16:57) (82 - 95)  BP: 103/65 (2023 16:57) (101/58 - 103/65)  BP(mean): --  RR: 18 (2023 16:57) (18 - 18)  SpO2: 93% (2023 16:57) (92% - 99%)    Parameters below as of 2023 16:57  Patient On (Oxygen Delivery Method): room air        PHYSICAL EXAMINATION:  General: Alert and Awake, NAD  HEENT: PERRL, EOMI  Neck: Supple  Cardiac: RRR, No M/R/G  Resp: CTAB, No Wh/Rh/Ra  Abdomen: NBS, NT/ND, No HSM, No rigidity or guarding  MSK: No LE edema. No Calf tenderness  : No [place]en[place]ro  Skin: No rashes or lesions. Skin is warm and dry to the touch.   Neuro: Alert and Awake. CN 2-12 Grossly intact. Moves all four extremities spontaneously.  Psych: Calm, Pleasant, Cooperative                          9.0    19.62 )-----------( 278      ( 2023 06:26 )             26.2       06-02    130<L>  |  95<L>  |  11  ----------------------------<  100<H>  3.1<L>   |  19<L>  |  1.22    Ca    9.2      2023 06:25  Phos  2.4     06-  Mg     1.6     06-02    TPro  5.6<L>  /  Alb  2.9<L>  /  TBili  31.7<H>  /  DBili  x   /  AST  95<H>  /  ALT  <5<L>  /  AlkPhos  191<H>  06-02      Urinalysis Basic - ( 2023 00:34 )    Color: Dark Yellow / Appearance: Slightly Turbid / S.024 / pH: x  Gluc: x / Ketone: Trace  / Bili: Large >10 / Urobili: Negative   Blood: x / Protein: 30 mg/dL / Nitrite: Negative   Leuk Esterase: Negative / RBC: 11 /hpf / WBC 1 /HPF   Sq Epi: x / Non Sq Epi: x / Bacteria: Negative        MICROBIOLOGY:  v  Abdominal Fl Abdominal Fluid  23   No growth  --    polymorphonuclear leukocytes seen  No organisms seen  by cytocentrifuge      Clean Catch Clean Catch (Midstream)  23   <10,000 CFU/mL Normal Urogenital Aga  --  --      .Blood Blood-Peripheral  23   No growth to date.  --  --      .Blood Blood-Peripheral  23   No growth to date.  --  --      .Sputum Sputum  23   Culture is being performed.  --    Few polymorphonuclear leukocytes per low power field  Moderate Squamous epithelial cells per low power field  Moderate Yeast like cells seen per oil power field      .Blood Blood-Peripheral  23   No Growth Final  --  --      .Blood Blood-Peripheral  23   No Growth Final  --  --      Peritoneal Peritoneal Fluid  23   No growth at 5 days  --    polymorphonuclear leukocytes seen  No organisms seen  by cytocentrifuge      .Blood Blood-Peripheral  23   No Growth Final  --  --      .Blood Blood  23   No Growth Final  --  --      Clean Catch Clean Catch (Midstream)  23   50,000 - 99,000 CFU/mL Enterococcus faecium (vancomycin resistant)  --  Enterococcus faecium (vancomycin resistant)      .Blood Blood-Peripheral  23   No Growth Final  --  --      Peritoneal Peritoneal Fluid  23   No growth at 5 days  --    No polymorphonuclear cells seen  No organisms seen  by cytocentrifuge      .Blood Blood-Peripheral  23   No Growth Final  --  --      .Blood Blood-Peripheral  23   No Growth Final  --  --      Ascites Fl Ascites Fluid  23   No growth at 5 days  --    No polymorphonuclear cells seen seen  No organisms seen  by cytocentrifuge      Clean Catch Clean Catch (Midstream)  23   >100,000 CFU/ml Escherichia coli  --  Escherichia coli      .Blood Blood-Peripheral  23   Growth in aerobic and anaerobic bottles: Staphylococcus aureus  ***Blood Panel PCR results on this specimen are available  approximately 3 hours after the Gram stain result.***  Gram stain, PCR, and/or culture results may not always  correspond dueto difference in methodologies.  ************************************************************  This PCR assay was performed by multiplex PCR. This  Assay tests for 66 bacterial and resistance gene targets.  Please refer to the [facility] test directory  at [email or web address] for details.  --  Blood Culture PCR  Staphylococcus aureus      .Blood Blood-Peripheral  23   Growth in aerobic bottle: Staphylococcus aureus  See previous culture [id number]  --    Growth in aerobic bottle: Gram Positive Cocci in Clusters        CMV IgG Antibody: >10.00 U/mL (23 @ 00:45)  Toxoplasma IgG Screen: <3.0 IU/mL (23 @ 07:14)  CMV IgG Antibody: >10.00 U/mL (23 @ 07:14)  Toxoplasma IgG Screen: <3.0 IU/mL (23 @ 07:14)    Rapid RVP Result: NotDetec ( @ 09:17)        RADIOLOGY:    <The imaging below has been reviewed and visualized by me independently. Findings as detailed in report below> Follow Up:  pre-OLT    Interval History: cough stable. afebrile.     REVIEW OF SYSTEMS  [  ] ROS unobtainable because:    [x  ] All other systems negative except as noted below    Constitutional:  [ ] fever [ ] chills  [ ] weight loss  [ ] weakness  Skin:  [ ] rash [ ] phlebitis	  Eyes: [ ] icterus [ ] pain  [ ] discharge	  ENMT: [ ] sore throat  [ ] thrush [ ] ulcers [ ] exudates  Respiratory: [ ] dyspnea [ ] hemoptysis [ x] cough [ ] sputum	  Cardiovascular:  [ ] chest pain [ ] palpitations [ ] edema	  Gastrointestinal:  [ ] nausea [ ] vomiting [ ] diarrhea [ ] constipation [ ] pain	  Genitourinary:  [ ] dysuria [ ] frequency [ ] hematuria [ ] discharge [ ] flank pain  [ ] incontinence  Musculoskeletal:  [ ] myalgias [ ] arthralgias [ ] arthritis  [ ] back pain  Neurological:  [ ] headache [ ] seizures  [ ] confusion/altered mental status    Allergies  No Known Allergies        ANTIMICROBIALS:  ceFAZolin   IVPB 2000 every 8 hours      OTHER MEDS:  MEDICATIONS  (STANDING):  benzonatate 100 every 8 hours PRN  guaiFENesin Oral Liquid (Sugar-Free) 100 every 6 hours PRN  heparin   Injectable 5000 every 8 hours  lactulose Syrup 10 two times a day  levothyroxine 100 daily  midodrine. 10 every 8 hours  ondansetron Injectable 4 once  ondansetron Injectable 4 every 6 hours PRN  oxyCODONE    IR 2.5 every 12 hours PRN  pantoprazole    Tablet 40 before breakfast  simethicone 80 once      Vital Signs Last 24 Hrs  T(C): 37.7 (2023 16:57), Max: 37.7 (2023 16:57)  T(F): 99.9 (2023 16:57), Max: 99.9 (2023 16:57)  HR: 95 (2023 16:57) (82 - 95)  BP: 103/65 (2023 16:57) (101/58 - 103/65)  BP(mean): --  RR: 18 (2023 16:57) (18 - 18)  SpO2: 93% (2023 16:57) (92% - 99%)    Parameters below as of 2023 16:57  Patient On (Oxygen Delivery Method): room air      PHYSICAL EXAMINATION:  General: Alert and Awake, NAD, jaundice  HEENT: Normocephalic / Atraumatic, scleral icterus  Resp: No accessory muscles of respiration utilized  Abdomen: Not distended.  MSK: No LE edema.   : No [place]en[place]ro  Skin: No rashes or lesions.    Neuro: Alert and Awake. CN 2-12 Grossly intact. Moves all four extremities spontaneously.  Psych: Calm, Pleasant, Cooperative                          9.0    19.62 )-----------( 278      ( 2023 06:26 )             26.2       06-02    130<L>  |  95<L>  |  11  ----------------------------<  100<H>  3.1<L>   |  19<L>  |  1.22    Ca    9.2      2023 06:25  Phos  2.4     06-  Mg     1.6     06-02    TPro  5.6<L>  /  Alb  2.9<L>  /  TBili  31.7<H>  /  DBili  x   /  AST  95<H>  /  ALT  <5<L>  /  AlkPhos  191<H>        Urinalysis Basic - ( 2023 00:34 )    Color: Dark Yellow / Appearance: Slightly Turbid / S.024 / pH: x  Gluc: x / Ketone: Trace  / Bili: Large >10 / Urobili: Negative   Blood: x / Protein: 30 mg/dL / Nitrite: Negative   Leuk Esterase: Negative / RBC: 11 /hpf / WBC 1 /HPF   Sq Epi: x / Non Sq Epi: x / Bacteria: Negative        MICROBIOLOGY:  v  Abdominal Fl Abdominal Fluid  23   No growth  --    polymorphonuclear leukocytes seen  No organisms seen  by cytocentrifuge      Clean Catch Clean Catch (Midstream)  23   <10,000 CFU/mL Normal Urogenital Aga  --  --      .Blood Blood-Peripheral  23   No growth to date.  --  --      .Blood Blood-Peripheral  23   No growth to date.  --  --      .Sputum Sputum  23   Culture is being performed.  --    Few polymorphonuclear leukocytes per low power field  Moderate Squamous epithelial cells per low power field  Moderate Yeast like cells seen per oil power field      .Blood Blood-Peripheral  23   No Growth Final  --  --      .Blood Blood-Peripheral  23   No Growth Final  --  --      Peritoneal Peritoneal Fluid  23   No growth at 5 days  --    polymorphonuclear leukocytes seen  No organisms seen  by cytocentrifuge      .Blood Blood-Peripheral  23   No Growth Final  --  --      .Blood Blood  23   No Growth Final  --  --      Clean Catch Clean Catch (Midstream)  23   50,000 - 99,000 CFU/mL Enterococcus faecium (vancomycin resistant)  --  Enterococcus faecium (vancomycin resistant)      .Blood Blood-Peripheral  23   No Growth Final  --  --      Peritoneal Peritoneal Fluid  23   No growth at 5 days  --    No polymorphonuclear cells seen  No organisms seen  by cytocentrifuge      .Blood Blood-Peripheral  23   No Growth Final  --  --      .Blood Blood-Peripheral  23   No Growth Final  --  --      Ascites Fl Ascites Fluid  23   No growth at 5 days  --    No polymorphonuclear cells seen seen  No organisms seen  by cytocentrifuge      Clean Catch Clean Catch (Midstream)  23   >100,000 CFU/ml Escherichia coli  --  Escherichia coli      .Blood Blood-Peripheral  23   Growth in aerobic and anaerobic bottles: Staphylococcus aureus  ***Blood Panel PCR results on this specimen are available  approximately 3 hours after the Gram stain result.***  Gram stain, PCR, and/or culture results may not always  correspond dueto difference in methodologies.  ************************************************************  This PCR assay was performed by multiplex PCR. This  Assay tests for 66 bacterial and resistance gene targets.  Please refer to the [facility] test directory  at [email or web address] for details.  --  Blood Culture PCR  Staphylococcus aureus      .Blood Blood-Peripheral  23   Growth in aerobic bottle: Staphylococcus aureus  See previous culture [id number]  --    Growth in aerobic bottle: Gram Positive Cocci in Clusters        CMV IgG Antibody: >10.00 U/mL (23 @ 00:45)  Toxoplasma IgG Screen: <3.0 IU/mL (23 @ 07:14)  CMV IgG Antibody: >10.00 U/mL (23 @ 07:14)  Toxoplasma IgG Screen: <3.0 IU/mL (23 @ 07:14)    Rapid RVP Result: NotDetec ( @ 09:17)        RADIOLOGY:    <The imaging below has been reviewed and visualized by me independently. Findings as detailed in report below>    < from: CT Chest No Cont (23 @ 18:14) >    IMPRESSION:  Bilateral patchy lung opacities with mixed in appearance compared to the   prior study    < end of copied text >

## 2023-06-03 LAB
ALBUMIN SERPL ELPH-MCNC: 3.7 G/DL — SIGNIFICANT CHANGE UP (ref 3.3–5)
ALP SERPL-CCNC: 171 U/L — HIGH (ref 40–120)
ALT FLD-CCNC: <5 U/L — LOW (ref 10–45)
ANION GAP SERPL CALC-SCNC: 20 MMOL/L — HIGH (ref 5–17)
APTT BLD: 45.1 SEC — HIGH (ref 27.5–35.5)
AST SERPL-CCNC: 84 U/L — HIGH (ref 10–40)
BASOPHILS # BLD AUTO: 0.12 K/UL — SIGNIFICANT CHANGE UP (ref 0–0.2)
BASOPHILS NFR BLD AUTO: 0.7 % — SIGNIFICANT CHANGE UP (ref 0–2)
BILIRUB SERPL-MCNC: 33.8 MG/DL — HIGH (ref 0.2–1.2)
BUN SERPL-MCNC: 12 MG/DL — SIGNIFICANT CHANGE UP (ref 7–23)
CALCIUM SERPL-MCNC: 9.5 MG/DL — SIGNIFICANT CHANGE UP (ref 8.4–10.5)
CHLORIDE SERPL-SCNC: 94 MMOL/L — LOW (ref 96–108)
CO2 SERPL-SCNC: 19 MMOL/L — LOW (ref 22–31)
CREAT SERPL-MCNC: 1.12 MG/DL — SIGNIFICANT CHANGE UP (ref 0.5–1.3)
CULTURE RESULTS: SIGNIFICANT CHANGE UP
CULTURE RESULTS: SIGNIFICANT CHANGE UP
EGFR: 64 ML/MIN/1.73M2 — SIGNIFICANT CHANGE UP
EOSINOPHIL # BLD AUTO: 0.22 K/UL — SIGNIFICANT CHANGE UP (ref 0–0.5)
EOSINOPHIL NFR BLD AUTO: 1.2 % — SIGNIFICANT CHANGE UP (ref 0–6)
GLUCOSE SERPL-MCNC: 87 MG/DL — SIGNIFICANT CHANGE UP (ref 70–99)
HCT VFR BLD CALC: 23.3 % — LOW (ref 34.5–45)
HGB BLD-MCNC: 7.9 G/DL — LOW (ref 11.5–15.5)
IMM GRANULOCYTES NFR BLD AUTO: 1.5 % — HIGH (ref 0–0.9)
INR BLD: 2.07 RATIO — HIGH (ref 0.88–1.16)
LYMPHOCYTES # BLD AUTO: 1.6 K/UL — SIGNIFICANT CHANGE UP (ref 1–3.3)
LYMPHOCYTES # BLD AUTO: 8.7 % — LOW (ref 13–44)
MAGNESIUM SERPL-MCNC: 1.6 MG/DL — SIGNIFICANT CHANGE UP (ref 1.6–2.6)
MCHC RBC-ENTMCNC: 33.9 GM/DL — SIGNIFICANT CHANGE UP (ref 32–36)
MCHC RBC-ENTMCNC: 35.3 PG — HIGH (ref 27–34)
MCV RBC AUTO: 104 FL — HIGH (ref 80–100)
MONOCYTES # BLD AUTO: 1.68 K/UL — HIGH (ref 0–0.9)
MONOCYTES NFR BLD AUTO: 9.2 % — SIGNIFICANT CHANGE UP (ref 2–14)
NEUTROPHILS # BLD AUTO: 14.45 K/UL — HIGH (ref 1.8–7.4)
NEUTROPHILS NFR BLD AUTO: 78.7 % — HIGH (ref 43–77)
NRBC # BLD: 0 /100 WBCS — SIGNIFICANT CHANGE UP (ref 0–0)
PHOSPHATE SERPL-MCNC: 2.5 MG/DL — SIGNIFICANT CHANGE UP (ref 2.5–4.5)
PLATELET # BLD AUTO: 275 K/UL — SIGNIFICANT CHANGE UP (ref 150–400)
POTASSIUM SERPL-MCNC: 3.3 MMOL/L — LOW (ref 3.5–5.3)
POTASSIUM SERPL-SCNC: 3.3 MMOL/L — LOW (ref 3.5–5.3)
PROT SERPL-MCNC: 5.8 G/DL — LOW (ref 6–8.3)
PROTHROM AB SERPL-ACNC: 24.2 SEC — HIGH (ref 10.5–13.4)
RBC # BLD: 2.24 M/UL — LOW (ref 3.8–5.2)
RBC # FLD: SIGNIFICANT CHANGE UP (ref 10.3–14.5)
SODIUM SERPL-SCNC: 133 MMOL/L — LOW (ref 135–145)
SPECIMEN SOURCE: SIGNIFICANT CHANGE UP
SPECIMEN SOURCE: SIGNIFICANT CHANGE UP
WBC # BLD: 18.34 K/UL — HIGH (ref 3.8–10.5)
WBC # FLD AUTO: 18.34 K/UL — HIGH (ref 3.8–10.5)

## 2023-06-03 PROCEDURE — 76604 US EXAM CHEST: CPT | Mod: 26,GC

## 2023-06-03 PROCEDURE — 99253 IP/OBS CNSLTJ NEW/EST LOW 45: CPT | Mod: GC

## 2023-06-03 PROCEDURE — 99221 1ST HOSP IP/OBS SF/LOW 40: CPT | Mod: GC

## 2023-06-03 PROCEDURE — 99232 SBSQ HOSP IP/OBS MODERATE 35: CPT | Mod: GC

## 2023-06-03 RX ORDER — IPRATROPIUM/ALBUTEROL SULFATE 18-103MCG
3 AEROSOL WITH ADAPTER (GRAM) INHALATION EVERY 6 HOURS
Refills: 0 | Status: DISCONTINUED | OUTPATIENT
Start: 2023-06-03 | End: 2023-06-09

## 2023-06-03 RX ORDER — FLUCONAZOLE 150 MG/1
200 TABLET ORAL DAILY
Refills: 0 | Status: DISCONTINUED | OUTPATIENT
Start: 2023-06-03 | End: 2023-06-09

## 2023-06-03 RX ADMIN — Medication 100 MILLIGRAM(S): at 05:23

## 2023-06-03 RX ADMIN — MIDODRINE HYDROCHLORIDE 10 MILLIGRAM(S): 2.5 TABLET ORAL at 13:05

## 2023-06-03 RX ADMIN — OXYCODONE HYDROCHLORIDE 2.5 MILLIGRAM(S): 5 TABLET ORAL at 05:50

## 2023-06-03 RX ADMIN — Medication 1 TABLET(S): at 13:06

## 2023-06-03 RX ADMIN — OXYCODONE HYDROCHLORIDE 2.5 MILLIGRAM(S): 5 TABLET ORAL at 00:15

## 2023-06-03 RX ADMIN — Medication 100 MILLIGRAM(S): at 13:06

## 2023-06-03 RX ADMIN — Medication 100 MICROGRAM(S): at 05:45

## 2023-06-03 RX ADMIN — OXYCODONE HYDROCHLORIDE 2.5 MILLIGRAM(S): 5 TABLET ORAL at 06:20

## 2023-06-03 RX ADMIN — Medication 3 MILLILITER(S): at 17:24

## 2023-06-03 RX ADMIN — Medication 100 MILLIGRAM(S): at 17:46

## 2023-06-03 RX ADMIN — PANTOPRAZOLE SODIUM 40 MILLIGRAM(S): 20 TABLET, DELAYED RELEASE ORAL at 05:45

## 2023-06-03 RX ADMIN — HEPARIN SODIUM 5000 UNIT(S): 5000 INJECTION INTRAVENOUS; SUBCUTANEOUS at 22:31

## 2023-06-03 RX ADMIN — Medication 100 MILLIGRAM(S): at 17:23

## 2023-06-03 RX ADMIN — OXYCODONE HYDROCHLORIDE 2.5 MILLIGRAM(S): 5 TABLET ORAL at 17:23

## 2023-06-03 RX ADMIN — Medication 100 MILLIGRAM(S): at 10:24

## 2023-06-03 RX ADMIN — MIDODRINE HYDROCHLORIDE 10 MILLIGRAM(S): 2.5 TABLET ORAL at 05:45

## 2023-06-03 RX ADMIN — Medication 100 MILLIGRAM(S): at 23:23

## 2023-06-03 RX ADMIN — Medication 1 MILLIGRAM(S): at 13:06

## 2023-06-03 RX ADMIN — Medication 100 MILLIGRAM(S): at 09:02

## 2023-06-03 RX ADMIN — OXYCODONE HYDROCHLORIDE 2.5 MILLIGRAM(S): 5 TABLET ORAL at 18:23

## 2023-06-03 RX ADMIN — ONDANSETRON 4 MILLIGRAM(S): 8 TABLET, FILM COATED ORAL at 10:24

## 2023-06-03 RX ADMIN — FLUCONAZOLE 200 MILLIGRAM(S): 150 TABLET ORAL at 18:34

## 2023-06-03 RX ADMIN — MIDODRINE HYDROCHLORIDE 10 MILLIGRAM(S): 2.5 TABLET ORAL at 22:31

## 2023-06-03 RX ADMIN — Medication 3 MILLILITER(S): at 13:06

## 2023-06-03 RX ADMIN — Medication 3 MILLILITER(S): at 23:23

## 2023-06-03 NOTE — PROGRESS NOTE ADULT - SUBJECTIVE AND OBJECTIVE BOX
Chief Complaint:  Patient is a 38y old  Female who presents with a chief complaint of Liver Failure (01 Jun 2023 17:16)      Interval Events:   - Persistent nonproductive cough. Reports having x 2 months  - CT chest (6/1)-   Patchy mixed GGO and consolidative opacities, predominantly RML and BUL, lesser extent BLL. Small B pleural effusion R>L   - Zosyn DC s/p 7 day course  - Diuretics held/ Albumin given for sl bump in creatinine  - PET (5/14)- 347      MEDICATIONS  (STANDING):  albuterol/ipratropium for Nebulization 3 milliLiter(s) Nebulizer every 6 hours  ceFAZolin   IVPB 2000 milliGRAM(s) IV Intermittent every 8 hours  folic acid 1 milliGRAM(s) Oral daily  heparin   Injectable 5000 Unit(s) SubCutaneous every 8 hours  lactulose Syrup 10 Gram(s) Oral two times a day  levothyroxine 100 MICROGram(s) Oral daily  midodrine. 10 milliGRAM(s) Oral every 8 hours  multivitamin 1 Tablet(s) Oral daily  ondansetron Injectable 4 milliGRAM(s) IV Push once  pantoprazole    Tablet 40 milliGRAM(s) Oral before breakfast  simethicone 80 milliGRAM(s) Chew once  thiamine 100 milliGRAM(s) Oral daily    MEDICATIONS  (PRN):  benzonatate 100 milliGRAM(s) Oral every 8 hours PRN Cough  guaiFENesin Oral Liquid (Sugar-Free) 100 milliGRAM(s) Oral every 6 hours PRN Cough  ondansetron Injectable 4 milliGRAM(s) IV Push every 6 hours PRN Nausea and/or Vomiting  oxyCODONE    IR 2.5 milliGRAM(s) Oral every 12 hours PRN Moderate Pain (4 - 6)      PAST MEDICAL & SURGICAL HISTORY:  Hypothyroidism      Alcoholic liver disease      No significant past surgical history          Vital Signs Last 24 Hrs  T(C): 36.9 (03 Jun 2023 13:00), Max: 37.7 (02 Jun 2023 16:57)  T(F): 98.4 (03 Jun 2023 13:00), Max: 99.9 (02 Jun 2023 16:57)  HR: 90 (03 Jun 2023 13:00) (85 - 95)  BP: 104/68 (03 Jun 2023 13:00) (99/59 - 106/61)  BP(mean): --  RR: 18 (03 Jun 2023 13:00) (18 - 18)  SpO2: 93% (03 Jun 2023 13:00) (93% - 98%)    Parameters below as of 03 Jun 2023 13:00  Patient On (Oxygen Delivery Method): room air        I&O's Summary    02 Jun 2023 07:01  -  03 Jun 2023 07:00  --------------------------------------------------------  IN: 1200 mL / OUT: 900 mL / NET: 300 mL    03 Jun 2023 07:01  -  03 Jun 2023 16:12  --------------------------------------------------------  IN: 0 mL / OUT: 500 mL / NET: -500 mL                              7.9    18.34 )-----------( 275      ( 03 Jun 2023 07:20 )             23.3     06-03    133<L>  |  94<L>  |  12  ----------------------------<  87  3.3<L>   |  19<L>  |  1.12    Ca    9.5      03 Jun 2023 07:13  Phos  2.5     06-03  Mg     1.6     06-03    TPro  5.8<L>  /  Alb  3.7  /  TBili  33.8<H>  /  DBili  x   /  AST  84<H>  /  ALT  <5<L>  /  AlkPhos  171<H>  06-03          Culture - Fungal, Body Fluid (collected 05-31-23 @ 11:39)  Source: Peritoneal Peritoneal Fluid  Preliminary Report (06-03-23 @ 15:03):    Culture is being performed. Fungal cultures are held for 4 weeks.    Culture - Body Fluid with Gram Stain (collected 05-31-23 @ 11:39)  Source: Abdominal Fl Abdominal Fluid  Gram Stain (05-31-23 @ 20:33):    polymorphonuclear leukocytes seen    No organisms seen    by cytocentrifuge  Preliminary Report (06-01-23 @ 15:57):    No growth    Culture - Urine (collected 05-28-23 @ 10:27)  Source: Clean Catch Clean Catch (Midstream)  Final Report (05-29-23 @ 11:58):    <10,000 CFU/mL Normal Urogenital Aga    Culture - Blood (collected 05-28-23 @ 06:53)  Source: .Blood Blood-Peripheral  Final Report (06-03-23 @ 08:01):    No Growth Final    Culture - Blood (collected 05-28-23 @ 05:40)  Source: .Blood Blood-Peripheral  Final Report (06-03-23 @ 08:01):    No Growth Final      ROS: 14 point ROS negative unless otherwise stated in subjective      PHYSICAL EXAM:     GENERAL:  Well developed, +chronically ill appearing  HEENT:  NC/AT,  +sclera icteric; +R forehead hematoma w/o active bleeding  CHEST:  Full & symmetric excursion, no increased effort w/ respirations  HEART:  Regular rhythm & rate  ABDOMEN:  Soft, +diffusely but mildly tender, +mildly distended; +BS, no rebound tenderness or guarding  EXTREMITIES:  no LE edema, +sarcopenic extremities  SKIN:  +jaundice  NEURO:  Alert, orientedx3, no asterixis      Imaging: No new abdominal imaging
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
## 2023-06-03 NOTE — CONSULT NOTE ADULT - SUBJECTIVE AND OBJECTIVE BOX
CHIEF COMPLAINT:    HPI:  HPI:  This is a 37 yo Polish Female with Hx of hypothyroidism (on Levothyroxine), and no other known medical Hx, presented to [facility] ED on 5/13/23 with ~ 2 weeks Hx of progressively worsening abdominal pain ("stabbing, diffuse, non radiating") and distention,  as well as 1 day Hx of jaundice, and was admitted to medicine with liver failure (possibly acute on chronic), with MELD-Na 29 on admission, hyperbilirubinemia (Se bi 18.9, direct 15.9), coagulopathy (INR 2.15), mild liver enzyme elevation in AST>>ALT pattern (, ALT 14, ), severe hypoalbuminemia (alb 1.3), as well as leukocytosis (WBC 16k), anemia (Hb 8.0).     She was empirically started on NAC per protocol, hepatology evaluated. ID evaluated for MAY Pna and later MSSA bacteremia, has been on IV ceftriaxone and azithromycin. Ascitic fluid analysis (Dx paracentesis 5/13), was portal hypertensive (SAAG 1.1, total protein 1.0), and was not suggestive of SBP (PMN < 50). However, she continued to c/o abdominal pain, requiring morphine, getting every 8 hours. The patient is transferred to [facility] for Liver Transplant eval. (16 May 2023 12:14)    Pulm consulted for persistent productive cough.     Patient is a 20+ year chronic cigarette smoker without wheezing, asthma/COPD diagnosis or chronic inhaler use. Patient intermittently has tried vaping although does not like it Has chronic non Hemopytic cough for several months. Has SON. Has leg swelling, abdominal distention. Since admission, has completed 7 day course of zosyn also has been on cipro and now on cefazolin for MSSA bacteremia. Patient has had multiple CT scans with worsening parenchymal disease despite diuresis. no family history of AI disease, no skin or joint disease. Patient without pets. originally from [place] no occupational exposures.       PAST MEDICAL & SURGICAL HISTORY:  Hypothyroidism      Alcoholic liver disease      No significant past surgical history          FAMILY HISTORY:  No pertinent family history in first degree relatives        SOCIAL HISTORY:  alcohol use, active smoker    Allergies    No Known Allergies    Intolerances        HOME MEDICATIONS:  Home Medications:  acetylcysteine 20% intravenous solution: 40 milliliter(s) intravenous every 24 hours (16 May 2023 05:59)  cefTRIAXone: 2 gram(s) intravenously once a day (16 May 2023 05:59)  Constulose 10 g/15 mL oral liquid: 30 milliliter(s) orally 3 times a day (16 May 2023 02:51)  folic acid 1 mg oral tablet: 1 tab(s) orally once a day (16 May 2023 02:51)  guaiFENesin 100 mg/5 mL oral liquid: 5 milliliter(s) orally every 6 hours As needed Cough (16 May 2023 02:51)  levothyroxine 100 mcg (0.1 mg) oral tablet: 1 tab(s) orally once a day (16 May 2023 05:59)  LORazepam: 1 milliliter(s) intravenous every 4 hours as needed for Anxiety (16 May 2023 05:59)  morphine 15 mg oral tablet: 1 tab(s) orally every 8 hours (16 May 2023 02:51)  Multiple Vitamins oral tablet: 1 tab(s) orally once a day (16 May 2023 02:51)  mupirocin 2% topical ointment: Apply topically to affected area 2 times a day to both nostrils (16 May 2023 05:59)  ondansetron 2 mg/mL injectable solution: 4 milligram(s) injectable every 8 hours as needed for as needed (16 May 2023 05:59)  sodium/potassium/phosphorus 160 mg-280 mg-250 mg oral powder for reconstitution: 1 packet(s) orally 3 times a day (with meals) (16 May 2023 02:51)  thiamine 100 mg oral tablet: 1 tab(s) orally once a day (16 May 2023 02:51)      REVIEW OF SYSTEMS:  Patient denies fevers, chills, chest pain, shortness of breath, nausea, abdominal pain, diarrhea, constipation, dysuria, leg swelling, headache, light headedness    OBJECTIVE:  ICU Vital Signs Last 24 Hrs  T(C): 36.9 (03 Jun 2023 13:00), Max: 37.7 (02 Jun 2023 16:57)  T(F): 98.4 (03 Jun 2023 13:00), Max: 99.9 (02 Jun 2023 16:57)  HR: 90 (03 Jun 2023 13:00) (85 - 95)  BP: 104/68 (03 Jun 2023 13:00) (99/59 - 106/61)  BP(mean): --  ABP: --  ABP(mean): --  RR: 18 (03 Jun 2023 13:00) (18 - 18)  SpO2: 93% (03 Jun 2023 13:00) (93% - 98%)    O2 Parameters below as of 03 Jun 2023 13:00  Patient On (Oxygen Delivery Method): room air              06-02 @ 07:01  -  06-03 @ 07:00  --------------------------------------------------------  IN: 1200 mL / OUT: 900 mL / NET: 300 mL    06-03 @ 07:01  -  06-03 @ 15:14  --------------------------------------------------------  IN: 0 mL / OUT: 500 mL / NET: -500 mL      CAPILLARY BLOOD GLUCOSE          PHYSICAL EXAM:  General: awake and alert, nontoxic appearing lying in bed  HEENT: NC/AT, EOMI b/l, conjunctiva normal, MMM  Lymph Nodes: no cervical LAD  Neck: supple. full range of motion  Respiratory: CTA b/l, no w/r/c, appears comfortable on RA, no conversational dyspnea or accessory muscle use  Cardiovascular: S1 S2 present, RRR, no m/r/g  Abdomen: soft, NT/ND, +BS  Extremities: no c/c/e  Skin: no rashes or lesions noted. jaundiced   Neurological: AAOx3, no focal deficits  Psychiatry: calm, cooperative    LINES:     HOSPITAL MEDICATIONS:  Standing Meds:  albuterol/ipratropium for Nebulization 3 milliLiter(s) Nebulizer every 6 hours  ceFAZolin   IVPB 2000 milliGRAM(s) IV Intermittent every 8 hours  folic acid 1 milliGRAM(s) Oral daily  heparin   Injectable 5000 Unit(s) SubCutaneous every 8 hours  lactulose Syrup 10 Gram(s) Oral two times a day  levothyroxine 100 MICROGram(s) Oral daily  midodrine. 10 milliGRAM(s) Oral every 8 hours  multivitamin 1 Tablet(s) Oral daily  ondansetron Injectable 4 milliGRAM(s) IV Push once  pantoprazole    Tablet 40 milliGRAM(s) Oral before breakfast  simethicone 80 milliGRAM(s) Chew once  thiamine 100 milliGRAM(s) Oral daily      PRN Meds:  benzonatate 100 milliGRAM(s) Oral every 8 hours PRN  guaiFENesin Oral Liquid (Sugar-Free) 100 milliGRAM(s) Oral every 6 hours PRN  ondansetron Injectable 4 milliGRAM(s) IV Push every 6 hours PRN  oxyCODONE    IR 2.5 milliGRAM(s) Oral every 12 hours PRN      LABS:                        7.9    18.34 )-----------( 275      ( 03 Jun 2023 07:20 )             23.3     Hgb Trend: 7.9<--, 9.0<--, 7.7<--, 6.8<--, 7.1<--  06-03    133<L>  |  94<L>  |  12  ----------------------------<  87  3.3<L>   |  19<L>  |  1.12    Ca    9.5      03 Jun 2023 07:13  Phos  2.5     06-03  Mg     1.6     06-03    TPro  5.8<L>  /  Alb  3.7  /  TBili  33.8<H>  /  DBili  x   /  AST  84<H>  /  ALT  <5<L>  /  AlkPhos  171<H>  06-03    Creatinine Trend: 1.12<--, 1.22<--, 1.02<--, 0.97<--, 0.88<--, 0.78<--  PT/INR - ( 03 Jun 2023 07:19 )   PT: 24.2 sec;   INR: 2.07 ratio         PTT - ( 03 Jun 2023 07:19 )  PTT:45.1 sec          MICROBIOLOGY:       RADIOLOGY:  [ ] Reviewed and interpreted by me    PULMONARY FUNCTION TESTS:    EKG:

## 2023-06-03 NOTE — CHART NOTE - NSCHARTNOTEFT_GEN_A_CORE
: Dr. Lima, Dr. Whitfield    INDICATION: persistent cough    PROCEDURE:  [ ] LIMITED ECHO  [x] LIMITED CHEST  [ ] LIMITED RETROPERITONEAL  [ ] LIMITED ABDOMINAL  [ ] LIMITED DVT  [ ] NEEDLE GUIDANCE VASCULAR  [ ] NEEDLE GUIDANCE THORACENTESIS  [ ] NEEDLE GUIDANCE PARACENTESIS  [ ] NEEDLE GUIDANCE PERICARDIOCENTESIS  [ ] OTHER    FINDINGS:  anterior a lines, focal areas of b lines with irregular pleura, bilateral simple pleura effusions, right moderate, left small, basilar consolidations    INTERPRETATION:  no infectious signs of empyema, possible basilar consolidations no tappable window on left,       Images uploaded to [employer]

## 2023-06-03 NOTE — CONSULT NOTE ADULT - ATTENDING COMMENTS
37 yo Female with Hx of hypothyroidism (on Levothyroxine), and an active smoker presenting for acute liver failure in the setting of alcohol induced hepatitis now pending liver transplant shania. Course notable for fluid overload, MSSA bacteremia and also tx for pna.  Multiple CT this admission show a GGO pattern initially upper lobe predominant but then progressed to more dense b/l lower dz as well. She does have b/l effusions so there may be a component of fluid overload however I do not think that represents the whole picture.  Could represent atypical infection (pcp, viral...) pneumonitis, DAH.   Her exposure at home is not significant but she does reports some episodes of vaping  but she states the vaping was only twice. Also had exposure from her children who had viral illnesses which she reports was recent. No hemoptysis or anemia deop to suggest DAH now. COuld be pcp, cmv but so far w/u negative  Overall would favor bronchoscopy w/ BAL to better eval for infectious etiology prior to transplant as her opacities have slightly worsened throughout the hospital course. d/w team

## 2023-06-03 NOTE — CONSULT NOTE ADULT - ASSESSMENT
39 yo [place] Female with Hx of hypothyroidism (on Levothyroxine), and an active smoker presenting for acute liver failure in the setting of alcohol induced hepatitis now pending liver transplant eval with course complicated by PNAs/p zosyn, fluid overload, MSSA bacteremia with negative SAHARA and cleared cultures     Pulm consulted for persistent productive cough with progressive abnormal CT scans.   SAHARA without vegetations or signs of heart failure although with positive bubble study indicative of pulmonary AVM, although likely secondary to HPS  CT with multiple focal consolidations without PE or large AVM  POCUS with small left pleural effusion with consolidative lung, moderate right pleural effusion with atelectatic lung, simple appearing, not tappable    persistent leukocytosis, no easinophilia, quant gold/ histo/ procal 0.32, fungitell negative, crypto, aspergillus negative, u legionella, u strep negative, RVP negative, sputum normal , afb negative, HIV and MRSA negative     #recommendations   patient with unclear etiology for abnormal CT scans and persistent cough   ddx includes but not limited to delayed resolution of multifocal bacterial pneumonia, organizing viral pna, pneumonitis(although no clear medication/enviromental triggers), DAH, fluid overload, septic emboli (less likely)  given relative immunosupression due to liver dysfxn agree with large ID and AI workup  at this time would unlikely be unable to clear patient for liver transplant on a non emergent basis with bronch with BAL.  discussed with patient: who believes that she has already obtained this however unlikely true.   continue diuresis   continue empiric treatment of obstructive disease with standing duonebs  continue GERD management   start treatement for post nasal drip  pulmonary will continue to follow    Steve Lima MD  [facility] PGY4  [facility] [id number], [facility] [phone]   37 yo [place] Female with Hx of hypothyroidism (on Levothyroxine), and an active smoker presenting for acute liver failure in the setting of alcohol induced hepatitis now pending liver transplant eval with course complicated by PNAs/p zosyn, fluid overload, MSSA bacteremia with negative SAHARA and cleared cultures     Pulm consulted for persistent productive cough with progressive abnormal CT scans.   SAHARA without vegetations or signs of heart failure although with positive bubble study indicative of pulmonary AVM, although likely secondary to HPS  CT with multiple focal consolidations without PE or large AVM  POCUS with small left pleural effusion with consolidative lung, moderate right pleural effusion with atelectatic lung, simple appearing, not tappable    persistent leukocytosis, no easinophilia, quant gold/ histo/ procal 0.32, fungitell negative, crypto, aspergillus negative, u legionella, u strep negative, RVP negative, sputum normal , afb negative, HIV and MRSA negative     #recommendations   patient with unclear etiology for abnormal CT scans and persistent cough   ddx includes but not limited to delayed resolution of multifocal bacterial pneumonia, organizing viral pna, pneumonitis(although no clear medication/enviromental triggers), DAH, fluid overload, septic emboli (less likely)  given relative immunosupression due to liver dysfxn agree with large ID and AI workup  at this time would unlikely be unable to clear patient for liver transplant on a non emergent basis without bronch with BAL.  discussed with patient: who believes that she has already obtained this however unlikely true.   continue diuresis   continue empiric treatment of obstructive disease with standing duonebs  continue GERD management   start treatement for post nasal drip  pulmonary will continue to follow    Steve Lima MD  [facility] PGY4  [facility] [id number], [facility] [phone]

## 2023-06-04 LAB
ALBUMIN SERPL ELPH-MCNC: 3.3 G/DL — SIGNIFICANT CHANGE UP (ref 3.3–5)
ALP SERPL-CCNC: 164 U/L — HIGH (ref 40–120)
ALT FLD-CCNC: <5 U/L — LOW (ref 10–45)
ANION GAP SERPL CALC-SCNC: 16 MMOL/L — SIGNIFICANT CHANGE UP (ref 5–17)
APTT BLD: 46.6 SEC — HIGH (ref 27.5–35.5)
AST SERPL-CCNC: 85 U/L — HIGH (ref 10–40)
BASOPHILS # BLD AUTO: 0 K/UL — SIGNIFICANT CHANGE UP (ref 0–0.2)
BASOPHILS NFR BLD AUTO: 0 % — SIGNIFICANT CHANGE UP (ref 0–2)
BILIRUB SERPL-MCNC: 30.7 MG/DL — HIGH (ref 0.2–1.2)
BLD GP AB SCN SERPL QL: NEGATIVE — SIGNIFICANT CHANGE UP
BUN SERPL-MCNC: 13 MG/DL — SIGNIFICANT CHANGE UP (ref 7–23)
CALCIUM SERPL-MCNC: 9.1 MG/DL — SIGNIFICANT CHANGE UP (ref 8.4–10.5)
CHLORIDE SERPL-SCNC: 96 MMOL/L — SIGNIFICANT CHANGE UP (ref 96–108)
CO2 SERPL-SCNC: 19 MMOL/L — LOW (ref 22–31)
CREAT SERPL-MCNC: 1.27 MG/DL — SIGNIFICANT CHANGE UP (ref 0.5–1.3)
EGFR: 55 ML/MIN/1.73M2 — LOW
EOSINOPHIL # BLD AUTO: 0.17 K/UL — SIGNIFICANT CHANGE UP (ref 0–0.5)
EOSINOPHIL NFR BLD AUTO: 0.9 % — SIGNIFICANT CHANGE UP (ref 0–6)
FUNGITELL: <31 PG/ML — SIGNIFICANT CHANGE UP
GLUCOSE SERPL-MCNC: 96 MG/DL — SIGNIFICANT CHANGE UP (ref 70–99)
HCT VFR BLD CALC: 24.8 % — LOW (ref 34.5–45)
HGB BLD-MCNC: 8.6 G/DL — LOW (ref 11.5–15.5)
INR BLD: 2.08 RATIO — HIGH (ref 0.88–1.16)
LYMPHOCYTES # BLD AUTO: 1.34 K/UL — SIGNIFICANT CHANGE UP (ref 1–3.3)
LYMPHOCYTES # BLD AUTO: 6.9 % — LOW (ref 13–44)
MAGNESIUM SERPL-MCNC: 1.5 MG/DL — LOW (ref 1.6–2.6)
MANUAL SMEAR VERIFICATION: SIGNIFICANT CHANGE UP
MCHC RBC-ENTMCNC: 34.7 GM/DL — SIGNIFICANT CHANGE UP (ref 32–36)
MCHC RBC-ENTMCNC: 36.6 PG — HIGH (ref 27–34)
MCV RBC AUTO: 105.5 FL — HIGH (ref 80–100)
MONOCYTES # BLD AUTO: 1.69 K/UL — HIGH (ref 0–0.9)
MONOCYTES NFR BLD AUTO: 8.7 % — SIGNIFICANT CHANGE UP (ref 2–14)
NEUTROPHILS # BLD AUTO: 16.21 K/UL — HIGH (ref 1.8–7.4)
NEUTROPHILS NFR BLD AUTO: 83.5 % — HIGH (ref 43–77)
PHOSPHATE SERPL-MCNC: 2.4 MG/DL — LOW (ref 2.5–4.5)
PLAT MORPH BLD: NORMAL — SIGNIFICANT CHANGE UP
PLATELET # BLD AUTO: 309 K/UL — SIGNIFICANT CHANGE UP (ref 150–400)
POTASSIUM SERPL-MCNC: 3.4 MMOL/L — LOW (ref 3.5–5.3)
POTASSIUM SERPL-SCNC: 3.4 MMOL/L — LOW (ref 3.5–5.3)
PROT SERPL-MCNC: 5.4 G/DL — LOW (ref 6–8.3)
PROTHROM AB SERPL-ACNC: 24.1 SEC — HIGH (ref 10.5–13.4)
RBC # BLD: 2.35 M/UL — LOW (ref 3.8–5.2)
RBC # FLD: SIGNIFICANT CHANGE UP (ref 10.3–14.5)
RBC BLD AUTO: SIGNIFICANT CHANGE UP
RH IG SCN BLD-IMP: POSITIVE — SIGNIFICANT CHANGE UP
SODIUM SERPL-SCNC: 131 MMOL/L — LOW (ref 135–145)
WBC # BLD: 19.41 K/UL — HIGH (ref 3.8–10.5)
WBC # FLD AUTO: 19.41 K/UL — HIGH (ref 3.8–10.5)

## 2023-06-04 PROCEDURE — 99232 SBSQ HOSP IP/OBS MODERATE 35: CPT | Mod: GC

## 2023-06-04 RX ORDER — SODIUM,POTASSIUM PHOSPHATES 278-250MG
2 POWDER IN PACKET (EA) ORAL
Refills: 0 | Status: COMPLETED | OUTPATIENT
Start: 2023-06-04 | End: 2023-06-05

## 2023-06-04 RX ORDER — OXYCODONE HYDROCHLORIDE 5 MG/1
2.5 TABLET ORAL ONCE
Refills: 0 | Status: DISCONTINUED | OUTPATIENT
Start: 2023-06-04 | End: 2023-06-04

## 2023-06-04 RX ORDER — POTASSIUM CHLORIDE 20 MEQ
20 PACKET (EA) ORAL ONCE
Refills: 0 | Status: COMPLETED | OUTPATIENT
Start: 2023-06-04 | End: 2023-06-04

## 2023-06-04 RX ORDER — BENZOCAINE AND MENTHOL 5; 1 G/100ML; G/100ML
1 LIQUID ORAL
Refills: 0 | Status: DISCONTINUED | OUTPATIENT
Start: 2023-06-04 | End: 2023-06-09

## 2023-06-04 RX ORDER — MAGNESIUM OXIDE 400 MG ORAL TABLET 241.3 MG
400 TABLET ORAL
Refills: 0 | Status: DISCONTINUED | OUTPATIENT
Start: 2023-06-04 | End: 2023-06-09

## 2023-06-04 RX ADMIN — BENZOCAINE AND MENTHOL 1 LOZENGE: 5; 1 LIQUID ORAL at 15:58

## 2023-06-04 RX ADMIN — BENZOCAINE AND MENTHOL 1 LOZENGE: 5; 1 LIQUID ORAL at 21:43

## 2023-06-04 RX ADMIN — Medication 1 MILLIGRAM(S): at 12:11

## 2023-06-04 RX ADMIN — Medication 3 MILLILITER(S): at 17:25

## 2023-06-04 RX ADMIN — Medication 20 MILLIEQUIVALENT(S): at 12:09

## 2023-06-04 RX ADMIN — HEPARIN SODIUM 5000 UNIT(S): 5000 INJECTION INTRAVENOUS; SUBCUTANEOUS at 21:43

## 2023-06-04 RX ADMIN — OXYCODONE HYDROCHLORIDE 2.5 MILLIGRAM(S): 5 TABLET ORAL at 03:23

## 2023-06-04 RX ADMIN — Medication 100 MILLIGRAM(S): at 23:53

## 2023-06-04 RX ADMIN — Medication 3 MILLILITER(S): at 12:12

## 2023-06-04 RX ADMIN — Medication 100 MILLIGRAM(S): at 15:00

## 2023-06-04 RX ADMIN — BENZOCAINE AND MENTHOL 1 LOZENGE: 5; 1 LIQUID ORAL at 06:03

## 2023-06-04 RX ADMIN — Medication 3 MILLILITER(S): at 23:53

## 2023-06-04 RX ADMIN — OXYCODONE HYDROCHLORIDE 2.5 MILLIGRAM(S): 5 TABLET ORAL at 20:24

## 2023-06-04 RX ADMIN — MAGNESIUM OXIDE 400 MG ORAL TABLET 400 MILLIGRAM(S): 241.3 TABLET ORAL at 17:31

## 2023-06-04 RX ADMIN — Medication 100 MILLIGRAM(S): at 08:14

## 2023-06-04 RX ADMIN — Medication 1 TABLET(S): at 12:10

## 2023-06-04 RX ADMIN — BENZOCAINE AND MENTHOL 1 LOZENGE: 5; 1 LIQUID ORAL at 18:50

## 2023-06-04 RX ADMIN — OXYCODONE HYDROCHLORIDE 2.5 MILLIGRAM(S): 5 TABLET ORAL at 08:45

## 2023-06-04 RX ADMIN — MAGNESIUM OXIDE 400 MG ORAL TABLET 400 MILLIGRAM(S): 241.3 TABLET ORAL at 12:19

## 2023-06-04 RX ADMIN — PANTOPRAZOLE SODIUM 40 MILLIGRAM(S): 20 TABLET, DELAYED RELEASE ORAL at 06:01

## 2023-06-04 RX ADMIN — MIDODRINE HYDROCHLORIDE 10 MILLIGRAM(S): 2.5 TABLET ORAL at 14:06

## 2023-06-04 RX ADMIN — OXYCODONE HYDROCHLORIDE 2.5 MILLIGRAM(S): 5 TABLET ORAL at 04:04

## 2023-06-04 RX ADMIN — HEPARIN SODIUM 5000 UNIT(S): 5000 INJECTION INTRAVENOUS; SUBCUTANEOUS at 14:06

## 2023-06-04 RX ADMIN — Medication 100 MILLIGRAM(S): at 02:38

## 2023-06-04 RX ADMIN — FLUCONAZOLE 200 MILLIGRAM(S): 150 TABLET ORAL at 12:11

## 2023-06-04 RX ADMIN — BENZOCAINE AND MENTHOL 1 LOZENGE: 5; 1 LIQUID ORAL at 12:09

## 2023-06-04 RX ADMIN — OXYCODONE HYDROCHLORIDE 2.5 MILLIGRAM(S): 5 TABLET ORAL at 21:00

## 2023-06-04 RX ADMIN — Medication 100 MILLIGRAM(S): at 17:18

## 2023-06-04 RX ADMIN — Medication 2 PACKET(S): at 17:23

## 2023-06-04 RX ADMIN — Medication 100 MILLIGRAM(S): at 12:10

## 2023-06-04 RX ADMIN — MIDODRINE HYDROCHLORIDE 10 MILLIGRAM(S): 2.5 TABLET ORAL at 06:01

## 2023-06-04 RX ADMIN — MIDODRINE HYDROCHLORIDE 10 MILLIGRAM(S): 2.5 TABLET ORAL at 21:43

## 2023-06-04 RX ADMIN — Medication 100 MICROGRAM(S): at 06:02

## 2023-06-04 RX ADMIN — Medication 3 MILLILITER(S): at 06:03

## 2023-06-04 RX ADMIN — BENZOCAINE AND MENTHOL 1 LOZENGE: 5; 1 LIQUID ORAL at 03:02

## 2023-06-04 RX ADMIN — HEPARIN SODIUM 5000 UNIT(S): 5000 INJECTION INTRAVENOUS; SUBCUTANEOUS at 06:02

## 2023-06-04 RX ADMIN — OXYCODONE HYDROCHLORIDE 2.5 MILLIGRAM(S): 5 TABLET ORAL at 08:02
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
## 2023-06-04 NOTE — CHART NOTE - NSCHARTNOTEFT_GEN_A_CORE
patient will be tentatively added on for bronchoscopy with BAL for pre transplant eval  possibly monday or tuesday depending on ENDO availability which will only be known tomorrow  please obtain early morning CBC, CMP, Mag, Phos, Type and screen, Coags.  please keep patient NPO at MN starting tonight.     Steve Lima PGY4 PCCM Fellow

## 2023-06-04 NOTE — PROGRESS NOTE ADULT - SUBJECTIVE AND OBJECTIVE BOX
Chief Complaint:  Patient is a 38y old  Female who presents with a chief complaint of Liver Failure (01 Jun 2023 17:16)      Interval Events:   - Persistent nonproductive cough.  Reportedly cough was severe overnight causing her to vomit  - CT chest (6/1)-   Patchy mixed GGO and consolidative opacities, predominantly RML and BUL, lesser extent BLL. Small B pleural effusion R>L  - Pulm consulted. Plan for bronch w/ BAL  - PETH (5/14)- 347        MEDICATIONS  (STANDING):  albuterol/ipratropium for Nebulization 3 milliLiter(s) Nebulizer every 6 hours  ceFAZolin   IVPB 2000 milliGRAM(s) IV Intermittent every 8 hours  fluconAZOLE   Tablet 200 milliGRAM(s) Oral daily  folic acid 1 milliGRAM(s) Oral daily  heparin   Injectable 5000 Unit(s) SubCutaneous every 8 hours  lactulose Syrup 10 Gram(s) Oral two times a day  levothyroxine 100 MICROGram(s) Oral daily  magnesium oxide 400 milliGRAM(s) Oral three times a day with meals  midodrine. 10 milliGRAM(s) Oral every 8 hours  multivitamin 1 Tablet(s) Oral daily  ondansetron Injectable 4 milliGRAM(s) IV Push once  pantoprazole    Tablet 40 milliGRAM(s) Oral before breakfast  potassium phosphate / sodium phosphate Powder (PHOS-NaK) 2 Packet(s) Oral two times a day  simethicone 80 milliGRAM(s) Chew once  thiamine 100 milliGRAM(s) Oral daily    MEDICATIONS  (PRN):  benzocaine/menthol Lozenge 1 Lozenge Oral every 2 hours PRN cough  benzonatate 100 milliGRAM(s) Oral every 8 hours PRN Cough  guaiFENesin Oral Liquid (Sugar-Free) 100 milliGRAM(s) Oral every 6 hours PRN Cough  ondansetron Injectable 4 milliGRAM(s) IV Push every 6 hours PRN Nausea and/or Vomiting  oxyCODONE    IR 2.5 milliGRAM(s) Oral every 12 hours PRN Moderate Pain (4 - 6)      PAST MEDICAL & SURGICAL HISTORY:  Hypothyroidism      Alcoholic liver disease      No significant past surgical history          Vital Signs Last 24 Hrs  T(C): 37 (04 Jun 2023 13:00), Max: 37.2 (03 Jun 2023 21:00)  T(F): 98.6 (04 Jun 2023 13:00), Max: 98.9 (03 Jun 2023 21:00)  HR: 80 (04 Jun 2023 13:00) (80 - 100)  BP: 99/58 (04 Jun 2023 13:00) (96/57 - 103/64)  BP(mean): --  RR: 18 (04 Jun 2023 13:00) (18 - 18)  SpO2: 97% (04 Jun 2023 13:00) (94% - 99%)    Parameters below as of 04 Jun 2023 13:00  Patient On (Oxygen Delivery Method): room air        I&O's Summary    03 Jun 2023 07:01  -  04 Jun 2023 07:00  --------------------------------------------------------  IN: 1130 mL / OUT: 1100 mL / NET: 30 mL    04 Jun 2023 07:01  -  04 Jun 2023 16:13  --------------------------------------------------------  IN: 720 mL / OUT: 150 mL / NET: 570 mL                              8.6    19.41 )-----------( 309      ( 04 Jun 2023 07:34 )             24.8     06-04    131<L>  |  96  |  13  ----------------------------<  96  3.4<L>   |  19<L>  |  1.27    Ca    9.1      04 Jun 2023 07:32  Phos  2.4     06-04  Mg     1.5     06-04    TPro  5.4<L>  /  Alb  3.3  /  TBili  30.7<H>  /  DBili  x   /  AST  85<H>  /  ALT  <5<L>  /  AlkPhos  164<H>  06-04          Culture - Blood (collected 06-02-23 @ 10:44)  Source: .Blood Blood-Peripheral  Preliminary Report (06-03-23 @ 17:01):    No growth to date.    Culture - Blood (collected 06-02-23 @ 10:38)  Source: .Blood Blood-Peripheral  Preliminary Report (06-03-23 @ 17:01):    No growth to date.    Culture - Fungal, Body Fluid (collected 05-31-23 @ 11:39)  Source: Peritoneal Peritoneal Fluid  Preliminary Report (06-03-23 @ 15:03):    Culture is being performed. Fungal cultures are held for 4 weeks.    Culture - Body Fluid with Gram Stain (collected 05-31-23 @ 11:39)  Source: Abdominal Fl Abdominal Fluid  Gram Stain (05-31-23 @ 20:33):    polymorphonuclear leukocytes seen    No organisms seen    by cytocentrifuge  Preliminary Report (06-01-23 @ 15:57):    No growth      ROS: 14 point ROS negative unless otherwise stated in subjective      PHYSICAL EXAM:     GENERAL:  Well developed, +chronically ill appearing  HEENT:  NC/AT,  +sclera icteric; +R forehead hematoma w/o active bleeding  CHEST:  Full & symmetric excursion, no increased effort w/ respirations  HEART:  Regular rhythm & rate  ABDOMEN:  Soft, +diffusely but mildly tender, +mildly distended; +BS, no rebound tenderness or guarding  EXTREMITIES:  no LE edema, +sarcopenic extremities  SKIN:  +jaundice  NEURO:  Alert, orientedx3, no asterixis      Imaging: No new abdominal imaging

## 2023-06-05 LAB
ALBUMIN SERPL ELPH-MCNC: 2.9 G/DL — LOW (ref 3.3–5)
ALP SERPL-CCNC: 171 U/L — HIGH (ref 40–120)
ALT FLD-CCNC: <5 U/L — LOW (ref 10–45)
ANION GAP SERPL CALC-SCNC: 16 MMOL/L — SIGNIFICANT CHANGE UP (ref 5–17)
APTT BLD: 65.1 SEC — HIGH (ref 27.5–35.5)
AST SERPL-CCNC: 86 U/L — HIGH (ref 10–40)
B PERT IGG+IGM PNL SER: CLEAR — SIGNIFICANT CHANGE UP
BASOPHILS # BLD AUTO: 0.1 K/UL — SIGNIFICANT CHANGE UP (ref 0–0.2)
BASOPHILS NFR BLD AUTO: 0.6 % — SIGNIFICANT CHANGE UP (ref 0–2)
BILIRUB SERPL-MCNC: 30.3 MG/DL — HIGH (ref 0.2–1.2)
BUN SERPL-MCNC: 14 MG/DL — SIGNIFICANT CHANGE UP (ref 7–23)
CALCIUM SERPL-MCNC: 8.6 MG/DL — SIGNIFICANT CHANGE UP (ref 8.4–10.5)
CHLORIDE SERPL-SCNC: 98 MMOL/L — SIGNIFICANT CHANGE UP (ref 96–108)
CO2 SERPL-SCNC: 19 MMOL/L — LOW (ref 22–31)
COLOR FLD: SIGNIFICANT CHANGE UP
CREAT SERPL-MCNC: 1.48 MG/DL — HIGH (ref 0.5–1.3)
CULTURE RESULTS: NO GROWTH — SIGNIFICANT CHANGE UP
EGFR: 46 ML/MIN/1.73M2 — LOW
EOSINOPHIL # BLD AUTO: 0.17 K/UL — SIGNIFICANT CHANGE UP (ref 0–0.5)
EOSINOPHIL NFR BLD AUTO: 0.9 % — SIGNIFICANT CHANGE UP (ref 0–6)
FLUID INTAKE SUBSTANCE CLASS: SIGNIFICANT CHANGE UP
GLUCOSE SERPL-MCNC: 88 MG/DL — SIGNIFICANT CHANGE UP (ref 70–99)
HCT VFR BLD CALC: 22 % — LOW (ref 34.5–45)
HGB BLD-MCNC: 7.4 G/DL — LOW (ref 11.5–15.5)
IMM GRANULOCYTES NFR BLD AUTO: 1.6 % — HIGH (ref 0–0.9)
INR BLD: 2.17 RATIO — HIGH (ref 0.88–1.16)
LYMPHOCYTES # BLD AUTO: 1.5 K/UL — SIGNIFICANT CHANGE UP (ref 1–3.3)
LYMPHOCYTES # BLD AUTO: 8.3 % — LOW (ref 13–44)
LYMPHOCYTES # FLD: 6 % — SIGNIFICANT CHANGE UP
MAGNESIUM SERPL-MCNC: 1.8 MG/DL — SIGNIFICANT CHANGE UP (ref 1.6–2.6)
MCHC RBC-ENTMCNC: 33.6 GM/DL — SIGNIFICANT CHANGE UP (ref 32–36)
MCHC RBC-ENTMCNC: 35.4 PG — HIGH (ref 27–34)
MCV RBC AUTO: 105.3 FL — HIGH (ref 80–100)
MESOTHL CELL # FLD: 6 % — SIGNIFICANT CHANGE UP
MONOCYTES # BLD AUTO: 1.41 K/UL — HIGH (ref 0–0.9)
MONOCYTES NFR BLD AUTO: 7.8 % — SIGNIFICANT CHANGE UP (ref 2–14)
MONOS+MACROS # FLD: 86 % — SIGNIFICANT CHANGE UP
NEUTROPHILS # BLD AUTO: 14.53 K/UL — HIGH (ref 1.8–7.4)
NEUTROPHILS NFR BLD AUTO: 80.8 % — HIGH (ref 43–77)
NEUTROPHILS-BODY FLUID: 2 % — SIGNIFICANT CHANGE UP
NON-GYNECOLOGICAL CYTOLOGY STUDY: SIGNIFICANT CHANGE UP
NRBC # BLD: 0 /100 WBCS — SIGNIFICANT CHANGE UP (ref 0–0)
PHOSPHATE SERPL-MCNC: 3 MG/DL — SIGNIFICANT CHANGE UP (ref 2.5–4.5)
PLATELET # BLD AUTO: 287 K/UL — SIGNIFICANT CHANGE UP (ref 150–400)
POTASSIUM SERPL-MCNC: 3.8 MMOL/L — SIGNIFICANT CHANGE UP (ref 3.5–5.3)
POTASSIUM SERPL-SCNC: 3.8 MMOL/L — SIGNIFICANT CHANGE UP (ref 3.5–5.3)
PROT SERPL-MCNC: 5.2 G/DL — LOW (ref 6–8.3)
PROTHROM AB SERPL-ACNC: 25.4 SEC — HIGH (ref 10.5–13.4)
RAPIDTEG MAXIMUM AMPLITUDE: 64.8 MM — SIGNIFICANT CHANGE UP (ref 52–70)
RBC # BLD: 2.09 M/UL — LOW (ref 3.8–5.2)
RBC # FLD: SIGNIFICANT CHANGE UP (ref 10.3–14.5)
RCV VOL RI: < 2000 /UL — SIGNIFICANT CHANGE UP (ref 0–0)
SODIUM SERPL-SCNC: 133 MMOL/L — LOW (ref 135–145)
SPECIMEN SOURCE: SIGNIFICANT CHANGE UP
TEG FUNCTIONAL FIBRINOGEN: 28.5 MM — SIGNIFICANT CHANGE UP (ref 15–32)
TEG LY30 (LYSIS): 2.2 % — SIGNIFICANT CHANGE UP (ref 0–2.6)
TEG REACTION TIME: 11.7 MIN (ref 4.6–9.1)
TOTAL NUCLEATED CELL COUNT, BODY FLUID: 284 /UL — SIGNIFICANT CHANGE UP
TUBE TYPE: SIGNIFICANT CHANGE UP
WBC # BLD: 18 K/UL — HIGH (ref 3.8–10.5)
WBC # FLD AUTO: 18 K/UL — HIGH (ref 3.8–10.5)

## 2023-06-05 PROCEDURE — 99233 SBSQ HOSP IP/OBS HIGH 50: CPT | Mod: GC,25

## 2023-06-05 PROCEDURE — 31645 BRNCHSC W/THER ASPIR 1ST: CPT | Mod: GC

## 2023-06-05 PROCEDURE — 88305 TISSUE EXAM BY PATHOLOGIST: CPT | Mod: 26

## 2023-06-05 PROCEDURE — 88112 CYTOPATH CELL ENHANCE TECH: CPT | Mod: 26

## 2023-06-05 PROCEDURE — 99232 SBSQ HOSP IP/OBS MODERATE 35: CPT | Mod: GC

## 2023-06-05 DEVICE — PACK THORACENTESIS CHG: Type: IMPLANTABLE DEVICE | Status: FUNCTIONAL

## 2023-06-05 RX ORDER — IPRATROPIUM/ALBUTEROL SULFATE 18-103MCG
3 AEROSOL WITH ADAPTER (GRAM) INHALATION ONCE
Refills: 0 | Status: COMPLETED | OUTPATIENT
Start: 2023-06-05 | End: 2023-06-05

## 2023-06-05 RX ADMIN — MAGNESIUM OXIDE 400 MG ORAL TABLET 400 MILLIGRAM(S): 241.3 TABLET ORAL at 18:37

## 2023-06-05 RX ADMIN — Medication 3 MILLILITER(S): at 05:13

## 2023-06-05 RX ADMIN — Medication 1 TABLET(S): at 12:48

## 2023-06-05 RX ADMIN — Medication 100 MILLIGRAM(S): at 18:40

## 2023-06-05 RX ADMIN — Medication 100 MILLIGRAM(S): at 12:48

## 2023-06-05 RX ADMIN — Medication 3 MILLILITER(S): at 12:48

## 2023-06-05 RX ADMIN — MIDODRINE HYDROCHLORIDE 10 MILLIGRAM(S): 2.5 TABLET ORAL at 21:35

## 2023-06-05 RX ADMIN — HEPARIN SODIUM 5000 UNIT(S): 5000 INJECTION INTRAVENOUS; SUBCUTANEOUS at 21:35

## 2023-06-05 RX ADMIN — MIDODRINE HYDROCHLORIDE 10 MILLIGRAM(S): 2.5 TABLET ORAL at 14:30

## 2023-06-05 RX ADMIN — BENZOCAINE AND MENTHOL 1 LOZENGE: 5; 1 LIQUID ORAL at 21:34

## 2023-06-05 RX ADMIN — BENZOCAINE AND MENTHOL 1 LOZENGE: 5; 1 LIQUID ORAL at 23:47

## 2023-06-05 RX ADMIN — Medication 100 MILLIGRAM(S): at 23:42

## 2023-06-05 RX ADMIN — FLUCONAZOLE 200 MILLIGRAM(S): 150 TABLET ORAL at 12:48

## 2023-06-05 RX ADMIN — BENZOCAINE AND MENTHOL 1 LOZENGE: 5; 1 LIQUID ORAL at 05:13

## 2023-06-05 RX ADMIN — MAGNESIUM OXIDE 400 MG ORAL TABLET 400 MILLIGRAM(S): 241.3 TABLET ORAL at 12:47

## 2023-06-05 RX ADMIN — Medication 1 MILLIGRAM(S): at 12:48

## 2023-06-05 RX ADMIN — Medication 100 MILLIGRAM(S): at 08:37

## 2023-06-05 RX ADMIN — OXYCODONE HYDROCHLORIDE 2.5 MILLIGRAM(S): 5 TABLET ORAL at 09:20

## 2023-06-05 RX ADMIN — MAGNESIUM OXIDE 400 MG ORAL TABLET 400 MILLIGRAM(S): 241.3 TABLET ORAL at 08:37

## 2023-06-05 RX ADMIN — BENZOCAINE AND MENTHOL 1 LOZENGE: 5; 1 LIQUID ORAL at 10:59

## 2023-06-05 RX ADMIN — Medication 100 MILLIGRAM(S): at 18:39

## 2023-06-05 RX ADMIN — Medication 3 MILLILITER(S): at 23:40

## 2023-06-05 RX ADMIN — Medication 3 MILLILITER(S): at 17:42

## 2023-06-05 RX ADMIN — BENZOCAINE AND MENTHOL 1 LOZENGE: 5; 1 LIQUID ORAL at 00:02

## 2023-06-05 RX ADMIN — PANTOPRAZOLE SODIUM 40 MILLIGRAM(S): 20 TABLET, DELAYED RELEASE ORAL at 05:14

## 2023-06-05 RX ADMIN — BENZOCAINE AND MENTHOL 1 LOZENGE: 5; 1 LIQUID ORAL at 14:40

## 2023-06-05 RX ADMIN — Medication 2 PACKET(S): at 05:13

## 2023-06-05 RX ADMIN — Medication 100 MICROGRAM(S): at 05:13

## 2023-06-05 RX ADMIN — MIDODRINE HYDROCHLORIDE 10 MILLIGRAM(S): 2.5 TABLET ORAL at 05:13

## 2023-06-05 RX ADMIN — BENZOCAINE AND MENTHOL 1 LOZENGE: 5; 1 LIQUID ORAL at 08:37

## 2023-06-05 RX ADMIN — HEPARIN SODIUM 5000 UNIT(S): 5000 INJECTION INTRAVENOUS; SUBCUTANEOUS at 05:13

## 2023-06-05 RX ADMIN — HEPARIN SODIUM 5000 UNIT(S): 5000 INJECTION INTRAVENOUS; SUBCUTANEOUS at 14:30

## 2023-06-05 RX ADMIN — OXYCODONE HYDROCHLORIDE 2.5 MILLIGRAM(S): 5 TABLET ORAL at 08:37

## 2023-06-05 NOTE — CHART NOTE - NSCHARTNOTEFT_GEN_A_CORE
Bronchoscopy with BAL performed. Overall normal airways with scant mucus. BAL with 120ml of total normal saline performed with clear fluid return. BAL samples sent off for further testing. Full bronchoscopy note to follow.     Miguel Blevins MD   Pulmonary/Critical Care Fellow PGY-7  [facility] Pager #: [phone]  Spectra #: [phone]

## 2023-06-05 NOTE — PROVIDER CONTACT NOTE (OTHER) - ACTION/TREATMENT ORDERED:
Lois LEONG aware, advised to continue to apply pressure. Once wound stabilized, cleaned with NS & gauze & tegaderm dressing applied. Educated pt on infection prevention & bleeding risk
NP made aware, gave PRN orders

## 2023-06-05 NOTE — PROVIDER CONTACT NOTE (OTHER) - SITUATION
episode of emesis and coughing fits
Pt R upper eyebrow wound bleeding profusely, pt states she "picked at it so the bump would go down."

## 2023-06-05 NOTE — PROGRESS NOTE ADULT - SUBJECTIVE AND OBJECTIVE BOX
Interval Events:   No acute events overnight.  Patient without acute symptoms at this time.  Planning for bronch today.    ROS:   12 point review of systems performed and negative except otherwise noted in HPI.    Hospital Medications:  albuterol/ipratropium for Nebulization 3 milliLiter(s) Nebulizer every 6 hours  benzocaine/menthol Lozenge 1 Lozenge Oral every 2 hours PRN  benzonatate 100 milliGRAM(s) Oral every 8 hours PRN  ceFAZolin   IVPB 2000 milliGRAM(s) IV Intermittent every 8 hours  fluconAZOLE   Tablet 200 milliGRAM(s) Oral daily  folic acid 1 milliGRAM(s) Oral daily  guaiFENesin Oral Liquid (Sugar-Free) 100 milliGRAM(s) Oral every 6 hours PRN  heparin   Injectable 5000 Unit(s) SubCutaneous every 8 hours  lactulose Syrup 10 Gram(s) Oral two times a day  levothyroxine 100 MICROGram(s) Oral daily  magnesium oxide 400 milliGRAM(s) Oral three times a day with meals  midodrine. 10 milliGRAM(s) Oral every 8 hours  multivitamin 1 Tablet(s) Oral daily  ondansetron Injectable 4 milliGRAM(s) IV Push every 6 hours PRN  ondansetron Injectable 4 milliGRAM(s) IV Push once  oxyCODONE    IR 2.5 milliGRAM(s) Oral every 12 hours PRN  pantoprazole    Tablet 40 milliGRAM(s) Oral before breakfast  simethicone 80 milliGRAM(s) Chew once  thiamine 100 milliGRAM(s) Oral daily    MAR over past 24 hours:    albuterol/ipratropium for Nebulization   3 milliLiter(s) Nebulizer (06-05-23 @ 05:13)   3 milliLiter(s) Nebulizer (06-04-23 @ 23:53)   3 milliLiter(s) Nebulizer (06-04-23 @ 17:25)   3 milliLiter(s) Nebulizer (06-04-23 @ 12:12)    benzocaine/menthol Lozenge   1 Lozenge Oral (06-05-23 @ 08:37)   1 Lozenge Oral (06-05-23 @ 05:13)   1 Lozenge Oral (06-05-23 @ 00:02)   1 Lozenge Oral (06-04-23 @ 21:43)   1 Lozenge Oral (06-04-23 @ 18:50)   1 Lozenge Oral (06-04-23 @ 15:58)   1 Lozenge Oral (06-04-23 @ 12:09)    ceFAZolin   IVPB   100 mL/Hr IV Intermittent (06-05-23 @ 08:37)   100 mL/Hr IV Intermittent (06-04-23 @ 23:53)   100 mL/Hr IV Intermittent (06-04-23 @ 17:18)    fluconAZOLE   Tablet   200 milliGRAM(s) Oral (06-04-23 @ 12:11)    folic acid   1 milliGRAM(s) Oral (06-04-23 @ 12:11)    guaiFENesin Oral Liquid (Sugar-Free)   100 milliGRAM(s) Oral (06-04-23 @ 15:00)    heparin   Injectable   5000 Unit(s) SubCutaneous (06-05-23 @ 05:13)   5000 Unit(s) SubCutaneous (06-04-23 @ 21:43)   5000 Unit(s) SubCutaneous (06-04-23 @ 14:06)    levothyroxine   100 MICROGram(s) Oral (06-05-23 @ 05:13)    magnesium oxide   400 milliGRAM(s) Oral (06-05-23 @ 08:37)   400 milliGRAM(s) Oral (06-04-23 @ 17:31)   400 milliGRAM(s) Oral (06-04-23 @ 12:19)    midodrine.   10 milliGRAM(s) Oral (06-05-23 @ 05:13)   10 milliGRAM(s) Oral (06-04-23 @ 21:43)   10 milliGRAM(s) Oral (06-04-23 @ 14:06)    multivitamin   1 Tablet(s) Oral (06-04-23 @ 12:10)    oxyCODONE    IR   2.5 milliGRAM(s) Oral (06-05-23 @ 08:37)   2.5 milliGRAM(s) Oral (06-04-23 @ 20:24)    pantoprazole    Tablet   40 milliGRAM(s) Oral (06-05-23 @ 05:14)    potassium chloride    Tablet ER   20 milliEquivalent(s) Oral (06-04-23 @ 12:09)    potassium phosphate / sodium phosphate Powder (PHOS-NaK)   2 Packet(s) Oral (06-05-23 @ 05:13)   2 Packet(s) Oral (06-04-23 @ 17:23)    thiamine   100 milliGRAM(s) Oral (06-04-23 @ 12:10)      Home Medications:  Last Order Reconciliation Date: 05-16-23 @ 13:29 (Admission Reconciliation)  acetylcysteine 20% intravenous solution: 40 milliliter(s) intravenous every 24 hours  cefTRIAXone: 2 gram(s) intravenously once a day  Constulose 10 g/15 mL oral liquid: 30 milliliter(s) orally 3 times a day  folic acid 1 mg oral tablet: 1 tab(s) orally once a day  guaiFENesin 100 mg/5 mL oral liquid: 5 milliliter(s) orally every 6 hours As needed Cough  levothyroxine 100 mcg (0.1 mg) oral tablet: 1 tab(s) orally once a day  LORazepam: 1 milliliter(s) intravenous every 4 hours as needed for Anxiety  morphine 15 mg oral tablet: 1 tab(s) orally every 8 hours  Multiple Vitamins oral tablet: 1 tab(s) orally once a day  mupirocin 2% topical ointment: Apply topically to affected area 2 times a day to both nostrils  ondansetron 2 mg/mL injectable solution: 4 milligram(s) injectable every 8 hours as needed for as needed  sodium/potassium/phosphorus 160 mg-280 mg-250 mg oral powder for reconstitution: 1 packet(s) orally 3 times a day (with meals)  thiamine 100 mg oral tablet: 1 tab(s) orally once a day    PHYSICAL EXAM:   Vital Signs last 24 hours:  T(F): 98.3 (06-05-23 @ 08:43), Max: 98.6 (06-04-23 @ 13:00)  HR: 103 (06-05-23 @ 08:43) (80 - 103)  BP: 97/55 (06-05-23 @ 08:43) (96/57 - 102/63)  BP(mean): --  ABP: --  ABP(mean): --  RR: 18 (06-05-23 @ 08:43) (18 - 18)  SpO2: 97% (06-05-23 @ 08:43) (95% - 99%)    I&Os:    06-04-23 @ 07:01  -  06-05-23 @ 07:00  --------------------------------------------------------  IN:    IV PiggyBack: 100 mL    Oral Fluid: 1490 mL  Total IN: 1590 mL    OUT:    Voided (mL): 525 mL  Total OUT: 525 mL    Total NET: 1065 mL        BMI (kg/m2): 19.1 (05-13-23 @ 09:18)  GENERAL: no acute distress  NEURO: alert  HEENT: NCAT, no conjunctival pallor appreciated  CHEST: no respiratory distress, no accessory muscle use  CARDIAC: regular rate, +S1/S2  ABDOMEN: soft, nontender, no rebound or guarding  EXTREMITIES: warm, well perfused  SKIN: no lesions noted    DIAGNOSTICS:  WBC      Hg       PLT      Na       K        CO2     BUN      Cr       ALT      AST      TB       ALP  18.00    7.4      287      ------   ------   ------   ------   ------   ------   ------   ------   ------   06-05-23 @ 06:46  ------   ------   ------   133      3.8      19       14       1.48     <5       86       30.3     171      06-05-23 @ 06:45  19.41    8.6      309      ------   ------   ------   ------   ------   ------   ------   ------   ------   06-04-23 @ 07:34  ------   ------   ------   131      3.4      19       13       1.27     <5       85       30.7     164      06-04-23 @ 07:32  18.34    7.9      275      ------   ------   ------   ------   ------   ------   ------   ------   ------   06-03-23 @ 07:20    PT             INR            MELDwith  MELDw/o  25.4           2.17           --             --             06-05-23 @ 06:47  24.1           2.08           --             --             06-04-23 @ 07:36  24.2           2.07           --             --             06-03-23 @ 07:19  24.3           2.08           --             --             06-02-23 @ 06:25  23.3           1.99           --             --             06-01-23 @ 00:42

## 2023-06-05 NOTE — PROGRESS NOTE ADULT - ASSESSMENT
39 yo Polish Female with hypothyroidism admitted for acute liver failure in the setting of alcohol induced hepatitis now pending liver transplant eval with course complicated by PNAs/p zosyn, fluid overload, MSSA bacteremia with negative SAHARA and cleared cultures. Pulm consulted for persistent productive cough with progressive abnormal CT scans. 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 CT with multiple focal consolidations without PE or large AVM  POCUS with small left pleural effusion with consolidative lung, moderate right pleural effusion with atelectatic lung, simple appearing, not tappable      Plan  - patient with unclear etiology for abnormal CT scans and persistent cough   - Differential includes but not limited to delayed resolution of multifocal bacterial pneumonia, organizing viral pna, pneumonitis DAH, fluid overload, septic emboli (less likely)  - Plan for bronchoscopy with BAL today   - continue diuresis   - continue empiric treatment of obstructive disease with standing duonebs  - continue GERD management     Miguel Blevins MD   Pulmonary/Critical Care Fellow PGY-7  [facility] Pager #: [phone]  Spectra #: [id number]

## 2023-06-05 NOTE — PRE PROCEDURE NOTE - ATTENDING COMMENTS
Agree with fellow note as documented above.     Pt is a 38F with PMHx hypothyroidism and EtOH disorder presenting to [facility] as a transfer from 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 Bronchoscopic evaluation today with BAL sampling to r/o infectious etiology of abnormal CT imaging.

## 2023-06-05 NOTE — PROVIDER CONTACT NOTE (OTHER) - ASSESSMENT
Pt A&Ox4, VS as charted. Pt R upper eyebrow wound opened & bleeding bright red blood. 1 rag covered in blood, blood seen on the bedsheets as well. Pt states she "wanted to make the bump go down."

## 2023-06-05 NOTE — PROVIDER CONTACT NOTE (OTHER) - BACKGROUND
Patient admitted with alcohol induced liver disease
38F with Hx of hypothyroidism (on Levothyroxine), liver cirrhosis 2/2 ETOH abuse, transferred from [facility] for management of alcoholic hepatitis
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
## 2023-06-05 NOTE — PROGRESS NOTE ADULT - ATTENDING COMMENTS
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 Pulmonary consulted for persistent productive cough with multiple abnormal CT imaging of unclear primary etiology. Pt now scheduled for bronchoscopic evaluation today with BAL sampling. Discussed with pt at bedside, who is amenable to procedure.

## 2023-06-05 NOTE — PROGRESS NOTE ADULT - SUBJECTIVE AND OBJECTIVE BOX
CHIEF COMPLAINT:Patient is a 38y old  Female who presents with a chief complaint of Liver Failure (05 Jun 2023 08:56)      Interval Events:    REVIEW OF SYSTEMS:  [x] All other systems negative except per HPI   [ ] Unable to assess ROS because ________    OBJECTIVE:  ICU Vital Signs Last 24 Hrs  T(C): 36.7 (05 Jun 2023 12:13), Max: 37 (04 Jun 2023 13:00)  T(F): 98.1 (05 Jun 2023 12:13), Max: 98.6 (04 Jun 2023 13:00)  HR: 100 (05 Jun 2023 12:13) (80 - 103)  BP: 102/65 (05 Jun 2023 12:13) (97/55 - 102/65)  BP(mean): --  ABP: --  ABP(mean): --  RR: 18 (05 Jun 2023 12:13) (18 - 18)  SpO2: 94% (05 Jun 2023 12:13) (94% - 98%)    O2 Parameters below as of 05 Jun 2023 12:13  Patient On (Oxygen Delivery Method): room air              06-04 @ 07:01  -  06-05 @ 07:00  --------------------------------------------------------  IN: 1590 mL / OUT: 525 mL / NET: 1065 mL        PHYSICAL EXAM:  GENERAL: NAD, well-groomed, well-developed  HEAD:  Atraumatic, Normocephalic  EYES: EOMI, PERRLA, conjunctiva and sclera clear  ENMT: No tonsillar erythema, exudates, or enlargement; Moist mucous membranes, Good dentition, No lesions  NECK: Supple, No JVD, Normal thyroid  CHEST/LUNG: Clear to auscultation bilaterally; No rales, rhonchi, wheezing, or rubs  HEART: Regular rate and rhythm; No murmurs, rubs, or gallops  ABDOMEN: Soft, Nontender, Nondistended; Bowel sounds present  VASCULAR:  2+ Peripheral Pulses, No clubbing, cyanosis, or edema  LYMPH: No lymphadenopathy noted  SKIN: No rashes or lesions  NERVOUS SYSTEM:  Alert & Oriented X3, Good concentration; Motor Strength 5/5 B/L upper and lower extremities; DTRs 2+ intact and symmetric    HOSPITAL MEDICATIONS:  MEDICATIONS  (STANDING):  albuterol/ipratropium for Nebulization 3 milliLiter(s) Nebulizer every 6 hours  ceFAZolin   IVPB 2000 milliGRAM(s) IV Intermittent every 8 hours  fluconAZOLE   Tablet 200 milliGRAM(s) Oral daily  folic acid 1 milliGRAM(s) Oral daily  heparin   Injectable 5000 Unit(s) SubCutaneous every 8 hours  lactulose Syrup 10 Gram(s) Oral two times a day  levothyroxine 100 MICROGram(s) Oral daily  magnesium oxide 400 milliGRAM(s) Oral three times a day with meals  midodrine. 10 milliGRAM(s) Oral every 8 hours  multivitamin 1 Tablet(s) Oral daily  ondansetron Injectable 4 milliGRAM(s) IV Push once  pantoprazole    Tablet 40 milliGRAM(s) Oral before breakfast  simethicone 80 milliGRAM(s) Chew once  thiamine 100 milliGRAM(s) Oral daily    MEDICATIONS  (PRN):  benzocaine/menthol Lozenge 1 Lozenge Oral every 2 hours PRN cough  benzonatate 100 milliGRAM(s) Oral every 8 hours PRN Cough  guaiFENesin Oral Liquid (Sugar-Free) 100 milliGRAM(s) Oral every 6 hours PRN Cough  ondansetron Injectable 4 milliGRAM(s) IV Push every 6 hours PRN Nausea and/or Vomiting  oxyCODONE    IR 2.5 milliGRAM(s) Oral every 12 hours PRN Moderate Pain (4 - 6)      LABS:    The Labs were reviewed by me   The Radiology was reviewed by me    EKG tracing reviewed by me    06-05    133<L>  |  98  |  14  ----------------------------<  88  3.8   |  19<L>  |  1.48<H>  06-04    131<L>  |  96  |  13  ----------------------------<  96  3.4<L>   |  19<L>  |  1.27  06-03    133<L>  |  94<L>  |  12  ----------------------------<  87  3.3<L>   |  19<L>  |  1.12    Ca    8.6      05 Jun 2023 06:45  Ca    9.1      04 Jun 2023 07:32  Ca    9.5      03 Jun 2023 07:13  Phos  3.0     06-05  Mg     1.8     06-05    TPro  5.2<L>  /  Alb  2.9<L>  /  TBili  30.3<H>  /  DBili  x   /  AST  86<H>  /  ALT  <5<L>  /  AlkPhos  171<H>  06-05  TPro  5.4<L>  /  Alb  3.3  /  TBili  30.7<H>  /  DBili  x   /  AST  85<H>  /  ALT  <5<L>  /  AlkPhos  164<H>  06-04  TPro  5.8<L>  /  Alb  3.7  /  TBili  33.8<H>  /  DBili  x   /  AST  84<H>  /  ALT  <5<L>  /  AlkPhos  171<H>  06-03    Magnesium, Serum: 1.8 mg/dL (06-05-23 @ 06:45)  Magnesium, Serum: 1.5 mg/dL (06-04-23 @ 07:32)  Magnesium, Serum: 1.6 mg/dL (06-03-23 @ 07:13)    Phosphorus Level, Serum: 3.0 mg/dL (06-05-23 @ 06:45)  Phosphorus Level, Serum: 2.4 mg/dL (06-04-23 @ 07:32)  Phosphorus Level, Serum: 2.5 mg/dL (06-03-23 @ 07:13)      PT/INR - ( 05 Jun 2023 06:47 )   PT: 25.4 sec;   INR: 2.17 ratio         PTT - ( 05 Jun 2023 06:47 )  PTT:65.1 sec                                        7.4    18.00 )-----------( 287      ( 05 Jun 2023 06:46 )             22.0                         8.6    19.41 )-----------( 309      ( 04 Jun 2023 07:34 )             24.8                         7.9    18.34 )-----------( 275      ( 03 Jun 2023 07:20 )             23.3     CAPILLARY BLOOD GLUCOSE            MICROBIOLOGY:     RADIOLOGY:  [ ] Reviewed and interpreted by me    Point of Care Ultrasound Findings:    PFT:    EKG:

## 2023-06-05 NOTE — CHART NOTE - NSCHARTNOTEFT_GEN_A_CORE
Patient was off of the floor at time of my visit  ------------------------------------------------------------    Off the floor for bronchoscopy would ensure that following studies are sent:  --Cell counts with differential  --Cytology  --Cultures ( bacterial, fungal, afb and nocardia)   --Respiratory viral panel from the BAL fluid  --Pneumocystis PCR  --Aspergillus galactomannan from the BAL  --Legionella PCR    I will continue to follow. Please feel free to contact me with any further questions.    Irving Deras M.D.  [facility] Division of Infectious Disease  8AM-5PM Monday - Friday: Available on [employer] Teams  After Hours and Holidays (or if no response on [employer] Teams): Please contact the Infectious Diseases Office at [phone]    The above assessment and plan were discussed with Rosalba, transplant surgery PA

## 2023-06-06 PROBLEM — K70.9 ALCOHOLIC LIVER DISEASE, UNSPECIFIED: Chronic | Status: ACTIVE | Noted: 2023-05-16

## 2023-06-06 LAB
ALBUMIN SERPL ELPH-MCNC: 3.2 G/DL — LOW (ref 3.3–5)
ALP SERPL-CCNC: 199 U/L — HIGH (ref 40–120)
ALT FLD-CCNC: <5 U/L — LOW (ref 10–45)
ANION GAP SERPL CALC-SCNC: 17 MMOL/L — SIGNIFICANT CHANGE UP (ref 5–17)
APTT BLD: 54.9 SEC — HIGH (ref 27.5–35.5)
AST SERPL-CCNC: 93 U/L — HIGH (ref 10–40)
BASOPHILS # BLD AUTO: 0.03 K/UL — SIGNIFICANT CHANGE UP (ref 0–0.2)
BASOPHILS NFR BLD AUTO: 0.2 % — SIGNIFICANT CHANGE UP (ref 0–2)
BILIRUB SERPL-MCNC: 33.1 MG/DL — HIGH (ref 0.2–1.2)
BUN SERPL-MCNC: 16 MG/DL — SIGNIFICANT CHANGE UP (ref 7–23)
CALCIUM SERPL-MCNC: 8.9 MG/DL — SIGNIFICANT CHANGE UP (ref 8.4–10.5)
CHLORIDE SERPL-SCNC: 97 MMOL/L — SIGNIFICANT CHANGE UP (ref 96–108)
CO2 SERPL-SCNC: 16 MMOL/L — LOW (ref 22–31)
CREAT SERPL-MCNC: 1.52 MG/DL — HIGH (ref 0.5–1.3)
EGFR: 44 ML/MIN/1.73M2 — LOW
EOSINOPHIL # BLD AUTO: 0 K/UL — SIGNIFICANT CHANGE UP (ref 0–0.5)
EOSINOPHIL NFR BLD AUTO: 0 % — SIGNIFICANT CHANGE UP (ref 0–6)
GLUCOSE SERPL-MCNC: 174 MG/DL — HIGH (ref 70–99)
GRAM STN FLD: SIGNIFICANT CHANGE UP
HCT VFR BLD CALC: 26.8 % — LOW (ref 34.5–45)
HGB BLD-MCNC: 9.2 G/DL — LOW (ref 11.5–15.5)
IMM GRANULOCYTES NFR BLD AUTO: 1.4 % — HIGH (ref 0–0.9)
INR BLD: 2.16 RATIO — HIGH (ref 0.88–1.16)
LYMPHOCYTES # BLD AUTO: 0.56 K/UL — LOW (ref 1–3.3)
LYMPHOCYTES # BLD AUTO: 2.9 % — LOW (ref 13–44)
MAGNESIUM SERPL-MCNC: 1.9 MG/DL — SIGNIFICANT CHANGE UP (ref 1.6–2.6)
MCHC RBC-ENTMCNC: 34.3 GM/DL — SIGNIFICANT CHANGE UP (ref 32–36)
MCHC RBC-ENTMCNC: 34.8 PG — HIGH (ref 27–34)
MCV RBC AUTO: 101.5 FL — HIGH (ref 80–100)
MONOCYTES # BLD AUTO: 0.57 K/UL — SIGNIFICANT CHANGE UP (ref 0–0.9)
MONOCYTES NFR BLD AUTO: 2.9 % — SIGNIFICANT CHANGE UP (ref 2–14)
NEUTROPHILS # BLD AUTO: 18.15 K/UL — HIGH (ref 1.8–7.4)
NEUTROPHILS NFR BLD AUTO: 92.6 % — HIGH (ref 43–77)
NIGHT BLUE STAIN TISS: SIGNIFICANT CHANGE UP
NRBC # BLD: 0 /100 WBCS — SIGNIFICANT CHANGE UP (ref 0–0)
PHOSPHATE SERPL-MCNC: 2.5 MG/DL — SIGNIFICANT CHANGE UP (ref 2.5–4.5)
PLATELET # BLD AUTO: 289 K/UL — SIGNIFICANT CHANGE UP (ref 150–400)
POTASSIUM SERPL-MCNC: 4.5 MMOL/L — SIGNIFICANT CHANGE UP (ref 3.5–5.3)
POTASSIUM SERPL-SCNC: 4.5 MMOL/L — SIGNIFICANT CHANGE UP (ref 3.5–5.3)
PROT SERPL-MCNC: 5.8 G/DL — LOW (ref 6–8.3)
PROTHROM AB SERPL-ACNC: 25.2 SEC — HIGH (ref 10.5–13.4)
RBC # BLD: 2.64 M/UL — LOW (ref 3.8–5.2)
RBC # FLD: 25.1 % — HIGH (ref 10.3–14.5)
SODIUM SERPL-SCNC: 130 MMOL/L — LOW (ref 135–145)
SPECIMEN SOURCE: SIGNIFICANT CHANGE UP
WBC # BLD: 19.58 K/UL — HIGH (ref 3.8–10.5)
WBC # FLD AUTO: 19.58 K/UL — HIGH (ref 3.8–10.5)

## 2023-06-06 PROCEDURE — 99232 SBSQ HOSP IP/OBS MODERATE 35: CPT

## 2023-06-06 PROCEDURE — 99232 SBSQ HOSP IP/OBS MODERATE 35: CPT | Mod: GC

## 2023-06-06 PROCEDURE — 99233 SBSQ HOSP IP/OBS HIGH 50: CPT

## 2023-06-06 RX ORDER — PIPERACILLIN AND TAZOBACTAM 4; .5 G/20ML; G/20ML
3.38 INJECTION, POWDER, LYOPHILIZED, FOR SOLUTION INTRAVENOUS ONCE
Refills: 0 | Status: COMPLETED | OUTPATIENT
Start: 2023-06-06 | End: 2023-06-06

## 2023-06-06 RX ORDER — ALBUMIN HUMAN 25 %
50 VIAL (ML) INTRAVENOUS EVERY 8 HOURS
Refills: 0 | Status: COMPLETED | OUTPATIENT
Start: 2023-06-06 | End: 2023-06-07

## 2023-06-06 RX ORDER — PIPERACILLIN AND TAZOBACTAM 4; .5 G/20ML; G/20ML
3.38 INJECTION, POWDER, LYOPHILIZED, FOR SOLUTION INTRAVENOUS EVERY 8 HOURS
Refills: 0 | Status: DISCONTINUED | OUTPATIENT
Start: 2023-06-06 | End: 2023-06-09

## 2023-06-06 RX ORDER — CEFAZOLIN SODIUM 1 G
2000 VIAL (EA) INJECTION EVERY 8 HOURS
Refills: 0 | Status: DISCONTINUED | OUTPATIENT
Start: 2023-06-06 | End: 2023-06-09

## 2023-06-06 RX ADMIN — MAGNESIUM OXIDE 400 MG ORAL TABLET 400 MILLIGRAM(S): 241.3 TABLET ORAL at 17:07

## 2023-06-06 RX ADMIN — Medication 50 MILLILITER(S): at 13:01

## 2023-06-06 RX ADMIN — PIPERACILLIN AND TAZOBACTAM 200 GRAM(S): 4; .5 INJECTION, POWDER, LYOPHILIZED, FOR SOLUTION INTRAVENOUS at 04:36

## 2023-06-06 RX ADMIN — Medication 100 MILLIGRAM(S): at 02:24

## 2023-06-06 RX ADMIN — Medication 100 MILLIGRAM(S): at 08:27

## 2023-06-06 RX ADMIN — BENZOCAINE AND MENTHOL 1 LOZENGE: 5; 1 LIQUID ORAL at 17:07

## 2023-06-06 RX ADMIN — BENZOCAINE AND MENTHOL 1 LOZENGE: 5; 1 LIQUID ORAL at 21:22

## 2023-06-06 RX ADMIN — BENZOCAINE AND MENTHOL 1 LOZENGE: 5; 1 LIQUID ORAL at 02:23

## 2023-06-06 RX ADMIN — MAGNESIUM OXIDE 400 MG ORAL TABLET 400 MILLIGRAM(S): 241.3 TABLET ORAL at 12:59

## 2023-06-06 RX ADMIN — PIPERACILLIN AND TAZOBACTAM 25 GRAM(S): 4; .5 INJECTION, POWDER, LYOPHILIZED, FOR SOLUTION INTRAVENOUS at 08:26

## 2023-06-06 RX ADMIN — Medication 1 TABLET(S): at 12:59

## 2023-06-06 RX ADMIN — Medication 3 MILLILITER(S): at 05:08

## 2023-06-06 RX ADMIN — Medication 3 MILLILITER(S): at 22:29

## 2023-06-06 RX ADMIN — HEPARIN SODIUM 5000 UNIT(S): 5000 INJECTION INTRAVENOUS; SUBCUTANEOUS at 13:00

## 2023-06-06 RX ADMIN — Medication 100 MILLIGRAM(S): at 12:59

## 2023-06-06 RX ADMIN — PIPERACILLIN AND TAZOBACTAM 25 GRAM(S): 4; .5 INJECTION, POWDER, LYOPHILIZED, FOR SOLUTION INTRAVENOUS at 16:22

## 2023-06-06 RX ADMIN — BENZOCAINE AND MENTHOL 1 LOZENGE: 5; 1 LIQUID ORAL at 09:12

## 2023-06-06 RX ADMIN — BENZOCAINE AND MENTHOL 1 LOZENGE: 5; 1 LIQUID ORAL at 04:36

## 2023-06-06 RX ADMIN — HEPARIN SODIUM 5000 UNIT(S): 5000 INJECTION INTRAVENOUS; SUBCUTANEOUS at 05:07

## 2023-06-06 RX ADMIN — HEPARIN SODIUM 5000 UNIT(S): 5000 INJECTION INTRAVENOUS; SUBCUTANEOUS at 21:21

## 2023-06-06 RX ADMIN — OXYCODONE HYDROCHLORIDE 2.5 MILLIGRAM(S): 5 TABLET ORAL at 02:03

## 2023-06-06 RX ADMIN — MIDODRINE HYDROCHLORIDE 10 MILLIGRAM(S): 2.5 TABLET ORAL at 05:07

## 2023-06-06 RX ADMIN — Medication 1 MILLIGRAM(S): at 12:59

## 2023-06-06 RX ADMIN — Medication 100 MILLIGRAM(S): at 22:29

## 2023-06-06 RX ADMIN — MAGNESIUM OXIDE 400 MG ORAL TABLET 400 MILLIGRAM(S): 241.3 TABLET ORAL at 08:26

## 2023-06-06 RX ADMIN — Medication 100 MILLIGRAM(S): at 10:59

## 2023-06-06 RX ADMIN — Medication 100 MILLIGRAM(S): at 04:41

## 2023-06-06 RX ADMIN — OXYCODONE HYDROCHLORIDE 2.5 MILLIGRAM(S): 5 TABLET ORAL at 01:03

## 2023-06-06 RX ADMIN — Medication 100 MILLIGRAM(S): at 16:22

## 2023-06-06 RX ADMIN — MIDODRINE HYDROCHLORIDE 10 MILLIGRAM(S): 2.5 TABLET ORAL at 13:00

## 2023-06-06 RX ADMIN — PANTOPRAZOLE SODIUM 40 MILLIGRAM(S): 20 TABLET, DELAYED RELEASE ORAL at 05:07

## 2023-06-06 RX ADMIN — Medication 100 MICROGRAM(S): at 05:07

## 2023-06-06 RX ADMIN — OXYCODONE HYDROCHLORIDE 2.5 MILLIGRAM(S): 5 TABLET ORAL at 14:40

## 2023-06-06 RX ADMIN — BENZOCAINE AND MENTHOL 1 LOZENGE: 5; 1 LIQUID ORAL at 12:36

## 2023-06-06 RX ADMIN — MIDODRINE HYDROCHLORIDE 10 MILLIGRAM(S): 2.5 TABLET ORAL at 21:22

## 2023-06-06 RX ADMIN — Medication 50 MILLILITER(S): at 21:22

## 2023-06-06 RX ADMIN — FLUCONAZOLE 200 MILLIGRAM(S): 150 TABLET ORAL at 12:59

## 2023-06-06 NOTE — BH CONSULTATION LIAISON PROGRESS NOTE - NSBHCHARTREVIEWVS_PSY_A_CORE FT
Vital Signs Last 24 Hrs  T(C): 37.1 (26 May 2023 11:01), Max: 37.2 (25 May 2023 20:19)  T(F): 98.8 (26 May 2023 11:01), Max: 99 (26 May 2023 04:37)  HR: 98 (26 May 2023 11:01) (96 - 98)  BP: 96/56 (26 May 2023 11:01) (96/56 - 102/64)  BP(mean): --  RR: 18 (26 May 2023 11:01) (18 - 20)  SpO2: 92% (26 May 2023 11:01) (92% - 95%)    Parameters below as of 26 May 2023 11:01  Patient On (Oxygen Delivery Method): room air    
Vital Signs Last 24 Hrs  T(C): 36.4 (19 May 2023 10:49), Max: 36.7 (18 May 2023 20:56)  T(F): 97.5 (19 May 2023 10:49), Max: 98 (18 May 2023 20:56)  HR: 105 (19 May 2023 10:49) (102 - 105)  BP: 106/65 (19 May 2023 10:49) (100/65 - 106/65)  BP(mean): --  RR: 18 (19 May 2023 10:49) (17 - 18)  SpO2: 97% (19 May 2023 10:49) (95% - 97%)    Parameters below as of 19 May 2023 10:49  Patient On (Oxygen Delivery Method): room air    
Vital Signs Last 24 Hrs  T(C): 36.9 (06 Jun 2023 12:57), Max: 37.7 (05 Jun 2023 15:58)  T(F): 98.4 (06 Jun 2023 12:57), Max: 99.8 (05 Jun 2023 15:58)  HR: 75 (06 Jun 2023 12:57) (72 - 109)  BP: 100/62 (06 Jun 2023 12:57) (96/55 - 111/64)  BP(mean): --  RR: 18 (06 Jun 2023 12:57) (15 - 20)  SpO2: 96% (06 Jun 2023 12:57) (93% - 100%)    Parameters below as of 06 Jun 2023 12:57  Patient On (Oxygen Delivery Method): room air

## 2023-06-06 NOTE — BH CONSULTATION LIAISON PROGRESS NOTE - CURRENT MEDICATION
MEDICATIONS  (STANDING):  ceFAZolin   IVPB 2000 milliGRAM(s) IV Intermittent every 8 hours  folic acid 1 milliGRAM(s) Oral daily  heparin   Injectable 5000 Unit(s) SubCutaneous every 8 hours  lactulose Syrup 10 Gram(s) Oral two times a day  levothyroxine 100 MICROGram(s) Oral daily  midodrine. 10 milliGRAM(s) Oral every 8 hours  multivitamin 1 Tablet(s) Oral daily  pantoprazole    Tablet 40 milliGRAM(s) Oral before breakfast  piperacillin/tazobactam IVPB.- 3.375 Gram(s) IV Intermittent once  spironolactone 100 milliGRAM(s) Oral once  thiamine 100 milliGRAM(s) Oral daily    MEDICATIONS  (PRN):  guaiFENesin Oral Liquid (Sugar-Free) 100 milliGRAM(s) Oral every 6 hours PRN Cough  ondansetron Injectable 4 milliGRAM(s) IV Push every 6 hours PRN Nausea and/or Vomiting  oxyCODONE    IR 2.5 milliGRAM(s) Oral every 6 hours PRN Moderate Pain (4 - 6)  
MEDICATIONS  (STANDING):  albumin human 25% IVPB 50 milliLiter(s) IV Intermittent every 8 hours  albuterol/ipratropium for Nebulization 3 milliLiter(s) Nebulizer every 6 hours  ceFAZolin   IVPB 2000 milliGRAM(s) IV Intermittent every 8 hours  fluconAZOLE   Tablet 200 milliGRAM(s) Oral daily  folic acid 1 milliGRAM(s) Oral daily  heparin   Injectable 5000 Unit(s) SubCutaneous every 8 hours  lactulose Syrup 10 Gram(s) Oral two times a day  levothyroxine 100 MICROGram(s) Oral daily  magnesium oxide 400 milliGRAM(s) Oral three times a day with meals  midodrine. 10 milliGRAM(s) Oral every 8 hours  multivitamin 1 Tablet(s) Oral daily  ondansetron Injectable 4 milliGRAM(s) IV Push once  pantoprazole    Tablet 40 milliGRAM(s) Oral before breakfast  piperacillin/tazobactam IVPB.. 3.375 Gram(s) IV Intermittent every 8 hours  simethicone 80 milliGRAM(s) Chew once  thiamine 100 milliGRAM(s) Oral daily    MEDICATIONS  (PRN):  benzocaine/menthol Lozenge 1 Lozenge Oral every 2 hours PRN cough  benzonatate 100 milliGRAM(s) Oral every 8 hours PRN Cough  guaiFENesin Oral Liquid (Sugar-Free) 100 milliGRAM(s) Oral every 6 hours PRN Cough  ondansetron Injectable 4 milliGRAM(s) IV Push every 6 hours PRN Nausea and/or Vomiting  oxyCODONE    IR 2.5 milliGRAM(s) Oral every 12 hours PRN Moderate Pain (4 - 6)  
MEDICATIONS  (STANDING):  albumin human 25% IVPB 100 milliLiter(s) IV Intermittent every 8 hours  ceFAZolin   IVPB 2000 milliGRAM(s) IV Intermittent every 8 hours  folic acid 1 milliGRAM(s) Oral daily  heparin   Injectable 5000 Unit(s) SubCutaneous every 8 hours  lactulose Syrup 20 Gram(s) Oral every 6 hours  levothyroxine 100 MICROGram(s) Oral daily  multivitamin 1 Tablet(s) Oral daily  potassium chloride    Tablet ER 40 milliEquivalent(s) Oral once  rifAXIMin 550 milliGRAM(s) Oral two times a day  thiamine 100 milliGRAM(s) Oral daily    MEDICATIONS  (PRN):  guaiFENesin Oral Liquid (Sugar-Free) 100 milliGRAM(s) Oral every 6 hours PRN Cough  ondansetron Injectable 4 milliGRAM(s) IV Push every 6 hours PRN Nausea and/or Vomiting

## 2023-06-06 NOTE — BH CONSULTATION LIAISON PROGRESS NOTE - NSBHFUPINTERVALHXFT_PSY_A_CORE
Pt says that she feels much better since learning that she would be able to have a liver transplant.  She says that she is determined not to drink alcohol again. she says that she had not stopped drinking before a few weeks ago because she did not know that liver failure could happen to her at such a young age.  She is concerned about her two young sons, ages 6 and 9 who are with their father in a shelter since he was unable to keep up with the rent of their apartment.  She says that she has the support of a boy friend who will help her after discharge, and will live with her father.  Her mother lives with her youngest sister and she is not on speaking terms with this sister and her older sister lives in [place] and is busy with her own three children.  She understands that she remains at risk for relapse and would not be in the ideal location after discharge since she would be staying with her father who both drinks alcohol and smokes cigarettes.  Pt says that she recently gave up both smoking and drinking and has no desire to use either.  She says that she already signed up for an oupt rehab near her home and plans to addend treatment there.  She understands that she may also benefit from an inpatient 28 day rehab. followed by a long term sober house especially if the outpt treatment is not helpful.    She is open to starting Campral prior to discharge and would be open to taking medication for her insomnia and anxiety if her liver team approves.  
Pt says that she feels much better since learning that she would be able to have a liver transplant. She is able to walk with PT and was glad she walked more today.  She says that she is determined not to drink alcohol again. she says that she had not stopped drinking before a few weeks ago because she did not know that liver failure could happen to her at such a young age.  She is concerned about her two young sons, ages 6 and 9 who are with their father in a shelter since he was unable to keep up with the rent of their apartment.  She says that she has the support of a boy friend who will help her after discharge, and will live with her father.  Her mother lives with her youngest sister and she is not on speaking terms with this sister and her older sister lives in [place] and is busy with her own three children.  She understands that she remains at risk for relapse and would not be in the ideal location after discharge since she would be staying with her father who both drinks alcohol and smokes cigarettes.  Pt says that she recently gave up both smoking and drinking and has no desire to use either.  She says that she already signed up for an oupt rehab near her home and plans to addend treatment there.  She understands that she may also benefit from an inpatient 28 day rehab. followed by a long term sober house especially if the outpt treatment is not helpful.    She is open to starting Campral prior to discharge and would be open to taking medication for her insomnia and anxiety if her liver team approves.  
Pt complains that she was up most of the night because she had been on lactulose and needed to use the bathroom all night.  She understands that she needs the lactulose because of her liver failure and say she does want to get better.  She is not able to verbalize her understanding of what she would need to know regarding liver transplant, but will talk with the transplant team.

## 2023-06-06 NOTE — PROGRESS NOTE ADULT - ATTENDING COMMENTS
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 Pulmonary consulted for persistent productive cough with multiple abnormal CT imaging of unclear primary etiology. Pt now s/p flexible diagnostic bronchoscopic evaluation 6/5 with BAL sampling. No post-procedure complications, pt feels well today from a respiratory standpoint. Preliminary cx NTD, will continue to wait for final results of BAL testing.

## 2023-06-06 NOTE — PROGRESS NOTE ADULT - SUBJECTIVE AND OBJECTIVE BOX
CHIEF COMPLAINT:Patient is a 38y old  Female who presents with a chief complaint of Liver Failure (05 Jun 2023 12:39)      Interval Events: No acute complaints at this time     REVIEW OF SYSTEMS:  [x] All other systems negative except per HPI   [ ] Unable to assess ROS because ________    OBJECTIVE:  ICU Vital Signs Last 24 Hrs  T(C): 36.6 (06 Jun 2023 05:30), Max: 37.7 (05 Jun 2023 15:58)  T(F): 97.9 (06 Jun 2023 05:30), Max: 99.8 (05 Jun 2023 15:58)  HR: 82 (06 Jun 2023 05:30) (82 - 109)  BP: 106/63 (06 Jun 2023 05:30) (96/55 - 111/64)  BP(mean): --  ABP: --  ABP(mean): --  RR: 18 (06 Jun 2023 05:30) (15 - 20)  SpO2: 94% (06 Jun 2023 05:30) (93% - 100%)    O2 Parameters below as of 06 Jun 2023 05:30  Patient On (Oxygen Delivery Method): room air              06-05 @ 07:01  -  06-06 @ 07:00  --------------------------------------------------------  IN: 700 mL / OUT: 850 mL / NET: -150 mL        PHYSICAL EXAM:  GENERAL: NAD, well-groomed, well-developed  EYES: EOMI, PERRLA, conjunctiva and sclera icterus   ENMT: No tonsillar erythema, exudates, or enlargement; Moist mucous membranes, Good dentition, No lesions  CHEST/LUNG: Clear to auscultation bilaterally; No rales, rhonchi, wheezing, or rubs  HEART: Regular rate and rhythm; No murmurs, rubs, or gallops  ABDOMEN: Soft, Nontender, Nondistended; Bowel sounds present  VASCULAR:  2+ Peripheral Pulses +1 edma   LYMPH: No lymphadenopathy noted  SKIN: No rashes or lesions  NERVOUS SYSTEM:  Alert & Oriented X3, Good concentration    HOSPITAL MEDICATIONS:  MEDICATIONS  (STANDING):  albuterol/ipratropium for Nebulization 3 milliLiter(s) Nebulizer every 6 hours  ceFAZolin   IVPB 2000 milliGRAM(s) IV Intermittent every 8 hours  fluconAZOLE   Tablet 200 milliGRAM(s) Oral daily  folic acid 1 milliGRAM(s) Oral daily  heparin   Injectable 5000 Unit(s) SubCutaneous every 8 hours  lactulose Syrup 10 Gram(s) Oral two times a day  levothyroxine 100 MICROGram(s) Oral daily  magnesium oxide 400 milliGRAM(s) Oral three times a day with meals  midodrine. 10 milliGRAM(s) Oral every 8 hours  multivitamin 1 Tablet(s) Oral daily  ondansetron Injectable 4 milliGRAM(s) IV Push once  pantoprazole    Tablet 40 milliGRAM(s) Oral before breakfast  piperacillin/tazobactam IVPB.. 3.375 Gram(s) IV Intermittent every 8 hours  simethicone 80 milliGRAM(s) Chew once  thiamine 100 milliGRAM(s) Oral daily    MEDICATIONS  (PRN):  benzocaine/menthol Lozenge 1 Lozenge Oral every 2 hours PRN cough  benzonatate 100 milliGRAM(s) Oral every 8 hours PRN Cough  guaiFENesin Oral Liquid (Sugar-Free) 100 milliGRAM(s) Oral every 6 hours PRN Cough  ondansetron Injectable 4 milliGRAM(s) IV Push every 6 hours PRN Nausea and/or Vomiting  oxyCODONE    IR 2.5 milliGRAM(s) Oral every 12 hours PRN Moderate Pain (4 - 6)      LABS:    The Labs were reviewed by me   The Radiology was reviewed by me    EKG tracing reviewed by me    06-06    130<L>  |  97  |  16  ----------------------------<  174<H>  4.5   |  16<L>  |  1.52<H>  06-05    133<L>  |  98  |  14  ----------------------------<  88  3.8   |  19<L>  |  1.48<H>  06-04    131<L>  |  96  |  13  ----------------------------<  96  3.4<L>   |  19<L>  |  1.27    Ca    8.9      06 Jun 2023 06:27  Ca    8.6      05 Jun 2023 06:45  Ca    9.1      04 Jun 2023 07:32  Phos  2.5     06-06  Mg     1.9     06-06    TPro  5.8<L>  /  Alb  3.2<L>  /  TBili  33.1<H>  /  DBili  x   /  AST  93<H>  /  ALT  <5<L>  /  AlkPhos  199<H>  06-06  TPro  5.2<L>  /  Alb  2.9<L>  /  TBili  30.3<H>  /  DBili  x   /  AST  86<H>  /  ALT  <5<L>  /  AlkPhos  171<H>  06-05  TPro  5.4<L>  /  Alb  3.3  /  TBili  30.7<H>  /  DBili  x   /  AST  85<H>  /  ALT  <5<L>  /  AlkPhos  164<H>  06-04    Magnesium, Serum: 1.9 mg/dL (06-06-23 @ 06:27)  Magnesium, Serum: 1.8 mg/dL (06-05-23 @ 06:45)  Magnesium, Serum: 1.5 mg/dL (06-04-23 @ 07:32)    Phosphorus Level, Serum: 2.5 mg/dL (06-06-23 @ 06:27)  Phosphorus Level, Serum: 3.0 mg/dL (06-05-23 @ 06:45)  Phosphorus Level, Serum: 2.4 mg/dL (06-04-23 @ 07:32)      PT/INR - ( 06 Jun 2023 06:27 )   PT: 25.2 sec;   INR: 2.16 ratio         PTT - ( 06 Jun 2023 06:27 )  PTT:54.9 sec                                        9.2    19.58 )-----------( 289      ( 06 Jun 2023 06:27 )             26.8                         7.4    18.00 )-----------( 287      ( 05 Jun 2023 06:46 )             22.0                         8.6    19.41 )-----------( 309      ( 04 Jun 2023 07:34 )             24.8     CAPILLARY BLOOD GLUCOSE            MICROBIOLOGY:     RADIOLOGY:  [ ] Reviewed and interpreted by me    Point of Care Ultrasound Findings:    PFT:    EKG:
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
## 2023-06-06 NOTE — BH CONSULTATION LIAISON PROGRESS NOTE - NSBHCONSULTFOLLOWAFTERCARE_PSY_A_CORE FT
alcohol rehab (inpt and or outpt program)

## 2023-06-06 NOTE — BH CONSULTATION LIAISON PROGRESS NOTE - NSBHASSESSMENTFT_PSY_ALL_CORE
This is a 37 yo Polish Female with Hx of hypothyroidism (on Levothyroxine), and no other known medical Hx, presented to [facility] ED on 5/13/23 with ~ 2 weeks Hx of progressively worsening abdominal pain ("stabbing, diffuse, non radiating") and distention,  as well as 1 day Hx of jaundice, and was admitted to medicine with liver failure (possibly acute on chronic), with MELD-Na 29 on admission, hyperbilirubinemia (Se bi 18.9, direct 15.9), coagulopathy (INR 2.15), mild liver enzyme elevation in AST>>ALT pattern (, ALT 14, ), severe hypoalbuminemia (alb 1.3).   Pt was seen for consultation because she is a liver transplant candidate and stated that she is aware of all that is required and has been speaking to multiple team members for clearance but is not sure she would be accepted as a candidate.  She states that she stopped drinking alcohol three weeks ago.  She has attended alcohol rehab and AA meetings in the past.  She currently lives with her father who is not in good health.  She says she has two children ages 6 and 9 who live with their father in a shelter.     Pt says that she recently gave up both smoking and drinking and has no desire to use either.  She says that she already signed up for an oupt rehab near her home and plans to addend treatment there.  She understands that she may also benefit from an inpatient 28 day rehab. followed by a long term sober house especially if the outpt treatment is not helpful.    She is open to starting Campral prior to discharge and would be open to taking medication for her insomnia and anxiety if her liver team approves.  
This is a 39 yo Polish Female with Hx of hypothyroidism (on Levothyroxine), and no other known medical Hx, presented to [facility] ED on 5/13/23 with ~ 2 weeks Hx of progressively worsening abdominal pain ("stabbing, diffuse, non radiating") and distention,  as well as 1 day Hx of jaundice, and was admitted to medicine with liver failure (possibly acute on chronic), with MELD-Na 29 on admission, hyperbilirubinemia (Se bi 18.9, direct 15.9), coagulopathy (INR 2.15), mild liver enzyme elevation in AST>>ALT pattern (, ALT 14, ), severe hypoalbuminemia (alb 1.3).   Pt was seen for consultation because she is a liver transplant candidate and stated that she is aware of all that is required and has been speaking to multiple team members for clearance but is not sure she would be accepted as a candidate.  She states that she stopped drinking alcohol three weeks ago.  She has attended alcohol rehab and AA meetings in the past.  She currently lives with her father who is not in good health.  She says she has two children ages 6 and 9 who live with their father in a shelter.     Pt says that she recently gave up both smoking and drinking and has no desire to use either.  She says that she already signed up for an oupt rehab near her home and plans to addend treatment there.  She understands that she may also benefit from an inpatient 28 day rehab. followed by a long term sober house especially if the outpt treatment is not helpful.    She is open to starting Campral prior to discharge and would be open to taking medication for her insomnia and anxiety if her liver team approves.  
This is a 37 yo Polish Female with Hx of hypothyroidism (on Levothyroxine), and no other known medical Hx, presented to [facility] ED on 5/13/23 with ~ 2 weeks Hx of progressively worsening abdominal pain ("stabbing, diffuse, non radiating") and distention,  as well as 1 day Hx of jaundice, and was admitted to medicine with liver failure (possibly acute on chronic), with MELD-Na 29 on admission, hyperbilirubinemia (Se bi 18.9, direct 15.9), coagulopathy (INR 2.15), mild liver enzyme elevation in AST>>ALT pattern (, ALT 14, ), severe hypoalbuminemia (alb 1.3).   Pt was seen for consultation because she is a liver transplant candidate and stated that she is aware of all that is required and has been speaking to multiple team members for clearance but is not sure she would be accepted as a candidate.  She states that she stopped drinking alcohol three weeks ago.  She has attended alcohol rehab and AA meetings in the past but is not very interested in attending in the future.  She currently lives with her father who is not in good health.  She says she has two children ages 6 and 9 who live with their father.    She complained that she is short of breath and unable to speak much more. She agreed that she may continue this evaluation and was able to speak more on a second meeting but then said she was too tired because she had been up much of the night because of lactulose.  She has some understanding of the requirements for liver transplant and some knowledge but says no one discussed with her the nature of being on immunosuppressive medications and details about the surgery.   Pt may benefit from having further evaluation after she has been able to speak to the liver transplant team and ask questions about the surgery and management after surgery.

## 2023-06-06 NOTE — BH CONSULTATION LIAISON PROGRESS NOTE - NSBHATTESTTYPEVISIT_PSY_A_CORE
Attending Only
Skin normal color for race, warm, dry and intact. No evidence of rash.

## 2023-06-06 NOTE — BH CONSULTATION LIAISON PROGRESS NOTE - NSBHMSEJUDGE_PSY_A_CORE
[facility] [place] Progress Note    Subject: Petey Archibald closed wound left lower extremity however is gained approximately 30 pounds over the past several months, denies chest pain, shortness of breath, sleeps in bed in the supine position, no significant change in diet.    Patient Active Problem List   Diagnosis     GERD (gastroesophageal reflux disease)     Peripheral vascular disease (H)     History of pulmonary embolism     Tobacco dependence:40-50 pk yr hx     Borderline mental retardation     History of deep vein thrombosis of lower extremity     Pilonidal cyst     Asymptomatic varicose veins, bilateral     Callus of foot on Rt lat dist  since 8-15      Morbid obesity due to excess calories (H)  BMI 40-50     Hypercholesterolemia     Mixed simple and mucopurulent chronic bronchitis (HCC) CT 4-06 wnl and neg bronch for hemoptesis spirometry 7-26-16  FVC=59% & w mod restriction  and lung age of 83 in 55 y/o      Major depression in complete remission (HCC) on meds      Long term current use of anticoagulant therapy     Glucose intolerance (impaired glucose tolerance)     Erectile dysfunction, unspecified erectile dysfunction type     NEL (obstructive sleep apnea)     Anxiety     Thrombophlebitis of superficial veins of both lower extremities: Greater Saphenous VV      History of colonic polyps     Ulcer of left lower extremity with fat layer exposed (H)     Lymphedema of left leg     Chronic ulcer of left foot with necrosis of muscle (H)     Schizoaffective disorder, bipolar type (H)     Adenomatous polyp of ascending colon     Colon cancer screening     Edentulism, partial, class IV     Torus palatinus     Diverticular disease of large intestine     Hemorrhoids     Past Medical History:   Diagnosis Date     Borderline mental retardation 2/20/2013     Chronic infection     MRSA     Coagulation disorder (H)     on coumadin     COPD (chronic obstructive pulmonary disease) (H)      History of 
DVT of lower extremity      History of pulmonary embolism      Hyperlipidemia      Mild intellectual disabilities      Morbid obesity (H)      NEL (obstructive sleep apnea)      Peripheral edema      Peripheral vascular disease (H)      Sleep apnea     uses CPAP     Thrombosis      Tobacco dependence      Uncomplicated asthma      Venous stasis dermatitis      Exam:  /73 (BP Location: Left arm, Patient Position: Sitting)   Pulse 63   Temp 97.3  F (36.3  C)   Resp 18   Wound (used by OP [facility] only) 01/19/22 0801 Left (Active)   Dressing Appearance moist drainage 02/16/22 0757   Base slough 02/16/22 0757   Periwound redness 02/16/22 0757   Periwound Temperature warm 02/16/22 0757   Periwound Skin Turgor firm 02/16/22 0757   Edges callused 02/16/22 0757   Length (cm) 0.3 02/16/22 0757   Width (cm) 0.6 02/16/22 0757   Depth (cm) 0 02/16/22 0757   Wound (cm^2) 0.18 cm^2 02/16/22 0757   Wound Volume (cm^3) 0 cm^3 02/16/22 0757   Wound healing % 79.55 02/16/22 0757   Drainage Characteristics/Odor serosanguineous 02/16/22 0757   Drainage Amount moderate 02/16/22 0757   Care, Wound debrided 02/16/22 0757     Closed wound left lower extremity    Impression: Closed wound left lower extremity, chronic venous insufficiency, lymphedema    Plan: Ceramide-based lotion application on a daily basis.  Creatinine within normal limits.  Tobacco use.  Obtain chest x-ray and follow-up with primary care physician for further evaluation of significant short-term weight gain.       Rustam Davis MD on 2/16/2022 at 8:00 AM    Dictated using Dragon voice recognition software which may result in transcription errors    
Fair
Good
Good

## 2023-06-06 NOTE — PROGRESS NOTE ADULT - SUBJECTIVE AND OBJECTIVE BOX
Interval Events:   No acute events overnight following bronchoscopy.  Patient without acute symptoms at this time.    ROS:   12 point review of systems performed and negative except otherwise noted in HPI.    Hospital Medications:  albuterol/ipratropium for Nebulization 3 milliLiter(s) Nebulizer every 6 hours  benzocaine/menthol Lozenge 1 Lozenge Oral every 2 hours PRN  benzonatate 100 milliGRAM(s) Oral every 8 hours PRN  ceFAZolin   IVPB 2000 milliGRAM(s) IV Intermittent every 8 hours  fluconAZOLE   Tablet 200 milliGRAM(s) Oral daily  folic acid 1 milliGRAM(s) Oral daily  guaiFENesin Oral Liquid (Sugar-Free) 100 milliGRAM(s) Oral every 6 hours PRN  heparin   Injectable 5000 Unit(s) SubCutaneous every 8 hours  lactulose Syrup 10 Gram(s) Oral two times a day  levothyroxine 100 MICROGram(s) Oral daily  magnesium oxide 400 milliGRAM(s) Oral three times a day with meals  midodrine. 10 milliGRAM(s) Oral every 8 hours  multivitamin 1 Tablet(s) Oral daily  ondansetron Injectable 4 milliGRAM(s) IV Push every 6 hours PRN  ondansetron Injectable 4 milliGRAM(s) IV Push once  oxyCODONE    IR 2.5 milliGRAM(s) Oral every 12 hours PRN  pantoprazole    Tablet 40 milliGRAM(s) Oral before breakfast  piperacillin/tazobactam IVPB.. 3.375 Gram(s) IV Intermittent every 8 hours  simethicone 80 milliGRAM(s) Chew once  thiamine 100 milliGRAM(s) Oral daily    MAR over past 24 hours:    albuterol/ipratropium for Nebulization   3 milliLiter(s) Nebulizer (06-06-23 @ 05:08)   3 milliLiter(s) Nebulizer (06-05-23 @ 23:40)   3 milliLiter(s) Nebulizer (06-05-23 @ 12:48)    albuterol/ipratropium for Nebulization.   3 milliLiter(s) Nebulizer (06-05-23 @ 17:42)    benzocaine/menthol Lozenge   1 Lozenge Oral (06-06-23 @ 09:12)   1 Lozenge Oral (06-06-23 @ 04:36)   1 Lozenge Oral (06-06-23 @ 02:23)   1 Lozenge Oral (06-05-23 @ 23:47)   1 Lozenge Oral (06-05-23 @ 21:34)   1 Lozenge Oral (06-05-23 @ 14:40)    benzonatate   100 milliGRAM(s) Oral (06-06-23 @ 04:41)    ceFAZolin   IVPB   100 mL/Hr IV Intermittent (06-06-23 @ 08:27)    ceFAZolin   IVPB   100 mL/Hr IV Intermittent (06-05-23 @ 23:42)   100 mL/Hr IV Intermittent (06-05-23 @ 18:40)    fluconAZOLE   Tablet   200 milliGRAM(s) Oral (06-05-23 @ 12:48)    folic acid   1 milliGRAM(s) Oral (06-05-23 @ 12:48)    guaiFENesin Oral Liquid (Sugar-Free)   100 milliGRAM(s) Oral (06-06-23 @ 10:59)   100 milliGRAM(s) Oral (06-06-23 @ 02:24)   100 milliGRAM(s) Oral (06-05-23 @ 18:39)    heparin   Injectable   5000 Unit(s) SubCutaneous (06-06-23 @ 05:07)   5000 Unit(s) SubCutaneous (06-05-23 @ 21:35)   5000 Unit(s) SubCutaneous (06-05-23 @ 14:30)    levothyroxine   100 MICROGram(s) Oral (06-06-23 @ 05:07)    magnesium oxide   400 milliGRAM(s) Oral (06-06-23 @ 08:26)   400 milliGRAM(s) Oral (06-05-23 @ 18:37)   400 milliGRAM(s) Oral (06-05-23 @ 12:47)    midodrine.   10 milliGRAM(s) Oral (06-06-23 @ 05:07)   10 milliGRAM(s) Oral (06-05-23 @ 21:35)   10 milliGRAM(s) Oral (06-05-23 @ 14:30)    multivitamin   1 Tablet(s) Oral (06-05-23 @ 12:48)    oxyCODONE    IR   2.5 milliGRAM(s) Oral (06-06-23 @ 01:03)    pantoprazole    Tablet   40 milliGRAM(s) Oral (06-06-23 @ 05:07)    piperacillin/tazobactam IVPB.   200 mL/Hr IV Intermittent (06-06-23 @ 04:36)    piperacillin/tazobactam IVPB.-   25 mL/Hr IV Intermittent (06-06-23 @ 08:26)    thiamine   100 milliGRAM(s) Oral (06-05-23 @ 12:48)      Home Medications:  Last Order Reconciliation Date: 05-16-23 @ 13:29 (Admission Reconciliation)  acetylcysteine 20% intravenous solution: 40 milliliter(s) intravenous every 24 hours  cefTRIAXone: 2 gram(s) intravenously once a day  Constulose 10 g/15 mL oral liquid: 30 milliliter(s) orally 3 times a day  folic acid 1 mg oral tablet: 1 tab(s) orally once a day  guaiFENesin 100 mg/5 mL oral liquid: 5 milliliter(s) orally every 6 hours As needed Cough  levothyroxine 100 mcg (0.1 mg) oral tablet: 1 tab(s) orally once a day  LORazepam: 1 milliliter(s) intravenous every 4 hours as needed for Anxiety  morphine 15 mg oral tablet: 1 tab(s) orally every 8 hours  Multiple Vitamins oral tablet: 1 tab(s) orally once a day  mupirocin 2% topical ointment: Apply topically to affected area 2 times a day to both nostrils  ondansetron 2 mg/mL injectable solution: 4 milligram(s) injectable every 8 hours as needed for as needed  sodium/potassium/phosphorus 160 mg-280 mg-250 mg oral powder for reconstitution: 1 packet(s) orally 3 times a day (with meals)  thiamine 100 mg oral tablet: 1 tab(s) orally once a day    PHYSICAL EXAM:   Vital Signs last 24 hours:  T(F): 98.3 (06-06-23 @ 09:16), Max: 99.8 (06-05-23 @ 15:58)  HR: 72 (06-06-23 @ 09:16) (72 - 109)  BP: 102/56 (06-06-23 @ 09:16) (96/55 - 111/64)  BP(mean): --  ABP: --  ABP(mean): --  RR: 18 (06-06-23 @ 09:16) (15 - 20)  SpO2: 98% (06-06-23 @ 09:16) (93% - 100%)    I&Os:    06-05-23 @ 07:01  -  06-06-23 @ 07:00  --------------------------------------------------------  IN:    Oral Fluid: 700 mL  Total IN: 700 mL    OUT:    Voided (mL): 850 mL  Total OUT: 850 mL    Total NET: -150 mL        BMI (kg/m2): 22.1 (06-05-23 @ 16:42)  GENERAL: no acute distress  NEURO: alert  HEENT: NCAT, no conjunctival pallor appreciated  CHEST: no respiratory distress, no accessory muscle use  CARDIAC: regular rate, +S1/S2  ABDOMEN: soft, nontender, no rebound or guarding  EXTREMITIES: warm, well perfused  SKIN: no lesions noted    DIAGNOSTICS:  WBC      Hg       PLT      Na       K        CO2     BUN      Cr       ALT      AST      TB       ALP  19.58    9.2      289      130      4.5      16       16       1.52     <5       93       33.1     199      06-06-23 @ 06:27  18.00    7.4      287      ------   ------   ------   ------   ------   ------   ------   ------   ------   06-05-23 @ 06:46  ------   ------   ------   133      3.8      19       14       1.48     <5       86       30.3     171      06-05-23 @ 06:45  19.41    8.6      309      ------   ------   ------   ------   ------   ------   ------   ------   ------   06-04-23 @ 07:34  ------   ------   ------   131      3.4      19       13       1.27     <5       85       30.7     164      06-04-23 @ 07:32    PT             INR            MELDwith  MELDw/o  25.2           2.16           --             --             06-06-23 @ 06:27  25.4           2.17           --             --             06-05-23 @ 06:47  24.1           2.08           --             --             06-04-23 @ 07:36  24.2           2.07           --             --             06-03-23 @ 07:19  24.3           2.08           --             --             06-02-23 @ 06:25

## 2023-06-06 NOTE — BH CONSULTATION LIAISON PROGRESS NOTE - NSBHFUPINTERVALCCFT_PSY_A_CORE
"I'm getting stronger."
"I'm so glad I was accepted to have a liver transplant."
"I can't say much, I'm so tired from last night."

## 2023-06-06 NOTE — BH CONSULTATION LIAISON PROGRESS NOTE - NSBHMSESPABN_PSY_A_CORE
Slowed rate/Decreased productivity

## 2023-06-06 NOTE — PROGRESS NOTE ADULT - ASSESSMENT
39 yo Polish Female with hypothyroidism admitted for acute liver failure in the setting of alcohol induced hepatitis now pending liver transplant eval with course complicated by PNAs/p zosyn, fluid overload, MSSA bacteremia with negative SAHARA and cleared cultures. Pulm consulted for persistent productive cough with progressive abnormal CT scans. 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 CT with multiple focal consolidations without PE or large AVM. Underwent bronchoscopy with BAL on 6/5       Plan  - patient with unclear etiology for abnormal CT scans and persistent cough   - Differential includes but not limited to delayed resolution of multifocal bacterial pneumonia, organizing viral pna, pneumonitis DAH, fluid overload, septic emboli (less likely)  - underwent Bronchoscopy with BAL on 6/5  - follow up Infectious workup and cytopathology   - Cell count shows a macrophage predominance   - continue diuresis   - continue empiric treatment of obstructive disease with standing duonebs  - continue GERD management     Miguel Blevins MD   Pulmonary/Critical Care Fellow PGY-7  [facility] Pager #: [phone]  Spectra #: [id number]

## 2023-06-06 NOTE — PROGRESS NOTE ADULT - SUBJECTIVE AND OBJECTIVE BOX
Follow Up:      Interval History:    REVIEW OF SYSTEMS  [  ] ROS unobtainable because:    [  ] All other systems negative except as noted below    Constitutional:  [ ] fever [ ] chills  [ ] weight loss  [ ] weakness  Skin:  [ ] rash [ ] phlebitis	  Eyes: [ ] icterus [ ] pain  [ ] discharge	  ENMT: [ ] sore throat  [ ] thrush [ ] ulcers [ ] exudates  Respiratory: [ ] dyspnea [ ] hemoptysis [ ] cough [ ] sputum	  Cardiovascular:  [ ] chest pain [ ] palpitations [ ] edema	  Gastrointestinal:  [ ] nausea [ ] vomiting [ ] diarrhea [ ] constipation [ ] pain	  Genitourinary:  [ ] dysuria [ ] frequency [ ] hematuria [ ] discharge [ ] flank pain  [ ] incontinence  Musculoskeletal:  [ ] myalgias [ ] arthralgias [ ] arthritis  [ ] back pain  Neurological:  [ ] headache [ ] seizures  [ ] confusion/altered mental status    Allergies  No Known Allergies        ANTIMICROBIALS:  ceFAZolin   IVPB 2000 every 8 hours  fluconAZOLE   Tablet 200 daily  piperacillin/tazobactam IVPB.. 3.375 every 8 hours      OTHER MEDS:  MEDICATIONS  (STANDING):  albuterol/ipratropium for Nebulization 3 every 6 hours  benzonatate 100 every 8 hours PRN  guaiFENesin Oral Liquid (Sugar-Free) 100 every 6 hours PRN  heparin   Injectable 5000 every 8 hours  lactulose Syrup 10 two times a day  levothyroxine 100 daily  midodrine. 10 every 8 hours  ondansetron Injectable 4 once  ondansetron Injectable 4 every 6 hours PRN  oxyCODONE    IR 2.5 every 12 hours PRN  pantoprazole    Tablet 40 before breakfast  simethicone 80 once      Vital Signs Last 24 Hrs  T(C): 37 (06 Jun 2023 17:00), Max: 37.1 (05 Jun 2023 18:45)  T(F): 98.6 (06 Jun 2023 17:00), Max: 98.7 (05 Jun 2023 18:45)  HR: 73 (06 Jun 2023 17:00) (72 - 106)  BP: 109/64 (06 Jun 2023 17:00) (100/62 - 109/64)  BP(mean): --  RR: 18 (06 Jun 2023 17:00) (18 - 20)  SpO2: 98% (06 Jun 2023 17:00) (93% - 100%)    Parameters below as of 06 Jun 2023 17:00  Patient On (Oxygen Delivery Method): room air        PHYSICAL EXAMINATION:  General: Alert and Awake, NAD  HEENT: PERRL, EOMI  Neck: Supple  Cardiac: RRR, No M/R/G  Resp: CTAB, No Wh/Rh/Ra  Abdomen: NBS, NT/ND, No HSM, No rigidity or guarding  MSK: No LE edema. No Calf tenderness  : No [place]  Skin: No rashes or lesions. Skin is warm and dry to the touch.   Neuro: Alert and Awake. CN 2-12 Grossly intact. Moves all four extremities spontaneously.  Psych: Calm, Pleasant, Cooperative                          9.2    19.58 )-----------( 289      ( 06 Jun 2023 06:27 )             26.8       06-06    130<L>  |  97  |  16  ----------------------------<  174<H>  4.5   |  16<L>  |  1.52<H>    Ca    8.9      06 Jun 2023 06:27  Phos  2.5     06-06  Mg     1.9     06-06    TPro  5.8<L>  /  Alb  3.2<L>  /  TBili  33.1<H>  /  DBili  x   /  AST  93<H>  /  ALT  <5<L>  /  AlkPhos  199<H>  06-06          MICROBIOLOGY:  v  .Bronchial Bronchoalveolar Lavage  06-05-23 --  --    polymorphonuclear leukocytes per low power field  No squamous epithelial cells per low power field  Gram Negative Rods per oil power field  by cytocentrifuge      .Bronchial Bronchial Lavage  06-05-23   Testing in progress  --    Rare polymorphonuclear leukocytes per low power field  No squamous epithelial cells per low power field  No organisms seen per oil power field      .Blood Blood-Peripheral  06-02-23   No growth to date.  --  --      .Blood Blood-Peripheral  06-02-23   No growth to date.  --  --      Abdominal Fl Abdominal Fluid  05-31-23   No growth  --    polymorphonuclear leukocytes seen  No organisms seen  by cytocentrifuge      Clean Catch Clean Catch (Midstream)  05-28-23   <10,000 CFU/mL Normal Urogenital Aga  --  --      .Blood Blood-Peripheral  05-28-23   No Growth Final  --  --      .Blood Blood-Peripheral  05-28-23   No Growth Final  --  --      .Sputum Sputum  05-26-23   No acid-fast bacilli isolated at 1 week. ****Acid-fast cultures are held  for 6 weeks.****  --    Few polymorphonuclear leukocytes per low power field  Moderate Squamous epithelial cells per low power field  Moderate Yeast like cells seen per oil power field      .Blood Blood-Peripheral  05-26-23   No Growth Final  --  --      .Blood Blood-Peripheral  05-24-23   No Growth Final  --  --      Peritoneal Peritoneal Fluid  05-23-23   No growth at 5 days  --    polymorphonuclear leukocytes seen  No organisms seen  by cytocentrifuge      .Blood Blood-Peripheral  05-23-23   No Growth Final  --  --      .Blood Blood  05-23-23   No Growth Final  --  --      Clean Catch Clean Catch (Midstream)  05-22-23   50,000 - 99,000 CFU/mL Enterococcus faecium (vancomycin resistant)  --  Enterococcus faecium (vancomycin resistant)      .Blood Blood-Peripheral  05-18-23   No Growth Final  --  --      Peritoneal Peritoneal Fluid  05-17-23   No growth at 5 days  --    No polymorphonuclear cells seen  No organisms seen  by cytocentrifuge      .Blood Blood-Peripheral  05-17-23   No Growth Final  --  --      .Blood Blood-Peripheral  05-16-23   No Growth Final  --  --      Ascites Fl Ascites Fluid  05-13-23   No growth at 5 days  --    No polymorphonuclear cells seen seen  No organisms seen  by cytocentrifuge      Clean Catch Clean Catch (Midstream)  05-13-23   >100,000 CFU/ml Escherichia coli  --  Escherichia coli      .Blood Blood-Peripheral  05-13-23   Growth in aerobic and anaerobic bottles: Staphylococcus aureus  ***Blood Panel PCR results on this specimen are available  approximately 3 hours after the Gram stain result.***  Gram stain, PCR, and/or culture results may not always  correspond dueto difference in methodologies.  ************************************************************  This PCR assay was performed by multiplex PCR. This  Assay tests for 66 bacterial and resistance gene targets.  Please refer to the [facility] test directory  at [email or web address] for details.  --  Blood Culture PCR  Staphylococcus aureus      .Blood Blood-Peripheral  05-13-23   Growth in aerobic bottle: Staphylococcus aureus  See previous culture [id number]  --    Growth in aerobic bottle: Gram Positive Cocci in Clusters        CMV IgG Antibody: >10.00 U/mL (06-01-23 @ 00:45)  Toxoplasma IgG Screen: <3.0 IU/mL (05-17-23 @ 07:14)  CMV IgG Antibody: >10.00 U/mL (05-17-23 @ 07:14)  Toxoplasma IgG Screen: <3.0 IU/mL (05-17-23 @ 07:14)    Rapid RVP Result: NotDetec (06-01 @ 09:17)        RADIOLOGY:    <The imaging below has been reviewed and visualized by me independently. Findings as detailed in report below> Follow Up:  pre-OLT    Interval History: s/p bronchoscopy on 6/5/23. continued cough.     REVIEW OF SYSTEMS  [  ] ROS unobtainable because:    [ x ] All other systems negative except as noted below    Constitutional:  [ ] fever [ ] chills  [ ] weight loss  [ ] weakness  Skin:  [ ] rash [ ] phlebitis	  Eyes: [ ] icterus [ ] pain  [ ] discharge	  ENMT: [ ] sore throat  [ ] thrush [ ] ulcers [ ] exudates  Respiratory: [ ] dyspnea [ ] hemoptysis [ x] cough [ ] sputum	  Cardiovascular:  [ ] chest pain [ ] palpitations [ ] edema	  Gastrointestinal:  [ ] nausea [ ] vomiting [ ] diarrhea [ ] constipation [ ] pain	  Genitourinary:  [ ] dysuria [ ] frequency [ ] hematuria [ ] discharge [ ] flank pain  [ ] incontinence  Musculoskeletal:  [ ] myalgias [ ] arthralgias [ ] arthritis  [ ] back pain  Neurological:  [ ] headache [ ] seizures  [ ] confusion/altered mental status    Allergies  No Known Allergies        ANTIMICROBIALS:  ceFAZolin   IVPB 2000 every 8 hours  fluconAZOLE   Tablet 200 daily  piperacillin/tazobactam IVPB.. 3.375 every 8 hours      OTHER MEDS:  MEDICATIONS  (STANDING):  albuterol/ipratropium for Nebulization 3 every 6 hours  benzonatate 100 every 8 hours PRN  guaiFENesin Oral Liquid (Sugar-Free) 100 every 6 hours PRN  heparin   Injectable 5000 every 8 hours  lactulose Syrup 10 two times a day  levothyroxine 100 daily  midodrine. 10 every 8 hours  ondansetron Injectable 4 once  ondansetron Injectable 4 every 6 hours PRN  oxyCODONE    IR 2.5 every 12 hours PRN  pantoprazole    Tablet 40 before breakfast  simethicone 80 once      Vital Signs Last 24 Hrs  T(C): 37 (06 Jun 2023 17:00), Max: 37.1 (05 Jun 2023 18:45)  T(F): 98.6 (06 Jun 2023 17:00), Max: 98.7 (05 Jun 2023 18:45)  HR: 73 (06 Jun 2023 17:00) (72 - 106)  BP: 109/64 (06 Jun 2023 17:00) (100/62 - 109/64)  BP(mean): --  RR: 18 (06 Jun 2023 17:00) (18 - 20)  SpO2: 98% (06 Jun 2023 17:00) (93% - 100%)    Parameters below as of 06 Jun 2023 17:00  Patient On (Oxygen Delivery Method): room air        PHYSICAL EXAMINATION:  General: Alert and Awake, NAD, jaundice, scleral icterus  Cardiac: RRR, No M/R/G  Resp: CTAB, No Wh/Rh/Ra  Abdomen: NBS, NT/ND, No HSM, No rigidity or guarding  MSK: No LE edema. No Calf tenderness  : No [place]  Skin: No rashes or lesions. Skin is warm and dry to the touch.   Neuro: Alert and Awake. CN 2-12 Grossly intact. Moves all four extremities spontaneously.  Psych: Calm, Pleasant, Cooperative                          9.2    19.58 )-----------( 289      ( 06 Jun 2023 06:27 )             26.8       06-06    130<L>  |  97  |  16  ----------------------------<  174<H>  4.5   |  16<L>  |  1.52<H>    Ca    8.9      06 Jun 2023 06:27  Phos  2.5     06-06  Mg     1.9     06-06    TPro  5.8<L>  /  Alb  3.2<L>  /  TBili  33.1<H>  /  DBili  x   /  AST  93<H>  /  ALT  <5<L>  /  AlkPhos  199<H>  06-06          MICROBIOLOGY:  v  .Bronchial Bronchoalveolar Lavage  06-05-23 --  --    polymorphonuclear leukocytes per low power field  No squamous epithelial cells per low power field  Gram Negative Rods per oil power field  by cytocentrifuge      .Bronchial Bronchial Lavage  06-05-23   Testing in progress  --    Rare polymorphonuclear leukocytes per low power field  No squamous epithelial cells per low power field  No organisms seen per oil power field      .Blood Blood-Peripheral  06-02-23   No growth to date.  --  --      .Blood Blood-Peripheral  06-02-23   No growth to date.  --  --      Abdominal Fl Abdominal Fluid  05-31-23   No growth  --    polymorphonuclear leukocytes seen  No organisms seen  by cytocentrifuge      Clean Catch Clean Catch (Midstream)  05-28-23   <10,000 CFU/mL Normal Urogenital Aga  --  --      .Blood Blood-Peripheral  05-28-23   No Growth Final  --  --      .Blood Blood-Peripheral  05-28-23   No Growth Final  --  --      .Sputum Sputum  05-26-23   No acid-fast bacilli isolated at 1 week. ****Acid-fast cultures are held  for 6 weeks.****  --    Few polymorphonuclear leukocytes per low power field  Moderate Squamous epithelial cells per low power field  Moderate Yeast like cells seen per oil power field      .Blood Blood-Peripheral  05-26-23   No Growth Final  --  --      .Blood Blood-Peripheral  05-24-23   No Growth Final  --  --      Peritoneal Peritoneal Fluid  05-23-23   No growth at 5 days  --    polymorphonuclear leukocytes seen  No organisms seen  by cytocentrifuge      .Blood Blood-Peripheral  05-23-23   No Growth Final  --  --      .Blood Blood  05-23-23   No Growth Final  --  --      Clean Catch Clean Catch (Midstream)  05-22-23   50,000 - 99,000 CFU/mL Enterococcus faecium (vancomycin resistant)  --  Enterococcus faecium (vancomycin resistant)      .Blood Blood-Peripheral  05-18-23   No Growth Final  --  --      Peritoneal Peritoneal Fluid  05-17-23   No growth at 5 days  --    No polymorphonuclear cells seen  No organisms seen  by cytocentrifuge      .Blood Blood-Peripheral  05-17-23   No Growth Final  --  --      .Blood Blood-Peripheral  05-16-23   No Growth Final  --  --      Ascites Fl Ascites Fluid  05-13-23   No growth at 5 days  --    No polymorphonuclear cells seen seen  No organisms seen  by cytocentrifuge      Clean Catch Clean Catch (Midstream)  05-13-23   >100,000 CFU/ml Escherichia coli  --  Escherichia coli      .Blood Blood-Peripheral  05-13-23   Growth in aerobic and anaerobic bottles: Staphylococcus aureus  ***Blood Panel PCR results on this specimen are available  approximately 3 hours after the Gram stain result.***  Gram stain, PCR, and/or culture results may not always  correspond dueto difference in methodologies.  ************************************************************  This PCR assay was performed by multiplex PCR. This  Assay tests for 66 bacterial and resistance gene targets.  Please refer to the [facility] test directory  at [email or web address] for details.  --  Blood Culture PCR  Staphylococcus aureus      .Blood Blood-Peripheral  05-13-23   Growth in aerobic bottle: Staphylococcus aureus  See previous culture [id number]  --    Growth in aerobic bottle: Gram Positive Cocci in Clusters        CMV IgG Antibody: >10.00 U/mL (06-01-23 @ 00:45)  Toxoplasma IgG Screen: <3.0 IU/mL (05-17-23 @ 07:14)  CMV IgG Antibody: >10.00 U/mL (05-17-23 @ 07:14)  Toxoplasma IgG Screen: <3.0 IU/mL (05-17-23 @ 07:14)    Rapid RVP Result: NotDetec (06-01 @ 09:17)        RADIOLOGY:    <The imaging below has been reviewed and visualized by me independently. Findings as detailed in report below>    < from: CT Chest No Cont (06.01.23 @ 18:14) >  IMPRESSION:  Bilateral patchy lung opacities with mixed in appearance compared to the   prior study    < end of copied text >

## 2023-06-06 NOTE — PROGRESS NOTE ADULT - ASSESSMENT
39 yo F with hypothyroidism (on Levothyroxine) who initially presented to [facility] on  with abd pain. 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) And MSSA bacteremia    Microbiology:   BC NGTd   UC 50-99K CRE, UA non micro   BC NGTD   ascitic fluid bacterial, fungal cx NGTD  ,  BC NGTD   ascitic fluid cx neg   2/ BC pos MSSA    Imagin/25 Ct chest: findings c/w pulmonary edema- (areas of confluent GGo)   MRCP No suspicious liver lesions. Marked hepatomegaly and hepatic steatosis   with additional findings suggestive of cirrhosis and resultant portal   hypertension.   US abdomen: small to mod ascites  Ct chest iv con: Multifocal bilateral groundglass opacities, suspicious for pneumonia.  Small bilateral pleural effusions, left greater the right, increased compared to prior exam.no PE  Ct maxillofacial , Head and Cervical spine     CT FACIAL BONES  Bilateral mastoid air cells and middle ear regions well-aerated. Paranasal sinus mucosal thickening with near complete opacification of the right sphenoid sinus. Bilateral maxillary sinus septations. No air-fluid levels. S-shaped deviation of the nasal septum with hypertrophy of bilateral inferior greater than middle nasal turbinates. Narrowing ofbilateral ostiomeatal complexes. Periapical lucencies bilateral maxillarypremolars.    CT C/A/P   * Small focal consolidation in the posterior left upper lobe. Givenhistory of trauma, small pulmonary contusion cannot be excluded.Correlate for pneumonia.  * Hepatomegaly and diffuse upper extremity ptosis with signs of portalhypertension. Correlate for steatohepatitis. Moderate ascites.    Assessment and plan    1. 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  2. MSSA bacteremia  BC  2/2; repeat BC  neg. TTE no vegetations ; SAHARA neg on   CT face, chest/abdomen and pelvis post trauma noting sinus opacification, bilateral apical maxillary premolar lucencies. CT chest with small consolidation  Sources: traumatic /SSTI in s/o facial hematoma vs pneumonia vs less likely ENT. No evidence of metastatic infection. ? hardware  Cefazolin -    3. Leucocytosis  Initially: peaked at 20à stable 17-18, likely combination if bacteremia and alcoholic hepatitis  Rising over the last few days, only symptoms is occasional cough    4. Bilateral GGO on CT chest -  Pulmonary edema more likely  vs pneumonia  Repeat CT chest  with worsening GGo and some areas more consolidated on my review  ? aspiration  Cirrhotic patients also at risk of PCP, cryptococcosis- pattern not classical for the later, not classical host for mold infection except endemics- neither would cause leucocytosis      5. Ecoli bacteruria ;VRE enterococcus bacteruria : No symptoms of UTI, UA blunt.  6. Facial trauma with hematoma s/p evacuation - no purulence  7. Pre-transplant screening  HIV ag/Ab neg; HCV Ab neg, HBV s Ag neg, HBV s Ab pos, HBV c Ab neg, HEV IgG neg, EBV IgG pos, CMV IgG pos, HSV1/2 IgG pos, VZV IgG NEG; Measles/Mumps IgG neg, Rubella igG pos (Turk[place] measles), Syphilis screen neg, Strongyliodes IgG neg. Quantiferon and toxoplasma igG pending.  8. Need for vaccinations.  Only received Td . Positive titers for HBV non-quantitative    - Will benefit from vaccinations prior to transplant if latter not anticipated imminently or within 2 weeks- otherwise, higher benefit to delay:  Havrix, Shingrix 2 doses 6 months apart; Tdap (once in adulthood), COVID-19, Prevnar 20, HPV Gardisil 9 3 doses at 0-1-6 months  - Of note, non-immune to Measles or mumps,  nor to varicella. immune to [place] measles (Rubella) If she were to not be transplanted or delayed for > 1 month, would benefit from MMR+Varivax. However, if plan for or anticipated transplantation within a month, then live vaccination would be contraindicated.  - Also will benefit from HPV vaccine gardisil 9 kem given positive HPV - she agrees to this    Recommendations:  --Bronchoscopy gram stain with Gram negative antony; restarted on Zosyn - can continue pending prelim bronch bacterial cultures  --Follow up on additional bronchoscopy studies  --ID clearance pending preliminary bronchoscopy studies  --Continue cefazolin 2g q8h for MSSA bacteremia (tentative End Date: 23)    I will continue to follow. Please feel free to contact me with any further questions.    Irving Deras M.D.  [facility] Division of Infectious Disease  8AM-5PM Monday - Friday: Available on [employer] Teams  After Hours and Holidays (or if no response on [employer] Teams): Please contact the Infectious Diseases Office at [phone]    The above assessment and plan were discussed with Dr Trent (hepatology)

## 2023-06-07 LAB
ALBUMIN SERPL ELPH-MCNC: 3.6 G/DL — SIGNIFICANT CHANGE UP (ref 3.3–5)
ALP SERPL-CCNC: 164 U/L — HIGH (ref 40–120)
ALT FLD-CCNC: <5 U/L — LOW (ref 10–45)
ANION GAP SERPL CALC-SCNC: 17 MMOL/L — SIGNIFICANT CHANGE UP (ref 5–17)
APPEARANCE UR: ABNORMAL
APTT BLD: 64.5 SEC — HIGH (ref 27.5–35.5)
AST SERPL-CCNC: 77 U/L — HIGH (ref 10–40)
BASOPHILS # BLD AUTO: 0.04 K/UL — SIGNIFICANT CHANGE UP (ref 0–0.2)
BASOPHILS NFR BLD AUTO: 0.2 % — SIGNIFICANT CHANGE UP (ref 0–2)
BILIRUB SERPL-MCNC: 30.1 MG/DL — HIGH (ref 0.2–1.2)
BILIRUB UR-MCNC: ABNORMAL
BLD GP AB SCN SERPL QL: NEGATIVE — SIGNIFICANT CHANGE UP
BUN SERPL-MCNC: 27 MG/DL — HIGH (ref 7–23)
CALCIUM SERPL-MCNC: 9.5 MG/DL — SIGNIFICANT CHANGE UP (ref 8.4–10.5)
CHLORIDE SERPL-SCNC: 93 MMOL/L — LOW (ref 96–108)
CMV DNA CSF QL NAA+PROBE: 1500 IU/ML — HIGH
CMV DNA SPEC NAA+PROBE-LOG#: 3.18 LOG10IU/ML — HIGH
CO2 SERPL-SCNC: 17 MMOL/L — LOW (ref 22–31)
COLOR SPEC: ABNORMAL
CREAT ?TM UR-MCNC: 82 MG/DL — SIGNIFICANT CHANGE UP
CREAT SERPL-MCNC: 1.92 MG/DL — HIGH (ref 0.5–1.3)
CULTURE RESULTS: SIGNIFICANT CHANGE UP
DIFF PNL FLD: ABNORMAL
EGFR: 34 ML/MIN/1.73M2 — LOW
EOSINOPHIL # BLD AUTO: 0 K/UL — SIGNIFICANT CHANGE UP (ref 0–0.5)
EOSINOPHIL NFR BLD AUTO: 0 % — SIGNIFICANT CHANGE UP (ref 0–6)
FUNGITELL B-D-GLUCAN,  BRONCHIAL LAVAGE: SIGNIFICANT CHANGE UP
GLUCOSE SERPL-MCNC: 124 MG/DL — HIGH (ref 70–99)
GLUCOSE UR QL: ABNORMAL
HCT VFR BLD CALC: 24.8 % — LOW (ref 34.5–45)
HGB BLD-MCNC: 8.8 G/DL — LOW (ref 11.5–15.5)
IMM GRANULOCYTES NFR BLD AUTO: 2 % — HIGH (ref 0–0.9)
INR BLD: 1.91 RATIO — HIGH (ref 0.88–1.16)
KETONES UR-MCNC: SIGNIFICANT CHANGE UP
LEUKOCYTE ESTERASE UR-ACNC: ABNORMAL
LYMPHOCYTES # BLD AUTO: 0.99 K/UL — LOW (ref 1–3.3)
LYMPHOCYTES # BLD AUTO: 4.5 % — LOW (ref 13–44)
MAGNESIUM SERPL-MCNC: 2.2 MG/DL — SIGNIFICANT CHANGE UP (ref 1.6–2.6)
MCHC RBC-ENTMCNC: 35.5 GM/DL — SIGNIFICANT CHANGE UP (ref 32–36)
MCHC RBC-ENTMCNC: 35.6 PG — HIGH (ref 27–34)
MCV RBC AUTO: 100.4 FL — HIGH (ref 80–100)
MONOCYTES # BLD AUTO: 1.48 K/UL — HIGH (ref 0–0.9)
MONOCYTES NFR BLD AUTO: 6.7 % — SIGNIFICANT CHANGE UP (ref 2–14)
NEUTROPHILS # BLD AUTO: 19.11 K/UL — HIGH (ref 1.8–7.4)
NEUTROPHILS NFR BLD AUTO: 86.6 % — HIGH (ref 43–77)
NITRITE UR-MCNC: NEGATIVE — SIGNIFICANT CHANGE UP
NOCARDIA RT-PCR-BAL: SIGNIFICANT CHANGE UP
NRBC # BLD: 0 /100 WBCS — SIGNIFICANT CHANGE UP (ref 0–0)
OSMOLALITY UR: 344 MOS/KG — SIGNIFICANT CHANGE UP (ref 300–900)
PH UR: 6 — SIGNIFICANT CHANGE UP (ref 5–8)
PHOSPHATE SERPL-MCNC: 3.4 MG/DL — SIGNIFICANT CHANGE UP (ref 2.5–4.5)
PLATELET # BLD AUTO: 294 K/UL — SIGNIFICANT CHANGE UP (ref 150–400)
POTASSIUM SERPL-MCNC: 4.3 MMOL/L — SIGNIFICANT CHANGE UP (ref 3.5–5.3)
POTASSIUM SERPL-SCNC: 4.3 MMOL/L — SIGNIFICANT CHANGE UP (ref 3.5–5.3)
POTASSIUM UR-SCNC: 67 MMOL/L — SIGNIFICANT CHANGE UP
PROT ?TM UR-MCNC: 129 MG/DL — HIGH (ref 0–12)
PROT SERPL-MCNC: 6 G/DL — SIGNIFICANT CHANGE UP (ref 6–8.3)
PROT UR-MCNC: ABNORMAL
PROT/CREAT UR-RTO: 1.6 RATIO — HIGH (ref 0–0.2)
PROTHROM AB SERPL-ACNC: 22.1 SEC — HIGH (ref 10.5–13.4)
RBC # BLD: 2.47 M/UL — LOW (ref 3.8–5.2)
RBC # FLD: 25.2 % — HIGH (ref 10.3–14.5)
RH IG SCN BLD-IMP: POSITIVE — SIGNIFICANT CHANGE UP
SODIUM SERPL-SCNC: 127 MMOL/L — LOW (ref 135–145)
SODIUM UR-SCNC: 28 MMOL/L — SIGNIFICANT CHANGE UP
SP GR SPEC: 1.03 — HIGH (ref 1.01–1.02)
SPECIMEN SOURCE: SIGNIFICANT CHANGE UP
UROBILINOGEN FLD QL: NEGATIVE — SIGNIFICANT CHANGE UP
UUN UR-MCNC: 341 MG/DL — SIGNIFICANT CHANGE UP
WBC # BLD: 22.06 K/UL — HIGH (ref 3.8–10.5)
WBC # FLD AUTO: 22.06 K/UL — HIGH (ref 3.8–10.5)

## 2023-06-07 PROCEDURE — 76770 US EXAM ABDO BACK WALL COMP: CPT | Mod: 26

## 2023-06-07 PROCEDURE — 99232 SBSQ HOSP IP/OBS MODERATE 35: CPT

## 2023-06-07 PROCEDURE — 99232 SBSQ HOSP IP/OBS MODERATE 35: CPT | Mod: GC

## 2023-06-07 RX ORDER — OCTREOTIDE ACETATE 200 UG/ML
150 INJECTION, SOLUTION INTRAVENOUS; SUBCUTANEOUS EVERY 8 HOURS
Refills: 0 | Status: DISCONTINUED | OUTPATIENT
Start: 2023-06-07 | End: 2023-06-08

## 2023-06-07 RX ORDER — ALBUMIN HUMAN 25 %
100 VIAL (ML) INTRAVENOUS EVERY 8 HOURS
Refills: 0 | Status: COMPLETED | OUTPATIENT
Start: 2023-06-07 | End: 2023-06-08

## 2023-06-07 RX ADMIN — Medication 1 MILLIGRAM(S): at 11:07

## 2023-06-07 RX ADMIN — Medication 50 MILLILITER(S): at 13:20

## 2023-06-07 RX ADMIN — Medication 50 MILLILITER(S): at 05:42

## 2023-06-07 RX ADMIN — Medication 1 TABLET(S): at 11:07

## 2023-06-07 RX ADMIN — MIDODRINE HYDROCHLORIDE 10 MILLIGRAM(S): 2.5 TABLET ORAL at 21:33

## 2023-06-07 RX ADMIN — MIDODRINE HYDROCHLORIDE 10 MILLIGRAM(S): 2.5 TABLET ORAL at 13:21

## 2023-06-07 RX ADMIN — OXYCODONE HYDROCHLORIDE 2.5 MILLIGRAM(S): 5 TABLET ORAL at 03:44

## 2023-06-07 RX ADMIN — MIDODRINE HYDROCHLORIDE 10 MILLIGRAM(S): 2.5 TABLET ORAL at 05:44

## 2023-06-07 RX ADMIN — MAGNESIUM OXIDE 400 MG ORAL TABLET 400 MILLIGRAM(S): 241.3 TABLET ORAL at 18:12

## 2023-06-07 RX ADMIN — HEPARIN SODIUM 5000 UNIT(S): 5000 INJECTION INTRAVENOUS; SUBCUTANEOUS at 13:21

## 2023-06-07 RX ADMIN — BENZOCAINE AND MENTHOL 1 LOZENGE: 5; 1 LIQUID ORAL at 23:22

## 2023-06-07 RX ADMIN — Medication 100 MILLIGRAM(S): at 23:21

## 2023-06-07 RX ADMIN — Medication 3 MILLILITER(S): at 23:20

## 2023-06-07 RX ADMIN — Medication 100 MILLIGRAM(S): at 08:53

## 2023-06-07 RX ADMIN — OXYCODONE HYDROCHLORIDE 2.5 MILLIGRAM(S): 5 TABLET ORAL at 15:45

## 2023-06-07 RX ADMIN — FLUCONAZOLE 200 MILLIGRAM(S): 150 TABLET ORAL at 11:08

## 2023-06-07 RX ADMIN — PIPERACILLIN AND TAZOBACTAM 25 GRAM(S): 4; .5 INJECTION, POWDER, LYOPHILIZED, FOR SOLUTION INTRAVENOUS at 08:53

## 2023-06-07 RX ADMIN — Medication 100 MILLIGRAM(S): at 11:07

## 2023-06-07 RX ADMIN — HEPARIN SODIUM 5000 UNIT(S): 5000 INJECTION INTRAVENOUS; SUBCUTANEOUS at 21:35

## 2023-06-07 RX ADMIN — OCTREOTIDE ACETATE 150 MICROGRAM(S): 200 INJECTION, SOLUTION INTRAVENOUS; SUBCUTANEOUS at 11:07

## 2023-06-07 RX ADMIN — OCTREOTIDE ACETATE 150 MICROGRAM(S): 200 INJECTION, SOLUTION INTRAVENOUS; SUBCUTANEOUS at 22:14

## 2023-06-07 RX ADMIN — PIPERACILLIN AND TAZOBACTAM 25 GRAM(S): 4; .5 INJECTION, POWDER, LYOPHILIZED, FOR SOLUTION INTRAVENOUS at 00:14

## 2023-06-07 RX ADMIN — PIPERACILLIN AND TAZOBACTAM 25 GRAM(S): 4; .5 INJECTION, POWDER, LYOPHILIZED, FOR SOLUTION INTRAVENOUS at 18:11

## 2023-06-07 RX ADMIN — PANTOPRAZOLE SODIUM 40 MILLIGRAM(S): 20 TABLET, DELAYED RELEASE ORAL at 05:44

## 2023-06-07 RX ADMIN — BENZOCAINE AND MENTHOL 1 LOZENGE: 5; 1 LIQUID ORAL at 00:13

## 2023-06-07 RX ADMIN — OXYCODONE HYDROCHLORIDE 2.5 MILLIGRAM(S): 5 TABLET ORAL at 14:54

## 2023-06-07 RX ADMIN — BENZOCAINE AND MENTHOL 1 LOZENGE: 5; 1 LIQUID ORAL at 19:53

## 2023-06-07 RX ADMIN — BENZOCAINE AND MENTHOL 1 LOZENGE: 5; 1 LIQUID ORAL at 11:08

## 2023-06-07 RX ADMIN — OXYCODONE HYDROCHLORIDE 2.5 MILLIGRAM(S): 5 TABLET ORAL at 15:50

## 2023-06-07 RX ADMIN — MAGNESIUM OXIDE 400 MG ORAL TABLET 400 MILLIGRAM(S): 241.3 TABLET ORAL at 13:21

## 2023-06-07 RX ADMIN — OXYCODONE HYDROCHLORIDE 2.5 MILLIGRAM(S): 5 TABLET ORAL at 02:44

## 2023-06-07 RX ADMIN — Medication 50 MILLILITER(S): at 21:34

## 2023-06-07 RX ADMIN — Medication 100 MICROGRAM(S): at 05:43

## 2023-06-07 RX ADMIN — MAGNESIUM OXIDE 400 MG ORAL TABLET 400 MILLIGRAM(S): 241.3 TABLET ORAL at 08:53

## 2023-06-07 RX ADMIN — Medication 100 MILLIGRAM(S): at 21:35

## 2023-06-07 RX ADMIN — Medication 100 MILLIGRAM(S): at 18:12

## 2023-06-07 RX ADMIN — HEPARIN SODIUM 5000 UNIT(S): 5000 INJECTION INTRAVENOUS; SUBCUTANEOUS at 05:44

## 2023-06-07 RX ADMIN — BENZOCAINE AND MENTHOL 1 LOZENGE: 5; 1 LIQUID ORAL at 05:50

## 2023-06-07 RX ADMIN — PIPERACILLIN AND TAZOBACTAM 25 GRAM(S): 4; .5 INJECTION, POWDER, LYOPHILIZED, FOR SOLUTION INTRAVENOUS at 23:21

## 2023-06-07 NOTE — PROGRESS NOTE ADULT - ASSESSMENT
39 yo F with hypothyroidism (on Levothyroxine) who initially presented to [facility] on  with abd pain. 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) And MSSA bacteremia    Microbiology:   BC NGTd   UC 50-99K CRE, UA non micro   BC NGTD   ascitic fluid bacterial, fungal cx NGTD  ,  BC NGTD   ascitic fluid cx neg   2/ BC pos MSSA    Imagin/25 Ct chest: findings c/w pulmonary edema- (areas of confluent GGo)   MRCP No suspicious liver lesions. Marked hepatomegaly and hepatic steatosis   with additional findings suggestive of cirrhosis and resultant portal   hypertension.   US abdomen: small to mod ascites  Ct chest iv con: Multifocal bilateral groundglass opacities, suspicious for pneumonia.  Small bilateral pleural effusions, left greater the right, increased compared to prior exam.no PE  Ct maxillofacial , Head and Cervical spine     CT FACIAL BONES  Bilateral mastoid air cells and middle ear regions well-aerated. Paranasal sinus mucosal thickening with near complete opacification of the right sphenoid sinus. Bilateral maxillary sinus septations. No air-fluid levels. S-shaped deviation of the nasal septum with hypertrophy of bilateral inferior greater than middle nasal turbinates. Narrowing ofbilateral ostiomeatal complexes. Periapical lucencies bilateral maxillarypremolars.    CT C/A/P   * Small focal consolidation in the posterior left upper lobe. Givenhistory of trauma, small pulmonary contusion cannot be excluded.Correlate for pneumonia.  * Hepatomegaly and diffuse upper extremity ptosis with signs of portalhypertension. Correlate for steatohepatitis. Moderate ascites.    Assessment and plan    1. 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  2. MSSA bacteremia  BC  2/2; repeat BC  neg. TTE no vegetations ; SAHARA neg on   CT face, chest/abdomen and pelvis post trauma noting sinus opacification, bilateral apical maxillary premolar lucencies. CT chest with small consolidation  Sources: traumatic /SSTI in s/o facial hematoma vs pneumonia vs less likely ENT. No evidence of metastatic infection. ? hardware  Cefazolin -    3. Leucocytosis  Initially: peaked at 20à stable 17-18, likely combination if bacteremia and alcoholic hepatitis  Rising over the last few days, only symptoms is occasional cough    4. Bilateral GGO on CT chest -  Pulmonary edema more likely  vs pneumonia  Repeat CT chest  with worsening GGo and some areas more consolidated on my review  ? aspiration  Cirrhotic patients also at risk of PCP, cryptococcosis- pattern not classical for the later, not classical host for mold infection except endemics- neither would cause leucocytosis      5. Ecoli bacteruria ;VRE enterococcus bacteruria : No symptoms of UTI, UA blunt.  6. Facial trauma with hematoma s/p evacuation - no purulence  7. Pre-transplant screening  HIV ag/Ab neg; HCV Ab neg, HBV s Ag neg, HBV s Ab pos, HBV c Ab neg, HEV IgG neg, EBV IgG pos, CMV IgG pos, HSV1/2 IgG pos, VZV IgG NEG; Measles/Mumps IgG neg, Rubella igG pos (Urdu measles), Syphilis screen neg, Strongyliodes IgG neg. Quantiferon and toxoplasma igG pending.  8. Need for vaccinations.  Only received Td . Positive titers for HBV non-quantitative    - Will benefit from vaccinations prior to transplant if latter not anticipated imminently or within 2 weeks- otherwise, higher benefit to delay:  Havrix, Shingrix 2 doses 6 months apart; Tdap (once in adulthood), COVID-19, Prevnar 20, HPV Gardisil 9 3 doses at 0-1-6 months  - Of note, non-immune to Measles or mumps,  nor to varicella. immune to Ur[place] measles (Rubella) If she were to not be transplanted or delayed for > 1 month, would benefit from MMR+Varivax. However, if plan for or anticipated transplantation within a month, then live vaccination would be contraindicated.  - Also will benefit from HPV vaccine gardisil 9 kem given positive HPV - she agrees to this    s/p Bronchoscopy   284 nucleated cells with 86% macrophages  Bacterial Bronch Cultures () with GNR on gram stain but with no growth  Bronchoscopy Fungitell <45    Recommendations:  --Bronchoscopy gram stain with Gram negative antony; restarted on Zosyn - can continue pending prelim bronch bacterial cultures  --Follow up on additional bronchoscopy studies - worsening of MELD Score - may need to be re-activated for transplant before all of testing results (as long as bacterial cultures are unrevealing and if pulmonary does not have any contraindications)  --CMV PCR () 1,500 - in the pre-transplant setting less likely clinically relevant, can repeat CMV PCR on   --ID clearance pending preliminary bronchoscopy studies (Cultures, Aspergillus galactomannan, Cytopathology, Flow Cytometry, Legionella PCR, PCP PCR)  --Continue cefazolin 2g q8h for MSSA bacteremia (tentative End Date: 23)    I will continue to follow. Please feel free to contact me with any further questions.    Irving Deras M.D.  [facility] Division of Infectious Disease  8AM-5PM Monday - Friday: Available on [employer] Teams  After Hours and Holidays (or if no response on [employer] Teams): Please contact the Infectious Diseases Office at [phone]    The above assessment and plan were discussed with Dr Wolfe, Dr Trent

## 2023-06-07 NOTE — PROGRESS NOTE ADULT - SUBJECTIVE AND OBJECTIVE BOX
Interval Events:   No acute events overnight.  Met with behavioral health team yesterday.  Afebrile and hemodynamically stable.    ROS:   12 point review of systems performed and negative except otherwise noted in HPI.    Hospital Medications:  albuterol/ipratropium for Nebulization 3 milliLiter(s) Nebulizer every 6 hours  benzocaine/menthol Lozenge 1 Lozenge Oral every 2 hours PRN  benzonatate 100 milliGRAM(s) Oral every 8 hours PRN  ceFAZolin   IVPB 2000 milliGRAM(s) IV Intermittent every 8 hours  fluconAZOLE   Tablet 200 milliGRAM(s) Oral daily  folic acid 1 milliGRAM(s) Oral daily  guaiFENesin Oral Liquid (Sugar-Free) 100 milliGRAM(s) Oral every 6 hours PRN  heparin   Injectable 5000 Unit(s) SubCutaneous every 8 hours  lactulose Syrup 10 Gram(s) Oral two times a day  levothyroxine 100 MICROGram(s) Oral daily  magnesium oxide 400 milliGRAM(s) Oral three times a day with meals  midodrine. 10 milliGRAM(s) Oral every 8 hours  multivitamin 1 Tablet(s) Oral daily  ondansetron Injectable 4 milliGRAM(s) IV Push once  ondansetron Injectable 4 milliGRAM(s) IV Push every 6 hours PRN  oxyCODONE    IR 2.5 milliGRAM(s) Oral every 12 hours PRN  pantoprazole    Tablet 40 milliGRAM(s) Oral before breakfast  piperacillin/tazobactam IVPB.. 3.375 Gram(s) IV Intermittent every 8 hours  simethicone 80 milliGRAM(s) Chew once  thiamine 100 milliGRAM(s) Oral daily    MAR over past 24 hours:    albumin human 25% IVPB   50 mL/Hr IV Intermittent (06-07-23 @ 05:42)   50 mL/Hr IV Intermittent (06-06-23 @ 21:22)   50 mL/Hr IV Intermittent (06-06-23 @ 13:01)    albuterol/ipratropium for Nebulization   3 milliLiter(s) Nebulizer (06-06-23 @ 22:29)    benzocaine/menthol Lozenge   1 Lozenge Oral (06-07-23 @ 05:50)   1 Lozenge Oral (06-07-23 @ 00:13)   1 Lozenge Oral (06-06-23 @ 21:22)   1 Lozenge Oral (06-06-23 @ 17:07)   1 Lozenge Oral (06-06-23 @ 12:36)   1 Lozenge Oral (06-06-23 @ 09:12)    ceFAZolin   IVPB   100 mL/Hr IV Intermittent (06-06-23 @ 22:29)   100 mL/Hr IV Intermittent (06-06-23 @ 16:22)    fluconAZOLE   Tablet   200 milliGRAM(s) Oral (06-06-23 @ 12:59)    folic acid   1 milliGRAM(s) Oral (06-06-23 @ 12:59)    guaiFENesin Oral Liquid (Sugar-Free)   100 milliGRAM(s) Oral (06-06-23 @ 10:59)    heparin   Injectable   5000 Unit(s) SubCutaneous (06-07-23 @ 05:44)   5000 Unit(s) SubCutaneous (06-06-23 @ 21:21)   5000 Unit(s) SubCutaneous (06-06-23 @ 13:00)    levothyroxine   100 MICROGram(s) Oral (06-07-23 @ 05:43)    magnesium oxide   400 milliGRAM(s) Oral (06-06-23 @ 17:07)   400 milliGRAM(s) Oral (06-06-23 @ 12:59)    midodrine.   10 milliGRAM(s) Oral (06-07-23 @ 05:44)   10 milliGRAM(s) Oral (06-06-23 @ 21:22)   10 milliGRAM(s) Oral (06-06-23 @ 13:00)    multivitamin   1 Tablet(s) Oral (06-06-23 @ 12:59)    oxyCODONE    IR   2.5 milliGRAM(s) Oral (06-07-23 @ 02:44)   2.5 milliGRAM(s) Oral (06-06-23 @ 14:40)    pantoprazole    Tablet   40 milliGRAM(s) Oral (06-07-23 @ 05:44)    piperacillin/tazobactam IVPB..   25 mL/Hr IV Intermittent (06-07-23 @ 00:14)   25 mL/Hr IV Intermittent (06-06-23 @ 16:22)    thiamine   100 milliGRAM(s) Oral (06-06-23 @ 12:59)      Home Medications:  Last Order Reconciliation Date: 05-16-23 @ 13:29 (Admission Reconciliation)  acetylcysteine 20% intravenous solution: 40 milliliter(s) intravenous every 24 hours  cefTRIAXone: 2 gram(s) intravenously once a day  Constulose 10 g/15 mL oral liquid: 30 milliliter(s) orally 3 times a day  folic acid 1 mg oral tablet: 1 tab(s) orally once a day  guaiFENesin 100 mg/5 mL oral liquid: 5 milliliter(s) orally every 6 hours As needed Cough  levothyroxine 100 mcg (0.1 mg) oral tablet: 1 tab(s) orally once a day  LORazepam: 1 milliliter(s) intravenous every 4 hours as needed for Anxiety  morphine 15 mg oral tablet: 1 tab(s) orally every 8 hours  Multiple Vitamins oral tablet: 1 tab(s) orally once a day  mupirocin 2% topical ointment: Apply topically to affected area 2 times a day to both nostrils  ondansetron 2 mg/mL injectable solution: 4 milligram(s) injectable every 8 hours as needed for as needed  sodium/potassium/phosphorus 160 mg-280 mg-250 mg oral powder for reconstitution: 1 packet(s) orally 3 times a day (with meals)  thiamine 100 mg oral tablet: 1 tab(s) orally once a day    PHYSICAL EXAM:   Vital Signs last 24 hours:  T(F): 97.6 (06-07-23 @ 06:10), Max: 98.6 (06-06-23 @ 17:00)  HR: 67 (06-07-23 @ 06:10) (67 - 75)  BP: 100/54 (06-07-23 @ 06:10) (97/57 - 109/64)  BP(mean): --  ABP: --  ABP(mean): --  RR: 18 (06-07-23 @ 06:10) (18 - 18)  SpO2: 95% (06-07-23 @ 06:10) (93% - 98%)    I&Os:    06-06-23 @ 07:01  -  06-07-23 @ 07:00  --------------------------------------------------------  IN:    IV PiggyBack: 50 mL    IV PiggyBack: 100 mL    IV PiggyBack: 100 mL    Oral Fluid: 1740 mL  Total IN: 1990 mL    OUT:    Voided (mL): 1100 mL  Total OUT: 1100 mL    Total NET: 890 mL        BMI (kg/m2): 22.1 (06-05-23 @ 16:42)  GENERAL: no acute distress  NEURO: alert  HEENT: NCAT, no conjunctival pallor appreciated  CHEST: no respiratory distress, no accessory muscle use  CARDIAC: regular rate, +S1/S2  ABDOMEN: soft, nontender, no rebound or guarding  EXTREMITIES: warm, well perfused  SKIN: no lesions noted    DIAGNOSTICS:  WBC      Hg       PLT      Na       K        CO2     BUN      Cr       ALT      AST      TB       ALP  22.06    8.8      294      ------   ------   ------   ------   ------   ------   ------   ------   ------   06-07-23 @ 06:31  ------   ------   ------   127      4.3      17       27       1.92     <5       77       30.1     164      06-07-23 @ 06:30  19.58    9.2      289      130      4.5      16       16       1.52     <5       93       33.1     199      06-06-23 @ 06:27  18.00    7.4      287      ------   ------   ------   ------   ------   ------   ------   ------   ------   06-05-23 @ 06:46  ------   ------   ------   133      3.8      19       14       1.48     <5       86       30.3     171      06-05-23 @ 06:45    PT             INR            MELDwith  MELDw/o  22.1           1.91           --             --             06-07-23 @ 06:31  25.2           2.16           --             --             06-06-23 @ 06:27  25.4           2.17           --             --             06-05-23 @ 06:47  24.1           2.08           --             --             06-04-23 @ 07:36  24.2           2.07           --             --             06-03-23 @ 07:19

## 2023-06-07 NOTE — PROGRESS NOTE ADULT - SUBJECTIVE AND OBJECTIVE BOX
Follow Up:      Interval History:    REVIEW OF SYSTEMS  [  ] ROS unobtainable because:    [  ] All other systems negative except as noted below    Constitutional:  [ ] fever [ ] chills  [ ] weight loss  [ ] weakness  Skin:  [ ] rash [ ] phlebitis	  Eyes: [ ] icterus [ ] pain  [ ] discharge	  ENMT: [ ] sore throat  [ ] thrush [ ] ulcers [ ] exudates  Respiratory: [ ] dyspnea [ ] hemoptysis [ ] cough [ ] sputum	  Cardiovascular:  [ ] chest pain [ ] palpitations [ ] edema	  Gastrointestinal:  [ ] nausea [ ] vomiting [ ] diarrhea [ ] constipation [ ] pain	  Genitourinary:  [ ] dysuria [ ] frequency [ ] hematuria [ ] discharge [ ] flank pain  [ ] incontinence  Musculoskeletal:  [ ] myalgias [ ] arthralgias [ ] arthritis  [ ] back pain  Neurological:  [ ] headache [ ] seizures  [ ] confusion/altered mental status    Allergies  No Known Allergies        ANTIMICROBIALS:  ceFAZolin   IVPB 2000 every 8 hours  fluconAZOLE   Tablet 200 daily  piperacillin/tazobactam IVPB.. 3.375 every 8 hours      OTHER MEDS:  MEDICATIONS  (STANDING):  albuterol/ipratropium for Nebulization 3 every 6 hours  benzonatate 100 every 8 hours PRN  guaiFENesin Oral Liquid (Sugar-Free) 100 every 6 hours PRN  heparin   Injectable 5000 every 8 hours  lactulose Syrup 10 two times a day  levothyroxine 100 daily  midodrine. 10 every 8 hours  octreotide  Injectable 150 every 8 hours  ondansetron Injectable 4 once  ondansetron Injectable 4 every 6 hours PRN  oxyCODONE    IR 2.5 every 12 hours PRN  pantoprazole    Tablet 40 before breakfast  simethicone 80 once      Vital Signs Last 24 Hrs  T(C): 36.9 (2023 13:05), Max: 37 (2023 17:00)  T(F): 98.4 (2023 13:05), Max: 98.6 (2023 17:00)  HR: 81 (2023 13:05) (67 - 81)  BP: 107/60 (2023 13:05) (97/57 - 109/64)  BP(mean): --  RR: 18 (2023 13:05) (18 - 18)  SpO2: 98% (2023 13:05) (93% - 98%)    Parameters below as of 2023 13:05  Patient On (Oxygen Delivery Method): room air        PHYSICAL EXAMINATION:  General: Alert and Awake, NAD  HEENT: PERRL, EOMI  Neck: Supple  Cardiac: RRR, No M/R/G  Resp: CTAB, No Wh/Rh/Ra  Abdomen: NBS, NT/ND, No HSM, No rigidity or guarding  MSK: No LE edema. No Calf tenderness  : No [place]  Skin: No rashes or lesions. Skin is warm and dry to the touch.   Neuro: Alert and Awake. CN 2-12 Grossly intact. Moves all four extremities spontaneously.  Psych: Calm, Pleasant, Cooperative                          8.8    22.06 )-----------( 294      ( 2023 06:31 )             24.8       06-07    127<L>  |  93<L>  |  27<H>  ----------------------------<  124<H>  4.3   |  17<L>  |  1.92<H>    Ca    9.5      2023 06:30  Phos  3.4     06-  Mg     2.2     06-    TPro  6.0  /  Alb  3.6  /  TBili  30.1<H>  /  DBili  x   /  AST  77<H>  /  ALT  <5<L>  /  AlkPhos  164<H>        Urinalysis Basic - ( 2023 13:03 )    Color: Dark Yellow / Appearance: Slightly Turbid / S.028 / pH: x  Gluc: x / Ketone: Trace  / Bili: Large / Urobili: Negative   Blood: x / Protein: 100 mg/dl / Nitrite: Negative   Leuk Esterase: Small / RBC: 3 /hpf / WBC 3 /HPF   Sq Epi: x / Non Sq Epi: x / Bacteria: Negative        MICROBIOLOGY:  v  .Blood Blood  23   No growth to date.  --  --      .Bronchial Bronchoalveolar Lavage  23   No growth at 48 hours  --    polymorphonuclear leukocytes per low power field  No squamous epithelial cells per low power field  Gram Negative Rods per oil power field  by cytocentrifuge      .Bronchial Bronchial Lavage  23   Culture is being performed.  --    Rare polymorphonuclear leukocytes per low power field  No squamous epithelial cells per low power field  No organisms seen per oil power field      .Blood Blood-Peripheral  23   No growth to date.  --  --      .Blood Blood-Peripheral  23   No growth to date.  --  --      Abdominal Fl Abdominal Fluid  23   No growth  --    polymorphonuclear leukocytes seen  No organisms seen  by cytocentrifuge      Clean Catch Clean Catch (Midstream)  23   <10,000 CFU/mL Normal Urogenital Aga  --  --      .Blood Blood-Peripheral  23   No Growth Final  --  --      .Blood Blood-Peripheral  23   No Growth Final  --  --      .Sputum Sputum  23   No acid-fast bacilli isolated at 1 week. ****Acid-fast cultures are held  for 6 weeks.****  --    Few polymorphonuclear leukocytes per low power field  Moderate Squamous epithelial cells per low power field  Moderate Yeast like cells seen per oil power field      .Blood Blood-Peripheral  23   No Growth Final  --  --      .Blood Blood-Peripheral  23   No Growth Final  --  --      Peritoneal Peritoneal Fluid  23   No growth at 5 days  --    polymorphonuclear leukocytes seen  No organisms seen  by cytocentrifuge      .Blood Blood-Peripheral  23   No Growth Final  --  --      .Blood Blood  23   No Growth Final  --  --      Clean Catch Clean Catch (Midstream)  23   50,000 - 99,000 CFU/mL Enterococcus faecium (vancomycin resistant)  --  Enterococcus faecium (vancomycin resistant)      .Blood Blood-Peripheral  23   No Growth Final  --  --      Peritoneal Peritoneal Fluid  23   No growth at 5 days  --    No polymorphonuclear cells seen  No organisms seen  by cytocentrifuge      .Blood Blood-Peripheral  23   No Growth Final  --  --      .Blood Blood-Peripheral  23   No Growth Final  --  --      Ascites Fl Ascites Fluid  23   No growth at 5 days  --    No polymorphonuclear cells seen seen  No organisms seen  by cytocentrifuge      Clean Catch Clean Catch (Midstream)  23   >100,000 CFU/ml Escherichia coli  --  Escherichia coli      .Blood Blood-Peripheral  23   Growth in aerobic and anaerobic bottles: Staphylococcus aureus  ***Blood Panel PCR results on this specimen are available  approximately 3 hours after the Gram stain result.***  Gram stain, PCR, and/or culture results may not always  correspond dueto difference in methodologies.  ************************************************************  This PCR assay was performed by multiplex PCR. This  Assay tests for 66 bacterial and resistance gene targets.  Please refer to the [facility] test directory  at [email or web address] for details.  --  Blood Culture PCR  Staphylococcus aureus      .Blood Blood-Peripheral  23   Growth in aerobic bottle: Staphylococcus aureus  See previous culture [id number]  --    Growth in aerobic bottle: Gram Positive Cocci in Clusters        CMV IgG Antibody: >10.00 U/mL (23 @ 00:45)  Toxoplasma IgG Screen: <3.0 IU/mL (23 @ 07:14)  CMV IgG Antibody: >10.00 U/mL (23 @ 07:14)  Toxoplasma IgG Screen: <3.0 IU/mL (23 @ 07:14)    CMVPCR Log: 3.18 [id number]/mL ( @ 19:40)  Rapid RVP Result: NotDetec ( @ 09:17)        RADIOLOGY:    <The imaging below has been reviewed and visualized by me independently. Findings as detailed in report below> Follow Up:  pre-OLT    Interval History: afebrile. notes improved cough today.     REVIEW OF SYSTEMS  [  ] ROS unobtainable because:    [ x ] All other systems negative except as noted below    Constitutional:  [ ] fever [ ] chills  [ ] weight loss  [ ] weakness  Skin:  [ ] rash [ ] phlebitis	  Eyes: [ ] icterus [ ] pain  [ ] discharge	  ENMT: [ ] sore throat  [ ] thrush [ ] ulcers [ ] exudates  Respiratory: [ ] dyspnea [ ] hemoptysis [ x] cough [ ] sputum	  Cardiovascular:  [ ] chest pain [ ] palpitations [ ] edema	  Gastrointestinal:  [ ] nausea [ ] vomiting [ ] diarrhea [ ] constipation [ ] pain	  Genitourinary:  [ ] dysuria [ ] frequency [ ] hematuria [ ] discharge [ ] flank pain  [ ] incontinence  Musculoskeletal:  [ ] myalgias [ ] arthralgias [ ] arthritis  [ ] back pain  Neurological:  [ ] headache [ ] seizures  [ ] confusion/altered mental status    Allergies  No Known Allergies        ANTIMICROBIALS:  ceFAZolin   IVPB 2000 every 8 hours  fluconAZOLE   Tablet 200 daily  piperacillin/tazobactam IVPB.. 3.375 every 8 hours      OTHER MEDS:  MEDICATIONS  (STANDING):  albuterol/ipratropium for Nebulization 3 every 6 hours  benzonatate 100 every 8 hours PRN  guaiFENesin Oral Liquid (Sugar-Free) 100 every 6 hours PRN  heparin   Injectable 5000 every 8 hours  lactulose Syrup 10 two times a day  levothyroxine 100 daily  midodrine. 10 every 8 hours  octreotide  Injectable 150 every 8 hours  ondansetron Injectable 4 once  ondansetron Injectable 4 every 6 hours PRN  oxyCODONE    IR 2.5 every 12 hours PRN  pantoprazole    Tablet 40 before breakfast  simethicone 80 once      Vital Signs Last 24 Hrs  T(C): 36.9 (2023 13:05), Max: 37 (2023 17:00)  T(F): 98.4 (2023 13:05), Max: 98.6 (2023 17:00)  HR: 81 (2023 13:05) (67 - 81)  BP: 107/60 (2023 13:05) (97/57 - 109/64)  BP(mean): --  RR: 18 (2023 13:05) (18 - 18)  SpO2: 98% (2023 13:05) (93% - 98%)    Parameters below as of 2023 13:05  Patient On (Oxygen Delivery Method): room air    PHYSICAL EXAMINATION:  General: Alert and Awake, NAD, jaundice, scleral icterus  Cardiac: RRR, No M/R/G  Resp: CTAB, No Wh/Rh/Ra  Abdomen: NBS, NT/ND, No HSM, No rigidity or guarding  MSK: No LE edema. No Calf tenderness  : No [place]  Skin: No rashes or lesions. Skin is warm and dry to the touch.   Neuro: Alert and Awake. CN 2-12 Grossly intact. Moves all four extremities spontaneously.  Psych: Calm, Pleasant, Cooperative                          8.8    22.06 )-----------( 294      ( 2023 06:31 )             24.8       06-07    127<L>  |  93<L>  |  27<H>  ----------------------------<  124<H>  4.3   |  17<L>  |  1.92<H>    Ca    9.5      2023 06:30  Phos  3.4     06-07  Mg     2.2     06-07    TPro  6.0  /  Alb  3.6  /  TBili  30.1<H>  /  DBili  x   /  AST  77<H>  /  ALT  <5<L>  /  AlkPhos  164<H>  06-07      Urinalysis Basic - ( 2023 13:03 )    Color: Dark Yellow / Appearance: Slightly Turbid / S.028 / pH: x  Gluc: x / Ketone: Trace  / Bili: Large / Urobili: Negative   Blood: x / Protein: 100 mg/dl / Nitrite: Negative   Leuk Esterase: Small / RBC: 3 /hpf / WBC 3 /HPF   Sq Epi: x / Non Sq Epi: x / Bacteria: Negative        MICROBIOLOGY:  v  .Blood Blood  23   No growth to date.  --  --      .Bronchial Bronchoalveolar Lavage  23   No growth at 48 hours  --    polymorphonuclear leukocytes per low power field  No squamous epithelial cells per low power field  Gram Negative Rods per oil power field  by cytocentrifuge      .Bronchial Bronchial Lavage  23   Culture is being performed.  --    Rare polymorphonuclear leukocytes per low power field  No squamous epithelial cells per low power field  No organisms seen per oil power field      .Blood Blood-Peripheral  23   No growth to date.  --  --      .Blood Blood-Peripheral  23   No growth to date.  --  --      Abdominal Fl Abdominal Fluid  23   No growth  --    polymorphonuclear leukocytes seen  No organisms seen  by cytocentrifuge      Clean Catch Clean Catch (Midstream)  23   <10,000 CFU/mL Normal Urogenital Aga  --  --      .Blood Blood-Peripheral  23   No Growth Final  --  --      .Blood Blood-Peripheral  23   No Growth Final  --  --      .Sputum Sputum  23   No acid-fast bacilli isolated at 1 week. ****Acid-fast cultures are held  for 6 weeks.****  --    Few polymorphonuclear leukocytes per low power field  Moderate Squamous epithelial cells per low power field  Moderate Yeast like cells seen per oil power field      .Blood Blood-Peripheral  23   No Growth Final  --  --      .Blood Blood-Peripheral  23   No Growth Final  --  --      Peritoneal Peritoneal Fluid  23   No growth at 5 days  --    polymorphonuclear leukocytes seen  No organisms seen  by cytocentrifuge      .Blood Blood-Peripheral  23   No Growth Final  --  --      .Blood Blood  23   No Growth Final  --  --      Clean Catch Clean Catch (Midstream)  23   50,000 - 99,000 CFU/mL Enterococcus faecium (vancomycin resistant)  --  Enterococcus faecium (vancomycin resistant)      .Blood Blood-Peripheral  23   No Growth Final  --  --      Peritoneal Peritoneal Fluid  23   No growth at 5 days  --    No polymorphonuclear cells seen  No organisms seen  by cytocentrifuge      .Blood Blood-Peripheral  23   No Growth Final  --  --      .Blood Blood-Peripheral  23   No Growth Final  --  --      Ascites Fl Ascites Fluid  23   No growth at 5 days  --    No polymorphonuclear cells seen seen  No organisms seen  by cytocentrifuge      Clean Catch Clean Catch (Midstream)  23   >100,000 CFU/ml Escherichia coli  --  Escherichia coli      .Blood Blood-Peripheral  23   Growth in aerobic and anaerobic bottles: Staphylococcus aureus  ***Blood Panel PCR results on this specimen are available  approximately 3 hours after the Gram stain result.***  Gram stain, PCR, and/or culture results may not always  correspond dueto difference in methodologies.  ************************************************************  This PCR assay was performed by multiplex PCR. This  Assay tests for 66 bacterial and resistance gene targets.  Please refer to the [facility] test directory  at [email or web address] for details.  --  Blood Culture PCR  Staphylococcus aureus      .Blood Blood-Peripheral  23   Growth in aerobic bottle: Staphylococcus aureus  See previous culture [id number]  --    Growth in aerobic bottle: Gram Positive Cocci in Clusters        CMV IgG Antibody: >10.00 U/mL (23 @ 00:45)  Toxoplasma IgG Screen: <3.0 IU/mL (23 @ 07:14)  CMV IgG Antibody: >10.00 U/mL (23 @ 07:14)  Toxoplasma IgG Screen: <3.0 IU/mL (23 @ 07:14)    CMVPCR Log: 3.18 [id number]/mL ( @ 19:40)  Rapid RVP Result: NotDetec ( @ 09:17)        RADIOLOGY:    <The imaging below has been reviewed and visualized by me independently. Findings as detailed in report below>    < from: US Kidney and Bladder (23 @ 17:55) >  IMPRESSION:  No renal mass, hydronephrosis or calculus is visualized sonographically.    < end of copied text >
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
## 2023-06-08 ENCOUNTER — TRANSCRIPTION ENCOUNTER (OUTPATIENT)
Age: 38
End: 2023-06-08

## 2023-06-08 DIAGNOSIS — N17.9 ACUTE KIDNEY FAILURE, UNSPECIFIED: ICD-10-CM

## 2023-06-08 LAB
ACANTHOCYTES BLD QL SMEAR: SLIGHT — SIGNIFICANT CHANGE UP
ALBUMIN FLD-MCNC: 1.9 G/DL — SIGNIFICANT CHANGE UP
ALBUMIN SERPL ELPH-MCNC: 4.1 G/DL — SIGNIFICANT CHANGE UP (ref 3.3–5)
ALP SERPL-CCNC: 131 U/L — HIGH (ref 40–120)
ALT FLD-CCNC: <5 U/L — LOW (ref 10–45)
ANION GAP SERPL CALC-SCNC: 21 MMOL/L — HIGH (ref 5–17)
ANISOCYTOSIS BLD QL: SIGNIFICANT CHANGE UP
APTT BLD: 58.4 SEC — HIGH (ref 27.5–35.5)
AST SERPL-CCNC: 151 U/L — HIGH (ref 10–40)
B PERT IGG+IGM PNL SER: CLEAR — SIGNIFICANT CHANGE UP
BASOPHILS # BLD AUTO: 0 K/UL — SIGNIFICANT CHANGE UP (ref 0–0.2)
BASOPHILS NFR BLD AUTO: 0 % — SIGNIFICANT CHANGE UP (ref 0–2)
BILIRUB SERPL-MCNC: 28.6 MG/DL — HIGH (ref 0.2–1.2)
BUN SERPL-MCNC: 31 MG/DL — HIGH (ref 7–23)
BURR CELLS BLD QL SMEAR: PRESENT — SIGNIFICANT CHANGE UP
CALCIUM SERPL-MCNC: 9.7 MG/DL — SIGNIFICANT CHANGE UP (ref 8.4–10.5)
CHLORIDE SERPL-SCNC: 94 MMOL/L — LOW (ref 96–108)
CMV DNA CSF QL NAA+PROBE: 2040 IU/ML — HIGH
CMV DNA SPEC NAA+PROBE-LOG#: 3.31 LOG10IU/ML — HIGH
CO2 SERPL-SCNC: 16 MMOL/L — LOW (ref 22–31)
COLOR FLD: YELLOW — SIGNIFICANT CHANGE UP
CREAT SERPL-MCNC: 2.09 MG/DL — HIGH (ref 0.5–1.3)
DACRYOCYTES BLD QL SMEAR: SLIGHT — SIGNIFICANT CHANGE UP
EGFR: 30 ML/MIN/1.73M2 — LOW
EOSINOPHIL # BLD AUTO: 0 K/UL — SIGNIFICANT CHANGE UP (ref 0–0.5)
EOSINOPHIL NFR BLD AUTO: 0 % — SIGNIFICANT CHANGE UP (ref 0–6)
FLUID INTAKE SUBSTANCE CLASS: SIGNIFICANT CHANGE UP
GLUCOSE FLD-MCNC: 140 MG/DL — SIGNIFICANT CHANGE UP
GLUCOSE SERPL-MCNC: 141 MG/DL — HIGH (ref 70–99)
GRAM STN FLD: SIGNIFICANT CHANGE UP
HCT VFR BLD CALC: 24.3 % — LOW (ref 34.5–45)
HGB BLD-MCNC: 8.1 G/DL — LOW (ref 11.5–15.5)
INR BLD: 1.69 RATIO — HIGH (ref 0.88–1.16)
LDH SERPL L TO P-CCNC: 83 U/L — SIGNIFICANT CHANGE UP
LYMPHOCYTES # BLD AUTO: 1.47 K/UL — SIGNIFICANT CHANGE UP (ref 1–3.3)
LYMPHOCYTES # BLD AUTO: 7 % — LOW (ref 13–44)
LYMPHOCYTES # FLD: 38 % — SIGNIFICANT CHANGE UP
MACROCYTES BLD QL: SIGNIFICANT CHANGE UP
MAGNESIUM SERPL-MCNC: 2.1 MG/DL — SIGNIFICANT CHANGE UP (ref 1.6–2.6)
MANUAL SMEAR VERIFICATION: SIGNIFICANT CHANGE UP
MCHC RBC-ENTMCNC: 33.3 GM/DL — SIGNIFICANT CHANGE UP (ref 32–36)
MCHC RBC-ENTMCNC: 34.3 PG — HIGH (ref 27–34)
MCV RBC AUTO: 103 FL — HIGH (ref 80–100)
MONOCYTES # BLD AUTO: 1.11 K/UL — HIGH (ref 0–0.9)
MONOCYTES NFR BLD AUTO: 5.3 % — SIGNIFICANT CHANGE UP (ref 2–14)
MONOS+MACROS # FLD: 47 % — SIGNIFICANT CHANGE UP
NEUTROPHILS # BLD AUTO: 18.39 K/UL — HIGH (ref 1.8–7.4)
NEUTROPHILS NFR BLD AUTO: 84.2 % — HIGH (ref 43–77)
NEUTROPHILS-BODY FLUID: 15 % — SIGNIFICANT CHANGE UP
NEUTS BAND # BLD: 3.5 % — SIGNIFICANT CHANGE UP (ref 0–8)
NON-GYNECOLOGICAL CYTOLOGY STUDY: SIGNIFICANT CHANGE UP
OVALOCYTES BLD QL SMEAR: SLIGHT — SIGNIFICANT CHANGE UP
PHOSPHATE SERPL-MCNC: 3.3 MG/DL — SIGNIFICANT CHANGE UP (ref 2.5–4.5)
PLAT MORPH BLD: NORMAL — SIGNIFICANT CHANGE UP
PLATELET # BLD AUTO: 263 K/UL — SIGNIFICANT CHANGE UP (ref 150–400)
POIKILOCYTOSIS BLD QL AUTO: SLIGHT — SIGNIFICANT CHANGE UP
POTASSIUM SERPL-MCNC: 4.1 MMOL/L — SIGNIFICANT CHANGE UP (ref 3.5–5.3)
POTASSIUM SERPL-SCNC: 4.1 MMOL/L — SIGNIFICANT CHANGE UP (ref 3.5–5.3)
PROT FLD-MCNC: 2.6 G/DL — SIGNIFICANT CHANGE UP
PROT SERPL-MCNC: 6.3 G/DL — SIGNIFICANT CHANGE UP (ref 6–8.3)
PROTHROM AB SERPL-ACNC: 19.7 SEC — HIGH (ref 10.5–13.4)
RBC # BLD: 2.36 M/UL — LOW (ref 3.8–5.2)
RBC # FLD: SIGNIFICANT CHANGE UP (ref 10.3–14.5)
RBC BLD AUTO: ABNORMAL
RCV VOL RI: < 2000 /UL — SIGNIFICANT CHANGE UP (ref 0–0)
SODIUM SERPL-SCNC: 131 MMOL/L — LOW (ref 135–145)
SPECIMEN SOURCE: SIGNIFICANT CHANGE UP
TARGETS BLD QL SMEAR: SIGNIFICANT CHANGE UP
TM INTERPRETATION: SIGNIFICANT CHANGE UP
TOTAL NUCLEATED CELL COUNT, BODY FLUID: 36 /UL — SIGNIFICANT CHANGE UP
TUBE TYPE: SIGNIFICANT CHANGE UP
WBC # BLD: 20.97 K/UL — HIGH (ref 3.8–10.5)
WBC # FLD AUTO: 20.97 K/UL — HIGH (ref 3.8–10.5)

## 2023-06-08 PROCEDURE — 99233 SBSQ HOSP IP/OBS HIGH 50: CPT | Mod: GC

## 2023-06-08 PROCEDURE — 49082 ABD PARACENTESIS: CPT

## 2023-06-08 PROCEDURE — 99232 SBSQ HOSP IP/OBS MODERATE 35: CPT | Mod: GC

## 2023-06-08 PROCEDURE — 49083 ABD PARACENTESIS W/IMAGING: CPT

## 2023-06-08 PROCEDURE — 99232 SBSQ HOSP IP/OBS MODERATE 35: CPT

## 2023-06-08 PROCEDURE — 99254 IP/OBS CNSLTJ NEW/EST MOD 60: CPT | Mod: GC

## 2023-06-08 RX ORDER — MIDODRINE HYDROCHLORIDE 2.5 MG/1
15 TABLET ORAL EVERY 8 HOURS
Refills: 0 | Status: DISCONTINUED | OUTPATIENT
Start: 2023-06-08 | End: 2023-06-09

## 2023-06-08 RX ORDER — POLYETHYLENE GLYCOL 3350 17 G/17G
17 POWDER, FOR SOLUTION ORAL
Refills: 0 | Status: DISCONTINUED | OUTPATIENT
Start: 2023-06-08 | End: 2023-06-09

## 2023-06-08 RX ORDER — OCTREOTIDE ACETATE 200 UG/ML
200 INJECTION, SOLUTION INTRAVENOUS; SUBCUTANEOUS EVERY 8 HOURS
Refills: 0 | Status: DISCONTINUED | OUTPATIENT
Start: 2023-06-08 | End: 2023-06-09

## 2023-06-08 RX ORDER — TRAMADOL HYDROCHLORIDE 50 MG/1
25 TABLET ORAL EVERY 4 HOURS
Refills: 0 | Status: DISCONTINUED | OUTPATIENT
Start: 2023-06-08 | End: 2023-06-09

## 2023-06-08 RX ORDER — OXYCODONE HYDROCHLORIDE 5 MG/1
2.5 TABLET ORAL ONCE
Refills: 0 | Status: DISCONTINUED | OUTPATIENT
Start: 2023-06-08 | End: 2023-06-08

## 2023-06-08 RX ADMIN — Medication 3 MILLILITER(S): at 05:07

## 2023-06-08 RX ADMIN — OXYCODONE HYDROCHLORIDE 2.5 MILLIGRAM(S): 5 TABLET ORAL at 05:07

## 2023-06-08 RX ADMIN — BENZOCAINE AND MENTHOL 1 LOZENGE: 5; 1 LIQUID ORAL at 02:43

## 2023-06-08 RX ADMIN — Medication 1 TABLET(S): at 12:46

## 2023-06-08 RX ADMIN — OXYCODONE HYDROCHLORIDE 2.5 MILLIGRAM(S): 5 TABLET ORAL at 02:49

## 2023-06-08 RX ADMIN — MAGNESIUM OXIDE 400 MG ORAL TABLET 400 MILLIGRAM(S): 241.3 TABLET ORAL at 08:50

## 2023-06-08 RX ADMIN — HEPARIN SODIUM 5000 UNIT(S): 5000 INJECTION INTRAVENOUS; SUBCUTANEOUS at 12:46

## 2023-06-08 RX ADMIN — MIDODRINE HYDROCHLORIDE 15 MILLIGRAM(S): 2.5 TABLET ORAL at 20:47

## 2023-06-08 RX ADMIN — Medication 100 MICROGRAM(S): at 05:08

## 2023-06-08 RX ADMIN — OXYCODONE HYDROCHLORIDE 2.5 MILLIGRAM(S): 5 TABLET ORAL at 13:15

## 2023-06-08 RX ADMIN — Medication 100 MILLIGRAM(S): at 12:46

## 2023-06-08 RX ADMIN — Medication 50 MILLILITER(S): at 05:07

## 2023-06-08 RX ADMIN — OCTREOTIDE ACETATE 200 MICROGRAM(S): 200 INJECTION, SOLUTION INTRAVENOUS; SUBCUTANEOUS at 21:24

## 2023-06-08 RX ADMIN — BENZOCAINE AND MENTHOL 1 LOZENGE: 5; 1 LIQUID ORAL at 12:44

## 2023-06-08 RX ADMIN — OCTREOTIDE ACETATE 150 MICROGRAM(S): 200 INJECTION, SOLUTION INTRAVENOUS; SUBCUTANEOUS at 05:08

## 2023-06-08 RX ADMIN — BENZOCAINE AND MENTHOL 1 LOZENGE: 5; 1 LIQUID ORAL at 05:08

## 2023-06-08 RX ADMIN — MIDODRINE HYDROCHLORIDE 10 MILLIGRAM(S): 2.5 TABLET ORAL at 05:08

## 2023-06-08 RX ADMIN — MAGNESIUM OXIDE 400 MG ORAL TABLET 400 MILLIGRAM(S): 241.3 TABLET ORAL at 17:52

## 2023-06-08 RX ADMIN — PANTOPRAZOLE SODIUM 40 MILLIGRAM(S): 20 TABLET, DELAYED RELEASE ORAL at 05:08

## 2023-06-08 RX ADMIN — MIDODRINE HYDROCHLORIDE 15 MILLIGRAM(S): 2.5 TABLET ORAL at 12:49

## 2023-06-08 RX ADMIN — MAGNESIUM OXIDE 400 MG ORAL TABLET 400 MILLIGRAM(S): 241.3 TABLET ORAL at 12:46

## 2023-06-08 RX ADMIN — Medication 1 MILLIGRAM(S): at 12:46

## 2023-06-08 RX ADMIN — Medication 3 MILLILITER(S): at 19:52

## 2023-06-08 RX ADMIN — Medication 100 MILLIGRAM(S): at 08:51

## 2023-06-08 RX ADMIN — Medication 100 MILLIGRAM(S): at 17:52

## 2023-06-08 RX ADMIN — FLUCONAZOLE 200 MILLIGRAM(S): 150 TABLET ORAL at 12:46

## 2023-06-08 RX ADMIN — PIPERACILLIN AND TAZOBACTAM 25 GRAM(S): 4; .5 INJECTION, POWDER, LYOPHILIZED, FOR SOLUTION INTRAVENOUS at 17:53

## 2023-06-08 RX ADMIN — BENZOCAINE AND MENTHOL 1 LOZENGE: 5; 1 LIQUID ORAL at 19:52

## 2023-06-08 RX ADMIN — PIPERACILLIN AND TAZOBACTAM 25 GRAM(S): 4; .5 INJECTION, POWDER, LYOPHILIZED, FOR SOLUTION INTRAVENOUS at 08:54

## 2023-06-08 RX ADMIN — TRAMADOL HYDROCHLORIDE 25 MILLIGRAM(S): 50 TABLET ORAL at 21:45

## 2023-06-08 RX ADMIN — TRAMADOL HYDROCHLORIDE 25 MILLIGRAM(S): 50 TABLET ORAL at 20:45

## 2023-06-08 RX ADMIN — OXYCODONE HYDROCHLORIDE 2.5 MILLIGRAM(S): 5 TABLET ORAL at 03:50

## 2023-06-08 RX ADMIN — OXYCODONE HYDROCHLORIDE 2.5 MILLIGRAM(S): 5 TABLET ORAL at 12:45

## 2023-06-08 RX ADMIN — Medication 3 MILLILITER(S): at 23:04

## 2023-06-08 RX ADMIN — OCTREOTIDE ACETATE 200 MICROGRAM(S): 200 INJECTION, SOLUTION INTRAVENOUS; SUBCUTANEOUS at 15:27

## 2023-06-08 RX ADMIN — BENZOCAINE AND MENTHOL 1 LOZENGE: 5; 1 LIQUID ORAL at 08:50

## 2023-06-08 RX ADMIN — OXYCODONE HYDROCHLORIDE 2.5 MILLIGRAM(S): 5 TABLET ORAL at 06:10

## 2023-06-08 NOTE — PROGRESS NOTE ADULT - ASSESSMENT
37 yo F with hypothyroidism (on Levothyroxine) who [facility]d [facility]. 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) And MSSA bacteremia    Microbiology:   BC NGTd   UC 50-99K CRE, UA non micro   BC NGTD   ascitic fluid bacterial, fungal cx NGTD  ,  BC NGTD   ascitic fluid cx neg   2/ BC pos MSSA    Imagin/25 Ct chest: findings c/w pulmonary edema- (areas of confluent GGo)   MRCP No suspicious liver lesions. Marked hepatomegaly and hepatic steatosis   with additional findings suggestive of cirrhosis and resultant portal   hypertension.   US abdomen: small to mod ascites  Ct chest iv con: Multifocal bilateral groundglass opacities, suspicious for pneumonia.  Small bilateral pleural effusions, left greater the right, increased compared to prior exam.no PE  Ct maxillofacial , Head and Cervical spine     CT FACIAL BONES  Bilateral mastoid air cells and middle ear regions well-aerated. Paranasal sinus mucosal thickening with near complete opacification of the right sphenoid sinus. Bilateral maxillary sinus septations. No air-fluid levels. S-shaped deviation of the nasal septum with hypertrophy of bilateral inferior greater than middle nasal turbinates. Narrowing ofbilateral ostiomeatal complexes. Periapical lucencies bilateral maxillarypremolars.    CT C/A/P   * Small focal consolidation in the posterior left upper lobe. Givenhistory of trauma, small pulmonary contusion cannot be excluded.Correlate for pneumonia.  * Hepatomegaly and diffuse upper extremity ptosis with signs of portalhypertension. Correlate for steatohepatitis. Moderate ascites.    Assessment and plan    1. 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  2. MSSA bacteremia  BC  2/2; repeat BC  neg. TTE no vegetations ; SAHARA neg on   CT face, chest/abdomen and pelvis post trauma noting sinus opacification, bilateral apical maxillary premolar lucencies. CT chest with small consolidation  Sources: traumatic /SSTI in s/o facial hematoma vs pneumonia vs less likely ENT. No evidence of metastatic infection. ? hardware  Cefazolin -    3. Leucocytosis  Initially: peaked at 20à stable 17-18, likely combination if bacteremia and alcoholic hepatitis  Rising over the last few days, only symptoms is occasional cough    4. Bilateral GGO on CT chest -  Pulmonary edema more likely  vs pneumonia  Repeat CT chest  with worsening GGo and some areas more consolidated on my review  ? aspiration  Cirrhotic patients also at risk of PCP, cryptococcosis- pattern not classical for the later, not classical host for mold infection except endemics- neither would cause leucocytosis      5. Ecoli bacteruria ;VRE enterococcus bacteruria : No symptoms of UTI, UA blunt.  6. Facial trauma with hematoma s/p evacuation - no purulence  7. Pre-transplant screening  HIV ag/Ab neg; HCV Ab neg, HBV s Ag neg, HBV s Ab pos, HBV c Ab neg, HEV IgG neg, EBV IgG pos, CMV IgG pos, HSV1/2 IgG pos, VZV IgG NEG; Measles/Mumps IgG neg, Rubella igG pos (Turkmen measles), Syphilis screen neg, Strongyliodes IgG neg. Quantiferon and toxoplasma igG pending.  8. Need for vaccinations.  Only received Td . Positive titers for HBV non-quantitative    - Will benefit from vaccinations prior to transplant if latter not anticipated imminently or within 2 weeks- otherwise, higher benefit to delay:  Havrix, Shingrix 2 doses 6 months apart; Tdap (once in adulthood), COVID-19, Prevnar 20, HPV Gardisil 9 3 doses at 0-1-6 months  - Of note, non-immune to Measles or mumps,  nor to varicella. immune to Turk[place] measles (Rubella) If she were to not be transplanted or delayed for > 1 month, would benefit from MMR+Varivax. However, if plan for or anticipated transplantation within a month, then live vaccination would be contraindicated.  - Also will benefit from HPV vaccine gardisil 9 kem given positive HPV - she agrees to this    s/p Bronchoscopy   284 nucleated cells with 86% macrophages  Bacterial Bronch Cultures () with GNR on gram stain but with no growth  Bronchoscopy Fungitell <45    Paracentesis () Cell counts not suggestive of SBP    Recommendations:  --Bacterial Bronchoscopy cultures are unrevealing, cytopathology unrevealing. I have low suspicion for bacterial pneumonia and low suspicion for fungal or AFB infection. Some studies as below are still pending but in light of worsening of MELD Score (and since suspicion for these etiologies is low) I don't have an absolute ID contraindication to transplant  --Bronch CMV PCR () 1,500, Serum CMV PCR () 2,040 - in the pre-transplant setting less likely clinically relevant, can repeat CMV PCR on 6/15  --ID clearance pending preliminary bronchoscopy studies (Cultures, Aspergillus galactomannan, Flow Cytometry, Legionella PCR, PCP PCR)  --Continue cefazolin 2g q8h for MSSA bacteremia (tentative End Date: 23)  --Can utilize Zosyn for [place]-operative transplant coverage    I will be away on vacation until 2023. Please contact the Infectious Diseases Office to contact the covering attending.     Irving Deras M.D.  [facility] Division of Infectious Disease  8AM-5PM Monday - Friday: Available on [employer] Teams  After Hours and Holidays (or if no response on [employer] Teams): Please contact the Infectious Diseases Office at [phone]     The above assessment and plan were discussed with transplant surgery team

## 2023-06-08 NOTE — PROGRESS NOTE ADULT - ASSESSMENT
39 yo Polish Female with hypothyroidism admitted for acute liver failure in the setting of alcohol induced hepatitis now pending liver transplant eval with course complicated by PNAs/p zosyn, fluid overload, MSSA bacteremia with negative SAHARA and cleared cultures. Pulm consulted for persistent productive cough with progressive abnormal CT scans. 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 CT with multiple focal consolidations without PE or large AVM. Underwent bronchoscopy with BAL on 6/5       Plan  - patient with unclear etiology for abnormal CT scans and persistent cough   - Differential includes but not limited to delayed resolution of multifocal bacterial pneumonia, organizing viral pna, pneumonitis DAH, fluid overload  - underwent Bronchoscopy with BAL on 6/5  - Bronchial Fungal, AFB, and bacterial culture NGTD  - BAL Fungitell negative  - BAL CMV PCR elevated, per ID no need to treat    - Cell count shows a macrophage predominance   - Pending PCP PCR, aspergillus, Legionella, Cytopathology and Flow cytometry     - continue empiric treatment of obstructive disease with standing duonebs  - continue GERD management     Pulmonary will sign off. Please call back with any questions    Miguel Blevins MD   Pulmonary/Critical Care Fellow PGY-7  [facility] Pager #: [phone]  Spectra #: [phone]

## 2023-06-08 NOTE — PROGRESS NOTE ADULT - SUBJECTIVE AND OBJECTIVE BOX
Interval Events:   HRS VIVIANA cocktail initiated yesterday.  No acute events overnight.  Patient without acute symptoms at this time.    ROS:   12 point review of systems performed and negative except otherwise noted in HPI.    Hospital Medications:  albuterol/ipratropium for Nebulization 3 milliLiter(s) Nebulizer every 6 hours  benzocaine/menthol Lozenge 1 Lozenge Oral every 2 hours PRN  benzonatate 100 milliGRAM(s) Oral every 8 hours PRN  ceFAZolin   IVPB 2000 milliGRAM(s) IV Intermittent every 8 hours  fluconAZOLE   Tablet 200 milliGRAM(s) Oral daily  folic acid 1 milliGRAM(s) Oral daily  guaiFENesin Oral Liquid (Sugar-Free) 100 milliGRAM(s) Oral every 6 hours PRN  heparin   Injectable 5000 Unit(s) SubCutaneous every 8 hours  lactulose Syrup 10 Gram(s) Oral two times a day  levothyroxine 100 MICROGram(s) Oral daily  magnesium oxide 400 milliGRAM(s) Oral three times a day with meals  midodrine. 10 milliGRAM(s) Oral every 8 hours  multivitamin 1 Tablet(s) Oral daily  octreotide  Injectable 150 MICROGram(s) SubCutaneous every 8 hours  ondansetron Injectable 4 milliGRAM(s) IV Push once  ondansetron Injectable 4 milliGRAM(s) IV Push every 6 hours PRN  oxyCODONE    IR 2.5 milliGRAM(s) Oral every 12 hours PRN  pantoprazole    Tablet 40 milliGRAM(s) Oral before breakfast  piperacillin/tazobactam IVPB.. 3.375 Gram(s) IV Intermittent every 8 hours  simethicone 80 milliGRAM(s) Chew once  thiamine 100 milliGRAM(s) Oral daily    MAR over past 24 hours:    albumin human 25% IVPB   50 mL/Hr IV Intermittent (06-08-23 @ 05:07)   50 mL/Hr IV Intermittent (06-07-23 @ 21:34)   50 mL/Hr IV Intermittent (06-07-23 @ 13:20)    albuterol/ipratropium for Nebulization   3 milliLiter(s) Nebulizer (06-08-23 @ 05:07)   3 milliLiter(s) Nebulizer (06-07-23 @ 23:20)    benzocaine/menthol Lozenge   1 Lozenge Oral (06-08-23 @ 05:08)   1 Lozenge Oral (06-08-23 @ 02:43)   1 Lozenge Oral (06-07-23 @ 23:22)   1 Lozenge Oral (06-07-23 @ 19:53)   1 Lozenge Oral (06-07-23 @ 11:08)    ceFAZolin   IVPB   100 mL/Hr IV Intermittent (06-07-23 @ 23:21)   100 mL/Hr IV Intermittent (06-07-23 @ 18:12)   100 mL/Hr IV Intermittent (06-07-23 @ 08:53)    fluconAZOLE   Tablet   200 milliGRAM(s) Oral (06-07-23 @ 11:08)    folic acid   1 milliGRAM(s) Oral (06-07-23 @ 11:07)    guaiFENesin Oral Liquid (Sugar-Free)   100 milliGRAM(s) Oral (06-07-23 @ 21:35)    heparin   Injectable   5000 Unit(s) SubCutaneous (06-07-23 @ 21:35)   5000 Unit(s) SubCutaneous (06-07-23 @ 13:21)    levothyroxine   100 MICROGram(s) Oral (06-08-23 @ 05:08)    magnesium oxide   400 milliGRAM(s) Oral (06-07-23 @ 18:12)   400 milliGRAM(s) Oral (06-07-23 @ 13:21)   400 milliGRAM(s) Oral (06-07-23 @ 08:53)    midodrine.   10 milliGRAM(s) Oral (06-08-23 @ 05:08)   10 milliGRAM(s) Oral (06-07-23 @ 21:33)   10 milliGRAM(s) Oral (06-07-23 @ 13:21)    multivitamin   1 Tablet(s) Oral (06-07-23 @ 11:07)    octreotide  Injectable   150 MICROGram(s) SubCutaneous (06-08-23 @ 05:08)   150 MICROGram(s) SubCutaneous (06-07-23 @ 22:14)   150 MICROGram(s) SubCutaneous (06-07-23 @ 11:07)    oxyCODONE    IR   2.5 milliGRAM(s) Oral (06-08-23 @ 02:49)   2.5 milliGRAM(s) Oral (06-07-23 @ 14:54)    oxyCODONE    IR   2.5 milliGRAM(s) Oral (06-08-23 @ 05:07)    pantoprazole    Tablet   40 milliGRAM(s) Oral (06-08-23 @ 05:08)    piperacillin/tazobactam IVPB..   25 mL/Hr IV Intermittent (06-07-23 @ 23:21)   25 mL/Hr IV Intermittent (06-07-23 @ 18:11)   25 mL/Hr IV Intermittent (06-07-23 @ 08:53)    thiamine   100 milliGRAM(s) Oral (06-07-23 @ 11:07)      Home Medications:  Last Order Reconciliation Date: 05-16-23 @ 13:29 (Admission Reconciliation)  acetylcysteine 20% intravenous solution: 40 milliliter(s) intravenous every 24 hours  cefTRIAXone: 2 gram(s) intravenously once a day  Constulose 10 g/15 mL oral liquid: 30 milliliter(s) orally 3 times a day  folic acid 1 mg oral tablet: 1 tab(s) orally once a day  guaiFENesin 100 mg/5 mL oral liquid: 5 milliliter(s) orally every 6 hours As needed Cough  levothyroxine 100 mcg (0.1 mg) oral tablet: 1 tab(s) orally once a day  LORazepam: 1 milliliter(s) intravenous every 4 hours as needed for Anxiety  morphine 15 mg oral tablet: 1 tab(s) orally every 8 hours  Multiple Vitamins oral tablet: 1 tab(s) orally once a day  mupirocin 2% topical ointment: Apply topically to affected area 2 times a day to both nostrils  ondansetron 2 mg/mL injectable solution: 4 milligram(s) injectable every 8 hours as needed for as needed  sodium/potassium/phosphorus 160 mg-280 mg-250 mg oral powder for reconstitution: 1 packet(s) orally 3 times a day (with meals)  thiamine 100 mg oral tablet: 1 tab(s) orally once a day    PHYSICAL EXAM:   Vital Signs last 24 hours:  T(F): 98.1 (06-08-23 @ 04:55), Max: 98.9 (06-07-23 @ 21:01)  HR: 96 (06-08-23 @ 04:55) (72 - 96)  BP: 114/70 (06-08-23 @ 04:55) (104/57 - 116/54)  BP(mean): --  ABP: --  ABP(mean): --  RR: 18 (06-08-23 @ 04:55) (18 - 18)  SpO2: 96% (06-08-23 @ 04:55) (94% - 98%)    I&Os:    06-07-23 @ 07:01  -  06-08-23 @ 07:00  --------------------------------------------------------  IN:    IV PiggyBack: 200 mL    IV PiggyBack: 100 mL    Oral Fluid: 1100 mL  Total IN: 1400 mL    OUT:    Voided (mL): 700 mL  Total OUT: 700 mL    Total NET: 700 mL        BMI (kg/m2): 22.1 (06-05-23 @ 16:42)  GENERAL: no acute distress  NEURO: alert  HEENT: NCAT, no conjunctival pallor appreciated  CHEST: no respiratory distress, no accessory muscle use  CARDIAC: regular rate, +S1/S2  ABDOMEN: soft, nontender, no rebound or guarding  EXTREMITIES: warm, well perfused  SKIN: no lesions noted    DIAGNOSTICS:  WBC      Hg       PLT      Na       K        CO2     BUN      Cr       ALT      AST      TB       ALP  20.97    8.1      263      131      4.1      16       31       2.09     <5       151      28.6     131      06-08-23 @ 06:41  22.06    8.8      294      ------   ------   ------   ------   ------   ------   ------   ------   ------   06-07-23 @ 06:31  ------   ------   ------   127      4.3      17       27       1.92     <5       77       30.1     164      06-07-23 @ 06:30  19.58    9.2      289      130      4.5      16       16       1.52     <5       93       33.1     199      06-06-23 @ 06:27  18.00    7.4      287      ------   ------   ------   ------   ------   ------   ------   ------   ------   06-05-23 @ 06:46    PT             INR            MELDwith  MELDw/o  19.7           1.69           --             --             06-08-23 @ 06:42  22.1           1.91           --             --             06-07-23 @ 06:31  25.2           2.16           --             --             06-06-23 @ 06:27  25.4           2.17           --             --             06-05-23 @ 06:47  24.1           2.08           --             --             06-04-23 @ 07:36

## 2023-06-08 NOTE — CONSULT NOTE ADULT - SUBJECTIVE AND OBJECTIVE BOX
[facility] DIVISION OF KIDNEY DISEASES AND HYPERTENSION -- INITIAL CONSULT NOTE  --------------------------------------------------------------------------------    HPI:  Pt. is a 38 y.o. F w/ PMHx. of Hypothyroidism and AUD who presented initially to [facility] for abdominal pain since 5/13/2023, however has had worsening distention for the past few weeks prior to admission and transferred to [facility] for liver transplant evaluation. Transplant nephrology consulted for VIVIANA. Pt. denies any history of renal problems Pt. admitted with SCr. of 0.8 which has trended to 2.0 today. Pt. states at [facility] she had blood pressures of 120/80? but here has been having blood pressure in 90s/50s prior to upward trend of SCr. Blood pressure now better in 100s/60s range today. Denies any lightheadedness or dizziness. Had paracentesis today with 800cc removed. No difficulty passing urine. no constipation. No shortness of breath. LLE more edematous than RLE. Duplex negative. Pt. awaiting official listing once infection ruled out.  HRS medications increased today. Lasix and Aldactone on hold.    PAST HISTORY  --------------------------------------------------------------------------------  PAST MEDICAL & SURGICAL HISTORY:  Hypothyroidism      Alcoholic liver disease      No significant past surgical history        FAMILY HISTORY:  No pertinent family history in first degree relatives]  No history of renal disease to dialysis.      Social History:  Lives with , has 2 children (16 May 2023 12:14)  Alcohol abuse      ALLERGIES & MEDICATIONS  --------------------------------------------------------------------------------  Allergies    No Known Allergies    Intolerances      Standing Inpatient Medications  albuterol/ipratropium for Nebulization 3 milliLiter(s) Nebulizer every 6 hours  ceFAZolin   IVPB 2000 milliGRAM(s) IV Intermittent every 8 hours  fluconAZOLE   Tablet 200 milliGRAM(s) Oral daily  folic acid 1 milliGRAM(s) Oral daily  heparin   Injectable 5000 Unit(s) SubCutaneous every 8 hours  lactulose Syrup 10 Gram(s) Oral two times a day  levothyroxine 100 MICROGram(s) Oral daily  magnesium oxide 400 milliGRAM(s) Oral three times a day with meals  midodrine. 15 milliGRAM(s) Oral every 8 hours  multivitamin 1 Tablet(s) Oral daily  octreotide  Injectable 200 MICROGram(s) SubCutaneous every 8 hours  ondansetron Injectable 4 milliGRAM(s) IV Push once  pantoprazole    Tablet 40 milliGRAM(s) Oral before breakfast  piperacillin/tazobactam IVPB.. 3.375 Gram(s) IV Intermittent every 8 hours  polyethylene glycol 3350 17 Gram(s) Oral two times a day  simethicone 80 milliGRAM(s) Chew once  thiamine 100 milliGRAM(s) Oral daily    PRN Inpatient Medications  benzocaine/menthol Lozenge 1 Lozenge Oral every 2 hours PRN  benzonatate 100 milliGRAM(s) Oral every 8 hours PRN  guaiFENesin Oral Liquid (Sugar-Free) 100 milliGRAM(s) Oral every 6 hours PRN  ondansetron Injectable 4 milliGRAM(s) IV Push every 6 hours PRN  oxyCODONE    IR 2.5 milliGRAM(s) Oral every 12 hours PRN      REVIEW OF SYSTEMS  --------------------------------------------------------------------------------  As per HPI        VITALS/PHYSICAL EXAM  --------------------------------------------------------------------------------  T(C): 37 (06-08-23 @ 12:16), Max: 37.2 (06-07-23 @ 21:01)  HR: 89 (06-08-23 @ 12:16) (76 - 96)  BP: 101/58 (06-08-23 @ 12:16) (101/58 - 116/54)  RR: 18 (06-08-23 @ 12:16) (18 - 18)  SpO2: 95% (06-08-23 @ 12:16) (94% - 96%)  Wt(kg): --        06-07-23 @ 07:01  -  06-08-23 @ 07:00  --------------------------------------------------------  IN: 1400 mL / OUT: 700 mL / NET: 700 mL      Physical Exam:  	Gen: Not in distress, well-appearing  	HEENT: pupils reactive to light,  scleral icterus+, moist oral mucosa. No thrush. Supple neck, no JVD  	Pulm: normal respiratory effort, lungs clear to auscultation bilaterally   	CV: regular rate and rhythm, S1 and S2 normal, no murmur   	Abd: normoactive bowel sounds, soft, protuberant abdomen. No tenderness, guarding or rigidity                   : No suprapubic tenderness          Back: No spinal or CVA tenderness; no pre sacral edema          Extremities: + edema LLE >RLE. Distal pulses 2+ bilaterally           Skin: warm no rash, no cyanosis , some jaundice  	Neuro: Alert and oriented to person, place and time. Normal speech. Normal affect.       LABS/STUDIES  --------------------------------------------------------------------------------              8.1    20.97 >-----------<  263      [06-08-23 @ 06:41]              24.3     131  |  94  |  31  ----------------------------<  141      [06-08-23 @ 06:41]  4.1   |  16  |  2.09        Ca     9.7     [06-08-23 @ 06:41]      Mg     2.1     [06-08-23 @ 06:41]      Phos  3.3     [06-08-23 @ 06:41]    TPro  6.3  /  Alb  4.1  /  TBili  28.6  /  DBili  x   /  AST  151  /  ALT  <5  /  AlkPhos  131  [06-08-23 @ 06:41]    PT/INR: PT 19.7 , INR 1.69       [06-08-23 @ 06:42]  PTT: 58.4       [06-08-23 @ 06:42]      Creatinine Trend:  SCr 2.09 [06-08 @ 06:41]  SCr 1.92 [06-07 @ 06:30]  SCr 1.52 [06-06 @ 06:27]  SCr 1.48 [06-05 @ 06:45]  SCr 1.27 [06-04 @ 07:32]    Urinalysis - [06-07-23 @ 13:03]      Color Dark Yellow / Appearance Slightly Turbid / SG 1.028 / pH 6.0      Gluc Trace / Ketone Trace  / Bili Large / Urobili Negative       Blood Small / Protein 100 mg/dl / Leuk Est Small / Nitrite Negative      RBC 3 / WBC 3 / Hyaline 15 / Gran 3-5 / Sq Epi  / Non Sq Epi  / Bacteria Negative    Urine Creatinine 82      [06-07-23 @ 13:11]  Urine Protein 129      [06-07-23 @ 13:11]  Urine Sodium 28      [06-07-23 @ 13:11]  Urine Urea Nitrogen 341      [06-07-23 @ 13:11]  Urine Potassium 67      [06-07-23 @ 13:11]  Urine Osmolality 344      [06-07-23 @ 13:11]    Iron 78, TIBC Unable to calculate Test Repeated, %sat Unable to calculate Test Repeated      [05-17-23 @ 07:14]  Ferritin 349      [05-17-23 @ 07:14]  TSH 0.62      [05-17-23 @ 07:14]  Lipid: chol 127, , HDL 11, LDL --      [05-17-23 @ 07:14]    HBsAb 380.9      [06-01-23 @ 00:45]  HBsAb Reactive      [05-15-23 @ 07:32]  HBsAg Nonreact      [06-01-23 @ 00:45]  HBcAb Nonreact      [06-01-23 @ 00:45]  HCV 0.14, Nonreact      [06-01-23 @ 00:45]  HIV Nonreact      [06-01-23 @ 00:41]  HIV Nonreact      [05-13-23 @ 10:00]    ISHAN: titer Negative, pattern --      [05-17-23 @ 07:14]  dsDNA 12      [05-14-23 @ 08:26]  Rheumatoid Factor <10      [05-14-23 @ 08:26]  ANCA: cANCA Negative, pANCA Negative, atypical ANCA Negative      [05-14-23 @ 08:26]  Syphilis Screen (Treponema Pallidum Ab) Negative      [05-17-23 @ 07:14]  Free Light Chains: kappa 5.24, lambda 3.65, ratio = 1.44      [05-15 @ 07:32]    Tacrolimus  Cyclosporine  Sirolimus  Mycophenolate  BK PCR  CMV PCRCMVPCR Log: 3.31 Elc98QI/mL (06-08 @ 06:41)  CMVPCR Log: 3.18 Itr70YP/mL (06-05 @ 19:40)  CMVPCR Log: Det <1.54 Assay Dynamic Range: 34.5 to 1.0E+07 IU/mL (1.54 to 7.00 Log10 IU/mL)  Assay lower limit of quantification (LLOQ) is 34.5 IU/mL (1.54 Log10  IU/mL)  CMV DNA detected below the LLOQ will be reported as Detected < 34.5 IU/mL  (<1.54 Log10 IU/mL)  Tim Cytomegalovirus (CMV) is an FDA-cleared quantitative test that  enables the detection and quantitation of CMV DNA in EDTA plasma of  infected transplant patients on the tim 8800 system. This test was  verified by [employer]. Results should be interpreted  with consideration of all clinical findings and laboratory findings and  should not form the sole basis for a diagnosis or treatment decision. Fae71NI/mL (05-17 @ 07:20)  CMVPCR Log: Det <1.54 Assay Dynamic Range: 34.5 to 1.0E+07 IU/mL (1.54 to 7.00 Log10 IU/mL)  Assay lower limit of quantification (LLOQ) is 34.5 IU/mL (1.54 Log10  IU/mL)  CMV DNA detected below the LLOQ will be reported as Detected < 34.5 IU/mL  (<1.54 Log10 IU/mL)  Tim Cytomegalovirus (CMV) is an FDA-cleared quantitative test that  enables the detection and quantitation of CMV DNA in EDTA plasma of  infected transplant patients on the tim 8800 system. This test was  verified by [employer]. Results should be interpreted  with consideration of all clinical findings and laboratory findings and  should not form the sole basis for a diagnosis or treatment decision. Xse35OG/mL (05-14 @ 08:26)    Parvo PCR  EBV PCR [facility] DIVISION OF KIDNEY DISEASES AND HYPERTENSION -- INITIAL CONSULT NOTE  --------------------------------------------------------------------------------    HPI:  Pt. is a 38 y.o. F w/ PMHx. of Hypothyroidism and AUD who presented initially to [facility] for abdominal pain since 5/13/2023, however has had worsening distention for the past few weeks prior to admission and transferred to [facility] for liver transplant evaluation. Transplant nephrology consulted for VIVIANA. Pt. denies any history of renal problems Pt. admitted with SCr. of 0.8 which has trended to 2.0 today. Pt. states at [facility] she had blood pressures of 120/80? but here has been having blood pressure in 90s/50s prior to upward trend of SCr. Blood pressure now better in 100s/60s range today. Denies any lightheadedness or dizziness. Had paracentesis today with 800cc removed. No difficulty passing urine. no constipation. No shortness of breath. LLE more edematous than RLE. Duplex negative. Pt. awaiting official listing once infection ruled out.  HRS medications increased today. Lasix and Aldactone on hold.    PAST HISTORY  --------------------------------------------------------------------------------  PAST MEDICAL & SURGICAL HISTORY:  Hypothyroidism      Alcoholic liver disease      No significant past surgical history        FAMILY HISTORY:  No pertinent family history in first degree relatives]  No history of renal disease to dialysis.      Social History:  Lives with , has 2 children (16 May 2023 12:14)  Alcohol abuse      ALLERGIES & MEDICATIONS  --------------------------------------------------------------------------------  Allergies    No Known Allergies    Intolerances      Standing Inpatient Medications  albuterol/ipratropium for Nebulization 3 milliLiter(s) Nebulizer every 6 hours  ceFAZolin   IVPB 2000 milliGRAM(s) IV Intermittent every 8 hours  fluconAZOLE   Tablet 200 milliGRAM(s) Oral daily  folic acid 1 milliGRAM(s) Oral daily  heparin   Injectable 5000 Unit(s) SubCutaneous every 8 hours  lactulose Syrup 10 Gram(s) Oral two times a day  levothyroxine 100 MICROGram(s) Oral daily  magnesium oxide 400 milliGRAM(s) Oral three times a day with meals  midodrine. 15 milliGRAM(s) Oral every 8 hours  multivitamin 1 Tablet(s) Oral daily  octreotide  Injectable 200 MICROGram(s) SubCutaneous every 8 hours  ondansetron Injectable 4 milliGRAM(s) IV Push once  pantoprazole    Tablet 40 milliGRAM(s) Oral before breakfast  piperacillin/tazobactam IVPB.. 3.375 Gram(s) IV Intermittent every 8 hours  polyethylene glycol 3350 17 Gram(s) Oral two times a day  simethicone 80 milliGRAM(s) Chew once  thiamine 100 milliGRAM(s) Oral daily    PRN Inpatient Medications  benzocaine/menthol Lozenge 1 Lozenge Oral every 2 hours PRN  benzonatate 100 milliGRAM(s) Oral every 8 hours PRN  guaiFENesin Oral Liquid (Sugar-Free) 100 milliGRAM(s) Oral every 6 hours PRN  ondansetron Injectable 4 milliGRAM(s) IV Push every 6 hours PRN  oxyCODONE    IR 2.5 milliGRAM(s) Oral every 12 hours PRN      REVIEW OF SYSTEMS  --------------------------------------------------------------------------------  As per HPI        VITALS/PHYSICAL EXAM  --------------------------------------------------------------------------------  T(C): 37 (06-08-23 @ 12:16), Max: 37.2 (06-07-23 @ 21:01)  HR: 89 (06-08-23 @ 12:16) (76 - 96)  BP: 101/58 (06-08-23 @ 12:16) (101/58 - 116/54)  RR: 18 (06-08-23 @ 12:16) (18 - 18)  SpO2: 95% (06-08-23 @ 12:16) (94% - 96%)  Wt(kg): --        06-07-23 @ 07:01  -  06-08-23 @ 07:00  --------------------------------------------------------  IN: 1400 mL / OUT: 700 mL / NET: 700 mL      Physical Exam:  	Gen: Not in distress, well-appearing  	HEENT: pupils reactive to light,  scleral icterus+, moist oral mucosa. No thrush. Supple neck, no JVD  	Pulm: normal respiratory effort, lungs clear to auscultation bilaterally   	CV: regular rate and rhythm, S1 and S2 normal, no murmur   	Abd: normoactive bowel sounds, soft, protuberant abdomen. No tenderness, guarding or rigidity                   : No suprapubic tenderness          Back: No spinal or CVA tenderness; no pre sacral edema          Extremities: + edema LLE >RLE. Distal pulses 2+ bilaterally           Skin: warm no rash, no cyanosis , some jaundice  	Neuro: Alert and oriented to person, place and time. Normal speech. Normal affect.       LABS/STUDIES  --------------------------------------------------------------------------------              8.1    20.97 >-----------<  263      [06-08-23 @ 06:41]              24.3     131  |  94  |  31  ----------------------------<  141      [06-08-23 @ 06:41]  4.1   |  16  |  2.09        Ca     9.7     [06-08-23 @ 06:41]      Mg     2.1     [06-08-23 @ 06:41]      Phos  3.3     [06-08-23 @ 06:41]    TPro  6.3  /  Alb  4.1  /  TBili  28.6  /  DBili  x   /  AST  151  /  ALT  <5  /  AlkPhos  131  [06-08-23 @ 06:41]    PT/INR: PT 19.7 , INR 1.69       [06-08-23 @ 06:42]  PTT: 58.4       [06-08-23 @ 06:42]      Creatinine Trend:  SCr 2.09 [06-08 @ 06:41]  SCr 1.92 [06-07 @ 06:30]  SCr 1.52 [06-06 @ 06:27]  SCr 1.48 [06-05 @ 06:45]  SCr 1.27 [06-04 @ 07:32]    Urinalysis - [06-07-23 @ 13:03]      Color Dark Yellow / Appearance Slightly Turbid / SG 1.028 / pH 6.0      Gluc Trace / Ketone Trace  / Bili Large / Urobili Negative       Blood Small / Protein 100 mg/dl / Leuk Est Small / Nitrite Negative      RBC 3 / WBC 3 / Hyaline 15 / Gran 3-5 / Sq Epi  / Non Sq Epi  / Bacteria Negative    Urine Creatinine 82      [06-07-23 @ 13:11]  Urine Protein 129      [06-07-23 @ 13:11]  Urine Sodium 28      [06-07-23 @ 13:11]  Urine Urea Nitrogen 341      [06-07-23 @ 13:11]  Urine Potassium 67      [06-07-23 @ 13:11]  Urine Osmolality 344      [06-07-23 @ 13:11]    Iron 78, TIBC Unable to calculate Test Repeated, %sat Unable to calculate Test Repeated      [05-17-23 @ 07:14]  Ferritin 349      [05-17-23 @ 07:14]  TSH 0.62      [05-17-23 @ 07:14]  Lipid: chol 127, , HDL 11, LDL --      [05-17-23 @ 07:14]    HBsAb 380.9      [06-01-23 @ 00:45]  HBsAb Reactive      [05-15-23 @ 07:32]  HBsAg Nonreact      [06-01-23 @ 00:45]  HBcAb Nonreact      [06-01-23 @ 00:45]  HCV 0.14, Nonreact      [06-01-23 @ 00:45]  HIV Nonreact      [06-01-23 @ 00:41]  HIV Nonreact      [05-13-23 @ 10:00]    ISHAN: titer Negative, pattern --      [05-17-23 @ 07:14]  dsDNA 12      [05-14-23 @ 08:26]  Rheumatoid Factor <10      [05-14-23 @ 08:26]  ANCA: cANCA Negative, pANCA Negative, atypical ANCA Negative      [05-14-23 @ 08:26]  Syphilis Screen (Treponema Pallidum Ab) Negative      [05-17-23 @ 07:14]  Free Light Chains: kappa 5.24, lambda 3.65, ratio = 1.44      [05-15 @ 07:32]    CMV PCRCMVPCR Log: 3.31 Hli27JJ/mL (06-08 @ 06:41)  CMVPCR Log: 3.18 Hjk75EV/mL (06-05 @ 19:40)  CMVPCR Log: Det <1.54 Assay Dynamic Range: 34.5 to 1.0E+07 IU/mL (1.54 to 7.00 Log10 IU/mL)  Assay lower limit of quantification (LLOQ) is 34.5 IU/mL (1.54 Log10  IU/mL)  CMV DNA detected below the LLOQ will be reported as Detected < 34.5 IU/mL  (<1.54 Log10 IU/mL)  Tim Cytomegalovirus (CMV) is an FDA-cleared quantitative test that  enables the detection and quantitation of CMV DNA in EDTA plasma of  infected transplant patients on the tim 8800 system. This test was  verified by [employer]. Results should be interpreted  with consideration of all clinical findings and laboratory findings and  should not form the sole basis for a diagnosis or treatment decision. Hkw24NR/mL (05-17 @ 07:20)  CMVPCR Log: Det <1.54 Assay Dynamic Range: 34.5 to 1.0E+07 IU/mL (1.54 to 7.00 Log10 IU/mL)  Assay lower limit of quantification (LLOQ) is 34.5 IU/mL (1.54 Log10  IU/mL)  CMV DNA detected below the LLOQ will be reported as Detected < 34.5 IU/mL  (<1.54 Log10 IU/mL)  Tim Cytomegalovirus (CMV) is an FDA-cleared quantitative test that  enables the detection and quantitation of CMV DNA in EDTA plasma of  infected transplant patients on the tim 8800 system. This test was  verified by [employer]. Results should be interpreted  with consideration of all clinical findings and laboratory findings and  should not form the sole basis for a diagnosis or treatment decision. Trh93PB/mL (05-14 @ 08:26)

## 2023-06-08 NOTE — CONSULT NOTE ADULT - PROBLEM SELECTOR RECOMMENDATION 9
Pt with VIVIANA in the setting of hemodynamic instability vs. HRS. Pt. admitted with SCr. of 0.8 which has trended to 2.0 today. Urine studies sent on 6/7 do not indicate HRS and more fovor hemodynamics instability. Celio 28. 1.6 grams proteinuria. Agree with up-titration of midodrine and octreotide. C/w Albumin. Optimize hemodynamics. Keep HgB above 7. Follow cultures. Will need to consider CRRT vs Terlipressin if renal failure continues to worsen. Monitor labs and strict urine output. Avoid NSAIDs, ACEI/ARBS, RCA and nephrotoxins. Dose medications as per eGFR. Hold Aldactone and Lasix for now. Pt. is not a candidate for renal transplantation at this time as VIVIANA began on 6/2.     If you have any questions, please feel free to contact me  Benton Canas  Nephrology Fellow  [phone]; Prefer [employer] TEAMS  (After 5pm or on weekends please page the on-call fellow)
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
## 2023-06-08 NOTE — PROGRESS NOTE ADULT - ATTENDING COMMENTS
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 Pulmonary consulted for persistent productive cough with multiple abnormal CT imaging of unclear primary etiology. Pt now s/p flexible diagnostic bronchoscopic evaluation 6/5 with BAL sampling. No post-procedure complications, pt feels well today from a respiratory standpoint. Bronchial cx and cytopathology negative, CT Chest most likely edema and unlikely 2/2 underlying infectious process.

## 2023-06-08 NOTE — CONSULT NOTE ADULT - ASSESSMENT
Pt. is a 38 y.o. F w/ PMHx. of Hypothyroidism and AUD who presented initially to [facility] for abdominal pain since 5/13/2023, however has had worsening distention for the past few weeks prior to admission and transferred to [facility] for liver transplant evaluation. Transplant nephrology consulted for VIVIANA.

## 2023-06-08 NOTE — PROCEDURE NOTE - NSPOSTCAREGUIDE_GEN_A_CORE
Verbal/written post procedure instructions were given to patient/caregiver
Keep the cast/splint/dressing clean and dry
Verbal/written post procedure instructions were given to patient/caregiver/Instructed patient/caregiver to follow-up with primary care physician/Instructed patient/caregiver regarding signs and symptoms of infection/Keep the cast/splint/dressing clean and dry

## 2023-06-08 NOTE — PROGRESS NOTE ADULT - SUBJECTIVE AND OBJECTIVE BOX
Follow Up:      Interval History:    REVIEW OF SYSTEMS  [  ] ROS unobtainable because:    [  ] All other systems negative except as noted below    Constitutional:  [ ] fever [ ] chills  [ ] weight loss  [ ] weakness  Skin:  [ ] rash [ ] phlebitis	  Eyes: [ ] icterus [ ] pain  [ ] discharge	  ENMT: [ ] sore throat  [ ] thrush [ ] ulcers [ ] exudates  Respiratory: [ ] dyspnea [ ] hemoptysis [ ] cough [ ] sputum	  Cardiovascular:  [ ] chest pain [ ] palpitations [ ] edema	  Gastrointestinal:  [ ] nausea [ ] vomiting [ ] diarrhea [ ] constipation [ ] pain	  Genitourinary:  [ ] dysuria [ ] frequency [ ] hematuria [ ] discharge [ ] flank pain  [ ] incontinence  Musculoskeletal:  [ ] myalgias [ ] arthralgias [ ] arthritis  [ ] back pain  Neurological:  [ ] headache [ ] seizures  [ ] confusion/altered mental status    Allergies  No Known Allergies        ANTIMICROBIALS:  ceFAZolin   IVPB 2000 every 8 hours  fluconAZOLE   Tablet 200 daily  piperacillin/tazobactam IVPB.. 3.375 every 8 hours      OTHER MEDS:  MEDICATIONS  (STANDING):  albuterol/ipratropium for Nebulization 3 every 6 hours  benzonatate 100 every 8 hours PRN  guaiFENesin Oral Liquid (Sugar-Free) 100 every 6 hours PRN  heparin   Injectable 5000 every 8 hours  lactulose Syrup 10 two times a day  levothyroxine 100 daily  midodrine. 15 every 8 hours  octreotide  Injectable 200 every 8 hours  ondansetron Injectable 4 once  ondansetron Injectable 4 every 6 hours PRN  oxyCODONE    IR 2.5 every 12 hours PRN  pantoprazole    Tablet 40 before breakfast  polyethylene glycol 3350 17 two times a day  simethicone 80 once      Vital Signs Last 24 Hrs  T(C): 37 (2023 12:16), Max: 37.2 (2023 21:01)  T(F): 98.6 (2023 12:16), Max: 98.9 (2023 21:01)  HR: 89 (2023 12:16) (76 - 96)  BP: 101/58 (2023 12:16) (101/58 - 116/54)  BP(mean): --  RR: 18 (2023 12:16) (18 - 18)  SpO2: 95% (2023 12:16) (94% - 96%)    Parameters below as of 2023 12:16  Patient On (Oxygen Delivery Method): room air        PHYSICAL EXAMINATION:  General: Alert and Awake, NAD  HEENT: PERRL, EOMI  Neck: Supple  Cardiac: RRR, No M/R/G  Resp: CTAB, No Wh/Rh/Ra  Abdomen: NBS, NT/ND, No HSM, No rigidity or guarding  MSK: No LE edema. No Calf tenderness  : No [place]  Skin: No rashes or lesions. Skin is warm and dry to the touch.   Neuro: Alert and Awake. CN 2-12 Grossly intact. Moves all four extremities spontaneously.  Psych: Calm, Pleasant, Cooperative                          8.1    20.97 )-----------( 263      ( 2023 06:41 )             24.3       06-08    131<L>  |  94<L>  |  31<H>  ----------------------------<  141<H>  4.1   |  16<L>  |  2.09<H>    Ca    9.7      2023 06:41  Phos  3.3     06-08  Mg     2.1     -08    TPro  6.3  /  Alb  4.1  /  TBili  28.6<H>  /  DBili  x   /  AST  151<H>  /  ALT  <5<L>  /  AlkPhos  131<H>  08      Urinalysis Basic - ( 2023 13:03 )    Color: Dark Yellow / Appearance: Slightly Turbid / S.028 / pH: x  Gluc: x / Ketone: Trace  / Bili: Large / Urobili: Negative   Blood: x / Protein: 100 mg/dl / Nitrite: Negative   Leuk Esterase: Small / RBC: 3 /hpf / WBC 3 /HPF   Sq Epi: x / Non Sq Epi: x / Bacteria: Negative        MICROBIOLOGY:  v  .Blood Blood-Peripheral  23   No growth to date.  --  --      .Blood Blood  23   No growth to date.  --  --      .Bronchial Bronchoalveolar Lavage  23   No growth at 48 hours  --    polymorphonuclear leukocytes per low power field  No squamous epithelial cells per low power field  Gram Negative Rods per oil power field  by cytocentrifuge      .Bronchial Bronchial Lavage  23   Culture is being performed.  --    Rare polymorphonuclear leukocytes per low power field  No squamous epithelial cells per low power field  No organisms seen per oil power field      .Blood Blood-Peripheral  23   No Growth Final  --  --      .Blood Blood-Peripheral  23   No Growth Final  --  --      Abdominal Fl Abdominal Fluid  23   No growth  --    polymorphonuclear leukocytes seen  No organisms seen  by cytocentrifuge      Clean Catch Clean Catch (Midstream)  23   <10,000 CFU/mL Normal Urogenital Aga  --  --      .Blood Blood-Peripheral  23   No Growth Final  --  --      .Blood Blood-Peripheral  23   No Growth Final  --  --      .Sputum Sputum  23   No acid-fast bacilli isolated at 1 week. ****Acid-fast cultures are held  for 6 weeks.****  --    Few polymorphonuclear leukocytes per low power field  Moderate Squamous epithelial cells per low power field  Moderate Yeast like cells seen per oil power field      .Blood Blood-Peripheral  23   No Growth Final  --  --      .Blood Blood-Peripheral  23   No Growth Final  --  --      Peritoneal Peritoneal Fluid  23   No growth at 5 days  --    polymorphonuclear leukocytes seen  No organisms seen  by cytocentrifuge      .Blood Blood-Peripheral  23   No Growth Final  --  --      .Blood Blood  23   No Growth Final  --  --      Clean Catch Clean Catch (Midstream)  23   50,000 - 99,000 CFU/mL Enterococcus faecium (vancomycin resistant)  --  Enterococcus faecium (vancomycin resistant)      .Blood Blood-Peripheral  23   No Growth Final  --  --      Peritoneal Peritoneal Fluid  23   No growth at 5 days  --    No polymorphonuclear cells seen  No organisms seen  by cytocentrifuge      .Blood Blood-Peripheral  23   No Growth Final  --  --      .Blood Blood-Peripheral  23   No Growth Final  --  --      Ascites Fl Ascites Fluid  23   No growth at 5 days  --    No polymorphonuclear cells seen seen  No organisms seen  by cytocentrifuge      Clean Catch Clean Catch (Midstream)  23   >100,000 CFU/ml Escherichia coli  --  Escherichia coli      .Blood Blood-Peripheral  23   Growth in aerobic and anaerobic bottles: Staphylococcus aureus  ***Blood Panel PCR results on this specimen are available  approximately 3 hours after the Gram stain result.***  Gram stain, PCR, and/or culture results may not always  correspond dueto difference in methodologies.  ************************************************************  This PCR assay was performed by multiplex PCR. This  Assay tests for 66 bacterial and resistance gene targets.  Please refer to the [facility] test directory  at [email or web address] for details.  --  Blood Culture PCR  Staphylococcus aureus      .Blood Blood-Peripheral  23   Growth in aerobic bottle: Staphylococcus aureus  See previous culture [id number]  --    Growth in aerobic bottle: Gram Positive Cocci in Clusters        CMV IgG Antibody: >10.00 U/mL (23 @ 00:45)  Toxoplasma IgG Screen: <3.0 IU/mL (23 @ 07:14)  CMV IgG Antibody: >10.00 U/mL (23 @ 07:14)  Toxoplasma IgG Screen: <3.0 IU/mL (23 @ 07:14)    CMVPCR Log: 3.31 Xzu95ZS/mL ( @ 06:41)  CMVPCR Log: 3.18 Qlu06GB/mL ( @ 19:40)        RADIOLOGY:    <The imaging below has been reviewed and visualized by me independently. Findings as detailed in report below> Follow Up:  pre-OLT    Interval History: afebrile. improving cough. s/p paracentesis today.     REVIEW OF SYSTEMS  [  ] ROS unobtainable because:    [ x ] All other systems negative except as noted below    Constitutional:  [ ] fever [ ] chills  [ ] weight loss  [ ] weakness  Skin:  [ ] rash [ ] phlebitis	  Eyes: [ ] icterus [ ] pain  [ ] discharge	  ENMT: [ ] sore throat  [ ] thrush [ ] ulcers [ ] exudates  Respiratory: [ ] dyspnea [ ] hemoptysis [ x] cough [ ] sputum	  Cardiovascular:  [ ] chest pain [ ] palpitations [ ] edema	  Gastrointestinal:  [ ] nausea [ ] vomiting [ ] diarrhea [ ] constipation [ ] pain	  Genitourinary:  [ ] dysuria [ ] frequency [ ] hematuria [ ] discharge [ ] flank pain  [ ] incontinence  Musculoskeletal:  [ ] myalgias [ ] arthralgias [ ] arthritis  [ ] back pain  Neurological:  [ ] headache [ ] seizures  [ ] confusion/altered mental status      Allergies  No Known Allergies        ANTIMICROBIALS:  ceFAZolin   IVPB 2000 every 8 hours  fluconAZOLE   Tablet 200 daily  piperacillin/tazobactam IVPB.. 3.375 every 8 hours      OTHER MEDS:  MEDICATIONS  (STANDING):  albuterol/ipratropium for Nebulization 3 every 6 hours  benzonatate 100 every 8 hours PRN  guaiFENesin Oral Liquid (Sugar-Free) 100 every 6 hours PRN  heparin   Injectable 5000 every 8 hours  lactulose Syrup 10 two times a day  levothyroxine 100 daily  midodrine. 15 every 8 hours  octreotide  Injectable 200 every 8 hours  ondansetron Injectable 4 once  ondansetron Injectable 4 every 6 hours PRN  oxyCODONE    IR 2.5 every 12 hours PRN  pantoprazole    Tablet 40 before breakfast  polyethylene glycol 3350 17 two times a day  simethicone 80 once      Vital Signs Last 24 Hrs  T(C): 37 (2023 12:16), Max: 37.2 (2023 21:01)  T(F): 98.6 (2023 12:16), Max: 98.9 (2023 21:01)  HR: 89 (2023 12:16) (76 - 96)  BP: 101/58 (2023 12:16) (101/58 - 116/54)  BP(mean): --  RR: 18 (2023 12:16) (18 - 18)  SpO2: 95% (2023 12:16) (94% - 96%)    Parameters below as of 2023 12:16  Patient On (Oxygen Delivery Method): room air    PHYSICAL EXAMINATION:  General: Alert and Awake, NAD, jaundice, scleral icterus  Cardiac: RRR, No M/R/G  Resp: CTAB, No Wh/Rh/Ra  Abdomen: NBS, NT/ND, No HSM, No rigidity or guarding  MSK: No LE edema. No Calf tenderness  : No [place]  Skin: No rashes or lesions. Skin is warm and dry to the touch.   Neuro: Alert and Awake. CN 2-12 Grossly intact. Moves all four extremities spontaneously.  Psych: Calm, Pleasant, Cooperative                          8.1    20.97 )-----------( 263      ( 2023 06:41 )             24.3       06-08    131<L>  |  94<L>  |  31<H>  ----------------------------<  141<H>  4.1   |  16<L>  |  2.09<H>    Ca    9.7      2023 06:41  Phos  3.3     06-08  Mg     2.1     06-08    TPro  6.3  /  Alb  4.1  /  TBili  28.6<H>  /  DBili  x   /  AST  151<H>  /  ALT  <5<L>  /  AlkPhos  131<H>  06-08      Urinalysis Basic - ( 2023 13:03 )    Color: Dark Yellow / Appearance: Slightly Turbid / S.028 / pH: x  Gluc: x / Ketone: Trace  / Bili: Large / Urobili: Negative   Blood: x / Protein: 100 mg/dl / Nitrite: Negative   Leuk Esterase: Small / RBC: 3 /hpf / WBC 3 /HPF   Sq Epi: x / Non Sq Epi: x / Bacteria: Negative        MICROBIOLOGY:  v  .Blood Blood-Peripheral  23   No growth to date.  --  --      .Blood Blood  23   No growth to date.  --  --      .Bronchial Bronchoalveolar Lavage  23   No growth at 48 hours  --    polymorphonuclear leukocytes per low power field  No squamous epithelial cells per low power field  Gram Negative Rods per oil power field  by cytocentrifuge      .Bronchial Bronchial Lavage  23   Culture is being performed.  --    Rare polymorphonuclear leukocytes per low power field  No squamous epithelial cells per low power field  No organisms seen per oil power field      .Blood Blood-Peripheral  23   No Growth Final  --  --      .Blood Blood-Peripheral  23   No Growth Final  --  --      Abdominal Fl Abdominal Fluid  23   No growth  --    polymorphonuclear leukocytes seen  No organisms seen  by cytocentrifuge      Clean Catch Clean Catch (Midstream)  23   <10,000 CFU/mL Normal Urogenital Aga  --  --      .Blood Blood-Peripheral  23   No Growth Final  --  --      .Blood Blood-Peripheral  23   No Growth Final  --  --      .Sputum Sputum  23   No acid-fast bacilli isolated at 1 week. ****Acid-fast cultures are held  for 6 weeks.****  --    Few polymorphonuclear leukocytes per low power field  Moderate Squamous epithelial cells per low power field  Moderate Yeast like cells seen per oil power field      .Blood Blood-Peripheral  23   No Growth Final  --  --      .Blood Blood-Peripheral  23   No Growth Final  --  --      Peritoneal Peritoneal Fluid  23   No growth at 5 days  --    polymorphonuclear leukocytes seen  No organisms seen  by cytocentrifuge      .Blood Blood-Peripheral  23   No Growth Final  --  --      .Blood Blood  23   No Growth Final  --  --      Clean Catch Clean Catch (Midstream)  23   50,000 - 99,000 CFU/mL Enterococcus faecium (vancomycin resistant)  --  Enterococcus faecium (vancomycin resistant)      .Blood Blood-Peripheral  23   No Growth Final  --  --      Peritoneal Peritoneal Fluid  23   No growth at 5 days  --    No polymorphonuclear cells seen  No organisms seen  by cytocentrifuge      .Blood Blood-Peripheral  23   No Growth Final  --  --      .Blood Blood-Peripheral  23   No Growth Final  --  --      Ascites Fl Ascites Fluid  23   No growth at 5 days  --    No polymorphonuclear cells seen seen  No organisms seen  by cytocentrifuge      Clean Catch Clean Catch (Midstream)  23   >100,000 CFU/ml Escherichia coli  --  Escherichia coli      .Blood Blood-Peripheral  23   Growth in aerobic and anaerobic bottles: Staphylococcus aureus  ***Blood Panel PCR results on this specimen are available  approximately 3 hours after the Gram stain result.***  Gram stain, PCR, and/or culture results may not always  correspond dueto difference in methodologies.  ************************************************************  This PCR assay was performed by multiplex PCR. This  Assay tests for 66 bacterial and resistance gene targets.  Please refer to the [facility] Labs test directory  at [email or web address] for details.  --  Blood Culture PCR  Staphylococcus aureus      .Blood Blood-Peripheral  23   Growth in aerobic bottle: Staphylococcus aureus  See previous culture [id number]  --    Growth in aerobic bottle: Gram Positive Cocci in Clusters        CMV IgG Antibody: >10.00 U/mL (23 @ 00:45)  Toxoplasma IgG Screen: <3.0 IU/mL (23 @ 07:14)  CMV IgG Antibody: >10.00 U/mL (23 @ 07:14)  Toxoplasma IgG Screen: <3.0 IU/mL (23 @ 07:14)    CMVPCR Log: 3.31 Jtx23DA/mL ( @ 06:41)  CMVPCR Log: 3.18 Vos87RX/mL ( @ 19:40)        RADIOLOGY:    <The imaging below has been reviewed and visualized by me independently. Findings as detailed in report below>    < from: US Kidney and Bladder (23 @ 17:55) >  IMPRESSION:  No renal mass, hydronephrosis or calculus is visualized sonographically.    < end of copied text >

## 2023-06-08 NOTE — PRE PROCEDURE NOTE - GENERAL PROCEDURE NAME
Paracentesis
EGD
Paracentesis
US-guided paracentesis
Bronchoscopy with BAL

## 2023-06-08 NOTE — PROCEDURE NOTE - NSPROCDETAILS_GEN_ALL_CORE
location identified, sterile technique used, catheter introduced, fluid drained/sterile dressing applied
location identified, sterile technique used, catheter introduced, fluid drained/Seldinger technique/sterile dressing applied

## 2023-06-08 NOTE — PROGRESS NOTE ADULT - SUBJECTIVE AND OBJECTIVE BOX
CHIEF COMPLAINT:Patient is a 38y old  Female who presents with a chief complaint of Liver Failure (2023 08:22)      Interval Events: No acute events overnight     REVIEW OF SYSTEMS:  [x] All other systems negative except per HPI   [ ] Unable to assess ROS because ________    OBJECTIVE:  ICU Vital Signs Last 24 Hrs  T(C): 36.8 (2023 08:37), Max: 37.2 (2023 21:01)  T(F): 98.2 (2023 08:37), Max: 98.9 (2023 21:01)  HR: 93 (2023 08:37) (72 - 96)  BP: 106/63 (2023 08:37) (104/57 - 116/54)  BP(mean): --  ABP: --  ABP(mean): --  RR: 18 (2023 08:37) (18 - 18)  SpO2: 95% (2023 08:37) (94% - 98%)    O2 Parameters below as of 2023 08:37  Patient On (Oxygen Delivery Method): room air              06-07 @ 07:01  -  06-08 @ 07:00  --------------------------------------------------------  IN: 1400 mL / OUT: 700 mL / NET: 700 mL        PHYSICAL EXAM:  GENERAL: NAD, well-groomed, well-developed  EYES: EOMI, PERRLA, conjunctiva and sclera icterus  ENMT: No tonsillar erythema, exudates, or enlargement; Moist mucous membranes, Good dentition, No lesions  CHEST/LUNG: Clear to auscultation bilaterally; No rales, rhonchi, wheezing, or rubs  HEART: Regular rate and rhythm; No murmurs, rubs, or gallops  ABDOMEN: Soft, distended with ascites   VASCULAR:  2+ Peripheral Pulses, No clubbing, cyanosis, or edema  LYMPH: No lymphadenopathy noted  SKIN: No rashes or lesions  NERVOUS SYSTEM:  Alert & Oriented X3, Good concentration    HOSPITAL MEDICATIONS:  MEDICATIONS  (STANDING):  albuterol/ipratropium for Nebulization 3 milliLiter(s) Nebulizer every 6 hours  ceFAZolin   IVPB 2000 milliGRAM(s) IV Intermittent every 8 hours  fluconAZOLE   Tablet 200 milliGRAM(s) Oral daily  folic acid 1 milliGRAM(s) Oral daily  heparin   Injectable 5000 Unit(s) SubCutaneous every 8 hours  lactulose Syrup 10 Gram(s) Oral two times a day  levothyroxine 100 MICROGram(s) Oral daily  magnesium oxide 400 milliGRAM(s) Oral three times a day with meals  midodrine. 15 milliGRAM(s) Oral every 8 hours  multivitamin 1 Tablet(s) Oral daily  octreotide  Injectable 200 MICROGram(s) SubCutaneous every 8 hours  ondansetron Injectable 4 milliGRAM(s) IV Push once  pantoprazole    Tablet 40 milliGRAM(s) Oral before breakfast  piperacillin/tazobactam IVPB.. 3.375 Gram(s) IV Intermittent every 8 hours  polyethylene glycol 3350 17 Gram(s) Oral two times a day  simethicone 80 milliGRAM(s) Chew once  thiamine 100 milliGRAM(s) Oral daily    MEDICATIONS  (PRN):  benzocaine/menthol Lozenge 1 Lozenge Oral every 2 hours PRN cough  benzonatate 100 milliGRAM(s) Oral every 8 hours PRN Cough  guaiFENesin Oral Liquid (Sugar-Free) 100 milliGRAM(s) Oral every 6 hours PRN Cough  ondansetron Injectable 4 milliGRAM(s) IV Push every 6 hours PRN Nausea and/or Vomiting  oxyCODONE    IR 2.5 milliGRAM(s) Oral every 12 hours PRN Moderate Pain (4 - 6)      LABS:    The Labs were reviewed by me   The Radiology was reviewed by me    EKG tracing reviewed by me        131<L>  |  94<L>  |  31<H>  ----------------------------<  141<H>  4.1   |  16<L>  |  2.09<H>      127<L>  |  93<L>  |  27<H>  ----------------------------<  124<H>  4.3   |  17<L>  |  1.92<H>  06-06    130<L>  |  97  |  16  ----------------------------<  174<H>  4.5   |  16<L>  |  1.52<H>    Ca    9.7      2023 06:41  Ca    9.5      2023 06:30  Ca    8.9      2023 06:27  Phos  3.3       Mg     2.1         TPro  6.3  /  Alb  4.1  /  TBili  28.6<H>  /  DBili  x   /  AST  151<H>  /  ALT  <5<L>  /  AlkPhos  131<H>    TPro  6.0  /  Alb  3.6  /  TBili  30.1<H>  /  DBili  x   /  AST  77<H>  /  ALT  <5<L>  /  AlkPhos  164<H>    TPro  5.8<L>  /  Alb  3.2<L>  /  TBili  33.1<H>  /  DBili  x   /  AST  93<H>  /  ALT  <5<L>  /  AlkPhos  199<H>      Magnesium, Serum: 2.1 mg/dL (23 @ 06:41)  Magnesium, Serum: 2.2 mg/dL (23 @ 06:30)  Magnesium, Serum: 1.9 mg/dL (23 @ 06:27)    Phosphorus Level, Serum: 3.3 mg/dL (23 @ 06:41)  Phosphorus Level, Serum: 3.4 mg/dL (23 @ 06:30)  Phosphorus Level, Serum: 2.5 mg/dL (23 @ 06:27)      PT/INR - ( 2023 06:42 )   PT: 19.7 sec;   INR: 1.69 ratio         PTT - ( 2023 06:42 )  PTT:58.4 sec              Urinalysis Basic - ( 2023 13:03 )    Color: Dark Yellow / Appearance: Slightly Turbid / S.028 / pH: x  Gluc: x / Ketone: Trace  / Bili: Large / Urobili: Negative   Blood: x / Protein: 100 mg/dl / Nitrite: Negative   Leuk Esterase: Small / RBC: 3 /hpf / WBC 3 /HPF   Sq Epi: x / Non Sq Epi: x / Bacteria: Negative                              8.1    20.97 )-----------( 263      ( 2023 06:41 )             24.3                         8.8    22.06 )-----------( 294      ( 2023 06:31 )             24.8                         9.2    19.58 )-----------( 289      ( 2023 06:27 )             26.8     CAPILLARY BLOOD GLUCOSE            MICROBIOLOGY:     RADIOLOGY:  [ ] Reviewed and interpreted by me    Point of Care Ultrasound Findings:    PFT:    EKG:

## 2023-06-08 NOTE — CONSULT NOTE ADULT - TIME BILLING
review of laboratory data, radiology results, discussion with primary team\patient, and monitoring for potential decompensation. Interventions were performed as documented above
Patient with VIVIANA, non oliguric , electrolyte and fluid abnormalities.    VIVIANA risks as detailed in consult note. Decompensated liver disease with portal hypertension  Current management will include :  Agree with splanchnic vasoconstrictors  Monitor chemistry including K,. Mg and replete to normal  Monitoring of I/O and maintaining euvolemic state  Avoiding nephrotoxic agents-NSAIDs, contrast, nephrotoxic antibiotics  Adjusting dosage of medications for abnormal creatinine clearance (<10ml/min when creatinine is rising)  Reviewed workup available. Other work up as suggested in consult.  Please call with any additional questions or on availability of any new information or reports or change in clinical condition. Will follow.
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
## 2023-06-08 NOTE — PRE PROCEDURE NOTE - PROCEDURE SERVICE
Pulmonary/Endoscopy
Vascular and Interventional Radiology
Endoscopy
Vascular and Interventional Radiology

## 2023-06-09 ENCOUNTER — NON-APPOINTMENT (OUTPATIENT)
Age: 38
End: 2023-06-09

## 2023-06-09 ENCOUNTER — APPOINTMENT (OUTPATIENT)
Dept: TRANSPLANT | Facility: HOSPITAL | Age: 38
End: 2023-06-09

## 2023-06-09 LAB
ALBUMIN SERPL ELPH-MCNC: 3.9 G/DL — SIGNIFICANT CHANGE UP (ref 3.3–5)
ALP SERPL-CCNC: 110 U/L — SIGNIFICANT CHANGE UP (ref 40–120)
ALT FLD-CCNC: <5 U/L — LOW (ref 10–45)
ANION GAP SERPL CALC-SCNC: 20 MMOL/L — HIGH (ref 5–17)
APTT BLD: 41.7 SEC — HIGH (ref 27.5–35.5)
AST SERPL-CCNC: 146 U/L — HIGH (ref 10–40)
BASE EXCESS BLDV CALC-SCNC: -4.4 MMOL/L — LOW (ref -2–3)
BASOPHILS # BLD AUTO: 0.04 K/UL — SIGNIFICANT CHANGE UP (ref 0–0.2)
BASOPHILS NFR BLD AUTO: 0.2 % — SIGNIFICANT CHANGE UP (ref 0–2)
BILIRUB SERPL-MCNC: 28.2 MG/DL — HIGH (ref 0.2–1.2)
BLD GP AB SCN SERPL QL: NEGATIVE — SIGNIFICANT CHANGE UP
BUN SERPL-MCNC: 36 MG/DL — HIGH (ref 7–23)
CALCIUM SERPL-MCNC: 9.5 MG/DL — SIGNIFICANT CHANGE UP (ref 8.4–10.5)
CHLORIDE SERPL-SCNC: 95 MMOL/L — LOW (ref 96–108)
CO2 BLDV-SCNC: 21 MMOL/L — LOW (ref 22–26)
CO2 SERPL-SCNC: 16 MMOL/L — LOW (ref 22–31)
CREAT SERPL-MCNC: 2.04 MG/DL — HIGH (ref 0.5–1.3)
EGFR: 31 ML/MIN/1.73M2 — LOW
EOSINOPHIL # BLD AUTO: 0.24 K/UL — SIGNIFICANT CHANGE UP (ref 0–0.5)
EOSINOPHIL NFR BLD AUTO: 1.3 % — SIGNIFICANT CHANGE UP (ref 0–6)
GAS PNL BLDA: SIGNIFICANT CHANGE UP
GAS PNL BLDV: SIGNIFICANT CHANGE UP
GLUCOSE SERPL-MCNC: 116 MG/DL — HIGH (ref 70–99)
HBV CORE AB SER-ACNC: SIGNIFICANT CHANGE UP
HBV SURFACE AB SER-ACNC: 305.3 MIU/ML — SIGNIFICANT CHANGE UP
HBV SURFACE AG SER-ACNC: SIGNIFICANT CHANGE UP
HCG SERPL-ACNC: <2 MIU/ML — SIGNIFICANT CHANGE UP
HCO3 BLDV-SCNC: 20 MMOL/L — LOW (ref 22–29)
HCT VFR BLD CALC: 22.9 % — LOW (ref 34.5–45)
HCV AB S/CO SERPL IA: 0.13 S/CO — SIGNIFICANT CHANGE UP (ref 0–0.99)
HCV AB SERPL-IMP: SIGNIFICANT CHANGE UP
HCV RNA SPEC NAA+PROBE-LOG IU: SIGNIFICANT CHANGE UP
HCV RNA SPEC NAA+PROBE-LOG IU: SIGNIFICANT CHANGE UP LOGIU/ML
HEPARINASE TEG R TIME: 10.5 MIN — SIGNIFICANT CHANGE UP (ref 4.3–8.3)
HEPARINASE TEG R TIME: 8.8 MIN (ref 4.3–8.3)
HGB BLD-MCNC: 7.8 G/DL — LOW (ref 11.5–15.5)
HIV 1+2 AB+HIV1 P24 AG SERPL QL IA: SIGNIFICANT CHANGE UP
IMM GRANULOCYTES NFR BLD AUTO: 2.8 % — HIGH (ref 0–0.9)
INR BLD: 1.66 RATIO — HIGH (ref 0.88–1.16)
LYMPHOCYTES # BLD AUTO: 1.25 K/UL — SIGNIFICANT CHANGE UP (ref 1–3.3)
LYMPHOCYTES # BLD AUTO: 6.7 % — LOW (ref 13–44)
MAGNESIUM SERPL-MCNC: 2.2 MG/DL — SIGNIFICANT CHANGE UP (ref 1.6–2.6)
MCHC RBC-ENTMCNC: 34.1 GM/DL — SIGNIFICANT CHANGE UP (ref 32–36)
MCHC RBC-ENTMCNC: 35.8 PG — HIGH (ref 27–34)
MCV RBC AUTO: 105 FL — HIGH (ref 80–100)
MONOCYTES # BLD AUTO: 1.42 K/UL — HIGH (ref 0–0.9)
MONOCYTES NFR BLD AUTO: 7.6 % — SIGNIFICANT CHANGE UP (ref 2–14)
NEUTROPHILS # BLD AUTO: 15.29 K/UL — HIGH (ref 1.8–7.4)
NEUTROPHILS NFR BLD AUTO: 81.4 % — HIGH (ref 43–77)
NRBC # BLD: 0 /100 WBCS — SIGNIFICANT CHANGE UP (ref 0–0)
PCO2 BLDV: 35 MMHG — LOW (ref 39–42)
PH BLDV: 7.37 — SIGNIFICANT CHANGE UP (ref 7.32–7.43)
PHOSPHATE SERPL-MCNC: 2.5 MG/DL — SIGNIFICANT CHANGE UP (ref 2.5–4.5)
PLATELET # BLD AUTO: 235 K/UL — SIGNIFICANT CHANGE UP (ref 150–400)
PO2 BLDV: 193 MMHG — HIGH (ref 25–45)
POTASSIUM SERPL-MCNC: 3.8 MMOL/L — SIGNIFICANT CHANGE UP (ref 3.5–5.3)
POTASSIUM SERPL-SCNC: 3.8 MMOL/L — SIGNIFICANT CHANGE UP (ref 3.5–5.3)
PROT SERPL-MCNC: 5.9 G/DL — LOW (ref 6–8.3)
PROTHROM AB SERPL-ACNC: 19.4 SEC — HIGH (ref 10.5–13.4)
RAPIDTEG MAXIMUM AMPLITUDE: 56.4 MM — SIGNIFICANT CHANGE UP (ref 52–70)
RAPIDTEG MAXIMUM AMPLITUDE: 62.8 MM — SIGNIFICANT CHANGE UP (ref 52–70)
RBC # BLD: 2.18 M/UL — LOW (ref 3.8–5.2)
RBC # FLD: 25 % — HIGH (ref 10.3–14.5)
RH IG SCN BLD-IMP: POSITIVE — SIGNIFICANT CHANGE UP
SAO2 % BLDV: 99.7 % — HIGH (ref 67–88)
SODIUM SERPL-SCNC: 131 MMOL/L — LOW (ref 135–145)
TEG FUNCTIONAL FIBRINOGEN: 17.8 MM — SIGNIFICANT CHANGE UP (ref 15–32)
TEG FUNCTIONAL FIBRINOGEN: 22 MM — SIGNIFICANT CHANGE UP (ref 15–32)
TEG MAXIMUM AMPLITUDE: 53.8 MM — SIGNIFICANT CHANGE UP (ref 52–69)
TEG MAXIMUM AMPLITUDE: 57.9 MM — SIGNIFICANT CHANGE UP (ref 52–69)
TEG REACTION TIME: 8.2 MIN — SIGNIFICANT CHANGE UP (ref 4.6–9.1)
TEG REACTION TIME: 9.2 MIN — SIGNIFICANT CHANGE UP (ref 4.6–9.1)
WBC # BLD: 18.76 K/UL — HIGH (ref 3.8–10.5)
WBC # FLD AUTO: 18.76 K/UL — HIGH (ref 3.8–10.5)

## 2023-06-09 PROCEDURE — 99232 SBSQ HOSP IP/OBS MODERATE 35: CPT | Mod: GC

## 2023-06-09 PROCEDURE — 88304 TISSUE EXAM BY PATHOLOGIST: CPT | Mod: 26

## 2023-06-09 PROCEDURE — 99233 SBSQ HOSP IP/OBS HIGH 50: CPT

## 2023-06-09 PROCEDURE — 88307 TISSUE EXAM BY PATHOLOGIST: CPT | Mod: 26

## 2023-06-09 PROCEDURE — 47135 TRANSPLANTATION OF LIVER: CPT

## 2023-06-09 PROCEDURE — 88312 SPECIAL STAINS GROUP 1: CPT | Mod: 26

## 2023-06-09 PROCEDURE — 88309 TISSUE EXAM BY PATHOLOGIST: CPT | Mod: 26

## 2023-06-09 PROCEDURE — 88313 SPECIAL STAINS GROUP 2: CPT | Mod: 26

## 2023-06-09 DEVICE — SURGICEL NU-KNIT 3 X 4": Type: IMPLANTABLE DEVICE | Status: FUNCTIONAL

## 2023-06-09 DEVICE — KIT A-LINE 1LUM 20G X 12CM SAFE KIT: Type: IMPLANTABLE DEVICE | Status: FUNCTIONAL

## 2023-06-09 DEVICE — KIT CVC 2LUM MAC 9FR CHG: Type: IMPLANTABLE DEVICE | Status: FUNCTIONAL

## 2023-06-09 DEVICE — STAPLER ETHICON GST ECHELON 45MM WHITE RELOAD: Type: IMPLANTABLE DEVICE | Status: FUNCTIONAL

## 2023-06-09 RX ORDER — BASILIXIMAB 20 MG/5ML
20 INJECTION, POWDER, FOR SOLUTION INTRAVENOUS ONCE
Refills: 0 | Status: DISCONTINUED | OUTPATIENT
Start: 2023-06-09 | End: 2023-06-09

## 2023-06-09 RX ADMIN — BENZOCAINE AND MENTHOL 1 LOZENGE: 5; 1 LIQUID ORAL at 00:39

## 2023-06-09 RX ADMIN — Medication 1 MILLIGRAM(S): at 11:12

## 2023-06-09 RX ADMIN — BENZOCAINE AND MENTHOL 1 LOZENGE: 5; 1 LIQUID ORAL at 11:13

## 2023-06-09 RX ADMIN — Medication 100 MILLIGRAM(S): at 11:12

## 2023-06-09 RX ADMIN — Medication 100 MILLIGRAM(S): at 09:11

## 2023-06-09 RX ADMIN — Medication 3 MILLILITER(S): at 12:11

## 2023-06-09 RX ADMIN — PANTOPRAZOLE SODIUM 40 MILLIGRAM(S): 20 TABLET, DELAYED RELEASE ORAL at 05:52

## 2023-06-09 RX ADMIN — Medication 100 MILLIGRAM(S): at 12:02

## 2023-06-09 RX ADMIN — MAGNESIUM OXIDE 400 MG ORAL TABLET 400 MILLIGRAM(S): 241.3 TABLET ORAL at 09:10

## 2023-06-09 RX ADMIN — PIPERACILLIN AND TAZOBACTAM 25 GRAM(S): 4; .5 INJECTION, POWDER, LYOPHILIZED, FOR SOLUTION INTRAVENOUS at 00:07

## 2023-06-09 RX ADMIN — OXYCODONE HYDROCHLORIDE 2.5 MILLIGRAM(S): 5 TABLET ORAL at 12:10

## 2023-06-09 RX ADMIN — OCTREOTIDE ACETATE 200 MICROGRAM(S): 200 INJECTION, SOLUTION INTRAVENOUS; SUBCUTANEOUS at 05:27

## 2023-06-09 RX ADMIN — FLUCONAZOLE 200 MILLIGRAM(S): 150 TABLET ORAL at 11:13

## 2023-06-09 RX ADMIN — Medication 100 MILLIGRAM(S): at 00:07

## 2023-06-09 RX ADMIN — OXYCODONE HYDROCHLORIDE 2.5 MILLIGRAM(S): 5 TABLET ORAL at 01:08

## 2023-06-09 RX ADMIN — MAGNESIUM OXIDE 400 MG ORAL TABLET 400 MILLIGRAM(S): 241.3 TABLET ORAL at 12:11

## 2023-06-09 RX ADMIN — Medication 100 MICROGRAM(S): at 05:27

## 2023-06-09 RX ADMIN — Medication 3 MILLILITER(S): at 09:10

## 2023-06-09 RX ADMIN — Medication 1 TABLET(S): at 11:13

## 2023-06-09 RX ADMIN — PIPERACILLIN AND TAZOBACTAM 25 GRAM(S): 4; .5 INJECTION, POWDER, LYOPHILIZED, FOR SOLUTION INTRAVENOUS at 09:12

## 2023-06-09 RX ADMIN — OXYCODONE HYDROCHLORIDE 2.5 MILLIGRAM(S): 5 TABLET ORAL at 13:00

## 2023-06-09 RX ADMIN — MIDODRINE HYDROCHLORIDE 15 MILLIGRAM(S): 2.5 TABLET ORAL at 14:52

## 2023-06-09 RX ADMIN — BENZOCAINE AND MENTHOL 1 LOZENGE: 5; 1 LIQUID ORAL at 09:12

## 2023-06-09 RX ADMIN — BENZOCAINE AND MENTHOL 1 LOZENGE: 5; 1 LIQUID ORAL at 04:00

## 2023-06-09 RX ADMIN — MIDODRINE HYDROCHLORIDE 15 MILLIGRAM(S): 2.5 TABLET ORAL at 05:52

## 2023-06-09 RX ADMIN — Medication 100 MILLIGRAM(S): at 02:21

## 2023-06-09 RX ADMIN — OXYCODONE HYDROCHLORIDE 2.5 MILLIGRAM(S): 5 TABLET ORAL at 00:08

## 2023-06-09 NOTE — PRE-ANESTHESIA EVALUATION ADULT - NSANTHPEFT_GEN_ALL_CORE
JAUNDICE
PE reviewed no new signs/symptoms
cor reg  lungs clear  abd soft  AO X3
PE reviewed
NAD  + Jaundice  CTABL  RRR  Abdomen soft, mildly distended

## 2023-06-09 NOTE — PROGRESS NOTE ADULT - SUBJECTIVE AND OBJECTIVE BOX
____________________________________________________  ROS  GENERAL: denies chills, , night sweats, weight loss.   PSYCH: denies depression, anxiety, suicidal ideation, hallucination, and delusions  SKIN: no rash or lesions; no color changes, no abnormal nevi,no  dryness, and nojaundice    EYES: denies visual changes, floaters, pain, inflammation, blurred vision, and discharge  ENT: denies tinnitus, vertigo, epistaxis, oral lesion, and decreased acuity  PULM: denies, hemoptysis, pleurisy  CVS: denies angina, palpitations,+ orthopnea, no syncope, or heart murmur  GI: denies constipation, diarrhea, melena, abdominal pain, nausea.   : denies dysuria, frequency, discharge, incontinence, stones or macroscopic hematuria  MS: no arthralgias, no erythema or swelling, no myalgias, noedema, or lower back pain.   CNS: denies numbness, dizziness, seizure, or tremor  ENDO: denies heat/cold intolerance, polyuria, polydipsia, malaise.    HEME: denies bruising, bleeding, lymphadenopathy, anemia, and calf pain    Allergies  No Known Allergies    __________________________________________________  MEDS:  MEDICATIONS  (STANDING):  albuterol/ipratropium for Nebulization 3 every 6 hours  benzonatate 100 every 8 hours PRN  guaiFENesin Oral Liquid (Sugar-Free) 100 every 6 hours PRN  heparin   Injectable 5000 every 8 hours  lactulose Syrup 10 two times a day  levothyroxine 100 daily  midodrine. 15 every 8 hours  octreotide  Injectable 200 every 8 hours  ondansetron Injectable 4 once  ondansetron Injectable 4 every 6 hours PRN  oxyCODONE    IR 2.5 every 12 hours PRN  pantoprazole    Tablet 40 before breakfast  polyethylene glycol 3350 17 two times a day  simethicone 80 once  traMADol 25 every 4 hours PRN    _________________________________________________  ANTIMICOBIALS  ceFAZolin   IVPB 2000 every 8 hours  fluconAZOLE   Tablet 200 daily  piperacillin/tazobactam IVPB.. 3.375 every 8 hours      GENERAL LABS              7.8                  x    | x    | x            18.76 >-----------< 235     ------------------------< x                     22.9                 x    | x    | x                                            Ca x     Mg x     Ph x          Urinalysis Basic - ( 2023 13:03 )    Color: Dark Yellow / Appearance: Slightly Turbid / S.028 / pH: x  Gluc: x / Ketone: Trace  / Bili: Large / Urobili: Negative   Blood: x / Protein: 100 mg/dl / Nitrite: Negative   Leuk Esterase: Small / RBC: 3 /hpf / WBC 3 /HPF   Sq Epi: x / Non Sq Epi: x / Bacteria: Negative        _________________________________________________  MICROBIOLOGY  -----------    Culture - Body Fluid with Gram Stain (collected 2023 18:34)  Source: Ascites Fl Ascites Fluid  Gram Stain (2023 23:32):    No polymorphonuclear cells seen    No organisms seen    by cytocentrifuge    Culture - Blood (collected 2023 12:35)  Source: .Blood Blood-Peripheral  Preliminary Report (2023 15:02):    No growth to date.    Culture - Blood (collected 2023 12:35)  Source: .Blood Blood-Peripheral  Preliminary Report (2023 15:02):    No growth to date.    Culture - Blood (collected 2023 06:31)  Source: .Blood Blood  Preliminary Report (2023 12:01):    No growth to date.    Culture - Blood (collected 2023 06:31)  Source: .Blood Blood  Preliminary Report (2023 12:01):    No growth to date.    Culture - Quantitative BAL Immunocompromised (collected 2023 19:44)  Source: .Bronchial Bronchoalveolar Lavage  Gram Stain (2023 03:40):    polymorphonuclear leukocytes per low power field    No squamous epithelial cells per low power field    Gram Negative Rods per oil power field    by cytocentrifuge  Final Report (2023 15:18):    No growth at 48 hours    Culture - Fungal, Bronchial (collected 2023 19:40)  Source: .Bronchial Bronchial Lavage  Preliminary Report (2023 15:03):    Culture is being performed. Fungal cultures are held for 4 weeks.    Culture - Bronchial (collected 2023 19:40)  Source: .Bronchial Bronchial Lavage  Gram Stain (2023 03:39):    Rare polymorphonuclear leukocytes per low power field    No squamous epithelial cells per low power field    No organisms seen per oil power field  Final Report (2023 15:58):    No growth at 48 hours    Culture - Acid Fast - Bronchial w/Smear (collected 2023 19:40)  Source: .Bronchial Bronchial Lavage  Preliminary Report (2023 15:09):    Culture is being performed.            CMVPCR Log: 3.31 Otl19IN/mL ( @ 06:41)  CMVPCR Log: 3.18 Yju31DF/mL ( @ 19:40)          Fungitell:   _______________________________________________  PERTINENT IMAGING  _________________________________________________  Physical Exam:   T(C): 37.1 (23 @ 09:23), Max: 37.1 (23 @ 09:23)  HR: 86 (23 @ 09:23) (84 - 98)  BP: 100/49 (23 @ 09:23) (96/54 - 101/65)  RR: 18 (23 @ 09:23) (18 - 18)  SpO2: 96% (23 @ 09:23) (95% - 97%)    23 @ 07:01  -  23 @ 07:00  --------------------------------------------------------  IN: 1240 mL / OUT: 1390 mL / NET: -150 mL      · Constitutional	well-groomed; no distress  · Eyes	PERRL; EOMI; conjunctiva clear  · ENMT	no gross abnormalities  · Respiratory	clear to auscultation bilaterally; no wheezes; no rales; no rhonchi; no respiratory distress  · Cardiovascular	regular rate and rhythm; S1 S2 present; no gallops; no rub; no murmur; no JVD; normal PMI; no pedal edema  · Cardiovascular Comments	no S3/s4  · Gastrointestinal	soft; nontender; nondistended; normal active bowel sounds; no guarding; no organomegaly; no palpable julia  · Genitourinary Female	normal external genitalia  · Neurological	cranial nerves II-XII intact; sensation intact; responds to pain; responds to verbal commands; deep reflexes intact; no meningismus  · Mental Status	Alert and oriented x3, appropriate  · Skin	warm and dry; color normal; no rashes; no ulcers; no subcutaneous nodules; no induration  · Lymphatic	No lymphadedenopathy  · Musculoskeletal	normal; ROM intact; strength 5/5 bilateral upper extremities; strength 5/5 bilateral lower extremities; no joint swelling; no joint erythema; no joint warmth; no calf tenderness; no chest wall tenderness   Possible transplant today  WBC 18.95  Afebrile  ____________________________________________________  ROS  GENERAL: denies chills, , night sweats, weight loss.   PSYCH: denies depression, anxiety, suicidal ideation, hallucination, and delusions  SKIN: no rash or lesions; no color changes, no abnormal nevi,no  dryness, and nojaundice    EYES: denies visual changes, floaters, pain, inflammation, blurred vision, and discharge  ENT: denies tinnitus, vertigo, epistaxis, oral lesion, and decreased acuity  PULM: denies, hemoptysis, pleurisy  CVS: denies angina, palpitations,+ orthopnea, no syncope, or heart murmur  GI: denies constipation, diarrhea, melena, abdominal pain, nausea.   : denies dysuria, frequency, discharge, incontinence, stones or macroscopic hematuria  MS: no arthralgias, no erythema or swelling, no myalgias, noedema, or lower back pain.   CNS: denies numbness, dizziness, seizure, or tremor  ENDO: denies heat/cold intolerance, polyuria, polydipsia, malaise.    HEME: denies bruising, bleeding, lymphadenopathy, anemia, and calf pain    Allergies  No Known Allergies    __________________________________________________  MEDS:  MEDICATIONS  (STANDING):  albuterol/ipratropium for Nebulization 3 every 6 hours  benzonatate 100 every 8 hours PRN  guaiFENesin Oral Liquid (Sugar-Free) 100 every 6 hours PRN  heparin   Injectable 5000 every 8 hours  lactulose Syrup 10 two times a day  levothyroxine 100 daily  midodrine. 15 every 8 hours  octreotide  Injectable 200 every 8 hours  ondansetron Injectable 4 once  ondansetron Injectable 4 every 6 hours PRN  oxyCODONE    IR 2.5 every 12 hours PRN  pantoprazole    Tablet 40 before breakfast  polyethylene glycol 3350 17 two times a day  simethicone 80 once  traMADol 25 every 4 hours PRN    _________________________________________________  ANTIMICOBIALS  ceFAZolin   IVPB 2000 every 8 hours  fluconAZOLE   Tablet 200 daily  piperacillin/tazobactam IVPB.. 3.375 every 8 hours      GENERAL LABS              7.8                  x    | x    | x            18.76 >-----------< 235     ------------------------< x                     22.9                 x    | x    | x                                            Ca x     Mg x     Ph x          Urinalysis Basic - ( 2023 13:03 )    Color: Dark Yellow / Appearance: Slightly Turbid / S.028 / pH: x  Gluc: x / Ketone: Trace  / Bili: Large / Urobili: Negative   Blood: x / Protein: 100 mg/dl / Nitrite: Negative   Leuk Esterase: Small / RBC: 3 /hpf / WBC 3 /HPF   Sq Epi: x / Non Sq Epi: x / Bacteria: Negative        _________________________________________________  MICROBIOLOGY  -----------    Culture - Body Fluid with Gram Stain (collected 2023 18:34)  Source: Ascites Fl Ascites Fluid  Gram Stain (2023 23:32):    No polymorphonuclear cells seen    No organisms seen    by cytocentrifuge    Culture - Blood (collected 2023 12:35)  Source: .Blood Blood-Peripheral  Preliminary Report (2023 15:02):    No growth to date.    Culture - Blood (collected 2023 12:35)  Source: .Blood Blood-Peripheral  Preliminary Report (2023 15:02):    No growth to date.    Culture - Blood (collected 2023 06:31)  Source: .Blood Blood  Preliminary Report (2023 12:01):    No growth to date.    Culture - Blood (collected 2023 06:31)  Source: .Blood Blood  Preliminary Report (2023 12:01):    No growth to date.    Culture - Quantitative BAL Immunocompromised (collected 2023 19:44)  Source: .Bronchial Bronchoalveolar Lavage  Gram Stain (2023 03:40):    polymorphonuclear leukocytes per low power field    No squamous epithelial cells per low power field    Gram Negative Rods per oil power field    by cytocentrifuge  Final Report (2023 15:18):    No growth at 48 hours    Culture - Fungal, Bronchial (collected 2023 19:40)  Source: .Bronchial Bronchial Lavage  Preliminary Report (2023 15:03):    Culture is being performed. Fungal cultures are held for 4 weeks.    Culture - Bronchial (collected 2023 19:40)  Source: .Bronchial Bronchial Lavage  Gram Stain (2023 03:39):    Rare polymorphonuclear leukocytes per low power field    No squamous epithelial cells per low power field    No organisms seen per oil power field  Final Report (2023 15:58):    No growth at 48 hours    Culture - Acid Fast - Bronchial w/Smear (collected 2023 19:40)  Source: .Bronchial Bronchial Lavage  Preliminary Report (2023 15:09):    Culture is being performed.            CMVPCR Log: 3.31 Hdn29QS/mL ( @ 06:41)  CMVPCR Log: 3.18 Qgz15XD/mL ( @ 19:40)          Fungitell:   _______________________________________________  PERTINENT IMAGING  _________________________________________________  Physical Exam:   T(C): 37.1 (23 @ 09:23), Max: 37.1 (23 @ 09:23)  HR: 86 (23 @ 09:23) (84 - 98)  BP: 100/49 (23 @ 09:23) (96/54 - 101/65)  RR: 18 (23 @ 09:23) (18 - 18)  SpO2: 96% (23 @ 09:23) (95% - 97%)    23 @ 07:01  -  23 @ 07:00  --------------------------------------------------------  IN: 1240 mL / OUT: 1390 mL / NET: -150 mL      · Constitutional	well-groomed; no distress  · Eyes	PERRL; EOMI; jaundice  · ENMT	no gross abnormalities  · Respiratory	clear to auscultation bilaterally; no wheezes; no rales; no rhonchi; no respiratory distress  · Cardiovascular	regular rate and rhythm; S1 S2 present; no gallops; no rub; no murmur; no JVD; normal PMI; no pedal edema  · Cardiovascular Comments	no S3/s4  · Gastrointestinal	distended, non tender  · Neurological	cranial nerves II-XII intact; sensation intact; responds to pain; responds to verbal commands; deep reflexes intact; no meningismus  · Mental Status	Alert and oriented x3, appropriate  · Skin	warm and dry; color normal; no rashes; no ulcers; no subcutaneous nodules; no induration  · Lymphatic	No lymphadedenopathy  · Musculoskeletal	normal; ROM intact; strength 5/5 bilateral upper extremities; strength 5/5 bilateral lower extremities; no joint swelling; no joint erythema; no joint warmth; no calf tenderness; no chest wall tenderness   Possible transplant today  no complaints, dry cough  WBC 18.95  Afebrile  ____________________________________________________  ROS  GENERAL: denies chills, , night sweats, weight loss.   PSYCH: denies depression, anxiety, suicidal ideation, hallucination, and delusions  SKIN: no rash or lesions; no color changes, no abnormal nevi,no  dryness, and nojaundice    EYES: denies visual changes, floaters, pain, inflammation, blurred vision, and discharge  ENT: denies tinnitus, vertigo, epistaxis, oral lesion, and decreased acuity  PULM: denies, hemoptysis, pleurisy  CVS: denies angina, palpitations,+ orthopnea, no syncope, or heart murmur  GI: denies constipation, diarrhea, melena, abdominal pain, nausea.   : denies dysuria, frequency, discharge, incontinence, stones or macroscopic hematuria  MS: no arthralgias, no erythema or swelling, no myalgias, noedema, or lower back pain.   CNS: denies numbness, dizziness, seizure, or tremor  ENDO: denies heat/cold intolerance, polyuria, polydipsia, malaise.    HEME: denies bruising, bleeding, lymphadenopathy, anemia, and calf pain    Allergies  No Known Allergies    __________________________________________________  MEDS:  MEDICATIONS  (STANDING):  albuterol/ipratropium for Nebulization 3 every 6 hours  benzonatate 100 every 8 hours PRN  guaiFENesin Oral Liquid (Sugar-Free) 100 every 6 hours PRN  heparin   Injectable 5000 every 8 hours  lactulose Syrup 10 two times a day  levothyroxine 100 daily  midodrine. 15 every 8 hours  octreotide  Injectable 200 every 8 hours  ondansetron Injectable 4 once  ondansetron Injectable 4 every 6 hours PRN  oxyCODONE    IR 2.5 every 12 hours PRN  pantoprazole    Tablet 40 before breakfast  polyethylene glycol 3350 17 two times a day  simethicone 80 once  traMADol 25 every 4 hours PRN    _________________________________________________  ANTIMICOBIALS  ceFAZolin   IVPB 2000 every 8 hours  fluconAZOLE   Tablet 200 daily  piperacillin/tazobactam IVPB.. 3.375 every 8 hours      GENERAL LABS              7.8                  x    | x    | x            18.76 >-----------< 235     ------------------------< x                     22.9                 x    | x    | x                                            Ca x     Mg x     Ph x          Urinalysis Basic - ( 2023 13:03 )    Color: Dark Yellow / Appearance: Slightly Turbid / S.028 / pH: x  Gluc: x / Ketone: Trace  / Bili: Large / Urobili: Negative   Blood: x / Protein: 100 mg/dl / Nitrite: Negative   Leuk Esterase: Small / RBC: 3 /hpf / WBC 3 /HPF   Sq Epi: x / Non Sq Epi: x / Bacteria: Negative        _________________________________________________  MICROBIOLOGY  -----------    Culture - Body Fluid with Gram Stain (collected 2023 18:34)  Source: Ascites Fl Ascites Fluid  Gram Stain (2023 23:32):    No polymorphonuclear cells seen    No organisms seen    by cytocentrifuge    Culture - Blood (collected 2023 12:35)  Source: .Blood Blood-Peripheral  Preliminary Report (2023 15:02):    No growth to date.    Culture - Blood (collected 2023 12:35)  Source: .Blood Blood-Peripheral  Preliminary Report (2023 15:02):    No growth to date.    Culture - Blood (collected 2023 06:31)  Source: .Blood Blood  Preliminary Report (2023 12:01):    No growth to date.    Culture - Blood (collected 2023 06:31)  Source: .Blood Blood  Preliminary Report (2023 12:01):    No growth to date.    Culture - Quantitative BAL Immunocompromised (collected 2023 19:44)  Source: .Bronchial Bronchoalveolar Lavage  Gram Stain (2023 03:40):    polymorphonuclear leukocytes per low power field    No squamous epithelial cells per low power field    Gram Negative Rods per oil power field    by cytocentrifuge  Final Report (2023 15:18):    No growth at 48 hours    Culture - Fungal, Bronchial (collected 2023 19:40)  Source: .Bronchial Bronchial Lavage  Preliminary Report (2023 15:03):    Culture is being performed. Fungal cultures are held for 4 weeks.    Culture - Bronchial (collected 2023 19:40)  Source: .Bronchial Bronchial Lavage  Gram Stain (2023 03:39):    Rare polymorphonuclear leukocytes per low power field    No squamous epithelial cells per low power field    No organisms seen per oil power field  Final Report (2023 15:58):    No growth at 48 hours    Culture - Acid Fast - Bronchial w/Smear (collected 2023 19:40)  Source: .Bronchial Bronchial Lavage  Preliminary Report (2023 15:09):    Culture is being performed.            CMVPCR Log: 3.31 Fke18CJ/mL ( @ 06:41)  CMVPCR Log: 3.18 Iss11HG/mL ( @ 19:40)          Fungitell:   _______________________________________________  PERTINENT IMAGING  _________________________________________________  Physical Exam:   T(C): 37.1 (23 @ 09:23), Max: 37.1 (23 @ 09:23)  HR: 86 (23 @ 09:23) (84 - 98)  BP: 100/49 (23 @ 09:23) (96/54 - 101/65)  RR: 18 (23 @ 09:23) (18 - 18)  SpO2: 96% (23 @ 09:23) (95% - 97%)    23 @ 07:01  -  23 @ 07:00  --------------------------------------------------------  IN: 1240 mL / OUT: 1390 mL / NET: -150 mL      · Constitutional	well-groomed; no distress  · Eyes	PERRL; EOMI; jaundice  · ENMT	no gross abnormalities  · Respiratory	clear to auscultation bilaterally; no wheezes; no rales; no rhonchi; no respiratory distress  · Cardiovascular	regular rate and rhythm; S1 S2 present; no gallops; no rub; no murmur; no JVD; normal PMI; no pedal edema  · Cardiovascular Comments	no S3/s4  · Gastrointestinal	distended, non tender  · Neurological	cranial nerves II-XII intact; sensation intact; responds to pain; responds to verbal commands; deep reflexes intact; no meningismus  · Mental Status	Alert and oriented x3, appropriate  · Skin	warm and dry; color normal; no rashes; no ulcers; no subcutaneous nodules; no induration  · Lymphatic	No lymphadedenopathy  · Musculoskeletal	normal; ROM intact; strength 5/5 bilateral upper extremities; strength 5/5 bilateral lower extremities; no joint swelling; no joint erythema; no joint warmth; no calf tenderness; no chest wall tenderness

## 2023-06-09 NOTE — PRE-ANESTHESIA EVALUATION ADULT - NSANTHPROCED_GEN_ALL_CORE
Arterial Catheter/Central Venous Catheter/Pulmonary Artery Catheter/Transesophageal Echocardiogram
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
## 2023-06-09 NOTE — PRE-ANESTHESIA EVALUATION ADULT - NSANTHOSAYNRD_GEN_A_CORE
No. HUSEYIN screening performed.  STOP BANG Legend: 0-2 = LOW Risk; 3-4 = INTERMEDIATE Risk; 5-8 = HIGH Risk

## 2023-06-09 NOTE — PRE-ANESTHESIA EVALUATION ADULT - NSPROPOSEDPROCEDFT_GEN_ALL_CORE
bronchoscopy
EGD/SAHARA
Orthotopic liver transplant

## 2023-06-09 NOTE — PRE-ANESTHESIA EVALUATION ADULT - NSPREOPDXFT_GEN_ALL_CORE
eval for transplant
GERD/VARICIES/PRE TRANSPLANT
Liver cirrhosis
End stage liver disease
End stage liver disease

## 2023-06-09 NOTE — PROGRESS NOTE ADULT - SUBJECTIVE AND OBJECTIVE BOX
Interval Events:   No acute events overnight.  Paracentesis with 800cc yesterday, negative for SBP.  Patient remains anxious.    ROS:   12 point review of systems performed and negative except otherwise noted in HPI.    Hospital Medications:  albuterol/ipratropium for Nebulization 3 milliLiter(s) Nebulizer every 6 hours  benzocaine/menthol Lozenge 1 Lozenge Oral every 2 hours PRN  benzonatate 100 milliGRAM(s) Oral every 8 hours PRN  ceFAZolin   IVPB 2000 milliGRAM(s) IV Intermittent every 8 hours  fluconAZOLE   Tablet 200 milliGRAM(s) Oral daily  folic acid 1 milliGRAM(s) Oral daily  guaiFENesin Oral Liquid (Sugar-Free) 100 milliGRAM(s) Oral every 6 hours PRN  heparin   Injectable 5000 Unit(s) SubCutaneous every 8 hours  lactulose Syrup 10 Gram(s) Oral two times a day  levothyroxine 100 MICROGram(s) Oral daily  magnesium oxide 400 milliGRAM(s) Oral three times a day with meals  midodrine. 15 milliGRAM(s) Oral every 8 hours  multivitamin 1 Tablet(s) Oral daily  octreotide  Injectable 200 MICROGram(s) SubCutaneous every 8 hours  ondansetron Injectable 4 milliGRAM(s) IV Push once  ondansetron Injectable 4 milliGRAM(s) IV Push every 6 hours PRN  oxyCODONE    IR 2.5 milliGRAM(s) Oral every 12 hours PRN  pantoprazole    Tablet 40 milliGRAM(s) Oral before breakfast  piperacillin/tazobactam IVPB.. 3.375 Gram(s) IV Intermittent every 8 hours  polyethylene glycol 3350 17 Gram(s) Oral two times a day  simethicone 80 milliGRAM(s) Chew once  thiamine 100 milliGRAM(s) Oral daily  traMADol 25 milliGRAM(s) Oral every 4 hours PRN    MAR over past 24 hours:    albuterol/ipratropium for Nebulization   3 milliLiter(s) Nebulizer (06-08-23 @ 23:04)   3 milliLiter(s) Nebulizer (06-08-23 @ 19:52)    benzocaine/menthol Lozenge   1 Lozenge Oral (06-09-23 @ 04:00)   1 Lozenge Oral (06-09-23 @ 00:39)   1 Lozenge Oral (06-08-23 @ 19:52)   1 Lozenge Oral (06-08-23 @ 12:44)   1 Lozenge Oral (06-08-23 @ 08:50)    ceFAZolin   IVPB   100 mL/Hr IV Intermittent (06-09-23 @ 00:07)   100 mL/Hr IV Intermittent (06-08-23 @ 17:52)   100 mL/Hr IV Intermittent (06-08-23 @ 08:51)    fluconAZOLE   Tablet   200 milliGRAM(s) Oral (06-08-23 @ 12:46)    folic acid   1 milliGRAM(s) Oral (06-08-23 @ 12:46)    guaiFENesin Oral Liquid (Sugar-Free)   100 milliGRAM(s) Oral (06-09-23 @ 02:21)    heparin   Injectable   5000 Unit(s) SubCutaneous (06-08-23 @ 12:46)    levothyroxine   100 MICROGram(s) Oral (06-09-23 @ 05:27)    magnesium oxide   400 milliGRAM(s) Oral (06-08-23 @ 17:52)   400 milliGRAM(s) Oral (06-08-23 @ 12:46)   400 milliGRAM(s) Oral (06-08-23 @ 08:50)    midodrine.   15 milliGRAM(s) Oral (06-09-23 @ 05:52)   15 milliGRAM(s) Oral (06-08-23 @ 20:47)   15 milliGRAM(s) Oral (06-08-23 @ 12:49)    multivitamin   1 Tablet(s) Oral (06-08-23 @ 12:46)    octreotide  Injectable   200 MICROGram(s) SubCutaneous (06-09-23 @ 05:27)   200 MICROGram(s) SubCutaneous (06-08-23 @ 21:24)   200 MICROGram(s) SubCutaneous (06-08-23 @ 15:27)    oxyCODONE    IR   2.5 milliGRAM(s) Oral (06-09-23 @ 00:08)   2.5 milliGRAM(s) Oral (06-08-23 @ 12:45)    pantoprazole    Tablet   40 milliGRAM(s) Oral (06-09-23 @ 05:52)    piperacillin/tazobactam IVPB..   25 mL/Hr IV Intermittent (06-09-23 @ 00:07)   25 mL/Hr IV Intermittent (06-08-23 @ 17:53)   25 mL/Hr IV Intermittent (06-08-23 @ 08:54)    thiamine   100 milliGRAM(s) Oral (06-08-23 @ 12:46)    traMADol   25 milliGRAM(s) Oral (06-08-23 @ 20:45)      Home Medications:  Last Order Reconciliation Date: 05-16-23 @ 13:29 (Admission Reconciliation)  acetylcysteine 20% intravenous solution: 40 milliliter(s) intravenous every 24 hours  cefTRIAXone: 2 gram(s) intravenously once a day  Constulose 10 g/15 mL oral liquid: 30 milliliter(s) orally 3 times a day  folic acid 1 mg oral tablet: 1 tab(s) orally once a day  guaiFENesin 100 mg/5 mL oral liquid: 5 milliliter(s) orally every 6 hours As needed Cough  levothyroxine 100 mcg (0.1 mg) oral tablet: 1 tab(s) orally once a day  LORazepam: 1 milliliter(s) intravenous every 4 hours as needed for Anxiety  morphine 15 mg oral tablet: 1 tab(s) orally every 8 hours  Multiple Vitamins oral tablet: 1 tab(s) orally once a day  mupirocin 2% topical ointment: Apply topically to affected area 2 times a day to both nostrils  ondansetron 2 mg/mL injectable solution: 4 milligram(s) injectable every 8 hours as needed for as needed  sodium/potassium/phosphorus 160 mg-280 mg-250 mg oral powder for reconstitution: 1 packet(s) orally 3 times a day (with meals)  thiamine 100 mg oral tablet: 1 tab(s) orally once a day    PHYSICAL EXAM:   Vital Signs last 24 hours:  T(F): 98 (06-09-23 @ 05:03), Max: 98.6 (06-08-23 @ 12:16)  HR: 84 (06-09-23 @ 05:03) (84 - 98)  BP: 97/58 (06-09-23 @ 05:03) (96/54 - 106/63)  BP(mean): --  ABP: --  ABP(mean): --  RR: 18 (06-09-23 @ 05:03) (18 - 18)  SpO2: 96% (06-09-23 @ 05:03) (95% - 97%)    I&Os:    06-08-23 @ 07:01  -  06-09-23 @ 07:00  --------------------------------------------------------  IN:    Oral Fluid: 1240 mL  Total IN: 1240 mL    OUT:    Voided (mL): 1390 mL  Total OUT: 1390 mL    Total NET: -150 mL        BMI (kg/m2): 22.1 (06-05-23 @ 16:42)  GENERAL: no acute distress  NEURO: alert  HEENT: NCAT, no conjunctival pallor appreciated  CHEST: no respiratory distress, no accessory muscle use  CARDIAC: regular rate, +S1/S2  ABDOMEN: soft, nontender, no rebound or guarding  EXTREMITIES: warm, well perfused  SKIN: no lesions noted    DIAGNOSTICS:  WBC      Hg       PLT      Na       K        CO2     BUN      Cr       ALT      AST      TB       ALP  18.76    7.8      235      ------   ------   ------   ------   ------   ------   ------   ------   ------   06-09-23 @ 07:02  ------   ------   ------   131      3.8      16       36       2.04     <5       146      28.2     110      06-09-23 @ 07:01  20.97    8.1      263      131      4.1      16       31       2.09     <5       151      28.6     131      06-08-23 @ 06:41  22.06    8.8      294      ------   ------   ------   ------   ------   ------   ------   ------   ------   06-07-23 @ 06:31  ------   ------   ------   127      4.3      17       27       1.92     <5       77       30.1     164      06-07-23 @ 06:30    PT             INR            MELDwith  MELDw/o  19.4           1.66           --             --             06-09-23 @ 07:02  19.7           1.69           --             --             06-08-23 @ 06:42  22.1           1.91           --             --             06-07-23 @ 06:31  25.2           2.16           --             --             06-06-23 @ 06:27  25.4           2.17           --             --             06-05-23 @ 06:47
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
## 2023-06-09 NOTE — PRE-ANESTHESIA EVALUATION ADULT - NSANTHPMHFT_GEN_ALL_CORE
Medical record reviewed and discussed with surgeon and care team.
Medical record reviewed and discussed with surgeon and care team.
59 yo F h/o alcoholic cirrhosis with decompensation (MELD 35) here for orthotopic liver transplant  h/o MSSA bacteremia on Zosyn with neg blood cultures  + Ascites s/p recent tap  + Jaundice  Negative cardiac work up

## 2023-06-09 NOTE — PRE-ANESTHESIA EVALUATION ADULT - MALLAMPATI CLASS
Class I (easy) - visualization of the soft palate, fauces, uvula, and both anterior and posterior pillars
Class II - visualization of the soft palate, fauces, and uvula

## 2023-06-10 LAB
ALBUMIN SERPL ELPH-MCNC: 2.9 G/DL — LOW (ref 3.3–5)
ALBUMIN SERPL ELPH-MCNC: 3 G/DL — LOW (ref 3.3–5)
ALBUMIN SERPL ELPH-MCNC: 3.2 G/DL — LOW (ref 3.3–5)
ALBUMIN SERPL ELPH-MCNC: 3.3 G/DL — SIGNIFICANT CHANGE UP (ref 3.3–5)
ALP SERPL-CCNC: 36 U/L — LOW (ref 40–120)
ALP SERPL-CCNC: 39 U/L — LOW (ref 40–120)
ALP SERPL-CCNC: 42 U/L — SIGNIFICANT CHANGE UP (ref 40–120)
ALP SERPL-CCNC: 43 U/L — SIGNIFICANT CHANGE UP (ref 40–120)
ALT FLD-CCNC: 100 U/L — HIGH (ref 10–45)
ALT FLD-CCNC: 148 U/L — HIGH (ref 10–45)
ALT FLD-CCNC: 29 U/L — SIGNIFICANT CHANGE UP (ref 10–45)
ALT FLD-CCNC: 50 U/L — HIGH (ref 10–45)
AMMONIA BLD-MCNC: 20 UMOL/L — SIGNIFICANT CHANGE UP (ref 11–55)
ANION GAP SERPL CALC-SCNC: 11 MMOL/L — SIGNIFICANT CHANGE UP (ref 5–17)
ANION GAP SERPL CALC-SCNC: 12 MMOL/L — SIGNIFICANT CHANGE UP (ref 5–17)
ANION GAP SERPL CALC-SCNC: 15 MMOL/L — SIGNIFICANT CHANGE UP (ref 5–17)
ANION GAP SERPL CALC-SCNC: 15 MMOL/L — SIGNIFICANT CHANGE UP (ref 5–17)
APTT BLD: 30.7 SEC — SIGNIFICANT CHANGE UP (ref 27.5–35.5)
APTT BLD: 31 SEC — SIGNIFICANT CHANGE UP (ref 27.5–35.5)
APTT BLD: 31.9 SEC — SIGNIFICANT CHANGE UP (ref 27.5–35.5)
APTT BLD: 33.5 SEC — SIGNIFICANT CHANGE UP (ref 27.5–35.5)
AST SERPL-CCNC: 155 U/L — HIGH (ref 10–40)
AST SERPL-CCNC: 208 U/L — HIGH (ref 10–40)
AST SERPL-CCNC: 304 U/L — HIGH (ref 10–40)
AST SERPL-CCNC: 419 U/L — HIGH (ref 10–40)
BASE EXCESS BLDMV CALC-SCNC: -0.5 MMOL/L — SIGNIFICANT CHANGE UP (ref -3–3)
BASE EXCESS BLDMV CALC-SCNC: -2.9 MMOL/L — SIGNIFICANT CHANGE UP (ref -3–3)
BILIRUB SERPL-MCNC: 5.6 MG/DL — HIGH (ref 0.2–1.2)
BILIRUB SERPL-MCNC: 5.7 MG/DL — HIGH (ref 0.2–1.2)
BILIRUB SERPL-MCNC: 6.7 MG/DL — HIGH (ref 0.2–1.2)
BILIRUB SERPL-MCNC: 6.9 MG/DL — HIGH (ref 0.2–1.2)
BUN SERPL-MCNC: 29 MG/DL — HIGH (ref 7–23)
BUN SERPL-MCNC: 30 MG/DL — HIGH (ref 7–23)
BUN SERPL-MCNC: 34 MG/DL — HIGH (ref 7–23)
BUN SERPL-MCNC: 37 MG/DL — HIGH (ref 7–23)
CALCIUM SERPL-MCNC: 8.7 MG/DL — SIGNIFICANT CHANGE UP (ref 8.4–10.5)
CALCIUM SERPL-MCNC: 8.8 MG/DL — SIGNIFICANT CHANGE UP (ref 8.4–10.5)
CALCIUM SERPL-MCNC: 8.9 MG/DL — SIGNIFICANT CHANGE UP (ref 8.4–10.5)
CALCIUM SERPL-MCNC: 9.4 MG/DL — SIGNIFICANT CHANGE UP (ref 8.4–10.5)
CHLORIDE SERPL-SCNC: 107 MMOL/L — SIGNIFICANT CHANGE UP (ref 96–108)
CHLORIDE SERPL-SCNC: 107 MMOL/L — SIGNIFICANT CHANGE UP (ref 96–108)
CHLORIDE SERPL-SCNC: 108 MMOL/L — SIGNIFICANT CHANGE UP (ref 96–108)
CHLORIDE SERPL-SCNC: 108 MMOL/L — SIGNIFICANT CHANGE UP (ref 96–108)
CO2 BLDMV-SCNC: 23 MMOL/L — SIGNIFICANT CHANGE UP (ref 21–29)
CO2 BLDMV-SCNC: 26 MMOL/L — SIGNIFICANT CHANGE UP (ref 21–29)
CO2 SERPL-SCNC: 22 MMOL/L — SIGNIFICANT CHANGE UP (ref 22–31)
CO2 SERPL-SCNC: 22 MMOL/L — SIGNIFICANT CHANGE UP (ref 22–31)
CO2 SERPL-SCNC: 23 MMOL/L — SIGNIFICANT CHANGE UP (ref 22–31)
CO2 SERPL-SCNC: 24 MMOL/L — SIGNIFICANT CHANGE UP (ref 22–31)
COHGB MFR BLDMV: 1.9 % — SIGNIFICANT CHANGE UP (ref 0–3)
COHGB MFR BLDMV: 2.2 % — SIGNIFICANT CHANGE UP (ref 0–3)
CREAT SERPL-MCNC: 1.52 MG/DL — HIGH (ref 0.5–1.3)
CREAT SERPL-MCNC: 1.55 MG/DL — HIGH (ref 0.5–1.3)
CREAT SERPL-MCNC: 1.69 MG/DL — HIGH (ref 0.5–1.3)
CREAT SERPL-MCNC: 1.8 MG/DL — HIGH (ref 0.5–1.3)
EGFR: 36 ML/MIN/1.73M2 — LOW
EGFR: 39 ML/MIN/1.73M2 — LOW
EGFR: 43 ML/MIN/1.73M2 — LOW
EGFR: 44 ML/MIN/1.73M2 — LOW
GAS PNL BLDA: SIGNIFICANT CHANGE UP
GAS PNL BLDMV: SIGNIFICANT CHANGE UP
GLUCOSE BLDC GLUCOMTR-MCNC: 159 MG/DL — HIGH (ref 70–99)
GLUCOSE BLDC GLUCOMTR-MCNC: 166 MG/DL — HIGH (ref 70–99)
GLUCOSE BLDC GLUCOMTR-MCNC: 177 MG/DL — HIGH (ref 70–99)
GLUCOSE SERPL-MCNC: 153 MG/DL — HIGH (ref 70–99)
GLUCOSE SERPL-MCNC: 156 MG/DL — HIGH (ref 70–99)
GLUCOSE SERPL-MCNC: 158 MG/DL — HIGH (ref 70–99)
GLUCOSE SERPL-MCNC: 191 MG/DL — HIGH (ref 70–99)
GRAM STN FLD: SIGNIFICANT CHANGE UP
HCO3 BLDMV-SCNC: 22 MMOL/L — SIGNIFICANT CHANGE UP (ref 20–28)
HCO3 BLDMV-SCNC: 25 MMOL/L — SIGNIFICANT CHANGE UP (ref 20–28)
HCT VFR BLD CALC: 18.5 % — CRITICAL LOW (ref 34.5–45)
HCT VFR BLD CALC: 21 % — CRITICAL LOW (ref 34.5–45)
HCT VFR BLD CALC: 25.2 % — LOW (ref 34.5–45)
HCT VFR BLD CALC: 27.9 % — LOW (ref 34.5–45)
HEPARINASE TEG R TIME: 7.2 MIN — SIGNIFICANT CHANGE UP (ref 4.3–8.3)
HEPARINASE TEG R TIME: 9.2 MIN (ref 4.3–8.3)
HGB BLD-MCNC: 6.7 G/DL — CRITICAL LOW (ref 11.5–15.5)
HGB BLD-MCNC: 7.7 G/DL — LOW (ref 11.5–15.5)
HGB BLD-MCNC: 8.8 G/DL — LOW (ref 11.5–15.5)
HGB BLD-MCNC: 9.7 G/DL — LOW (ref 11.5–15.5)
HGB FLD-MCNC: 10.1 G/DL — LOW (ref 11.7–16.1)
HGB FLD-MCNC: 8.7 G/DL — LOW (ref 11.7–16.1)
HOROWITZ INDEX BLDMV+IHG-RTO: 50 — SIGNIFICANT CHANGE UP
INR BLD: 1.32 RATIO — HIGH (ref 0.88–1.16)
INR BLD: 1.32 RATIO — HIGH (ref 0.88–1.16)
INR BLD: 1.47 RATIO — HIGH (ref 0.88–1.16)
INR BLD: 1.53 RATIO — HIGH (ref 0.88–1.16)
LEGIONELLA DNA SPEC NAA+PROBE: NEGATIVE — SIGNIFICANT CHANGE UP
MAGNESIUM SERPL-MCNC: 2 MG/DL — SIGNIFICANT CHANGE UP (ref 1.6–2.6)
MAGNESIUM SERPL-MCNC: 2.1 MG/DL — SIGNIFICANT CHANGE UP (ref 1.6–2.6)
MAGNESIUM SERPL-MCNC: 2.1 MG/DL — SIGNIFICANT CHANGE UP (ref 1.6–2.6)
MAGNESIUM SERPL-MCNC: 2.2 MG/DL — SIGNIFICANT CHANGE UP (ref 1.6–2.6)
MCHC RBC-ENTMCNC: 28.8 PG — SIGNIFICANT CHANGE UP (ref 27–34)
MCHC RBC-ENTMCNC: 29.1 PG — SIGNIFICANT CHANGE UP (ref 27–34)
MCHC RBC-ENTMCNC: 29.9 PG — SIGNIFICANT CHANGE UP (ref 27–34)
MCHC RBC-ENTMCNC: 30.2 PG — SIGNIFICANT CHANGE UP (ref 27–34)
MCHC RBC-ENTMCNC: 34.8 GM/DL — SIGNIFICANT CHANGE UP (ref 32–36)
MCHC RBC-ENTMCNC: 34.9 GM/DL — SIGNIFICANT CHANGE UP (ref 32–36)
MCHC RBC-ENTMCNC: 36.2 GM/DL — HIGH (ref 32–36)
MCHC RBC-ENTMCNC: 36.7 GM/DL — HIGH (ref 32–36)
MCV RBC AUTO: 82.4 FL — SIGNIFICANT CHANGE UP (ref 80–100)
MCV RBC AUTO: 82.4 FL — SIGNIFICANT CHANGE UP (ref 80–100)
MCV RBC AUTO: 82.6 FL — SIGNIFICANT CHANGE UP (ref 80–100)
MCV RBC AUTO: 83.8 FL — SIGNIFICANT CHANGE UP (ref 80–100)
METHGB MFR BLDMV: 0.7 % — SIGNIFICANT CHANGE UP (ref 0–1.5)
METHGB MFR BLDMV: 1.7 % — HIGH (ref 0–1.5)
NRBC # BLD: 0 /100 WBCS — SIGNIFICANT CHANGE UP (ref 0–0)
O2 CT VFR BLD CALC: 57 MMHG — SIGNIFICANT CHANGE UP (ref 30–65)
O2 CT VFR BLD CALC: 60 MMHG — SIGNIFICANT CHANGE UP (ref 30–65)
O2 CT VFR BLDMV CALC: 11 ML/DL — LOW (ref 18–22)
O2 CT VFR BLDMV CALC: 12.6 ML/DL — LOW (ref 18–22)
OXYHGB MFR BLDMV: 88.4 % — LOW (ref 90–95)
OXYHGB MFR BLDMV: 89.6 % — LOW (ref 90–95)
P JIROVECII DNA L RESP QL NAA+NON-PROBE: NEGATIVE — SIGNIFICANT CHANGE UP
PCO2 BLDMV: 37 MMHG — SIGNIFICANT CHANGE UP (ref 30–65)
PCO2 BLDMV: 43 MMHG — SIGNIFICANT CHANGE UP (ref 30–65)
PH BLDMV: 7.37 — SIGNIFICANT CHANGE UP (ref 7.32–7.45)
PH BLDMV: 7.38 — SIGNIFICANT CHANGE UP (ref 7.32–7.45)
PHOSPHATE SERPL-MCNC: 3.6 MG/DL — SIGNIFICANT CHANGE UP (ref 2.5–4.5)
PHOSPHATE SERPL-MCNC: 4 MG/DL — SIGNIFICANT CHANGE UP (ref 2.5–4.5)
PHOSPHATE SERPL-MCNC: 4.1 MG/DL — SIGNIFICANT CHANGE UP (ref 2.5–4.5)
PHOSPHATE SERPL-MCNC: 4.3 MG/DL — SIGNIFICANT CHANGE UP (ref 2.5–4.5)
PLATELET # BLD AUTO: 101 K/UL — LOW (ref 150–400)
PLATELET # BLD AUTO: 124 K/UL — LOW (ref 150–400)
PLATELET # BLD AUTO: 75 K/UL — LOW (ref 150–400)
PLATELET # BLD AUTO: 89 K/UL — LOW (ref 150–400)
POTASSIUM SERPL-MCNC: 3.6 MMOL/L — SIGNIFICANT CHANGE UP (ref 3.5–5.3)
POTASSIUM SERPL-MCNC: 4.1 MMOL/L — SIGNIFICANT CHANGE UP (ref 3.5–5.3)
POTASSIUM SERPL-MCNC: 4.5 MMOL/L — SIGNIFICANT CHANGE UP (ref 3.5–5.3)
POTASSIUM SERPL-MCNC: 4.9 MMOL/L — SIGNIFICANT CHANGE UP (ref 3.5–5.3)
POTASSIUM SERPL-SCNC: 3.6 MMOL/L — SIGNIFICANT CHANGE UP (ref 3.5–5.3)
POTASSIUM SERPL-SCNC: 4.1 MMOL/L — SIGNIFICANT CHANGE UP (ref 3.5–5.3)
POTASSIUM SERPL-SCNC: 4.5 MMOL/L — SIGNIFICANT CHANGE UP (ref 3.5–5.3)
POTASSIUM SERPL-SCNC: 4.9 MMOL/L — SIGNIFICANT CHANGE UP (ref 3.5–5.3)
PROT SERPL-MCNC: 4.2 G/DL — LOW (ref 6–8.3)
PROT SERPL-MCNC: 4.4 G/DL — LOW (ref 6–8.3)
PROT SERPL-MCNC: 4.5 G/DL — LOW (ref 6–8.3)
PROT SERPL-MCNC: 4.5 G/DL — LOW (ref 6–8.3)
PROTHROM AB SERPL-ACNC: 15.2 SEC — HIGH (ref 10.5–13.4)
PROTHROM AB SERPL-ACNC: 15.2 SEC — HIGH (ref 10.5–13.4)
PROTHROM AB SERPL-ACNC: 17 SEC — HIGH (ref 10.5–13.4)
PROTHROM AB SERPL-ACNC: 17.8 SEC — HIGH (ref 10.5–13.4)
RAPIDTEG MAXIMUM AMPLITUDE: 49.3 MM (ref 52–70)
RAPIDTEG MAXIMUM AMPLITUDE: 53.8 MM — SIGNIFICANT CHANGE UP (ref 52–70)
RAPIDTEG MAXIMUM AMPLITUDE: 60.1 MM — SIGNIFICANT CHANGE UP (ref 52–70)
RBC # BLD: 2.24 M/UL — LOW (ref 3.8–5.2)
RBC # BLD: 2.55 M/UL — LOW (ref 3.8–5.2)
RBC # BLD: 3.06 M/UL — LOW (ref 3.8–5.2)
RBC # BLD: 3.33 M/UL — LOW (ref 3.8–5.2)
RBC # FLD: 15.5 % — HIGH (ref 10.3–14.5)
RBC # FLD: 15.8 % — HIGH (ref 10.3–14.5)
RBC # FLD: 16.1 % — HIGH (ref 10.3–14.5)
RBC # FLD: 16.8 % — HIGH (ref 10.3–14.5)
SAO2 % BLDMV: 91.9 % — HIGH (ref 60–90)
SAO2 % BLDMV: 92 % — HIGH (ref 60–90)
SODIUM SERPL-SCNC: 141 MMOL/L — SIGNIFICANT CHANGE UP (ref 135–145)
SODIUM SERPL-SCNC: 143 MMOL/L — SIGNIFICANT CHANGE UP (ref 135–145)
SODIUM SERPL-SCNC: 145 MMOL/L — SIGNIFICANT CHANGE UP (ref 135–145)
SODIUM SERPL-SCNC: 145 MMOL/L — SIGNIFICANT CHANGE UP (ref 135–145)
SPECIMEN SOURCE: SIGNIFICANT CHANGE UP
TEG FUNCTIONAL FIBRINOGEN: 16.2 MM — SIGNIFICANT CHANGE UP (ref 15–32)
TEG FUNCTIONAL FIBRINOGEN: 17.9 MM — SIGNIFICANT CHANGE UP (ref 15–32)
TEG FUNCTIONAL FIBRINOGEN: 22.1 MM — SIGNIFICANT CHANGE UP (ref 15–32)
TEG LY30 (LYSIS): 0 % — SIGNIFICANT CHANGE UP (ref 0–2.6)
TEG MAXIMUM AMPLITUDE: 44.7 MM (ref 52–69)
TEG MAXIMUM AMPLITUDE: 61.4 MM — SIGNIFICANT CHANGE UP (ref 52–69)
TEG REACTION TIME: 10.2 MIN (ref 4.6–9.1)
TEG REACTION TIME: 5.8 MIN — SIGNIFICANT CHANGE UP (ref 4.6–9.1)
TEG REACTION TIME: 6.2 MIN — SIGNIFICANT CHANGE UP (ref 4.6–9.1)
WBC # BLD: 4.97 K/UL — SIGNIFICANT CHANGE UP (ref 3.8–10.5)
WBC # BLD: 5.06 K/UL — SIGNIFICANT CHANGE UP (ref 3.8–10.5)
WBC # BLD: 5.63 K/UL — SIGNIFICANT CHANGE UP (ref 3.8–10.5)
WBC # BLD: 5.65 K/UL — SIGNIFICANT CHANGE UP (ref 3.8–10.5)
WBC # FLD AUTO: 4.97 K/UL — SIGNIFICANT CHANGE UP (ref 3.8–10.5)
WBC # FLD AUTO: 5.06 K/UL — SIGNIFICANT CHANGE UP (ref 3.8–10.5)
WBC # FLD AUTO: 5.63 K/UL — SIGNIFICANT CHANGE UP (ref 3.8–10.5)
WBC # FLD AUTO: 5.65 K/UL — SIGNIFICANT CHANGE UP (ref 3.8–10.5)

## 2023-06-10 PROCEDURE — 99253 IP/OBS CNSLTJ NEW/EST LOW 45: CPT

## 2023-06-10 PROCEDURE — 71045 X-RAY EXAM CHEST 1 VIEW: CPT | Mod: 26

## 2023-06-10 PROCEDURE — 93975 VASCULAR STUDY: CPT | Mod: 26

## 2023-06-10 PROCEDURE — 76705 ECHO EXAM OF ABDOMEN: CPT | Mod: 26,59

## 2023-06-10 PROCEDURE — 99232 SBSQ HOSP IP/OBS MODERATE 35: CPT

## 2023-06-10 PROCEDURE — 99291 CRITICAL CARE FIRST HOUR: CPT | Mod: GC

## 2023-06-10 RX ORDER — HYDROMORPHONE HYDROCHLORIDE 2 MG/ML
0.25 INJECTION INTRAMUSCULAR; INTRAVENOUS; SUBCUTANEOUS
Refills: 0 | Status: DISCONTINUED | OUTPATIENT
Start: 2023-06-10 | End: 2023-06-12

## 2023-06-10 RX ORDER — HYDROMORPHONE HYDROCHLORIDE 2 MG/ML
0.5 INJECTION INTRAMUSCULAR; INTRAVENOUS; SUBCUTANEOUS
Refills: 0 | Status: DISCONTINUED | OUTPATIENT
Start: 2023-06-10 | End: 2023-06-10

## 2023-06-10 RX ORDER — CHLORHEXIDINE GLUCONATE 213 G/1000ML
15 SOLUTION TOPICAL EVERY 12 HOURS
Refills: 0 | Status: DISCONTINUED | OUTPATIENT
Start: 2023-06-10 | End: 2023-06-10

## 2023-06-10 RX ORDER — CEFAZOLIN SODIUM 1 G
2000 VIAL (EA) INJECTION EVERY 8 HOURS
Refills: 0 | Status: DISCONTINUED | OUTPATIENT
Start: 2023-06-10 | End: 2023-06-11

## 2023-06-10 RX ORDER — INSULIN LISPRO 100/ML
VIAL (ML) SUBCUTANEOUS EVERY 6 HOURS
Refills: 0 | Status: DISCONTINUED | OUTPATIENT
Start: 2023-06-10 | End: 2023-06-12

## 2023-06-10 RX ORDER — MYCOPHENOLATE MOFETIL 250 MG/1
1000 CAPSULE ORAL
Refills: 0 | Status: DISCONTINUED | OUTPATIENT
Start: 2023-06-10 | End: 2023-06-12

## 2023-06-10 RX ORDER — ALBUMIN HUMAN 25 %
250 VIAL (ML) INTRAVENOUS ONCE
Refills: 0 | Status: COMPLETED | OUTPATIENT
Start: 2023-06-10 | End: 2023-06-10

## 2023-06-10 RX ORDER — HYDROMORPHONE HYDROCHLORIDE 2 MG/ML
0.5 INJECTION INTRAMUSCULAR; INTRAVENOUS; SUBCUTANEOUS
Refills: 0 | Status: DISCONTINUED | OUTPATIENT
Start: 2023-06-10 | End: 2023-06-12

## 2023-06-10 RX ORDER — PIPERACILLIN AND TAZOBACTAM 4; .5 G/20ML; G/20ML
3.38 INJECTION, POWDER, LYOPHILIZED, FOR SOLUTION INTRAVENOUS EVERY 12 HOURS
Refills: 0 | Status: DISCONTINUED | OUTPATIENT
Start: 2023-06-10 | End: 2023-06-10

## 2023-06-10 RX ORDER — SODIUM CHLORIDE 9 MG/ML
1000 INJECTION, SOLUTION INTRAVENOUS
Refills: 0 | Status: DISCONTINUED | OUTPATIENT
Start: 2023-06-10 | End: 2023-06-12

## 2023-06-10 RX ORDER — POTASSIUM CHLORIDE 20 MEQ
20 PACKET (EA) ORAL
Refills: 0 | Status: COMPLETED | OUTPATIENT
Start: 2023-06-10 | End: 2023-06-10

## 2023-06-10 RX ORDER — VALGANCICLOVIR 450 MG/1
450 TABLET, FILM COATED ORAL DAILY
Refills: 0 | Status: DISCONTINUED | OUTPATIENT
Start: 2023-06-10 | End: 2023-06-12

## 2023-06-10 RX ORDER — ALBUMIN HUMAN 25 %
250 VIAL (ML) INTRAVENOUS
Refills: 0 | Status: COMPLETED | OUTPATIENT
Start: 2023-06-10 | End: 2023-06-10

## 2023-06-10 RX ORDER — DEXMEDETOMIDINE HYDROCHLORIDE IN 0.9% SODIUM CHLORIDE 4 UG/ML
0.5 INJECTION INTRAVENOUS
Qty: 200 | Refills: 0 | Status: DISCONTINUED | OUTPATIENT
Start: 2023-06-10 | End: 2023-06-10

## 2023-06-10 RX ORDER — VALGANCICLOVIR 450 MG/1
900 TABLET, FILM COATED ORAL EVERY 12 HOURS
Refills: 0 | Status: DISCONTINUED | OUTPATIENT
Start: 2023-06-10 | End: 2023-06-10

## 2023-06-10 RX ORDER — CHLORHEXIDINE GLUCONATE 213 G/1000ML
1 SOLUTION TOPICAL
Refills: 0 | Status: DISCONTINUED | OUTPATIENT
Start: 2023-06-10 | End: 2023-06-12

## 2023-06-10 RX ORDER — CHLORHEXIDINE GLUCONATE 213 G/1000ML
1 SOLUTION TOPICAL
Refills: 0 | Status: DISCONTINUED | OUTPATIENT
Start: 2023-06-10 | End: 2023-06-26

## 2023-06-10 RX ORDER — FLUCONAZOLE 150 MG/1
400 TABLET ORAL DAILY
Refills: 0 | Status: DISCONTINUED | OUTPATIENT
Start: 2023-06-10 | End: 2023-06-12

## 2023-06-10 RX ORDER — PANTOPRAZOLE SODIUM 20 MG/1
40 TABLET, DELAYED RELEASE ORAL EVERY 24 HOURS
Refills: 0 | Status: DISCONTINUED | OUTPATIENT
Start: 2023-06-10 | End: 2023-06-10

## 2023-06-10 RX ORDER — SODIUM CHLORIDE 9 MG/ML
10 INJECTION INTRAMUSCULAR; INTRAVENOUS; SUBCUTANEOUS
Refills: 0 | Status: DISCONTINUED | OUTPATIENT
Start: 2023-06-10 | End: 2023-06-12

## 2023-06-10 RX ORDER — SENNA PLUS 8.6 MG/1
2 TABLET ORAL
Refills: 0 | Status: DISCONTINUED | OUTPATIENT
Start: 2023-06-10 | End: 2023-06-12

## 2023-06-10 RX ORDER — PANTOPRAZOLE SODIUM 20 MG/1
40 TABLET, DELAYED RELEASE ORAL DAILY
Refills: 0 | Status: DISCONTINUED | OUTPATIENT
Start: 2023-06-10 | End: 2023-06-12

## 2023-06-10 RX ADMIN — SODIUM CHLORIDE 75 MILLILITER(S): 9 INJECTION, SOLUTION INTRAVENOUS at 05:11

## 2023-06-10 RX ADMIN — Medication 100 MILLIEQUIVALENT(S): at 06:22

## 2023-06-10 RX ADMIN — MYCOPHENOLATE MOFETIL 1000 MILLIGRAM(S): 250 CAPSULE ORAL at 19:48

## 2023-06-10 RX ADMIN — CHLORHEXIDINE GLUCONATE 15 MILLILITER(S): 213 SOLUTION TOPICAL at 05:10

## 2023-06-10 RX ADMIN — Medication 100 MILLIGRAM(S): at 19:06

## 2023-06-10 RX ADMIN — Medication 2: at 23:53

## 2023-06-10 RX ADMIN — SODIUM CHLORIDE 75 MILLILITER(S): 9 INJECTION, SOLUTION INTRAVENOUS at 07:10

## 2023-06-10 RX ADMIN — VALGANCICLOVIR 450 MILLIGRAM(S): 450 TABLET, FILM COATED ORAL at 19:07

## 2023-06-10 RX ADMIN — Medication 100 MILLIGRAM(S): at 21:34

## 2023-06-10 RX ADMIN — DEXMEDETOMIDINE HYDROCHLORIDE IN 0.9% SODIUM CHLORIDE 8 MICROGRAM(S)/KG/HR: 4 INJECTION INTRAVENOUS at 05:11

## 2023-06-10 RX ADMIN — Medication 125 MILLILITER(S): at 10:14

## 2023-06-10 RX ADMIN — Medication 10 MILLILITER(S): at 18:44

## 2023-06-10 RX ADMIN — Medication 125 MILLILITER(S): at 13:06

## 2023-06-10 RX ADMIN — HYDROMORPHONE HYDROCHLORIDE 0.5 MILLIGRAM(S): 2 INJECTION INTRAMUSCULAR; INTRAVENOUS; SUBCUTANEOUS at 19:23

## 2023-06-10 RX ADMIN — CHLORHEXIDINE GLUCONATE 1 APPLICATION(S): 213 SOLUTION TOPICAL at 05:11

## 2023-06-10 RX ADMIN — Medication 100 MILLIGRAM(S): at 17:29

## 2023-06-10 RX ADMIN — HYDROMORPHONE HYDROCHLORIDE 0.5 MILLIGRAM(S): 2 INJECTION INTRAMUSCULAR; INTRAVENOUS; SUBCUTANEOUS at 19:07

## 2023-06-10 RX ADMIN — HYDROMORPHONE HYDROCHLORIDE 0.5 MILLIGRAM(S): 2 INJECTION INTRAMUSCULAR; INTRAVENOUS; SUBCUTANEOUS at 15:26

## 2023-06-10 RX ADMIN — Medication 2: at 17:02

## 2023-06-10 RX ADMIN — PANTOPRAZOLE SODIUM 40 MILLIGRAM(S): 20 TABLET, DELAYED RELEASE ORAL at 05:10

## 2023-06-10 RX ADMIN — HYDROMORPHONE HYDROCHLORIDE 0.5 MILLIGRAM(S): 2 INJECTION INTRAMUSCULAR; INTRAVENOUS; SUBCUTANEOUS at 15:41

## 2023-06-10 RX ADMIN — Medication 100 MILLIEQUIVALENT(S): at 07:31

## 2023-06-10 RX ADMIN — HYDROMORPHONE HYDROCHLORIDE 0.5 MILLIGRAM(S): 2 INJECTION INTRAMUSCULAR; INTRAVENOUS; SUBCUTANEOUS at 22:08

## 2023-06-10 RX ADMIN — PIPERACILLIN AND TAZOBACTAM 25 GRAM(S): 4; .5 INJECTION, POWDER, LYOPHILIZED, FOR SOLUTION INTRAVENOUS at 17:02

## 2023-06-10 RX ADMIN — FLUCONAZOLE 100 MILLIGRAM(S): 150 TABLET ORAL at 11:08

## 2023-06-10 RX ADMIN — SODIUM CHLORIDE 75 MILLILITER(S): 9 INJECTION, SOLUTION INTRAVENOUS at 19:06

## 2023-06-10 RX ADMIN — PANTOPRAZOLE SODIUM 40 MILLIGRAM(S): 20 TABLET, DELAYED RELEASE ORAL at 11:08

## 2023-06-10 RX ADMIN — MYCOPHENOLATE MOFETIL 1000 MILLIGRAM(S): 250 CAPSULE ORAL at 08:02

## 2023-06-10 RX ADMIN — Medication 500 MILLILITER(S): at 15:04

## 2023-06-10 RX ADMIN — DEXMEDETOMIDINE HYDROCHLORIDE IN 0.9% SODIUM CHLORIDE 8 MICROGRAM(S)/KG/HR: 4 INJECTION INTRAVENOUS at 07:11

## 2023-06-10 RX ADMIN — SENNA PLUS 2 TABLET(S): 8.6 TABLET ORAL at 17:02

## 2023-06-10 RX ADMIN — Medication 500 MILLILITER(S): at 15:13

## 2023-06-10 RX ADMIN — Medication 2: at 11:20

## 2023-06-10 RX ADMIN — HYDROMORPHONE HYDROCHLORIDE 0.5 MILLIGRAM(S): 2 INJECTION INTRAMUSCULAR; INTRAVENOUS; SUBCUTANEOUS at 21:53

## 2023-06-10 RX ADMIN — CHLORHEXIDINE GLUCONATE 1 APPLICATION(S): 213 SOLUTION TOPICAL at 05:10

## 2023-06-10 NOTE — PROGRESS NOTE ADULT - ASSESSMENT
38F with Hx of hypothyroidism (on Levothyroxine), liver cirrhosis 2/2 ETOH abuse---->  presented to [facility] ED on 5/13/23 with ~ 2 weeks Hx of progressively worsening abdominal pain and distention.  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  now s/p HCV+ OLT under Simulect induction on 6/10/2023    s/p HCV+ OLT (POD#0)  -good graft function, U/S ok  -trend labs  -NPO/IVF  -SCDs/spirometry as able  -OOBTC  -bowel regimen  -hold ASA/SQH for now  -check HCV titers per protocol  -VIVIANA pre-op, expected to improve. will hold FK for now. 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  now s/p HCV+ OLT under Simulect induction on 6/10/2023    s/p HCV+ OLT (POD#0)  -good graft function, U/S ok  -trend labs  -NPO/IVF  -SCDs/spirometry as able  -OOBTC  -bowel regimen  -hold ASA/SQH for now  -check HCV titers per protocol  -VIVIANA pre-op, expected to improve. will hold FK for now. 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  now s/p HCV+ OLT under Simulect induction on 6/10/2023    s/p HCV+ OLT (POD#0)  - good graft function, U/S ok  - trend labs  - NPO/IVF  - SCDs/spirometry as able  - OOBTC  - bowel regimen  - hold ASA/SQH for now  - check HCV titers per protocol  - VIVIANA pre-op, expected to improve. will hold FK for now. 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  now s/p HCV+ OLT under Simulect induction on 6/10/2023    s/p HCV+ OLT (POD#0)  - good graft function, U/S ok  - trend labs  - NPO/IVF  - SCDs/spirometry as able  - OOBTC  - bowel regimen  - hold ASA/SQH for now  - check HCV titers per protocol  - VIVIANA pre-op, expected to improve. will hold FK for now. 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  now s/p HCV+ OLT under Simulect induction on 6/10/2023    s/p HCV+ OLT  - good graft function, U/S ok  - trend labs  - NPO/IVF  - SCDs/spirometry as able  - OOBTC  - bowel regimen  - hold ASA/SQH for now  - check HCV titers per protocol  - VIVIANA pre-op, expected to improve. will hold FK for now. 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

## 2023-06-10 NOTE — CONSULT NOTE ADULT - SUBJECTIVE AND OBJECTIVE BOX
HISTORY:    37 y/o F w/ PMH EtOH Cirrhosis (MELD 29) admitted for abdominal pain and facial trauma, now s/p OTL transplant.    SICU is consulted for hemodynamic monitoring. Patient arrived intubated. Required 12U PRBC, 8FFP, 2 platelet, 3L crystalloid, 1L albumin. EBL 800cc.     NEURO  RASS (if intubated): 		CAM ICU (if concern for delirium):  Exam: RASS -4  Meds:     RESPIRATORY  RR: 18 (06-09-23 @ 21:15) (18 - 19)  SpO2: 98% (06-09-23 @ 21:15) (96% - 98%)  Wt(kg): --  Exam: Intubated sedated PRVC 400 12 5 40  Mechanical Ventilation:   ABG - ( 10 Sukhjinder 2023 02:27 )  pH: 7.47  /  pCO2: 34    /  pO2: 236   / HCO3: 25    / Base Excess: 1.1   /  SaO2: 99.1    Lactate: x                Meds:     CARDIOVASCULAR  HR: 80 (06-09-23 @ 21:15) (80 - 90)  BP: 110/70 (06-09-23 @ 21:15) (97/58 - 110/70)  BP(mean): --  ABP: --  ABP(mean): --  Wt(kg): --  CVP(cm H2O): --  VBG - ( 09 Jun 2023 21:43 )  pH: 7.37  /  pCO2: 35    /  pO2: 193   / HCO3: 20    / Base Excess: -4.4  /  SaO2: 99.7   Lactate: x                  Exam:   Cardiac Rhythm:   Perfusion     [ ]Adequate   [ ]Inadequate  Mentation   [ ]Normal       [ ]Reduced  Extremities  [ ]Warm         [ ]Cool  Volume Status [ ]Hypervolemic [ ]Euvolemic [ ]Hypovolemic  Meds:     GI/NUTRITION  Exam:   Diet:   Meds:     GENITOURINARY  I&O's Detail    06-08 @ 07:01  -  06-09 @ 07:00  --------------------------------------------------------  IN:    Oral Fluid: 1240 mL  Total IN: 1240 mL    OUT:    Voided (mL): 1390 mL  Total OUT: 1390 mL    Total NET: -150 mL        Weight (kg): 64 (06-09 @ 21:15)  06-09    131<L>  |  95<L>  |  36<H>  ----------------------------<  116<H>  3.8   |  16<L>  |  2.04<H>    Ca    9.5      09 Jun 2023 07:01  Phos  2.5     06-09  Mg     2.2     06-09    TPro  5.9<L>  /  Alb  3.9  /  TBili  28.2<H>  /  DBili  x   /  AST  146<H>  /  ALT  <5<L>  /  AlkPhos  110  06-09    Meds:     HEMATOLOGIC  Meds:                         7.8    18.76 )-----------( 235      ( 09 Jun 2023 07:02 )             22.9     PT/INR - ( 09 Jun 2023 07:02 )   PT: 19.4 sec;   INR: 1.66 ratio         PTT - ( 09 Jun 2023 07:02 )  PTT:41.7 sec    INFECTIOUS DISEASES  T(C): 36.8 (06-09-23 @ 21:15), Max: 37.1 (06-09-23 @ 09:23)  Wt(kg): --  WBC Count: 18.76 K/uL (06-09 @ 07:02)    Recent Cultures:  Specimen Source: Ascites Fl Ascites Fluid, 06-08 @ 18:34; Results   No growth; Gram Stain:   No polymorphonuclear cells seen  No organisms seen  by cytocentrifuge; Organism: --  Specimen Source: .Blood Blood-Peripheral, 06-07 @ 12:35; Results   No growth to date.; Gram Stain: --; Organism: --  Specimen Source: .Blood Blood, 06-06 @ 06:31; Results   No growth to date.; Gram Stain: --; Organism: --  Specimen Source: .Bronchial Bronchoalveolar Lavage, 06-05 @ 19:44; Results   No growth at 48 hours; Gram Stain:   polymorphonuclear leukocytes per low power field  No squamous epithelial cells per low power field  Gram Negative Rods per oil power field  by cytocentrifuge; Organism: --  Specimen Source: .Bronchial Bronchial Lavage, 06-05 @ 19:40; Results   Culture is being performed.; Gram Stain:   Rare polymorphonuclear leukocytes per low power field  No squamous epithelial cells per low power field  No organisms seen per oil power field; Organism: --    Meds:     ENDOCRINE  Capillary Blood Glucose    Meds:     ACCESS DEVICES:  [ ] Peripheral IV  [ ] Central Venous Line		[ ] R	[ ] L	[ ] IJ	[ ] Fem	[ ] SC	Placed:   [ ] Arterial Line			[ ] R	[ ] L	[ ] Fem	[ ] Rad	[ ] Ax	Placed:   [ ] PICC:					[ ] Mediport  [ ] Urinary Catheter, Date Placed:   [ ] Necessity of urinary, arterial, and venous catheters discussed    OTHER MEDICATIONS:      IMAGING:

## 2023-06-10 NOTE — PROGRESS NOTE ADULT - SUBJECTIVE AND OBJECTIVE BOX
Transplant Surgery - Multidisciplinary Progress Note  --------------------------------------------------------------  HCV+ OLT 6/10  POD#0    Present:   Patient seen and examined with multidisciplinary Transplant team including  (Surgeon: Dr. Wolfe,  Hepatologist: Dr. Montes, SALO Argueta, SICU team during AM rounds.   Disciplines not in attendance will be notified of the plan.     38F with Hx of hypothyroidism (on Levothyroxine), liver cirrhosis 2/2 ETOH abuse---->  [facility] with ~ 2 weeks Hx of progressively worsening abdominal pain and distention.  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  now s/p HCV+ OLT under Simulect induction on 6/10/2023      Interval Events:  POD#0  Afebrile, VSS  good graft function  extubated   good mental status    Immunosuppression:  FK on hold, MMF 1/1, SST  SIMULECT POD#4  ongoing monitoring for signs of rejection    Potential Discharge date: TBD    Education:  Medications    Plan of care:  See Below    MEDICATIONS  (STANDING):  chlorhexidine 2% Cloths 1 Application(s) Topical <User Schedule>  chlorhexidine 4% Liquid 1 Application(s) Topical <User Schedule>  dexMEDEtomidine Infusion 0.5 MICROgram(s)/kG/Hr (8 mL/Hr) IV Continuous <Continuous>  fluconAZOLE IVPB 400 milliGRAM(s) IV Intermittent daily  insulin lispro (ADMELOG) corrective regimen sliding scale   SubCutaneous every 6 hours  lactated ringers. 1000 milliLiter(s) (75 mL/Hr) IV Continuous <Continuous>  methylPREDNISolone sodium succinate IVPB   IV Intermittent   methylPREDNISolone sodium succinate IVPB 500 milliGRAM(s) IV Intermittent every 24 hours  mycophenolate mofetil Suspension 1000 milliGRAM(s) Oral <User Schedule>  pantoprazole  Injectable 40 milliGRAM(s) IV Push daily  piperacillin/tazobactam IVPB.. 3.375 Gram(s) IV Intermittent every 12 hours  senna 2 Tablet(s) Oral two times a day  valGANciclovir 50 mG/mL Oral Solution 900 milliGRAM(s) Oral every 12 hours    MEDICATIONS  (PRN):  HYDROmorphone  Injectable 0.5 milliGRAM(s) IV Push every 3 hours PRN pain  sodium chloride 0.9% lock flush 10 milliLiter(s) IV Push every 1 hour PRN Pre/post blood products, medications, blood draw, and to maintain line patency      PAST MEDICAL & SURGICAL HISTORY:  Hypothyroidism      Alcoholic liver disease      No significant past surgical history          Vital Signs Last 24 Hrs  T(C): 36.8 (10 Sukhjinder 2023 15:00), Max: 36.8 (09 Jun 2023 21:15)  T(F): 98.2 (10 Sukhjinder 2023 15:00), Max: 98.2 (10 Sukhjinder 2023 15:00)  HR: 82 (10 Sukhjinder 2023 15:22) (76 - 95)  BP: 120/75 (10 Sukhjinder 2023 04:30) (110/70 - 120/75)  BP(mean): 92 (10 Sukhjinder 2023 04:30) (92 - 92)  RR: 20 (10 Sukhjinder 2023 15:00) (15 - 20)  SpO2: 100% (10 Sukhjinder 2023 15:22) (98% - 100%)    Parameters below as of 10 Sukhjinder 2023 15:23      O2 Concentration (%): 40    I&O's Summary    09 Jun 2023 07:01  -  10 Sukhjinder 2023 07:00  --------------------------------------------------------  IN: 443 mL / OUT: 475 mL / NET: -32 mL    10 Sukhjinder 2023 07:01  -  10 Sukhjinder 2023 15:52  --------------------------------------------------------  IN: 1400 mL / OUT: 445 mL / NET: 955 mL                              7.7    5.06  )-----------( 89       ( 10 Sukhjinder 2023 14:49 )             21.0     06-10    145  |  108  |  34<H>  ----------------------------<  153<H>  4.5   |  22  |  1.69<H>    Ca    8.8      10 Sukhjinder 2023 14:49  Phos  4.1     06-10  Mg     2.1     06-10    TPro  4.5<L>  /  Alb  3.2<L>  /  TBili  5.6<H>  /  DBili  x   /  AST  208<H>  /  ALT  50<H>  /  AlkPhos  39<L>  06-10        Review of systems  Gen: No weight changes, fatigue, fevers/chills, weakness  Skin: No rashes  Head/Eyes/Ears/Mouth: No headache; Normal hearing; Normal vision w/o blurriness; No sinus pain/discomfort, sore throat  Respiratory: No dyspnea, cough, wheezing, hemoptysis  CV: No chest pain, PND, orthopnea  GI: C/O mild abdominal pain at surgical site. no diarrhea, constipation, nausea, vomiting, melena, hematochezia  : No increased frequency, dysuria, hematuria, nocturia  MSK: No joint pain/swelling; no back pain; no edema  Neuro: No dizziness/lightheadedness, weakness, seizures, numbness, tingling  Heme: No easy bruising or bleeding  Endo: No heat/cold intolerance  Psych: No significant nervousness, anxiety, stress, depression  All other systems were reviewed and are negative, except as noted.      PHYSICAL EXAM:  Constitutional: Well developed / well nourished  Eyes: anicteric, PERRLA  ENMT: nc/at, no thrush  Neck: supple, central line c/d/i  Respiratory: CTA B/L  Cardiovascular: RRR  Gastrointestinal: Soft abdomen, ND, appropriate incisional TTP. chevron incision c/d/i, staples in place. No signs of infection. JPs x3 ss  Genitourinary: Urinary catheter in place  Extremities: SCD's in place and working bilaterally  Vascular: Palpable dp pulses bilaterally.   Neurological: A&O x3  Skin: no rashes, ulcerations, lesions  Musculoskeletal: Moving all extremities  Psychiatric: Responsive     Transplant Surgery Post OP CHECK   --------------------------------------------------------------  HCV+ OLT 6/10  POD#0    Present:   Patient seen and examined with multidisciplinary Transplant team including Surgeon: Dr. Wolfe,  Hepatologist: Dr. Montes, SALO Argueta, SICU team during AM rounds.   Disciplines not in attendance will be notified of the plan.     HPI: 38F with Hx of hypothyroidism (on Levothyroxine), liver cirrhosis 2/2 ETOH abuse---->  presented to [facility] ED on 5/13/23 with ~ 2 weeks Hx of progressively worsening abdominal pain and distention.  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  now s/p HCV+ OLT under Simulect induction on 6/10/2023    Interval Events:  - POD 0  - hemodynamically stable  - Extubated this afternoon   - LFTs trending down, liver doppler with good perfusion     Immunosuppression:  FK on hold, MMF 1/1, SST  SIMULECT POD#4  ongoing monitoring for signs of rejection    Potential Discharge date: TBD  Education:  Medications  Plan of care:  See Below    MEDICATIONS  (STANDING):  chlorhexidine 2% Cloths 1 Application(s) Topical <User Schedule>  chlorhexidine 4% Liquid 1 Application(s) Topical <User Schedule>  dexMEDEtomidine Infusion 0.5 MICROgram(s)/kG/Hr (8 mL/Hr) IV Continuous <Continuous>  fluconAZOLE IVPB 400 milliGRAM(s) IV Intermittent daily  insulin lispro (ADMELOG) corrective regimen sliding scale   SubCutaneous every 6 hours  lactated ringers. 1000 milliLiter(s) (75 mL/Hr) IV Continuous <Continuous>  methylPREDNISolone sodium succinate IVPB   IV Intermittent   methylPREDNISolone sodium succinate IVPB 500 milliGRAM(s) IV Intermittent every 24 hours  mycophenolate mofetil Suspension 1000 milliGRAM(s) Oral <User Schedule>  pantoprazole  Injectable 40 milliGRAM(s) IV Push daily  piperacillin/tazobactam IVPB.. 3.375 Gram(s) IV Intermittent every 12 hours  senna 2 Tablet(s) Oral two times a day  valGANciclovir 50 mG/mL Oral Solution 900 milliGRAM(s) Oral every 12 hours    MEDICATIONS  (PRN):  HYDROmorphone  Injectable 0.5 milliGRAM(s) IV Push every 3 hours PRN pain  sodium chloride 0.9% lock flush 10 milliLiter(s) IV Push every 1 hour PRN Pre/post blood products, medications, blood draw, and to maintain line patency      PAST MEDICAL & SURGICAL HISTORY:  Hypothyroidism  Alcoholic liver disease  No significant past surgical history    Vital Signs Last 24 Hrs  T(C): 36.8 (10 Sukhjinder 2023 15:00), Max: 36.8 (09 Jun 2023 21:15)  T(F): 98.2 (10 Sukhjinder 2023 15:00), Max: 98.2 (10 Sukhjinder 2023 15:00)  HR: 82 (10 Sukhjinder 2023 15:22) (76 - 95)  BP: 120/75 (10 Sukhjinder 2023 04:30) (110/70 - 120/75)  BP(mean): 92 (10 Sukhjinder 2023 04:30) (92 - 92)  RR: 20 (10 Sukhjinder 2023 15:00) (15 - 20)  SpO2: 100% (10 Sukhjinder 2023 15:22) (98% - 100%)    I&O's Summary    09 Jun 2023 07:01  -  10 Sukhjinder 2023 07:00  --------------------------------------------------------  IN: 443 mL / OUT: 475 mL / NET: -32 mL    10 Sukhjinder 2023 07:01  -  10 Sukhjinder 2023 15:52  --------------------------------------------------------  IN: 1400 mL / OUT: 445 mL / NET: 955 mL                         7.7    5.06  )-----------( 89       ( 10 Sukhjinder 2023 14:49 )             21.0     06-10    145  |  108  |  34<H>  ----------------------------<  153<H>  4.5   |  22  |  1.69<H>    Ca    8.8      10 Jun 2023 14:49  Phos  4.1     06-10  Mg     2.1     06-10    TPro  4.5<L>  /  Alb  3.2<L>  /  TBili  5.6<H>  /  DBili  x   /  AST  208<H>  /  ALT  50<H>  /  AlkPhos  39<L>  06-10    Review of systems  Gen: No weight changes, fatigue, fevers/chills, weakness  Skin: No rashes  Head/Eyes/Ears/Mouth: No headache; Normal hearing; Normal vision w/o blurriness; No sinus pain/discomfort, sore throat  Respiratory: No dyspnea, cough, wheezing, hemoptysis  CV: No chest pain, PND, orthopnea  GI: C/O mild abdominal pain at surgical site. no diarrhea, constipation, nausea, vomiting, melena, hematochezia  : No increased frequency, dysuria, hematuria, nocturia  MSK: No joint pain/swelling; no back pain; no edema  Neuro: No dizziness/lightheadedness, weakness, seizures, numbness, tingling  Heme: No easy bruising or bleeding  Endo: No heat/cold intolerance  Psych: No significant nervousness, anxiety, stress, depression  All other systems were reviewed and are negative, except as noted.      PHYSICAL EXAM:  Constitutional: NAD  Eyes: anicteric, PERRLA  ENMT: nc/at, no thrush  Neck: supple, central line c/d/i  Respiratory: CTA B/L  Cardiovascular: RRR  Gastrointestinal: Soft abdomen, ND, appropriate incisional TTP. chevron incision c/d/i, staples in place. No signs of infection. JPs x3 ss  Genitourinary: Urinary catheter in place  Extremities: SCD's in place and working bilaterally  Vascular: Palpable dp pulses bilaterally.   Neurological: A&O x3  Skin: no rashes, ulcerations, lesions  Musculoskeletal: Moving all extremities  Psychiatric: Responsive     Transplant Surgery Post OP CHECK   --------------------------------------------------------------  HCV+ OLT 6/9      Present:   Patient seen and examined with multidisciplinary Transplant team including Surgeon: Dr. Wolfe,  Hepatologist: Dr. Montes, SALO Argueta, SICU team during AM rounds.   Disciplines not in attendance will be notified of the plan.     HPI: 38F with Hx of hypothyroidism (on Levothyroxine), liver cirrhosis 2/2 ETOH abuse---->  presented to [facility] ED on 5/13/23 with ~ 2 weeks Hx of progressively worsening abdominal pain and distention.  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  now s/p HCV+ OLT under Simulect induction on 6/10/2023    Interval Events:  - POD 0  - hemodynamically stable  - Extubated this afternoon   - LFTs trending down, liver doppler with good perfusion     Immunosuppression:  FK on hold, MMF 1/1, SST  SIMULECT POD#4  ongoing monitoring for signs of rejection    Potential Discharge date: TBD  Education:  Medications  Plan of care:  See Below    MEDICATIONS  (STANDING):  chlorhexidine 2% Cloths 1 Application(s) Topical <User Schedule>  chlorhexidine 4% Liquid 1 Application(s) Topical <User Schedule>  dexMEDEtomidine Infusion 0.5 MICROgram(s)/kG/Hr (8 mL/Hr) IV Continuous <Continuous>  fluconAZOLE IVPB 400 milliGRAM(s) IV Intermittent daily  insulin lispro (ADMELOG) corrective regimen sliding scale   SubCutaneous every 6 hours  lactated ringers. 1000 milliLiter(s) (75 mL/Hr) IV Continuous <Continuous>  methylPREDNISolone sodium succinate IVPB   IV Intermittent   methylPREDNISolone sodium succinate IVPB 500 milliGRAM(s) IV Intermittent every 24 hours  mycophenolate mofetil Suspension 1000 milliGRAM(s) Oral <User Schedule>  pantoprazole  Injectable 40 milliGRAM(s) IV Push daily  piperacillin/tazobactam IVPB.. 3.375 Gram(s) IV Intermittent every 12 hours  senna 2 Tablet(s) Oral two times a day  valGANciclovir 50 mG/mL Oral Solution 900 milliGRAM(s) Oral every 12 hours    MEDICATIONS  (PRN):  HYDROmorphone  Injectable 0.5 milliGRAM(s) IV Push every 3 hours PRN pain  sodium chloride 0.9% lock flush 10 milliLiter(s) IV Push every 1 hour PRN Pre/post blood products, medications, blood draw, and to maintain line patency      PAST MEDICAL & SURGICAL HISTORY:  Hypothyroidism  Alcoholic liver disease  No significant past surgical history    Vital Signs Last 24 Hrs  T(C): 36.8 (10 Sukhjinder 2023 15:00), Max: 36.8 (09 Jun 2023 21:15)  T(F): 98.2 (10 Sukhjinder 2023 15:00), Max: 98.2 (10 Sukhjinder 2023 15:00)  HR: 82 (10 Sukhjinder 2023 15:22) (76 - 95)  BP: 120/75 (10 Sukhjinder 2023 04:30) (110/70 - 120/75)  BP(mean): 92 (10 Sukhjinder 2023 04:30) (92 - 92)  RR: 20 (10 Sukhjinder 2023 15:00) (15 - 20)  SpO2: 100% (10 Sukhjinder 2023 15:22) (98% - 100%)    I&O's Summary    09 Jun 2023 07:01  -  10 Sukhjinder 2023 07:00  --------------------------------------------------------  IN: 443 mL / OUT: 475 mL / NET: -32 mL    10 Sukhjinder 2023 07:01  -  10 Sukhjinder 2023 15:52  --------------------------------------------------------  IN: 1400 mL / OUT: 445 mL / NET: 955 mL                         7.7    5.06  )-----------( 89       ( 10 Sukhjinder 2023 14:49 )             21.0     06-10    145  |  108  |  34<H>  ----------------------------<  153<H>  4.5   |  22  |  1.69<H>    Ca    8.8      10 Jun 2023 14:49  Phos  4.1     06-10  Mg     2.1     06-10    TPro  4.5<L>  /  Alb  3.2<L>  /  TBili  5.6<H>  /  DBili  x   /  AST  208<H>  /  ALT  50<H>  /  AlkPhos  39<L>  06-10    Review of systems  Gen: No weight changes, fatigue, fevers/chills, weakness  Skin: No rashes  Head/Eyes/Ears/Mouth: No headache; Normal hearing; Normal vision w/o blurriness; No sinus pain/discomfort, sore throat  Respiratory: No dyspnea, cough, wheezing, hemoptysis  CV: No chest pain, PND, orthopnea  GI: C/O mild abdominal pain at surgical site. no diarrhea, constipation, nausea, vomiting, melena, hematochezia  : No increased frequency, dysuria, hematuria, nocturia  MSK: No joint pain/swelling; no back pain; no edema  Neuro: No dizziness/lightheadedness, weakness, seizures, numbness, tingling  Heme: No easy bruising or bleeding  Endo: No heat/cold intolerance  Psych: No significant nervousness, anxiety, stress, depression  All other systems were reviewed and are negative, except as noted.      PHYSICAL EXAM:  Constitutional: NAD  Eyes: anicteric, PERRLA  ENMT: nc/at, no thrush  Neck: supple, central line c/d/i  Respiratory: CTA B/L  Cardiovascular: RRR  Gastrointestinal: Soft abdomen, ND, appropriate incisional TTP. chevron incision c/d/i, staples in place. No signs of infection. JPs x3 ss  Genitourinary: Urinary catheter in place  Extremities: SCD's in place and working bilaterally  Vascular: Palpable dp pulses bilaterally.   Neurological: A&O x3  Skin: no rashes, ulcerations, lesions  Musculoskeletal: Moving all extremities  Psychiatric: Responsive

## 2023-06-10 NOTE — CONSULT NOTE ADULT - ATTENDING COMMENTS
39 y/o F w/ PMH EtOH Cirrhosis s/p OTL transplant.  Arrived intubated.   Required 12U PRBC, 8FFP, 2 platelet, 3L crystalloid, 1L albumin. EBL 800cc.     Wean off sedation  Good gas exchange, CXR no acute finding, SBT when awake  Hemodynamically stable, not on pressure  Hepatic US, monitor LFT  IVF, I/O  Abx Zosyn, H/O ?MSSA   Anti rej: Systemic steroid, cellcept  PPX abx: Bactrim, Fluc, Valcyte  Mech DVT ppx, Hb 9.7, Pl 124, INR 1.53 decreasing  Serial labs coags 39 y/o F w/ PMH EtOH Cirrhosis s/p OTL transplant.  Arrived intubated.   Required 12U PRBC, 8FFP, 2 platelet, 3L crystalloid, 1L albumin. EBL 800cc.     Wean off sedation  Good gas exchange, CXR no acute finding, SBT when awake  Hemodynamically stable, not on pressure  Hepatic US, monitor LFT  IVF, I/O  Abx Zosyn, H/O MSSA bacteremia  Anti rej: Systemic steroid, cellcept  PPX abx: Bactrim, Fluc, Valcyte  Mech DVT ppx, Hb 9.7, Pl 124, INR 1.53 decreasing  Serial labs coags

## 2023-06-10 NOTE — PROGRESS NOTE ADULT - ASSESSMENT
38 y.o female with ETOH cirrhosis and VIVIANA now s/p liver transplant on 6/10/23.    1.  VIVIANA - likely HRS now improving.  Creatinine down to 1.5. Electrolytes stable, good UO.   2.  Liver transplant - followed by liver transplant team.  Enzymes improving.  38 y.o female with ETOH cirrhosis and VIVIANA now s/p liver transplant on 6/10/23.    1.  VIVIANA - likely HRS now improving.  Creatinine down to 1.5. Electrolytes stable, good UO.   2.  Liver transplant - followed by liver transplant team.

## 2023-06-10 NOTE — CONSULT NOTE ADULT - ASSESSMENT
37 y/o F now POD 0 from  donor liver transplant.    NEURO:    -Precedex for sedation  -Extubate as able    RESPIRATORY:    -Intubated  -Treated for PNA during this hospital stay, on Chest CT    CARDIOVASCULAR:    -Off pressors at time of arrival  -Lactate 2 in OR  -Normal recent TTE  -No cardiac hx    GI:    -NPO, NGT  -Marce Froylan incision  -MONSE x3, sanginuous         39 y/o F now POD 0 from  donor liver transplant.    NEURO:    -Precedex for sedation  -Extubate as able    RESPIRATORY:    -Intubated  -Treated for PNA during this hospital stay, on Chest CT  -CXR in SICU    CARDIOVASCULAR:    -Off pressors at time of arrival  -Lactate 2 in OR  -Normal recent TTE  -No cardiac hx    GI:    -NPO, NGT  -Marce Froylan incision  -MONSE x3, sanginuous    RENAL:    -Bundy  -Monitor UOP    ID:    -Zosyn    ENDO:    -Insulin gtt in OR, off now    DISPO: SICU    Lines: A-line x 2, RICC, PIV         37 y/o F now POD 0 from  donor liver transplant.    NEURO:    -Precedex for sedation  -Extubate as able    RESPIRATORY:    -Intubated  -Treated for PNA during this hospital stay, on Chest CT  -CXR in SICU  -Oropharyngeal bleeding noted however recent EGD with small varices and no blood in NGT    CARDIOVASCULAR:    -Off pressors at time of arrival  -Lactate 2 in OR  -Normal recent TTE  -No cardiac hx    GI:    -NPO, NGT  -Marce Froylan incision  -MONSE x3, sanginuous  -Hepatic artery U/S this AM    RENAL:    -Bundy  -Monitor UOP  -Nephro on board for preop VIVIANA of unknown origin      HEME:    -TEG sent    ID:    -Zosyn  -Recent bronch, f/u cx  -ID on board  -Cellcept to start today  -Simulect induction and POD 4    ENDO:    -Insulin gtt in OR, off now    DISPO: SICU    Lines: A-line x 2, RICC, PIV         39 y/o F now POD 0 from  donor liver transplant.    NEURO:    -Precedex for sedation  -Extubate as able    RESPIRATORY:    -Intubated  -Treated for PNA during this hospital stay, on Chest CT  -CXR in SICU  -Oropharyngeal bleeding noted however recent EGD with small varices and no blood in NGT    CARDIOVASCULAR:    -Off pressors at time of arrival  -Lactate 2 in OR  -Normal recent TTE  -No cardiac hx    GI:    -NPO, NGT  -Marce Froylan incision  -MONSE x3, sanginuous  -Hepatic artery U/S this AM    RENAL:    -Bundy  -Monitor UOP  -Nephro on board for preop VIVIANA of unknown origin      HEME:    -TEG sent    ID:    -Zosyn, Fluc, Valcyte  -Recent bronch, f/u cx  -ID on board  -Cellcept to start today  -Simulect induction and POD 4    ENDO:    -Insulin gtt in OR, off now    DISPO: SICU    Lines: A-line x 2, RICC, PIV         37 y/o F now POD 0 from  donor liver transplant.    NEURO:    -Precedex for sedation  -Extubate as able    RESPIRATORY:    -Intubated  -Treated for PNA during this hospital stay, on Chest CT  -CXR in SICU  -Oropharyngeal bleeding noted however recent EGD with small varices and no blood in NGT    CARDIOVASCULAR:    -Off pressors at time of arrival  -Lactate 2 in OR  -Normal recent TTE  -No cardiac hx  -No cardiac meds  -Monitor BP    GI:    -NPO, NGT  -Marce Froylan incision  -MONSE x3, sanginuous  -Hepatic artery U/S this AM    RENAL:    -Bundy  -Monitor UOP  -Nephro on board for preop VIVIANA of unknown origin      HEME:    -TEG sent    ID:    -Zosyn, Fluc, Valcyte  -Recent bronch, f/u cx  -ID on board  -Cellcept to start today  -Simulect induction and POD 4    ENDO:    -Insulin gtt in OR, off now    DISPO: SICU    Lines: A-line x 2, RICC, PIV         37 y/o F now POD 0 from  donor liver transplant.    NEURO:    -Precedex for sedation  -Extubate as able    RESPIRATORY:    -Intubated  -Treated for PNA during this hospital stay, on Chest CT  -CXR in SICU  -Oropharyngeal bleeding noted however recent EGD with small varices and no blood in NGT    CARDIOVASCULAR:    -Off pressors at time of arrival  -Ramiro in place  -Lactate 2 in OR  -Normal recent TTE  -No cardiac hx  -No cardiac meds  -Monitor BP    GI:    -NPO, NGT  -Marce Froylan incision  -MONSE x3, sanginuous  -Hepatic artery U/S this AM    RENAL:    -Bundy  -Monitor UOP  -Nephro on board for preop VIVIANA of unknown origin      HEME:    -TEG sent    ID:    -Zosyn, Fluc, Valcyte  -Recent bronch, f/u cx  -ID on board  -Cellcept to start today  -Simulect induction and POD 4    ENDO:    -Insulin gtt in OR, off now    DISPO: SICU    Lines: A-line x 2, RICC, PIV, R IJ introducer

## 2023-06-10 NOTE — PROGRESS NOTE ADULT - SUBJECTIVE AND OBJECTIVE BOX
[facility] DIVISION OF KIDNEY DISEASES AND HYPERTENSION -- FOLLOW UP NOTE  --------------------------------------------------------------------------------  Chief Complaint:  cirrhosis, s/p OLT    24 hour events/subjective:  Pt s/p liver transplant overnight.  Making urine, still intubated.       PAST HISTORY  --------------------------------------------------------------------------------  No significant changes to PMH, PSH, FHx, SHx, unless otherwise noted    ALLERGIES & MEDICATIONS  --------------------------------------------------------------------------------  Allergies    No Known Allergies    Intolerances      Standing Inpatient Medications  chlorhexidine 0.12% Liquid 15 milliLiter(s) Oral Mucosa every 12 hours  chlorhexidine 2% Cloths 1 Application(s) Topical <User Schedule>  chlorhexidine 4% Liquid 1 Application(s) Topical <User Schedule>  dexMEDEtomidine Infusion 0.5 MICROgram(s)/kG/Hr IV Continuous <Continuous>  fluconAZOLE IVPB 400 milliGRAM(s) IV Intermittent daily  lactated ringers. 1000 milliLiter(s) IV Continuous <Continuous>  methylPREDNISolone sodium succinate IVPB   IV Intermittent   methylPREDNISolone sodium succinate IVPB 500 milliGRAM(s) IV Intermittent every 24 hours  mycophenolate mofetil Suspension 1000 milliGRAM(s) Oral <User Schedule>  pantoprazole  Injectable 40 milliGRAM(s) IV Push daily  piperacillin/tazobactam IVPB.. 3.375 Gram(s) IV Intermittent every 12 hours  senna 2 Tablet(s) Oral two times a day    PRN Inpatient Medications  HYDROmorphone  Injectable 0.5 milliGRAM(s) IV Push every 3 hours PRN  sodium chloride 0.9% lock flush 10 milliLiter(s) IV Push every 1 hour PRN      REVIEW OF SYSTEMS  --------------------------------------------------------------------------------  unable to obtain, intubated and sedated.     VITALS/PHYSICAL EXAM  --------------------------------------------------------------------------------  T(C): 36.1 (06-10-23 @ 09:00), Max: 37 (06-09-23 @ 11:00)  HR: 79 (06-10-23 @ 09:00) (76 - 93)  BP: 120/75 (06-10-23 @ 04:30) (98/68 - 120/75)  RR: 15 (06-10-23 @ 09:00) (15 - 19)  SpO2: 100% (06-10-23 @ 09:00) (97% - 100%)  Wt(kg): --  Height (cm): 170.2 (06-09-23 @ 21:15)  Weight (kg): 41.4 (06-10-23 @ 04:10)  BMI (kg/m2): 14.3 (06-10-23 @ 04:10)  BSA (m2): 1.45 (06-10-23 @ 04:10)      06-09-23 @ 07:01  -  06-10-23 @ 07:00  --------------------------------------------------------  IN: 443 mL / OUT: 475 mL / NET: -32 mL    06-10-23 @ 07:01  -  06-10-23 @ 09:24  --------------------------------------------------------  IN: 250 mL / OUT: 150 mL / NET: 100 mL      Physical Exam:  	Gen: intubated, sedated  	HEENT: intubated  	Pulm: CTA B/L  	CV: RRR, S1S2; no rub  	Back: No spinal or CVA tenderness; no sacral edema  	Abd: +BS, soft, OLT incision   	LE: Warm, trace edema      LABS/STUDIES  --------------------------------------------------------------------------------              9.7    4.97  >-----------<  124      [06-10-23 @ 04:47]              27.9     143  |  107  |  29  ----------------------------<  158      [06-10-23 @ 04:47]  3.6   |  24  |  1.52        Ca     9.4     [06-10-23 @ 04:47]      Mg     2.2     [06-10-23 @ 04:47]      Phos  3.6     [06-10-23 @ 04:47]    TPro  4.4  /  Alb  3.0  /  TBili  6.9  /  DBili  x   /  AST  419  /  ALT  148  /  AlkPhos  43  [06-10-23 @ 04:47]    PT/INR: PT 17.8 , INR 1.53       [06-10-23 @ 04:48]  PTT: 33.5       [06-10-23 @ 04:48]      Creatinine Trend:  SCr 1.52 [06-10 @ 04:47]  SCr 2.04 [06-09 @ 07:01]  SCr 2.09 [06-08 @ 06:41]  SCr 1.92 [06-07 @ 06:30]  SCr 1.52 [06-06 @ 06:27]              Urinalysis - [06-07-23 @ 13:03]      Color Dark Yellow / Appearance Slightly Turbid / SG 1.028 / pH 6.0      Gluc Trace / Ketone Trace  / Bili Large / Urobili Negative       Blood Small / Protein 100 mg/dl / Leuk Est Small / Nitrite Negative      RBC 3 / WBC 3 / Hyaline 15 / Gran 3-5 / Sq Epi  / Non Sq Epi  / Bacteria Negative    Urine Creatinine 82      [06-07-23 @ 13:11]  Urine Protein 129      [06-07-23 @ 13:11]  Urine Sodium 28      [06-07-23 @ 13:11]  Urine Urea Nitrogen 341      [06-07-23 @ 13:11]  Urine Potassium 67      [06-07-23 @ 13:11]  Urine Osmolality 344      [06-07-23 @ 13:11]    Iron 78, TIBC Unable to calculate Test Repeated, %sat Unable to calculate Test Repeated      [05-17-23 @ 07:14]  Ferritin 349      [05-17-23 @ 07:14]  TSH 0.62      [05-17-23 @ 07:14]  Lipid: chol 127, , HDL 11, LDL --      [05-17-23 @ 07:14]    HBsAb 305.3      [06-09-23 @ 13:31]  HBsAb Reactive      [05-15-23 @ 07:32]  HBsAg Nonreact      [06-09-23 @ 13:32]  HBcAb Nonreact      [06-09-23 @ 13:31]  HCV 0.13, Nonreact      [06-09-23 @ 13:31]  HIV Nonreact      [06-09-23 @ 13:31]  HIV Nonreact      [05-13-23 @ 10:00]    ISHAN: titer Negative, pattern --      [05-17-23 @ 07:14]  dsDNA 12      [05-14-23 @ 08:26]  Rheumatoid Factor <10      [05-14-23 @ 08:26]  ANCA: cANCA Negative, pANCA Negative, atypical ANCA Negative      [05-14-23 @ 08:26]  Syphilis Screen (Treponema Pallidum Ab) Negative      [05-17-23 @ 07:14]  Free Light Chains: kappa 5.24, lambda 3.65, ratio = 1.44      [05-15 @ 07:32]

## 2023-06-10 NOTE — BRIEF OPERATIVE NOTE - OPERATION/FINDINGS
donor orthotopic liver transplant.  Induction with simulect and steroids.  Conventional anatomy. Side to side cavo-caval anastomosis, end-to-end hepatic artery anastomosis and CBD anastomosis.  Cold ischemia time approx 6 hours.

## 2023-06-10 NOTE — AIRWAY REMOVAL NOTE  ADULT & PEDS - ARTIFICAL AIRWAY REMOVAL COMMENTS
Written order for extubation verified. The patient was identified by full name and birth date compared to the identification band. Present during the procedure was (Stephy LOUIS)

## 2023-06-10 NOTE — BRIEF OPERATIVE NOTE - NSICDXBRIEFPREOP_GEN_ALL_CORE_FT
PRE-OP DIAGNOSIS:  Alcoholic cirrhosis of liver with ascites [id number]-2023 04:24:36  Sharon Mckinley

## 2023-06-11 LAB
ALBUMIN SERPL ELPH-MCNC: 3.3 G/DL — SIGNIFICANT CHANGE UP (ref 3.3–5)
ALBUMIN SERPL ELPH-MCNC: 3.3 G/DL — SIGNIFICANT CHANGE UP (ref 3.3–5)
ALBUMIN SERPL ELPH-MCNC: 3.5 G/DL — SIGNIFICANT CHANGE UP (ref 3.3–5)
ALBUMIN SERPL ELPH-MCNC: 3.5 G/DL — SIGNIFICANT CHANGE UP (ref 3.3–5)
ALP SERPL-CCNC: 38 U/L — LOW (ref 40–120)
ALP SERPL-CCNC: 39 U/L — LOW (ref 40–120)
ALP SERPL-CCNC: 41 U/L — SIGNIFICANT CHANGE UP (ref 40–120)
ALP SERPL-CCNC: 46 U/L — SIGNIFICANT CHANGE UP (ref 40–120)
ALT FLD-CCNC: 14 U/L — SIGNIFICANT CHANGE UP (ref 10–45)
ALT FLD-CCNC: 22 U/L — SIGNIFICANT CHANGE UP (ref 10–45)
ALT FLD-CCNC: 7 U/L — LOW (ref 10–45)
ALT FLD-CCNC: 7 U/L — LOW (ref 10–45)
ANION GAP SERPL CALC-SCNC: 16 MMOL/L — SIGNIFICANT CHANGE UP (ref 5–17)
ANION GAP SERPL CALC-SCNC: 16 MMOL/L — SIGNIFICANT CHANGE UP (ref 5–17)
ANION GAP SERPL CALC-SCNC: 17 MMOL/L — SIGNIFICANT CHANGE UP (ref 5–17)
ANION GAP SERPL CALC-SCNC: 18 MMOL/L — HIGH (ref 5–17)
APTT BLD: 18.7 SEC — LOW (ref 27.5–35.5)
APTT BLD: 26.7 SEC — LOW (ref 27.5–35.5)
APTT BLD: 27.1 SEC — LOW (ref 27.5–35.5)
APTT BLD: 28.4 SEC — SIGNIFICANT CHANGE UP (ref 27.5–35.5)
AST SERPL-CCNC: 102 U/L — HIGH (ref 10–40)
AST SERPL-CCNC: 137 U/L — HIGH (ref 10–40)
AST SERPL-CCNC: 75 U/L — HIGH (ref 10–40)
AST SERPL-CCNC: 90 U/L — HIGH (ref 10–40)
BILIRUB DIRECT SERPL-MCNC: 5 MG/DL — HIGH (ref 0–0.3)
BILIRUB SERPL-MCNC: 5.7 MG/DL — HIGH (ref 0.2–1.2)
BILIRUB SERPL-MCNC: 6.1 MG/DL — HIGH (ref 0.2–1.2)
BILIRUB SERPL-MCNC: 6.3 MG/DL — HIGH (ref 0.2–1.2)
BILIRUB SERPL-MCNC: 7.2 MG/DL — HIGH (ref 0.2–1.2)
BUN SERPL-MCNC: 39 MG/DL — HIGH (ref 7–23)
BUN SERPL-MCNC: 42 MG/DL — HIGH (ref 7–23)
BUN SERPL-MCNC: 46 MG/DL — HIGH (ref 7–23)
BUN SERPL-MCNC: 49 MG/DL — HIGH (ref 7–23)
CALCIUM SERPL-MCNC: 8.5 MG/DL — SIGNIFICANT CHANGE UP (ref 8.4–10.5)
CALCIUM SERPL-MCNC: 8.7 MG/DL — SIGNIFICANT CHANGE UP (ref 8.4–10.5)
CALCIUM SERPL-MCNC: 8.8 MG/DL — SIGNIFICANT CHANGE UP (ref 8.4–10.5)
CALCIUM SERPL-MCNC: 8.8 MG/DL — SIGNIFICANT CHANGE UP (ref 8.4–10.5)
CHLORIDE SERPL-SCNC: 100 MMOL/L — SIGNIFICANT CHANGE UP (ref 96–108)
CHLORIDE SERPL-SCNC: 102 MMOL/L — SIGNIFICANT CHANGE UP (ref 96–108)
CHLORIDE SERPL-SCNC: 105 MMOL/L — SIGNIFICANT CHANGE UP (ref 96–108)
CHLORIDE SERPL-SCNC: 107 MMOL/L — SIGNIFICANT CHANGE UP (ref 96–108)
CO2 SERPL-SCNC: 19 MMOL/L — LOW (ref 22–31)
CO2 SERPL-SCNC: 20 MMOL/L — LOW (ref 22–31)
CO2 SERPL-SCNC: 20 MMOL/L — LOW (ref 22–31)
CO2 SERPL-SCNC: 21 MMOL/L — LOW (ref 22–31)
CREAT SERPL-MCNC: 1.91 MG/DL — HIGH (ref 0.5–1.3)
CREAT SERPL-MCNC: 2.09 MG/DL — HIGH (ref 0.5–1.3)
CREAT SERPL-MCNC: 2.24 MG/DL — HIGH (ref 0.5–1.3)
CREAT SERPL-MCNC: 2.36 MG/DL — HIGH (ref 0.5–1.3)
CULTURE RESULTS: SIGNIFICANT CHANGE UP
CULTURE RESULTS: SIGNIFICANT CHANGE UP
EGFR: 26 ML/MIN/1.73M2 — LOW
EGFR: 28 ML/MIN/1.73M2 — LOW
EGFR: 30 ML/MIN/1.73M2 — LOW
EGFR: 34 ML/MIN/1.73M2 — LOW
GAS PNL BLDA: SIGNIFICANT CHANGE UP
GLUCOSE BLDC GLUCOMTR-MCNC: 138 MG/DL — HIGH (ref 70–99)
GLUCOSE BLDC GLUCOMTR-MCNC: 139 MG/DL — HIGH (ref 70–99)
GLUCOSE BLDC GLUCOMTR-MCNC: 148 MG/DL — HIGH (ref 70–99)
GLUCOSE SERPL-MCNC: 131 MG/DL — HIGH (ref 70–99)
GLUCOSE SERPL-MCNC: 139 MG/DL — HIGH (ref 70–99)
GLUCOSE SERPL-MCNC: 146 MG/DL — HIGH (ref 70–99)
GLUCOSE SERPL-MCNC: 146 MG/DL — HIGH (ref 70–99)
HCT VFR BLD CALC: 21.6 % — LOW (ref 34.5–45)
HCT VFR BLD CALC: 22 % — LOW (ref 34.5–45)
HCT VFR BLD CALC: 22 % — LOW (ref 34.5–45)
HCT VFR BLD CALC: 22.2 % — LOW (ref 34.5–45)
HGB BLD-MCNC: 7.6 G/DL — LOW (ref 11.5–15.5)
HGB BLD-MCNC: 7.7 G/DL — LOW (ref 11.5–15.5)
HGB BLD-MCNC: 7.7 G/DL — LOW (ref 11.5–15.5)
HGB BLD-MCNC: 7.8 G/DL — LOW (ref 11.5–15.5)
INR BLD: 1.07 RATIO — SIGNIFICANT CHANGE UP (ref 0.88–1.16)
INR BLD: 1.12 RATIO — SIGNIFICANT CHANGE UP (ref 0.88–1.16)
INR BLD: 1.12 RATIO — SIGNIFICANT CHANGE UP (ref 0.88–1.16)
INR BLD: 1.14 RATIO — SIGNIFICANT CHANGE UP (ref 0.88–1.16)
MAGNESIUM SERPL-MCNC: 2.1 MG/DL — SIGNIFICANT CHANGE UP (ref 1.6–2.6)
MAGNESIUM SERPL-MCNC: 2.2 MG/DL — SIGNIFICANT CHANGE UP (ref 1.6–2.6)
MCHC RBC-ENTMCNC: 28.8 PG — SIGNIFICANT CHANGE UP (ref 27–34)
MCHC RBC-ENTMCNC: 29.1 PG — SIGNIFICANT CHANGE UP (ref 27–34)
MCHC RBC-ENTMCNC: 29.2 PG — SIGNIFICANT CHANGE UP (ref 27–34)
MCHC RBC-ENTMCNC: 29.3 PG — SIGNIFICANT CHANGE UP (ref 27–34)
MCHC RBC-ENTMCNC: 35 GM/DL — SIGNIFICANT CHANGE UP (ref 32–36)
MCHC RBC-ENTMCNC: 35 GM/DL — SIGNIFICANT CHANGE UP (ref 32–36)
MCHC RBC-ENTMCNC: 35.1 GM/DL — SIGNIFICANT CHANGE UP (ref 32–36)
MCHC RBC-ENTMCNC: 35.2 GM/DL — SIGNIFICANT CHANGE UP (ref 32–36)
MCV RBC AUTO: 81.9 FL — SIGNIFICANT CHANGE UP (ref 80–100)
MCV RBC AUTO: 82.8 FL — SIGNIFICANT CHANGE UP (ref 80–100)
MCV RBC AUTO: 83.3 FL — SIGNIFICANT CHANGE UP (ref 80–100)
MCV RBC AUTO: 83.7 FL — SIGNIFICANT CHANGE UP (ref 80–100)
NRBC # BLD: 0 /100 WBCS — SIGNIFICANT CHANGE UP (ref 0–0)
PHOSPHATE SERPL-MCNC: 4.1 MG/DL — SIGNIFICANT CHANGE UP (ref 2.5–4.5)
PHOSPHATE SERPL-MCNC: 4.5 MG/DL — SIGNIFICANT CHANGE UP (ref 2.5–4.5)
PHOSPHATE SERPL-MCNC: 4.7 MG/DL — HIGH (ref 2.5–4.5)
PHOSPHATE SERPL-MCNC: 4.8 MG/DL — HIGH (ref 2.5–4.5)
PLATELET # BLD AUTO: 69 K/UL — LOW (ref 150–400)
PLATELET # BLD AUTO: 72 K/UL — LOW (ref 150–400)
PLATELET # BLD AUTO: 75 K/UL — LOW (ref 150–400)
PLATELET # BLD AUTO: 81 K/UL — LOW (ref 150–400)
POTASSIUM SERPL-MCNC: 4.1 MMOL/L — SIGNIFICANT CHANGE UP (ref 3.5–5.3)
POTASSIUM SERPL-MCNC: 4.2 MMOL/L — SIGNIFICANT CHANGE UP (ref 3.5–5.3)
POTASSIUM SERPL-MCNC: 4.3 MMOL/L — SIGNIFICANT CHANGE UP (ref 3.5–5.3)
POTASSIUM SERPL-MCNC: 4.3 MMOL/L — SIGNIFICANT CHANGE UP (ref 3.5–5.3)
POTASSIUM SERPL-SCNC: 4.1 MMOL/L — SIGNIFICANT CHANGE UP (ref 3.5–5.3)
POTASSIUM SERPL-SCNC: 4.2 MMOL/L — SIGNIFICANT CHANGE UP (ref 3.5–5.3)
POTASSIUM SERPL-SCNC: 4.3 MMOL/L — SIGNIFICANT CHANGE UP (ref 3.5–5.3)
POTASSIUM SERPL-SCNC: 4.3 MMOL/L — SIGNIFICANT CHANGE UP (ref 3.5–5.3)
PROT SERPL-MCNC: 4.7 G/DL — LOW (ref 6–8.3)
PROTHROM AB SERPL-ACNC: 12.4 SEC — SIGNIFICANT CHANGE UP (ref 10.5–13.4)
PROTHROM AB SERPL-ACNC: 13 SEC — SIGNIFICANT CHANGE UP (ref 10.5–13.4)
PROTHROM AB SERPL-ACNC: 13 SEC — SIGNIFICANT CHANGE UP (ref 10.5–13.4)
PROTHROM AB SERPL-ACNC: 13.1 SEC — SIGNIFICANT CHANGE UP (ref 10.5–13.4)
RBC # BLD: 2.61 M/UL — LOW (ref 3.8–5.2)
RBC # BLD: 2.63 M/UL — LOW (ref 3.8–5.2)
RBC # BLD: 2.64 M/UL — LOW (ref 3.8–5.2)
RBC # BLD: 2.71 M/UL — LOW (ref 3.8–5.2)
RBC # FLD: 16.9 % — HIGH (ref 10.3–14.5)
RBC # FLD: 17.8 % — HIGH (ref 10.3–14.5)
RBC # FLD: 18.3 % — HIGH (ref 10.3–14.5)
RBC # FLD: 18.6 % — HIGH (ref 10.3–14.5)
SODIUM SERPL-SCNC: 136 MMOL/L — SIGNIFICANT CHANGE UP (ref 135–145)
SODIUM SERPL-SCNC: 138 MMOL/L — SIGNIFICANT CHANGE UP (ref 135–145)
SODIUM SERPL-SCNC: 143 MMOL/L — SIGNIFICANT CHANGE UP (ref 135–145)
SODIUM SERPL-SCNC: 144 MMOL/L — SIGNIFICANT CHANGE UP (ref 135–145)
SPECIMEN SOURCE: SIGNIFICANT CHANGE UP
SPECIMEN SOURCE: SIGNIFICANT CHANGE UP
WBC # BLD: 10.31 K/UL — SIGNIFICANT CHANGE UP (ref 3.8–10.5)
WBC # BLD: 10.79 K/UL — HIGH (ref 3.8–10.5)
WBC # BLD: 7.21 K/UL — SIGNIFICANT CHANGE UP (ref 3.8–10.5)
WBC # BLD: 8.62 K/UL — SIGNIFICANT CHANGE UP (ref 3.8–10.5)
WBC # FLD AUTO: 10.31 K/UL — SIGNIFICANT CHANGE UP (ref 3.8–10.5)
WBC # FLD AUTO: 10.79 K/UL — HIGH (ref 3.8–10.5)
WBC # FLD AUTO: 7.21 K/UL — SIGNIFICANT CHANGE UP (ref 3.8–10.5)
WBC # FLD AUTO: 8.62 K/UL — SIGNIFICANT CHANGE UP (ref 3.8–10.5)

## 2023-06-11 PROCEDURE — 76705 ECHO EXAM OF ABDOMEN: CPT | Mod: 26,59

## 2023-06-11 PROCEDURE — 93975 VASCULAR STUDY: CPT | Mod: 26

## 2023-06-11 PROCEDURE — 99291 CRITICAL CARE FIRST HOUR: CPT | Mod: GC

## 2023-06-11 PROCEDURE — 71045 X-RAY EXAM CHEST 1 VIEW: CPT | Mod: 26

## 2023-06-11 PROCEDURE — 99233 SBSQ HOSP IP/OBS HIGH 50: CPT

## 2023-06-11 PROCEDURE — 99232 SBSQ HOSP IP/OBS MODERATE 35: CPT

## 2023-06-11 RX ORDER — ALBUMIN HUMAN 25 %
250 VIAL (ML) INTRAVENOUS ONCE
Refills: 0 | Status: COMPLETED | OUTPATIENT
Start: 2023-06-11 | End: 2023-06-11

## 2023-06-11 RX ORDER — FUROSEMIDE 40 MG
40 TABLET ORAL ONCE
Refills: 0 | Status: COMPLETED | OUTPATIENT
Start: 2023-06-11 | End: 2023-06-11

## 2023-06-11 RX ORDER — FUROSEMIDE 40 MG
10 TABLET ORAL ONCE
Refills: 0 | Status: COMPLETED | OUTPATIENT
Start: 2023-06-11 | End: 2023-06-11

## 2023-06-11 RX ORDER — PIPERACILLIN AND TAZOBACTAM 4; .5 G/20ML; G/20ML
3.38 INJECTION, POWDER, LYOPHILIZED, FOR SOLUTION INTRAVENOUS EVERY 8 HOURS
Refills: 0 | Status: COMPLETED | OUTPATIENT
Start: 2023-06-11 | End: 2023-06-12

## 2023-06-11 RX ORDER — HYDROMORPHONE HYDROCHLORIDE 2 MG/ML
0.5 INJECTION INTRAMUSCULAR; INTRAVENOUS; SUBCUTANEOUS ONCE
Refills: 0 | Status: DISCONTINUED | OUTPATIENT
Start: 2023-06-11 | End: 2023-06-11

## 2023-06-11 RX ADMIN — VALGANCICLOVIR 450 MILLIGRAM(S): 450 TABLET, FILM COATED ORAL at 11:45

## 2023-06-11 RX ADMIN — Medication 125 MILLILITER(S): at 05:34

## 2023-06-11 RX ADMIN — Medication 100 MILLIGRAM(S): at 05:34

## 2023-06-11 RX ADMIN — MYCOPHENOLATE MOFETIL 1000 MILLIGRAM(S): 250 CAPSULE ORAL at 09:15

## 2023-06-11 RX ADMIN — Medication 40 MILLIGRAM(S): at 21:11

## 2023-06-11 RX ADMIN — HYDROMORPHONE HYDROCHLORIDE 0.25 MILLIGRAM(S): 2 INJECTION INTRAMUSCULAR; INTRAVENOUS; SUBCUTANEOUS at 19:45

## 2023-06-11 RX ADMIN — FLUCONAZOLE 100 MILLIGRAM(S): 150 TABLET ORAL at 11:06

## 2023-06-11 RX ADMIN — CHLORHEXIDINE GLUCONATE 1 APPLICATION(S): 213 SOLUTION TOPICAL at 05:35

## 2023-06-11 RX ADMIN — HYDROMORPHONE HYDROCHLORIDE 0.5 MILLIGRAM(S): 2 INJECTION INTRAMUSCULAR; INTRAVENOUS; SUBCUTANEOUS at 21:37

## 2023-06-11 RX ADMIN — HYDROMORPHONE HYDROCHLORIDE 0.5 MILLIGRAM(S): 2 INJECTION INTRAMUSCULAR; INTRAVENOUS; SUBCUTANEOUS at 11:20

## 2023-06-11 RX ADMIN — SENNA PLUS 2 TABLET(S): 8.6 TABLET ORAL at 05:34

## 2023-06-11 RX ADMIN — HYDROMORPHONE HYDROCHLORIDE 0.5 MILLIGRAM(S): 2 INJECTION INTRAMUSCULAR; INTRAVENOUS; SUBCUTANEOUS at 06:16

## 2023-06-11 RX ADMIN — Medication 100 MILLIGRAM(S): at 20:16

## 2023-06-11 RX ADMIN — Medication 10 MILLILITER(S): at 11:06

## 2023-06-11 RX ADMIN — Medication 10 MILLIGRAM(S): at 11:44

## 2023-06-11 RX ADMIN — HYDROMORPHONE HYDROCHLORIDE 0.25 MILLIGRAM(S): 2 INJECTION INTRAMUSCULAR; INTRAVENOUS; SUBCUTANEOUS at 20:15

## 2023-06-11 RX ADMIN — HYDROMORPHONE HYDROCHLORIDE 0.5 MILLIGRAM(S): 2 INJECTION INTRAMUSCULAR; INTRAVENOUS; SUBCUTANEOUS at 18:40

## 2023-06-11 RX ADMIN — SODIUM CHLORIDE 75 MILLILITER(S): 9 INJECTION, SOLUTION INTRAVENOUS at 19:45

## 2023-06-11 RX ADMIN — SODIUM CHLORIDE 75 MILLILITER(S): 9 INJECTION, SOLUTION INTRAVENOUS at 17:45

## 2023-06-11 RX ADMIN — HYDROMORPHONE HYDROCHLORIDE 0.5 MILLIGRAM(S): 2 INJECTION INTRAMUSCULAR; INTRAVENOUS; SUBCUTANEOUS at 18:55

## 2023-06-11 RX ADMIN — HYDROMORPHONE HYDROCHLORIDE 0.5 MILLIGRAM(S): 2 INJECTION INTRAMUSCULAR; INTRAVENOUS; SUBCUTANEOUS at 03:23

## 2023-06-11 RX ADMIN — PIPERACILLIN AND TAZOBACTAM 25 GRAM(S): 4; .5 INJECTION, POWDER, LYOPHILIZED, FOR SOLUTION INTRAVENOUS at 13:56

## 2023-06-11 RX ADMIN — PIPERACILLIN AND TAZOBACTAM 25 GRAM(S): 4; .5 INJECTION, POWDER, LYOPHILIZED, FOR SOLUTION INTRAVENOUS at 22:01

## 2023-06-11 RX ADMIN — HYDROMORPHONE HYDROCHLORIDE 0.5 MILLIGRAM(S): 2 INJECTION INTRAMUSCULAR; INTRAVENOUS; SUBCUTANEOUS at 03:08

## 2023-06-11 RX ADMIN — CHLORHEXIDINE GLUCONATE 1 APPLICATION(S): 213 SOLUTION TOPICAL at 05:34

## 2023-06-11 RX ADMIN — MYCOPHENOLATE MOFETIL 1000 MILLIGRAM(S): 250 CAPSULE ORAL at 19:45

## 2023-06-11 RX ADMIN — HYDROMORPHONE HYDROCHLORIDE 0.5 MILLIGRAM(S): 2 INJECTION INTRAMUSCULAR; INTRAVENOUS; SUBCUTANEOUS at 11:05

## 2023-06-11 RX ADMIN — PANTOPRAZOLE SODIUM 40 MILLIGRAM(S): 20 TABLET, DELAYED RELEASE ORAL at 11:06

## 2023-06-11 RX ADMIN — HYDROMORPHONE HYDROCHLORIDE 0.5 MILLIGRAM(S): 2 INJECTION INTRAMUSCULAR; INTRAVENOUS; SUBCUTANEOUS at 06:31

## 2023-06-11 RX ADMIN — HYDROMORPHONE HYDROCHLORIDE 0.5 MILLIGRAM(S): 2 INJECTION INTRAMUSCULAR; INTRAVENOUS; SUBCUTANEOUS at 22:07

## 2023-06-11 NOTE — PROGRESS NOTE ADULT - ASSESSMENT
38 y.o female with ETOH cirrhosis and VIVIANA now s/p liver transplant on 6/10/23.    1.  VIVIANA - Creatinine increased today and UO low.  SICU to assess volume status via POCUS.  Appears euvolemic on exam, may have post op ATN.  No current need for RRT.   2.  Liver transplant - followed by liver transplant team.

## 2023-06-11 NOTE — PROCEDURE NOTE - NSINFORMCONSENT_GEN_A_CORE
Benefits, risks, and possible complications of procedure explained to patient/caregiver who verbalized understanding and gave verbal consent.
Benefits, risks, and possible complications of procedure explained to patient/caregiver who verbalized understanding and gave written consent.

## 2023-06-11 NOTE — PROGRESS NOTE ADULT - SUBJECTIVE AND OBJECTIVE BOX
SICU DAILY PROGRESS NOTE    24 HOUR EVENTS:  - extubated; mental status improved  - 1L albumin  - POCUS - IVC 1.5; good contractility  - 1u PRBC given (6.7/18.5 -> 7.8/22.2 )    NEURO  Meds:    RESPIRATORY  Mechanical Ventilation: HR: 62 (06-11-23 @ 03:00) (62 - 95)  BP: 120/75 (06-10-23 @ 04:30) (120/75 - 120/75)  BP(mean): 92 (06-10-23 @ 04:30) (92 - 92)  ABP: 143/68 (06-11-23 @ 03:00) (105/53 - 147/73)  ABP(mean): 97 (06-11-23 @ 03:00) (73 - 103)  Wt(kg): --  CVP(cm H2O): --  ABG - ( 11 Jun 2023 01:10 )  pH: 7.46  /  pCO2: 33    /  pO2: 78    / HCO3: 24    / Base Excess: -0.1  /  SaO2: 97.2    Lactate: x          Meds:    CARDIOVASCULAR  VBG - ( 09 Jun 2023 21:43 )  pH: 7.37  /  pCO2: 35    /  pO2: 193   / HCO3: 20    / Base Excess: -4.4  /  SaO2: 99.7   Lactate: x                Cardiac Rhythm:  Meds:    GI/NUTRITION  Diet:  Meds:pantoprazole  Injectable 40 milliGRAM(s) IV Push daily  senna 2 Tablet(s) Oral two times a day      GENITOURINARY/RENAL  Meds:  06-09 @ 07:01  -  06-10 @ 07:00  --------------------------------------------------------  IN:    Dexmedetomidine: 13 mL    Enteral Tube Flush: 30 mL    IV PiggyBack: 100 mL    Lactated Ringers: 300 mL  Total IN: 443 mL    OUT:    Drain (mL): 105 mL    Drain (mL): 75 mL    Drain (mL): 110 mL    Indwelling Catheter - Urethral (mL): 135 mL    Nasogastric/Oral tube (mL): 50 mL  Total OUT: 475 mL    Total NET: -32 mL      06-10 @ 07:01 - 06-11 @ 03:28  --------------------------------------------------------  IN:    Albumin 5%  - 250 mL: 1000 mL    IV PiggyBack: 50 mL    IV PiggyBack: 450 mL    Lactated Ringers: 1500 mL    PRBCs (Packed Red Blood Cells): 300 mL  Total IN: 3300 mL    OUT:    Dexmedetomidine: 0 mL    Drain (mL): 320 mL    Drain (mL): 105 mL    Drain (mL): 280 mL    Indwelling Catheter - Urethral (mL): 310 mL    Nasogastric/Oral tube (mL): 85 mL  Total OUT: 1100 mL    Total NET: 2200 mL        Weight (kg): 41.4 (06-10 @ 04:10)  lactated ringers. 1000 milliLiter(s) IV Continuous <Continuous>  sodium chloride 0.9% lock flush 10 milliLiter(s) IV Push every 1 hour PRN Pre/post blood products, medications, blood draw, and to maintain line patency    06-11    144  |  107  |  39<H>  ----------------------------<  146<H>  4.2   |  21<L>  |  1.91<H>    Ca    8.8      11 Jun 2023 01:24  Phos  4.1     06-11  Mg     2.1     06-11    TPro  4.7<L>  /  Alb  3.3  /  TBili  5.7<H>  /  DBili  x   /  AST  137<H>  /  ALT  22  /  AlkPhos  38<L>  06-11    [ ] Bundy catheter, indication: urine output monitoring in critically ill patient    HEMATOLOGIC  Meds:   [x] DVT Prophylaxis                        7.8    7.21  )-----------( 75       ( 11 Jun 2023 01:24 )             22.2     PT/INR - ( 11 Jun 2023 01:24 )   PT: 13.1 sec;   INR: 1.14 ratio         PTT - ( 11 Jun 2023 01:24 )  PTT:28.4 sec  Transfusion     [ ] PRBC   [ ] Platelets   [ ] FFP   [ ] Cryoprecipitate    INFECTIOUS DISEASES  T(C): 36.5 (06-11-23 @ 03:00), Max: 36.8 (06-10-23 @ 15:00)  Wt(kg): --  WBC Count: 7.21 K/uL (06-11 @ 01:24)  WBC Count: 5.63 K/uL (06-10 @ 21:02)  WBC Count: 5.06 K/uL (06-10 @ 14:49)  WBC Count: 5.65 K/uL (06-10 @ 09:03)  WBC Count: 4.97 K/uL (06-10 @ 04:47)    Recent Cultures:  Specimen Source: Abdominal Fl Abdominal Fluid, 06-10 @ 05:42; Results --; Gram Stain:   No polymorphonuclear cells seen per low power field  No organisms seen per oil power field; Organism: --  Specimen Source: Ascites Fl Ascites Fluid, 06-08 @ 18:34; Results   No growth; Gram Stain:   No polymorphonuclear cells seen  No organisms seen  by cytocentrifuge; Organism: --  Specimen Source: .Blood Blood-Peripheral, 06-07 @ 12:35; Results   No growth to date.; Gram Stain: --; Organism: --  Specimen Source: .Blood Blood, 06-06 @ 06:31; Results   No growth to date.; Gram Stain: --; Organism: --  Specimen Source: .Bronchial Bronchoalveolar Lavage, 06-05 @ 19:44; Results   No growth at 48 hours; Gram Stain:   polymorphonuclear leukocytes per low power field  No squamous epithelial cells per low power field  Gram Negative Rods per oil power field  by cytocentrifuge; Organism: --  Specimen Source: .Bronchial Bronchial Lavage, 06-05 @ 19:40; Results   Culture is being performed.; Gram Stain:   Rare polymorphonuclear leukocytes per low power field  No squamous epithelial cells per low power field  No organisms seen per oil power field; Organism: --    Meds: ceFAZolin   IVPB 2000 milliGRAM(s) IV Intermittent every 8 hours  fluconAZOLE IVPB 400 milliGRAM(s) IV Intermittent daily  mycophenolate mofetil Suspension 1000 milliGRAM(s) Oral <User Schedule>  trimethoprim  40 mG/sulfamethoxazole 200 mG Suspension 10 milliLiter(s) Oral daily  valGANciclovir 50 mG/mL Oral Solution 450 milliGRAM(s) Oral daily      ENDOCRINE  Capillary Blood Glucose    Meds: insulin lispro (ADMELOG) corrective regimen sliding scale   SubCutaneous every 6 hours  methylPREDNISolone sodium succinate IVPB   IV Intermittent   methylPREDNISolone sodium succinate IVPB 250 milliGRAM(s) IV Intermittent every 24 hours      OTHER MEDICATIONS:  chlorhexidine 2% Cloths 1 Application(s) Topical <User Schedule>  chlorhexidine 4% Liquid 1 Application(s) Topical <User Schedule>      PHYSICAL EXAM:  General: NAD  Neurologic: A&Ox3  Resp: no increased WOB, satting well on RA  Cardiac: appears well perfused, extremities warm  GI/abd: softly distended, tender, 3 drains with sanguinous output  : Bundy    ACCESS DEVICES:  [x] Peripheral IV  [x] Central Venous Line	[x] R	[ ] L	[ ] IJ	[ ] Fem	[ ] SC	Placed:   [x] Arterial Line		[x] R	[x] L	[ ] Fem	[ ] Rad	[ ] Ax	Placed:   [ ] PICC:					[ ] Mediport  [x] Urinary Catheter, Date Placed:   [x] DOROTHY

## 2023-06-11 NOTE — PROGRESS NOTE ADULT - ASSESSMENT
39 yo F with hypothyroidism (on Levothyroxine) who initially presented to [facility] on  with abd pain. 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;  MSSA bacteremia secondary to SSTI and Organizing pneumonia of undetermined etiology now s/p OLT 23    Microbiology:   BC NGTd   UC 50-99K CRE, UA non micro   BC NGTD   ascitic fluid bacterial, fungal cx NGTD  ,  BC NGTD   ascitic fluid cx neg   BC pos MSSA   induced sputum bacterial cx neg, fungal cx not sent, PCP PCR still pending   fungitell , aspergillus GM neg in serum Histoplasma antigen in urine neg, CrAg neg   RVP neg   BAL gram stain GNR, no growth on culture, fungal and afb cx NGTD, PCP PCR pending, Legionella PCR pending, fungitell BAl neg, aspergillus GM pending   repeat serum fungitell neg  ;  BC NGTD   ascitic fluid cx P; cellk  count; 36 cells    Imagin/25 Ct chest: findings c/w pulmonary edema- (areas of confluent GGo)   MRCP No suspicious liver lesions. Marked hepatomegaly and hepatic steatosis   with additional findings suggestive of cirrhosis and resultant portal   hypertension.   US abdomen: small to mod ascites  Ct chest iv con: Multifocal bilateral groundglass opacities, suspicious for pneumonia.  Small bilateral pleural effusions, left greater the right, increased compared to prior exam.no PE  Ct maxillofacial , Head and Cervical spine     CT FACIAL BONES  Bilateral mastoid air cells and middle ear regions well-aerated. Paranasal sinus mucosal thickening with near complete opacification of the right sphenoid sinus. Bilateral maxillary sinus septations. No air-fluid levels. S-shaped deviation of the nasal septum with hypertrophy of bilateral inferior greater than middle nasal turbinates. Narrowing ofbilateral ostiomeatal complexes. Periapical lucencies bilateral maxillarypremolars.    CT C/A/P   * Small focal consolidation in the posterior left upper lobe. Givenhistory of trauma, small pulmonary contusion cannot be excluded.Correlate for pneumonia.  * Hepatomegaly and diffuse upper extremity ptosis with signs of portalhypertension. Correlate for steatohepatitis. Moderate ascites.    Assessment and plan  1. EtoH cirrhosis- MELD-NA  35; s/p OLT23  CMV D+/R+, EBV D+/R+, Toxoplasma D?/R-  Donor: HCV Ab/NAAt pos  Induction simulect + steroids  Maintenance : tacro, MMF  Prophylaxis:  CMV: valcyte (temporarily on treatment dose 450 mf daily for crcl in s/o low level viremia in  on day of transplant);   PCP: bactrim   2. MSSA bacteremia  BC ; repeat BC  neg. TTE no vegetations ; SAHARA neg on   CT face, chest/abdomen and pelvis post trauma noting sinus opacification, bilateral apical maxillary premolar lucencies. CT chest with small consolidation  Sources: traumatic /SSTI in s/o facial hematoma vs pneumonia vs less likely ENT. No evidence of metastatic infection. ? hardware  Cefazolin -  3. Donor HCV NAAT pos  4. migratory GGO and patchy opacities on CT chest , waxing and waning on repeat Ct   Pulmonary edema +/-    Repeat CT chest  with worsening GGo and some areas more consolidated ; s/p zosyn 7 day course, then some improvement on  with other areas of worsening  Cirrhotic patients also at risk of PCP, cryptococcosis- pattern not classical for the later, not classical host for mold infection except endemics- neither would cause leucocytosis  Induced sputum 5/  S/p bronchoscopy on ; cytology with macrophages and mild acute inflamamtion. cx NGTD, PCP PCR, legionella PCR P, aspergillus GM P, fungitell in BAL neg.   ?  of unclear etiology- on RA- no identified etiology and would not delay transplant at this point    5. 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 Facial trauma with hematoma s/p evacuation - no purulence  7. Pre-transplant screening  HIV ag/Ab neg; HCV Ab neg, HBV s Ag neg, HBV s Ab pos, HBV c Ab neg, HEV IgG neg, EBV IgG pos, CMV IgG pos, HSV1/2 IgG pos, VZV IgG NEG; Measles/Mumps IgG neg, Rubella igG pos ([place] measles), Syphilis screen neg, Strongyliodes IgG neg. Quantiferon and toxoplasma igG neg.  8. Need for vaccinations.  Only received Td . Positive titers for HBV non-quantitative  will need in the post-transplant setting" Shingrix, 2 doses 2-6 months apart; Tdap (once in adulthood), COVID-19, Prevnar 20, HPV Gardisil 9 3 doses at 0-1-6 months  - Of note, non-immune to Measles or mumps,  nor to varicella. immune to [place] measles (Rubella) - post-transplant,  live vaccination would be contraindicated.  9. Leucocytosis - resolved  pretransplant, peak at 24- extensive ID workup negative    Recommendations:  -- Continue valcyge 450 mg daily - this is temporary treatment dose in s/o low level CMV viremia within 24 hours of transplant.   -- Follow fungal, afb Cultures, BAL Aspergillus galactomannan, Legionella PCR, PCP PCR)  --Continue cefazolin 2g q8h for MSSA bacteremia (tentative End Date: 23 then stop  --Can stop zosyn today   - continue fluconazole prophylaxis pe protocol: please decrease dose to 200 mg daily adjusted to creatinine clearance  -- continue bactrim for PCP prophylaxis         Thank you for involving us in the care of this patient  Transplant ID will continue to follow  Please call or page with additional questions  Pager; #[phone]  Teams: from 8 am to 5 pm  Samanta Soto MD     37 yo F with hypothyroidism (on Levothyroxine) who initially presented to [facility] on  with abd pain. 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;  MSSA bacteremia secondary to SSTI and Organizing pneumonia of undetermined etiology now s/p OLT 23    Microbiology:   BC NGTd   UC 50-99K CRE, UA non micro   BC NGTD   ascitic fluid bacterial, fungal cx NGTD  ,  BC NGTD   ascitic fluid cx neg   BC pos MSSA   induced sputum bacterial cx neg, fungal cx not sent, PCP PCR still pending   fungitell , aspergillus GM neg in serum Histoplasma antigen in urine neg, CrAg neg   RVP neg   BAL gram stain GNR, no growth on culture, fungal and afb cx NGTD, PCP PCR pending, Legionella PCR pending, fungitell BAl neg, aspergillus GM pending   repeat serum fungitell neg  ;  BC NGTD   ascitic fluid cx P; cellk  count; 36 cells    Imagin/25 Ct chest: findings c/w pulmonary edema- (areas of confluent GGo)   MRCP No suspicious liver lesions. Marked hepatomegaly and hepatic steatosis   with additional findings suggestive of cirrhosis and resultant portal   hypertension.   US abdomen: small to mod ascites  Ct chest iv con: Multifocal bilateral groundglass opacities, suspicious for pneumonia.  Small bilateral pleural effusions, left greater the right, increased compared to prior exam.no PE  Ct maxillofacial , Head and Cervical spine     CT FACIAL BONES  Bilateral mastoid air cells and middle ear regions well-aerated. Paranasal sinus mucosal thickening with near complete opacification of the right sphenoid sinus. Bilateral maxillary sinus septations. No air-fluid levels. S-shaped deviation of the nasal septum with hypertrophy of bilateral inferior greater than middle nasal turbinates. Narrowing ofbilateral ostiomeatal complexes. Periapical lucencies bilateral maxillarypremolars.    CT C/A/P   * Small focal consolidation in the posterior left upper lobe. Givenhistory of trauma, small pulmonary contusion cannot be excluded.Correlate for pneumonia.  * Hepatomegaly and diffuse upper extremity ptosis with signs of portalhypertension. Correlate for steatohepatitis. Moderate ascites.    Assessment and plan  1. EtoH cirrhosis- MELD-NA  35; s/p OLT23  CMV D+/R+, EBV D+/R+, Toxoplasma D?/R-  Donor: HCV Ab/NAAt pos  Induction simulect + steroids  Maintenance : tacro, MMF  Prophylaxis:  CMV: valcyte (temporarily on treatment dose 450 mf daily for crcl in s/o low level viremia in  on day of transplant);   PCP: bactrim   2. MSSA bacteremia  BC ; repeat BC  neg. TTE no vegetations ; SAHARA neg on   CT face, chest/abdomen and pelvis post trauma noting sinus opacification, bilateral apical maxillary premolar lucencies. CT chest with small consolidation  Sources: traumatic /SSTI in s/o facial hematoma vs pneumonia vs less likely ENT. No evidence of metastatic infection. ? hardware  Cefazolin -  3. Donor HCV NAAT pos  4. migratory GGO and patchy opacities on CT chest , waxing and waning on repeat Ct   Pulmonary edema +/-    Repeat CT chest  with worsening GGo and some areas more consolidated ; s/p zosyn 7 day course, then some improvement on  with other areas of worsening  Cirrhotic patients also at risk of PCP, cryptococcosis- pattern not classical for the later, not classical host for mold infection except endemics- neither would cause leucocytosis  Induced sputum 5/  S/p bronchoscopy on ; cytology with macrophages and mild acute inflamamtion. cx NGTD, PCP PCR, legionella PCR P, aspergillus GM P, fungitell in BAL neg.   ?  of unclear etiology- on RA- no identified etiology and would not delay transplant at this point    5. 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 Facial trauma with hematoma s/p evacuation - no purulence  7. Pre-transplant screening  HIV ag/Ab neg; HCV Ab neg, HBV s Ag neg, HBV s Ab pos, HBV c Ab neg, HEV IgG neg, EBV IgG pos, CMV IgG pos, HSV1/2 IgG pos, VZV IgG NEG; Measles/Mumps IgG neg, Rubella igG pos ([place] measles), Syphilis screen neg, Strongyliodes IgG neg. Quantiferon and toxoplasma igG neg.  8. Need for vaccinations.  Only received Td . Positive titers for HBV non-quantitative  will need in the post-transplant setting" Shingrix, 2 doses 2-6 months apart; Tdap (once in adulthood), COVID-19, Prevnar 20, HPV Gardisil 9 3 doses at 0-1-6 months  - Of note, non-immune to Measles or mumps,  nor to varicella. immune to [place] measles (Rubella) - post-transplant,  live vaccination would be contraindicated.  9. Leucocytosis - resolved  pretransplant, peak at 24- extensive ID workup negative    Recommendations:  -- Continue valcyte 450 mg daily - this is temporary treatment dose in s/o low level CMV viremia within 24 hours of transplant.   -- Continue monitoring of HCV PCR per protocol .  -- will also need HIV NAAT/HBV DNA at 1 months post-transplant  -- Follow fungal, afb Cultures, BAL Aspergillus galactomannan, Legionella PCR, PCP PCR)  --Continue cefazolin 2g q8h for MSSA bacteremia (tentative End Date: 23 then stop  --Can stop zosyn today   - continue fluconazole prophylaxis pe protocol: please decrease dose to 200 mg daily adjusted to creatinine clearance  -- continue bactrim for PCP prophylaxis         Thank you for involving us in the care of this patient  Transplant ID will continue to follow  Please call or page with additional questions  Pager; #[phone]  Teams: from 8 am to 5 pm  Samanta Soto MD     37 yo F with hypothyroidism (on Levothyroxine) who initially presented to [facility] on  with abd pain. 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;  MSSA bacteremia secondary to SSTI and Organizing pneumonia of undetermined etiology now s/p OLT 23    Microbiology:   BC NGTd   UC 50-99K CRE, UA non micro   BC NGTD   ascitic fluid bacterial, fungal cx NGTD  ,  BC NGTD   ascitic fluid cx neg   BC pos MSSA   induced sputum bacterial cx neg, fungal cx not sent, PCP PCR still pending   fungitell , aspergillus GM neg in serum Histoplasma antigen in urine neg, CrAg neg   RVP neg   BAL gram stain GNR, no growth on culture, fungal and afb cx NGTD, PCP PCR pending, Legionella PCR pending, fungitell BAl neg, aspergillus GM pending   repeat serum fungitell neg  ;  BC NGTD   ascitic fluid cx P; cellk  count; 36 cells    Imagin/25 Ct chest: findings c/w pulmonary edema- (areas of confluent GGo)   MRCP No suspicious liver lesions. Marked hepatomegaly and hepatic steatosis   with additional findings suggestive of cirrhosis and resultant portal   hypertension.   US abdomen: small to mod ascites  Ct chest iv con: Multifocal bilateral groundglass opacities, suspicious for pneumonia.  Small bilateral pleural effusions, left greater the right, increased compared to prior exam.no PE  Ct maxillofacial , Head and Cervical spine     CT FACIAL BONES  Bilateral mastoid air cells and middle ear regions well-aerated. Paranasal sinus mucosal thickening with near complete opacification of the right sphenoid sinus. Bilateral maxillary sinus septations. No air-fluid levels. S-shaped deviation of the nasal septum with hypertrophy of bilateral inferior greater than middle nasal turbinates. Narrowing ofbilateral ostiomeatal complexes. Periapical lucencies bilateral maxillarypremolars.    CT C/A/P   * Small focal consolidation in the posterior left upper lobe. Givenhistory of trauma, small pulmonary contusion cannot be excluded.Correlate for pneumonia.  * Hepatomegaly and diffuse upper extremity ptosis with signs of portalhypertension. Correlate for steatohepatitis. Moderate ascites.      US liver   Large heterogeneous fluid collection in Morison's pouch measuring 11.4 x 2.4 x 7.7 cm  Assessment and plan  1. EtoH cirrhosis- MELD-NA  of 35; s/p OLT23  CMV D+/R+, EBV D+/R+, Toxoplasma D?/R-  required 12 U PRBC, 8 FFp, 2 Plat, 1 cryo  Donor: HCV Ab/NAAt pos  Induction simulect + steroids  Maintenance : tacro, MMF  Prophylaxis:  CMV: valcyte (temporarily on treatment dose 450 mf daily for crcl in s/o low level viremia in  on day of transplant);   PCP: bactrim   2. MSSA bacteremia  BC ; repeat BC  neg. TTE no vegetations ; SAHARA neg on   CT face, chest/abdomen and pelvis post trauma noting sinus opacification, bilateral apical maxillary premolar lucencies. CT chest with small consolidation  Sources: traumatic /SSTI in s/o facial hematoma vs pneumonia vs less likely ENT. No evidence of metastatic infection. ? hardware  Cefazolin -  3. Donor HCV NAAT pos  4. migratory GGO and patchy opacities on CT chest , waxing and waning on repeat Ct   Pulmonary edema +/-    Repeat CT chest  with worsening GGo and some areas more consolidated ; s/p zosyn 7 day course, then some improvement on  with other areas of worsening  Cirrhotic patients also at risk of PCP, cryptococcosis- pattern not classical for the later, not classical host for mold infection except endemics- neither would cause leucocytosis  Induced sputum 5/  S/p bronchoscopy on ; cytology with macrophages and mild acute inflamamtion. cx NGTD, PCP PCR, legionella PCR P, aspergillus GM P, fungitell in BAL neg.   ?  of unclear etiology- on RA- no identified etiology and would not delay transplant at this point    5. 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 Facial trauma with hematoma s/p evacuation - no purulence  7. Pre-transplant screening  HIV ag/Ab neg; HCV Ab neg, HBV s Ag neg, HBV s Ab pos, HBV c Ab neg, HEV IgG neg, EBV IgG pos, CMV IgG pos, HSV1/2 IgG pos, VZV IgG NEG; Measles/Mumps IgG neg, Rubella igG pos ([place] measles), Syphilis screen neg, Strongyliodes IgG neg. Quantiferon and toxoplasma igG neg.  8. Need for vaccinations.  Only received Td . Positive titers for HBV non-quantitative  will need in the post-transplant setting" Shingrix, 2 doses 2-6 months apart; Tdap (once in adulthood), COVID-19, Prevnar 20, HPV Gardisil 9 3 doses at 0-1-6 months  - Of note, non-immune to Measles or mumps,  nor to varicella. immune to [place] measles (Rubella) - post-transplant,  live vaccination would be contraindicated.  9. Leucocytosis - resolved  pretransplant, peak at 24- extensive ID workup negative    Recommendations:  -- Continue valcyte 450 mg daily - this is temporary treatment dose in s/o low level CMV viremia within 24 hours of transplant.   -- Continue monitoring of HCV PCR per protocol .  -- will also need HIV NAAT/HBV DNA at 1 months post-transplant  -- Follow fungal, afb Cultures, BAL Aspergillus galactomannan, Legionella PCR, PCP PCR)  --Continue cefazolin 2g q8h for MSSA bacteremia (tentative End Date: 23 then stop  --Can stop zosyn today   - continue fluconazole prophylaxis pe protocol: please decrease dose to 200 mg daily adjusted to creatinine clearance  -- continue bactrim for PCP prophylaxis         Thank you for involving us in the care of this patient  Transplant ID will continue to follow  Please call or page with additional questions  Pager; #[phone]  Teams: from 8 am to 5 pm  Samanta Soto MD

## 2023-06-11 NOTE — PROGRESS NOTE ADULT - ATTENDING COMMENTS
Dr. Painter (Attending Physician)  N - Pulled one   P -   C - Dc swan, pull miguel  GI - dc ngt, start sips    - poor UO, will give lasix 10 mg IV  H - responded to transfusion, stable  ID - periop abx  E -   PPx - ppi bid  Lines -   Dispo -

## 2023-06-11 NOTE — PROGRESS NOTE ADULT - ASSESSMENT
38F with Hx of hypothyroidism (on Levothyroxine), liver cirrhosis 2/2 ETOH abuse---->  presented to [facility] ED on 5/13/23 with ~ 2 weeks Hx of progressively worsening abdominal pain and distention.  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  now s/p HCV+ OLT under Simulect induction on 6/10/2023.    NEURO:  - A&Ox4  - Pain: dilaudid PRN    RESPIRATORY:  - Comfortable on RA    CARDIOVASCULAR:  -Ramiro in place  -Normal recent TTE  -No cardiac hx  -No cardiac meds  -Monitor BP    GI:  - NPO, NGT  - Marce Froylan incision  - MONSE x3, sanginuous  - Hepatic artery post op U/S - patent vasculature    RENAL:  - Bundy  - Monitor UOP  - Nephro on board for preop VIVIANA of unknown origin    HEME:  - DVT PPx held    ID:  -Zosyn, Fluc, Valcyte + zosyn  -Recent bronch, f/u cx  -ID on board  -Cellcept to start today  -Simulect induction and POD 4    ENDO:  - POCT glucose  - investigate compliance to home reg given glucose control on ISS    DISPO: SICU    Lines: A-line x 2, RICC, PIV, R IJ introducer  Labs: Q4

## 2023-06-11 NOTE — PROGRESS NOTE ADULT - ASSESSMENT
38F with Hx of hypothyroidism (on Levothyroxine), liver cirrhosis 2/2 ETOH abuse---->  presented to [facility] ED on 5/13/23 with ~ 2 weeks Hx of progressively worsening abdominal pain and distention.  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  now s/p HCV+ OLT under Simulect induction on 6/10/2023    s/p HCV+ OLT  - good graft function, U/S with good perfusion  - trend labs  - NGT removed, advanced to sips  - SCDs/spirometry as able  - OOBTC  - bowel regimen  - hold ASA/SQH for now  - check HCV titers per protocol  - VIVIANA pre-op, continuing to rise with oliguria. Will continue to trend. Holding FK for now. Strict I&Os ([place]/JPx3), Transplant Nephrology following.  - MSSA bacteremia (5/17) - cont Ancef until 6/12, Transplant ID following  - cont zosyn (periop)  - Ok to remove [place] Amy catheter today  - Remove one a-line today    Immunosuppression  -FK on HOLD  -MMF 1/1  -SST  -SIMULECT POD#4  -CMV viremia pre-op, on Valcyte 900BID, rest of PPx standard    Transplant Surgery  [id number]

## 2023-06-11 NOTE — PROCEDURE NOTE - ADDITIONAL PROCEDURE DETAILS
U/S guided brachial vein cannulation. Flushing well and pulling back initially, however after subsequent attempts only flushing. Likely due to clot. Sterile dressing atop. U/S guided brachial vein cannulation. Flushing well and pulling back initially, however after subsequent attempts only flushing. Likely due to clot in line. Sterile dressing atop.

## 2023-06-11 NOTE — PROGRESS NOTE ADULT - SUBJECTIVE AND OBJECTIVE BOX
POD #1    SUBJECTIVE:  Does not like NGT, reports nonfocal pain but otherwise no acute complaints    OBJECTIVE:  Vital Signs Last 24 Hrs  T(C): 36.7 (11 Jun 2023 11:00), Max: 36.8 (10 Sukhjinder 2023 15:00)  T(F): 98.1 (11 Jun 2023 11:00), Max: 98.2 (10 Sukhjinder 2023 15:00)  HR: 54 (11 Jun 2023 13:00) (52 - 95)  BP: --  BP(mean): --  RR: 17 (11 Jun 2023 13:00) (12 - 34)  SpO2: 94% (11 Jun 2023 13:00) (94% - 100%)    Parameters below as of 10 Sukhjinder 2023 19:00  Patient On (Oxygen Delivery Method): room air          06-10-23 @ 07:01  -  06-11-23 @ 07:00  --------------------------------------------------------  IN: 3690 mL / OUT: 1185 mL / NET: 2505 mL    06-11-23 @ 07:01  -  06-11-23 @ 12:58  --------------------------------------------------------  IN: 575 mL / OUT: 87 mL / NET: 488 mL        Physical Examination:  GEN: NAD, resting quietly  PULM: symmetric chest rise bilaterally, no increased WOB  ABD: soft, appropriately tender, nondistended, no rebound or guarding, incision CDI, MONSE drain x3 serosang  EXTR: no LE erythema, moving all extremities      LABS:                        7.7    8.62  )-----------( 72       ( 11 Jun 2023 08:50 )             22.0       06-11    143  |  105  |  42<H>  ----------------------------<  146<H>  4.1   |  20<L>  |  2.09<H>    Ca    8.8      11 Jun 2023 08:50  Phos  4.5     06-11  Mg     2.2     06-11    TPro  4.7<L>  /  Alb  3.5  /  TBili  6.1<H>  /  DBili  x   /  AST  102<H>  /  ALT  14  /  AlkPhos  39<L>  06-11

## 2023-06-11 NOTE — PROGRESS NOTE ADULT - SUBJECTIVE AND OBJECTIVE BOX
[facility] DIVISION OF KIDNEY DISEASES AND HYPERTENSION -- FOLLOW UP NOTE  --------------------------------------------------------------------------------  Chief Complaint:  cirrhosis, s/p OLT    24 hour events/subjective:  Pt s/p liver transplant, UO dropped, creatinine increased.        PAST HISTORY  --------------------------------------------------------------------------------  No significant changes to PMH, PSH, FHx, SHx, unless otherwise noted    ALLERGIES & MEDICATIONS  --------------------------------------------------------------------------------  Allergies    No Known Allergies    Intolerances      MEDICATIONS  (STANDING):  chlorhexidine 2% Cloths 1 Application(s) Topical <User Schedule>  chlorhexidine 4% Liquid 1 Application(s) Topical <User Schedule>  fluconAZOLE IVPB 400 milliGRAM(s) IV Intermittent daily  insulin lispro (ADMELOG) corrective regimen sliding scale   SubCutaneous every 6 hours  lactated ringers. 1000 milliLiter(s) (75 mL/Hr) IV Continuous <Continuous>  methylPREDNISolone sodium succinate IVPB 250 milliGRAM(s) IV Intermittent every 24 hours  methylPREDNISolone sodium succinate IVPB   IV Intermittent   mycophenolate mofetil Suspension 1000 milliGRAM(s) Oral <User Schedule>  pantoprazole  Injectable 40 milliGRAM(s) IV Push daily  piperacillin/tazobactam IVPB.. 3.375 Gram(s) IV Intermittent every 8 hours  senna 2 Tablet(s) Oral two times a day  trimethoprim  40 mG/sulfamethoxazole 200 mG Suspension 10 milliLiter(s) Oral daily  valGANciclovir 50 mG/mL Oral Solution 450 milliGRAM(s) Oral daily    MEDICATIONS  (PRN):  HYDROmorphone  Injectable 0.25 milliGRAM(s) IV Push every 3 hours PRN Moderate Pain (4 - 6)  HYDROmorphone  Injectable 0.5 milliGRAM(s) IV Push every 3 hours PRN Severe Pain (7 - 10)  sodium chloride 0.9% lock flush 10 milliLiter(s) IV Push every 1 hour PRN Pre/post blood products, medications, blood draw, and to maintain line patency      Vital Signs Last 24 Hrs  T(C): 36.7 (11 Jun 2023 11:00), Max: 36.8 (10 Sukhjinder 2023 15:00)  T(F): 98.1 (11 Jun 2023 11:00), Max: 98.2 (10 Sukhjinder 2023 15:00)  HR: 54 (11 Jun 2023 13:00) (52 - 95)  BP: --  BP(mean): --  RR: 17 (11 Jun 2023 13:00) (12 - 34)  SpO2: 94% (11 Jun 2023 13:00) (94% - 100%)    Parameters below as of 10 Sukhjinder 2023 19:00  Patient On (Oxygen Delivery Method): room air        I&O's Summary    10 Sukhjinder 2023 07:01  -  11 Jun 2023 07:00  --------------------------------------------------------  IN: 3690 mL / OUT: 1185 mL / NET: 2505 mL    11 Jun 2023 07:01  -  11 Jun 2023 13:19  --------------------------------------------------------  IN: 650 mL / OUT: 97 mL / NET: 553 mL                Physical Exam:  	Gen: NAD  	HEENT: supple  	Pulm: CTA B/L  	CV: RRR, S1S2; no rub  	Back: No spinal or CVA tenderness; no sacral edema  	Abd: +BS, soft, OLT incision   	LE: Warm, trace edema      LABS/STUDIES  --------------------------------------------------------------------------------                                7.7    8.62  )-----------( 72       ( 11 Jun 2023 08:50 )             22.0     06-11    143  |  105  |  42<H>  ----------------------------<  146<H>  4.1   |  20<L>  |  2.09<H>    Ca    8.8      11 Jun 2023 08:50  Phos  4.5     06-11  Mg     2.2     06-11    TPro  4.7<L>  /  Alb  3.5  /  TBili  6.1<H>  /  DBili  x   /  AST  102<H>  /  ALT  14  /  AlkPhos  39<L>  06-11          Culture - Body Fluid with Gram Stain (collected 06-10-23 @ 05:42)  Source: Abdominal Fl Abdominal Fluid  Gram Stain (06-10-23 @ 16:55):    No polymorphonuclear cells seen per low power field    No organisms seen per oil power field  Preliminary Report (06-11-23 @ 09:30):    No growth    Culture - Body Fluid with Gram Stain (collected 06-08-23 @ 18:34)  Source: Ascites Fl Ascites Fluid  Gram Stain (06-08-23 @ 23:32):    No polymorphonuclear cells seen    No organisms seen    by cytocentrifuge  Preliminary Report (06-09-23 @ 15:40):    No growth    Culture - Blood (collected 06-07-23 @ 12:35)  Source: .Blood Blood-Peripheral  Preliminary Report (06-08-23 @ 15:02):    No growth to date.    Culture - Blood (collected 06-07-23 @ 12:35)  Source: .Blood Blood-Peripheral  Preliminary Report (06-08-23 @ 15:02):    No growth to date.    Culture - Blood (collected 06-06-23 @ 06:31)  Source: .Blood Blood  Final Report (06-11-23 @ 12:00):    No Growth Final    Culture - Blood (collected 06-06-23 @ 06:31)  Source: .Blood Blood  Final Report (06-11-23 @ 12:00):    No Growth Final    Culture - Quantitative BAL Immunocompromised (collected 06-05-23 @ 19:44)  Source: .Bronchial Bronchoalveolar Lavage  Gram Stain (06-06-23 @ 03:40):    polymorphonuclear leukocytes per low power field    No squamous epithelial cells per low power field    Gram Negative Rods per oil power field    by cytocentrifuge  Final Report (06-07-23 @ 15:18):    No growth at 48 hours    Culture - Fungal, Bronchial (collected 06-05-23 @ 19:40)  Source: .Bronchial Bronchial Lavage  Preliminary Report (06-07-23 @ 15:03):    Culture is being performed. Fungal cultures are held for 4 weeks.    Culture - Bronchial (collected 06-05-23 @ 19:40)  Source: .Bronchial Bronchial Lavage  Gram Stain (06-06-23 @ 03:39):    Rare polymorphonuclear leukocytes per low power field    No squamous epithelial cells per low power field    No organisms seen per oil power field  Final Report (06-07-23 @ 15:58):    No growth at 48 hours    Culture - Acid Fast - Bronchial w/Smear (collected 06-05-23 @ 19:40)  Source: .Bronchial Bronchial Lavage  Preliminary Report (06-07-23 @ 15:09):    Culture is being performed.

## 2023-06-11 NOTE — PROGRESS NOTE ADULT - SUBJECTIVE AND OBJECTIVE BOX
AFebrile.  s/p OLT 6/10 with 12 PRBC, 8 FFp, 2 Plat 1 cryo  US with 11 cm heterogeneous collection of surgical bed, good flow  complaining of pain    24 hours:    06-10 @ 07:01  -  06-11 @ 07:00  --------------------------------------------------------  OUT:    Dexmedetomidine: 0 mL    Drain (mL): 115 mL    Drain (mL): 340 mL    Drain (mL): 290 mL    Indwelling Catheter - Urethral (mL): 355 mL    Nasogastric/Oral tube (mL): 85 mL  Total OUT: 1185 mL            ____________________________________________________  ROS  GENERAL: denies chills, , night sweats, weight loss.   PSYCH: denies depression, anxiety, suicidal ideation, hallucination, and delusions  SKIN: no rash or lesions; no color changes, no abnormal nevi,no  dryness, and nojaundice    EYES: denies visual changes, floaters, pain, inflammation, blurred vision, and discharge  ENT: denies tinnitus, vertigo, epistaxis, oral lesion, and decreased acuity  PULM: denies, hemoptysis, pleurisy  CVS: denies angina, palpitations,+ orthopnea, no syncope, or heart murmur  GI: denies constipation, diarrhea, melena, abdominal pain, nausea.   : denies dysuria, frequency, discharge, incontinence, stones or macroscopic hematuria  MS: no arthralgias, no erythema or swelling, no myalgias, noedema, or lower back pain.   CNS: denies numbness, dizziness, seizure, or tremor  ENDO: denies heat/cold intolerance, polyuria, polydipsia, malaise.    HEME: denies bruising, bleeding, lymphadenopathy, anemia, and calf pain    Allergies  No Known Allergies    __________________________________________________  MEDS:  MEDICATIONS  (STANDING):  HYDROmorphone  Injectable 0.25 every 3 hours PRN  HYDROmorphone  Injectable 0.5 every 3 hours PRN  insulin lispro (ADMELOG) corrective regimen sliding scale  every 6 hours  methylPREDNISolone sodium succinate IVPB 250 every 24 hours  methylPREDNISolone sodium succinate IVPB    mycophenolate mofetil Suspension 1000 <User Schedule>  pantoprazole  Injectable 40 daily  senna 2 two times a day    _________________________________________________  ANTIMICOBIALS  fluconAZOLE IVPB 400 daily  mycophenolate mofetil Suspension 1000 <User Schedule>  piperacillin/tazobactam IVPB.. 3.375 every 8 hours  trimethoprim  40 mG/sulfamethoxazole 200 mG Suspension 10 daily  valGANciclovir 50 mG/mL Oral Solution 450 daily      GENERAL LABS              7.6                  138  | 20   | 46           10.31 >-----------< 81      ------------------------< 139                   21.6                 4.3  | 102  | 2.24                                         Ca 8.5   Mg 2.1   Ph 4.7            _________________________________________________  MICROBIOLOGY  -----------    Culture - Body Fluid with Gram Stain (collected 10 Sukhjinder 2023 05:42)  Source: Abdominal Fl Abdominal Fluid  Gram Stain (10 Sukhjinder 2023 16:55):    No polymorphonuclear cells seen per low power field    No organisms seen per oil power field  Preliminary Report (11 Jun 2023 09:30):    No growth    Culture - Body Fluid with Gram Stain (collected 08 Jun 2023 18:34)  Source: Ascites Fl Ascites Fluid  Gram Stain (08 Jun 2023 23:32):    No polymorphonuclear cells seen    No organisms seen    by cytocentrifuge  Preliminary Report (09 Jun 2023 15:40):    No growth    Culture - Blood (collected 07 Jun 2023 12:35)  Source: .Blood Blood-Peripheral  Preliminary Report (08 Jun 2023 15:02):    No growth to date.    Culture - Blood (collected 07 Jun 2023 12:35)  Source: .Blood Blood-Peripheral  Preliminary Report (08 Jun 2023 15:02):    No growth to date.            CMVPCR Log: 3.31 [id number]/mL (06-08 @ 06:41)  CMVPCR Log: 3.18 [id number]/mL (06-05 @ 19:40)          Fungitell:   _______________________________________________  PERTINENT IMAGING  _________________________________________________  Physical Exam:   T(C): 36.6 (06-11-23 @ 15:00), Max: 36.7 (06-10-23 @ 19:00)  HR: 55 (06-11-23 @ 17:00) (52 - 90)  BP: --  RR: 24 (06-11-23 @ 17:00) (12 - 34)  SpO2: 99% (06-11-23 @ 17:00) (93% - 100%)    06-10-23 @ 07:01  -  06-11-23 @ 07:00  --------------------------------------------------------  IN: 3690 mL / OUT: 1185 mL / NET: 2505 mL    06-11-23 @ 07:01  -  06-11-23 @ 17:16  --------------------------------------------------------  IN: 950 mL / OUT: 442 mL / NET: 508 mL      · Constitutional	well-groomed; no distress  · Eyes	PERRL; EOMI; conjunctiva clear  · ENMT	no gross abnormalities  · Respiratory	lower lobe crackles  · Cardiovascular	regular rate and rhythm; S1 S2 present; no gallops; no rub; no murmur; no JVD; normal PMI; no pedal edema  · Cardiovascular Comments	no S3/s4  . Abdominal: distended, scar clean with stitches. 3 drains in place with bloody drainage  · Neurological	cranial nerves II-XII intact; sensation intact; responds to pain; responds to verbal commands; deep reflexes intact; no meningismus  · Mental Status	Alert and oriented x3, appropriate  · Skin	warm and dry; color normal; no rashes; no ulcers; no subcutaneous nodules; no induration  · Lymphatic	No lymphadedenopathy  · Musculoskeletal	normal; ROM intact; strength 5/5 bilateral upper extremities; strength 5/5 bilateral lower extremities; no joint swelling; no joint erythema; no joint warmth; no calf tenderness; no chest wall tenderness

## 2023-06-11 NOTE — PROGRESS NOTE ADULT - CRITICAL CARE ATTENDING COMMENT
Dr. Painter (Attending Physician)  N - Unequal pupils, still with depressed mental status, breathing over vent  P - acute resp insufficiency postop, oxygenating ventilating well on 10/5  C - off pressors, cvp 0, - PA pressures 21/14 giving albumin  GI - alc cirrhosis s/p OLT, NGT will advance   - s/p liver transplant with decreasing UO will give albumin 250 mL 5%  H - h/h stable  ID - zosyn periop,   E - start SSI for hyperglycemia, on solumedrol  PPx - ppi, no dvt  Lines - RIJ mac cordis, b/l radial alines  Dispo - SICU
Dr. Painter (Attending Physician)  N - Awake, following commands  P - acute resp insufficiency, organizing pneumonia  C - Increased BP pressures today to 34/14, Dc bo, pull miguel, off vasopressors  GI - patient pulled ngt x 2 overnight, start sips    - VIVIANA had pre-op now with poor UO, will give lasix 10 mg IV to assess for any response  H - responded to blood transfusion, h/h stable  ID - periop abx to cover MSSA thru 6/12, cw zosyn  E - glucose controlled  PPx - ppi bid  Lines - RIJ cordis, 2 lan cabral  Dispo - full code, SICU

## 2023-06-12 LAB
ALBUMIN SERPL ELPH-MCNC: 2.8 G/DL — LOW (ref 3.3–5)
ALBUMIN SERPL ELPH-MCNC: 2.9 G/DL — LOW (ref 3.3–5)
ALBUMIN SERPL ELPH-MCNC: 3.1 G/DL — LOW (ref 3.3–5)
ALBUMIN SERPL ELPH-MCNC: 3.2 G/DL — LOW (ref 3.3–5)
ALP SERPL-CCNC: 101 U/L — SIGNIFICANT CHANGE UP (ref 40–120)
ALP SERPL-CCNC: 52 U/L — SIGNIFICANT CHANGE UP (ref 40–120)
ALP SERPL-CCNC: 61 U/L — SIGNIFICANT CHANGE UP (ref 40–120)
ALP SERPL-CCNC: 77 U/L — SIGNIFICANT CHANGE UP (ref 40–120)
ALT FLD-CCNC: 5 U/L — LOW (ref 10–45)
ALT FLD-CCNC: <5 U/L — LOW (ref 10–45)
ANION GAP SERPL CALC-SCNC: 13 MMOL/L — SIGNIFICANT CHANGE UP (ref 5–17)
ANION GAP SERPL CALC-SCNC: 15 MMOL/L — SIGNIFICANT CHANGE UP (ref 5–17)
ANION GAP SERPL CALC-SCNC: 16 MMOL/L — SIGNIFICANT CHANGE UP (ref 5–17)
ANION GAP SERPL CALC-SCNC: 17 MMOL/L — SIGNIFICANT CHANGE UP (ref 5–17)
APTT BLD: 25.1 SEC — LOW (ref 27.5–35.5)
APTT BLD: 25.5 SEC — LOW (ref 27.5–35.5)
APTT BLD: 26.4 SEC — LOW (ref 27.5–35.5)
APTT BLD: 26.5 SEC — LOW (ref 27.5–35.5)
AST SERPL-CCNC: 42 U/L — HIGH (ref 10–40)
AST SERPL-CCNC: 43 U/L — HIGH (ref 10–40)
AST SERPL-CCNC: 53 U/L — HIGH (ref 10–40)
AST SERPL-CCNC: 60 U/L — HIGH (ref 10–40)
BASE EXCESS BLDV CALC-SCNC: -3 MMOL/L — LOW (ref -2–3)
BASE EXCESS BLDV CALC-SCNC: -3.1 MMOL/L — LOW (ref -2–3)
BASE EXCESS BLDV CALC-SCNC: -3.4 MMOL/L — LOW (ref -2–3)
BASE EXCESS BLDV CALC-SCNC: -4.9 MMOL/L — LOW (ref -2–3)
BILIRUB SERPL-MCNC: 7.9 MG/DL — HIGH (ref 0.2–1.2)
BILIRUB SERPL-MCNC: 8.4 MG/DL — HIGH (ref 0.2–1.2)
BILIRUB SERPL-MCNC: 8.5 MG/DL — HIGH (ref 0.2–1.2)
BILIRUB SERPL-MCNC: 9 MG/DL — HIGH (ref 0.2–1.2)
BUN SERPL-MCNC: 53 MG/DL — HIGH (ref 7–23)
BUN SERPL-MCNC: 55 MG/DL — HIGH (ref 7–23)
BUN SERPL-MCNC: 57 MG/DL — HIGH (ref 7–23)
BUN SERPL-MCNC: 58 MG/DL — HIGH (ref 7–23)
CA-I SERPL-SCNC: 1.26 MMOL/L — SIGNIFICANT CHANGE UP (ref 1.15–1.33)
CA-I SERPL-SCNC: 1.28 MMOL/L — SIGNIFICANT CHANGE UP (ref 1.15–1.33)
CA-I SERPL-SCNC: 1.29 MMOL/L — SIGNIFICANT CHANGE UP (ref 1.15–1.33)
CA-I SERPL-SCNC: 1.33 MMOL/L — SIGNIFICANT CHANGE UP (ref 1.15–1.33)
CALCIUM SERPL-MCNC: 8.4 MG/DL — SIGNIFICANT CHANGE UP (ref 8.4–10.5)
CALCIUM SERPL-MCNC: 8.5 MG/DL — SIGNIFICANT CHANGE UP (ref 8.4–10.5)
CHLORIDE BLDV-SCNC: 100 MMOL/L — SIGNIFICANT CHANGE UP (ref 96–108)
CHLORIDE BLDV-SCNC: 101 MMOL/L — SIGNIFICANT CHANGE UP (ref 96–108)
CHLORIDE BLDV-SCNC: 101 MMOL/L — SIGNIFICANT CHANGE UP (ref 96–108)
CHLORIDE BLDV-SCNC: 99 MMOL/L — SIGNIFICANT CHANGE UP (ref 96–108)
CHLORIDE SERPL-SCNC: 100 MMOL/L — SIGNIFICANT CHANGE UP (ref 96–108)
CHLORIDE SERPL-SCNC: 100 MMOL/L — SIGNIFICANT CHANGE UP (ref 96–108)
CHLORIDE SERPL-SCNC: 98 MMOL/L — SIGNIFICANT CHANGE UP (ref 96–108)
CHLORIDE SERPL-SCNC: 98 MMOL/L — SIGNIFICANT CHANGE UP (ref 96–108)
CO2 BLDV-SCNC: 22 MMOL/L — SIGNIFICANT CHANGE UP (ref 22–26)
CO2 BLDV-SCNC: 23 MMOL/L — SIGNIFICANT CHANGE UP (ref 22–26)
CO2 BLDV-SCNC: 23 MMOL/L — SIGNIFICANT CHANGE UP (ref 22–26)
CO2 BLDV-SCNC: 24 MMOL/L — SIGNIFICANT CHANGE UP (ref 22–26)
CO2 SERPL-SCNC: 19 MMOL/L — LOW (ref 22–31)
CO2 SERPL-SCNC: 19 MMOL/L — LOW (ref 22–31)
CO2 SERPL-SCNC: 20 MMOL/L — LOW (ref 22–31)
CO2 SERPL-SCNC: 21 MMOL/L — LOW (ref 22–31)
CREAT SERPL-MCNC: 2.5 MG/DL — HIGH (ref 0.5–1.3)
CREAT SERPL-MCNC: 2.57 MG/DL — HIGH (ref 0.5–1.3)
CREAT SERPL-MCNC: 2.61 MG/DL — HIGH (ref 0.5–1.3)
CREAT SERPL-MCNC: 2.66 MG/DL — HIGH (ref 0.5–1.3)
CULTURE RESULTS: SIGNIFICANT CHANGE UP
CULTURE RESULTS: SIGNIFICANT CHANGE UP
EGFR: 23 ML/MIN/1.73M2 — LOW
EGFR: 23 ML/MIN/1.73M2 — LOW
EGFR: 24 ML/MIN/1.73M2 — LOW
EGFR: 24 ML/MIN/1.73M2 — LOW
GAS PNL BLDV: 130 MMOL/L — LOW (ref 136–145)
GAS PNL BLDV: 131 MMOL/L — LOW (ref 136–145)
GAS PNL BLDV: 132 MMOL/L — LOW (ref 136–145)
GAS PNL BLDV: 133 MMOL/L — LOW (ref 136–145)
GAS PNL BLDV: SIGNIFICANT CHANGE UP
GLUCOSE BLDC GLUCOMTR-MCNC: 124 MG/DL — HIGH (ref 70–99)
GLUCOSE BLDC GLUCOMTR-MCNC: 145 MG/DL — HIGH (ref 70–99)
GLUCOSE BLDC GLUCOMTR-MCNC: 145 MG/DL — HIGH (ref 70–99)
GLUCOSE BLDV-MCNC: 135 MG/DL — HIGH (ref 70–99)
GLUCOSE BLDV-MCNC: 135 MG/DL — HIGH (ref 70–99)
GLUCOSE BLDV-MCNC: 139 MG/DL — HIGH (ref 70–99)
GLUCOSE BLDV-MCNC: 141 MG/DL — HIGH (ref 70–99)
GLUCOSE SERPL-MCNC: 129 MG/DL — HIGH (ref 70–99)
GLUCOSE SERPL-MCNC: 139 MG/DL — HIGH (ref 70–99)
GLUCOSE SERPL-MCNC: 142 MG/DL — HIGH (ref 70–99)
GLUCOSE SERPL-MCNC: 148 MG/DL — HIGH (ref 70–99)
HCO3 BLDV-SCNC: 21 MMOL/L — LOW (ref 22–29)
HCO3 BLDV-SCNC: 22 MMOL/L — SIGNIFICANT CHANGE UP (ref 22–29)
HCO3 BLDV-SCNC: 22 MMOL/L — SIGNIFICANT CHANGE UP (ref 22–29)
HCO3 BLDV-SCNC: 23 MMOL/L — SIGNIFICANT CHANGE UP (ref 22–29)
HCT VFR BLD CALC: 21.3 % — LOW (ref 34.5–45)
HCT VFR BLD CALC: 21.3 % — LOW (ref 34.5–45)
HCT VFR BLD CALC: 22.1 % — LOW (ref 34.5–45)
HCT VFR BLD CALC: 22.1 % — LOW (ref 34.5–45)
HCT VFR BLDA CALC: 23 % — LOW (ref 34.5–46.5)
HCT VFR BLDA CALC: 23 % — LOW (ref 34.5–46.5)
HCT VFR BLDA CALC: 24 % — LOW (ref 34.5–46.5)
HCT VFR BLDA CALC: 25 % — LOW (ref 34.5–46.5)
HCV RNA SERPL NAA DL=5-ACNC: SIGNIFICANT CHANGE UP IU/ML
HCV RNA SPEC NAA+PROBE-LOG IU: 7.42 LOGIU/ML — SIGNIFICANT CHANGE UP
HCV RNA SPEC NAA+PROBE-LOG IU: DETECTED
HGB BLD CALC-MCNC: 7.7 G/DL — LOW (ref 11.7–16.1)
HGB BLD CALC-MCNC: 7.8 G/DL — LOW (ref 11.7–16.1)
HGB BLD CALC-MCNC: 7.9 G/DL — LOW (ref 11.7–16.1)
HGB BLD CALC-MCNC: 8.2 G/DL — LOW (ref 11.7–16.1)
HGB BLD-MCNC: 7.3 G/DL — LOW (ref 11.5–15.5)
HGB BLD-MCNC: 7.5 G/DL — LOW (ref 11.5–15.5)
HGB BLD-MCNC: 7.6 G/DL — LOW (ref 11.5–15.5)
HGB BLD-MCNC: 7.7 G/DL — LOW (ref 11.5–15.5)
HOROWITZ INDEX BLDV+IHG-RTO: 21 — SIGNIFICANT CHANGE UP
INR BLD: 1.07 RATIO — SIGNIFICANT CHANGE UP (ref 0.88–1.16)
INR BLD: 1.09 RATIO — SIGNIFICANT CHANGE UP (ref 0.88–1.16)
LACTATE BLDV-MCNC: 0.7 MMOL/L — SIGNIFICANT CHANGE UP (ref 0.5–2)
LACTATE BLDV-MCNC: 0.7 MMOL/L — SIGNIFICANT CHANGE UP (ref 0.5–2)
LACTATE BLDV-MCNC: 0.9 MMOL/L — SIGNIFICANT CHANGE UP (ref 0.5–2)
LACTATE BLDV-MCNC: 1.3 MMOL/L — SIGNIFICANT CHANGE UP (ref 0.5–2)
MAGNESIUM SERPL-MCNC: 2.1 MG/DL — SIGNIFICANT CHANGE UP (ref 1.6–2.6)
MCHC RBC-ENTMCNC: 28.9 PG — SIGNIFICANT CHANGE UP (ref 27–34)
MCHC RBC-ENTMCNC: 29.4 PG — SIGNIFICANT CHANGE UP (ref 27–34)
MCHC RBC-ENTMCNC: 29.6 PG — SIGNIFICANT CHANGE UP (ref 27–34)
MCHC RBC-ENTMCNC: 30.2 PG — SIGNIFICANT CHANGE UP (ref 27–34)
MCHC RBC-ENTMCNC: 33.9 GM/DL — SIGNIFICANT CHANGE UP (ref 32–36)
MCHC RBC-ENTMCNC: 34.3 GM/DL — SIGNIFICANT CHANGE UP (ref 32–36)
MCHC RBC-ENTMCNC: 34.8 GM/DL — SIGNIFICANT CHANGE UP (ref 32–36)
MCHC RBC-ENTMCNC: 35.7 GM/DL — SIGNIFICANT CHANGE UP (ref 32–36)
MCV RBC AUTO: 84.2 FL — SIGNIFICANT CHANGE UP (ref 80–100)
MCV RBC AUTO: 84.5 FL — SIGNIFICANT CHANGE UP (ref 80–100)
MCV RBC AUTO: 85 FL — SIGNIFICANT CHANGE UP (ref 80–100)
MCV RBC AUTO: 86.7 FL — SIGNIFICANT CHANGE UP (ref 80–100)
NRBC # BLD: 0 /100 WBCS — SIGNIFICANT CHANGE UP (ref 0–0)
PCO2 BLDV: 38 MMHG — LOW (ref 39–42)
PCO2 BLDV: 39 MMHG — SIGNIFICANT CHANGE UP (ref 39–42)
PCO2 BLDV: 41 MMHG — SIGNIFICANT CHANGE UP (ref 39–42)
PCO2 BLDV: 43 MMHG — HIGH (ref 39–42)
PH BLDV: 7.33 — SIGNIFICANT CHANGE UP (ref 7.32–7.43)
PH BLDV: 7.33 — SIGNIFICANT CHANGE UP (ref 7.32–7.43)
PH BLDV: 7.34 — SIGNIFICANT CHANGE UP (ref 7.32–7.43)
PH BLDV: 7.37 — SIGNIFICANT CHANGE UP (ref 7.32–7.43)
PHOSPHATE SERPL-MCNC: 4.9 MG/DL — HIGH (ref 2.5–4.5)
PHOSPHATE SERPL-MCNC: 5 MG/DL — HIGH (ref 2.5–4.5)
PHOSPHATE SERPL-MCNC: 5.4 MG/DL — HIGH (ref 2.5–4.5)
PHOSPHATE SERPL-MCNC: 5.4 MG/DL — HIGH (ref 2.5–4.5)
PLATELET # BLD AUTO: 73 K/UL — LOW (ref 150–400)
PLATELET # BLD AUTO: 74 K/UL — LOW (ref 150–400)
PLATELET # BLD AUTO: 77 K/UL — LOW (ref 150–400)
PLATELET # BLD AUTO: 78 K/UL — LOW (ref 150–400)
PO2 BLDV: 35 MMHG — SIGNIFICANT CHANGE UP (ref 25–45)
PO2 BLDV: 40 MMHG — SIGNIFICANT CHANGE UP (ref 25–45)
PO2 BLDV: 40 MMHG — SIGNIFICANT CHANGE UP (ref 25–45)
PO2 BLDV: 42 MMHG — SIGNIFICANT CHANGE UP (ref 25–45)
POTASSIUM BLDV-SCNC: 3.9 MMOL/L — SIGNIFICANT CHANGE UP (ref 3.5–5.1)
POTASSIUM BLDV-SCNC: 3.9 MMOL/L — SIGNIFICANT CHANGE UP (ref 3.5–5.1)
POTASSIUM BLDV-SCNC: 4.2 MMOL/L — SIGNIFICANT CHANGE UP (ref 3.5–5.1)
POTASSIUM BLDV-SCNC: 4.5 MMOL/L — SIGNIFICANT CHANGE UP (ref 3.5–5.1)
POTASSIUM SERPL-MCNC: 3.9 MMOL/L — SIGNIFICANT CHANGE UP (ref 3.5–5.3)
POTASSIUM SERPL-MCNC: 4 MMOL/L — SIGNIFICANT CHANGE UP (ref 3.5–5.3)
POTASSIUM SERPL-MCNC: 4.2 MMOL/L — SIGNIFICANT CHANGE UP (ref 3.5–5.3)
POTASSIUM SERPL-MCNC: 4.2 MMOL/L — SIGNIFICANT CHANGE UP (ref 3.5–5.3)
POTASSIUM SERPL-SCNC: 3.9 MMOL/L — SIGNIFICANT CHANGE UP (ref 3.5–5.3)
POTASSIUM SERPL-SCNC: 4 MMOL/L — SIGNIFICANT CHANGE UP (ref 3.5–5.3)
POTASSIUM SERPL-SCNC: 4.2 MMOL/L — SIGNIFICANT CHANGE UP (ref 3.5–5.3)
POTASSIUM SERPL-SCNC: 4.2 MMOL/L — SIGNIFICANT CHANGE UP (ref 3.5–5.3)
PROT SERPL-MCNC: 4.2 G/DL — LOW (ref 6–8.3)
PROT SERPL-MCNC: 4.3 G/DL — LOW (ref 6–8.3)
PROT SERPL-MCNC: 4.5 G/DL — LOW (ref 6–8.3)
PROT SERPL-MCNC: 4.6 G/DL — LOW (ref 6–8.3)
PROTHROM AB SERPL-ACNC: 12.3 SEC — SIGNIFICANT CHANGE UP (ref 10.5–13.4)
PROTHROM AB SERPL-ACNC: 12.6 SEC — SIGNIFICANT CHANGE UP (ref 10.5–13.4)
PROTHROM AB SERPL-ACNC: 12.6 SEC — SIGNIFICANT CHANGE UP (ref 10.5–13.4)
PROTHROM AB SERPL-ACNC: 12.7 SEC — SIGNIFICANT CHANGE UP (ref 10.5–13.4)
RBC # BLD: 2.52 M/UL — LOW (ref 3.8–5.2)
RBC # BLD: 2.53 M/UL — LOW (ref 3.8–5.2)
RBC # BLD: 2.55 M/UL — LOW (ref 3.8–5.2)
RBC # BLD: 2.6 M/UL — LOW (ref 3.8–5.2)
RBC # FLD: 19.5 % — HIGH (ref 10.3–14.5)
RBC # FLD: 19.6 % — HIGH (ref 10.3–14.5)
SAO2 % BLDV: 62.1 % — LOW (ref 67–88)
SAO2 % BLDV: 65.6 % — LOW (ref 67–88)
SAO2 % BLDV: 68.9 % — SIGNIFICANT CHANGE UP (ref 67–88)
SAO2 % BLDV: 71.2 % — SIGNIFICANT CHANGE UP (ref 67–88)
SODIUM SERPL-SCNC: 133 MMOL/L — LOW (ref 135–145)
SODIUM SERPL-SCNC: 134 MMOL/L — LOW (ref 135–145)
SODIUM SERPL-SCNC: 134 MMOL/L — LOW (ref 135–145)
SODIUM SERPL-SCNC: 135 MMOL/L — SIGNIFICANT CHANGE UP (ref 135–145)
SPECIMEN SOURCE: SIGNIFICANT CHANGE UP
SPECIMEN SOURCE: SIGNIFICANT CHANGE UP
WBC # BLD: 10.68 K/UL — HIGH (ref 3.8–10.5)
WBC # BLD: 9.06 K/UL — SIGNIFICANT CHANGE UP (ref 3.8–10.5)
WBC # BLD: 9.27 K/UL — SIGNIFICANT CHANGE UP (ref 3.8–10.5)
WBC # BLD: 9.39 K/UL — SIGNIFICANT CHANGE UP (ref 3.8–10.5)
WBC # FLD AUTO: 10.68 K/UL — HIGH (ref 3.8–10.5)
WBC # FLD AUTO: 9.06 K/UL — SIGNIFICANT CHANGE UP (ref 3.8–10.5)
WBC # FLD AUTO: 9.27 K/UL — SIGNIFICANT CHANGE UP (ref 3.8–10.5)
WBC # FLD AUTO: 9.39 K/UL — SIGNIFICANT CHANGE UP (ref 3.8–10.5)

## 2023-06-12 PROCEDURE — 99233 SBSQ HOSP IP/OBS HIGH 50: CPT

## 2023-06-12 PROCEDURE — 76705 ECHO EXAM OF ABDOMEN: CPT | Mod: 26,59

## 2023-06-12 PROCEDURE — 99232 SBSQ HOSP IP/OBS MODERATE 35: CPT | Mod: GC,24

## 2023-06-12 PROCEDURE — 93975 VASCULAR STUDY: CPT | Mod: 26

## 2023-06-12 PROCEDURE — 99232 SBSQ HOSP IP/OBS MODERATE 35: CPT

## 2023-06-12 PROCEDURE — 71045 X-RAY EXAM CHEST 1 VIEW: CPT | Mod: 26

## 2023-06-12 RX ORDER — HYDROMORPHONE HYDROCHLORIDE 2 MG/ML
0.25 INJECTION INTRAMUSCULAR; INTRAVENOUS; SUBCUTANEOUS
Refills: 0 | Status: DISCONTINUED | OUTPATIENT
Start: 2023-06-12 | End: 2023-06-14

## 2023-06-12 RX ORDER — LEVOTHYROXINE SODIUM 125 MCG
100 TABLET ORAL DAILY
Refills: 0 | Status: DISCONTINUED | OUTPATIENT
Start: 2023-06-12 | End: 2023-06-26

## 2023-06-12 RX ORDER — ASPIRIN/CALCIUM CARB/MAGNESIUM 324 MG
81 TABLET ORAL DAILY
Refills: 0 | Status: DISCONTINUED | OUTPATIENT
Start: 2023-06-12 | End: 2023-06-26

## 2023-06-12 RX ORDER — SENNA PLUS 8.6 MG/1
2 TABLET ORAL AT BEDTIME
Refills: 0 | Status: DISCONTINUED | OUTPATIENT
Start: 2023-06-12 | End: 2023-06-26

## 2023-06-12 RX ORDER — OXYCODONE HYDROCHLORIDE 5 MG/1
10 TABLET ORAL EVERY 6 HOURS
Refills: 0 | Status: DISCONTINUED | OUTPATIENT
Start: 2023-06-12 | End: 2023-06-14

## 2023-06-12 RX ORDER — POLYETHYLENE GLYCOL 3350 17 G/17G
17 POWDER, FOR SOLUTION ORAL DAILY
Refills: 0 | Status: DISCONTINUED | OUTPATIENT
Start: 2023-06-12 | End: 2023-06-26

## 2023-06-12 RX ORDER — OXYCODONE HYDROCHLORIDE 5 MG/1
5 TABLET ORAL EVERY 4 HOURS
Refills: 0 | Status: DISCONTINUED | OUTPATIENT
Start: 2023-06-12 | End: 2023-06-17

## 2023-06-12 RX ORDER — FLUCONAZOLE 150 MG/1
200 TABLET ORAL DAILY
Refills: 0 | Status: DISCONTINUED | OUTPATIENT
Start: 2023-06-12 | End: 2023-06-26

## 2023-06-12 RX ORDER — MYCOPHENOLATE MOFETIL 250 MG/1
1000 CAPSULE ORAL
Refills: 0 | Status: DISCONTINUED | OUTPATIENT
Start: 2023-06-12 | End: 2023-06-22

## 2023-06-12 RX ORDER — VALGANCICLOVIR 450 MG/1
450 TABLET, FILM COATED ORAL
Refills: 0 | Status: DISCONTINUED | OUTPATIENT
Start: 2023-06-12 | End: 2023-06-20

## 2023-06-12 RX ORDER — PANTOPRAZOLE SODIUM 20 MG/1
40 TABLET, DELAYED RELEASE ORAL
Refills: 0 | Status: DISCONTINUED | OUTPATIENT
Start: 2023-06-12 | End: 2023-06-26

## 2023-06-12 RX ADMIN — Medication 81 MILLIGRAM(S): at 11:55

## 2023-06-12 RX ADMIN — OXYCODONE HYDROCHLORIDE 10 MILLIGRAM(S): 5 TABLET ORAL at 18:28

## 2023-06-12 RX ADMIN — HYDROMORPHONE HYDROCHLORIDE 0.5 MILLIGRAM(S): 2 INJECTION INTRAMUSCULAR; INTRAVENOUS; SUBCUTANEOUS at 06:48

## 2023-06-12 RX ADMIN — CHLORHEXIDINE GLUCONATE 1 APPLICATION(S): 213 SOLUTION TOPICAL at 05:17

## 2023-06-12 RX ADMIN — HYDROMORPHONE HYDROCHLORIDE 0.5 MILLIGRAM(S): 2 INJECTION INTRAMUSCULAR; INTRAVENOUS; SUBCUTANEOUS at 03:00

## 2023-06-12 RX ADMIN — Medication 125 MILLIGRAM(S): at 19:58

## 2023-06-12 RX ADMIN — HYDROMORPHONE HYDROCHLORIDE 0.25 MILLIGRAM(S): 2 INJECTION INTRAMUSCULAR; INTRAVENOUS; SUBCUTANEOUS at 07:48

## 2023-06-12 RX ADMIN — FLUCONAZOLE 200 MILLIGRAM(S): 150 TABLET ORAL at 11:09

## 2023-06-12 RX ADMIN — PIPERACILLIN AND TAZOBACTAM 25 GRAM(S): 4; .5 INJECTION, POWDER, LYOPHILIZED, FOR SOLUTION INTRAVENOUS at 05:17

## 2023-06-12 RX ADMIN — SODIUM CHLORIDE 75 MILLILITER(S): 9 INJECTION, SOLUTION INTRAVENOUS at 07:15

## 2023-06-12 RX ADMIN — PIPERACILLIN AND TAZOBACTAM 25 GRAM(S): 4; .5 INJECTION, POWDER, LYOPHILIZED, FOR SOLUTION INTRAVENOUS at 13:02

## 2023-06-12 RX ADMIN — HYDROMORPHONE HYDROCHLORIDE 0.5 MILLIGRAM(S): 2 INJECTION INTRAMUSCULAR; INTRAVENOUS; SUBCUTANEOUS at 07:05

## 2023-06-12 RX ADMIN — HYDROMORPHONE HYDROCHLORIDE 0.5 MILLIGRAM(S): 2 INJECTION INTRAMUSCULAR; INTRAVENOUS; SUBCUTANEOUS at 02:32

## 2023-06-12 RX ADMIN — PIPERACILLIN AND TAZOBACTAM 25 GRAM(S): 4; .5 INJECTION, POWDER, LYOPHILIZED, FOR SOLUTION INTRAVENOUS at 21:52

## 2023-06-12 RX ADMIN — HYDROMORPHONE HYDROCHLORIDE 0.25 MILLIGRAM(S): 2 INJECTION INTRAMUSCULAR; INTRAVENOUS; SUBCUTANEOUS at 15:28

## 2023-06-12 RX ADMIN — MYCOPHENOLATE MOFETIL 1000 MILLIGRAM(S): 250 CAPSULE ORAL at 08:03

## 2023-06-12 RX ADMIN — PANTOPRAZOLE SODIUM 40 MILLIGRAM(S): 20 TABLET, DELAYED RELEASE ORAL at 11:09

## 2023-06-12 RX ADMIN — OXYCODONE HYDROCHLORIDE 10 MILLIGRAM(S): 5 TABLET ORAL at 18:58

## 2023-06-12 RX ADMIN — SODIUM CHLORIDE 75 MILLILITER(S): 9 INJECTION, SOLUTION INTRAVENOUS at 05:19

## 2023-06-12 RX ADMIN — VALGANCICLOVIR 450 MILLIGRAM(S): 450 TABLET, FILM COATED ORAL at 11:09

## 2023-06-12 RX ADMIN — Medication 1 TABLET(S): at 11:55

## 2023-06-12 RX ADMIN — MYCOPHENOLATE MOFETIL 1000 MILLIGRAM(S): 250 CAPSULE ORAL at 20:00

## 2023-06-12 RX ADMIN — HYDROMORPHONE HYDROCHLORIDE 0.25 MILLIGRAM(S): 2 INJECTION INTRAMUSCULAR; INTRAVENOUS; SUBCUTANEOUS at 07:33

## 2023-06-12 RX ADMIN — HYDROMORPHONE HYDROCHLORIDE 0.25 MILLIGRAM(S): 2 INJECTION INTRAMUSCULAR; INTRAVENOUS; SUBCUTANEOUS at 15:13

## 2023-06-12 NOTE — OCCUPATIONAL THERAPY INITIAL EVALUATION ADULT - PERTINENT HX OF CURRENT PROBLEM, REHAB EVAL
38F with Hx of hypothyroidism (on Levothyroxine), liver cirrhosis 2/2 ETOH abuse---->  presented to [facility] ED on 5/13/23 with ~ 2 weeks Hx of progressively worsening abdominal pain and distention.  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  now s/p HCV+ OLT under Simulect induction on 6/10/2023.

## 2023-06-12 NOTE — PHYSICAL THERAPY INITIAL EVALUATION ADULT - GAIT DEVIATIONS NOTED, PT EVAL
slightly ataxic, antalgic, stepping outside AIMEE/decreased rashida/increased time in double stance/decreased step length/decreased stride length/decreased swing-to-stance ratio/decreased weight-shifting ability
R knee buckle requiring PT to correct./decreased rashida/decreased step length

## 2023-06-12 NOTE — PHYSICAL THERAPY INITIAL EVALUATION ADULT - MANUAL MUSCLE TESTING RESULTS, REHAB EVAL
bilateral UE 5/5, however bilateral LE not assessed secondary to patient refusal to participate./grossly assessed due to
at least 3/5 grossly throughout/grossly assessed due to

## 2023-06-12 NOTE — PROGRESS NOTE ADULT - SUBJECTIVE AND OBJECTIVE BOX
Transplant Surgery Multidisciplinary Note  --------------------------------------------------------------  HCV+ OLT    Date: 6/9/23       POD 3    Present:   Patient seen and examined with multidisciplinary Transplant team including Surgeon: Dr. Wolfe, Dr. Haile,  Hepatologist: Dr. Montes, SCAR Mace, Transplant Pharmacist Jody, SICU team during AM rounds.   Disciplines not in attendance will be notified of the plan.     HPI: 38F with Hx of hypothyroidism (on Levothyroxine), liver cirrhosis 2/2 ETOH abuse---->  [facility] with ~ 2 weeks Hx of progressively worsening abdominal pain and distention.  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  now s/p HCV+ OLT under Simulect induction on 6/10/2023    Interval Events:  - Creatinine uptrending, 2.5.  Received 1 L albumin + Lasix 40mg IV x1    UOP 277ml  - TBili uptrending. US w/ patent vessels, stable perihepatic collection  - NGT removed. Passing flatus      Immunosuppression:  FK on hold, MMF 1/1, SST  SIMULECT due POD#4  ongoing monitoring for signs of rejection    Potential Discharge date: TBD  Education:  Medications  Plan of care:  See Below        MEDICATIONS  (STANDING):  aspirin enteric coated 81 milliGRAM(s) Oral daily  chlorhexidine 2% Cloths 1 Application(s) Topical <User Schedule>  fluconAZOLE   Tablet 200 milliGRAM(s) Oral daily  levothyroxine 100 MICROGram(s) Oral daily  methylPREDNISolone sodium succinate Injectable   IV Push   methylPREDNISolone sodium succinate Injectable 125 milliGRAM(s) IV Push every 24 hours  mycophenolate mofetil 1000 milliGRAM(s) Oral <User Schedule>  pantoprazole    Tablet 40 milliGRAM(s) Oral before breakfast  piperacillin/tazobactam IVPB.. 3.375 Gram(s) IV Intermittent every 8 hours  polyethylene glycol 3350 17 Gram(s) Oral daily  senna 2 Tablet(s) Oral at bedtime  trimethoprim   80 mG/sulfamethoxazole 400 mG 1 Tablet(s) Oral <User Schedule>  valGANciclovir 450 milliGRAM(s) Oral <User Schedule>    MEDICATIONS  (PRN):  HYDROmorphone  Injectable 0.25 milliGRAM(s) IV Push every 3 hours PRN Severe Breakthrough Pain  oxyCODONE    IR 5 milliGRAM(s) Oral every 4 hours PRN Moderate Pain (4 - 6)  oxyCODONE    IR 10 milliGRAM(s) Oral every 6 hours PRN Severe Pain (7 - 10)      PAST MEDICAL & SURGICAL HISTORY:  Hypothyroidism      Alcoholic liver disease      No significant past surgical history          Vital Signs Last 24 Hrs  T(C): 36.4 (12 Jun 2023 11:00), Max: 36.7 (11 Jun 2023 19:00)  T(F): 97.5 (12 Jun 2023 11:00), Max: 98.1 (11 Jun 2023 19:00)  HR: 50 (12 Jun 2023 14:00) (45 - 62)  BP: 133/72 (12 Jun 2023 14:00) (118/69 - 157/79)  BP(mean): 96 (12 Jun 2023 14:00) (86 - 108)  RR: 13 (12 Jun 2023 14:00) (11 - 35)  SpO2: 98% (12 Jun 2023 14:00) (92% - 100%)    Parameters below as of 12 Jun 2023 12:00  Patient On (Oxygen Delivery Method): room air        I&O's Summary    11 Jun 2023 07:01  -  12 Jun 2023 07:00  --------------------------------------------------------  IN: 2565 mL / OUT: 1297 mL / NET: 1268 mL    12 Jun 2023 07:01  -  12 Jun 2023 14:53  --------------------------------------------------------  IN: 590 mL / OUT: 380 mL / NET: 210 mL                              7.5    9.39  )-----------( 74       ( 12 Jun 2023 08:33 )             22.1     06-12    135  |  98  |  55<H>  ----------------------------<  139<H>  4.2   |  21<L>  |  2.57<H>    Ca    8.5      12 Jun 2023 08:32  Phos  5.4     06-12  Mg     2.1     06-12    TPro  4.5<L>  /  Alb  3.1<L>  /  TBili  8.4<H>  /  DBili  x   /  AST  53<H>  /  ALT  <5<L>  /  AlkPhos  61  06-12          Culture - Body Fluid with Gram Stain (collected 06-10-23 @ 05:42)  Source: Abdominal Fl Abdominal Fluid  Gram Stain (06-10-23 @ 16:55):    No polymorphonuclear cells seen per low power field    No organisms seen per oil power field  Preliminary Report (06-11-23 @ 09:30):    No growth    Culture - Body Fluid with Gram Stain (collected 06-08-23 @ 18:34)  Source: Ascites Fl Ascites Fluid  Gram Stain (06-08-23 @ 23:32):    No polymorphonuclear cells seen    No organisms seen    by cytocentrifuge  Preliminary Report (06-09-23 @ 15:40):    No growth    Culture - Blood (collected 06-07-23 @ 12:35)  Source: .Blood Blood-Peripheral  Preliminary Report (06-08-23 @ 15:02):    No growth to date.    Culture - Blood (collected 06-07-23 @ 12:35)  Source: .Blood Blood-Peripheral  Preliminary Report (06-08-23 @ 15:02):    No growth to date.    Culture - Blood (collected 06-06-23 @ 06:31)  Source: .Blood Blood  Final Report (06-11-23 @ 12:00):    No Growth Final    Culture - Blood (collected 06-06-23 @ 06:31)  Source: .Blood Blood  Final Report (06-11-23 @ 12:00):    No Growth Final    Culture - Quantitative BAL Immunocompromised (collected 06-05-23 @ 19:44)  Source: .Bronchial Bronchoalveolar Lavage  Gram Stain (06-06-23 @ 03:40):    polymorphonuclear leukocytes per low power field    No squamous epithelial cells per low power field    Gram Negative Rods per oil power field    by cytocentrifuge  Final Report (06-07-23 @ 15:18):    No growth at 48 hours    Culture - Fungal, Bronchial (collected 06-05-23 @ 19:40)  Source: .Bronchial Bronchial Lavage  Preliminary Report (06-07-23 @ 15:03):    Culture is being performed. Fungal cultures are held for 4 weeks.    Culture - Bronchial (collected 06-05-23 @ 19:40)  Source: .Bronchial Bronchial Lavage  Gram Stain (06-06-23 @ 03:39):    Rare polymorphonuclear leukocytes per low power field    No squamous epithelial cells per low power field    No organisms seen per oil power field  Final Report (06-07-23 @ 15:58):    No growth at 48 hours    Culture - Acid Fast - Bronchial w/Smear (collected 06-05-23 @ 19:40)  Source: .Bronchial Bronchial Lavage  Preliminary Report (06-07-23 @ 15:09):    Culture is being performed.      Review of systems  Gen: No weight changes, fatigue, fevers/chills, weakness  Skin: No rashes  Head/Eyes/Ears/Mouth: No headache; Normal hearing; Normal vision w/o blurriness; No sinus pain/discomfort, sore throat  Respiratory: No dyspnea, cough, wheezing, hemoptysis  CV: No chest pain, PND, orthopnea  GI: C/O mild abdominal pain at surgical site. no diarrhea, constipation, nausea, vomiting, melena, hematochezia  : No increased frequency, dysuria, hematuria, nocturia  MSK: No joint pain/swelling; no back pain; no edema  Neuro: No dizziness/lightheadedness, weakness, seizures, numbness, tingling  Heme: No easy bruising or bleeding  Endo: No heat/cold intolerance  Psych: No significant nervousness, anxiety, stress, depression  All other systems were reviewed and are negative, except as noted.      PHYSICAL EXAM:  Constitutional: NAD  Eyes: anicteric, PERRLA  ENMT: nc/at, no thrush  Neck: supple, central line c/d/i  Respiratory: CTA B/L  Cardiovascular: RRR  Gastrointestinal: Soft abdomen, ND, appropriate incisional TTP. chevron incision c/d/i, staples in place. No signs of infection. JPs x3 ss  Genitourinary: Urinary catheter in place  Extremities: SCD's in place and working bilaterally  Vascular: Palpable dp pulses bilaterally.   Neurological: A&O x3  Skin: no rashes, ulcerations, lesions  Musculoskeletal: Moving all extremities  Psychiatric: Responsive

## 2023-06-12 NOTE — PHYSICAL THERAPY INITIAL EVALUATION ADULT - NSPTDISCHREC_GEN_A_CORE
Pending functional evaluation, however will progress towards no PT Pending functional evaluation, however will progress towards no PT/Sub-acute Rehab
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
## 2023-06-12 NOTE — CHART NOTE - NSCHARTNOTEFT_GEN_A_CORE
Nutrition Follow Up Note  Patient seen for: s/p OLT     Chart reviewed, events noted.    Source: Electronic Medical Record, Team (Nany), RN     Per chart, "38F with Hx of hypothyroidism (on Levothyroxine), liver cirrhosis 2/2 ETOH abuse---->  presented to [facility] ED on 23 with ~ 2 weeks Hx of progressively worsening abdominal pain and distention.  Transferred on  for management of alcohol hepatitis.  now s/p HCV+ OLT under Simulect induction on 6/10/2023."    Diet Order:   Diet, NPO:   Except Medications  With Ice Chips/Sips of Water (23)    Nutrition-related events:  -Liver Transplant: s/p OLT 6/10/23; Cellcept ordered   -Intake: NPO; defer advancement of diet   -GI: last BM documented ; bowel regimen ordered (Senna); Protonix ordered; NGT to suction d/c'ed; x3 MONSE bulbs in place  -Endo: post-transplant steroid regimen ordered (Solu-medrol); sliding scale insulin for coverage; POCT x24hrs: 138-145mg/dL (appropriate ICU range: 140-180mg/dL)  -Renal: VIVIANA; LR ordered for maintenance; hyponatremic, hyperphosphatemic   -Resp: extubated 6/10      Weights:   Daily Weight in k.6 (), Weight in k.8 (), Weight in k.8 (), Weight in k.5 (), Weight in k.7 ()  - Weight fluctuation likely in the setting of fluid shifts vs true weight loss/gain; continue to monitor weights daily.     Nutritionally Pertinent MEDICATIONS  (STANDING):  fluconAZOLE IVPB  insulin lispro (ADMELOG) corrective regimen sliding scale  lactated ringers.  methylPREDNISolone sodium succinate Injectable  methylPREDNISolone sodium succinate Injectable  pantoprazole  Injectable  piperacillin/tazobactam IVPB..  senna  trimethoprim  40 mG/sulfamethoxazole 200 mG Suspension  valGANciclovir 50 mG/mL Oral Solution    Pertinent Labs:  @ 02:42: Na 134<L>, BUN 53<H>, Cr 2.50<H>, <H>, K+ 4.2, Phos 5.4<H>, Mg 2.1, Alk Phos 52, ALT/SGPT 5<L>, AST/SGOT 60<H>, HbA1c --   @ 20:05: Na 136, BUN 49<H>, Cr 2.36<H>, <H>, K+ 4.3, Phos 4.8<H>, Mg 2.1, Alk Phos 46, ALT/SGPT 7<L>, AST/SGOT 75<H>, HbA1c --   @ 14:24: Na 138, BUN 46<H>, Cr 2.24<H>, <H>, K+ 4.3, Phos 4.7<H>, Mg 2.1, Alk Phos 41, ALT/SGPT 7<L>, AST/SGOT 90<H>, HbA1c --   @ 08:50: Na 143, BUN 42<H>, Cr 2.09<H>, <H>, K+ 4.1, Phos 4.5, Mg 2.2, Alk Phos 39<L>, ALT/SGPT 14, AST/SGOT 102<H>, HbA1c --    A1C with Estimated Average Glucose Result: 4.6 % (23 @ 07:12)    Finger Sticks:  POCT Blood Glucose.: 145 mg/dL ( @ 05:11)  POCT Blood Glucose.: 145 mg/dL ( @ 00:18)  POCT Blood Glucose.: 139 mg/dL ( @ 17:43)  POCT Blood Glucose.: 138 mg/dL ( @ 11:04)      (Per flowsheet)  Pressure Injuries: none noted     Edema:   -1+ generalized     Estimated Needs: based on 53kg (6/3 -lowest daily weight)    [x] continued   -Energy: 35-40kcal/kg (1855-2120kcal/day)   -Protein: 1.5-1.7g/kg (79-90g/day)   -Defer fluids to Team     Previous Nutrition Diagnosis: underweight, severe acute on chronic malnutrition, increased protein-energy needs  Nutrition Diagnosis is: ongoing     New Nutrition Diagnosis: [x] Not applicable    Nutrition Care Plan:  [x] In Progress  [] Achieved  [] Not applicable    Nutrition Interventions:     Education Provided:       [] Yes:  [x] No:     Recommendations:      1) Defer advancement of diet to Team   2) Monitor weight trends, labs, GI function, and skin integrity     Monitoring and Evaluation:   Continue to monitor nutritional intake, tolerance to diet prescription, weights, labs, skin integrity      RD remains available upon request and will follow up per protocol    Katerina Day MS, RDN, CDN (Teams/Pager #[phone])

## 2023-06-12 NOTE — PROGRESS NOTE ADULT - NS ATTEND AMEND GEN_ALL_CORE FT
Tb remains elevated,  will monitor  immuno: cellcept/pred.  Simulect induction.  FK on hold for renal recovery, will start soon
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
## 2023-06-12 NOTE — PHYSICAL THERAPY INITIAL EVALUATION ADULT - ADDITIONAL COMMENTS
Pt lives in an apartment with 2 steps to enter & elevator access. Pt was independent with all mobility & ADLs with no AD.
Pt lives with father in an apartment with 4 steps to enter. Pt has 2 children ages 6 and 9. Pt does not work currently. Pt was independent prior to admission.

## 2023-06-12 NOTE — PROGRESS NOTE ADULT - SUBJECTIVE AND OBJECTIVE BOX
Afebrile, , reporting pain  BiT 8.5, pending repeat liver US  low UOP no stools  ____________________________________________________  ROS  GENERAL: denies chills, , night sweats, weight loss.   PSYCH: denies depression, anxiety, suicidal ideation, hallucination, and delusions  SKIN: no rash or lesions; no color changes, no abnormal nevi,no  dryness, and nojaundice    EYES: denies visual changes, floaters, pain, inflammation, blurred vision, and discharge  ENT: denies tinnitus, vertigo, epistaxis, oral lesion, and decreased acuity  PULM: denies, hemoptysis, pleurisy  CVS: denies angina, palpitations,+ orthopnea, no syncope, or heart murmur  GI: denies constipation, diarrhea, melena, abdominal pain, nausea.   : denies dysuria, frequency, discharge, incontinence, stones or macroscopic hematuria  MS: no arthralgias, no erythema or swelling, no myalgias, noedema, or lower back pain.   CNS: denies numbness, dizziness, seizure, or tremor  ENDO: denies heat/cold intolerance, polyuria, polydipsia, malaise.    HEME: denies bruising, bleeding, lymphadenopathy, anemia, and calf pain    Allergies  No Known Allergies    __________________________________________________  MEDS:  MEDICATIONS  (STANDING):  aspirin enteric coated 81 daily  HYDROmorphone  Injectable 0.25 every 3 hours PRN  HYDROmorphone  Injectable 0.5 every 3 hours PRN  insulin lispro (ADMELOG) corrective regimen sliding scale  every 6 hours  methylPREDNISolone sodium succinate Injectable 125 every 24 hours  methylPREDNISolone sodium succinate Injectable    mycophenolate mofetil 1000 <User Schedule>  pantoprazole    Tablet 40 before breakfast    _________________________________________________  ANTIMICOBIALS  fluconAZOLE   Tablet 200 daily  mycophenolate mofetil 1000 <User Schedule>  piperacillin/tazobactam IVPB.. 3.375 every 8 hours  trimethoprim   80 mG/sulfamethoxazole 400 mG 1 <User Schedule>  valGANciclovir 450 <User Schedule>      GENERAL LABS              7.5                  x    | x    | x            9.39  >-----------< 74      ------------------------< x                     22.1                 x    | x    | x                                            Ca x     Mg x     Ph x              _________________________________________________  MICROBIOLOGY  -----------    Culture - Body Fluid with Gram Stain (collected 10 Sukhjinder 2023 05:42)  Source: Abdominal Fl Abdominal Fluid  Gram Stain (10 Sukhjinder 2023 16:55):    No polymorphonuclear cells seen per low power field    No organisms seen per oil power field  Preliminary Report (11 Jun 2023 09:30):    No growth    Culture - Body Fluid with Gram Stain (collected 08 Jun 2023 18:34)  Source: Ascites Fl Ascites Fluid  Gram Stain (08 Jun 2023 23:32):    No polymorphonuclear cells seen    No organisms seen    by cytocentrifuge  Preliminary Report (09 Jun 2023 15:40):    No growth    Culture - Blood (collected 07 Jun 2023 12:35)  Source: .Blood Blood-Peripheral  Preliminary Report (08 Jun 2023 15:02):    No growth to date.    Culture - Blood (collected 07 Jun 2023 12:35)  Source: .Blood Blood-Peripheral  Preliminary Report (08 Jun 2023 15:02):    No growth to date.            CMVPCR Log: 3.31 [id number]/mL (06-08 @ 06:41)  CMVPCR Log: 3.18 Dcz08BG/mL (06-05 @ 19:40)          Fungitell:   _______________________________________________  PERTINENT IMAGING  _________________________________________________  Physical Exam:   T(C): 36.4 (06-12-23 @ 11:00), Max: 36.7 (06-11-23 @ 19:00)  HR: 58 (06-12-23 @ 12:00) (45 - 62)  BP: 147/82 (06-12-23 @ 12:00) (118/69 - 157/79)  RR: 23 (06-12-23 @ 12:00) (11 - 35)  SpO2: 100% (06-12-23 @ 12:00) (92% - 100%)    06-11-23 @ 07:01  -  06-12-23 @ 07:00  --------------------------------------------------------  IN: 2565 mL / OUT: 1297 mL / NET: 1268 mL    06-12-23 @ 07:01  -  06-12-23 @ 11:43  --------------------------------------------------------  IN: 325 mL / OUT: 230 mL / NET: 95 mL      24 hours:    06-11 @ 07:01  -  06-12 @ 07:00  --------------------------------------------------------  OUT:    Drain (mL): 190 mL    Drain (mL): 285 mL    Drain (mL): 545 mL    Indwelling Catheter - Urethral (mL): 277 mL  Total OUT: 1297 mL        · Constitutional	well-groomed; no distress  · Eyes	PERRL; EOMI; conjunctiva clear  · ENMT	no gross abnormalities  · Respiratory	lower lobe crackles  · Cardiovascular	regular rate and rhythm; S1 S2 present; no gallops; no rub; no murmur; no JVD; normal PMI; no pedal edema  · Cardiovascular Comments	no S3/s4  . Abdominal: distended, scar clean with stitches. 3 drains in place with bloody drainage  · Neurological	cranial nerves II-XII intact; sensation intact; responds to pain; responds to verbal commands; deep reflexes intact; no meningismus  · Mental Status	Alert and oriented x3, appropriate  · Skin	warm and dry; color normal; no rashes; no ulcers; no subcutaneous nodules; no induration  · Lymphatic	No lymphadedenopathy  · Musculoskeletal	normal; ROM intact; strength 5/5 bilateral upper extremities; strength 5/5 bilateral lower extremities; no joint swelling; no joint erythema; no joint warmth; no calf tenderness; no chest wall tenderness   Afebrile, , reporting pain  BiT 8.5, pending repeat liver US  low UOP no stools  no cough  ____________________________________________________  ROS  GENERAL: denies chills, , night sweats, weight loss.   PSYCH: denies depression, anxiety, suicidal ideation, hallucination, and delusions  SKIN: no rash or lesions; no color changes, no abnormal nevi,no  dryness, and no jaundice    EYES: denies visual changes, floaters, pain, inflammation, blurred vision, and discharge  ENT: denies tinnitus, vertigo, epistaxis, oral lesion, and decreased acuity  PULM: denies, hemoptysis, pleurisy  CVS: denies angina, palpitations,+ orthopnea, no syncope, or heart murmur  GI: denies constipation, diarrhea, melena, abdominal pain, nausea.   : denies dysuria, frequency, discharge, incontinence, stones or macroscopic hematuria  MS: no arthralgias, no erythema or swelling, no myalgias, noedema, or lower back pain.   CNS: denies numbness, dizziness, seizure, or tremor  ENDO: denies heat/cold intolerance, polyuria, polydipsia, malaise.    HEME: denies bruising, bleeding, lymphadenopathy, anemia, and calf pain    Allergies  No Known Allergies    __________________________________________________  MEDS:  MEDICATIONS  (STANDING):  aspirin enteric coated 81 daily  HYDROmorphone  Injectable 0.25 every 3 hours PRN  HYDROmorphone  Injectable 0.5 every 3 hours PRN  insulin lispro (ADMELOG) corrective regimen sliding scale  every 6 hours  methylPREDNISolone sodium succinate Injectable 125 every 24 hours  methylPREDNISolone sodium succinate Injectable    mycophenolate mofetil 1000 <User Schedule>  pantoprazole    Tablet 40 before breakfast    _________________________________________________  ANTIMICOBIALS  fluconAZOLE   Tablet 200 daily  mycophenolate mofetil 1000 <User Schedule>  piperacillin/tazobactam IVPB.. 3.375 every 8 hours  trimethoprim   80 mG/sulfamethoxazole 400 mG 1 <User Schedule>  valGANciclovir 450 <User Schedule>      GENERAL LABS              7.5                  x    | x    | x            9.39  >-----------< 74      ------------------------< x                     22.1                 x    | x    | x                                            Ca x     Mg x     Ph x              _________________________________________________  MICROBIOLOGY  -----------    Culture - Body Fluid with Gram Stain (collected 10 Sukhjinder 2023 05:42)  Source: Abdominal Fl Abdominal Fluid  Gram Stain (10 Sukhjinder 2023 16:55):    No polymorphonuclear cells seen per low power field    No organisms seen per oil power field  Preliminary Report (11 Jun 2023 09:30):    No growth    Culture - Body Fluid with Gram Stain (collected 08 Jun 2023 18:34)  Source: Ascites Fl Ascites Fluid  Gram Stain (08 Jun 2023 23:32):    No polymorphonuclear cells seen    No organisms seen    by cytocentrifuge  Preliminary Report (09 Jun 2023 15:40):    No growth    Culture - Blood (collected 07 Jun 2023 12:35)  Source: .Blood Blood-Peripheral  Preliminary Report (08 Jun 2023 15:02):    No growth to date.    Culture - Blood (collected 07 Jun 2023 12:35)  Source: .Blood Blood-Peripheral  Preliminary Report (08 Jun 2023 15:02):    No growth to date.            CMVPCR Log: 3.31 Ijn63FW/mL (06-08 @ 06:41)  CMVPCR Log: 3.18 Hzp10JY/mL (06-05 @ 19:40)          Fungitell:   _______________________________________________  PERTINENT IMAGING  _________________________________________________  Physical Exam:   T(C): 36.4 (06-12-23 @ 11:00), Max: 36.7 (06-11-23 @ 19:00)  HR: 58 (06-12-23 @ 12:00) (45 - 62)  BP: 147/82 (06-12-23 @ 12:00) (118/69 - 157/79)  RR: 23 (06-12-23 @ 12:00) (11 - 35)  SpO2: 100% (06-12-23 @ 12:00) (92% - 100%)    06-11-23 @ 07:01  -  06-12-23 @ 07:00  --------------------------------------------------------  IN: 2565 mL / OUT: 1297 mL / NET: 1268 mL    06-12-23 @ 07:01  -  06-12-23 @ 11:43  --------------------------------------------------------  IN: 325 mL / OUT: 230 mL / NET: 95 mL      24 hours:    06-11 @ 07:01  -  06-12 @ 07:00  --------------------------------------------------------  OUT:    Drain (mL): 190 mL    Drain (mL): 285 mL    Drain (mL): 545 mL    Indwelling Catheter - Urethral (mL): 277 mL  Total OUT: 1297 mL      RI c/d/i  · Constitutional	well-groomed; no distress  · Eyes	PERRL; EOMI; conjunctiva clear  · ENMT	no gross abnormalities  · Respiratory	lower lobe crackles  · Cardiovascular	regular rate and rhythm; S1 S2 present; no gallops; no rub; no murmur; no JVD; normal PMI; no pedal edema  · Cardiovascular Comments	no S3/s4  . Abdominal: distended, scar clean with stitches. 3 drains in place with bloody drainage  · Neurological	cranial nerves II-XII intact; sensation intact; responds to pain; responds to verbal commands; deep reflexes intact; no meningismus  · Mental Status	Alert and oriented x3, appropriate  · Skin	warm and dry; color normal; no rashes; no ulcers; no subcutaneous nodules; no induration  · Lymphatic	No lymphadedenopathy  · Musculoskeletal	normal; ROM intact; strength 5/5 bilateral upper extremities; strength 5/5 bilateral lower extremities; no joint swelling; no joint erythema; no joint warmth; no calf tenderness; no chest wall tenderness

## 2023-06-12 NOTE — PHYSICAL THERAPY INITIAL EVALUATION ADULT - TRANSFER TRAINING, PT EVAL
GOAL: In 2 weeks pt will be independent with all transfers.
Goal: Pt will perform sit to stand independently without an assistive device in 2 weeks.

## 2023-06-12 NOTE — PROGRESS NOTE ADULT - SUBJECTIVE AND OBJECTIVE BOX
SICU Daily Progress Note  =====================================================  Interval/Overnight Events:     - Tbili risin.9  - US liver duplex ordered   - 40mg lasix given for poor urine outpur      Allergies: No Known Allergies  MEDICATIONS:   --------------------------------------------------------------------------------------  Neurologic Medications  HYDROmorphone  Injectable 0.5 milliGRAM(s) IV Push every 3 hours PRN Severe Pain (7 - 10)  HYDROmorphone  Injectable 0.25 milliGRAM(s) IV Push every 3 hours PRN Moderate Pain (4 - 6)    Respiratory Medications    Cardiovascular Medications    Gastrointestinal Medications  lactated ringers. 1000 milliLiter(s) IV Continuous <Continuous>  pantoprazole  Injectable 40 milliGRAM(s) IV Push daily  senna 2 Tablet(s) Oral two times a day  sodium chloride 0.9% lock flush 10 milliLiter(s) IV Push every 1 hour PRN Pre/post blood products, medications, blood draw, and to maintain line patency    Genitourinary Medications    Hematologic/Oncologic Medications  mycophenolate mofetil Suspension 1000 milliGRAM(s) Oral <User Schedule>    Antimicrobial/Immunologic Medications  fluconAZOLE IVPB 400 milliGRAM(s) IV Intermittent daily  piperacillin/tazobactam IVPB.. 3.375 Gram(s) IV Intermittent every 8 hours  trimethoprim  40 mG/sulfamethoxazole 200 mG Suspension 10 milliLiter(s) Oral daily  valGANciclovir 50 mG/mL Oral Solution 450 milliGRAM(s) Oral daily    Endocrine/Metabolic Medications  insulin lispro (ADMELOG) corrective regimen sliding scale   SubCutaneous every 6 hours  methylPREDNISolone sodium succinate Injectable 125 milliGRAM(s) IV Push every 24 hours  methylPREDNISolone sodium succinate Injectable   IV Push     Topical/Other Medications  chlorhexidine 2% Cloths 1 Application(s) Topical <User Schedule>    --------------------------------------------------------------------------------------    VITAL SIGNS, INS/OUTS (last 24 hours):  --------------------------------------------------------------------------------------  T(C): 36.4 (23 @ 03:00), Max: 36.7 (23 @ 11:00)  HR: 47 (23 05:00) (45 - 63)  BP: 134/74 (23 @ 05:00) (118/69 - 147/78)  RR: 11 (23 @ 05:00) (11 - 34)  SpO2: 95% (23 @ 05:00) (92% - 100%)    06-10-23 @ 07:01  -  23 @ 07:00  --------------------------------------------------------  IN: 3690 mL / OUT: 1185 mL / NET: 2505 mL    23 @ 07:01  -  23 @ 05:23  --------------------------------------------------------  IN: 2260 mL / OUT: 1202 mL / NET: 1058 mL      --------------------------------------------------------------------------------------    EXAM  NEUROLOGY  Exam: Normal, NAD, alert, oriented x3, no focal deficits.    HEENT  Exam: Normocephalic, atraumatic, EOMI.     RESPIRATORY  Exam: Normal expansion/effort.  Mechanical Ventilation:     CARDIOVASCULAR  Exam: Regular rate and rhythm.       GI/NUTRITION  Exam: Abdomen soft, Non-tender, Non-distended. incision cdi, drains x3    VASCULAR  Exam: Extremities warm, pink, well-perfused.     MUSCULOSKELETAL  Exam: All extremities moving spontaneously without limitations.     SKIN  Exam: Good skin turgor, no skin breakdown.       LABS  --------------------------------------------------------------------------------------                        7.3    9.06  )-----------( 73       ( 2023 02:42 )             21.3   06-12    134<L>  |  100  |  53<H>  ----------------------------<  142<H>  4.2   |  19<L>  |  2.50<H>    Ca    8.4      2023 02:42  Phos  5.4     06-12  Mg     2.1     12    TPro  4.6<L>  /  Alb  3.2<L>  /  TBili  7.9<H>  /  DBili  x   /  AST  60<H>  /  ALT  5<L>  /  AlkPhos  52  06-12  ABG - ( 2023 19:57 )  pH, Arterial: 7.39  pH, Blood: x     /  pCO2: 35    /  pO2: 77    / HCO3: 21    / Base Excess: -3.4  /  SaO2: 98.6            PT/INR - ( 2023 02:42 )   PT: 12.6 sec;   INR: 1.09 ratio         PTT - ( 2023 02:42 )  PTT:26.5 sec  --------------------------------------------------------------------------------------

## 2023-06-12 NOTE — PHYSICAL THERAPY INITIAL EVALUATION ADULT - ACTIVE RANGE OF MOTION EXAMINATION, REHAB EVAL
LE ROM not tested secondary to patient refusal to participate./bilateral upper extremity Active ROM was WFL (within functional limits)

## 2023-06-12 NOTE — PHYSICAL THERAPY INITIAL EVALUATION ADULT - GENERAL OBSERVATIONS, REHAB EVAL
Pt encountered semi-hinkle in bed, A&Ox4, +lan, +tele
Chart reviewed events to date noted. Blood glucose reviewed. Pt tolerated 45min PT initial evaluation well. Rec'd in bed in NAD, agreeable to PT. See Chart Event note for Frailty assessment, pt currently Frail.

## 2023-06-12 NOTE — PROGRESS NOTE ADULT - SUBJECTIVE AND OBJECTIVE BOX
JAEL ESTES is a 38y Female s/p liver transplant on 6/9/23. PMH is significant for ETOH abuse liver cirrhosis, hypothyroidism.     NKDA  CMV +/+  (patient is CMV viremic and will need treatment dose of valcyte)    Transplant Medications  Induction  -Basiliximab 20 mg POD 0 (given in OR) and POD 4  -Methylprednisolone taper (switch to PO prednisone on POD 6)            POD 0: 1000 mg IV in OR            POD 1: 500 mg IV x 1            POD 2: 250 mg IV x 1            POD 3: 125 mg IV x 1            POD 4: 60 mg IV x 1            POD 5: 40 mg IV x 1        Maintenance Immunosuppression  -Tacrolimus 0.05 mg/kg/dose Q12H at 8AM and 8PM (Adjust for goal trough: 8-10) to be started on POD 1 per liver transplant team  -Mycophenolate 1,000 mg PO Q12H  -Prednisone             POD 6: 20 mg PO daily    Anti-infection   -Bactrim SS tablet (frequency based on renal function)  -Valganciclovir (dose based on CMV serostatus and frequency based on renal function) - will use treatment dose adjusted to renal function  -Fluconazole 400 mg daily    Surgical prophylaxis pre- and intra-operative dosing  -Ampicillin/sulbactam    Prophylaxis  -HAT ppx: aspirin 81 mg daily to start on POD3 per liver transplant team  -GI ppx: pantoprazole 40 mg IV daily (switch to PO when patient tolerates)  -Bowel ppx: senna  -DVT: sequential compression device  -Pain:            Mild: Acetaminophen 650 mg every 6 hours PRN           Moderate: Tramadol 25 mg every 4 hours PRN (adjust for renal function)           Severe: Tramadol 50 mg every 4 hours PRN (adjust for renal function)    Home Medications:  acetylcysteine 20% intravenous solution: 40 milliliter(s) intravenous every 24 hours (16 May 2023 05:59)  cefTRIAXone: 2 gram(s) intravenously once a day (16 May 2023 05:59)  Constulose 10 g/15 mL oral liquid: 30 milliliter(s) orally 3 times a day (16 May 2023 02:51)  folic acid 1 mg oral tablet: 1 tab(s) orally once a day (16 May 2023 02:51)  guaiFENesin 100 mg/5 mL oral liquid: 5 milliliter(s) orally every 6 hours As needed Cough (16 May 2023 02:51)  levothyroxine 100 mcg (0.1 mg) oral tablet: 1 tab(s) orally once a day (16 May 2023 05:59)  LORazepam: 1 milliliter(s) intravenous every 4 hours as needed for Anxiety (16 May 2023 05:59)  morphine 15 mg oral tablet: 1 tab(s) orally every 8 hours (16 May 2023 02:51)  Multiple Vitamins oral tablet: 1 tab(s) orally once a day (16 May 2023 02:51)  mupirocin 2% topical ointment: Apply topically to affected area 2 times a day to both nostrils (16 May 2023 05:59)  ondansetron 2 mg/mL injectable solution: 4 milligram(s) injectable every 8 hours as needed for as needed (16 May 2023 05:59)  sodium/potassium/phosphorus 160 mg-280 mg-250 mg oral powder for reconstitution: 1 packet(s) orally 3 times a day (with meals) (16 May 2023 02:51)  thiamine 100 mg oral tablet: 1 tab(s) orally once a day (16 May 2023 02:51)      Outpatient medication reconciliation reviewed and will be re-started appropriately.  Plan discussed with multidisciplinary team.

## 2023-06-12 NOTE — PROGRESS NOTE ADULT - ASSESSMENT
38F with Hx of hypothyroidism (on Levothyroxine), liver cirrhosis 2/2 ETOH abuse---->  presented to [facility] ED on 5/13/23 with ~ 2 weeks Hx of progressively worsening abdominal pain and distention.  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  now s/p HCV+ OLT under Simulect induction on 6/10/2023    s/p HCV+ OLT    - TBili rising. US w/ good perfusion, patent vessels  - Start AS 81mg po qd  - HCV + donor.  HCV PCR and genotype sent 6/12  - VVIIANA pre-op, continuing to rise with oliguria. Will continue to trend.  Transplant Nephrology following.  - Strict I&Os ([place]/JPx3)  - Add Phos binder  - MSSA bacteremia (5/17) - Ancef DCd 6/11. Transplant ID following  - DC [place]-op Zosyn after PM dose today  - Advance to clear liquid diet  - SCDs/spirometry as able  - OOBTC  - bowel regimen    Immunosuppression  -FK on HOLD  -MMF 1/1  -SST  -SIMULECT POD#4  -CMV viremia pre-op.  Decrease Valcyte to TIW for renal dosing.   - PPX: Bactrim to TIW for renal dosing. FLuconazole QD

## 2023-06-12 NOTE — PROGRESS NOTE ADULT - ASSESSMENT
38F with Hx of hypothyroidism (on Levothyroxine), liver cirrhosis 2/2 ETOH abuse---->  presented to [facility] ED on 5/13/23 with ~ 2 weeks Hx of progressively worsening abdominal pain and distention.  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  now s/p HCV+ OLT under Simulect induction on 6/10/2023.    NEURO:  - A&Ox4  - Pain: Dilaudid PRN    RESPIRATORY:  - Comfortable on RA    CARDIOVASCULAR:  - s/p Ramiro in place  - Normal recent TTE  - No cardiac hx  - No cardiac meds  - HDS    GI:  - NPO  - Marce Froylan incision  - MONSE x3, sanginuous  - Tbili uptrending - US liver duplex     RENAL:  - Bundy  - Monitor UOP  - Nephro on board for preop VIVIANA of unknown origin    HEME:  - DVT PPx held  - Trend H/H    ID:  -Zosyn, Fluc, Valcyte, Bactrim   -Recent bronch, f/u cx  -ID on board  -Cellcept  -Simulect induction POD 4    ENDO:  - POCT glucose  - Mod ISS  - Steroid taper    DISPO: SICU    Lines: A-line x 2, RICC, PIV, R IJ introducer  Labs: Q4

## 2023-06-12 NOTE — PHYSICAL THERAPY INITIAL EVALUATION ADULT - TRANSFER SAFETY CONCERNS NOTED: SIT/STAND, REHAB EVAL
decreased balance during turns/decreased step length/decreased weight-shifting ability
decreased safety awareness/losing balance

## 2023-06-12 NOTE — PHYSICAL THERAPY INITIAL EVALUATION ADULT - PLANNED THERAPY INTERVENTIONS, PT EVAL
Goal: Pt will negotiate 4 steps independently without an AD in 2 weeks./bed mobility training/gait training/transfer training
Stair training... GOAL: In 2 weeks pt will negotiate 1 flight of stairs independently with least restrictive device./balance training/bed mobility training/gait training/strengthening/transfer training

## 2023-06-12 NOTE — PROGRESS NOTE ADULT - ATTENDING COMMENTS
Evaluated in multidisciplinary round in SICU.    Awake, non focal  Acute resp insufficiency, organizing pneumonia resolving, off O2  Off vasopressors, off PA cath  Clears, high T bili. Repeat hepatic US indicates  good flow in all feeding vessels. On BID PPI  VIVIANA resolving,   H/h stable after transfusion  Periop abx to cover MSSA thru 6/12, cw zosyn  ISS  PPX abx Fluc, Valcyte, Bactrim  Antirej: Cellcept, steroid taper    OOB/mobilization

## 2023-06-12 NOTE — PROGRESS NOTE ADULT - SUBJECTIVE AND OBJECTIVE BOX
[facility] DIVISION OF KIDNEY DISEASES AND HYPERTENSION -- FOLLOW UP NOTE  --------------------------------------------------------------------------------  Chief Complaint:    24 hour events/subjective:  Patient was seen and examined at bedside      PAST HISTORY  --------------------------------------------------------------------------------  No significant changes to PMH, PSH, FHx, SHx, unless otherwise noted    ALLERGIES & MEDICATIONS  --------------------------------------------------------------------------------  Allergies    No Known Allergies    Intolerances      Standing Inpatient Medications  aspirin enteric coated 81 milliGRAM(s) Oral daily  chlorhexidine 2% Cloths 1 Application(s) Topical <User Schedule>  fluconAZOLE   Tablet 200 milliGRAM(s) Oral daily  levothyroxine 100 MICROGram(s) Oral daily  methylPREDNISolone sodium succinate Injectable   IV Push   methylPREDNISolone sodium succinate Injectable 125 milliGRAM(s) IV Push every 24 hours  mycophenolate mofetil 1000 milliGRAM(s) Oral <User Schedule>  pantoprazole    Tablet 40 milliGRAM(s) Oral before breakfast  piperacillin/tazobactam IVPB.. 3.375 Gram(s) IV Intermittent every 8 hours  polyethylene glycol 3350 17 Gram(s) Oral daily  senna 2 Tablet(s) Oral at bedtime  trimethoprim   80 mG/sulfamethoxazole 400 mG 1 Tablet(s) Oral <User Schedule>  valGANciclovir 450 milliGRAM(s) Oral <User Schedule>    PRN Inpatient Medications  HYDROmorphone  Injectable 0.25 milliGRAM(s) IV Push every 3 hours PRN  oxyCODONE    IR 10 milliGRAM(s) Oral every 6 hours PRN  oxyCODONE    IR 5 milliGRAM(s) Oral every 4 hours PRN      REVIEW OF SYSTEMS  --------------------------------------------------------------------------------  Gen: No fatigue, fevers/chills, weakness  Skin: No rashes  Head/Eyes/Ears/Mouth: No headache;No sore throat  Respiratory: No dyspnea, cough,   CV: No chest pain, PND, orthopnea  GI: No abdominal pain, diarrhea, constipation, nausea, vomiting  Transplant: +incisional pain  : No increased frequency, dysuria, hematuria, nocturia  Neuro: No dizziness/lightheadedness, weakness, seizures, numbness, tingling    All other systems were reviewed and are negative, except as noted.    VITALS/PHYSICAL EXAM  --------------------------------------------------------------------------------  T(C): 36.6 (06-12-23 @ 15:00), Max: 36.7 (06-11-23 @ 19:00)  HR: 53 (06-12-23 @ 15:00) (45 - 62)  BP: 141/85 (06-12-23 @ 15:00) (118/69 - 157/79)  RR: 15 (06-12-23 @ 15:00) (11 - 35)  SpO2: 96% (06-12-23 @ 15:00) (92% - 100%)  Wt(kg): --        06-11-23 @ 07:01  -  06-12-23 @ 07:00  --------------------------------------------------------  IN: 2565 mL / OUT: 1297 mL / NET: 1268 mL    06-12-23 @ 07:01  -  06-12-23 @ 16:01  --------------------------------------------------------  IN: 590 mL / OUT: 440 mL / NET: 150 mL      Physical Exam:  	Gen: NAD  	HEENT: PERRL, supple neck, clear oropharynx  	Pulm: CTA B/L  	CV: RRR, S1S2; no rub  	Abd: +BS, soft, nontender/nondistended                      Transplant: + tenderness  	: No suprapubic tenderness  	UE: Warm, FROM; no edema; no asterixis  	LE: Warm, FROM; no edema  	Neuro: No focal deficits  	Skin: Warm, without rashes      LABS/STUDIES  --------------------------------------------------------------------------------              7.6    10.68 >-----------<  77       [06-12-23 @ 15:10]              21.3     134  |  98  |  57  ----------------------------<  148      [06-12-23 @ 15:10]  4.0   |  19  |  2.61        Ca     8.4     [06-12-23 @ 15:10]      Mg     2.1     [06-12-23 @ 15:10]      Phos  5.0     [06-12-23 @ 15:10]    TPro  4.2  /  Alb  2.8  /  TBili  8.5  /  DBili  x   /  AST  43  /  ALT  <5  /  AlkPhos  77  [06-12-23 @ 15:10]    PT/INR: PT 12.7 , INR 1.09       [06-12-23 @ 15:10]  PTT: 26.4       [06-12-23 @ 15:10]      Creatinine Trend:  SCr 2.61 [06-12 @ 15:10]  SCr 2.57 [06-12 @ 08:32]  SCr 2.50 [06-12 @ 02:42]  SCr 2.36 [06-11 @ 20:05]  SCr 2.24 [06-11 @ 14:24]              Urinalysis - [06-07-23 @ 13:03]      Color Dark Yellow / Appearance Slightly Turbid / SG 1.028 / pH 6.0      Gluc Trace / Ketone Trace  / Bili Large / Urobili Negative       Blood Small / Protein 100 mg/dl / Leuk Est Small / Nitrite Negative      RBC 3 / WBC 3 / Hyaline 15 / Gran 3-5 / Sq Epi  / Non Sq Epi  / Bacteria Negative    Urine Creatinine 82      [06-07-23 @ 13:11]  Urine Protein 129      [06-07-23 @ 13:11]  Urine Sodium 28      [06-07-23 @ 13:11]  Urine Urea Nitrogen 341      [06-07-23 @ 13:11]  Urine Potassium 67      [06-07-23 @ 13:11]  Urine Osmolality 344      [06-07-23 @ 13:11]    Iron 78, TIBC Unable to calculate Test Repeated, %sat Unable to calculate Test Repeated      [05-17-23 @ 07:14]  Ferritin 349      [05-17-23 @ 07:14]  TSH 0.62      [05-17-23 @ 07:14]  Lipid: chol 127, , HDL 11, LDL --      [05-17-23 @ 07:14]    HBsAb 305.3      [06-09-23 @ 13:31]  HBsAb Reactive      [05-15-23 @ 07:32]  HBsAg Nonreact      [06-09-23 @ 13:32]  HBcAb Nonreact      [06-09-23 @ 13:31]  HCV 0.13, Nonreact      [06-09-23 @ 13:31]  HIV Nonreact      [06-09-23 @ 13:31]    ISHAN: titer Negative, pattern --      [05-17-23 @ 07:14]  dsDNA 12      [05-14-23 @ 08:26]  Rheumatoid Factor <10      [05-14-23 @ 08:26]  ANCA: cANCA Negative, pANCA Negative, atypical ANCA Negative      [05-14-23 @ 08:26]  Syphilis Screen (Treponema Pallidum Ab) Negative      [05-17-23 @ 07:14]  Free Light Chains: kappa 5.24, lambda 3.65, ratio = 1.44      [05-15 @ 07:32]

## 2023-06-12 NOTE — PROGRESS NOTE ADULT - ASSESSMENT
38 y.o female with ETOH cirrhosis and VIVIANA now s/p liver transplant on 6/10/23.    1.  VIVIANA, likely post op ATN - No current need for dialysis, will reassess daily.   2.  s/p Liver transplant on 6/10/23 - followed by liver transplant team.

## 2023-06-12 NOTE — PHYSICAL THERAPY INITIAL EVALUATION ADULT - GAIT TRAINING, PT EVAL
Goal: Pt will ambulate 150ft independently without an assistive device in 2 weeks.
GOAL: In 2 weeks pt will amb 250 ft independently with least restrictive device.

## 2023-06-12 NOTE — OCCUPATIONAL THERAPY INITIAL EVALUATION ADULT - DIAGNOSIS, OT EVAL
Patient presents with decreased strength, endurance impacting ability to perform ADLs and functional mobility

## 2023-06-13 LAB
ALBUMIN SERPL ELPH-MCNC: 2.7 G/DL — LOW (ref 3.3–5)
ALBUMIN SERPL ELPH-MCNC: 3 G/DL — LOW (ref 3.3–5)
ALP SERPL-CCNC: 140 U/L — HIGH (ref 40–120)
ALP SERPL-CCNC: 201 U/L — HIGH (ref 40–120)
ALT FLD-CCNC: 6 U/L — LOW (ref 10–45)
ALT FLD-CCNC: 6 U/L — LOW (ref 10–45)
ANION GAP SERPL CALC-SCNC: 17 MMOL/L — SIGNIFICANT CHANGE UP (ref 5–17)
ANION GAP SERPL CALC-SCNC: 17 MMOL/L — SIGNIFICANT CHANGE UP (ref 5–17)
APTT BLD: 24.9 SEC — LOW (ref 27.5–35.5)
APTT BLD: 25.1 SEC — LOW (ref 27.5–35.5)
APTT BLD: 25.2 SEC — LOW (ref 27.5–35.5)
AST SERPL-CCNC: 38 U/L — SIGNIFICANT CHANGE UP (ref 10–40)
AST SERPL-CCNC: 40 U/L — SIGNIFICANT CHANGE UP (ref 10–40)
BASE EXCESS BLDV CALC-SCNC: -5.9 MMOL/L — LOW (ref -2–3)
BILIRUB SERPL-MCNC: 9.3 MG/DL — HIGH (ref 0.2–1.2)
BILIRUB SERPL-MCNC: 9.8 MG/DL — HIGH (ref 0.2–1.2)
BLD GP AB SCN SERPL QL: NEGATIVE — SIGNIFICANT CHANGE UP
BUN SERPL-MCNC: 56 MG/DL — HIGH (ref 7–23)
BUN SERPL-MCNC: 57 MG/DL — HIGH (ref 7–23)
CA-I SERPL-SCNC: 1.27 MMOL/L — SIGNIFICANT CHANGE UP (ref 1.15–1.33)
CALCIUM SERPL-MCNC: 8.3 MG/DL — LOW (ref 8.4–10.5)
CALCIUM SERPL-MCNC: 8.7 MG/DL — SIGNIFICANT CHANGE UP (ref 8.4–10.5)
CHLORIDE BLDV-SCNC: 100 MMOL/L — SIGNIFICANT CHANGE UP (ref 96–108)
CHLORIDE SERPL-SCNC: 98 MMOL/L — SIGNIFICANT CHANGE UP (ref 96–108)
CHLORIDE SERPL-SCNC: 98 MMOL/L — SIGNIFICANT CHANGE UP (ref 96–108)
CO2 BLDV-SCNC: 21 MMOL/L — LOW (ref 22–26)
CO2 SERPL-SCNC: 16 MMOL/L — LOW (ref 22–31)
CO2 SERPL-SCNC: 18 MMOL/L — LOW (ref 22–31)
CREAT SERPL-MCNC: 2.41 MG/DL — HIGH (ref 0.5–1.3)
CREAT SERPL-MCNC: 2.61 MG/DL — HIGH (ref 0.5–1.3)
CULTURE RESULTS: SIGNIFICANT CHANGE UP
EGFR: 23 ML/MIN/1.73M2 — LOW
EGFR: 26 ML/MIN/1.73M2 — LOW
GAS PNL BLDV: 130 MMOL/L — LOW (ref 136–145)
GAS PNL BLDV: SIGNIFICANT CHANGE UP
GAS PNL BLDV: SIGNIFICANT CHANGE UP
GLUCOSE BLDV-MCNC: 157 MG/DL — HIGH (ref 70–99)
GLUCOSE SERPL-MCNC: 145 MG/DL — HIGH (ref 70–99)
GLUCOSE SERPL-MCNC: 164 MG/DL — HIGH (ref 70–99)
HCO3 BLDV-SCNC: 20 MMOL/L — LOW (ref 22–29)
HCT VFR BLD CALC: 21.5 % — LOW (ref 34.5–45)
HCT VFR BLD CALC: 26.9 % — LOW (ref 34.5–45)
HCT VFR BLD CALC: 29.7 % — LOW (ref 34.5–45)
HCT VFR BLDA CALC: 23 % — LOW (ref 34.5–46.5)
HGB BLD CALC-MCNC: 7.8 G/DL — LOW (ref 11.7–16.1)
HGB BLD-MCNC: 10.5 G/DL — LOW (ref 11.5–15.5)
HGB BLD-MCNC: 7.4 G/DL — LOW (ref 11.5–15.5)
HGB BLD-MCNC: 9.3 G/DL — LOW (ref 11.5–15.5)
HOROWITZ INDEX BLDV+IHG-RTO: 21 — SIGNIFICANT CHANGE UP
INR BLD: 1.03 RATIO — SIGNIFICANT CHANGE UP (ref 0.88–1.16)
INR BLD: 1.06 RATIO — SIGNIFICANT CHANGE UP (ref 0.88–1.16)
INR BLD: 1.1 RATIO — SIGNIFICANT CHANGE UP (ref 0.88–1.16)
LACTATE BLDV-MCNC: 1.3 MMOL/L — SIGNIFICANT CHANGE UP (ref 0.5–2)
MAGNESIUM SERPL-MCNC: 2 MG/DL — SIGNIFICANT CHANGE UP (ref 1.6–2.6)
MAGNESIUM SERPL-MCNC: 2.1 MG/DL — SIGNIFICANT CHANGE UP (ref 1.6–2.6)
MAGNESIUM SERPL-MCNC: 2.1 MG/DL — SIGNIFICANT CHANGE UP (ref 1.6–2.6)
MCHC RBC-ENTMCNC: 29.2 PG — SIGNIFICANT CHANGE UP (ref 27–34)
MCHC RBC-ENTMCNC: 29.3 PG — SIGNIFICANT CHANGE UP (ref 27–34)
MCHC RBC-ENTMCNC: 30.2 PG — SIGNIFICANT CHANGE UP (ref 27–34)
MCHC RBC-ENTMCNC: 34.4 GM/DL — SIGNIFICANT CHANGE UP (ref 32–36)
MCHC RBC-ENTMCNC: 34.6 GM/DL — SIGNIFICANT CHANGE UP (ref 32–36)
MCHC RBC-ENTMCNC: 35.4 GM/DL — SIGNIFICANT CHANGE UP (ref 32–36)
MCV RBC AUTO: 84.9 FL — SIGNIFICANT CHANGE UP (ref 80–100)
MCV RBC AUTO: 85 FL — SIGNIFICANT CHANGE UP (ref 80–100)
MCV RBC AUTO: 85.3 FL — SIGNIFICANT CHANGE UP (ref 80–100)
NRBC # BLD: 0 /100 WBCS — SIGNIFICANT CHANGE UP (ref 0–0)
PCO2 BLDV: 37 MMHG — LOW (ref 39–42)
PH BLDV: 7.33 — SIGNIFICANT CHANGE UP (ref 7.32–7.43)
PHOSPHATE SERPL-MCNC: 2.9 MG/DL — SIGNIFICANT CHANGE UP (ref 2.5–4.5)
PHOSPHATE SERPL-MCNC: 3.8 MG/DL — SIGNIFICANT CHANGE UP (ref 2.5–4.5)
PHOSPHATE SERPL-MCNC: 4.6 MG/DL — HIGH (ref 2.5–4.5)
PLATELET # BLD AUTO: 78 K/UL — LOW (ref 150–400)
PLATELET # BLD AUTO: 85 K/UL — LOW (ref 150–400)
PLATELET # BLD AUTO: 93 K/UL — LOW (ref 150–400)
PO2 BLDV: 38 MMHG — SIGNIFICANT CHANGE UP (ref 25–45)
POTASSIUM BLDV-SCNC: 3.8 MMOL/L — SIGNIFICANT CHANGE UP (ref 3.5–5.1)
POTASSIUM SERPL-MCNC: 3.8 MMOL/L — SIGNIFICANT CHANGE UP (ref 3.5–5.3)
POTASSIUM SERPL-MCNC: 3.9 MMOL/L — SIGNIFICANT CHANGE UP (ref 3.5–5.3)
POTASSIUM SERPL-SCNC: 3.8 MMOL/L — SIGNIFICANT CHANGE UP (ref 3.5–5.3)
POTASSIUM SERPL-SCNC: 3.9 MMOL/L — SIGNIFICANT CHANGE UP (ref 3.5–5.3)
PROT SERPL-MCNC: 4.2 G/DL — LOW (ref 6–8.3)
PROT SERPL-MCNC: 4.4 G/DL — LOW (ref 6–8.3)
PROTHROM AB SERPL-ACNC: 11.9 SEC — SIGNIFICANT CHANGE UP (ref 10.5–13.4)
PROTHROM AB SERPL-ACNC: 12.3 SEC — SIGNIFICANT CHANGE UP (ref 10.5–13.4)
PROTHROM AB SERPL-ACNC: 12.7 SEC — SIGNIFICANT CHANGE UP (ref 10.5–13.4)
RBC # BLD: 2.53 M/UL — LOW (ref 3.8–5.2)
RBC # BLD: 3.17 M/UL — LOW (ref 3.8–5.2)
RBC # BLD: 3.48 M/UL — LOW (ref 3.8–5.2)
RBC # FLD: 18.1 % — HIGH (ref 10.3–14.5)
RBC # FLD: 18.2 % — HIGH (ref 10.3–14.5)
RBC # FLD: 20 % — HIGH (ref 10.3–14.5)
RH IG SCN BLD-IMP: POSITIVE — SIGNIFICANT CHANGE UP
SAO2 % BLDV: 58.3 % — LOW (ref 67–88)
SODIUM SERPL-SCNC: 131 MMOL/L — LOW (ref 135–145)
SODIUM SERPL-SCNC: 133 MMOL/L — LOW (ref 135–145)
SPECIMEN SOURCE: SIGNIFICANT CHANGE UP
WBC # BLD: 10.87 K/UL — HIGH (ref 3.8–10.5)
WBC # BLD: 11.69 K/UL — HIGH (ref 3.8–10.5)
WBC # BLD: 7.49 K/UL — SIGNIFICANT CHANGE UP (ref 3.8–10.5)
WBC # FLD AUTO: 10.87 K/UL — HIGH (ref 3.8–10.5)
WBC # FLD AUTO: 11.69 K/UL — HIGH (ref 3.8–10.5)
WBC # FLD AUTO: 7.49 K/UL — SIGNIFICANT CHANGE UP (ref 3.8–10.5)

## 2023-06-13 PROCEDURE — 99233 SBSQ HOSP IP/OBS HIGH 50: CPT

## 2023-06-13 PROCEDURE — 76705 ECHO EXAM OF ABDOMEN: CPT | Mod: 26,59

## 2023-06-13 PROCEDURE — 99232 SBSQ HOSP IP/OBS MODERATE 35: CPT | Mod: GC,24

## 2023-06-13 PROCEDURE — 93975 VASCULAR STUDY: CPT | Mod: 26

## 2023-06-13 PROCEDURE — 71045 X-RAY EXAM CHEST 1 VIEW: CPT | Mod: 26

## 2023-06-13 RX ORDER — POTASSIUM CHLORIDE 20 MEQ
20 PACKET (EA) ORAL ONCE
Refills: 0 | Status: COMPLETED | OUTPATIENT
Start: 2023-06-13 | End: 2023-06-13

## 2023-06-13 RX ORDER — SODIUM BICARBONATE 1 MEQ/ML
1300 SYRINGE (ML) INTRAVENOUS EVERY 12 HOURS
Refills: 0 | Status: DISCONTINUED | OUTPATIENT
Start: 2023-06-13 | End: 2023-06-15

## 2023-06-13 RX ORDER — URSODIOL 250 MG/1
300 TABLET, FILM COATED ORAL EVERY 12 HOURS
Refills: 0 | Status: DISCONTINUED | OUTPATIENT
Start: 2023-06-13 | End: 2023-06-26

## 2023-06-13 RX ORDER — ALBUMIN HUMAN 25 %
100 VIAL (ML) INTRAVENOUS ONCE
Refills: 0 | Status: COMPLETED | OUTPATIENT
Start: 2023-06-13 | End: 2023-06-13

## 2023-06-13 RX ORDER — TACROLIMUS 5 MG/1
0.5 CAPSULE ORAL
Refills: 0 | Status: DISCONTINUED | OUTPATIENT
Start: 2023-06-13 | End: 2023-06-15

## 2023-06-13 RX ORDER — BASILIXIMAB 20 MG/5ML
20 INJECTION, POWDER, FOR SOLUTION INTRAVENOUS ONCE
Refills: 0 | Status: COMPLETED | OUTPATIENT
Start: 2023-06-13 | End: 2023-06-13

## 2023-06-13 RX ADMIN — OXYCODONE HYDROCHLORIDE 10 MILLIGRAM(S): 5 TABLET ORAL at 17:00

## 2023-06-13 RX ADMIN — OXYCODONE HYDROCHLORIDE 10 MILLIGRAM(S): 5 TABLET ORAL at 03:00

## 2023-06-13 RX ADMIN — Medication 100 MICROGRAM(S): at 05:06

## 2023-06-13 RX ADMIN — OXYCODONE HYDROCHLORIDE 10 MILLIGRAM(S): 5 TABLET ORAL at 02:28

## 2023-06-13 RX ADMIN — OXYCODONE HYDROCHLORIDE 10 MILLIGRAM(S): 5 TABLET ORAL at 17:30

## 2023-06-13 RX ADMIN — Medication 81 MILLIGRAM(S): at 11:25

## 2023-06-13 RX ADMIN — MYCOPHENOLATE MOFETIL 1000 MILLIGRAM(S): 250 CAPSULE ORAL at 07:32

## 2023-06-13 RX ADMIN — PANTOPRAZOLE SODIUM 40 MILLIGRAM(S): 20 TABLET, DELAYED RELEASE ORAL at 05:06

## 2023-06-13 RX ADMIN — POLYETHYLENE GLYCOL 3350 17 GRAM(S): 17 POWDER, FOR SOLUTION ORAL at 11:25

## 2023-06-13 RX ADMIN — OXYCODONE HYDROCHLORIDE 5 MILLIGRAM(S): 5 TABLET ORAL at 12:00

## 2023-06-13 RX ADMIN — Medication 1 TABLET(S): at 05:06

## 2023-06-13 RX ADMIN — BASILIXIMAB 100 MILLIGRAM(S): 20 INJECTION, POWDER, FOR SOLUTION INTRAVENOUS at 22:57

## 2023-06-13 RX ADMIN — Medication 60 MILLIGRAM(S): at 20:26

## 2023-06-13 RX ADMIN — OXYCODONE HYDROCHLORIDE 5 MILLIGRAM(S): 5 TABLET ORAL at 05:54

## 2023-06-13 RX ADMIN — Medication 20 MILLIEQUIVALENT(S): at 17:06

## 2023-06-13 RX ADMIN — Medication 20 MILLIEQUIVALENT(S): at 09:12

## 2023-06-13 RX ADMIN — OXYCODONE HYDROCHLORIDE 5 MILLIGRAM(S): 5 TABLET ORAL at 11:30

## 2023-06-13 RX ADMIN — CHLORHEXIDINE GLUCONATE 1 APPLICATION(S): 213 SOLUTION TOPICAL at 05:05

## 2023-06-13 RX ADMIN — FLUCONAZOLE 200 MILLIGRAM(S): 150 TABLET ORAL at 11:25

## 2023-06-13 RX ADMIN — Medication 1 TABLET(S): at 17:06

## 2023-06-13 RX ADMIN — Medication 1300 MILLIGRAM(S): at 17:07

## 2023-06-13 RX ADMIN — URSODIOL 300 MILLIGRAM(S): 250 TABLET, FILM COATED ORAL at 17:07

## 2023-06-13 RX ADMIN — Medication 50 MILLILITER(S): at 15:43

## 2023-06-13 RX ADMIN — MYCOPHENOLATE MOFETIL 1000 MILLIGRAM(S): 250 CAPSULE ORAL at 20:25

## 2023-06-13 RX ADMIN — TACROLIMUS 0.5 MILLIGRAM(S): 5 CAPSULE ORAL at 20:25

## 2023-06-13 RX ADMIN — OXYCODONE HYDROCHLORIDE 5 MILLIGRAM(S): 5 TABLET ORAL at 05:24

## 2023-06-13 NOTE — PROGRESS NOTE ADULT - ASSESSMENT
38F with Hx of hypothyroidism (on Levothyroxine), liver cirrhosis 2/2 ETOH abuse---->  [facility] ~ 2 weeks Hx of progressively worsening abdominal pain and distention.  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  now s/p HCV+ OLT under Simulect induction on 6/10/2023    s/p HCV+ OLT    - TBili rising. US w/ good perfusion, patent vessels  - ASA 81mg po qd  - HCV + donor.  HCV PCR and genotype sent 6/12  - VIVIANA pre-op, continuing to rise with oliguria. Will continue to trend.  Transplant Nephrology following.  - Strict I&Os ([place]/JPx3)  - MSSA bacteremia (5/17) - Ancef DCd 6/11. Transplant ID following  - ADAT  - SCDs/spirometry as able  - OOBTC  - bowel regimen    Immunosuppression  -FK on HOLD  -MMF 1/1  -SST  -SIMULECT today  -CMV viremia pre-op.  Decrease Valcyte to TIW for renal dosing.   - PPX: Bactrim to TIW for renal dosing. Fluconazole QD   38F with Hx of hypothyroidism (on Levothyroxine), liver cirrhosis 2/2 ETOH abuse---->  presented to [facility] ED on 5/13/23 with ~ 2 weeks Hx of progressively worsening abdominal pain and distention.  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  now s/p HCV+ OLT under Simulect induction on 6/10/2023    [] s/p HCV+ OLT    - TBili rising. US w/ good perfusion, patent vessels  - Start Jacinto 300mg bid  - Obtain MRCP  - ASA 81mg po qd  - HCV + donor.  HCV PCR +, pending genotype   - VIVIANA pre-op, continuing to rise with oliguria. Will continue to trend.  Transplant Nephrology following.  - Strict I&Os ([place]/JPx3)  - MSSA bacteremia (5/17) - Ancef DCd 6/11. Transplant ID following  - Reg diet  - SCDs/spirometry as able  - OOBTC  - bowel regimen    Immunosuppression  - Start FK 0.5mg bid (staring tonight), MMF 1/1, -SST  - Last dose simulect today   - CMV viremia pre-op.  Decrease Valcyte to TIW for renal dosing.   - PPX: Bactrim to TIW for renal dosing. Fluconazole QD

## 2023-06-13 NOTE — PROGRESS NOTE ADULT - ASSESSMENT
39 yo F with hypothyroidism (on Levothyroxine) who initially presented to [facility] on  with abd pain. 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;  MSSA bacteremia secondary to SSTI and Organizing pneumonia of undetermined etiology now s/p OLT 23    Microbiology:   BC NGTd   UC 50-99K CRE, UA non micro   BC NGTD   ascitic fluid bacterial, fungal cx NGTD  ,  BC NGTD   ascitic fluid cx neg   BC pos MSSA   induced sputum bacterial cx neg, fungal cx not sent, PCP PCR still pending   fungitell , aspergillus GM neg in serum Histoplasma antigen in urine neg, CrAg neg   RVP neg   BAL gram stain GNR, no growth on culture, fungal and afb cx NGTD, PCP PCR pending, Legionella PCR pending, fungitell BAl neg, aspergillus GM pending   repeat serum fungitell neg  ;  BC NGTD   ascitic fluid cx P; cellk  count; 36 cells    Imagin/25 Ct chest: findings c/w pulmonary edema- (areas of confluent GGo)   MRCP No suspicious liver lesions. Marked hepatomegaly and hepatic steatosis   with additional findings suggestive of cirrhosis and resultant portal   hypertension.   US abdomen: small to mod ascites  Ct chest iv con: Multifocal bilateral groundglass opacities, suspicious for pneumonia.  Small bilateral pleural effusions, left greater the right, increased compared to prior exam.no PE  Ct maxillofacial , Head and Cervical spine     CT FACIAL BONES  Bilateral mastoid air cells and middle ear regions well-aerated. Paranasal sinus mucosal thickening with near complete opacification of the right sphenoid sinus. Bilateral maxillary sinus septations. No air-fluid levels. S-shaped deviation of the nasal septum with hypertrophy of bilateral inferior greater than middle nasal turbinates. Narrowing ofbilateral ostiomeatal complexes. Periapical lucencies bilateral maxillarypremolars.    CT C/A/P   * Small focal consolidation in the posterior left upper lobe. Givenhistory of trauma, small pulmonary contusion cannot be excluded.Correlate for pneumonia.  * Hepatomegaly and diffuse upper extremity ptosis with signs of portalhypertension. Correlate for steatohepatitis. Moderate ascites.      US liver   Large heterogeneous fluid collection in Morison's pouch measuring 11.4 x 2.4 x 7.7 cm  Assessment and plan  1. EtoH cirrhosis- MELD-NA  of 35; s/p OLT23  CMV D+/R+, EBV D+/R+, Toxoplasma D?/R-  required 12 U PRBC, 8 FFp, 2 Plat, 1 cryo  Donor: HCV Ab/NAAt pos  Induction simulect + steroids  Maintenance : tacro, MMF  Prophylaxis:  CMV: valcyte (temporarily on treatment dose 450 mf daily for crcl in s/o low level viremia in  on day of transplant);   PCP: bactrim   2. MSSA bacteremia  BC ; repeat BC  neg. TTE no vegetations ; SAHARA neg on   CT face, chest/abdomen and pelvis post trauma noting sinus opacification, bilateral apical maxillary premolar lucencies. CT chest with small consolidation  Sources: traumatic /SSTI in s/o facial hematoma vs pneumonia vs less likely ENT. No evidence of metastatic infection. ? hardware  Cefazolin -  3. Donor HCV NAAT pos-  converted on - genotype pending, per current protocol treatment expected  in outpatient basis  4. migratory GGO and patchy opacities on CT chest , waxing and waning on repeat Ct   Pulmonary edema +/-    Repeat CT chest  with worsening GGo and some areas more consolidated ; s/p zosyn 7 day course, then some improvement on  with other areas of worsening  Cirrhotic patients also at risk of PCP, cryptococcosis- pattern not classical for the later, not classical host for mold infection except endemics- neither would cause leucocytosis  Induced sputum 5/  S/p bronchoscopy on ; cytology with macrophages and mild acute inflamamtion. cx NGTD, PCP PCR, legionella PCR P, aspergillus GM P, fungitell in BAL neg.   ?  of unclear etiology- on RA- no identified etiology and would not delay transplant at this point    5. 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 peritransplant fluid collection- no concern for infection at this time  US~ 11 cm collection at surgical bed  7. Pre-transplant screening  HIV ag/Ab neg; HCV Ab neg, HBV s Ag neg, HBV s Ab pos, HBV c Ab neg, HEV IgG neg, EBV IgG pos, CMV IgG pos, HSV1/2 IgG pos, VZV IgG NEG; Measles/Mumps IgG neg, Rubella igG pos (Surinamese measles), Syphilis screen neg, Strongyliodes IgG neg. Quantiferon and toxoplasma igG neg.  8. Need for vaccinations.  Only received Td . Positive titers for HBV non-quantitative  will need in the post-transplant setting" Shingrix, 2 doses 2-6 months apart; Tdap (once in adulthood), COVID-19, Prevnar 20, HPV Gardisil 9 3 doses at 0-1-6 months  - Of note, non-immune to Measles or mumps,  nor to varicella. immune to Surinamese measles (Rubella) - post-transplant,  live vaccination would be contraindicated.  9. Leucocytosis - resolved  pretransplant, peak at 24- extensive ID workup negative      Recommendations:  -- Continue valcyte 450 mg daily - this is temporary treatment dose in s/o low level CMV viremia within 24 hours of transplant.   - repeat CMV PCR  ordered- once negative should go back to prophylactic doses.  -- HCV PCR positive on - converted- follow genotype and will needs NA treatment per protocol   -- will also need HIV NAAT/HBV DNA at 1 months post-transplant  -- Follow fungal, afb Cultures both NGTD, BAL Aspergillus galactomannan  -- rising BiT- follow MRCP   - continue fluconazole prophylaxis pe protocol: on 200 mg daily - this is a 400 mg dose adjusted to creatinine clearance- would recommend a lower dose of 100 mg daily for prophylaxis, which is 200 mg daily adjusted for crcl  -- continue bactrim for PCP prophylaxis         Thank you for involving us in the care of this patient  Transplant ID will continue to follow  Please call or page with additional questions  Pager; #[phone]  Teams: from 8 am to 5 pm  Samanta Soto MD

## 2023-06-13 NOTE — PROGRESS NOTE ADULT - SUBJECTIVE AND OBJECTIVE BOX
afebrile, WBC 11.9  Tbi up to 9.8  ____________________________________________________  ROS  GENERAL: denies chills, , night sweats, weight loss.   PSYCH: denies depression, anxiety, suicidal ideation, hallucination, and delusions  SKIN: no rash or lesions; no color changes, no abnormal nevi,no  dryness, and nojaundice    EYES: denies visual changes, floaters, pain, inflammation, blurred vision, and discharge  ENT: denies tinnitus, vertigo, epistaxis, oral lesion, and decreased acuity  PULM: denies, hemoptysis, pleurisy  CVS: denies angina, palpitations,+ orthopnea, no syncope, or heart murmur  GI: denies constipation, diarrhea, melena, abdominal pain, nausea.   : denies dysuria, frequency, discharge, incontinence, stones or macroscopic hematuria  MS: no arthralgias, no erythema or swelling, no myalgias, noedema, or lower back pain.   CNS: denies numbness, dizziness, seizure, or tremor  ENDO: denies heat/cold intolerance, polyuria, polydipsia, malaise.    HEME: denies bruising, bleeding, lymphadenopathy, anemia, and calf pain    Allergies  No Known Allergies    __________________________________________________  MEDS:  MEDICATIONS  (STANDING):  aspirin enteric coated 81 daily  basiliximab  IVPB 20 once  HYDROmorphone  Injectable 0.25 every 3 hours PRN  levothyroxine 100 daily  methylPREDNISolone sodium succinate Injectable 60 every 24 hours  methylPREDNISolone sodium succinate Injectable    mycophenolate mofetil 1000 <User Schedule>  oxyCODONE    IR 5 every 4 hours PRN  oxyCODONE    IR 10 every 6 hours PRN  pantoprazole    Tablet 40 before breakfast  polyethylene glycol 3350 17 daily  senna 2 at bedtime  tacrolimus 0.5 <User Schedule>  ursodiol Capsule 300 every 12 hours    _________________________________________________  ANTIMICOBIALS  basiliximab  IVPB 20 once  fluconAZOLE   Tablet 200 daily  mycophenolate mofetil 1000 <User Schedule>  tacrolimus 0.5 <User Schedule>  trimethoprim   80 mG/sulfamethoxazole 400 mG 1 <User Schedule>  valGANciclovir 450 <User Schedule>      GENERAL LABS              10.5                 131  | 16   | 57           11.69 >-----------< 93      ------------------------< 145                   29.7                 3.9  | 98   | 2.41                                         Ca 8.7   Mg 2.1   Ph 3.8            _________________________________________________  MICROBIOLOGY  -----------    Culture - Body Fluid with Gram Stain (collected 10 Sukhjinder 2023 05:42)  Source: Abdominal Fl Abdominal Fluid  Gram Stain (10 Sukhjinder 2023 16:55):    No polymorphonuclear cells seen per low power field    No organisms seen per oil power field  Preliminary Report (11 Jun 2023 09:30):    No growth            CMVPCR Log: 3.31 [id number]/mL (06-08 @ 06:41)          Fungitell:   _______________________________________________  PERTINENT IMAGING  _________________________________________________  Physical Exam:   T(C): 36.6 (06-13-23 @ 15:00), Max: 36.6 (06-13-23 @ 15:00)  HR: 61 (06-13-23 @ 19:00) (41 - 65)  BP: 133/78 (06-13-23 @ 18:00) (126/70 - 147/78)  RR: 22 (06-13-23 @ 19:00) (10 - 26)  SpO2: 96% (06-13-23 @ 19:00) (93% - 100%)    06-12-23 @ 07:01  -  06-13-23 @ 07:00  --------------------------------------------------------  IN: 1805 mL / OUT: 1920 mL / NET: -115 mL    06-13-23 @ 07:01  -  06-13-23 @ 19:58  --------------------------------------------------------  IN: 100 mL / OUT: 1250 mL / NET: -1150 mL      [facility] c/d/i  · Constitutional	well-groomed; no distress  · Eyes	PERRL; EOMI; icteric  · ENMT	no gross abnormalities  · Respiratory	lower lobe crackles  · Cardiovascular	regular rate and rhythm; S1 S2 present; no gallops; no rub; no murmur; no JVD; normal PMI; no pedal edema  · Cardiovascular Comments	no S3/s4  . Abdominal: distended, scar clean with stitches. 3 drains in place with bloody drainage  · Neurological	cranial nerves II-XII intact; sensation intact; responds to pain; responds to verbal commands; deep reflexes intact; no meningismus  · Mental Status	Alert and oriented x3, appropriate  · Skin: icteric , echhymotic

## 2023-06-13 NOTE — PROGRESS NOTE ADULT - ATTENDING COMMENTS
Evaluated in multidisciplinary round in SICU.   Awake, non focal  Acute resp insufficiency, organizing pneumonia resolving, off O2  Off vasopressors  Clears, high T bili. Repeat hepatic US today. Previous USs indicate  good flow in all feeding vessels. On BID PPI  VIVIANA resolving,   H/h stable after transfusion  Periop abx to cover MSSA, completed course of Zosyn  ISS  PPX abx Fluc, Valcyte, Bactrim  Antirej: Cellcept, steroid taper    OOB/mobilization

## 2023-06-13 NOTE — PROGRESS NOTE ADULT - SUBJECTIVE AND OBJECTIVE BOX
Transplant Surgery Multidisciplinary Note  --------------------------------------------------------------  HCV+ OLT    Date: 6/9/23       POD 4    Present:   Patient seen and examined with multidisciplinary Transplant team including Surgeon: Dr. Haile,  SALO Siu, Hepatologist: Dr. Montes,  Transplant Pharmacist Jody, SICU team during AM rounds.   Disciplines not in attendance will be notified of the plan.     HPI: 38F with Hx of hypothyroidism (on Levothyroxine), liver cirrhosis 2/2 ETOH abuse---->  presented to [facility] ED on 5/13/23 with ~ 2 weeks Hx of progressively worsening abdominal pain and distention.  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  now s/p HCV+ OLT under Simulect induction on 6/10/2023    Interval Events:  Afebrile, VSS      Immunosuppression:  FK on hold, MMF 1/1, SST  SIMULECT today  ongoing monitoring for signs of rejection    Potential Discharge date: TBD  Education:  Medications  Plan of care:  See Below        MEDICATIONS  (STANDING):  aspirin enteric coated 81 milliGRAM(s) Oral daily  basiliximab  IVPB 20 milliGRAM(s) IV Intermittent once  calcium carbonate 1250 mG  + Vitamin D (OsCal 500 + D) 1 Tablet(s) Oral two times a day  chlorhexidine 2% Cloths 1 Application(s) Topical <User Schedule>  fluconAZOLE   Tablet 200 milliGRAM(s) Oral daily  levothyroxine 100 MICROGram(s) Oral daily  methylPREDNISolone sodium succinate Injectable   IV Push   methylPREDNISolone sodium succinate Injectable 60 milliGRAM(s) IV Push every 24 hours  mycophenolate mofetil 1000 milliGRAM(s) Oral <User Schedule>  pantoprazole    Tablet 40 milliGRAM(s) Oral before breakfast  polyethylene glycol 3350 17 Gram(s) Oral daily  senna 2 Tablet(s) Oral at bedtime  trimethoprim   80 mG/sulfamethoxazole 400 mG 1 Tablet(s) Oral <User Schedule>  valGANciclovir 450 milliGRAM(s) Oral <User Schedule>    MEDICATIONS  (PRN):  HYDROmorphone  Injectable 0.25 milliGRAM(s) IV Push every 3 hours PRN Severe Breakthrough Pain  oxyCODONE    IR 5 milliGRAM(s) Oral every 4 hours PRN Moderate Pain (4 - 6)  oxyCODONE    IR 10 milliGRAM(s) Oral every 6 hours PRN Severe Pain (7 - 10)      PAST MEDICAL & SURGICAL HISTORY:  Hypothyroidism      Alcoholic liver disease      No significant past surgical history          Vital Signs Last 24 Hrs  T(C): 36.3 (13 Jun 2023 03:00), Max: 36.6 (12 Jun 2023 15:00)  T(F): 97.3 (13 Jun 2023 03:00), Max: 97.9 (12 Jun 2023 15:00)  HR: 53 (13 Jun 2023 05:00) (41 - 62)  BP: 130/86 (13 Jun 2023 05:00) (125/76 - 157/79)  BP(mean): 105 (13 Jun 2023 05:00) (92 - 110)  RR: 16 (13 Jun 2023 05:00) (10 - 35)  SpO2: 96% (13 Jun 2023 05:00) (92% - 100%)    Parameters below as of 13 Jun 2023 03:00  Patient On (Oxygen Delivery Method): room air        I&O's Summary    11 Jun 2023 07:01  -  12 Jun 2023 07:00  --------------------------------------------------------  IN: 2565 mL / OUT: 1297 mL / NET: 1268 mL    12 Jun 2023 07:01  -  13 Jun 2023 05:55  --------------------------------------------------------  IN: 1805 mL / OUT: 1735 mL / NET: 70 mL                              7.4    7.49  )-----------( 78       ( 13 Jun 2023 02:47 )             21.5     06-13    133<L>  |  98  |  56<H>  ----------------------------<  164<H>  3.8   |  18<L>  |  2.61<H>    Ca    8.3<L>      13 Jun 2023 02:47  Phos  4.6     06-13  Mg     2.1     06-13    TPro  4.2<L>  /  Alb  2.7<L>  /  TBili  9.3<H>  /  DBili  x   /  AST  38  /  ALT  6<L>  /  AlkPhos  140<H>  06-13          Culture - Body Fluid with Gram Stain (collected 06-10-23 @ 05:42)  Source: Abdominal Fl Abdominal Fluid  Gram Stain (06-10-23 @ 16:55):    No polymorphonuclear cells seen per low power field    No organisms seen per oil power field  Preliminary Report (06-11-23 @ 09:30):    No growth    Culture - Body Fluid with Gram Stain (collected 06-08-23 @ 18:34)  Source: Ascites Fl Ascites Fluid  Gram Stain (06-08-23 @ 23:32):    No polymorphonuclear cells seen    No organisms seen    by cytocentrifuge  Preliminary Report (06-09-23 @ 15:40):    No growth    Culture - Blood (collected 06-07-23 @ 12:35)  Source: .Blood Blood-Peripheral  Final Report (06-12-23 @ 15:00):    No Growth Final    Culture - Blood (collected 06-07-23 @ 12:35)  Source: .Blood Blood-Peripheral  Final Report (06-12-23 @ 15:00):    No Growth Final    Culture - Blood (collected 06-06-23 @ 06:31)  Source: .Blood Blood  Final Report (06-11-23 @ 12:00):    No Growth Final    Culture - Blood (collected 06-06-23 @ 06:31)  Source: .Blood Blood  Final Report (06-11-23 @ 12:00):    No Growth Final                  Review of systems  Gen: No weight changes, fatigue, fevers/chills, weakness  Skin: No rashes  Head/Eyes/Ears/Mouth: No headache; Normal hearing; Normal vision w/o blurriness; No sinus pain/discomfort, sore throat  Respiratory: No dyspnea, cough, wheezing, hemoptysis  CV: No chest pain, PND, orthopnea  GI: C/O mild abdominal pain at surgical site. no diarrhea, constipation, nausea, vomiting, melena, hematochezia  : No increased frequency, dysuria, hematuria, nocturia  MSK: No joint pain/swelling; no back pain; no edema  Neuro: No dizziness/lightheadedness, weakness, seizures, numbness, tingling  Heme: No easy bruising or bleeding  Endo: No heat/cold intolerance  Psych: No significant nervousness, anxiety, stress, depression  All other systems were reviewed and are negative, except as noted.      PHYSICAL EXAM:  Constitutional: NAD  Eyes: anicteric, PERRLA  ENMT: nc/at, no thrush  Neck: supple, central line c/d/i  Respiratory: CTA B/L  Cardiovascular: RRR  Gastrointestinal: Soft abdomen, ND, appropriate incisional TTP. chevron incision c/d/i, staples in place. No signs of infection. JPs x3 ss  Genitourinary: Urinary catheter in place  Extremities: SCD's in place and working bilaterally  Vascular: Palpable dp pulses bilaterally.   Neurological: A&O x3  Skin: no rashes, ulcerations, lesions  Musculoskeletal: Moving all extremities  Psychiatric: Responsive Transplant Surgery Multidisciplinary Note  --------------------------------------------------------------  HCV+ OLT    Date: 6/9/23       POD 4    Present:   Patient seen and examined with multidisciplinary Transplant team including Surgeon: Dr. Haile,  SALO Argueta, Hepatologist: Dr. Montes,  Transplant Pharmacist Jody, SICU team during AM rounds.   Disciplines not in attendance will be notified of the plan.     HPI: 38F with Hx of hypothyroidism (on Levothyroxine), liver cirrhosis 2/2 ETOH abuse---->  presented to [facility] ED on 5/13/23 with ~ 2 weeks Hx of progressively worsening abdominal pain and distention.  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  now s/p HCV+ OLT under Simulect induction on 6/10/2023    Interval Events:  - POD 4 s/p OLT with uptrending t bili and ALP  - dced zosyn, adv to clear liquid diet; tolerating,  - started asa 81  - refusing PT     Immunosuppression:  Induction: simulect, last dose today  Maint:  FK by level, MMF 1/1, SST  ongoing monitoring for signs of rejection    Potential Discharge date: TBD  Education:  Medications  Plan of care:  See Below    MEDICATIONS  (STANDING):  aspirin enteric coated 81 milliGRAM(s) Oral daily  basiliximab  IVPB 20 milliGRAM(s) IV Intermittent once  calcium carbonate 1250 mG  + Vitamin D (OsCal 500 + D) 1 Tablet(s) Oral two times a day  chlorhexidine 2% Cloths 1 Application(s) Topical <User Schedule>  fluconAZOLE   Tablet 200 milliGRAM(s) Oral daily  levothyroxine 100 MICROGram(s) Oral daily  methylPREDNISolone sodium succinate Injectable   IV Push   methylPREDNISolone sodium succinate Injectable 60 milliGRAM(s) IV Push every 24 hours  mycophenolate mofetil 1000 milliGRAM(s) Oral <User Schedule>  pantoprazole    Tablet 40 milliGRAM(s) Oral before breakfast  polyethylene glycol 3350 17 Gram(s) Oral daily  senna 2 Tablet(s) Oral at bedtime  trimethoprim   80 mG/sulfamethoxazole 400 mG 1 Tablet(s) Oral <User Schedule>  valGANciclovir 450 milliGRAM(s) Oral <User Schedule>    MEDICATIONS  (PRN):  HYDROmorphone  Injectable 0.25 milliGRAM(s) IV Push every 3 hours PRN Severe Breakthrough Pain  oxyCODONE    IR 5 milliGRAM(s) Oral every 4 hours PRN Moderate Pain (4 - 6)  oxyCODONE    IR 10 milliGRAM(s) Oral every 6 hours PRN Severe Pain (7 - 10)      PAST MEDICAL & SURGICAL HISTORY:  Hypothyroidism  Alcoholic liver disease  No significant past surgical history    Vital Signs Last 24 Hrs  T(C): 36.3 (13 Jun 2023 03:00), Max: 36.6 (12 Jun 2023 15:00)  T(F): 97.3 (13 Jun 2023 03:00), Max: 97.9 (12 Jun 2023 15:00)  HR: 53 (13 Jun 2023 05:00) (41 - 62)  BP: 130/86 (13 Jun 2023 05:00) (125/76 - 157/79)  BP(mean): 105 (13 Jun 2023 05:00) (92 - 110)  RR: 16 (13 Jun 2023 05:00) (10 - 35)  SpO2: 96% (13 Jun 2023 05:00) (92% - 100%)    Parameters below as of 13 Jun 2023 03:00  Patient On (Oxygen Delivery Method): room air    I&O's Summary    11 Jun 2023 07:01  -  12 Jun 2023 07:00  --------------------------------------------------------  IN: 2565 mL / OUT: 1297 mL / NET: 1268 mL    12 Jun 2023 07:01  -  13 Jun 2023 05:55  --------------------------------------------------------  IN: 1805 mL / OUT: 1735 mL / NET: 70 mL                        7.4    7.49  )-----------( 78       ( 13 Jun 2023 02:47 )             21.5     06-13    133<L>  |  98  |  56<H>  ----------------------------<  164<H>  3.8   |  18<L>  |  2.61<H>    Ca    8.3<L>      13 Jun 2023 02:47  Phos  4.6     06-13  Mg     2.1     06-13    TPro  4.2<L>  /  Alb  2.7<L>  /  TBili  9.3<H>  /  DBili  x   /  AST  38  /  ALT  6<L>  /  AlkPhos  140<H>  06-13    Culture - Body Fluid with Gram Stain (collected 06-10-23 @ 05:42)  Source: Abdominal Fl Abdominal Fluid  Gram Stain (06-10-23 @ 16:55):    No polymorphonuclear cells seen per low power field    No organisms seen per oil power field  Preliminary Report (06-11-23 @ 09:30):    No growth    Culture - Body Fluid with Gram Stain (collected 06-08-23 @ 18:34)  Source: Ascites Fl Ascites Fluid  Gram Stain (06-08-23 @ 23:32):    No polymorphonuclear cells seen    No organisms seen    by cytocentrifuge  Preliminary Report (06-09-23 @ 15:40):    No growth    Culture - Blood (collected 06-07-23 @ 12:35)  Source: .Blood Blood-Peripheral  Final Report (06-12-23 @ 15:00):    No Growth Final    Culture - Blood (collected 06-07-23 @ 12:35)  Source: .Blood Blood-Peripheral  Final Report (06-12-23 @ 15:00):    No Growth Final    Culture - Blood (collected 06-06-23 @ 06:31)  Source: .Blood Blood  Final Report (06-11-23 @ 12:00):    No Growth Final    Culture - Blood (collected 06-06-23 @ 06:31)  Source: .Blood Blood  Final Report (06-11-23 @ 12:00):    No Growth Final      Review of systems  Gen: No weight changes, fatigue, fevers/chills, weakness  Skin: No rashes  Head/Eyes/Ears/Mouth: No headache; Normal hearing; Normal vision w/o blurriness; No sinus pain/discomfort, sore throat  Respiratory: No dyspnea, cough, wheezing, hemoptysis  CV: No chest pain, PND, orthopnea  GI: C/O mild abdominal pain at surgical site. no diarrhea, constipation, nausea, vomiting, melena, hematochezia  : No increased frequency, dysuria, hematuria, nocturia  MSK: No joint pain/swelling; no back pain; no edema  Neuro: No dizziness/lightheadedness, weakness, seizures, numbness, tingling  Heme: No easy bruising or bleeding  Endo: No heat/cold intolerance  Psych: No significant nervousness, anxiety, stress, depression  All other systems were reviewed and are negative, except as noted.      PHYSICAL EXAM:  Constitutional: NAD  Eyes: anicteric, PERRLA  ENMT: nc/at, no thrush  Neck: supple, central line c/d/i  Respiratory: CTA B/L  Cardiovascular: RRR  Gastrointestinal: Soft abdomen, ND, appropriate incisional TTP. chevron incision c/d/i, staples in place. No signs of infection. JPs x3 ss  Genitourinary: Urinary catheter in place  Extremities: SCD's in place and working bilaterally  Vascular: Palpable dp pulses bilaterally.   Neurological: A&O x3  Skin: no rashes, ulcerations, lesions  Musculoskeletal: Moving all extremities  Psychiatric: Responsive

## 2023-06-13 NOTE — PROGRESS NOTE ADULT - ASSESSMENT
38F with Hx of hypothyroidism (on Levothyroxine), liver cirrhosis 2/2 ETOH abuse---->  presented to [facility] on 5/13/23 with ~ 2 weeks Hx of progressively worsening abdominal pain and distention.  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  now s/p HCV+ OLT under Simulect induction on 6/10/2023.    NEURO:  - A&Ox4  - Pain: Oxycodone PRN with Dilaudid for breakthrough pain    RESPIRATORY:  - Comfortable on RA  - POCUS shows pleural effusions, IVC small but no respiratory variation.     CARDIOVASCULAR:  - Normal recent TTE  - No cardiac hx  - No cardiac meds  - HDS, arterial line removed  - Eric DC'd    GI:  - TBilli elevated, repeat doppler okay  - Diet advanced to clears  - Bowel regimen with senna, Miralax   - Continue PPI  - Marce Froylan incision  - MONSE x3, serosanguinous    RENAL:  - Bundy  - Monitor UOP  - Nephro on board for preop VIVIANA of unknown origin    HEME:  - DVT PPx held  - Started on ASA 81mg daily   - Trend H/H    ID:  - s/p Zosyn for perioperative abx, ended 6/12    - Diflucan/ Valcyte/ Bactrim for post-transplant ppx  - ID on board  - BCx 6/7, 6/6, 6/2, 5/28, 5/26, 5/24, 5/23, 5/18, 5/17, 5/16 no growth final   - UCx 5/28 normal urogenital cameron  - UCx 5/22 VRE  - Body fluid Cx 5/23, 5/17, 5/13 no growth  - GI PCR 5/17 negative  - MRSA 5/14 negative  - Legionella 5/13 negative   - UCx 5/13 E. COli  - BCx 5/13 Staph aureus      ENDO:  - POCT glucose  - Mod ISS  - Steroid taper for immunosuppression  - Continue home Synthroid for hypothyroid    DISPO: SICU    Lines: PIV, RIJ introducer  Labs: Q6

## 2023-06-14 LAB
ALBUMIN SERPL ELPH-MCNC: 3.1 G/DL — LOW (ref 3.3–5)
ALP SERPL-CCNC: 226 U/L — HIGH (ref 40–120)
ALT FLD-CCNC: 9 U/L — LOW (ref 10–45)
ANION GAP SERPL CALC-SCNC: 16 MMOL/L — SIGNIFICANT CHANGE UP (ref 5–17)
APTT BLD: 25.2 SEC — LOW (ref 27.5–35.5)
AST SERPL-CCNC: 42 U/L — HIGH (ref 10–40)
BILIRUB SERPL-MCNC: 7.5 MG/DL — HIGH (ref 0.2–1.2)
BUN SERPL-MCNC: 52 MG/DL — HIGH (ref 7–23)
CALCIUM SERPL-MCNC: 8.7 MG/DL — SIGNIFICANT CHANGE UP (ref 8.4–10.5)
CHLORIDE SERPL-SCNC: 98 MMOL/L — SIGNIFICANT CHANGE UP (ref 96–108)
CMV DNA CSF QL NAA+PROBE: 441 IU/ML — HIGH
CMV DNA SPEC NAA+PROBE-LOG#: 2.64 LOG10IU/ML — HIGH
CO2 SERPL-SCNC: 19 MMOL/L — LOW (ref 22–31)
CREAT SERPL-MCNC: 2.11 MG/DL — HIGH (ref 0.5–1.3)
EGFR: 30 ML/MIN/1.73M2 — LOW
GALACTOMANNAN AG SERPL-ACNC: 0.21 INDEX — SIGNIFICANT CHANGE UP (ref 0–0.49)
GLUCOSE SERPL-MCNC: 141 MG/DL — HIGH (ref 70–99)
HCT VFR BLD CALC: 26.2 % — LOW (ref 34.5–45)
HGB BLD-MCNC: 9.2 G/DL — LOW (ref 11.5–15.5)
INR BLD: 1.04 RATIO — SIGNIFICANT CHANGE UP (ref 0.88–1.16)
MAGNESIUM SERPL-MCNC: 1.9 MG/DL — SIGNIFICANT CHANGE UP (ref 1.6–2.6)
MCHC RBC-ENTMCNC: 29.6 PG — SIGNIFICANT CHANGE UP (ref 27–34)
MCHC RBC-ENTMCNC: 35.1 GM/DL — SIGNIFICANT CHANGE UP (ref 32–36)
MCV RBC AUTO: 84.2 FL — SIGNIFICANT CHANGE UP (ref 80–100)
NRBC # BLD: 0 /100 WBCS — SIGNIFICANT CHANGE UP (ref 0–0)
PHOSPHATE SERPL-MCNC: 3 MG/DL — SIGNIFICANT CHANGE UP (ref 2.5–4.5)
PLATELET # BLD AUTO: 104 K/UL — LOW (ref 150–400)
POTASSIUM SERPL-MCNC: 4.2 MMOL/L — SIGNIFICANT CHANGE UP (ref 3.5–5.3)
POTASSIUM SERPL-SCNC: 4.2 MMOL/L — SIGNIFICANT CHANGE UP (ref 3.5–5.3)
PROCALCITONIN SERPL-MCNC: 1.42 NG/ML — HIGH (ref 0.02–0.1)
PROT SERPL-MCNC: 4.5 G/DL — LOW (ref 6–8.3)
PROTHROM AB SERPL-ACNC: 12.1 SEC — SIGNIFICANT CHANGE UP (ref 10.5–13.4)
RBC # BLD: 3.11 M/UL — LOW (ref 3.8–5.2)
RBC # FLD: 18.1 % — HIGH (ref 10.3–14.5)
SODIUM SERPL-SCNC: 133 MMOL/L — LOW (ref 135–145)
TACROLIMUS SERPL-MCNC: 1.2 NG/ML — SIGNIFICANT CHANGE UP
WBC # BLD: 9.04 K/UL — SIGNIFICANT CHANGE UP (ref 3.8–10.5)
WBC # FLD AUTO: 9.04 K/UL — SIGNIFICANT CHANGE UP (ref 3.8–10.5)

## 2023-06-14 PROCEDURE — 99232 SBSQ HOSP IP/OBS MODERATE 35: CPT | Mod: GC,24

## 2023-06-14 PROCEDURE — 71045 X-RAY EXAM CHEST 1 VIEW: CPT | Mod: 26

## 2023-06-14 PROCEDURE — 99232 SBSQ HOSP IP/OBS MODERATE 35: CPT

## 2023-06-14 PROCEDURE — 99233 SBSQ HOSP IP/OBS HIGH 50: CPT

## 2023-06-14 RX ORDER — OXYCODONE HYDROCHLORIDE 5 MG/1
10 TABLET ORAL EVERY 4 HOURS
Refills: 0 | Status: DISCONTINUED | OUTPATIENT
Start: 2023-06-14 | End: 2023-06-17

## 2023-06-14 RX ORDER — HEPARIN SODIUM 5000 [USP'U]/ML
5000 INJECTION INTRAVENOUS; SUBCUTANEOUS EVERY 12 HOURS
Refills: 0 | Status: DISCONTINUED | OUTPATIENT
Start: 2023-06-14 | End: 2023-06-26

## 2023-06-14 RX ORDER — MAGNESIUM SULFATE 500 MG/ML
2 VIAL (ML) INJECTION ONCE
Refills: 0 | Status: COMPLETED | OUTPATIENT
Start: 2023-06-14 | End: 2023-06-14

## 2023-06-14 RX ORDER — BUMETANIDE 0.25 MG/ML
2 INJECTION INTRAMUSCULAR; INTRAVENOUS ONCE
Refills: 0 | Status: COMPLETED | OUTPATIENT
Start: 2023-06-14 | End: 2023-06-14

## 2023-06-14 RX ADMIN — CHLORHEXIDINE GLUCONATE 1 APPLICATION(S): 213 SOLUTION TOPICAL at 05:24

## 2023-06-14 RX ADMIN — MYCOPHENOLATE MOFETIL 1000 MILLIGRAM(S): 250 CAPSULE ORAL at 20:54

## 2023-06-14 RX ADMIN — TACROLIMUS 0.5 MILLIGRAM(S): 5 CAPSULE ORAL at 19:58

## 2023-06-14 RX ADMIN — Medication 81 MILLIGRAM(S): at 11:55

## 2023-06-14 RX ADMIN — OXYCODONE HYDROCHLORIDE 5 MILLIGRAM(S): 5 TABLET ORAL at 12:33

## 2023-06-14 RX ADMIN — URSODIOL 300 MILLIGRAM(S): 250 TABLET, FILM COATED ORAL at 17:45

## 2023-06-14 RX ADMIN — MYCOPHENOLATE MOFETIL 1000 MILLIGRAM(S): 250 CAPSULE ORAL at 08:02

## 2023-06-14 RX ADMIN — URSODIOL 300 MILLIGRAM(S): 250 TABLET, FILM COATED ORAL at 05:23

## 2023-06-14 RX ADMIN — Medication 100 MICROGRAM(S): at 05:24

## 2023-06-14 RX ADMIN — BUMETANIDE 2 MILLIGRAM(S): 0.25 INJECTION INTRAMUSCULAR; INTRAVENOUS at 08:03

## 2023-06-14 RX ADMIN — FLUCONAZOLE 200 MILLIGRAM(S): 150 TABLET ORAL at 11:55

## 2023-06-14 RX ADMIN — Medication 1300 MILLIGRAM(S): at 05:23

## 2023-06-14 RX ADMIN — OXYCODONE HYDROCHLORIDE 10 MILLIGRAM(S): 5 TABLET ORAL at 16:16

## 2023-06-14 RX ADMIN — Medication 1 TABLET(S): at 05:24

## 2023-06-14 RX ADMIN — Medication 25 GRAM(S): at 08:44

## 2023-06-14 RX ADMIN — BUMETANIDE 2 MILLIGRAM(S): 0.25 INJECTION INTRAMUSCULAR; INTRAVENOUS at 08:02

## 2023-06-14 RX ADMIN — VALGANCICLOVIR 450 MILLIGRAM(S): 450 TABLET, FILM COATED ORAL at 11:55

## 2023-06-14 RX ADMIN — Medication 40 MILLIGRAM(S): at 19:58

## 2023-06-14 RX ADMIN — OXYCODONE HYDROCHLORIDE 10 MILLIGRAM(S): 5 TABLET ORAL at 01:45

## 2023-06-14 RX ADMIN — OXYCODONE HYDROCHLORIDE 10 MILLIGRAM(S): 5 TABLET ORAL at 19:58

## 2023-06-14 RX ADMIN — Medication 1 TABLET(S): at 11:55

## 2023-06-14 RX ADMIN — HEPARIN SODIUM 5000 UNIT(S): 5000 INJECTION INTRAVENOUS; SUBCUTANEOUS at 17:46

## 2023-06-14 RX ADMIN — OXYCODONE HYDROCHLORIDE 10 MILLIGRAM(S): 5 TABLET ORAL at 20:50

## 2023-06-14 RX ADMIN — OXYCODONE HYDROCHLORIDE 10 MILLIGRAM(S): 5 TABLET ORAL at 09:14

## 2023-06-14 RX ADMIN — OXYCODONE HYDROCHLORIDE 10 MILLIGRAM(S): 5 TABLET ORAL at 02:15

## 2023-06-14 RX ADMIN — Medication 1 TABLET(S): at 17:45

## 2023-06-14 RX ADMIN — TACROLIMUS 0.5 MILLIGRAM(S): 5 CAPSULE ORAL at 08:02

## 2023-06-14 RX ADMIN — OXYCODONE HYDROCHLORIDE 5 MILLIGRAM(S): 5 TABLET ORAL at 12:03

## 2023-06-14 RX ADMIN — OXYCODONE HYDROCHLORIDE 5 MILLIGRAM(S): 5 TABLET ORAL at 21:26

## 2023-06-14 RX ADMIN — PANTOPRAZOLE SODIUM 40 MILLIGRAM(S): 20 TABLET, DELAYED RELEASE ORAL at 05:24

## 2023-06-14 RX ADMIN — OXYCODONE HYDROCHLORIDE 5 MILLIGRAM(S): 5 TABLET ORAL at 20:54

## 2023-06-14 RX ADMIN — OXYCODONE HYDROCHLORIDE 10 MILLIGRAM(S): 5 TABLET ORAL at 16:46

## 2023-06-14 RX ADMIN — Medication 1300 MILLIGRAM(S): at 17:46

## 2023-06-14 RX ADMIN — OXYCODONE HYDROCHLORIDE 10 MILLIGRAM(S): 5 TABLET ORAL at 08:44

## 2023-06-14 NOTE — PROGRESS NOTE ADULT - ATTENDING COMMENTS
Evaluated in multidisciplinary round in SICU.   Awake, non focal  Acute resp insufficiency, organizing pneumonia resolving, off O2  CXR dev b/l effusion R>L, will diurese  Remains off vasopressors  Advance diet, T bili decreasing.  Multiple US indicate good flow in all feeding vessels to liver. On BID PPI  VIVIANA resolving,   H/h stable   Periop abx to cover MSSA, completed course of Zosyn  ISS  PPX abx Fluc, Valcyte, Bactrim  Antirej: Cellcept, steroid taper    OOB/mobilization

## 2023-06-14 NOTE — PROGRESS NOTE ADULT - SUBJECTIVE AND OBJECTIVE BOX
afebrile, no leucotytosis   stable BiT in 9's  awaiting MRCP  24 hours:     @ 07:01  -   @ 07:00  --------------------------------------------------------  OUT:    Drain (mL): 750 mL    Drain (mL): 490 mL    Drain (mL): 210 mL    Indwelling Catheter - Urethral (mL): 120 mL    Voided (mL): 1100 mL  Total OUT: 2670 mL        ____________________________________________________  ROS  GENERAL: denies chills, , night sweats, weight loss.   PSYCH: denies depression, anxiety, suicidal ideation, hallucination, and delusions  SKIN: no rash or lesions; no color changes, no abnormal nevi,no  dryness, and nojaundice    EYES: denies visual changes, floaters, pain, inflammation, blurred vision, and discharge  ENT: denies tinnitus, vertigo, epistaxis, oral lesion, and decreased acuity  PULM: denies, hemoptysis, pleurisy  CVS: denies angina, palpitations,+ orthopnea, no syncope, or heart murmur  GI: denies constipation, diarrhea, melena, abdominal pain, nausea.   : denies dysuria, frequency, discharge, incontinence, stones or macroscopic hematuria  MS: no arthralgias, no erythema or swelling, no myalgias, noedema, or lower back pain.   CNS: denies numbness, dizziness, seizure, or tremor  ENDO: denies heat/cold intolerance, polyuria, polydipsia, malaise.    HEME: denies bruising, bleeding, lymphadenopathy, anemia, and calf pain    Allergies  No Known Allergies    __________________________________________________  MEDS:  MEDICATIONS  (STANDING):  aspirin enteric coated 81 daily  heparin   Injectable 5000 every 12 hours  levothyroxine 100 daily  methylPREDNISolone sodium succinate Injectable 40 every 24 hours  methylPREDNISolone sodium succinate Injectable    mycophenolate mofetil 1000 <User Schedule>  oxyCODONE    IR 10 every 6 hours PRN  oxyCODONE    IR 5 every 4 hours PRN  pantoprazole    Tablet 40 before breakfast  polyethylene glycol 3350 17 daily  senna 2 at bedtime  tacrolimus 0.5 <User Schedule>  ursodiol Capsule 300 every 12 hours    _________________________________________________  ANTIMICOBIALS  fluconAZOLE   Tablet 200 daily  mycophenolate mofetil 1000 <User Schedule>  tacrolimus 0.5 <User Schedule>  trimethoprim   80 mG/sulfamethoxazole 400 mG 1 <User Schedule>  valGANciclovir 450 <User Schedule>      GENERAL LABS              9.2                  133  | 19   | 52           9.04  >-----------< 104     ------------------------< 141                   26.2                 4.2  | 98   | 2.11                                         Ca 8.7   Mg 1.9   Ph 3.0            _________________________________________________  MICROBIOLOGY  -----------    Culture - Body Fluid with Gram Stain (collected 10 Sukhjinder 2023 05:42)  Source: Abdominal Fl Abdominal Fluid  Gram Stain (10 Sukhjinder 2023 16:55):    No polymorphonuclear cells seen per low power field    No organisms seen per oil power field  Preliminary Report (2023 09:30):    No growth            CMVPCR Lo.64 Slq86DD/mL ( @ 06:08)  CMVPCR Log: 3.31 Rsf31QU/mL ( @ 06:41)          Fungitell:   _______________________________________________  PERTINENT IMAGING  _________________________________________________  Physical Exam:   T(C): 36.2 (23 @ 11:00), Max: 36.8 (23 @ 03:00)  HR: 93 (23 @ 14:45) (49 - 93)  BP: 130/87 (23 @ 14:45) (122/73 - 148/79)  RR: 14 (23 @ 14:45) (10 - 34)  SpO2: 100% (23 @ 14:45) (92% - 100%)    23 @ 07:01  -  23 @ 07:00  --------------------------------------------------------  IN: 870 mL / OUT: 2670 mL / NET: -1800 mL    23 @ 07:01  -  23 @ 14:54  --------------------------------------------------------  IN: 505 mL / OUT: 945 mL / NET: -440 mL    [facility] c/d/i  · Constitutional	well-groomed; no distress  · Eyes	PERRL; EOMI; icteric  · ENMT	no gross abnormalities  · Respiratory	lower lobe crackles  · Cardiovascular	regular rate and rhythm; S1 S2 present; no gallops; no rub; no murmur; no JVD; normal PMI; no pedal edema  · Cardiovascular Comments	no S3/s4  . Abdominal: distended, scar clean with stitches. 3 drains in place with bloody drainage  · Neurological	cranial nerves II-XII intact; sensation intact; responds to pain; responds to verbal commands; deep reflexes intact; no meningismus  · Mental Status	Alert and oriented x3, appropriate  · Skin: icteric , echhymotic

## 2023-06-14 NOTE — PROGRESS NOTE ADULT - ASSESSMENT
39 yo F with hypothyroidism (on Levothyroxine) who initially presented to [facility] on  with abd pain. 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;  MSSA bacteremia secondary to SSTI and Organizing pneumonia of undetermined etiology now s/p OLT 23    Microbiology:   BC NGTd   UC 50-99K CRE, UA non micro   BC NGTD   ascitic fluid bacterial, fungal cx NGTD  ,  BC NGTD   ascitic fluid cx neg   BC pos MSSA   induced sputum bacterial cx neg, fungal cx not sent, PCP PCR still pending   fungitell , aspergillus GM neg in serum Histoplasma antigen in urine neg, CrAg neg   RVP neg   BAL gram stain GNR, no growth on culture, fungal and afb cx NGTD, PCP PCR pending, Legionella PCR pending, fungitell BAl neg, aspergillus GM pending   repeat serum fungitell neg  ;  BC NGTD   ascitic fluid cx P; cellk  count; 36 cells    Imagin/25 Ct chest: findings c/w pulmonary edema- (areas of confluent GGo)   MRCP No suspicious liver lesions. Marked hepatomegaly and hepatic steatosis   with additional findings suggestive of cirrhosis and resultant portal   hypertension.   US abdomen: small to mod ascites  Ct chest iv con: Multifocal bilateral groundglass opacities, suspicious for pneumonia.  Small bilateral pleural effusions, left greater the right, increased compared to prior exam.no PE  Ct maxillofacial , Head and Cervical spine     CT FACIAL BONES  Bilateral mastoid air cells and middle ear regions well-aerated. Paranasal sinus mucosal thickening with near complete opacification of the right sphenoid sinus. Bilateral maxillary sinus septations. No air-fluid levels. S-shaped deviation of the nasal septum with hypertrophy of bilateral inferior greater than middle nasal turbinates. Narrowing ofbilateral ostiomeatal complexes. Periapical lucencies bilateral maxillarypremolars.    CT C/A/P   * Small focal consolidation in the posterior left upper lobe. Givenhistory of trauma, small pulmonary contusion cannot be excluded.Correlate for pneumonia.  * Hepatomegaly and diffuse upper extremity ptosis with signs of portalhypertension. Correlate for steatohepatitis. Moderate ascites.      US liver   Large heterogeneous fluid collection in Morison's pouch measuring 11.4 x 2.4 x 7.7 cm  Assessment and plan  1. EtoH cirrhosis- MELD-NA  of 35; s/p OLT23  CMV D+/R+, EBV D+/R+, Toxoplasma D?/R-  required 12 U PRBC, 8 FFp, 2 Plat, 1 cryo  Donor: HCV Ab/NAAt pos  Induction simulect + steroids  Maintenance : tacro, MMF  Prophylaxis:  CMV: valcyte (temporarily on treatment dose 450 mf daily for crcl in s/o low level viremia in  on day of transplant);   PCP: bactrim   2. MSSA bacteremia  BC ; repeat BC  neg. TTE no vegetations ; SAHARA neg on   CT face, chest/abdomen and pelvis post trauma noting sinus opacification, bilateral apical maxillary premolar lucencies. CT chest with small consolidation  Sources: traumatic /SSTI in s/o facial hematoma vs pneumonia vs less likely ENT. No evidence of metastatic infection. ? hardware  Cefazolin -  3. Donor HCV NAAT pos-  converted on - genotype pending, per current protocol treatment expected  in outpatient basis  4. migratory GGO and patchy opacities on CT chest , waxing and waning on repeat Ct   Pulmonary edema +/-    Repeat CT chest  with worsening GGo and some areas more consolidated ; s/p zosyn 7 day course, then some improvement on  with other areas of worsening  Cirrhotic patients also at risk of PCP, cryptococcosis- pattern not classical for the later, not classical host for mold infection except endemics- neither would cause leucocytosis  Induced sputum 5/  S/p bronchoscopy on ; cytology with macrophages and mild acute inflamamtion. cx NGTD, PCP PCR, legionella PCR P, aspergillus GM P, fungitell in BAL neg.   ?  of unclear etiology- on RA- no identified etiology and would not delay transplant at this point    5. 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 peritransplant fluid collection- no concern for infection at this time  US~ 11 cm collection at surgical bed  7. Pre-transplant screening  HIV ag/Ab neg; HCV Ab neg, HBV s Ag neg, HBV s Ab pos, HBV c Ab neg, HEV IgG neg, EBV IgG pos, CMV IgG pos, HSV1/2 IgG pos, VZV IgG NEG; Measles/Mumps IgG neg, Rubella igG pos ([place] measles), Syphilis screen neg, Strongyliodes IgG neg. Quantiferon and toxoplasma igG neg.  8. Need for vaccinations.  Only received Td . Positive titers for HBV non-quantitative  will need in the post-transplant setting" Shingrix, 2 doses 2-6 months apart; Tdap (once in adulthood), COVID-19, Prevnar 20, HPV Gardisil 9 3 doses at 0-1-6 months  - Of note, non-immune to Measles or mumps,  nor to varicella. immune to [place] measles (Rubella) - post-transplant,  live vaccination would be contraindicated.  9. Leucocytosis - resolved  pretransplant, peak at 24- extensive ID workup negative      Recommendations:  -- Continue valcyte 450 mg daily - this is temporary treatment dose in s/o low level CMV viremia within 24 hours of transplant.   - repeat CMV PCR  down to 440- once negative should go back to prophylactic doses. ordered repeat for   -- HCV PCR positive on - converted- follow genotype and will needs NA treatment per protocol   -- will also need HIV NAAT/HBV DNA at 1 months post-transplant  -- Follow fungal, afb Cultures both NGTD, BAL Aspergillus galactomannan  -- rising BiT- follow MRCP   - continue fluconazole prophylaxis pe protocol: on 200 mg daily - this is a 400 mg dose adjusted to creatinine clearance- would recommend a lower dose of 100 mg daily for prophylaxis, which is 200 mg daily adjusted for crcl  -- continue bactrim for PCP prophylaxis         Thank you for involving us in the care of this patient  Transplant ID will continue to follow  Please call or page with additional questions  Pager; #[phone]  Teams: from 8 am to 5 pm  Samanta Soto MD

## 2023-06-14 NOTE — PROGRESS NOTE ADULT - SUBJECTIVE AND OBJECTIVE BOX
Transplant Surgery Multidisciplinary Note  --------------------------------------------------------------  HCV+ OLT    Date: 6/9/23       POD 5    Present:   Patient seen and examined with multidisciplinary Transplant team including Surgeon: Dr. Haile, NP Rodri, Hepatologist: Dr. Montes,  Transplant Pharmacist Jody, SICU team during AM rounds.   Disciplines not in attendance will be notified of the plan.     HPI: 38F with Hx of hypothyroidism (on Levothyroxine), liver cirrhosis 2/2 ETOH abuse---->  presented to [facility] ED on 5/13/23 with ~ 2 weeks Hx of progressively worsening abdominal pain and distention.  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  now s/p HCV+ OLT under Simulect induction on 6/10/2023    Interval Events:  - POD 5 s/p OLT.   Uptrending ALP but TBili downtrending  - US with patent vessels  - FK started      Immunosuppression:  Induction: simulect, last dose today  Maint:  FK by level, MMF 1/1, SST  ongoing monitoring for signs of rejection    Potential Discharge date: TBD  Education:  Medications  Plan of care:  See Below      MEDICATIONS  (STANDING):  aspirin enteric coated 81 milliGRAM(s) Oral daily  calcium carbonate 1250 mG  + Vitamin D (OsCal 500 + D) 1 Tablet(s) Oral two times a day  chlorhexidine 2% Cloths 1 Application(s) Topical <User Schedule>  fluconAZOLE   Tablet 200 milliGRAM(s) Oral daily  heparin   Injectable 5000 Unit(s) SubCutaneous every 12 hours  levothyroxine 100 MICROGram(s) Oral daily  methylPREDNISolone sodium succinate Injectable   IV Push   methylPREDNISolone sodium succinate Injectable 40 milliGRAM(s) IV Push every 24 hours  mycophenolate mofetil 1000 milliGRAM(s) Oral <User Schedule>  pantoprazole    Tablet 40 milliGRAM(s) Oral before breakfast  polyethylene glycol 3350 17 Gram(s) Oral daily  senna 2 Tablet(s) Oral at bedtime  sodium bicarbonate 1300 milliGRAM(s) Oral every 12 hours  tacrolimus 0.5 milliGRAM(s) Oral <User Schedule>  trimethoprim   80 mG/sulfamethoxazole 400 mG 1 Tablet(s) Oral <User Schedule>  ursodiol Capsule 300 milliGRAM(s) Oral every 12 hours  valGANciclovir 450 milliGRAM(s) Oral <User Schedule>    MEDICATIONS  (PRN):  oxyCODONE    IR 5 milliGRAM(s) Oral every 4 hours PRN Moderate Pain (4 - 6)  oxyCODONE    IR 10 milliGRAM(s) Oral every 6 hours PRN Severe Pain (7 - 10)      PAST MEDICAL & SURGICAL HISTORY:  Hypothyroidism      Alcoholic liver disease      No significant past surgical history          Vital Signs Last 24 Hrs  T(C): 36.2 (14 Jun 2023 15:00), Max: 36.8 (14 Jun 2023 03:00)  T(F): 97.2 (14 Jun 2023 15:00), Max: 98.2 (14 Jun 2023 03:00)  HR: 84 (14 Jun 2023 17:00) (52 - 96)  BP: 127/78 (14 Jun 2023 17:00) (110/80 - 148/79)  BP(mean): 98 (14 Jun 2023 17:00) (91 - 109)  RR: 19 (14 Jun 2023 17:00) (11 - 35)  SpO2: 100% (14 Jun 2023 17:00) (92% - 100%)    Parameters below as of 14 Jun 2023 14:45  Patient On (Oxygen Delivery Method): room air        I&O's Summary    13 Jun 2023 07:01  -  14 Jun 2023 07:00  --------------------------------------------------------  IN: 870 mL / OUT: 2670 mL / NET: -1800 mL    14 Jun 2023 07:01  -  14 Jun 2023 18:01  --------------------------------------------------------  IN: 770 mL / OUT: 1510 mL / NET: -740 mL                              9.2    9.04  )-----------( 104      ( 14 Jun 2023 06:08 )             26.2     06-14    133<L>  |  98  |  52<H>  ----------------------------<  141<H>  4.2   |  19<L>  |  2.11<H>    Ca    8.7      14 Jun 2023 06:08  Phos  3.0     06-14  Mg     1.9     06-14    TPro  4.5<L>  /  Alb  3.1<L>  /  TBili  7.5<H>  /  DBili  x   /  AST  42<H>  /  ALT  9<L>  /  AlkPhos  226<H>  06-14    Tacrolimus (), Serum: 1.2 ng/mL (06-14 @ 06:08)        Culture - Body Fluid with Gram Stain (collected 06-10-23 @ 05:42)  Source: Abdominal Fl Abdominal Fluid  Gram Stain (06-10-23 @ 16:55):    No polymorphonuclear cells seen per low power field    No organisms seen per oil power field  Preliminary Report (06-11-23 @ 09:30):    No growth    Culture - Body Fluid with Gram Stain (collected 06-08-23 @ 18:34)  Source: Ascites Fl Ascites Fluid  Gram Stain (06-08-23 @ 23:32):    No polymorphonuclear cells seen    No organisms seen    by cytocentrifuge  Final Report (06-13-23 @ 17:05):    No growth at 5 days      Review of systems  Gen: No weight changes, fatigue, fevers/chills, weakness  Skin: No rashes  Head/Eyes/Ears/Mouth: No headache; Normal hearing; Normal vision w/o blurriness; No sinus pain/discomfort, sore throat  Respiratory: No dyspnea, cough, wheezing, hemoptysis  CV: No chest pain, PND, orthopnea  GI: C/O mild abdominal pain at surgical site. no diarrhea, constipation, nausea, vomiting, melena, hematochezia  : No increased frequency, dysuria, hematuria, nocturia  MSK: No joint pain/swelling; no back pain; no edema  Neuro: No dizziness/lightheadedness, weakness, seizures, numbness, tingling  Heme: No easy bruising or bleeding  Endo: No heat/cold intolerance  Psych: No significant nervousness, anxiety, stress, depression  All other systems were reviewed and are negative, except as noted.      PHYSICAL EXAM:  Constitutional: NAD  Eyes: anicteric, PERRLA  ENMT: nc/at, no thrush  Neck: supple, central line c/d/i  Respiratory: CTA B/L  Cardiovascular: RRR  Gastrointestinal: Soft abdomen, ND, appropriate incisional TTP. chevron incision c/d/i, staples in place. No signs of infection. JPs x3 ss  Genitourinary: voiding  Extremities: SCD's in place and working bilaterally  Vascular: Palpable dp pulses bilaterally.   Neurological: A&O x3  Skin: no rashes, ulcerations, lesions  Musculoskeletal: Moving all extremities  Psychiatric: Responsive

## 2023-06-14 NOTE — PROGRESS NOTE ADULT - ATTENDING SUPERVISION STATEMENT
Fellow
Resident
Resident
Fellow
Resident
Fellow
Resident
Fellow

## 2023-06-14 NOTE — PROGRESS NOTE ADULT - SUBJECTIVE AND OBJECTIVE BOX
24 HOUR EVENTS:  - Advanced to regular diet  - Repeat duplex x3 with patent vasculature  - Plan for MRCP today  - DC [place]  - Overresponded for 1 unit PRBC from 7.4 --> 10.5 --> 9.3    SUBJECTIVE/ROS:  [ X ] A ten-point review of systems was otherwise negative except as noted.  [ ] Due to altered mental status/intubation, subjective information were not able to be obtained from the patient. History was obtained, to the extent possible, from review of the chart and collateral sources of information.      NEURO  Exam: AOx3. NAD. Follows commands. Moves all extremities. Strength and sensation intact.   Meds: HYDROmorphone  Injectable 0.25 milliGRAM(s) IV Push every 3 hours PRN Severe Breakthrough Pain  oxyCODONE    IR 5 milliGRAM(s) Oral every 4 hours PRN Moderate Pain (4 - 6)  oxyCODONE    IR 10 milliGRAM(s) Oral every 6 hours PRN Severe Pain (7 - 10)    [x] Adequacy of sedation and pain control has been assessed and adjusted      RESPIRATORY  RR: 23 (06-14-23 @ 05:00) (10 - 34)  SpO2: 96% (06-14-23 @ 05:00) (92% - 100%)  Wt(kg): --  Exam: CTA b/l. No murmurs, rubs, gallops appreciated.   Mechanical Ventilation:     [ ] Extubation Readiness Assessed  Meds:       CARDIOVASCULAR  HR: 59 (06-14-23 @ 05:00) (49 - 70)  BP: 135/89 (06-14-23 @ 05:00) (128/76 - 147/78)  BP(mean): 108 (06-14-23 @ 05:00) (96 - 110)  ABP: --  ABP(mean): --  Wt(kg): --  CVP(cm H2O): --  VBG - ( 13 Jun 2023 02:25 )  pH: 7.33  /  pCO2: 37    /  pO2: 38    / HCO3: 20    / Base Excess: -5.9  /  SaO2: 58.3   Lactate: 1.3                Exam: S1S2. No murmurs, rubs, gallops appreciated.  Cardiac Rhythm: NSR rate 69  Meds:       GI/NUTRITION  Exam: Soft, non-distended, non-tender.   Diet: Consistent carb  Meds: pantoprazole    Tablet 40 milliGRAM(s) Oral before breakfast  polyethylene glycol 3350 17 Gram(s) Oral daily  senna 2 Tablet(s) Oral at bedtime  ursodiol Capsule 300 milliGRAM(s) Oral every 12 hours      GENITOURINARY  I&O's Detail    06-12 @ 07:01  -  06-13 @ 07:00  --------------------------------------------------------  IN:    IV PiggyBack: 100 mL    IV PiggyBack: 125 mL    Lactated Ringers: 300 mL    Oral Fluid: 1280 mL  Total IN: 1805 mL    OUT:    Drain (mL): 185 mL    Drain (mL): 485 mL    Drain (mL): 430 mL    Indwelling Catheter - Urethral (mL): 820 mL  Total OUT: 1920 mL    Total NET: -115 mL      06-13 @ 07:01 - 06-14 @ 05:34  --------------------------------------------------------  IN:    IV PiggyBack: 150 mL    Oral Fluid: 480 mL  Total IN: 630 mL    OUT:    Drain (mL): 420 mL    Drain (mL): 190 mL    Drain (mL): 650 mL    Indwelling Catheter - Urethral (mL): 120 mL    Voided (mL): 500 mL  Total OUT: 1880 mL    Total NET: -1250 mL          06-13    131<L>  |  98  |  57<H>  ----------------------------<  145<H>  3.9   |  16<L>  |  2.41<H>    Ca    8.7      13 Jun 2023 13:53  Phos  2.9     06-13  Mg     2.0     06-13    TPro  4.4<L>  /  Alb  3.0<L>  /  TBili  9.8<H>  /  DBili  x   /  AST  40  /  ALT  6<L>  /  AlkPhos  201<H>  06-13    [ ] [place] catheter, indication: N/A  Meds: calcium carbonate 1250 mG  + Vitamin D (OsCal 500 + D) 1 Tablet(s) Oral two times a day  sodium bicarbonate 1300 milliGRAM(s) Oral every 12 hours        HEMATOLOGIC  Meds: aspirin enteric coated 81 milliGRAM(s) Oral daily    [x] VTE Prophylaxis                        9.3    10.87 )-----------( 85       ( 13 Jun 2023 22:45 )             26.9     PT/INR - ( 13 Jun 2023 22:45 )   PT: 12.3 sec;   INR: 1.06 ratio         PTT - ( 13 Jun 2023 22:45 )  PTT:25.2 sec  Transfusion     [ ] PRBC   [ ] Platelets   [ ] FFP   [ ] Cryoprecipitate      INFECTIOUS DISEASES  T(C): 36.7 (06-13-23 @ 23:00), Max: 36.7 (06-13-23 @ 19:00)  Wt(kg): --  WBC Count: 10.87 K/uL (06-13 @ 22:45)  WBC Count: 11.69 K/uL (06-13 @ 13:53)    Recent Cultures:  Specimen Source: Abdominal Fl Abdominal Fluid, 06-10 @ 05:42; Results   No growth; Gram Stain:   No polymorphonuclear cells seen per low power field  No organisms seen per oil power field; Organism: --  Specimen Source: Ascites Fl Ascites Fluid, 06-08 @ 18:34; Results   No growth at 5 days; Gram Stain:   No polymorphonuclear cells seen  No organisms seen  by cytocentrifuge; Organism: --  Specimen Source: .Blood Blood-Peripheral, 06-07 @ 12:35; Results   No Growth Final; Gram Stain: --; Organism: --    Meds: fluconAZOLE   Tablet 200 milliGRAM(s) Oral daily  mycophenolate mofetil 1000 milliGRAM(s) Oral <User Schedule>  tacrolimus 0.5 milliGRAM(s) Oral <User Schedule>  trimethoprim   80 mG/sulfamethoxazole 400 mG 1 Tablet(s) Oral <User Schedule>  valGANciclovir 450 milliGRAM(s) Oral <User Schedule>        ENDOCRINE  Capillary Blood Glucose    Meds: levothyroxine 100 MICROGram(s) Oral daily  methylPREDNISolone sodium succinate Injectable   IV Push   methylPREDNISolone sodium succinate Injectable 40 milliGRAM(s) IV Push every 24 hours        ACCESS DEVICES:  [ X ] Peripheral IV  [ X ] Central Venous Line	[ X ] R	[ ] L	[ X ] IJ	[ ] Fem	[ ] SC	Placed: 6/9  [ ] Arterial Line		[ ] R	[ ] L	[ ] Fem	[ ] Rad	[ ] Ax	Placed:   [ ] PICC:					[ ] Mediport  [ ] Urinary Catheter, Date Placed:   [ ] Necessity of urinary, arterial, and venous catheters discussed    OTHER MEDICATIONS:  chlorhexidine 2% Cloths 1 Application(s) Topical <User Schedule>      CODE STATUS: Full    IMAGING: 24 HOUR EVENTS:  - Advanced to regular diet  - Repeat duplex x3 with patent vasculature  - Plan for MRCP today  - DC [place]  - Overresponded for 1 unit PRBC from 7.4 --> 10.5 --> 9.3    SUBJECTIVE/ROS:  [ X ] A ten-point review of systems was otherwise negative except as noted.  [ ] Due to altered mental status/intubation, subjective information were not able to be obtained from the patient. History was obtained, to the extent possible, from review of the chart and collateral sources of information.      NEURO  Exam: AOx3. NAD. Follows commands. Moves all extremities. Strength and sensation intact.   Meds: HYDROmorphone  Injectable 0.25 milliGRAM(s) IV Push every 3 hours PRN Severe Breakthrough Pain  oxyCODONE    IR 5 milliGRAM(s) Oral every 4 hours PRN Moderate Pain (4 - 6)  oxyCODONE    IR 10 milliGRAM(s) Oral every 6 hours PRN Severe Pain (7 - 10)    [x] Adequacy of sedation and pain control has been assessed and adjusted      RESPIRATORY  RR: 23 (06-14-23 @ 05:00) (10 - 34)  SpO2: 96% (06-14-23 @ 05:00) (92% - 100%)  Exam: CTA b/l. No murmurs, rubs, gallops appreciated.   Mechanical Ventilation: N/A    [ ] Extubation Readiness Assessed  Meds:       CARDIOVASCULAR  HR: 59 (06-14-23 @ 05:00) (49 - 70)  BP: 135/89 (06-14-23 @ 05:00) (128/76 - 147/78)  BP(mean): 108 (06-14-23 @ 05:00) (96 - 110)  VBG - ( 13 Jun 2023 02:25 )  pH: 7.33  /  pCO2: 37    /  pO2: 38    / HCO3: 20    / Base Excess: -5.9  /  SaO2: 58.3   Lactate: 1.3        Exam: S1S2. No murmurs, rubs, gallops appreciated.  Cardiac Rhythm: NSR rate 69  Meds:       GI/NUTRITION  Exam: Soft, non-distended, non-tender. + incision clean, dry, intact.   diet: consistent carb  Meds: pantoprazole    Tablet 40 milliGRAM(s) Oral before breakfast  polyethylene glycol 3350 17 Gram(s) Oral daily  senna 2 Tablet(s) Oral at bedtime  ursodiol Capsule 300 milliGRAM(s) Oral every 12 hours      GENITOURINARY  I&O's Detail    06-12 @ 07:01 - 06-13 @ 07:00  --------------------------------------------------------  IN:    IV PiggyBack: 100 mL    IV PiggyBack: 125 mL    Lactated Ringers: 300 mL    Oral Fluid: 1280 mL  Total IN: 1805 mL    OUT:    Drain (mL): 185 mL    Drain (mL): 485 mL    Drain (mL): 430 mL    Indwelling Catheter - Urethral (mL): 820 mL  Total OUT: 1920 mL    Total NET: -115 mL      06-13 @ 07:01 - 06-14 @ 05:34  --------------------------------------------------------  IN:    IV PiggyBack: 150 mL    Oral Fluid: 480 mL  Total IN: 630 mL    OUT:    Drain (mL): 420 mL    Drain (mL): 190 mL    Drain (mL): 650 mL    Indwelling Catheter - Urethral (mL): 120 mL    Voided (mL): 500 mL  Total OUT: 1880 mL    Total NET: -1250 mL        06-13    131<L>  |  98  |  57<H>  ----------------------------<  145<H>  3.9   |  16<L>  |  2.41<H>    Ca    8.7      13 Jun 2023 13:53  Phos  2.9     06-13  Mg     2.0     06-13    TPro  4.4<L>  /  Alb  3.0<L>  /  TBili  9.8<H>  /  DBili  x   /  AST  40  /  ALT  6<L>  /  AlkPhos  201<H>  06-13    {[] [place] catheter, indication: N/A  Meds: calcium carbonate 1250 mG  + Vitamin D (OsCal 500 + D) 1 Tablet(s) Oral two times a day  sodium bicarbonate 1300 milliGRAM(s) Oral every 12 hours        HEMATOLOGIC  Meds: aspirin enteric coated 81 milliGRAM(s) Oral daily    [x] VTE Prophylaxis                        9.3    10.87 )-----------( 85       ( 13 Jun 2023 22:45 )             26.9     PT/INR - ( 13 Jun 2023 22:45 )   PT: 12.3 sec;   INR: 1.06 ratio         PTT - ( 13 Jun 2023 22:45 )  PTT:25.2 sec  Transfusion     [ ] PRBC   [ ] Platelets   [ ] FFP   [ ] Cryoprecipitate      INFECTIOUS DISEASES  T(C): 36.7 (06-13-23 @ 23:00), Max: 36.7 (06-13-23 @ 19:00)  Wt(kg): --  WBC Count: 10.87 K/uL (06-13 @ 22:45)  WBC Count: 11.69 K/uL (06-13 @ 13:53)    Recent Cultures:  Specimen Source: Abdominal Fl Abdominal Fluid, 06-10 @ 05:42; Results   No growth; Gram Stain:   No polymorphonuclear cells seen per low power field  No organisms seen per oil power field; Organism: --  Specimen Source: Ascites Fl Ascites Fluid, 06-08 @ 18:34; Results   No growth at 5 days; Gram Stain:   No polymorphonuclear cells seen  No organisms seen  by cytocentrifuge; Organism: --  Specimen Source: .Blood Blood-Peripheral, 06-07 @ 12:35; Results   No Growth Final; Gram Stain: --; Organism: --    Meds: fluconAZOLE   Tablet 200 milliGRAM(s) Oral daily  mycophenolate mofetil 1000 milliGRAM(s) Oral <User Schedule>  tacrolimus 0.5 milliGRAM(s) Oral <User Schedule>  trimethoprim   80 mG/sulfamethoxazole 400 mG 1 Tablet(s) Oral <User Schedule>  valGANciclovir 450 milliGRAM(s) Oral <User Schedule>        ENDOCRINE  Capillary Blood Glucose    Meds: levothyroxine 100 MICROGram(s) Oral daily  methylPREDNISolone sodium succinate Injectable   IV Push   methylPREDNISolone sodium succinate Injectable 40 milliGRAM(s) IV Push every 24 hours        ACCESS DEVICES:  [ X ] Peripheral IV  [ X ] Central Venous Line	[ X ] R	[ ] L	[ X ] IJ	[ ] Fem	[ ] SC	Placed: 6/9  [ ] Arterial Line		[ ] R	[ ] L	[ ] Fem	[ ] Rad	[ ] Ax	Placed:   [ ] PICC:					[ ] Mediport  [ ] Urinary Catheter, Date Placed:   [ ] Necessity of urinary, arterial, and venous catheters discussed    OTHER MEDICATIONS:  chlorhexidine 2% Cloths 1 Application(s) Topical <User Schedule>      CODE STATUS: Full    IMAGING: Mauc Instructions: By selecting yes to the question below the MAUC number will be added into the note.  This will be calculated automatically based on the diagnosis chosen, the size entered, the body zone selected (H,M,L) and the specific indications you chose. You will also have the option to override the Mohs AUC if you disagree with the automatically calculated number and this option is found in the Case Summary tab.

## 2023-06-14 NOTE — PROGRESS NOTE ADULT - ASSESSMENT
38F with Hx of hypothyroidism (on Levothyroxine), liver cirrhosis 2/2 ETOH abuse---->  presented to [facility] ED on 5/13/23 with ~ 2 weeks Hx of progressively worsening abdominal pain and distention.  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  now s/p HCV+ OLT under Simulect induction on 6/10/2023    [] s/p HCV+ OLT    - TBili down today. US w/ good perfusion, patent vessels  - ALP uptrending.  Jacinto 300mg bid started 6/13  - HOld on MRCP for now  - ASA 81mg po qd  - Start ppx SQH BID  - HCV + donor.  HCV PCR +, pending genotype   - VIVIANA pre-op. Creatinine downtrending today.  Good UOP.  Will continue to trend.  Transplant Nephrology following.  - Strict I&Os ([place]/JPx3)  - MSSA bacteremia (5/17) - Ancef DCd 6/11. Transplant ID following  - Reg diet  - SCDs/spirometry as able  - OOBTC  - bowel regimen    Immunosuppression  - FK 0.5mg bid, MMF 1/1, -SST  - Simulect completed  - CMV viremia pre-op.  Valcyte to TIW for renal dosing.   - PPX: Bactrim to TIW for renal dosing. Fluconazole QD    OK to transfer to 6M when bed available

## 2023-06-14 NOTE — PROGRESS NOTE ADULT - ASSESSMENT
38F with Hx of hypothyroidism (on Levothyroxine), liver cirrhosis 2/2 ETOH abuse---->  presented to [facility] ED on 5/13/23 with ~ 2 weeks Hx of progressively worsening abdominal pain and distention.  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  now s/p HCV+ OLT under Simulect induction on 6/10/2023.    NEURO:  - A&Ox4  - Pain: Oxycodone PRN with Dilaudid for breakthrough pain    RESPIRATORY:  - Comfortable on RA  - POCUS shows pleural effusions, IVC small but no respiratory variation.     CARDIOVASCULAR:  - Normal recent TTE  - No cardiac hx  - No cardiac meds  - HDS, arterial line removed  - Eric DC'd    GI:  - TBilli elevated, repeat doppler x3 okay  - Advanced to regular diet  - Plan for MRCP today  - Bowel regimen with senna, Miralax   - Continue PPI  - Marce Froylan incision  - MONSE x3, serosanguinous  - Start Ursodiol   - Immunosuppression with cellcept, tacro    RENAL:  - ELISE Bundy 6/13  - Monitor UOP  - Nephro on board for preop VIVIANA of unknown origin    HEME:  - DVT PPx held  - Started on ASA 81mg daily   - Trend H/H, over responded for 1 unit PRBC from 7.4 --> 10.5 --> 9.3    ID:  - s/p Zosyn for perioperative abx, ended 6/12    - Diflucan/ Valcyte/ Bactrim for post-transplant ppx  - ID on board  - BCx 6/7, 6/6, 6/2, 5/28, 5/26, 5/24, 5/23, 5/18, 5/17, 5/16 no growth final   - UCx 5/28 normal urogenital cameron  - UCx 5/22 VRE  - Body fluid Cx 5/23, 5/17, 5/13 no growth  - GI PCR 5/17 negative  - MRSA 5/14 negative  - Legionella 5/13 negative   - UCx 5/13 E. COli  - BCx 5/13 Staph aureus      ENDO:  - POCT glucose  - Mod ISS  - Steroid taper for immunosuppression  - Continue home Synthroid for hypothyroid    DISPO: SICU    Lines: PIV, RIJ introducer  Labs: Q6   38F with Hx of hypothyroidism (on Levothyroxine), liver cirrhosis 2/2 ETOH abuse---->  presented to [facility] ED on 5/13/23 with ~ 2 weeks Hx of progressively worsening abdominal pain and distention.  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  now s/p HCV+ OLT under Simulect induction on 6/10/2023.    NEURO:  - A&Ox4  - Pain: Continue Oxycodone, discontinue Dilaudid     RESPIRATORY:  - Comfortable on RA      CARDIOVASCULAR:  - Normal recent TTE  - Hemodynamically stable     GI:  - TBilli elevated, repeat doppler x3 okay  - Continue regular diet  - No plan for MRCP, bilirubin now downtrending  - Bowel regimen with senna, Miralax   - Continue PPI  - Marce Froylan incision  - MONSE x3, serosanguinous  - Start Ursodiol   - Immunosuppression with cellcept, tacro    RENAL:  - voiding  - Monitor UOP  - Nephro on board for preop VIVIANA of unknown origin    HEME:  - Start SQH BID  - Started on ASA 81mg daily   - Trend H/H, over responded for 1 unit PRBC from 7.4 --> 10.5 --> 9.3    ID:  - s/p Zosyn for perioperative abx, ended 6/12    - Diflucan/ Valcyte/ Bactrim for post-transplant ppx  - ID on board  - BCx 6/7, 6/6, 6/2, 5/28, 5/26, 5/24, 5/23, 5/18, 5/17, 5/16 no growth final   - UCx 5/28 normal urogenital cameron  - UCx 5/22 VRE  - Body fluid Cx 5/23, 5/17, 5/13 no growth  - GI PCR 5/17 negative  - MRSA 5/14 negative  - Legionella 5/13 negative   - UCx 5/13 E. COli  - BCx 5/13 Staph aureus      ENDO:  - POCT glucose  - Mod ISS  - Steroid taper for immunosuppression  - Continue home Synthroid for hypothyroid    DISPO: Transfer to floor     Lines: PIV, Discontinue RIJ introducer  Labs: Q6

## 2023-06-14 NOTE — STUDENT SIGN OFF DOCUMENT - DOCUMENTS STUDENTS ARE SIGNED OFF ON
Acute. Drug-induced. Now resolved.  Was under PEC/CEC  - psych consult: dc PEC/CEC and dc psych meds  - monitor mental status   Physical Therapy Evaluation Plan of Care/Assessment and Intervention

## 2023-06-14 NOTE — PROGRESS NOTE ADULT - NS ATTEND AMEND GEN_ALL_CORE FT
seen by SW for concerning comments re: EtOH  immuno: tac/cellcept/pred cholestatic labs improved  otherwise good graft function  needs PT  seen by SW for concerning comments re: EtOH  immuno: tac/cellcept/pred

## 2023-06-15 LAB
ALBUMIN SERPL ELPH-MCNC: 2.9 G/DL — LOW (ref 3.3–5)
ALP SERPL-CCNC: 223 U/L — HIGH (ref 40–120)
ALT FLD-CCNC: 9 U/L — LOW (ref 10–45)
ANION GAP SERPL CALC-SCNC: 16 MMOL/L — SIGNIFICANT CHANGE UP (ref 5–17)
APTT BLD: 24.1 SEC — LOW (ref 27.5–35.5)
AST SERPL-CCNC: 36 U/L — SIGNIFICANT CHANGE UP (ref 10–40)
BILIRUB SERPL-MCNC: 6.6 MG/DL — HIGH (ref 0.2–1.2)
BUN SERPL-MCNC: 47 MG/DL — HIGH (ref 7–23)
CALCIUM SERPL-MCNC: 8.8 MG/DL — SIGNIFICANT CHANGE UP (ref 8.4–10.5)
CHLORIDE SERPL-SCNC: 95 MMOL/L — LOW (ref 96–108)
CO2 SERPL-SCNC: 23 MMOL/L — SIGNIFICANT CHANGE UP (ref 22–31)
CREAT SERPL-MCNC: 1.84 MG/DL — HIGH (ref 0.5–1.3)
CULTURE RESULTS: SIGNIFICANT CHANGE UP
EGFR: 35 ML/MIN/1.73M2 — LOW
GLUCOSE SERPL-MCNC: 104 MG/DL — HIGH (ref 70–99)
HCT VFR BLD CALC: 29.7 % — LOW (ref 34.5–45)
HGB BLD-MCNC: 10.5 G/DL — LOW (ref 11.5–15.5)
INR BLD: 0.89 RATIO — SIGNIFICANT CHANGE UP (ref 0.88–1.16)
MAGNESIUM SERPL-MCNC: 1.9 MG/DL — SIGNIFICANT CHANGE UP (ref 1.6–2.6)
MCHC RBC-ENTMCNC: 30.2 PG — SIGNIFICANT CHANGE UP (ref 27–34)
MCHC RBC-ENTMCNC: 35.4 GM/DL — SIGNIFICANT CHANGE UP (ref 32–36)
MCV RBC AUTO: 85.3 FL — SIGNIFICANT CHANGE UP (ref 80–100)
NRBC # BLD: 0 /100 WBCS — SIGNIFICANT CHANGE UP (ref 0–0)
PHOSPHATE SERPL-MCNC: 2.5 MG/DL — SIGNIFICANT CHANGE UP (ref 2.5–4.5)
PLATELET # BLD AUTO: 134 K/UL — LOW (ref 150–400)
POTASSIUM SERPL-MCNC: 2.8 MMOL/L — CRITICAL LOW (ref 3.5–5.3)
POTASSIUM SERPL-SCNC: 2.8 MMOL/L — CRITICAL LOW (ref 3.5–5.3)
PROT SERPL-MCNC: 4.4 G/DL — LOW (ref 6–8.3)
PROTHROM AB SERPL-ACNC: 10.3 SEC — LOW (ref 10.5–13.4)
RBC # BLD: 3.48 M/UL — LOW (ref 3.8–5.2)
RBC # FLD: 18.8 % — HIGH (ref 10.3–14.5)
SODIUM SERPL-SCNC: 134 MMOL/L — LOW (ref 135–145)
SPECIMEN SOURCE: SIGNIFICANT CHANGE UP
TACROLIMUS SERPL-MCNC: 3 NG/ML — SIGNIFICANT CHANGE UP
VIRUS SPEC CULT: SIGNIFICANT CHANGE UP
WBC # BLD: 11.77 K/UL — HIGH (ref 3.8–10.5)
WBC # FLD AUTO: 11.77 K/UL — HIGH (ref 3.8–10.5)

## 2023-06-15 PROCEDURE — 99252 IP/OBS CONSLTJ NEW/EST SF 35: CPT

## 2023-06-15 PROCEDURE — 99233 SBSQ HOSP IP/OBS HIGH 50: CPT

## 2023-06-15 PROCEDURE — 99232 SBSQ HOSP IP/OBS MODERATE 35: CPT | Mod: GC,24

## 2023-06-15 RX ORDER — LIDOCAINE HCL 20 MG/ML
5 VIAL (ML) INJECTION ONCE
Refills: 0 | Status: COMPLETED | OUTPATIENT
Start: 2023-06-15 | End: 2023-06-15

## 2023-06-15 RX ORDER — POTASSIUM CHLORIDE 20 MEQ
40 PACKET (EA) ORAL ONCE
Refills: 0 | Status: COMPLETED | OUTPATIENT
Start: 2023-06-15 | End: 2023-06-15

## 2023-06-15 RX ORDER — TACROLIMUS 5 MG/1
1 CAPSULE ORAL
Refills: 0 | Status: DISCONTINUED | OUTPATIENT
Start: 2023-06-15 | End: 2023-06-17

## 2023-06-15 RX ORDER — TACROLIMUS 5 MG/1
0.5 CAPSULE ORAL ONCE
Refills: 0 | Status: COMPLETED | OUTPATIENT
Start: 2023-06-15 | End: 2023-06-15

## 2023-06-15 RX ORDER — FUROSEMIDE 40 MG
20 TABLET ORAL ONCE
Refills: 0 | Status: COMPLETED | OUTPATIENT
Start: 2023-06-15 | End: 2023-06-15

## 2023-06-15 RX ADMIN — TACROLIMUS 1 MILLIGRAM(S): 5 CAPSULE ORAL at 20:20

## 2023-06-15 RX ADMIN — FLUCONAZOLE 200 MILLIGRAM(S): 150 TABLET ORAL at 12:27

## 2023-06-15 RX ADMIN — HEPARIN SODIUM 5000 UNIT(S): 5000 INJECTION INTRAVENOUS; SUBCUTANEOUS at 17:09

## 2023-06-15 RX ADMIN — OXYCODONE HYDROCHLORIDE 10 MILLIGRAM(S): 5 TABLET ORAL at 11:59

## 2023-06-15 RX ADMIN — TACROLIMUS 0.5 MILLIGRAM(S): 5 CAPSULE ORAL at 13:39

## 2023-06-15 RX ADMIN — TACROLIMUS 0.5 MILLIGRAM(S): 5 CAPSULE ORAL at 07:48

## 2023-06-15 RX ADMIN — OXYCODONE HYDROCHLORIDE 10 MILLIGRAM(S): 5 TABLET ORAL at 17:16

## 2023-06-15 RX ADMIN — Medication 100 MICROGRAM(S): at 06:26

## 2023-06-15 RX ADMIN — URSODIOL 300 MILLIGRAM(S): 250 TABLET, FILM COATED ORAL at 06:27

## 2023-06-15 RX ADMIN — CHLORHEXIDINE GLUCONATE 1 APPLICATION(S): 213 SOLUTION TOPICAL at 06:28

## 2023-06-15 RX ADMIN — MYCOPHENOLATE MOFETIL 1000 MILLIGRAM(S): 250 CAPSULE ORAL at 20:14

## 2023-06-15 RX ADMIN — Medication 81 MILLIGRAM(S): at 12:27

## 2023-06-15 RX ADMIN — Medication 40 MILLIEQUIVALENT(S): at 08:37

## 2023-06-15 RX ADMIN — OXYCODONE HYDROCHLORIDE 10 MILLIGRAM(S): 5 TABLET ORAL at 18:00

## 2023-06-15 RX ADMIN — Medication 20 MILLIGRAM(S): at 12:27

## 2023-06-15 RX ADMIN — OXYCODONE HYDROCHLORIDE 10 MILLIGRAM(S): 5 TABLET ORAL at 04:51

## 2023-06-15 RX ADMIN — Medication 20 MILLIGRAM(S): at 20:20

## 2023-06-15 RX ADMIN — PANTOPRAZOLE SODIUM 40 MILLIGRAM(S): 20 TABLET, DELAYED RELEASE ORAL at 06:24

## 2023-06-15 RX ADMIN — Medication 1 TABLET(S): at 17:10

## 2023-06-15 RX ADMIN — MYCOPHENOLATE MOFETIL 1000 MILLIGRAM(S): 250 CAPSULE ORAL at 07:46

## 2023-06-15 RX ADMIN — HEPARIN SODIUM 5000 UNIT(S): 5000 INJECTION INTRAVENOUS; SUBCUTANEOUS at 06:28

## 2023-06-15 RX ADMIN — OXYCODONE HYDROCHLORIDE 10 MILLIGRAM(S): 5 TABLET ORAL at 05:25

## 2023-06-15 RX ADMIN — Medication 1 TABLET(S): at 06:24

## 2023-06-15 RX ADMIN — URSODIOL 300 MILLIGRAM(S): 250 TABLET, FILM COATED ORAL at 17:10

## 2023-06-15 RX ADMIN — OXYCODONE HYDROCHLORIDE 10 MILLIGRAM(S): 5 TABLET ORAL at 12:35

## 2023-06-15 RX ADMIN — Medication 1300 MILLIGRAM(S): at 06:25

## 2023-06-15 NOTE — PROGRESS NOTE ADULT - ASSESSMENT
38F with Hx of hypothyroidism (on Levothyroxine), liver cirrhosis 2/2 ETOH abuse---->  presented to [facility] ED on 5/13/23 with ~ 2 weeks Hx of progressively worsening abdominal pain and distention.  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  now s/p HCV+ OLT under Simulect induction on 6/10/2023    [] s/p HCV+ OLT    - TBili down today. US w/ good perfusion, patent vessels  - ALP uptrending.  Jacinto 300mg bid started 6/13  - HOld on MRCP for now  - ASA 81mg po qd  - Start ppx SQH BID  - HCV + donor.  HCV PCR +, pending genotype   - VIVIANA pre-op. Creatinine downtrending today.  Good UOP.  Will continue to trend.  Transplant Nephrology following.  - Strict I&Os ([place]/JPx3)  - MSSA bacteremia (5/17) - Ancef DCd 6/11. Transplant ID following  - Reg diet  - SCDs/spirometry as able  - OOBTC  - bowel regimen    Immunosuppression  - FK 0.5mg bid, MMF 1/1, -SST  - Simulect completed  - CMV viremia pre-op.  Valcyte to TIW for renal dosing.   - PPX: Bactrim to TIW for renal dosing. Fluconazole QD    OK to transfer to 6M when bed available 38F with Hx of hypothyroidism (on Levothyroxine), liver cirrhosis 2/2 ETOH abuse---->  presented to [facility] ED on 5/13/23 with ~ 2 weeks Hx of progressively worsening abdominal pain and distention.  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  now s/p HCV+ OLT under Simulect induction on 6/10/2023    [] s/p HCV+ OLT    - TBili down today. US w/ good perfusion, patent vessels  - ALP improving on Jacinto (6/13--), MRCP deferred for now  - ASA 81mg po qd  - SQH BID  - HCV + donor.  HCV PCR +, pending genotype   - VIVIANA pre-op, improving renal function daily w/ good UOP;  Transplant Nephrology following.  - Strict I&Os ([place]/JPx3)  - MSSA bacteremia (5/17) - Ancef DCd 6/11. Transplant ID following  - Reg diet  - SCDs/spirometry as able  - OOBTC  - bowel regimen    Immunosuppression  - FK by level, MMF 1/1, SST  - Simulect completed  - CMV viremia pre-op.  Valcyte to TIW for renal dosing.   - PPX: Bactrim to TIW for renal dosing. Fluconazole QD   38F with Hx of hypothyroidism (on Levothyroxine), liver cirrhosis 2/2 ETOH abuse---->  presented to [facility] ED on 5/13/23 with ~ 2 weeks Hx of progressively worsening abdominal pain and distention.  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  now s/p HCV+ OLT under Simulect induction on 6/10/2023    [] s/p HCV+ OLT    - TBili downtrending. US w/ good perfusion, patent vessels  - ALP improving on Jacinto (6/13--), MRCP deferred for now  - ASA 81mg po qd  - SQH BID  - HCV + donor.  HCV PCR +, pending genotype   - VIVIANA pre-op, improving renal function daily w/ good UOP;  Transplant Nephrology following.  - Strict I&Os ([place]/JPx3)  - MSSA bacteremia (5/17) - Ancef DCd 6/11. Transplant ID following  - Reg diet  - SCDs/spirometry as able  - OOBTC  - bowel regimen  - DC MONSE #3  - Lasix 20mg IV x1    Immunosuppression  - FK by level, MMF 1/1, SST  - Simulect completed  - CMV viremia pre-op.  Valcyte to TIW for renal dosing.   - PPX: Bactrim to TIW for renal dosing. Fluconazole QD

## 2023-06-15 NOTE — CONSULT NOTE ADULT - CONSULT REASON
liver transplant eval
mssa
ICU
paracentesis
Pap Smear prior to Liver Transplant
Paracentesis
Pretransplant cardiac evaluation prior to possible liver transplant
paracentesis
Sacral/bilateral buttocks skin damage
VIVIANA
chronic cough with abnormal CT chest
paracentesis

## 2023-06-15 NOTE — PROGRESS NOTE ADULT - SUBJECTIVE AND OBJECTIVE BOX
transferred to the floor  afebrile, BiT improving so MRCP deferred  ____________________________________________________  ROS  GENERAL: denies chills, , night sweats, weight loss.   PSYCH: denies depression, anxiety, suicidal ideation, hallucination, and delusions  SKIN: no rash or lesions; no color changes, no abnormal nevi,no  dryness, and nojaundice    EYES: denies visual changes, floaters, pain, inflammation, blurred vision, and discharge  ENT: denies tinnitus, vertigo, epistaxis, oral lesion, and decreased acuity  PULM: denies, hemoptysis, pleurisy  CVS: denies angina, palpitations,+ orthopnea, no syncope, or heart murmur  GI: denies constipation, diarrhea, melena, abdominal pain, nausea.   : denies dysuria, frequency, discharge, incontinence, stones or macroscopic hematuria  MS: no arthralgias, no erythema or swelling, no myalgias, noedema, or lower back pain.   CNS: denies numbness, dizziness, seizure, or tremor  ENDO: denies heat/cold intolerance, polyuria, polydipsia, malaise.    HEME: denies bruising, bleeding, lymphadenopathy, anemia, and calf pain    Allergies  No Known Allergies    __________________________________________________  MEDS:  MEDICATIONS  (STANDING):  aspirin enteric coated 81 daily  heparin   Injectable 5000 every 12 hours  levothyroxine 100 daily  mycophenolate mofetil 1000 <User Schedule>  oxyCODONE    IR 5 every 4 hours PRN  oxyCODONE    IR 10 every 4 hours PRN  pantoprazole    Tablet 40 before breakfast  polyethylene glycol 3350 17 daily  predniSONE   Tablet 20 daily  senna 2 at bedtime  tacrolimus 1 <User Schedule>  ursodiol Capsule 300 every 12 hours    _________________________________________________  ANTIMICOBIALS  fluconAZOLE   Tablet 200 daily  mycophenolate mofetil 1000 <User Schedule>  tacrolimus 1 <User Schedule>  trimethoprim   80 mG/sulfamethoxazole 400 mG 1 <User Schedule>  valGANciclovir 450 <User Schedule>      GENERAL LABS              9.4                  132  | 23   | 44           11.73 >-----------< 146     ------------------------< 145                   27.7                 3.9  | 94   | 1.71                                         Ca 9.0   Mg 1.7   Ph 3.0            _________________________________________________  MICROBIOLOGY  -----------          CMVPCR Lo.64 [id number]/mL ( @ 06:08)          Fungitell:   _______________________________________________  PERTINENT IMAGING  _________________________________________________  Physical Exam:   T(C): 36.4 (23 @ 09:05), Max: 36.6 (06-15-23 @ 14:22)  HR: 57 (23 @ 09:05) (57 - 82)  BP: 153/90 (23 @ 09:05) (121/70 - 153/90)  RR: 20 (23 @ 09:05) (18 - 20)  SpO2: 100% (23 @ 09:05) (94% - 100%)    06-15-23 @ 07:01  -  23 @ 07:00  --------------------------------------------------------  IN: 1590 mL / OUT: 1035 mL / NET: 555 mL    23 @ 07:01  -  23 @ 10:44  --------------------------------------------------------  IN: 340 mL / OUT: 270 mL / NET: 70 mL    24 hours:    0615 @ 07: @ 07:00  --------------------------------------------------------  OUT:    Drain (mL): 45 mL    Drain (mL): 275 mL    Drain (mL): 165 mL    Voided (mL): 550 mL  Total OUT: 1035 mL        · Constitutional	well-groomed; no distress  · Eyes	PERRL; EOMI; conjunctiva clear  · ENMT	no gross abnormalities  · Respiratory	clear to auscultation bilaterally; no wheezes; no rales; no rhonchi; no respiratory distress  · Cardiovascular	regular rate and rhythm; S1 S2 present; no gallops; no rub; no murmur; no JVD; normal PMI; no pedal edema  · Cardiovascular Comments	no S3/s4  · Gastrointestinal	less distended, scar c/d/i 2 MONSE drains c/d/i  · Neurological	cranial nerves II-XII intact; sensation intact; responds to pain; responds to verbal commands; deep reflexes intact; no meningismus  · Mental Status	Alert and oriented x3, appropriate  · Skin	warm and dry; color normal; no rashes; no ulcers; no subcutaneous nodules; no induration

## 2023-06-15 NOTE — CONSULT NOTE ADULT - REASON FOR ADMISSION
Liver Failure

## 2023-06-15 NOTE — CONSULT NOTE ADULT - CONSULT REQUESTED DATE/TIME
16-May-2023 14:43
18-May-2023 09:00
19-May-2023 17:12
03-Jun-2023 15:12
08-Jun-2023 15:11
10-Sukhjinder-2023 04:00
24-May-2023 13:16
29-May-2023 13:59
16-May-2023
16-May-2023 15:58
08-Jun-2023 09:20
15-Sukhjinder-2023 12:19

## 2023-06-15 NOTE — CONSULT NOTE ADULT - SUBJECTIVE AND OBJECTIVE BOX
Wound Surgery Consult Note:    HPI:  This is a 39 yo Polish Female with Hx of hypothyroidism (on Levothyroxine), and no other known medical Hx, presented to [facility] ED on 5/13/23 with ~ 2 weeks Hx of progressively worsening abdominal pain ("stabbing, diffuse, non radiating") and distention,  as well as 1 day Hx of jaundice, and was admitted to medicine with liver failure (possibly acute on chronic), with MELD-Na 29 on admission, hyperbilirubinemia (Se bi 18.9, direct 15.9), coagulopathy (INR 2.15), mild liver enzyme elevation in AST>>ALT pattern (, ALT 14, ), severe hypoalbuminemia (alb 1.3), as well as leukocytosis (WBC 16k), anemia (Hb 8.0).     She was empirically started on NAC per protocol, hepatology evaluated. ID evaluated for MAY Pna and later MSSA bacteremia, has been on IV ceftriaxone and azithromycin. Ascitic fluid analysis (Dx paracentesis 5/13), was portal hypertensive (SAAG 1.1, total protein 1.0), and was not suggestive of SBP (PMN < 50). However, she continued to c/o abdominal pain, requiring morphine, getting every 8 hours. The patient is transferred to [facility] for Liver Transplant eval. (16 May 2023 12:14)    Request for wound care consult for sacral/bilateral buttocks skin breakdown received from nursing. Ms. Miller was encountered on an alternating air with low air loss surface.  She complained of buttocks feeling abraded and sticking to bed linens. She also stated that she has recently been incontinent of urine and not very mobile or active. She stated that when she gets her buttocks/perineum cleaned, it it painful and feels like there is a lot of harsh rubbing ointment off. Therefore, ointments avoided and allevyn placed for skin protection.    Principles of pressure injury prevention and treatment including but not limited to offloading and turning and repositioning review with patient.     PAST MEDICAL & SURGICAL HISTORY:  Hypothyroidism  Alcoholic liver disease  No significant past surgical history    REVIEW OF SYSTEMS:    Constitutional:     [x ] negative [ ] fevers [ ] chills [ ] weight loss [ ] weight gain  [ ] fatigue  HEENT:                  [x ] negative [ ] dry eyes [ ] eye irritation [ ] postnasal drip [ ] nasal congestion   CV:                         [x ] negative  [ ] chest pain [ ] orthopnea [ ] palpitations [ ] tachycardia  Resp:                     [ x] negative [ ] cough [ ] shortness of breath [ ] dyspnea [ ] wheezing [ ] sputum [ ] hemoptysis  GI:                          [ ] negative [ ] nausea [ ] vomiting [ ] diarrhea [ ] constipation [x ] abd pain [ ] dysphagia [ ] incontinent of bowel  :                        [ ] negative [ ] dysuria [ ] nocturia [ ] hematuria [ ] increased urinary frequency [ x] incontinent of urine  Musculoskeletal:     [ ] negative [ ] back pain [ ] myalgias [ ] arthralgias [ ] fracture [x ] ambulatory  [ ] non-ambulatory  Skin:                       [ ] negative [ ] rash [ ] itch [x ] wound [x ] skin discoloration  Neurological:        [x ] negative [ ] headache [ ] dizziness [ ] syncope [ ] weakness [ ] numbness  Psychiatric:           [x ] negative [ ] anxiety [ ] depression  Endocrine:            [ ] negative [ ] diabetes [x ] thyroid problem  Heme/Lymph:      [x ] negative [ ] anemia [ ] bleeding problem  Allergic/Immune: [x ] negative [ ] itchy eyes [ ] nasal discharge [ ] hives [ ] angioedema    [x ] All other systems negative    MEDICATIONS  (STANDING):  aspirin enteric coated 81 milliGRAM(s) Oral daily  calcium carbonate 1250 mG  + Vitamin D (OsCal 500 + D) 1 Tablet(s) Oral two times a day  chlorhexidine 2% Cloths 1 Application(s) Topical <User Schedule>  fluconAZOLE   Tablet 200 milliGRAM(s) Oral daily  furosemide   Injectable 20 milliGRAM(s) IV Push once  heparin   Injectable 5000 Unit(s) SubCutaneous every 12 hours  levothyroxine 100 MICROGram(s) Oral daily  methylPREDNISolone sodium succinate Injectable 20 milliGRAM(s) IV Push every 24 hours  methylPREDNISolone sodium succinate Injectable   IV Push   mycophenolate mofetil 1000 milliGRAM(s) Oral <User Schedule>  pantoprazole    Tablet 40 milliGRAM(s) Oral before breakfast  polyethylene glycol 3350 17 Gram(s) Oral daily  senna 2 Tablet(s) Oral at bedtime  tacrolimus 0.5 milliGRAM(s) Oral <User Schedule>  trimethoprim   80 mG/sulfamethoxazole 400 mG 1 Tablet(s) Oral <User Schedule>  ursodiol Capsule 300 milliGRAM(s) Oral every 12 hours  valGANciclovir 450 milliGRAM(s) Oral <User Schedule>    MEDICATIONS  (PRN):  oxyCODONE    IR 5 milliGRAM(s) Oral every 4 hours PRN Moderate Pain (4 - 6)  oxyCODONE    IR 10 milliGRAM(s) Oral every 4 hours PRN Severe Pain (7 - 10)    Allergies    No Known Allergies    Intolerances    SOCIAL HISTORY:  single; Current smoker and ETOH, Denies drugs    FAMILY HISTORY:  No pertinent family history in first degree relatives    Vital Signs Last 24 Hrs  T(C): 36.4 (15 Sukhjinder 2023 09:12), Max: 36.8 (14 Jun 2023 22:45)  T(F): 97.6 (15 Sukhjinder 2023 09:12), Max: 98.2 (14 Jun 2023 22:45)  HR: 74 (15 Sukhjinder 2023 09:12) (60 - 96)  BP: 119/83 (15 Sukhjinder 2023 09:12) (110/80 - 131/85)  BP(mean): 104 (14 Jun 2023 22:45) (91 - 104)  RR: 20 (15 Sukhjinder 2023 09:12) (12 - 35)  SpO2: 100% (15 Sukhjinder 2023 09:12) (81% - 100%)    Parameters below as of 15 Sukhjinder 2023 09:12  Patient On (Oxygen Delivery Method): room air    Physical Exam:  General: Alert, WN  Ophthamology: sclera clear  ENMT: moist mucous membranes, trachea midline  Respiratory: equal chest rise with respirations  Gastrointestinal: soft NT/ND  Neurology: verbal,  following commands  Psych: calm, appropriate  Musculoskeletal: no contractures  Vascular: BLE edema equal  Skin:  Sacral/bilateral buttocks with superficially denuded skin in and around the gluteal cleft with portion of a skin tear flap remaining, scant drainage  No odor, erythema, increased warmth, tenderness, induration, fluctuance    LABS:  06-15    134<L>  |  95<L>  |  47<H>  ----------------------------<  104<H>  2.8<LL>   |  23  |  1.84<H>    Ca    8.8      15 Sukhjinder 2023 06:37  Phos  2.5     06-15  Mg     1.9     06-15    TPro  4.4<L>  /  Alb  2.9<L>  /  TBili  6.6<H>  /  DBili  x   /  AST  36  /  ALT  9<L>  /  AlkPhos  223<H>  06-15                          10.5   11.77 )-----------( 134      ( 15 Sukhjinder 2023 06:37 )             29.7     PT/INR - ( 15 Sukhjinder 2023 08:09 )   PT: 10.3 sec;   INR: 0.89 ratio         PTT - ( 15 Sukhjinder 2023 08:09 )  PTT:24.1 sec

## 2023-06-15 NOTE — PROGRESS NOTE ADULT - SUBJECTIVE AND OBJECTIVE BOX
Transplant Surgery Multidisciplinary Note  --------------------------------------------------------------  HCV+ OLT    Date: 6/9/23       POD#6    Present:   Patient seen and examined with multidisciplinary Transplant team including Surgeon: Dr. Haile, SCAR Mace, Hepatologist: Dr. Montes, Transplant Pharmacist Jody, during AM rounds.   Disciplines not in attendance will be notified of the plan.     HPI: 38F with Hx of hypothyroidism (on Levothyroxine), liver cirrhosis 2/2 ETOH abuse---->  presented to [facility] ED on 5/13/23 with ~ 2 weeks Hx of progressively worsening abdominal pain and distention.  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  now s/p HCV+ OLT under Simulect induction on 6/10/2023    Interval Events:  - POD 5 s/p OLT.   Uptrending ALP but TBili downtrending  - US with patent vessels  - FK started      Immunosuppression:  Induction: simulect, last dose today  Maint:  FK by level, MMF 1/1, SST  ongoing monitoring for signs of rejection    Potential Discharge date: TBD  Education:  Medications  Plan of care:  See Below      MEDICATIONS  (STANDING):  aspirin enteric coated 81 milliGRAM(s) Oral daily  calcium carbonate 1250 mG  + Vitamin D (OsCal 500 + D) 1 Tablet(s) Oral two times a day  chlorhexidine 2% Cloths 1 Application(s) Topical <User Schedule>  fluconAZOLE   Tablet 200 milliGRAM(s) Oral daily  heparin   Injectable 5000 Unit(s) SubCutaneous every 12 hours  levothyroxine 100 MICROGram(s) Oral daily  methylPREDNISolone sodium succinate Injectable   IV Push   methylPREDNISolone sodium succinate Injectable 40 milliGRAM(s) IV Push every 24 hours  mycophenolate mofetil 1000 milliGRAM(s) Oral <User Schedule>  pantoprazole    Tablet 40 milliGRAM(s) Oral before breakfast  polyethylene glycol 3350 17 Gram(s) Oral daily  senna 2 Tablet(s) Oral at bedtime  sodium bicarbonate 1300 milliGRAM(s) Oral every 12 hours  tacrolimus 0.5 milliGRAM(s) Oral <User Schedule>  trimethoprim   80 mG/sulfamethoxazole 400 mG 1 Tablet(s) Oral <User Schedule>  ursodiol Capsule 300 milliGRAM(s) Oral every 12 hours  valGANciclovir 450 milliGRAM(s) Oral <User Schedule>    MEDICATIONS  (PRN):  oxyCODONE    IR 5 milliGRAM(s) Oral every 4 hours PRN Moderate Pain (4 - 6)  oxyCODONE    IR 10 milliGRAM(s) Oral every 6 hours PRN Severe Pain (7 - 10)      PAST MEDICAL & SURGICAL HISTORY:  Hypothyroidism      Alcoholic liver disease      No significant past surgical history          Vital Signs Last 24 Hrs  T(C): 36.2 (14 Jun 2023 15:00), Max: 36.8 (14 Jun 2023 03:00)  T(F): 97.2 (14 Jun 2023 15:00), Max: 98.2 (14 Jun 2023 03:00)  HR: 84 (14 Jun 2023 17:00) (52 - 96)  BP: 127/78 (14 Jun 2023 17:00) (110/80 - 148/79)  BP(mean): 98 (14 Jun 2023 17:00) (91 - 109)  RR: 19 (14 Jun 2023 17:00) (11 - 35)  SpO2: 100% (14 Jun 2023 17:00) (92% - 100%)    Parameters below as of 14 Jun 2023 14:45  Patient On (Oxygen Delivery Method): room air        I&O's Summary    13 Jun 2023 07:01  -  14 Jun 2023 07:00  --------------------------------------------------------  IN: 870 mL / OUT: 2670 mL / NET: -1800 mL    14 Jun 2023 07:01  -  14 Jun 2023 18:01  --------------------------------------------------------  IN: 770 mL / OUT: 1510 mL / NET: -740 mL                              9.2    9.04  )-----------( 104      ( 14 Jun 2023 06:08 )             26.2     06-14    133<L>  |  98  |  52<H>  ----------------------------<  141<H>  4.2   |  19<L>  |  2.11<H>    Ca    8.7      14 Jun 2023 06:08  Phos  3.0     06-14  Mg     1.9     06-14    TPro  4.5<L>  /  Alb  3.1<L>  /  TBili  7.5<H>  /  DBili  x   /  AST  42<H>  /  ALT  9<L>  /  AlkPhos  226<H>  06-14    Tacrolimus (), Serum: 1.2 ng/mL (06-14 @ 06:08)        Culture - Body Fluid with Gram Stain (collected 06-10-23 @ 05:42)  Source: Abdominal Fl Abdominal Fluid  Gram Stain (06-10-23 @ 16:55):    No polymorphonuclear cells seen per low power field    No organisms seen per oil power field  Preliminary Report (06-11-23 @ 09:30):    No growth    Culture - Body Fluid with Gram Stain (collected 06-08-23 @ 18:34)  Source: Ascites Fl Ascites Fluid  Gram Stain (06-08-23 @ 23:32):    No polymorphonuclear cells seen    No organisms seen    by cytocentrifuge  Final Report (06-13-23 @ 17:05):    No growth at 5 days      Review of systems  Gen: No weight changes, fatigue, fevers/chills, weakness  Skin: No rashes  Head/Eyes/Ears/Mouth: No headache; Normal hearing; Normal vision w/o blurriness; No sinus pain/discomfort, sore throat  Respiratory: No dyspnea, cough, wheezing, hemoptysis  CV: No chest pain, PND, orthopnea  GI: C/O mild abdominal pain at surgical site. no diarrhea, constipation, nausea, vomiting, melena, hematochezia  : No increased frequency, dysuria, hematuria, nocturia  MSK: No joint pain/swelling; no back pain; no edema  Neuro: No dizziness/lightheadedness, weakness, seizures, numbness, tingling  Heme: No easy bruising or bleeding  Endo: No heat/cold intolerance  Psych: No significant nervousness, anxiety, stress, depression  All other systems were reviewed and are negative, except as noted.      PHYSICAL EXAM:  Constitutional: NAD  Eyes: anicteric, PERRLA  ENMT: nc/at, no thrush  Neck: supple, central line c/d/i  Respiratory: CTA B/L  Cardiovascular: RRR  Gastrointestinal: Soft abdomen, ND, appropriate incisional TTP. chevron incision c/d/i, staples in place. No signs of infection. JPs x3 ss  Genitourinary: voiding  Extremities: SCD's in place and working bilaterally  Vascular: Palpable dp pulses bilaterally.   Neurological: A&O x3  Skin: no rashes, ulcerations, lesions  Musculoskeletal: Moving all extremities  Psychiatric: Responsive Transplant Surgery Multidisciplinary Note  --------------------------------------------------------------  HCV+ OLT    Date: 6/9/23       POD#6    Present:   Patient seen and examined with multidisciplinary Transplant team including Surgeon: Dr. Haile, SCAR Mace, Hepatologist: Dr. Montes, Transplant Pharmacist Jody, during AM rounds.   Disciplines not in attendance will be notified of the plan.     HPI: 38F with Hx of hypothyroidism (on Levothyroxine), liver cirrhosis 2/2 ETOH abuse---->  presented to [facility] ED on 5/13/23 with ~ 2 weeks Hx of progressively worsening abdominal pain and distention.  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  now s/p HCV+ OLT under Simulect induction on 6/10/2023    Interval Events:  - POD#6 OLT  - Cr/ALP improving  - Holding MRCP for now  - Tx to 6M overnight     Immunosuppression:  Induction: Simulect completed  Maint:  FK by level, MMF 1/1, SST  ongoing monitoring for signs of rejection    Potential Discharge date: TBD  Education:  Medications  Plan of care:  See Below      MEDICATIONS  (STANDING):  aspirin enteric coated 81 milliGRAM(s) Oral daily  calcium carbonate 1250 mG  + Vitamin D (OsCal 500 + D) 1 Tablet(s) Oral two times a day  chlorhexidine 2% Cloths 1 Application(s) Topical <User Schedule>  fluconAZOLE   Tablet 200 milliGRAM(s) Oral daily  heparin   Injectable 5000 Unit(s) SubCutaneous every 12 hours  levothyroxine 100 MICROGram(s) Oral daily  methylPREDNISolone sodium succinate Injectable 20 milliGRAM(s) IV Push every 24 hours  methylPREDNISolone sodium succinate Injectable   IV Push   mycophenolate mofetil 1000 milliGRAM(s) Oral <User Schedule>  pantoprazole    Tablet 40 milliGRAM(s) Oral before breakfast  polyethylene glycol 3350 17 Gram(s) Oral daily  senna 2 Tablet(s) Oral at bedtime  sodium bicarbonate 1300 milliGRAM(s) Oral every 12 hours  tacrolimus 0.5 milliGRAM(s) Oral <User Schedule>  trimethoprim   80 mG/sulfamethoxazole 400 mG 1 Tablet(s) Oral <User Schedule>  ursodiol Capsule 300 milliGRAM(s) Oral every 12 hours  valGANciclovir 450 milliGRAM(s) Oral <User Schedule>    MEDICATIONS  (PRN):  oxyCODONE    IR 10 milliGRAM(s) Oral every 4 hours PRN Severe Pain (7 - 10)  oxyCODONE    IR 5 milliGRAM(s) Oral every 4 hours PRN Moderate Pain (4 - 6)      PAST MEDICAL & SURGICAL HISTORY:  Hypothyroidism      Alcoholic liver disease      No significant past surgical history          Vital Signs Last 24 Hrs  T(C): 36.4 (15 Sukhjinder 2023 01:00), Max: 36.8 (14 Jun 2023 22:45)  T(F): 97.5 (15 Sukhjinder 2023 01:00), Max: 98.2 (14 Jun 2023 22:45)  HR: 67 (15 Sukhjinder 2023 01:00) (52 - 96)  BP: 127/84 (15 Sukhjinder 2023 01:00) (110/80 - 148/79)  BP(mean): 104 (14 Jun 2023 22:45) (91 - 109)  RR: 18 (15 Sukhjinder 2023 01:00) (11 - 35)  SpO2: 100% (15 Sukhjinder 2023 01:00) (81% - 100%)    Parameters below as of 15 Sukhjinder 2023 01:00  Patient On (Oxygen Delivery Method): room air        I&O's Summary    13 Jun 2023 07:01  -  14 Jun 2023 07:00  --------------------------------------------------------  IN: 870 mL / OUT: 2670 mL / NET: -1800 mL    14 Jun 2023 07:01  -  15 Sukhjinder 2023 03:42  --------------------------------------------------------  IN: 1130 mL / OUT: 2385 mL / NET: -1255 mL                              9.2    9.04  )-----------( 104      ( 14 Jun 2023 06:08 )             26.2     06-14    133<L>  |  98  |  52<H>  ----------------------------<  141<H>  4.2   |  19<L>  |  2.11<H>    Ca    8.7      14 Jun 2023 06:08  Phos  3.0     06-14  Mg     1.9     06-14    TPro  4.5<L>  /  Alb  3.1<L>  /  TBili  7.5<H>  /  DBili  x   /  AST  42<H>  /  ALT  9<L>  /  AlkPhos  226<H>  06-14    Tacrolimus (), Serum: 1.2 ng/mL (06-14 @ 06:08)        Culture - Body Fluid with Gram Stain (collected 06-10-23 @ 05:42)  Source: Abdominal Fl Abdominal Fluid  Gram Stain (06-10-23 @ 16:55):    No polymorphonuclear cells seen per low power field    No organisms seen per oil power field  Preliminary Report (06-11-23 @ 09:30):    No growth    Culture - Body Fluid with Gram Stain (collected 06-08-23 @ 18:34)  Source: Ascites Fl Ascites Fluid  Gram Stain (06-08-23 @ 23:32):    No polymorphonuclear cells seen    No organisms seen    by cytocentrifuge  Final Report (06-13-23 @ 17:05):    No growth at 5 days      Review of systems  Gen: No weight changes, fatigue, fevers/chills, weakness  Skin: No rashes  Head/Eyes/Ears/Mouth: No headache; Normal hearing; Normal vision w/o blurriness; No sinus pain/discomfort, sore throat  Respiratory: No dyspnea, cough, wheezing, hemoptysis  CV: No chest pain, PND, orthopnea  GI: C/O mild abdominal pain at surgical site. no diarrhea, constipation, nausea, vomiting, melena, hematochezia  : No increased frequency, dysuria, hematuria, nocturia  MSK: No joint pain/swelling; no back pain; no edema  Neuro: No dizziness/lightheadedness, weakness, seizures, numbness, tingling  Heme: No easy bruising or bleeding  Endo: No heat/cold intolerance  Psych: No significant nervousness, anxiety, stress, depression  All other systems were reviewed and are negative, except as noted.      PHYSICAL EXAM:  Constitutional: NAD  Eyes: anicteric, PERRLA  ENMT: nc/at, no thrush  Neck: supple, central line c/d/i  Respiratory: CTA B/L  Cardiovascular: RRR  Gastrointestinal: Soft abdomen, ND, appropriate incisional TTP. chevron incision c/d/i, staples in place. No signs of infection. JPs x3 ss  Genitourinary: voiding  Extremities: SCD's in place and working bilaterally  Vascular: Palpable dp pulses bilaterally.   Neurological: A&O x3  Skin: no rashes, ulcerations, lesions  Musculoskeletal: Moving all extremities  Psychiatric: Responsive Transplant Surgery Multidisciplinary Note  --------------------------------------------------------------  HCV+ OLT    Date: 6/9/23       POD#6    Present:   Patient seen and examined with multidisciplinary Transplant team including Surgeon: Dr. Haile, SCAR Mace, Hepatologist: Dr. Montes, Transplant Pharmacist Jody, during AM rounds.   Disciplines not in attendance will be notified of the plan.     HPI: 38F with Hx of hypothyroidism (on Levothyroxine), liver cirrhosis 2/2 ETOH abuse---->  presented to [facility] ED on 5/13/23 with ~ 2 weeks Hx of progressively worsening abdominal pain and distention.  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  now s/p HCV+ OLT under Simulect induction on 6/10/2023    Interval Events:  - POD#6 OLT  - Cr/ALP improving  - Holding MRCP for now  - Tx to 6M overnight     Immunosuppression:  Induction: Simulect completed  Maint:  FK by level, MMF 1/1, SST  ongoing monitoring for signs of rejection    Potential Discharge date: TBD  Education:  Medications  Plan of care:  See Below        MEDICATIONS  (STANDING):  aspirin enteric coated 81 milliGRAM(s) Oral daily  calcium carbonate 1250 mG  + Vitamin D (OsCal 500 + D) 1 Tablet(s) Oral two times a day  chlorhexidine 2% Cloths 1 Application(s) Topical <User Schedule>  fluconAZOLE   Tablet 200 milliGRAM(s) Oral daily  furosemide   Injectable 20 milliGRAM(s) IV Push once  heparin   Injectable 5000 Unit(s) SubCutaneous every 12 hours  levothyroxine 100 MICROGram(s) Oral daily  methylPREDNISolone sodium succinate Injectable   IV Push   methylPREDNISolone sodium succinate Injectable 20 milliGRAM(s) IV Push every 24 hours  mycophenolate mofetil 1000 milliGRAM(s) Oral <User Schedule>  pantoprazole    Tablet 40 milliGRAM(s) Oral before breakfast  polyethylene glycol 3350 17 Gram(s) Oral daily  senna 2 Tablet(s) Oral at bedtime  tacrolimus 0.5 milliGRAM(s) Oral <User Schedule>  trimethoprim   80 mG/sulfamethoxazole 400 mG 1 Tablet(s) Oral <User Schedule>  ursodiol Capsule 300 milliGRAM(s) Oral every 12 hours  valGANciclovir 450 milliGRAM(s) Oral <User Schedule>    MEDICATIONS  (PRN):  oxyCODONE    IR 10 milliGRAM(s) Oral every 4 hours PRN Severe Pain (7 - 10)  oxyCODONE    IR 5 milliGRAM(s) Oral every 4 hours PRN Moderate Pain (4 - 6)      PAST MEDICAL & SURGICAL HISTORY:  Hypothyroidism      Alcoholic liver disease      No significant past surgical history          Vital Signs Last 24 Hrs  T(C): 36.4 (15 Sukhjinder 2023 09:12), Max: 36.8 (14 Jun 2023 22:45)  T(F): 97.6 (15 Sukhjinder 2023 09:12), Max: 98.2 (14 Jun 2023 22:45)  HR: 74 (15 Sukhjinder 2023 09:12) (60 - 96)  BP: 119/83 (15 Sukhjinder 2023 09:12) (110/80 - 131/85)  BP(mean): 104 (14 Jun 2023 22:45) (91 - 104)  RR: 20 (15 Sukhjinder 2023 09:12) (12 - 35)  SpO2: 100% (15 Sukhjinder 2023 09:12) (81% - 100%)    Parameters below as of 15 Sukhjinder 2023 09:12  Patient On (Oxygen Delivery Method): room air        I&O's Summary    14 Jun 2023 07:01  -  15 Sukhjinder 2023 07:00  --------------------------------------------------------  IN: 1370 mL / OUT: 2945 mL / NET: -1575 mL    15 Sukhjinder 2023 07:01  -  15 Sukhjinder 2023 11:51  --------------------------------------------------------  IN: 180 mL / OUT: 75 mL / NET: 105 mL                              10.5   11.77 )-----------( 134      ( 15 Sukhjinder 2023 06:37 )             29.7     06-15    134<L>  |  95<L>  |  47<H>  ----------------------------<  104<H>  2.8<LL>   |  23  |  1.84<H>    Ca    8.8      15 Sukhjinder 2023 06:37  Phos  2.5     06-15  Mg     1.9     06-15    TPro  4.4<L>  /  Alb  2.9<L>  /  TBili  6.6<H>  /  DBili  x   /  AST  36  /  ALT  9<L>  /  AlkPhos  223<H>  06-15    Tacrolimus (), Serum: 3.0 ng/mL (06-15 @ 06:37)        Culture - Body Fluid with Gram Stain (collected 06-10-23 @ 05:42)  Source: Abdominal Fl Abdominal Fluid  Gram Stain (06-10-23 @ 16:55):    No polymorphonuclear cells seen per low power field    No organisms seen per oil power field  Final Report (06-15-23 @ 11:18):    No growth at 5 days    Culture - Body Fluid with Gram Stain (collected 06-08-23 @ 18:34)  Source: Ascites Fl Ascites Fluid  Gram Stain (06-08-23 @ 23:32):    No polymorphonuclear cells seen    No organisms seen    by cytocentrifuge  Final Report (06-13-23 @ 17:05):    No growth at 5 days      Review of systems  Gen: No weight changes, fatigue, fevers/chills, weakness  Skin: No rashes  Head/Eyes/Ears/Mouth: No headache; Normal hearing; Normal vision w/o blurriness; No sinus pain/discomfort, sore throat  Respiratory: No dyspnea, cough, wheezing, hemoptysis  CV: No chest pain, PND, orthopnea  GI: C/O mild abdominal pain at surgical site. no diarrhea, constipation, nausea, vomiting, melena, hematochezia  : No increased frequency, dysuria, hematuria, nocturia  MSK: No joint pain/swelling; no back pain; no edema  Neuro: No dizziness/lightheadedness, weakness, seizures, numbness, tingling  Heme: No easy bruising or bleeding  Endo: No heat/cold intolerance  Psych: No significant nervousness, anxiety, stress, depression  All other systems were reviewed and are negative, except as noted.      PHYSICAL EXAM:  Constitutional: NAD  Eyes: anicteric, PERRLA  ENMT: nc/at, no thrush  Neck: supple, central line c/d/i  Respiratory: CTA B/L  Cardiovascular: RRR  Gastrointestinal: Soft abdomen, ND, appropriate incisional TTP. chevron incision c/d/i, staples in place. No signs of infection. JPs x3 ss  Genitourinary: voiding  Extremities: SCD's in place and working bilaterally  Vascular: Palpable dp pulses bilaterally.   Neurological: A&O x3  Skin: no rashes, ulcerations, lesions  Musculoskeletal: Moving all extremities  Psychiatric: Responsive

## 2023-06-15 NOTE — PROGRESS NOTE ADULT - ASSESSMENT
39 yo F with hypothyroidism (on Levothyroxine) who initially presented to [facility] on  with abd pain. 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;  MSSA bacteremia secondary to SSTI and Organizing pneumonia of undetermined etiology now s/p OLT 23    Microbiology:   BC NGTd   UC 50-99K CRE, UA non micro   BC NGTD   ascitic fluid bacterial, fungal cx NGTD  ,  BC NGTD   ascitic fluid cx neg   BC pos MSSA   induced sputum bacterial cx neg, fungal cx not sent, PCP PCR still pending   fungitell , aspergillus GM neg in serum Histoplasma antigen in urine neg, CrAg neg   RVP neg   BAL gram stain GNR, no growth on culture, fungal and afb cx NGTD, PCP PCR pending, Legionella PCR pending, fungitell BAl neg, aspergillus GM pending   repeat serum fungitell neg  ;  BC NGTD   ascitic fluid cx P; cellk  count; 36 cells    Imagin/25 Ct chest: findings c/w pulmonary edema- (areas of confluent GGo)   MRCP No suspicious liver lesions. Marked hepatomegaly and hepatic steatosis   with additional findings suggestive of cirrhosis and resultant portal   hypertension.   US abdomen: small to mod ascites  Ct chest iv con: Multifocal bilateral groundglass opacities, suspicious for pneumonia.  Small bilateral pleural effusions, left greater the right, increased compared to prior exam.no PE  Ct maxillofacial , Head and Cervical spine     CT FACIAL BONES  Bilateral mastoid air cells and middle ear regions well-aerated. Paranasal sinus mucosal thickening with near complete opacification of the right sphenoid sinus. Bilateral maxillary sinus septations. No air-fluid levels. S-shaped deviation of the nasal septum with hypertrophy of bilateral inferior greater than middle nasal turbinates. Narrowing ofbilateral ostiomeatal complexes. Periapical lucencies bilateral maxillarypremolars.    CT C/A/P   * Small focal consolidation in the posterior left upper lobe. Givenhistory of trauma, small pulmonary contusion cannot be excluded.Correlate for pneumonia.  * Hepatomegaly and diffuse upper extremity ptosis with signs of portalhypertension. Correlate for steatohepatitis. Moderate ascites.      US liver   Large heterogeneous fluid collection in Morison's pouch measuring 11.4 x 2.4 x 7.7 cm  Assessment and plan  1. EtoH cirrhosis- MELD-NA  of 35; s/p OLT23  CMV D+/R+, EBV D+/R+, Toxoplasma D?/R-  required 12 U PRBC, 8 FFp, 2 Plat, 1 cryo  Donor: HCV Ab/NAAt pos  Induction simulect + steroids  Maintenance : tacro, MMF  Prophylaxis:  CMV: valcyte (temporarily on treatment dose 450 mf daily for crcl in s/o low level viremia in  on day of transplant);   PCP: bactrim   2. MSSA bacteremia  BC ; repeat BC  neg. TTE no vegetations ; SAHARA neg on   CT face, chest/abdomen and pelvis post trauma noting sinus opacification, bilateral apical maxillary premolar lucencies. CT chest with small consolidation  Sources: traumatic /SSTI in s/o facial hematoma vs pneumonia vs less likely ENT. No evidence of metastatic infection. ? hardware  Cefazolin -  3. Donor HCV NAAT pos-  converted on - genotype pending, per current protocol treatment expected  in outpatient basis  4. migratory GGO and patchy opacities on CT chest , waxing and waning on repeat Ct   Pulmonary edema +/-    Repeat CT chest  with worsening GGo and some areas more consolidated ; s/p zosyn 7 day course, then some improvement on  with other areas of worsening  Cirrhotic patients also at risk of PCP, cryptococcosis- pattern not classical for the later, not classical host for mold infection except endemics- neither would cause leucocytosis  Induced sputum 5/  S/p bronchoscopy on ; cytology with macrophages and mild acute inflamamtion. cx NGTD, PCP PCR, legionella PCR P, aspergillus GM P, fungitell in BAL neg.   ?  of unclear etiology- on RA- no identified etiology and would not delay transplant at this point    5. 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 peritransplant fluid collection- no concern for infection at this time  US~ 11 cm collection at surgical bed  7. Pre-transplant screening  HIV ag/Ab neg; HCV Ab neg, HBV s Ag neg, HBV s Ab pos, HBV c Ab neg, HEV IgG neg, EBV IgG pos, CMV IgG pos, HSV1/2 IgG pos, VZV IgG NEG; Measles/Mumps IgG neg, Rubella igG pos ([place] measles), Syphilis screen neg, Strongyliodes IgG neg. Quantiferon and toxoplasma igG neg.  8. Need for vaccinations.  Only received Td . Positive titers for HBV non-quantitative  will need in the post-transplant setting" Shingrix, 2 doses 2-6 months apart; Tdap (once in adulthood), COVID-19, Prevnar 20, HPV Gardisil 9 3 doses at 0-1-6 months  - Of note, non-immune to Measles or mumps,  nor to varicella. immune to [place] measles (Rubella) - post-transplant,  live vaccination would be contraindicated.  9. Leucocytosis - resolved  pretransplant, peak at 24- extensive ID workup negative      Recommendations:  -- Continue valcyte 450 mg daily - this is temporary treatment dose in s/o low level CMV viremia within 24 hours of transplant.   - repeat CMV PCR  down to 440- once negative should go back to prophylactic doses. ordered repeat for   -- HCV PCR positive on - converted- follow genotype and will needs NA treatment per protocol   -- will also need HIV NAAT/HBV DNA at 1 months post-transplant  -- Follow fungal, afb Cultures both NGTD;  BAL Aspergillus galactomannan and fungitell neg  - continue fluconazole prophylaxis pe protocol: on 200 mg daily - this is a 400 mg dose adjusted to creatinine clearance- would recommend a lower dose of 100 mg daily for prophylaxis, which is 200 mg daily adjusted for crcl  -- continue bactrim for PCP prophylaxis         Thank you for involving us in the care of this patient  Transplant ID will follow peripherally- pendiig CMV PCR   Please call or page with additional questions  Pager; #[phone]  Teams: from 8 am to 5 pm  Samanta Soto MD

## 2023-06-15 NOTE — CONSULT NOTE ADULT - CONSULT REQUESTED BY NAME
RN
medicine
Primary team
Medicine
transplant
Transplant
primary
mino
Day Camilo DO
Rodri
medicine
Dr. Day

## 2023-06-15 NOTE — CONSULT NOTE ADULT - ASSESSMENT
Impression:    Sacral/bilateral Buttocks incontinence dermatitis  Incontinence of bowel and bladder  Pressure Injury Prophylaxis    Recommend:  1.) topical therapy: sacral/buttock injury - cleanse with incontinence cleanser, pat dry, apply alleyvn foam dressing every other day  2.) Incontinence Management - incontinence cleanser, pads, pericare BID, continue external urinary catheter  3.) Maintain on an alternating air with low air loss surface  4.) Turn and reposition Q 2 hours  5.) Nutrition optimization - please add Dominick  6.) Offload heels/feet with complete cair air fluidized boots; ensure that the soles of the feet are not resting on the foot board of the bed.    Care as per medicine. Will not actively follow but will remain available. Please recall for new issues or deterioration.  Upon discharge f[facility]u [facility] [address] [place] [address] [phone]  Thank you for this consult  Seen and discussed with clinical nurse  Haydee Mcpherson, NP-C, CWOCN via TEAMS

## 2023-06-15 NOTE — CONSULT NOTE ADULT - PROVIDER SPECIALTY LIST ADULT
Cardiology
GYN
Intervent Radiology
Pulmonology
Intervent Radiology
Infectious Disease
Intervent Radiology
Nephrology
Wound Care
Transplant Hepatology
Intervent Radiology
SICU

## 2023-06-16 LAB
ALBUMIN SERPL ELPH-MCNC: 3.1 G/DL — LOW (ref 3.3–5)
ALP SERPL-CCNC: 207 U/L — HIGH (ref 40–120)
ALT FLD-CCNC: 11 U/L — SIGNIFICANT CHANGE UP (ref 10–45)
ANION GAP SERPL CALC-SCNC: 15 MMOL/L — SIGNIFICANT CHANGE UP (ref 5–17)
APTT BLD: 26.6 SEC — LOW (ref 27.5–35.5)
AST SERPL-CCNC: 26 U/L — SIGNIFICANT CHANGE UP (ref 10–40)
BILIRUB SERPL-MCNC: 5.2 MG/DL — HIGH (ref 0.2–1.2)
BLD GP AB SCN SERPL QL: NEGATIVE — SIGNIFICANT CHANGE UP
BUN SERPL-MCNC: 44 MG/DL — HIGH (ref 7–23)
CALCIUM SERPL-MCNC: 9 MG/DL — SIGNIFICANT CHANGE UP (ref 8.4–10.5)
CHLORIDE SERPL-SCNC: 94 MMOL/L — LOW (ref 96–108)
CO2 SERPL-SCNC: 23 MMOL/L — SIGNIFICANT CHANGE UP (ref 22–31)
CREAT SERPL-MCNC: 1.71 MG/DL — HIGH (ref 0.5–1.3)
EGFR: 39 ML/MIN/1.73M2 — LOW
GLUCOSE SERPL-MCNC: 145 MG/DL — HIGH (ref 70–99)
HCT VFR BLD CALC: 27.7 % — LOW (ref 34.5–45)
HCV GENTYP BLD NAA+PROBE: SIGNIFICANT CHANGE UP
HGB BLD-MCNC: 9.4 G/DL — LOW (ref 11.5–15.5)
INR BLD: 0.85 RATIO — LOW (ref 0.88–1.16)
MAGNESIUM SERPL-MCNC: 1.7 MG/DL — SIGNIFICANT CHANGE UP (ref 1.6–2.6)
MCHC RBC-ENTMCNC: 29.9 PG — SIGNIFICANT CHANGE UP (ref 27–34)
MCHC RBC-ENTMCNC: 33.9 GM/DL — SIGNIFICANT CHANGE UP (ref 32–36)
MCV RBC AUTO: 88.2 FL — SIGNIFICANT CHANGE UP (ref 80–100)
NRBC # BLD: 0 /100 WBCS — SIGNIFICANT CHANGE UP (ref 0–0)
PHOSPHATE SERPL-MCNC: 3 MG/DL — SIGNIFICANT CHANGE UP (ref 2.5–4.5)
PLATELET # BLD AUTO: 146 K/UL — LOW (ref 150–400)
POTASSIUM SERPL-MCNC: 3.9 MMOL/L — SIGNIFICANT CHANGE UP (ref 3.5–5.3)
POTASSIUM SERPL-SCNC: 3.9 MMOL/L — SIGNIFICANT CHANGE UP (ref 3.5–5.3)
PROT SERPL-MCNC: 4.8 G/DL — LOW (ref 6–8.3)
PROTHROM AB SERPL-ACNC: 9.8 SEC — LOW (ref 10.5–13.4)
RBC # BLD: 3.14 M/UL — LOW (ref 3.8–5.2)
RBC # FLD: 19.9 % — HIGH (ref 10.3–14.5)
RH IG SCN BLD-IMP: POSITIVE — SIGNIFICANT CHANGE UP
SODIUM SERPL-SCNC: 132 MMOL/L — LOW (ref 135–145)
TACROLIMUS SERPL-MCNC: 5.1 NG/ML — SIGNIFICANT CHANGE UP
WBC # BLD: 11.73 K/UL — HIGH (ref 3.8–10.5)
WBC # FLD AUTO: 11.73 K/UL — HIGH (ref 3.8–10.5)

## 2023-06-16 PROCEDURE — 99232 SBSQ HOSP IP/OBS MODERATE 35: CPT | Mod: GC,24

## 2023-06-16 RX ORDER — MAGNESIUM SULFATE 500 MG/ML
1 VIAL (ML) INJECTION ONCE
Refills: 0 | Status: COMPLETED | OUTPATIENT
Start: 2023-06-16 | End: 2023-06-16

## 2023-06-16 RX ORDER — LIDOCAINE HCL 20 MG/ML
5 VIAL (ML) INJECTION ONCE
Refills: 0 | Status: COMPLETED | OUTPATIENT
Start: 2023-06-16 | End: 2023-06-16

## 2023-06-16 RX ADMIN — HEPARIN SODIUM 5000 UNIT(S): 5000 INJECTION INTRAVENOUS; SUBCUTANEOUS at 17:43

## 2023-06-16 RX ADMIN — OXYCODONE HYDROCHLORIDE 5 MILLIGRAM(S): 5 TABLET ORAL at 08:30

## 2023-06-16 RX ADMIN — Medication 100 MICROGRAM(S): at 05:17

## 2023-06-16 RX ADMIN — OXYCODONE HYDROCHLORIDE 10 MILLIGRAM(S): 5 TABLET ORAL at 13:22

## 2023-06-16 RX ADMIN — OXYCODONE HYDROCHLORIDE 10 MILLIGRAM(S): 5 TABLET ORAL at 06:46

## 2023-06-16 RX ADMIN — URSODIOL 300 MILLIGRAM(S): 250 TABLET, FILM COATED ORAL at 05:20

## 2023-06-16 RX ADMIN — Medication 1 TABLET(S): at 12:42

## 2023-06-16 RX ADMIN — OXYCODONE HYDROCHLORIDE 5 MILLIGRAM(S): 5 TABLET ORAL at 15:30

## 2023-06-16 RX ADMIN — OXYCODONE HYDROCHLORIDE 5 MILLIGRAM(S): 5 TABLET ORAL at 01:24

## 2023-06-16 RX ADMIN — POLYETHYLENE GLYCOL 3350 17 GRAM(S): 17 POWDER, FOR SOLUTION ORAL at 12:42

## 2023-06-16 RX ADMIN — OXYCODONE HYDROCHLORIDE 10 MILLIGRAM(S): 5 TABLET ORAL at 12:45

## 2023-06-16 RX ADMIN — Medication 1 TABLET(S): at 05:18

## 2023-06-16 RX ADMIN — FLUCONAZOLE 200 MILLIGRAM(S): 150 TABLET ORAL at 12:42

## 2023-06-16 RX ADMIN — OXYCODONE HYDROCHLORIDE 5 MILLIGRAM(S): 5 TABLET ORAL at 14:50

## 2023-06-16 RX ADMIN — OXYCODONE HYDROCHLORIDE 5 MILLIGRAM(S): 5 TABLET ORAL at 09:13

## 2023-06-16 RX ADMIN — PANTOPRAZOLE SODIUM 40 MILLIGRAM(S): 20 TABLET, DELAYED RELEASE ORAL at 05:19

## 2023-06-16 RX ADMIN — Medication 100 GRAM(S): at 12:45

## 2023-06-16 RX ADMIN — Medication 1 TABLET(S): at 17:42

## 2023-06-16 RX ADMIN — OXYCODONE HYDROCHLORIDE 10 MILLIGRAM(S): 5 TABLET ORAL at 06:08

## 2023-06-16 RX ADMIN — OXYCODONE HYDROCHLORIDE 10 MILLIGRAM(S): 5 TABLET ORAL at 18:30

## 2023-06-16 RX ADMIN — HEPARIN SODIUM 5000 UNIT(S): 5000 INJECTION INTRAVENOUS; SUBCUTANEOUS at 05:20

## 2023-06-16 RX ADMIN — Medication 100 GRAM(S): at 17:45

## 2023-06-16 RX ADMIN — OXYCODONE HYDROCHLORIDE 5 MILLIGRAM(S): 5 TABLET ORAL at 21:54

## 2023-06-16 RX ADMIN — URSODIOL 300 MILLIGRAM(S): 250 TABLET, FILM COATED ORAL at 17:42

## 2023-06-16 RX ADMIN — OXYCODONE HYDROCHLORIDE 5 MILLIGRAM(S): 5 TABLET ORAL at 08:53

## 2023-06-16 RX ADMIN — VALGANCICLOVIR 450 MILLIGRAM(S): 450 TABLET, FILM COATED ORAL at 12:42

## 2023-06-16 RX ADMIN — Medication 20 MILLIGRAM(S): at 05:18

## 2023-06-16 RX ADMIN — OXYCODONE HYDROCHLORIDE 5 MILLIGRAM(S): 5 TABLET ORAL at 02:20

## 2023-06-16 RX ADMIN — Medication 81 MILLIGRAM(S): at 12:42

## 2023-06-16 RX ADMIN — TACROLIMUS 1 MILLIGRAM(S): 5 CAPSULE ORAL at 19:57

## 2023-06-16 RX ADMIN — OXYCODONE HYDROCHLORIDE 10 MILLIGRAM(S): 5 TABLET ORAL at 17:46

## 2023-06-16 RX ADMIN — TACROLIMUS 1 MILLIGRAM(S): 5 CAPSULE ORAL at 08:30

## 2023-06-16 RX ADMIN — MYCOPHENOLATE MOFETIL 1000 MILLIGRAM(S): 250 CAPSULE ORAL at 08:30

## 2023-06-16 RX ADMIN — MYCOPHENOLATE MOFETIL 1000 MILLIGRAM(S): 250 CAPSULE ORAL at 19:56

## 2023-06-16 RX ADMIN — OXYCODONE HYDROCHLORIDE 5 MILLIGRAM(S): 5 TABLET ORAL at 23:00

## 2023-06-16 NOTE — PROGRESS NOTE ADULT - SUBJECTIVE AND OBJECTIVE BOX
Transplant Surgery Multidisciplinary Note  --------------------------------------------------------------  HCV+ OLT    Date: 6/9/23       POD#7    Present:   Patient seen and examined with multidisciplinary Transplant team including Surgeon: Dr. Haile, Hepatologist: Dr. Montes, Transplant Pharmacist SALO Vera during AM rounds.   Disciplines not in attendance will be notified of the plan.     HPI: 38F with Hx of hypothyroidism (on Levothyroxine), liver cirrhosis 2/2 ETOH abuse---->  presented to [facility] ED on 5/13/23 with ~ 2 weeks Hx of progressively worsening abdominal pain and distention.  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  now s/p HCV+ OLT under Simulect induction on 6/10/2023    Interval Events:  - MONSE#3 removed  - IV diuresis with Lasix   - Improved serum bicarb --> dced PO bicarb   - Cr and LFTs continue to improve    Immunosuppression:  Induction: Simulect completed  Maint:  FK by level, MMF 1/1, SST  ongoing monitoring for signs of rejection    Potential Discharge date: TBD  Education:  Medications  Plan of care:  See Below      MEDICATIONS  (STANDING):  aspirin enteric coated 81 milliGRAM(s) Oral daily  calcium carbonate 1250 mG  + Vitamin D (OsCal 500 + D) 1 Tablet(s) Oral two times a day  chlorhexidine 2% Cloths 1 Application(s) Topical <User Schedule>  fluconAZOLE   Tablet 200 milliGRAM(s) Oral daily  heparin   Injectable 5000 Unit(s) SubCutaneous every 12 hours  levothyroxine 100 MICROGram(s) Oral daily  lidocaine 1% (Preservative-free) Injectable 5 milliLiter(s) Local Injection once  mycophenolate mofetil 1000 milliGRAM(s) Oral <User Schedule>  pantoprazole    Tablet 40 milliGRAM(s) Oral before breakfast  polyethylene glycol 3350 17 Gram(s) Oral daily  predniSONE   Tablet 20 milliGRAM(s) Oral daily  senna 2 Tablet(s) Oral at bedtime  tacrolimus 1 milliGRAM(s) Oral <User Schedule>  trimethoprim   80 mG/sulfamethoxazole 400 mG 1 Tablet(s) Oral <User Schedule>  ursodiol Capsule 300 milliGRAM(s) Oral every 12 hours  valGANciclovir 450 milliGRAM(s) Oral <User Schedule>    MEDICATIONS  (PRN):  oxyCODONE    IR 5 milliGRAM(s) Oral every 4 hours PRN Moderate Pain (4 - 6)  oxyCODONE    IR 10 milliGRAM(s) Oral every 4 hours PRN Severe Pain (7 - 10)      PAST MEDICAL & SURGICAL HISTORY:  Hypothyroidism      Alcoholic liver disease      No significant past surgical history          Vital Signs Last 24 Hrs  T(C): 36.4 (16 Jun 2023 01:00), Max: 36.6 (15 Sukhjinder 2023 14:22)  T(F): 97.6 (16 Jun 2023 01:00), Max: 97.9 (15 Sukhjinder 2023 14:22)  HR: 78 (16 Jun 2023 01:00) (68 - 85)  BP: 121/70 (16 Jun 2023 01:00) (113/75 - 129/85)  BP(mean): --  RR: 18 (16 Jun 2023 01:00) (18 - 20)  SpO2: 99% (16 Jun 2023 01:00) (98% - 100%)    Parameters below as of 16 Jun 2023 01:00  Patient On (Oxygen Delivery Method): room air        I&O's Summary    14 Jun 2023 07:01  -  15 Sukhjinder 2023 07:00  --------------------------------------------------------  IN: 1370 mL / OUT: 2945 mL / NET: -1575 mL    15 Sukhjinder 2023 07:01  -  16 Jun 2023 04:10  --------------------------------------------------------  IN: 1350 mL / OUT: 835 mL / NET: 515 mL                              10.5   11.77 )-----------( 134      ( 15 Sukhjinder 2023 06:37 )             29.7     06-15    134<L>  |  95<L>  |  47<H>  ----------------------------<  104<H>  2.8<LL>   |  23  |  1.84<H>    Ca    8.8      15 Sukhjinder 2023 06:37  Phos  2.5     06-15  Mg     1.9     06-15    TPro  4.4<L>  /  Alb  2.9<L>  /  TBili  6.6<H>  /  DBili  x   /  AST  36  /  ALT  9<L>  /  AlkPhos  223<H>  06-15    Tacrolimus (), Serum: 3.0 ng/mL (06-15 @ 06:37)        Culture - Body Fluid with Gram Stain (collected 06-10-23 @ 05:42)  Source: Abdominal Fl Abdominal Fluid  Gram Stain (06-10-23 @ 16:55):    No polymorphonuclear cells seen per low power field    No organisms seen per oil power field  Final Report (06-15-23 @ 11:18):    No growth at 5 days        Review of systems  All other systems were reviewed and are negative, except as noted.      PHYSICAL EXAM:  Constitutional: NAD  Eyes: anicteric, PERRLA  ENMT: nc/at, no thrush  Neck: supple, central line c/d/i  Respiratory: CTA B/L  Cardiovascular: RRR  Gastrointestinal: Soft abdomen, ND, appropriate incisional TTP. chevron incision c/d/i, staples in place. No signs of infection. JPs x2 ss  Genitourinary: voiding  Extremities: SCD's in place and working bilaterally  Vascular: Palpable dp pulses bilaterally.   Neurological: A&O x3  Skin: no rashes, ulcerations, lesions  Musculoskeletal: Moving all extremities  Psychiatric: Responsive Transplant Surgery Multidisciplinary Note  --------------------------------------------------------------  HCV+ OLT    Date: 6/9/23       POD#7    Present:   Patient seen and examined with multidisciplinary Transplant team including Surgeon: Dr. Haile, Hepatologist: Dr. Montes, Transplant Pharmacist SALO Vera during AM rounds.   Disciplines not in attendance will be notified of the plan.     HPI: 38F with Hx of hypothyroidism (on Levothyroxine), liver cirrhosis 2/2 ETOH abuse---->  presented to [facility] ED on 5/13/23 with ~ 2 weeks Hx of progressively worsening abdominal pain and distention.  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  now s/p HCV+ OLT under Simulect induction on 6/10/2023    Interval Events:  - MONSE#3 removed  - IV diuresis with Lasix   - Improved serum bicarb --> dced PO bicarb   - Cr and LFTs continue to improve  - Lasix 20mg x 1    Immunosuppression:  Induction: Simulect completed  Maint:  FK by level, MMF 1/1, SST  ongoing monitoring for signs of rejection    Potential Discharge date: TBD  Education:  Medications  Plan of care:  See Below      MEDICATIONS  (STANDING):  aspirin enteric coated 81 milliGRAM(s) Oral daily  calcium carbonate 1250 mG  + Vitamin D (OsCal 500 + D) 1 Tablet(s) Oral two times a day  chlorhexidine 2% Cloths 1 Application(s) Topical <User Schedule>  fluconAZOLE   Tablet 200 milliGRAM(s) Oral daily  heparin   Injectable 5000 Unit(s) SubCutaneous every 12 hours  levothyroxine 100 MICROGram(s) Oral daily  lidocaine 1% (Preservative-free) Injectable 5 milliLiter(s) Local Injection once  lidocaine 1% (Preservative-free) Injectable 5 milliLiter(s) Local Injection once  magnesium sulfate  IVPB 1 Gram(s) IV Intermittent once  magnesium sulfate  IVPB 1 Gram(s) IV Intermittent once  mycophenolate mofetil 1000 milliGRAM(s) Oral <User Schedule>  pantoprazole    Tablet 40 milliGRAM(s) Oral before breakfast  polyethylene glycol 3350 17 Gram(s) Oral daily  predniSONE   Tablet 20 milliGRAM(s) Oral daily  senna 2 Tablet(s) Oral at bedtime  tacrolimus 1 milliGRAM(s) Oral <User Schedule>  trimethoprim   80 mG/sulfamethoxazole 400 mG 1 Tablet(s) Oral <User Schedule>  ursodiol Capsule 300 milliGRAM(s) Oral every 12 hours  valGANciclovir 450 milliGRAM(s) Oral <User Schedule>    MEDICATIONS  (PRN):  oxyCODONE    IR 5 milliGRAM(s) Oral every 4 hours PRN Moderate Pain (4 - 6)  oxyCODONE    IR 10 milliGRAM(s) Oral every 4 hours PRN Severe Pain (7 - 10)      PAST MEDICAL & SURGICAL HISTORY:  Hypothyroidism  Alcoholic liver disease  No significant past surgical history    Vital Signs Last 24 Hrs  T(C): 36.4 (16 Jun 2023 09:05), Max: 36.6 (15 Sukhjinder 2023 14:22)  T(F): 97.6 (16 Jun 2023 09:05), Max: 97.9 (15 Sukhjinder 2023 14:22)  HR: 57 (16 Jun 2023 09:05) (57 - 82)  BP: 153/90 (16 Jun 2023 09:05) (121/70 - 153/90)  BP(mean): --  RR: 20 (16 Jun 2023 09:05) (18 - 20)  SpO2: 100% (16 Jun 2023 09:05) (94% - 100%)    Parameters below as of 16 Jun 2023 09:05  Patient On (Oxygen Delivery Method): room air    I&O's Summary    15 Sukhjinder 2023 07:01  -  16 Jun 2023 07:00  --------------------------------------------------------  IN: 1590 mL / OUT: 1035 mL / NET: 555 mL    16 Jun 2023 07:01  -  16 Jun 2023 11:24  --------------------------------------------------------  IN: 340 mL / OUT: 270 mL / NET: 70 mL                          9.4    11.73 )-----------( 146      ( 16 Jun 2023 07:03 )             27.7     06-16    132<L>  |  94<L>  |  44<H>  ----------------------------<  145<H>  3.9   |  23  |  1.71<H>    Ca    9.0      16 Jun 2023 07:03  Phos  3.0     06-16  Mg     1.7     06-16    TPro  4.8<L>  /  Alb  3.1<L>  /  TBili  5.2<H>  /  DBili  x   /  AST  26  /  ALT  11  /  AlkPhos  207<H>  06-16    Tacrolimus (), Serum: 5.1 ng/mL (06-16 @ 07:03)      Culture - Body Fluid with Gram Stain (collected 06-10-23 @ 05:42)  Source: Abdominal Fl Abdominal Fluid  Gram Stain (06-10-23 @ 16:55):    No polymorphonuclear cells seen per low power field    No organisms seen per oil power field  Final Report (06-15-23 @ 11:18):    No growth at 5 days      Review of systems  All other systems were reviewed and are negative, except as noted.      PHYSICAL EXAM:  Constitutional: NAD  Eyes: anicteric, PERRLA  ENMT: nc/at, no thrush  Neck: supple, central line c/d/i  Respiratory: CTA B/L  Cardiovascular: RRR  Gastrointestinal: Soft abdomen, ND, appropriate incisional TTP. chevron incision c/d/i, staples in place. No signs of infection. JPs x2 ss  Genitourinary: voiding  Extremities: SCD's in place and working bilaterally  Vascular: Palpable dp pulses bilaterally.   Neurological: A&O x3  Skin: no rashes, ulcerations, lesions  Musculoskeletal: Moving all extremities  Psychiatric: Responsive

## 2023-06-16 NOTE — PROGRESS NOTE ADULT - ASSESSMENT
38F with Hx of hypothyroidism (on Levothyroxine), liver cirrhosis 2/2 ETOH abuse---->  presented to [facility] ED on 5/13/23 with ~ 2 weeks Hx of progressively worsening abdominal pain and distention.  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  now s/p HCV+ OLT under Simulect induction on 6/10/2023    [] s/p HCV+ OLT    - TBili downtrending. US w/ good perfusion, patent vessels  - ALP improving on Jacinto (6/13--), MRCP deferred for now  - ASA 81mg po qd  - SQH BID  - HCV + donor.  HCV PCR +, pending genotype   - VIVIANA pre-op, improving renal function daily w/ good UOP;  Transplant Nephrology following.  - Strict I&Os ([place]/JPx2)  - MSSA bacteremia (5/17) - Ancef DCd 6/11. Transplant ID following  - Reg diet  - SCDs/spirometry as able  - OOBTC  - bowel regimen    Immunosuppression  - FK by level, MMF 1/1, SST  - Simulect completed  - CMV viremia pre-op.  Valcyte to TIW for renal dosing.   - PPX: Bactrim to TIW for renal dosing. Fluconazole QD   38F with Hx of hypothyroidism (on Levothyroxine), liver cirrhosis 2/2 ETOH abuse---->  presented to [facility] ED on 5/13/23 with ~ 2 weeks Hx of progressively worsening abdominal pain and distention.  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  now s/p HCV+ OLT under Simulect induction on 6/10/2023    [] s/p HCV+ OLT    - HCV + donor.  HCV PCR +, pending genotype   - LFTs downtrending  - Remove MONSE #1  - Continue Jacinto   - ASA 81mg po qd  - VIVIANA pre-op, improving renal function daily w/ good UOP;  Transplant Nephrology following.  - MSSA bacteremia (5/17) - Ancef DCd 6/11. Transplant ID following  - Reg diet  - Pain management  - IS/SCDs/SQH  - Strict I&Os   - bowel regimen    Immunosuppression  - FK by level, MMF 1/1, pred 20  - Simulect completed  - CMV viremia pre-op.  Valcyte to TIW for renal dosing.   - check CMV PCR 6/21  - PPX: Bactrim to TIW for renal dosing. Fluconazole QD

## 2023-06-17 LAB
ALBUMIN SERPL ELPH-MCNC: 3 G/DL — LOW (ref 3.3–5)
ALP SERPL-CCNC: 160 U/L — HIGH (ref 40–120)
ALT FLD-CCNC: 9 U/L — LOW (ref 10–45)
ANION GAP SERPL CALC-SCNC: 14 MMOL/L — SIGNIFICANT CHANGE UP (ref 5–17)
APPEARANCE UR: ABNORMAL
APTT BLD: 26.5 SEC — LOW (ref 27.5–35.5)
AST SERPL-CCNC: 20 U/L — SIGNIFICANT CHANGE UP (ref 10–40)
BACTERIA # UR AUTO: NEGATIVE — SIGNIFICANT CHANGE UP
BILIRUB SERPL-MCNC: 3.9 MG/DL — HIGH (ref 0.2–1.2)
BILIRUB UR-MCNC: NEGATIVE — SIGNIFICANT CHANGE UP
BUN SERPL-MCNC: 46 MG/DL — HIGH (ref 7–23)
CALCIUM SERPL-MCNC: 8.8 MG/DL — SIGNIFICANT CHANGE UP (ref 8.4–10.5)
CHLORIDE SERPL-SCNC: 93 MMOL/L — LOW (ref 96–108)
CO2 SERPL-SCNC: 24 MMOL/L — SIGNIFICANT CHANGE UP (ref 22–31)
COLOR SPEC: YELLOW — SIGNIFICANT CHANGE UP
CREAT SERPL-MCNC: 1.81 MG/DL — HIGH (ref 0.5–1.3)
CULTURE RESULTS: SIGNIFICANT CHANGE UP
DIFF PNL FLD: NEGATIVE — SIGNIFICANT CHANGE UP
EGFR: 36 ML/MIN/1.73M2 — LOW
EPI CELLS # UR: 2 /HPF — SIGNIFICANT CHANGE UP
GLUCOSE SERPL-MCNC: 133 MG/DL — HIGH (ref 70–99)
GLUCOSE UR QL: NEGATIVE — SIGNIFICANT CHANGE UP
HCT VFR BLD CALC: 25.1 % — LOW (ref 34.5–45)
HGB BLD-MCNC: 8.5 G/DL — LOW (ref 11.5–15.5)
HYALINE CASTS # UR AUTO: 8 /LPF — HIGH (ref 0–2)
INR BLD: 0.86 RATIO — LOW (ref 0.88–1.16)
KETONES UR-MCNC: NEGATIVE — SIGNIFICANT CHANGE UP
LEUKOCYTE ESTERASE UR-ACNC: NEGATIVE — SIGNIFICANT CHANGE UP
MAGNESIUM SERPL-MCNC: 2 MG/DL — SIGNIFICANT CHANGE UP (ref 1.6–2.6)
MCHC RBC-ENTMCNC: 30.4 PG — SIGNIFICANT CHANGE UP (ref 27–34)
MCHC RBC-ENTMCNC: 33.9 GM/DL — SIGNIFICANT CHANGE UP (ref 32–36)
MCV RBC AUTO: 89.6 FL — SIGNIFICANT CHANGE UP (ref 80–100)
NITRITE UR-MCNC: NEGATIVE — SIGNIFICANT CHANGE UP
NRBC # BLD: 0 /100 WBCS — SIGNIFICANT CHANGE UP (ref 0–0)
PH UR: 6.5 — SIGNIFICANT CHANGE UP (ref 5–8)
PHOSPHATE SERPL-MCNC: 3 MG/DL — SIGNIFICANT CHANGE UP (ref 2.5–4.5)
PLATELET # BLD AUTO: 187 K/UL — SIGNIFICANT CHANGE UP (ref 150–400)
POTASSIUM SERPL-MCNC: 3.8 MMOL/L — SIGNIFICANT CHANGE UP (ref 3.5–5.3)
POTASSIUM SERPL-SCNC: 3.8 MMOL/L — SIGNIFICANT CHANGE UP (ref 3.5–5.3)
PROCALCITONIN SERPL-MCNC: 1.02 NG/ML — HIGH (ref 0.02–0.1)
PROT SERPL-MCNC: 4.9 G/DL — LOW (ref 6–8.3)
PROT UR-MCNC: ABNORMAL
PROTHROM AB SERPL-ACNC: 9.9 SEC — LOW (ref 10.5–13.4)
RBC # BLD: 2.8 M/UL — LOW (ref 3.8–5.2)
RBC # FLD: 20.5 % — HIGH (ref 10.3–14.5)
RBC CASTS # UR COMP ASSIST: 4 /HPF — SIGNIFICANT CHANGE UP (ref 0–4)
SODIUM SERPL-SCNC: 131 MMOL/L — LOW (ref 135–145)
SP GR SPEC: 1.02 — SIGNIFICANT CHANGE UP (ref 1.01–1.02)
SPECIMEN SOURCE: SIGNIFICANT CHANGE UP
TACROLIMUS SERPL-MCNC: 7.1 NG/ML — SIGNIFICANT CHANGE UP
UROBILINOGEN FLD QL: NEGATIVE — SIGNIFICANT CHANGE UP
WBC # BLD: 14.09 K/UL — HIGH (ref 3.8–10.5)
WBC # FLD AUTO: 14.09 K/UL — HIGH (ref 3.8–10.5)
WBC UR QL: 2 /HPF — SIGNIFICANT CHANGE UP (ref 0–5)

## 2023-06-17 PROCEDURE — 71045 X-RAY EXAM CHEST 1 VIEW: CPT | Mod: 26

## 2023-06-17 PROCEDURE — 99232 SBSQ HOSP IP/OBS MODERATE 35: CPT | Mod: GC,24

## 2023-06-17 RX ORDER — TRAMADOL HYDROCHLORIDE 50 MG/1
25 TABLET ORAL EVERY 4 HOURS
Refills: 0 | Status: DISCONTINUED | OUTPATIENT
Start: 2023-06-17 | End: 2023-06-17

## 2023-06-17 RX ORDER — TRAMADOL HYDROCHLORIDE 50 MG/1
50 TABLET ORAL EVERY 4 HOURS
Refills: 0 | Status: DISCONTINUED | OUTPATIENT
Start: 2023-06-17 | End: 2023-06-17

## 2023-06-17 RX ORDER — TRAMADOL HYDROCHLORIDE 50 MG/1
25 TABLET ORAL EVERY 12 HOURS
Refills: 0 | Status: DISCONTINUED | OUTPATIENT
Start: 2023-06-17 | End: 2023-06-24

## 2023-06-17 RX ORDER — LIDOCAINE 4 G/100G
1 CREAM TOPICAL EVERY 24 HOURS
Refills: 0 | Status: DISCONTINUED | OUTPATIENT
Start: 2023-06-17 | End: 2023-06-26

## 2023-06-17 RX ORDER — TRAMADOL HYDROCHLORIDE 50 MG/1
50 TABLET ORAL EVERY 12 HOURS
Refills: 0 | Status: DISCONTINUED | OUTPATIENT
Start: 2023-06-17 | End: 2023-06-24

## 2023-06-17 RX ORDER — OXYCODONE HYDROCHLORIDE 5 MG/1
5 TABLET ORAL ONCE
Refills: 0 | Status: DISCONTINUED | OUTPATIENT
Start: 2023-06-17 | End: 2023-06-17

## 2023-06-17 RX ORDER — ALBUMIN HUMAN 25 %
100 VIAL (ML) INTRAVENOUS ONCE
Refills: 0 | Status: COMPLETED | OUTPATIENT
Start: 2023-06-17 | End: 2023-06-17

## 2023-06-17 RX ORDER — TACROLIMUS 5 MG/1
1 CAPSULE ORAL
Refills: 0 | Status: DISCONTINUED | OUTPATIENT
Start: 2023-06-18 | End: 2023-06-19

## 2023-06-17 RX ORDER — TACROLIMUS 5 MG/1
0.5 CAPSULE ORAL
Refills: 0 | Status: DISCONTINUED | OUTPATIENT
Start: 2023-06-17 | End: 2023-06-19

## 2023-06-17 RX ORDER — ACETAMINOPHEN 500 MG
650 TABLET ORAL ONCE
Refills: 0 | Status: COMPLETED | OUTPATIENT
Start: 2023-06-17 | End: 2023-06-17

## 2023-06-17 RX ADMIN — Medication 20 MILLIGRAM(S): at 05:29

## 2023-06-17 RX ADMIN — POLYETHYLENE GLYCOL 3350 17 GRAM(S): 17 POWDER, FOR SOLUTION ORAL at 12:48

## 2023-06-17 RX ADMIN — PANTOPRAZOLE SODIUM 40 MILLIGRAM(S): 20 TABLET, DELAYED RELEASE ORAL at 05:30

## 2023-06-17 RX ADMIN — LIDOCAINE 1 PATCH: 4 CREAM TOPICAL at 12:46

## 2023-06-17 RX ADMIN — CHLORHEXIDINE GLUCONATE 1 APPLICATION(S): 213 SOLUTION TOPICAL at 05:32

## 2023-06-17 RX ADMIN — URSODIOL 300 MILLIGRAM(S): 250 TABLET, FILM COATED ORAL at 17:25

## 2023-06-17 RX ADMIN — OXYCODONE HYDROCHLORIDE 10 MILLIGRAM(S): 5 TABLET ORAL at 07:36

## 2023-06-17 RX ADMIN — TACROLIMUS 0.5 MILLIGRAM(S): 5 CAPSULE ORAL at 20:05

## 2023-06-17 RX ADMIN — TACROLIMUS 1 MILLIGRAM(S): 5 CAPSULE ORAL at 07:36

## 2023-06-17 RX ADMIN — Medication 100 MICROGRAM(S): at 05:30

## 2023-06-17 RX ADMIN — OXYCODONE HYDROCHLORIDE 5 MILLIGRAM(S): 5 TABLET ORAL at 06:24

## 2023-06-17 RX ADMIN — Medication 1 TABLET(S): at 17:25

## 2023-06-17 RX ADMIN — TRAMADOL HYDROCHLORIDE 50 MILLIGRAM(S): 50 TABLET ORAL at 12:46

## 2023-06-17 RX ADMIN — OXYCODONE HYDROCHLORIDE 5 MILLIGRAM(S): 5 TABLET ORAL at 07:28

## 2023-06-17 RX ADMIN — TRAMADOL HYDROCHLORIDE 50 MILLIGRAM(S): 50 TABLET ORAL at 13:46

## 2023-06-17 RX ADMIN — Medication 650 MILLIGRAM(S): at 18:25

## 2023-06-17 RX ADMIN — FLUCONAZOLE 200 MILLIGRAM(S): 150 TABLET ORAL at 12:48

## 2023-06-17 RX ADMIN — HEPARIN SODIUM 5000 UNIT(S): 5000 INJECTION INTRAVENOUS; SUBCUTANEOUS at 05:29

## 2023-06-17 RX ADMIN — OXYCODONE HYDROCHLORIDE 10 MILLIGRAM(S): 5 TABLET ORAL at 08:36

## 2023-06-17 RX ADMIN — MYCOPHENOLATE MOFETIL 1000 MILLIGRAM(S): 250 CAPSULE ORAL at 19:39

## 2023-06-17 RX ADMIN — HEPARIN SODIUM 5000 UNIT(S): 5000 INJECTION INTRAVENOUS; SUBCUTANEOUS at 17:24

## 2023-06-17 RX ADMIN — Medication 650 MILLIGRAM(S): at 17:25

## 2023-06-17 RX ADMIN — MYCOPHENOLATE MOFETIL 1000 MILLIGRAM(S): 250 CAPSULE ORAL at 07:36

## 2023-06-17 RX ADMIN — Medication 1 TABLET(S): at 05:30

## 2023-06-17 RX ADMIN — OXYCODONE HYDROCHLORIDE 5 MILLIGRAM(S): 5 TABLET ORAL at 21:38

## 2023-06-17 RX ADMIN — URSODIOL 300 MILLIGRAM(S): 250 TABLET, FILM COATED ORAL at 05:29

## 2023-06-17 RX ADMIN — OXYCODONE HYDROCHLORIDE 5 MILLIGRAM(S): 5 TABLET ORAL at 22:30

## 2023-06-17 RX ADMIN — Medication 50 MILLILITER(S): at 09:34

## 2023-06-17 RX ADMIN — LIDOCAINE 1 PATCH: 4 CREAM TOPICAL at 20:57

## 2023-06-17 RX ADMIN — Medication 5 MILLIGRAM(S): at 09:34

## 2023-06-17 RX ADMIN — SENNA PLUS 2 TABLET(S): 8.6 TABLET ORAL at 21:38

## 2023-06-17 RX ADMIN — Medication 81 MILLIGRAM(S): at 12:48

## 2023-06-17 NOTE — PROGRESS NOTE ADULT - SUBJECTIVE AND OBJECTIVE BOX
Transplant Surgery Multidisciplinary Note  --------------------------------------------------------------  HCV+ OLT    Date: 6/9/23       POD#8    Present:   Patient seen and examined with multidisciplinary Transplant team including Surgeon: Dr. Haile, Hepatologist: SALO Manning during AM rounds.   Disciplines not in attendance will be notified of the plan.     HPI: 38F with Hx of hypothyroidism (on Levothyroxine), liver cirrhosis 2/2 ETOH abuse---->  presented to [facility] ED on 5/13/23 with ~ 2 weeks Hx of progressively worsening abdominal pain and distention.  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  now s/p HCV+ OLT under Simulect induction on 6/10/2023    Interval Events:  - LFTs trending down  - MONSE#1 removed  - c/o abd pain this morning   - leukocytosis    Immunosuppression:  Induction: Simulect completed  Maint:  FK by level, MMF 1/1, SST  ongoing monitoring for signs of rejection    Potential Discharge date: TBD  Education:  Medications  Plan of care:  See Below      MEDICATIONS  (STANDING):  albumin human 25% IVPB 100 milliLiter(s) IV Intermittent once  aspirin enteric coated 81 milliGRAM(s) Oral daily  bisacodyl 5 milliGRAM(s) Oral once  calcium carbonate 1250 mG  + Vitamin D (OsCal 500 + D) 1 Tablet(s) Oral two times a day  chlorhexidine 2% Cloths 1 Application(s) Topical <User Schedule>  fluconAZOLE   Tablet 200 milliGRAM(s) Oral daily  heparin   Injectable 5000 Unit(s) SubCutaneous every 12 hours  levothyroxine 100 MICROGram(s) Oral daily  lidocaine   4% Patch 1 Patch Transdermal every 24 hours  lidocaine 1% (Preservative-free) Injectable 5 milliLiter(s) Local Injection once  lidocaine 1% (Preservative-free) Injectable 5 milliLiter(s) Local Injection once  mycophenolate mofetil 1000 milliGRAM(s) Oral <User Schedule>  pantoprazole    Tablet 40 milliGRAM(s) Oral before breakfast  polyethylene glycol 3350 17 Gram(s) Oral daily  predniSONE   Tablet 20 milliGRAM(s) Oral daily  senna 2 Tablet(s) Oral at bedtime  tacrolimus 1 milliGRAM(s) Oral <User Schedule>  trimethoprim   80 mG/sulfamethoxazole 400 mG 1 Tablet(s) Oral <User Schedule>  ursodiol Capsule 300 milliGRAM(s) Oral every 12 hours  valGANciclovir 450 milliGRAM(s) Oral <User Schedule>    MEDICATIONS  (PRN):  traMADol 25 milliGRAM(s) Oral every 4 hours PRN Mild Pain (1 - 3)  traMADol 50 milliGRAM(s) Oral every 4 hours PRN Moderate Pain (4 - 6)      PAST MEDICAL & SURGICAL HISTORY:  Hypothyroidism  Alcoholic liver disease  No significant past surgical history    Vital Signs Last 24 Hrs  T(C): 36.3 (17 Jun 2023 05:00), Max: 36.8 (16 Jun 2023 21:37)  T(F): 97.4 (17 Jun 2023 05:00), Max: 98.2 (16 Jun 2023 21:37)  HR: 70 (17 Jun 2023 05:00) (57 - 90)  BP: 139/88 (17 Jun 2023 05:00) (117/82 - 153/90)  BP(mean): --  RR: 18 (17 Jun 2023 05:00) (18 - 20)  SpO2: 100% (17 Jun 2023 05:00) (99% - 100%)    Parameters below as of 17 Jun 2023 05:00  Patient On (Oxygen Delivery Method): room air    I&O's Summary    16 Jun 2023 07:01  -  17 Jun 2023 07:00  --------------------------------------------------------  IN: 1360 mL / OUT: 920 mL / NET: 440 mL    17 Jun 2023 07:01  -  17 Jun 2023 08:49  --------------------------------------------------------  IN: 0 mL / OUT: 70 mL / NET: -70 mL                        8.5    14.09 )-----------( 187      ( 17 Jun 2023 06:14 )             25.1     06-17    131<L>  |  93<L>  |  46<H>  ----------------------------<  133<H>  3.8   |  24  |  1.81<H>    Ca    8.8      17 Jun 2023 06:13  Phos  3.0     06-17  Mg     2.0     06-17    TPro  4.9<L>  /  Alb  3.0<L>  /  TBili  3.9<H>  /  DBili  x   /  AST  20  /  ALT  9<L>  /  AlkPhos  160<H>  06-17    Tacrolimus (), Serum: 5.1 ng/mL (06-16 @ 07:03)      Review of systems  All other systems were reviewed and are negative, except as noted.      PHYSICAL EXAM:  Constitutional: NAD  Eyes: anicteric, PERRLA  ENMT: nc/at, no thrush  Neck: supple, central line c/d/i  Respiratory: CTA B/L  Cardiovascular: RRR  Gastrointestinal: Soft abdomen, ND, appropriate incisional TTP. chevron incision c/d/i, staples in place. No signs of infection. JPs x2 ss  Genitourinary: voiding  Extremities: SCD's in place and working bilaterally  Vascular: Palpable dp pulses bilaterally.   Neurological: A&O x3  Skin: no rashes, ulcerations, lesions  Musculoskeletal: Moving all extremities  Psychiatric: Responsive

## 2023-06-17 NOTE — PROGRESS NOTE ADULT - ASSESSMENT
38F with Hx of hypothyroidism (on Levothyroxine), liver cirrhosis 2/2 ETOH abuse---->  presented to [facility] ED on 5/13/23 with ~ 2 weeks Hx of progressively worsening abdominal pain and distention.  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  now s/p HCV+ OLT under Simulect induction on 6/10/2023    [] s/p HCV+ OLT    - HCV + donor.  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 UA  - MSSA bacteremia (5/17) - completed Ancef 6/11.   - Reg diet  - Pain management: dc oxy, start tramadol. lidocaine patch  - IS/SCDs/SQH  - Strict I&Os   - bowel regimen    Immunosuppression  - FK by level, MMF 1/1, pred 20  - Simulect completed  - CMV viremia pre-op.  Valcyte to TIW for renal dosing.   - check CMV PCR 6/21  - PPX: Bactrim to TIW for renal dosing. Fluconazole QD

## 2023-06-18 LAB
ALBUMIN SERPL ELPH-MCNC: 2.9 G/DL — LOW (ref 3.3–5)
ALP SERPL-CCNC: 118 U/L — SIGNIFICANT CHANGE UP (ref 40–120)
ALT FLD-CCNC: 10 U/L — SIGNIFICANT CHANGE UP (ref 10–45)
ANION GAP SERPL CALC-SCNC: 14 MMOL/L — SIGNIFICANT CHANGE UP (ref 5–17)
APTT BLD: 25.9 SEC — LOW (ref 27.5–35.5)
AST SERPL-CCNC: 16 U/L — SIGNIFICANT CHANGE UP (ref 10–40)
BILIRUB SERPL-MCNC: 3.3 MG/DL — HIGH (ref 0.2–1.2)
BUN SERPL-MCNC: 40 MG/DL — HIGH (ref 7–23)
CALCIUM SERPL-MCNC: 9.1 MG/DL — SIGNIFICANT CHANGE UP (ref 8.4–10.5)
CHLORIDE SERPL-SCNC: 95 MMOL/L — LOW (ref 96–108)
CO2 SERPL-SCNC: 22 MMOL/L — SIGNIFICANT CHANGE UP (ref 22–31)
CREAT SERPL-MCNC: 1.76 MG/DL — HIGH (ref 0.5–1.3)
E FAECIUM DNA BLD POS QL NAA+NON-PROBE: SIGNIFICANT CHANGE UP
EGFR: 37 ML/MIN/1.73M2 — LOW
GLUCOSE SERPL-MCNC: 90 MG/DL — SIGNIFICANT CHANGE UP (ref 70–99)
GRAM STN FLD: SIGNIFICANT CHANGE UP
HCT VFR BLD CALC: 24.6 % — LOW (ref 34.5–45)
HCT VFR BLD CALC: 24.9 % — LOW (ref 34.5–45)
HGB BLD-MCNC: 8.2 G/DL — LOW (ref 11.5–15.5)
HGB BLD-MCNC: 8.2 G/DL — LOW (ref 11.5–15.5)
INR BLD: 0.9 RATIO — SIGNIFICANT CHANGE UP (ref 0.88–1.16)
MAGNESIUM SERPL-MCNC: 1.5 MG/DL — LOW (ref 1.6–2.6)
MCHC RBC-ENTMCNC: 30.1 PG — SIGNIFICANT CHANGE UP (ref 27–34)
MCHC RBC-ENTMCNC: 30.7 PG — SIGNIFICANT CHANGE UP (ref 27–34)
MCHC RBC-ENTMCNC: 32.9 GM/DL — SIGNIFICANT CHANGE UP (ref 32–36)
MCHC RBC-ENTMCNC: 33.3 GM/DL — SIGNIFICANT CHANGE UP (ref 32–36)
MCV RBC AUTO: 91.5 FL — SIGNIFICANT CHANGE UP (ref 80–100)
MCV RBC AUTO: 92.1 FL — SIGNIFICANT CHANGE UP (ref 80–100)
METHOD TYPE: SIGNIFICANT CHANGE UP
NRBC # BLD: 0 /100 WBCS — SIGNIFICANT CHANGE UP (ref 0–0)
NRBC # BLD: 0 /100 WBCS — SIGNIFICANT CHANGE UP (ref 0–0)
PHOSPHATE SERPL-MCNC: 2.9 MG/DL — SIGNIFICANT CHANGE UP (ref 2.5–4.5)
PLATELET # BLD AUTO: 213 K/UL — SIGNIFICANT CHANGE UP (ref 150–400)
PLATELET # BLD AUTO: 221 K/UL — SIGNIFICANT CHANGE UP (ref 150–400)
POTASSIUM SERPL-MCNC: 4.1 MMOL/L — SIGNIFICANT CHANGE UP (ref 3.5–5.3)
POTASSIUM SERPL-SCNC: 4.1 MMOL/L — SIGNIFICANT CHANGE UP (ref 3.5–5.3)
PROT SERPL-MCNC: 5 G/DL — LOW (ref 6–8.3)
PROTHROM AB SERPL-ACNC: 10.4 SEC — LOW (ref 10.5–13.4)
RBC # BLD: 2.67 M/UL — LOW (ref 3.8–5.2)
RBC # BLD: 2.72 M/UL — LOW (ref 3.8–5.2)
RBC # FLD: 21.2 % — HIGH (ref 10.3–14.5)
RBC # FLD: 21.3 % — HIGH (ref 10.3–14.5)
SODIUM SERPL-SCNC: 131 MMOL/L — LOW (ref 135–145)
SPECIMEN SOURCE: SIGNIFICANT CHANGE UP
TACROLIMUS SERPL-MCNC: 6.2 NG/ML — SIGNIFICANT CHANGE UP
WBC # BLD: 20.34 K/UL — HIGH (ref 3.8–10.5)
WBC # BLD: 27.47 K/UL — HIGH (ref 3.8–10.5)
WBC # FLD AUTO: 20.34 K/UL — HIGH (ref 3.8–10.5)
WBC # FLD AUTO: 27.47 K/UL — HIGH (ref 3.8–10.5)

## 2023-06-18 PROCEDURE — 93970 EXTREMITY STUDY: CPT | Mod: 26

## 2023-06-18 PROCEDURE — 99233 SBSQ HOSP IP/OBS HIGH 50: CPT

## 2023-06-18 PROCEDURE — 74176 CT ABD & PELVIS W/O CONTRAST: CPT | Mod: 26

## 2023-06-18 RX ORDER — OXYCODONE HYDROCHLORIDE 5 MG/1
5 TABLET ORAL ONCE
Refills: 0 | Status: DISCONTINUED | OUTPATIENT
Start: 2023-06-18 | End: 2023-06-18

## 2023-06-18 RX ORDER — MAGNESIUM OXIDE 400 MG ORAL TABLET 241.3 MG
400 TABLET ORAL
Refills: 0 | Status: DISCONTINUED | OUTPATIENT
Start: 2023-06-18 | End: 2023-06-26

## 2023-06-18 RX ORDER — HYDROMORPHONE HYDROCHLORIDE 2 MG/ML
0.5 INJECTION INTRAMUSCULAR; INTRAVENOUS; SUBCUTANEOUS ONCE
Refills: 0 | Status: DISCONTINUED | OUTPATIENT
Start: 2023-06-18 | End: 2023-06-18

## 2023-06-18 RX ORDER — MAGNESIUM SULFATE 500 MG/ML
2 VIAL (ML) INJECTION ONCE
Refills: 0 | Status: COMPLETED | OUTPATIENT
Start: 2023-06-18 | End: 2023-06-18

## 2023-06-18 RX ORDER — FUROSEMIDE 40 MG
40 TABLET ORAL ONCE
Refills: 0 | Status: COMPLETED | OUTPATIENT
Start: 2023-06-18 | End: 2023-06-18

## 2023-06-18 RX ORDER — DAPTOMYCIN 500 MG/10ML
400 INJECTION, POWDER, LYOPHILIZED, FOR SOLUTION INTRAVENOUS
Refills: 0 | Status: DISCONTINUED | OUTPATIENT
Start: 2023-06-18 | End: 2023-06-19

## 2023-06-18 RX ORDER — PIPERACILLIN AND TAZOBACTAM 4; .5 G/20ML; G/20ML
3.38 INJECTION, POWDER, LYOPHILIZED, FOR SOLUTION INTRAVENOUS EVERY 8 HOURS
Refills: 0 | Status: DISCONTINUED | OUTPATIENT
Start: 2023-06-18 | End: 2023-06-24

## 2023-06-18 RX ADMIN — LIDOCAINE 1 PATCH: 4 CREAM TOPICAL at 19:24

## 2023-06-18 RX ADMIN — Medication 20 MILLIGRAM(S): at 05:42

## 2023-06-18 RX ADMIN — Medication 1 TABLET(S): at 05:42

## 2023-06-18 RX ADMIN — LIDOCAINE 1 PATCH: 4 CREAM TOPICAL at 23:00

## 2023-06-18 RX ADMIN — HYDROMORPHONE HYDROCHLORIDE 0.5 MILLIGRAM(S): 2 INJECTION INTRAMUSCULAR; INTRAVENOUS; SUBCUTANEOUS at 10:55

## 2023-06-18 RX ADMIN — TRAMADOL HYDROCHLORIDE 50 MILLIGRAM(S): 50 TABLET ORAL at 05:00

## 2023-06-18 RX ADMIN — Medication 40 MILLIGRAM(S): at 21:44

## 2023-06-18 RX ADMIN — DAPTOMYCIN 116 MILLIGRAM(S): 500 INJECTION, POWDER, LYOPHILIZED, FOR SOLUTION INTRAVENOUS at 14:22

## 2023-06-18 RX ADMIN — HEPARIN SODIUM 5000 UNIT(S): 5000 INJECTION INTRAVENOUS; SUBCUTANEOUS at 05:41

## 2023-06-18 RX ADMIN — HYDROMORPHONE HYDROCHLORIDE 0.5 MILLIGRAM(S): 2 INJECTION INTRAMUSCULAR; INTRAVENOUS; SUBCUTANEOUS at 22:22

## 2023-06-18 RX ADMIN — PIPERACILLIN AND TAZOBACTAM 25 GRAM(S): 4; .5 INJECTION, POWDER, LYOPHILIZED, FOR SOLUTION INTRAVENOUS at 21:44

## 2023-06-18 RX ADMIN — CHLORHEXIDINE GLUCONATE 1 APPLICATION(S): 213 SOLUTION TOPICAL at 05:41

## 2023-06-18 RX ADMIN — PIPERACILLIN AND TAZOBACTAM 25 GRAM(S): 4; .5 INJECTION, POWDER, LYOPHILIZED, FOR SOLUTION INTRAVENOUS at 14:58

## 2023-06-18 RX ADMIN — OXYCODONE HYDROCHLORIDE 5 MILLIGRAM(S): 5 TABLET ORAL at 06:28

## 2023-06-18 RX ADMIN — HYDROMORPHONE HYDROCHLORIDE 0.5 MILLIGRAM(S): 2 INJECTION INTRAMUSCULAR; INTRAVENOUS; SUBCUTANEOUS at 09:01

## 2023-06-18 RX ADMIN — TRAMADOL HYDROCHLORIDE 50 MILLIGRAM(S): 50 TABLET ORAL at 18:07

## 2023-06-18 RX ADMIN — TRAMADOL HYDROCHLORIDE 25 MILLIGRAM(S): 50 TABLET ORAL at 12:52

## 2023-06-18 RX ADMIN — Medication 100 MICROGRAM(S): at 05:42

## 2023-06-18 RX ADMIN — LIDOCAINE 1 PATCH: 4 CREAM TOPICAL at 11:54

## 2023-06-18 RX ADMIN — PANTOPRAZOLE SODIUM 40 MILLIGRAM(S): 20 TABLET, DELAYED RELEASE ORAL at 05:42

## 2023-06-18 RX ADMIN — Medication 1 TABLET(S): at 17:08

## 2023-06-18 RX ADMIN — TRAMADOL HYDROCHLORIDE 50 MILLIGRAM(S): 50 TABLET ORAL at 17:07

## 2023-06-18 RX ADMIN — URSODIOL 300 MILLIGRAM(S): 250 TABLET, FILM COATED ORAL at 17:07

## 2023-06-18 RX ADMIN — OXYCODONE HYDROCHLORIDE 5 MILLIGRAM(S): 5 TABLET ORAL at 07:05

## 2023-06-18 RX ADMIN — TRAMADOL HYDROCHLORIDE 25 MILLIGRAM(S): 50 TABLET ORAL at 11:52

## 2023-06-18 RX ADMIN — Medication 25 GRAM(S): at 08:24

## 2023-06-18 RX ADMIN — Medication 81 MILLIGRAM(S): at 11:53

## 2023-06-18 RX ADMIN — TACROLIMUS 1 MILLIGRAM(S): 5 CAPSULE ORAL at 08:24

## 2023-06-18 RX ADMIN — MYCOPHENOLATE MOFETIL 1000 MILLIGRAM(S): 250 CAPSULE ORAL at 08:24

## 2023-06-18 RX ADMIN — FLUCONAZOLE 200 MILLIGRAM(S): 150 TABLET ORAL at 11:53

## 2023-06-18 RX ADMIN — SENNA PLUS 2 TABLET(S): 8.6 TABLET ORAL at 21:44

## 2023-06-18 RX ADMIN — HYDROMORPHONE HYDROCHLORIDE 0.5 MILLIGRAM(S): 2 INJECTION INTRAMUSCULAR; INTRAVENOUS; SUBCUTANEOUS at 09:16

## 2023-06-18 RX ADMIN — URSODIOL 300 MILLIGRAM(S): 250 TABLET, FILM COATED ORAL at 05:41

## 2023-06-18 RX ADMIN — TACROLIMUS 0.5 MILLIGRAM(S): 5 CAPSULE ORAL at 19:44

## 2023-06-18 RX ADMIN — MAGNESIUM OXIDE 400 MG ORAL TABLET 400 MILLIGRAM(S): 241.3 TABLET ORAL at 11:56

## 2023-06-18 RX ADMIN — HYDROMORPHONE HYDROCHLORIDE 0.5 MILLIGRAM(S): 2 INJECTION INTRAMUSCULAR; INTRAVENOUS; SUBCUTANEOUS at 10:40

## 2023-06-18 RX ADMIN — HYDROMORPHONE HYDROCHLORIDE 0.5 MILLIGRAM(S): 2 INJECTION INTRAMUSCULAR; INTRAVENOUS; SUBCUTANEOUS at 22:45

## 2023-06-18 RX ADMIN — TRAMADOL HYDROCHLORIDE 50 MILLIGRAM(S): 50 TABLET ORAL at 03:56

## 2023-06-18 RX ADMIN — HEPARIN SODIUM 5000 UNIT(S): 5000 INJECTION INTRAVENOUS; SUBCUTANEOUS at 17:07

## 2023-06-18 RX ADMIN — OXYCODONE HYDROCHLORIDE 5 MILLIGRAM(S): 5 TABLET ORAL at 08:23

## 2023-06-18 RX ADMIN — MAGNESIUM OXIDE 400 MG ORAL TABLET 400 MILLIGRAM(S): 241.3 TABLET ORAL at 17:07

## 2023-06-18 RX ADMIN — LIDOCAINE 1 PATCH: 4 CREAM TOPICAL at 00:19

## 2023-06-18 RX ADMIN — MYCOPHENOLATE MOFETIL 1000 MILLIGRAM(S): 250 CAPSULE ORAL at 19:44

## 2023-06-18 NOTE — PROGRESS NOTE ADULT - ASSESSMENT
38F with Hx of hypothyroidism (on Levothyroxine), liver cirrhosis 2/2 ETOH abuse---->  presented to [facility] ED on 5/13/23 with ~ 2 weeks Hx of progressively worsening abdominal pain and distention.  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  now s/p HCV+ OLT under Simulect induction on 6/10/2023      Bacteremia  - Leukocytosis: +GPCs, concerning for VRE from BCx 6/17. ID following. Started Zosyn/Daptomycin.  Check CK daily while on Daptomycin  - check CT a/p non-con for source  - daily BCx until cleared.  check UA/UCx.  replace peripheral IV.  daily skin checks  - MSSA bacteremia (5/17) - completed Ancef 6/11.     [] s/p HCV+ OLT    - HCV + donor.  Recipient now with HCV Viremia, Genotype 1A, plan to start Epclusa   - LFTs downtrending  - subcutaneous hematoma--change dressing QD or as needed. no concerns for active bleeding at this time  - check LUE doppler  - Continue Actigal  - ASA 81mg po qd  - VIVIANA: improving  - Regular diet  - Pain management: oxycodone prn/ lidocaine patch for now and involve pain management if acute issues have resolved  - IS/SCDs/SQH  - Strict I&Os: keep MONSE #2 for now  - bowel regimen    Immunosuppression  - FK by level, MMF 1/1, pred 20  - Simulect completed  - CMV viremia pre-op.  Valcyte to TIW for renal dosing.   - check CMV PCR 6/21  - PPX: Bactrim to TIW for renal dosing. Fluconazole QD (renal dosing

## 2023-06-18 NOTE — PROGRESS NOTE ADULT - SUBJECTIVE AND OBJECTIVE BOX
Transplant Surgery Multidisciplinary Note  --------------------------------------------------------------  HCV+ OLT    Date: 6/9/23       POD#9    Present:   Patient seen and examined with multidisciplinary Transplant team including Surgeon: Dr. Haile, Hepatologist: SALO Manning during AM rounds.   Disciplines not in attendance will be notified of the plan.     HPI: 38F with Hx of hypothyroidism (on Levothyroxine), liver cirrhosis 2/2 ETOH abuse---->  presented to [facility] ED on 5/13/23 with ~ 2 weeks Hx of progressively worsening abdominal pain and distention.  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  now s/p HCV+ OLT under Simulect induction on 6/10/2023    Interval Events:  - Afebrile, VSS  - good graft function  - incisional pain, subcutaneous hematoma evacuated with clots this morning at bedside  - LUE pain    Immunosuppression:  Induction: Simulect completed  Maint:  FK by level, MMF 1/1, SST  ongoing monitoring for signs of rejection    Potential Discharge date: TBD  Education:  Medications  Plan of care:  See Below        MEDICATIONS  (STANDING):  aspirin enteric coated 81 milliGRAM(s) Oral daily  calcium carbonate 1250 mG  + Vitamin D (OsCal 500 + D) 1 Tablet(s) Oral two times a day  chlorhexidine 2% Cloths 1 Application(s) Topical <User Schedule>  DAPTOmycin IVPB 400 milliGRAM(s) IV Intermittent every 48 hours  fluconAZOLE   Tablet 200 milliGRAM(s) Oral daily  heparin   Injectable 5000 Unit(s) SubCutaneous every 12 hours  levothyroxine 100 MICROGram(s) Oral daily  lidocaine   4% Patch 1 Patch Transdermal every 24 hours  lidocaine 1% (Preservative-free) Injectable 5 milliLiter(s) Local Injection once  lidocaine 1% (Preservative-free) Injectable 5 milliLiter(s) Local Injection once  magnesium oxide 400 milliGRAM(s) Oral three times a day with meals  mycophenolate mofetil 1000 milliGRAM(s) Oral <User Schedule>  pantoprazole    Tablet 40 milliGRAM(s) Oral before breakfast  piperacillin/tazobactam IVPB.. 3.375 Gram(s) IV Intermittent every 8 hours  polyethylene glycol 3350 17 Gram(s) Oral daily  predniSONE   Tablet 20 milliGRAM(s) Oral daily  senna 2 Tablet(s) Oral at bedtime  tacrolimus 1 milliGRAM(s) Oral <User Schedule>  tacrolimus 0.5 milliGRAM(s) Oral <User Schedule>  trimethoprim   80 mG/sulfamethoxazole 400 mG 1 Tablet(s) Oral <User Schedule>  ursodiol Capsule 300 milliGRAM(s) Oral every 12 hours  valGANciclovir 450 milliGRAM(s) Oral <User Schedule>    MEDICATIONS  (PRN):  traMADol 25 milliGRAM(s) Oral every 12 hours PRN Mild Pain (1 - 3)  traMADol 50 milliGRAM(s) Oral every 12 hours PRN Moderate Pain (4 - 6)      PAST MEDICAL & SURGICAL HISTORY:  Hypothyroidism      Alcoholic liver disease      No significant past surgical history          Vital Signs Last 24 Hrs  T(C): 36.5 (18 Jun 2023 14:20), Max: 36.8 (18 Jun 2023 01:00)  T(F): 97.7 (18 Jun 2023 14:20), Max: 98.2 (18 Jun 2023 01:00)  HR: 83 (18 Jun 2023 14:20) (73 - 98)  BP: 130/88 (18 Jun 2023 14:20) (116/74 - 130/88)  BP(mean): --  RR: 20 (18 Jun 2023 14:20) (18 - 20)  SpO2: 98% (18 Jun 2023 14:20) (98% - 100%)    Parameters below as of 18 Jun 2023 14:20  Patient On (Oxygen Delivery Method): room air        I&O's Summary    17 Jun 2023 07:01  -  18 Jun 2023 07:00  --------------------------------------------------------  IN: 1660 mL / OUT: 550 mL / NET: 1110 mL    18 Jun 2023 07:01  -  18 Jun 2023 15:37  --------------------------------------------------------  IN: 360 mL / OUT: 30 mL / NET: 330 mL                              8.2    27.47 )-----------( 221      ( 18 Jun 2023 13:44 )             24.6     06-18    131<L>  |  95<L>  |  40<H>  ----------------------------<  90  4.1   |  22  |  1.76<H>    Ca    9.1      18 Jun 2023 06:43  Phos  2.9     06-18  Mg     1.5     06-18    TPro  5.0<L>  /  Alb  2.9<L>  /  TBili  3.3<H>  /  DBili  x   /  AST  16  /  ALT  10  /  AlkPhos  118  06-18    Tacrolimus (), Serum: 6.2 ng/mL (06-18 @ 06:43)        Culture - Blood (collected 06-17-23 @ 13:25)  Source: .Blood Blood  Gram Stain (06-18-23 @ 10:30):    Growth in anaerobic bottle: Gram Positive Cocci in Pairs and Chains  Preliminary Report (06-18-23 @ 10:30):    Growth in anaerobic bottle: Gram Positive Cocci in Pairs and Chains    ***Blood Panel PCR results on this specimen are available    approximately 3 hours after the Gram stain result.***    Gram stain, PCR, and/or culture results may not always    correspond due to difference in methodologies.    ************************************************************    This PCR assay was performed by multiplex PCR. This    Assay tests for 66 bacterial and resistance gene targets.    Please refer to the [facility] test directory    at [email or web address] for details.  Organism: Blood Culture PCR (06-18-23 @ 11:43)  Organism: Blood Culture PCR (06-18-23 @ 11:43)                  Review of systems  All other systems were reviewed and are negative, except as noted.      PHYSICAL EXAM:  Constitutional: NAD  Eyes: anicteric, PERRLA  ENMT: nc/at, no thrush  Neck: supple, no lines. prior sites c/d/i.    Respiratory: CTA B/L  Cardiovascular: RRR  Gastrointestinal: Soft abdomen, ND, appropriate incisional TTP. chevron incision with stable ecchymosis.  medial aspect opened, hematoma evacuated with clots. no defect appreciated. staples otherwise in place. No signs of infection. MONSE#2 ss, leaking around site  Genitourinary: voiding  Extremities: SCD's in place and working bilaterally  Vascular: Palpable dp pulses bilaterally.   Neurological: A&O x3  Skin: no rashes, ulcerations, lesions  Musculoskeletal: Moving all extremities  Psychiatric: Responsive

## 2023-06-18 NOTE — PROGRESS NOTE ADULT - ASSESSMENT
39 yo F with hypothyroidism (on Levothyroxine) who initially presented to [facility] on 5/13 with abd pain. 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;  MSSA bacteremia secondary to SSTI and Organizing pneumonia of undetermined etiology now s/p OLT 6/9/23  S/p OLT6/9/23  CMV D+/R+, EBV D+/R+, Toxoplasma D?/R-  No fever, has rising WBC  5/13 2/2 BC pos MSSA (s/p treatment course)  6/8 ascitic fluid cx P; cellk  count; 36 cells  BCX 6/17 with 1/2 VRE (other BCX pending)--new  UA neg, UCX pending  CXR reviewed personally clear  US liver 6/11 - Large heterogeneous fluid collection in Morison's pouch measuring 11.4 x 2.4 x 7.7 cm  Concern for possible infected fluid collection as source bacteremia/rising leukocytosis  Potential life threatening infection  Overall, VRE Bacteremia, post operative state  - Start Dapto 400mg q 48 (monitor CrCl, check creatine kinase tomorrow AM)  - Start Zosyn 3.375g q 8  - Would check CT A/P (with contrast if able to tolerate, defer to team)  - Trend WBC to normal  - Repeat BCXs q 48 until clear  - Check TTE  - Valcyte/Bactrim  - Repeat CMV 6/20  - Check HIV NAAT/HBV DNA 1 month post transplant  - Fluconazole PPX  - Direct care for hematoma/wounds per team    Abdiaziz Cano MD  Contact on TEAMS messaging from 9am - 5pm  From 5pm-9am, on weekends, or if no response call [phone]

## 2023-06-18 NOTE — PROGRESS NOTE ADULT - SUBJECTIVE AND OBJECTIVE BOX
CC: Bacteremia    ID called back for +BCX. Saw/spoke to patient. Uncomfortable. Abd pain--had episode of hematoma/bleeding at wound site, s/p evacuation this AM.     Allergies  No Known Allergies    ANTIMICROBIALS:  DAPTOmycin IVPB 400 every 48 hours  fluconAZOLE   Tablet 200 daily  trimethoprim   80 mG/sulfamethoxazole 400 mG 1 <User Schedule>  valGANciclovir 450 <User Schedule>    PE:    Vital Signs Last 24 Hrs  T(C): 36.7 (2023 08:57), Max: 36.8 (2023 01:00)  T(F): 98 (2023 08:57), Max: 98.2 (2023 01:00)  HR: 92 (2023 08:57) (73 - 98)  BP: 116/74 (2023 08:57) (116/74 - 126/82)  RR: 20 (2023 08:57) (18 - 20)  SpO2: 98% (2023 08:57) (98% - 100%)    Gen: AOx3, NAD, non-toxic  CV: Nontachycardic  Resp: Breathing comfortably, RA  Abd: Wound sites clean, area of open wound with packing (site of hematoma)  IV/Skin: No thrombophlebitis    LABS:                        8.2    20.34 )-----------( 213      ( 2023 06:43 )             24.9     06-18    131<L>  |  95<L>  |  40<H>  ----------------------------<  90  4.1   |  22  |  1.76<H>    Ca    9.1      2023 06:43  Phos  2.9     06-18  Mg     1.5     06-18    TPro  5.0<L>  /  Alb  2.9<L>  /  TBili  3.3<H>  /  DBili  x   /  AST  16  /  ALT  10  /  AlkPhos  118  06-18    Urinalysis Basic - ( 2023 21:01 )    Color: Yellow / Appearance: Slightly Turbid / S.020 / pH: x  Gluc: x / Ketone: Negative  / Bili: Negative / Urobili: Negative   Blood: x / Protein: 100 mg/dl / Nitrite: Negative   Leuk Esterase: Negative / RBC: 4 /hpf / WBC 2 /HPF   Sq Epi: x / Non Sq Epi: x / Bacteria: Negative    MICROBIOLOGY:    .Blood Blood  23   Growth in anaerobic bottle: Gram Positive Cocci in Pairs and Chains  ***Blood Panel PCR results on this specimen are available  approximately 3 hours after the Gram stain result.***  Gram stain, PCR, and/or culture results may not always  correspond due to difference in methodologies.  ************************************************************  This PCR assay was performed by multiplex PCR. This  Assay tests for 66 bacterial and resistance gene targets.  Please refer to the [facility] test directory  at [email or web address] for details.  --  Blood Culture PCR    Abdominal Fl Abdominal Fluid  06-10-23   No growth at 5 days  --    No polymorphonuclear cells seen per low power field  No organisms seen per oil power field    Ascites Fl Ascites Fluid  23   No growth at 5 days  --    No polymorphonuclear cells seen  No organisms seen  by cytocentrifuge    .Blood Blood-Peripheral  23   No Growth Final  --  --    .Blood Blood  23   No Growth Final  --  --    .Bronchial Bronchoalveolar Lavage  23   No growth at 48 hours  --    polymorphonuclear leukocytes per low power field  No squamous epithelial cells per low power field  Gram Negative Rods per oil power field  by cytocentrifuge    .Bronchial Bronchial Lavage  23   No acid-fast bacilli isolated at 1 week. ****Acid-fast cultures are held  for 6 weeks.****  --    Rare polymorphonuclear leukocytes per low power field  No squamous epithelial cells per low power field  No organisms seen per oil power field    .Blood Blood-Peripheral  23   No Growth Final  --  --    .Blood Blood-Peripheral  23   No Growth Final  --  --    Abdominal Fl Abdominal Fluid  23   No growth  --    polymorphonuclear leukocytes seen  No organisms seen  by cytocentrifuge    Clean Catch Clean Catch (Midstream)  23   <10,000 CFU/mL Normal Urogenital Aga  --  --    .Blood Blood-Peripheral  23   No Growth Final  --  --    .Blood Blood-Peripheral  23   No Growth Final  --  --    .Sputum Sputum  23   No acid-fast bacilli isolated at 3 weeks.  --    Few polymorphonuclear leukocytes per low power field  Moderate Squamous epithelial cells per low power field  Moderate Yeast like cells seen per oil power field    Clean Catch Clean Catch (Midstream)  23   50,000 - 99,000 CFU/mL Enterococcus faecium (vancomycin resistant)  --  Enterococcus faecium (vancomycin resistant)    CMV IgG Antibody: >10.00 U/mL (23 @ 00:45)  Toxoplasma IgG Screen: <3.0 IU/mL (23 @ 07:14)  CMV IgG Antibody: >10.00 U/mL (23 @ 07:14)  Toxoplasma IgG Screen: <3.0 IU/mL (23 @ 07:14)    CMVPCR Lo.64 Yli53KK/mL ( @ 06:08)    (otherwise reviewed)    RADIOLOGY:     XR:      FINDINGS:  Interval removal of right-sided central venous catheter.  Redemonstrated right lower lung chest tube  Interval resolution of bilateral effusions.  Mild bilateral reticular opacities.  There is no pleural effusion or pneumothorax.  The heart is normal in size  The visualized osseous structures demonstrate no acute pathology.    IMPRESSION:  Minimal pulmonary vascular congestion.

## 2023-06-18 NOTE — PROGRESS NOTE ADULT - NS ATTEND AMEND GEN_ALL_CORE FT
GPCs in bld cx drawn for leukocytosis.  CT with no drainable collection  started on IV abx  immuno: tac/cellcept/pred

## 2023-06-19 LAB
ALBUMIN SERPL ELPH-MCNC: 2.5 G/DL — LOW (ref 3.3–5)
ALP SERPL-CCNC: 104 U/L — SIGNIFICANT CHANGE UP (ref 40–120)
ALT FLD-CCNC: 8 U/L — LOW (ref 10–45)
ANION GAP SERPL CALC-SCNC: 15 MMOL/L — SIGNIFICANT CHANGE UP (ref 5–17)
APTT BLD: 29.8 SEC — SIGNIFICANT CHANGE UP (ref 27.5–35.5)
AST SERPL-CCNC: 12 U/L — SIGNIFICANT CHANGE UP (ref 10–40)
BILIRUB SERPL-MCNC: 2.7 MG/DL — HIGH (ref 0.2–1.2)
BUN SERPL-MCNC: 34 MG/DL — HIGH (ref 7–23)
CALCIUM SERPL-MCNC: 8.7 MG/DL — SIGNIFICANT CHANGE UP (ref 8.4–10.5)
CHLORIDE SERPL-SCNC: 90 MMOL/L — LOW (ref 96–108)
CK SERPL-CCNC: <10 U/L — LOW (ref 25–170)
CO2 SERPL-SCNC: 24 MMOL/L — SIGNIFICANT CHANGE UP (ref 22–31)
CREAT SERPL-MCNC: 1.51 MG/DL — HIGH (ref 0.5–1.3)
EGFR: 45 ML/MIN/1.73M2 — LOW
GLUCOSE SERPL-MCNC: 154 MG/DL — HIGH (ref 70–99)
GRAM STN FLD: SIGNIFICANT CHANGE UP
HCT VFR BLD CALC: 22.1 % — LOW (ref 34.5–45)
HGB BLD-MCNC: 7.2 G/DL — LOW (ref 11.5–15.5)
INR BLD: 1.02 RATIO — SIGNIFICANT CHANGE UP (ref 0.88–1.16)
MAGNESIUM SERPL-MCNC: 1.9 MG/DL — SIGNIFICANT CHANGE UP (ref 1.6–2.6)
MCHC RBC-ENTMCNC: 30.8 PG — SIGNIFICANT CHANGE UP (ref 27–34)
MCHC RBC-ENTMCNC: 32.6 GM/DL — SIGNIFICANT CHANGE UP (ref 32–36)
MCV RBC AUTO: 94.4 FL — SIGNIFICANT CHANGE UP (ref 80–100)
NRBC # BLD: 0 /100 WBCS — SIGNIFICANT CHANGE UP (ref 0–0)
PHOSPHATE SERPL-MCNC: 3.4 MG/DL — SIGNIFICANT CHANGE UP (ref 2.5–4.5)
PLATELET # BLD AUTO: 209 K/UL — SIGNIFICANT CHANGE UP (ref 150–400)
POTASSIUM SERPL-MCNC: 3.6 MMOL/L — SIGNIFICANT CHANGE UP (ref 3.5–5.3)
POTASSIUM SERPL-SCNC: 3.6 MMOL/L — SIGNIFICANT CHANGE UP (ref 3.5–5.3)
PROT SERPL-MCNC: 4.5 G/DL — LOW (ref 6–8.3)
PROTHROM AB SERPL-ACNC: 11.7 SEC — SIGNIFICANT CHANGE UP (ref 10.5–13.4)
RBC # BLD: 2.34 M/UL — LOW (ref 3.8–5.2)
RBC # FLD: 21.7 % — HIGH (ref 10.3–14.5)
SODIUM SERPL-SCNC: 129 MMOL/L — LOW (ref 135–145)
SPECIMEN SOURCE: SIGNIFICANT CHANGE UP
SPECIMEN SOURCE: SIGNIFICANT CHANGE UP
TACROLIMUS SERPL-MCNC: 11.7 NG/ML — SIGNIFICANT CHANGE UP
WBC # BLD: 25.8 K/UL — HIGH (ref 3.8–10.5)
WBC # FLD AUTO: 25.8 K/UL — HIGH (ref 3.8–10.5)

## 2023-06-19 PROCEDURE — 99232 SBSQ HOSP IP/OBS MODERATE 35: CPT | Mod: GC,24

## 2023-06-19 PROCEDURE — 99233 SBSQ HOSP IP/OBS HIGH 50: CPT

## 2023-06-19 RX ORDER — VELPATASVIR AND SOFOSBUVIR 37.5; 15 MG/1; MG/1
1 PELLET ORAL
Qty: 30 | Refills: 0
Start: 2023-06-19 | End: 2023-07-18

## 2023-06-19 RX ORDER — DAPTOMYCIN 500 MG/10ML
350 INJECTION, POWDER, LYOPHILIZED, FOR SOLUTION INTRAVENOUS EVERY 24 HOURS
Refills: 0 | Status: DISCONTINUED | OUTPATIENT
Start: 2023-06-19 | End: 2023-06-19

## 2023-06-19 RX ORDER — DAPTOMYCIN 500 MG/10ML
400 INJECTION, POWDER, LYOPHILIZED, FOR SOLUTION INTRAVENOUS EVERY 24 HOURS
Refills: 0 | Status: DISCONTINUED | OUTPATIENT
Start: 2023-06-19 | End: 2023-06-26

## 2023-06-19 RX ORDER — TACROLIMUS 5 MG/1
0.5 CAPSULE ORAL
Refills: 0 | Status: DISCONTINUED | OUTPATIENT
Start: 2023-06-20 | End: 2023-06-26

## 2023-06-19 RX ORDER — TRAMADOL HYDROCHLORIDE 50 MG/1
25 TABLET ORAL ONCE
Refills: 0 | Status: DISCONTINUED | OUTPATIENT
Start: 2023-06-19 | End: 2023-06-19

## 2023-06-19 RX ORDER — VELPATASVIR AND SOFOSBUVIR 37.5; 15 MG/1; MG/1
1 PELLET ORAL
Refills: 0
Start: 2023-06-19

## 2023-06-19 RX ORDER — HYDROMORPHONE HYDROCHLORIDE 2 MG/ML
0.2 INJECTION INTRAMUSCULAR; INTRAVENOUS; SUBCUTANEOUS ONCE
Refills: 0 | Status: DISCONTINUED | OUTPATIENT
Start: 2023-06-19 | End: 2023-06-21

## 2023-06-19 RX ADMIN — URSODIOL 300 MILLIGRAM(S): 250 TABLET, FILM COATED ORAL at 17:07

## 2023-06-19 RX ADMIN — DAPTOMYCIN 116 MILLIGRAM(S): 500 INJECTION, POWDER, LYOPHILIZED, FOR SOLUTION INTRAVENOUS at 18:19

## 2023-06-19 RX ADMIN — TRAMADOL HYDROCHLORIDE 50 MILLIGRAM(S): 50 TABLET ORAL at 08:41

## 2023-06-19 RX ADMIN — TRAMADOL HYDROCHLORIDE 25 MILLIGRAM(S): 50 TABLET ORAL at 11:50

## 2023-06-19 RX ADMIN — HEPARIN SODIUM 5000 UNIT(S): 5000 INJECTION INTRAVENOUS; SUBCUTANEOUS at 05:00

## 2023-06-19 RX ADMIN — VALGANCICLOVIR 450 MILLIGRAM(S): 450 TABLET, FILM COATED ORAL at 12:30

## 2023-06-19 RX ADMIN — MAGNESIUM OXIDE 400 MG ORAL TABLET 400 MILLIGRAM(S): 241.3 TABLET ORAL at 17:08

## 2023-06-19 RX ADMIN — Medication 100 MICROGRAM(S): at 05:01

## 2023-06-19 RX ADMIN — CHLORHEXIDINE GLUCONATE 1 APPLICATION(S): 213 SOLUTION TOPICAL at 05:01

## 2023-06-19 RX ADMIN — PIPERACILLIN AND TAZOBACTAM 25 GRAM(S): 4; .5 INJECTION, POWDER, LYOPHILIZED, FOR SOLUTION INTRAVENOUS at 05:01

## 2023-06-19 RX ADMIN — TRAMADOL HYDROCHLORIDE 50 MILLIGRAM(S): 50 TABLET ORAL at 07:41

## 2023-06-19 RX ADMIN — URSODIOL 300 MILLIGRAM(S): 250 TABLET, FILM COATED ORAL at 05:00

## 2023-06-19 RX ADMIN — TRAMADOL HYDROCHLORIDE 50 MILLIGRAM(S): 50 TABLET ORAL at 20:30

## 2023-06-19 RX ADMIN — MAGNESIUM OXIDE 400 MG ORAL TABLET 400 MILLIGRAM(S): 241.3 TABLET ORAL at 07:41

## 2023-06-19 RX ADMIN — LIDOCAINE 1 PATCH: 4 CREAM TOPICAL at 18:27

## 2023-06-19 RX ADMIN — LIDOCAINE 1 PATCH: 4 CREAM TOPICAL at 21:20

## 2023-06-19 RX ADMIN — FLUCONAZOLE 200 MILLIGRAM(S): 150 TABLET ORAL at 12:30

## 2023-06-19 RX ADMIN — TRAMADOL HYDROCHLORIDE 25 MILLIGRAM(S): 50 TABLET ORAL at 15:45

## 2023-06-19 RX ADMIN — MYCOPHENOLATE MOFETIL 1000 MILLIGRAM(S): 250 CAPSULE ORAL at 07:41

## 2023-06-19 RX ADMIN — MYCOPHENOLATE MOFETIL 1000 MILLIGRAM(S): 250 CAPSULE ORAL at 20:02

## 2023-06-19 RX ADMIN — Medication 1 TABLET(S): at 12:30

## 2023-06-19 RX ADMIN — Medication 1 TABLET(S): at 17:08

## 2023-06-19 RX ADMIN — PANTOPRAZOLE SODIUM 40 MILLIGRAM(S): 20 TABLET, DELAYED RELEASE ORAL at 05:00

## 2023-06-19 RX ADMIN — MAGNESIUM OXIDE 400 MG ORAL TABLET 400 MILLIGRAM(S): 241.3 TABLET ORAL at 12:30

## 2023-06-19 RX ADMIN — Medication 20 MILLIGRAM(S): at 05:00

## 2023-06-19 RX ADMIN — HEPARIN SODIUM 5000 UNIT(S): 5000 INJECTION INTRAVENOUS; SUBCUTANEOUS at 17:07

## 2023-06-19 RX ADMIN — PIPERACILLIN AND TAZOBACTAM 25 GRAM(S): 4; .5 INJECTION, POWDER, LYOPHILIZED, FOR SOLUTION INTRAVENOUS at 21:46

## 2023-06-19 RX ADMIN — TRAMADOL HYDROCHLORIDE 25 MILLIGRAM(S): 50 TABLET ORAL at 10:50

## 2023-06-19 RX ADMIN — Medication 81 MILLIGRAM(S): at 12:30

## 2023-06-19 RX ADMIN — PIPERACILLIN AND TAZOBACTAM 25 GRAM(S): 4; .5 INJECTION, POWDER, LYOPHILIZED, FOR SOLUTION INTRAVENOUS at 14:11

## 2023-06-19 RX ADMIN — Medication 1 TABLET(S): at 05:01

## 2023-06-19 RX ADMIN — TRAMADOL HYDROCHLORIDE 25 MILLIGRAM(S): 50 TABLET ORAL at 14:45

## 2023-06-19 RX ADMIN — TRAMADOL HYDROCHLORIDE 50 MILLIGRAM(S): 50 TABLET ORAL at 20:02

## 2023-06-19 RX ADMIN — TACROLIMUS 1 MILLIGRAM(S): 5 CAPSULE ORAL at 07:40

## 2023-06-19 NOTE — PROGRESS NOTE ADULT - SUBJECTIVE AND OBJECTIVE BOX
Transplant Surgery Multidisciplinary Note  --------------------------------------------------------------  HCV+ OLT    Date: 6/9/23       POD#10    Present:   Patient seen and examined with multidisciplinary Transplant team including Surgeon: Dr. Byrnes, Hepatologist: Dr. Vivar, SCAR Mace during AM rounds.   Disciplines not in attendance will be notified of the plan.     HPI: 38F with Hx of hypothyroidism (on Levothyroxine), liver cirrhosis 2/2 ETOH abuse---->  presented to [facility] ED on 5/13/23 with ~ 2 weeks Hx of progressively worsening abdominal pain and distention.  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  now s/p HCV+ OLT under Simulect induction on 6/10/2023    Interval Events:  - incisional pain, subcutaneous hematoma evacuated at bedside and wound packed  - CT w/ moderate to large perihepatic collection with areas of high density which may represent blood products/clot; abscess cannot be excluded.  - Afebrile  - BCX: 6/17: + VRE.  Started On Zosyn and Dapto  - Good graft function    Immunosuppression:  Induction: Simulect completed  Maint:  FK by level, MMF 1/1, SST  ongoing monitoring for signs of rejection    Potential Discharge date: TBD  Education:  Medications  Plan of care:  See Below      MEDICATIONS  (STANDING):  aspirin enteric coated 81 milliGRAM(s) Oral daily  calcium carbonate 1250 mG  + Vitamin D (OsCal 500 + D) 1 Tablet(s) Oral two times a day  chlorhexidine 2% Cloths 1 Application(s) Topical <User Schedule>  DAPTOmycin IVPB 400 milliGRAM(s) IV Intermittent every 24 hours  fluconAZOLE   Tablet 200 milliGRAM(s) Oral daily  heparin   Injectable 5000 Unit(s) SubCutaneous every 12 hours  HYDROmorphone  Injectable 0.2 milliGRAM(s) IV Push once  levothyroxine 100 MICROGram(s) Oral daily  lidocaine   4% Patch 1 Patch Transdermal every 24 hours  lidocaine 1% (Preservative-free) Injectable 5 milliLiter(s) Local Injection once  lidocaine 1% (Preservative-free) Injectable 5 milliLiter(s) Local Injection once  magnesium oxide 400 milliGRAM(s) Oral three times a day with meals  mycophenolate mofetil 1000 milliGRAM(s) Oral <User Schedule>  pantoprazole    Tablet 40 milliGRAM(s) Oral before breakfast  piperacillin/tazobactam IVPB.. 3.375 Gram(s) IV Intermittent every 8 hours  polyethylene glycol 3350 17 Gram(s) Oral daily  predniSONE   Tablet 20 milliGRAM(s) Oral daily  senna 2 Tablet(s) Oral at bedtime  trimethoprim   80 mG/sulfamethoxazole 400 mG 1 Tablet(s) Oral <User Schedule>  ursodiol Capsule 300 milliGRAM(s) Oral every 12 hours  valGANciclovir 450 milliGRAM(s) Oral <User Schedule>    MEDICATIONS  (PRN):  traMADol 25 milliGRAM(s) Oral every 12 hours PRN Mild Pain (1 - 3)  traMADol 50 milliGRAM(s) Oral every 12 hours PRN Moderate Pain (4 - 6)      PAST MEDICAL & SURGICAL HISTORY:  Hypothyroidism      Alcoholic liver disease      No significant past surgical history          Vital Signs Last 24 Hrs  T(C): 37 (19 Jun 2023 12:34), Max: 37 (19 Jun 2023 12:34)  T(F): 98.6 (19 Jun 2023 12:34), Max: 98.6 (19 Jun 2023 12:34)  HR: 83 (19 Jun 2023 12:34) (76 - 90)  BP: 118/78 (19 Jun 2023 12:34) (108/72 - 128/83)  BP(mean): 101 (18 Jun 2023 21:00) (101 - 101)  RR: 18 (19 Jun 2023 12:34) (18 - 18)  SpO2: 96% (19 Jun 2023 12:34) (96% - 100%)    Parameters below as of 19 Jun 2023 12:34  Patient On (Oxygen Delivery Method): room air        I&O's Summary    18 Jun 2023 07:01  -  19 Jun 2023 07:00  --------------------------------------------------------  IN: 840 mL / OUT: 280 mL / NET: 560 mL    19 Jun 2023 07:01  -  19 Jun 2023 15:13  --------------------------------------------------------  IN: 480 mL / OUT: 210 mL / NET: 270 mL                              7.2    25.80 )-----------( 209      ( 19 Jun 2023 06:25 )             22.1     06-19    129<L>  |  90<L>  |  34<H>  ----------------------------<  154<H>  3.6   |  24  |  1.51<H>    Ca    8.7      19 Jun 2023 06:24  Phos  3.4     06-19  Mg     1.9     06-19    TPro  4.5<L>  /  Alb  2.5<L>  /  TBili  2.7<H>  /  DBili  x   /  AST  12  /  ALT  8<L>  /  AlkPhos  104  06-19    Tacrolimus (), Serum: 11.7 ng/mL (06-19 @ 06:25)        Culture - Blood (collected 06-18-23 @ 13:56)  Source: .Blood Blood-Peripheral  Gram Stain (06-19-23 @ 07:58):    Growth in anaerobic bottle: Gram Positive Cocci in Pairs and Chains  Preliminary Report (06-19-23 @ 07:58):    Growth in anaerobic bottle: Gram Positive Cocci in Pairs and Chains    Culture - Blood (collected 06-18-23 @ 13:47)  Source: .Blood Blood-Peripheral  Gram Stain (06-19-23 @ 09:15):    Growth in anaerobic bottle: Gram Positive Cocci in Pairs and Chains    Growth in aerobic bottle: Gram Positive Cocci in Pairs and Chains  Preliminary Report (06-19-23 @ 09:15):    Growth in anaerobic bottle: Gram Positive Cocci in Pairs and Chains    Growth in aerobic bottle: Gram Positive Cocci in Pairs and Chains    Culture - Urine (collected 06-18-23 @ 09:11)  Source: OR Collect Bladder (from O.R.)  Preliminary Report (06-19-23 @ 10:32):    Rare Gram positive organisms    Culture - Blood (collected 06-17-23 @ 13:30)  Source: .Blood Blood  Preliminary Report (06-18-23 @ 19:01):    No growth to date.    Culture - Blood (collected 06-17-23 @ 13:25)  Source: .Blood Blood  Gram Stain (06-18-23 @ 10:30):    Growth in anaerobic bottle: Gram Positive Cocci in Pairs and Chains  Preliminary Report (06-19-23 @ 11:21):    Growth in anaerobic bottle: Enterococcus faecium    Susceptibility to follow.    ***Blood Panel PCR results on this specimen are available    approximately 3 hours after the Gram stain result.***    Gram stain, PCR, and/or culture results may not always    correspond due to difference in methodologies.    ************************************************************    This PCR assay was performed by multiplex PCR. This    Assay tests for 66 bacterial and resistance gene targets.    Please refer to the [facility] Labs test directory    at [email or web address] for details.  Organism: Blood Culture PCR (06-18-23 @ 11:43)  Organism: Blood Culture PCR (06-18-23 @ 11:43)        Review of systems  All other systems were reviewed and are negative, except as noted.      PHYSICAL EXAM:  Constitutional: NAD  Eyes: anicteric, PERRLA  ENMT: nc/at, no thrush  Neck: supple, no lines. prior sites c/d/i.    Respiratory: CTA B/L  Cardiovascular: RRR  Gastrointestinal: Soft abdomen, ND, appropriate incisional TTP. chevron incision with stable ecchymosis.  medial aspect open, hematoma evacuated with clots. no defect appreciated. staples otherwise in place. No signs of infection. MONSE#2 ss, leaking around site  Genitourinary: voiding  Extremities: SCD's in place and working bilaterally  Vascular: Palpable dp pulses bilaterally.   Neurological: A&O x3  Skin: no rashes, ulcerations, lesions  Musculoskeletal: Moving all extremities  Psychiatric: Responsive

## 2023-06-19 NOTE — PROGRESS NOTE ADULT - ASSESSMENT
38F with Hx of hypothyroidism (on Levothyroxine), liver cirrhosis 2/2 ETOH abuse---->  presented to [facility] ED on 5/13/23 with ~ 2 weeks Hx of progressively worsening abdominal pain and distention.  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  now s/p HCV+ OLT under Simulect induction on 6/10/2023      Bacteremia  - Leukocytosis: 6/17 BCx growing VRE. Sensitivities pending. ID following. Continue Zosyn/Daptomycin.  Check CK daily while on Daptomycin  - 6/18 CT a/p reviewed  - daily BCx until cleared. daily skin checks  - Check Procal in AM  - MSSA bacteremia (5/17) - completed Ancef 6/11.     [] s/p HCV+ OLT    - HCV + donor.  Recipient now with HCV Viremia, Genotype 1A.  Ordering Epclusa today  - good graft function  - subcutaneous hematoma--change dressing QD or as needed. no concerns for active bleeding at this time  - Continue Actigal  - ASA 81mg po qd  - VIVIANA: improving  - Regular diet  - Pain management: oxycodone prn/ lidocaine patch for now and involve pain management if acute issues have resolved  - IS/SCDs/SQH  - Strict I&Os: keep MONSE #2 for now  - bowel regimen    Immunosuppression  - FK by level, MMF 1/1, pred 20  - Simulect completed  - CMV viremia pre-op.  Valcyte to TIW for renal dosing.   - check CMV PCR 6/21  - PPX: Bactrim to TIW for renal dosing. Fluconazole QD (renal dosing

## 2023-06-19 NOTE — PROGRESS NOTE ADULT - ASSESSMENT
39 yo F with hypothyroidism (on Levothyroxine) who initially presented to [facility] on 5/13 with abd pain. 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;  MSSA bacteremia secondary to SSTI and Organizing pneumonia of undetermined etiology now s/p OLT 6/9/23  S/p OLT6/9/23  CMV D+/R+, EBV D+/R+, Toxoplasma D?/R-  No fever, has rising WBC  5/13 2/2 BC pos MSSA (s/p treatment course)  6/8 ascitic fluid cx P; cellk  count; 36 cells  BCX 6/17 with 1/2 VRE (other BCX pending)--new  UA neg, UCX pending  CXR reviewed personally clear  US liver 6/11 - Large heterogeneous fluid collection in Morison's pouch measuring 11.4 x 2.4 x 7.7 cm  Concern for possible infected fluid collection as source bacteremia/rising leukocytosis  Potential life threatening infection  Overall, VRE Bacteremia, post operative state  - Adjust Dapto tp 400mg q 24 (monitor CrCl, check creatine kinase tomorrow AM)  - continue Zosyn 3.375g q 8  - Follow up on final CT A/P findings - if significant fluid collections visualized would pursue drainage as involvement is likely  - Trend WBC to normal  - Repeat BCXs q 48 until clear  - Check TTE  - Valcyte/Bactrim  - Repeat CMV 6/20  - Check HIV NAAT/HBV DNA 1 month post transplant  - Fluconazole PPX  - Direct care for hematoma/wounds per team    I will continue to follow. Please feel free to contact me with any further questions.    Irving Deras M.D.  [facility] Division of Infectious Disease  8AM-5PM Monday - Friday: Available on [employer] Teams  After Hours and Holidays (or if no response on Microsoft Teams): Please contact the Infectious Diseases Office at [phone]    The above assessment and plan were discussed with Aminata, transplant surgery NP

## 2023-06-19 NOTE — PROGRESS NOTE ADULT - NS ATTEND AMEND GEN_ALL_CORE FT
immuno plan today - fk 11.7, hold evening dose, decrease to 0.5mg bid tomorrow, mmf 1g bid, pred taper  dapto and zosyn for vre on blood culture  ct reviewed - a couple of fluid collections aroudn liver, monitor for now  mobilize

## 2023-06-19 NOTE — PROGRESS NOTE ADULT - SUBJECTIVE AND OBJECTIVE BOX
Follow Up:      Interval History:    REVIEW OF SYSTEMS  [  ] ROS unobtainable because:    [  ] All other systems negative except as noted below    Constitutional:  [ ] fever [ ] chills  [ ] weight loss  [ ] weakness  Skin:  [ ] rash [ ] phlebitis	  Eyes: [ ] icterus [ ] pain  [ ] discharge	  ENMT: [ ] sore throat  [ ] thrush [ ] ulcers [ ] exudates  Respiratory: [ ] dyspnea [ ] hemoptysis [ ] cough [ ] sputum	  Cardiovascular:  [ ] chest pain [ ] palpitations [ ] edema	  Gastrointestinal:  [ ] nausea [ ] vomiting [ ] diarrhea [ ] constipation [ ] pain	  Genitourinary:  [ ] dysuria [ ] frequency [ ] hematuria [ ] discharge [ ] flank pain  [ ] incontinence  Musculoskeletal:  [ ] myalgias [ ] arthralgias [ ] arthritis  [ ] back pain  Neurological:  [ ] headache [ ] seizures  [ ] confusion/altered mental status    Allergies  No Known Allergies        ANTIMICROBIALS:  DAPTOmycin IVPB 400 every 24 hours  fluconAZOLE   Tablet 200 daily  piperacillin/tazobactam IVPB.. 3.375 every 8 hours  trimethoprim   80 mG/sulfamethoxazole 400 mG 1 <User Schedule>  valGANciclovir 450 <User Schedule>      OTHER MEDS:  MEDICATIONS  (STANDING):  aspirin enteric coated 81 daily  heparin   Injectable 5000 every 12 hours  HYDROmorphone  Injectable 0.2 once  levothyroxine 100 daily  mycophenolate mofetil 1000 <User Schedule>  pantoprazole    Tablet 40 before breakfast  polyethylene glycol 3350 17 daily  predniSONE   Tablet 20 daily  senna 2 at bedtime  traMADol 25 every 12 hours PRN  traMADol 50 every 12 hours PRN  ursodiol Capsule 300 every 12 hours      Vital Signs Last 24 Hrs  T(C): 36.8 (2023 17:08), Max: 37 (2023 12:34)  T(F): 98.3 (2023 17:08), Max: 98.6 (2023 12:34)  HR: 79 (2023 17:08) (76 - 88)  BP: 115/79 (2023 17:08) (108/72 - 124/85)  BP(mean): 101 (2023 21:00) (101 - 101)  RR: 18 (2023 17:08) (18 - 18)  SpO2: 99% (2023 17:08) (96% - 100%)    Parameters below as of 2023 17:08  Patient On (Oxygen Delivery Method): room air        PHYSICAL EXAMINATION:  General: Alert and Awake, NAD  HEENT: PERRL, EOMI  Neck: Supple  Cardiac: RRR, No M/R/G  Resp: CTAB, No Wh/Rh/Ra  Abdomen: NBS, NT/ND, No HSM, No rigidity or guarding  MSK: No LE edema. No Calf tenderness  : No montenegro  Skin: No rashes or lesions. Skin is warm and dry to the touch.   Neuro: Alert and Awake. CN 2-12 Grossly intact. Moves all four extremities spontaneously.  Psych: Calm, Pleasant, Cooperative                          7.2    25.80 )-----------( 209      ( 2023 06:25 )             22.1       06-19    129<L>  |  90<L>  |  34<H>  ----------------------------<  154<H>  3.6   |  24  |  1.51<H>    Ca    8.7      2023 06:24  Phos  3.4     06-  Mg     1.9         TPro  4.5<L>  /  Alb  2.5<L>  /  TBili  2.7<H>  /  DBili  x   /  AST  12  /  ALT  8<L>  /  AlkPhos  104  -19      Urinalysis Basic - ( 2023 21:01 )    Color: Yellow / Appearance: Slightly Turbid / S.020 / pH: x  Gluc: x / Ketone: Negative  / Bili: Negative / Urobili: Negative   Blood: x / Protein: 100 mg/dl / Nitrite: Negative   Leuk Esterase: Negative / RBC: 4 /hpf / WBC 2 /HPF   Sq Epi: x / Non Sq Epi: x / Bacteria: Negative        MICROBIOLOGY:  v  .Blood Blood-Peripheral  23   Growth in anaerobic bottle: Gram Positive Cocci in Pairs and Chains  --    Growth in anaerobic bottle: Gram Positive Cocci in Pairs and Chains      .Blood Blood-Peripheral  23   Growth in anaerobic bottle: Gram Positive Cocci in Pairs and Chains  Growth in aerobic bottle: Gram Positive Cocci in Pairs and Chains  --    Growth in anaerobic bottle: Gram Positive Cocci in Pairs and Chains  Growth in aerobic bottle: Gram Positive Cocci in Pairs and Chains      OR Collect Bladder (from O.R.)  23   Rare Gram positive organisms  --  --      .Blood Blood  23   No growth to date.  --  --      .Blood Blood  23   Growth in anaerobic bottle: Enterococcus faecium  Susceptibility to follow.  ***Blood Panel PCR results on this specimen are available  approximately 3 hours after the Gram stain result.***  Gram stain, PCR, and/or culture results may not always  correspond due to difference in methodologies.  ************************************************************  This PCR assay was performed by multiplex PCR. This  Assay tests for 66 bacterial and resistance gene targets.  Please refer to the [facility] test directory  at [email or web address] for details.  --  Blood Culture PCR      Abdominal Fl Abdominal Fluid  06-10-23   No growth at 5 days  --    No polymorphonuclear cells seen per low power field  No organisms seen per oil power field      Ascites Fl Ascites Fluid  23   No growth at 5 days  --    No polymorphonuclear cells seen  No organisms seen  by cytocentrifuge      .Blood Blood-Peripheral  23   No Growth Final  --  --      .Blood Blood  23   No Growth Final  --  --      .Bronchial Bronchoalveolar Lavage  23   No growth at 48 hours  --    polymorphonuclear leukocytes per low power field  No squamous epithelial cells per low power field  Gram Negative Rods per oil power field  by cytocentrifuge      .Bronchial Bronchial Lavage  23   No acid-fast bacilli isolated at 1 week. ****Acid-fast cultures are held  for 6 weeks.****  --    Rare polymorphonuclear leukocytes per low power field  No squamous epithelial cells per low power field  No organisms seen per oil power field      .Blood Blood-Peripheral  23   No Growth Final  --  --      .Blood Blood-Peripheral  23   No Growth Final  --  --      Abdominal Fl Abdominal Fluid  23   No growth  --    polymorphonuclear leukocytes seen  No organisms seen  by cytocentrifuge      Clean Catch Clean Catch (Midstream)  23   <10,000 CFU/mL Normal Urogenital Aga  --  --      .Blood Blood-Peripheral  23   No Growth Final  --  --      .Blood Blood-Peripheral  23   No Growth Final  --  --      .Sputum Sputum  23   No acid-fast bacilli isolated at 3 weeks.  --    Few polymorphonuclear leukocytes per low power field  Moderate Squamous epithelial cells per low power field  Moderate Yeast like cells seen per oil power field      .Blood Blood-Peripheral  23   No Growth Final  --  --      .Blood Blood-Peripheral  23   No Growth Final  --  --      Peritoneal Peritoneal Fluid  23   No growth at 5 days  --    polymorphonuclear leukocytes seen  No organisms seen  by cytocentrifuge      .Blood Blood-Peripheral  23   No Growth Final  --  --      .Blood Blood  23   No Growth Final  --  --      Clean Catch Clean Catch (Midstream)  23   50,000 - 99,000 CFU/mL Enterococcus faecium (vancomycin resistant)  --  Enterococcus faecium (vancomycin resistant)        CMV IgG Antibody: >10.00 U/mL (23 @ 00:45)  Toxoplasma IgG Screen: <3.0 IU/mL (23 @ 07:14)  CMV IgG Antibody: >10.00 U/mL (23 @ 07:14)  Toxoplasma IgG Screen: <3.0 IU/mL (23 @ 07:14)    CMVPCR Lo.64 Khw56ZK/mL ( @ 06:08)        RADIOLOGY:    <The imaging below has been reviewed and visualized by me independently. Findings as detailed in report below> Follow Up:  bacteremia    Interval History: abdominal pain improving. afebrile.     REVIEW OF SYSTEMS  [  ] ROS unobtainable because:    [ x ] All other systems negative except as noted below    Constitutional:  [ ] fever [ ] chills  [ ] weight loss  [ ] weakness  Skin:  [ ] rash [ ] phlebitis	  Eyes: [ ] icterus [ ] pain  [ ] discharge	  ENMT: [ ] sore throat  [ ] thrush [ ] ulcers [ ] exudates  Respiratory: [ ] dyspnea [ ] hemoptysis [ ] cough [ ] sputum	  Cardiovascular:  [ ] chest pain [ ] palpitations [ ] edema	  Gastrointestinal:  [ ] nausea [ ] vomiting [ ] diarrhea [ ] constipation [x ] pain	  Genitourinary:  [ ] dysuria [ ] frequency [ ] hematuria [ ] discharge [ ] flank pain  [ ] incontinence  Musculoskeletal:  [ ] myalgias [ ] arthralgias [ ] arthritis  [ ] back pain  Neurological:  [ ] headache [ ] seizures  [ ] confusion/altered mental status    Allergies  No Known Allergies        ANTIMICROBIALS:  DAPTOmycin IVPB 400 every 24 hours  fluconAZOLE   Tablet 200 daily  piperacillin/tazobactam IVPB.. 3.375 every 8 hours  trimethoprim   80 mG/sulfamethoxazole 400 mG 1 <User Schedule>  valGANciclovir 450 <User Schedule>      OTHER MEDS:  MEDICATIONS  (STANDING):  aspirin enteric coated 81 daily  heparin   Injectable 5000 every 12 hours  HYDROmorphone  Injectable 0.2 once  levothyroxine 100 daily  mycophenolate mofetil 1000 <User Schedule>  pantoprazole    Tablet 40 before breakfast  polyethylene glycol 3350 17 daily  predniSONE   Tablet 20 daily  senna 2 at bedtime  traMADol 25 every 12 hours PRN  traMADol 50 every 12 hours PRN  ursodiol Capsule 300 every 12 hours      Vital Signs Last 24 Hrs  T(C): 36.8 (2023 17:08), Max: 37 (2023 12:34)  T(F): 98.3 (2023 17:08), Max: 98.6 (2023 12:34)  HR: 79 (2023 17:08) (76 - 88)  BP: 115/79 (2023 17:08) (108/72 - 124/85)  BP(mean): 101 (2023 21:00) (101 - 101)  RR: 18 (2023 17:08) (18 - 18)  SpO2: 99% (2023 17:08) (96% - 100%)    Parameters below as of 2023 17:08  Patient On (Oxygen Delivery Method): room air      PHYSICAL EXAMINATION:  General: Alert and Awake, NAD  HEENT: Normocephalic / Atraumatic  Resp: No accessory muscles of respiration utilized  Abdomen: Abdominal wound with packing at left side of OLT incision. RLQ Drain with SS output. Moderate diffuse tenderness to palpation.  MSK: No LE edema.   : No montenegro  Skin: As per abdominal section.   Neuro: Alert and Awake. CN 2-12 Grossly intact. Moves all four extremities spontaneously.  Psych: Calm, Pleasant, Cooperative                          7.2    25.80 )-----------( 209      ( 2023 06:25 )             22.1       06-19    129<L>  |  90<L>  |  34<H>  ----------------------------<  154<H>  3.6   |  24  |  1.51<H>    Ca    8.7      2023 06:24  Phos  3.4     06-  Mg     1.9     19    TPro  4.5<L>  /  Alb  2.5<L>  /  TBili  2.7<H>  /  DBili  x   /  AST  12  /  ALT  8<L>  /  AlkPhos  104  06-19      Urinalysis Basic - ( 2023 21:01 )    Color: Yellow / Appearance: Slightly Turbid / S.020 / pH: x  Gluc: x / Ketone: Negative  / Bili: Negative / Urobili: Negative   Blood: x / Protein: 100 mg/dl / Nitrite: Negative   Leuk Esterase: Negative / RBC: 4 /hpf / WBC 2 /HPF   Sq Epi: x / Non Sq Epi: x / Bacteria: Negative        MICROBIOLOGY:  v  .Blood Blood-Peripheral  23   Growth in anaerobic bottle: Gram Positive Cocci in Pairs and Chains  --    Growth in anaerobic bottle: Gram Positive Cocci in Pairs and Chains      .Blood Blood-Peripheral  23   Growth in anaerobic bottle: Gram Positive Cocci in Pairs and Chains  Growth in aerobic bottle: Gram Positive Cocci in Pairs and Chains  --    Growth in anaerobic bottle: Gram Positive Cocci in Pairs and Chains  Growth in aerobic bottle: Gram Positive Cocci in Pairs and Chains      OR Collect Bladder (from O.R.)  23   Rare Gram positive organisms  --  --      .Blood Blood  23   No growth to date.  --  --      .Blood Blood  23   Growth in anaerobic bottle: Enterococcus faecium  Susceptibility to follow.  ***Blood Panel PCR results on this specimen are available  approximately 3 hours after the Gram stain result.***  Gram stain, PCR, and/or culture results may not always  correspond due to difference in methodologies.  ************************************************************  This PCR assay was performed by multiplex PCR. This  Assay tests for 66 bacterial and resistance gene targets.  Please refer to the [facility] Labs test directory  at [email or web address] for details.  --  Blood Culture PCR      Abdominal Fl Abdominal Fluid  06-10-23   No growth at 5 days  --    No polymorphonuclear cells seen per low power field  No organisms seen per oil power field      Ascites Fl Ascites Fluid  23   No growth at 5 days  --    No polymorphonuclear cells seen  No organisms seen  by cytocentrifuge      .Blood Blood-Peripheral  23   No Growth Final  --  --      .Blood Blood  23   No Growth Final  --  --      .Bronchial Bronchoalveolar Lavage  23   No growth at 48 hours  --    polymorphonuclear leukocytes per low power field  No squamous epithelial cells per low power field  Gram Negative Rods per oil power field  by cytocentrifuge      .Bronchial Bronchial Lavage  23   No acid-fast bacilli isolated at 1 week. ****Acid-fast cultures are held  for 6 weeks.****  --    Rare polymorphonuclear leukocytes per low power field  No squamous epithelial cells per low power field  No organisms seen per oil power field      .Blood Blood-Peripheral  23   No Growth Final  --  --      .Blood Blood-Peripheral  23   No Growth Final  --  --      Abdominal Fl Abdominal Fluid  23   No growth  --    polymorphonuclear leukocytes seen  No organisms seen  by cytocentrifuge      Clean Catch Clean Catch (Midstream)  23   <10,000 CFU/mL Normal Urogenital Aga  --  --      .Blood Blood-Peripheral  23   No Growth Final  --  --      .Blood Blood-Peripheral  23   No Growth Final  --  --      .Sputum Sputum  23   No acid-fast bacilli isolated at 3 weeks.  --    Few polymorphonuclear leukocytes per low power field  Moderate Squamous epithelial cells per low power field  Moderate Yeast like cells seen per oil power field      .Blood Blood-Peripheral  23   No Growth Final  --  --      .Blood Blood-Peripheral  23   No Growth Final  --  --      Peritoneal Peritoneal Fluid  23   No growth at 5 days  --    polymorphonuclear leukocytes seen  No organisms seen  by cytocentrifuge      .Blood Blood-Peripheral  23   No Growth Final  --  --      .Blood Blood  23   No Growth Final  --  --      Clean Catch Clean Catch (Midstream)  23   50,000 - 99,000 CFU/mL Enterococcus faecium (vancomycin resistant)  --  Enterococcus faecium (vancomycin resistant)        CMV IgG Antibody: >10.00 U/mL (23 @ 00:45)  Toxoplasma IgG Screen: <3.0 IU/mL (23 @ 07:14)  CMV IgG Antibody: >10.00 U/mL (23 @ 07:14)  Toxoplasma IgG Screen: <3.0 IU/mL (23 @ 07:14)    CMVPCR Lo.64 Fdd53VT/mL ( @ 06:08)        RADIOLOGY:    <The imaging below has been reviewed and visualized by me independently. Findings as detailed in report below>    < from: [facility] Duplex Upper Ext Vein Scan, Bilat (23 @ 16:55) >  IMPRESSION:  No evidence of deep venous thrombosis in either upper extremity.    Superficial thrombophlebitis involving the RIGHT basilic vein in the   upper arm.    < end of copied text >

## 2023-06-20 LAB
-  AMPICILLIN: SIGNIFICANT CHANGE UP
-  AMPICILLIN: SIGNIFICANT CHANGE UP
-  CIPROFLOXACIN: SIGNIFICANT CHANGE UP
-  DAPTOMYCIN: SIGNIFICANT CHANGE UP
-  GENTAMICIN SYNERGY: SIGNIFICANT CHANGE UP
-  LEVOFLOXACIN: SIGNIFICANT CHANGE UP
-  LINEZOLID: SIGNIFICANT CHANGE UP
-  STREPTOMYCIN SYNERGY: SIGNIFICANT CHANGE UP
-  TETRACYCLINE: SIGNIFICANT CHANGE UP
-  VANCOMYCIN: SIGNIFICANT CHANGE UP
-  VANCOMYCIN: SIGNIFICANT CHANGE UP
ALBUMIN SERPL ELPH-MCNC: 2.5 G/DL — LOW (ref 3.3–5)
ALP SERPL-CCNC: 92 U/L — SIGNIFICANT CHANGE UP (ref 40–120)
ALT FLD-CCNC: 6 U/L — LOW (ref 10–45)
ANION GAP SERPL CALC-SCNC: 17 MMOL/L — SIGNIFICANT CHANGE UP (ref 5–17)
APTT BLD: 26.4 SEC — LOW (ref 27.5–35.5)
AST SERPL-CCNC: 9 U/L — LOW (ref 10–40)
BILIRUB SERPL-MCNC: 2.4 MG/DL — HIGH (ref 0.2–1.2)
BLD GP AB SCN SERPL QL: NEGATIVE — SIGNIFICANT CHANGE UP
BUN SERPL-MCNC: 36 MG/DL — HIGH (ref 7–23)
CALCIUM SERPL-MCNC: 8.6 MG/DL — SIGNIFICANT CHANGE UP (ref 8.4–10.5)
CHLORIDE SERPL-SCNC: 92 MMOL/L — LOW (ref 96–108)
CMV DNA CSF QL NAA+PROBE: 1430 IU/ML — HIGH
CMV DNA CSF QL NAA+PROBE: 1860 IU/ML — HIGH
CMV DNA SPEC NAA+PROBE-LOG#: 3.16 LOG10IU/ML — HIGH
CMV DNA SPEC NAA+PROBE-LOG#: 3.27 LOG10IU/ML — HIGH
CO2 SERPL-SCNC: 21 MMOL/L — LOW (ref 22–31)
CREAT SERPL-MCNC: 2.05 MG/DL — HIGH (ref 0.5–1.3)
CULTURE RESULTS: SIGNIFICANT CHANGE UP
EGFR: 31 ML/MIN/1.73M2 — LOW
GLUCOSE SERPL-MCNC: 82 MG/DL — SIGNIFICANT CHANGE UP (ref 70–99)
HCT VFR BLD CALC: 21.2 % — LOW (ref 34.5–45)
HGB BLD-MCNC: 7 G/DL — CRITICAL LOW (ref 11.5–15.5)
INR BLD: 0.91 RATIO — SIGNIFICANT CHANGE UP (ref 0.88–1.16)
MAGNESIUM SERPL-MCNC: 2 MG/DL — SIGNIFICANT CHANGE UP (ref 1.6–2.6)
MCHC RBC-ENTMCNC: 30.7 PG — SIGNIFICANT CHANGE UP (ref 27–34)
MCHC RBC-ENTMCNC: 33 GM/DL — SIGNIFICANT CHANGE UP (ref 32–36)
MCV RBC AUTO: 93 FL — SIGNIFICANT CHANGE UP (ref 80–100)
METHOD TYPE: SIGNIFICANT CHANGE UP
METHOD TYPE: SIGNIFICANT CHANGE UP
NRBC # BLD: 0 /100 WBCS — SIGNIFICANT CHANGE UP (ref 0–0)
ORGANISM # SPEC MICROSCOPIC CNT: SIGNIFICANT CHANGE UP
PHOSPHATE SERPL-MCNC: 3.8 MG/DL — SIGNIFICANT CHANGE UP (ref 2.5–4.5)
PLATELET # BLD AUTO: 239 K/UL — SIGNIFICANT CHANGE UP (ref 150–400)
POTASSIUM SERPL-MCNC: 3.4 MMOL/L — LOW (ref 3.5–5.3)
POTASSIUM SERPL-SCNC: 3.4 MMOL/L — LOW (ref 3.5–5.3)
PROCALCITONIN SERPL-MCNC: 0.89 NG/ML — HIGH (ref 0.02–0.1)
PROCALCITONIN SERPL-MCNC: 0.94 NG/ML — HIGH (ref 0.02–0.1)
PROT SERPL-MCNC: 4.7 G/DL — LOW (ref 6–8.3)
PROTHROM AB SERPL-ACNC: 10.4 SEC — LOW (ref 10.5–13.4)
RBC # BLD: 2.28 M/UL — LOW (ref 3.8–5.2)
RBC # FLD: 22.5 % — HIGH (ref 10.3–14.5)
RH IG SCN BLD-IMP: POSITIVE — SIGNIFICANT CHANGE UP
SODIUM SERPL-SCNC: 130 MMOL/L — LOW (ref 135–145)
SPECIMEN SOURCE: SIGNIFICANT CHANGE UP
TACROLIMUS SERPL-MCNC: 6.7 NG/ML — SIGNIFICANT CHANGE UP
WBC # BLD: 18.01 K/UL — HIGH (ref 3.8–10.5)
WBC # FLD AUTO: 18.01 K/UL — HIGH (ref 3.8–10.5)

## 2023-06-20 PROCEDURE — 99233 SBSQ HOSP IP/OBS HIGH 50: CPT

## 2023-06-20 PROCEDURE — 93321 DOPPLER ECHO F-UP/LMTD STD: CPT | Mod: 26

## 2023-06-20 PROCEDURE — 99232 SBSQ HOSP IP/OBS MODERATE 35: CPT | Mod: GC,24

## 2023-06-20 PROCEDURE — 93308 TTE F-UP OR LMTD: CPT | Mod: 26

## 2023-06-20 RX ORDER — TRAMADOL HYDROCHLORIDE 50 MG/1
50 TABLET ORAL ONCE
Refills: 0 | Status: DISCONTINUED | OUTPATIENT
Start: 2023-06-20 | End: 2023-06-20

## 2023-06-20 RX ORDER — POTASSIUM CHLORIDE 20 MEQ
20 PACKET (EA) ORAL
Refills: 0 | Status: COMPLETED | OUTPATIENT
Start: 2023-06-20 | End: 2023-06-20

## 2023-06-20 RX ORDER — VALGANCICLOVIR 450 MG/1
450 TABLET, FILM COATED ORAL
Refills: 0 | Status: DISCONTINUED | OUTPATIENT
Start: 2023-06-20 | End: 2023-06-24

## 2023-06-20 RX ADMIN — MAGNESIUM OXIDE 400 MG ORAL TABLET 400 MILLIGRAM(S): 241.3 TABLET ORAL at 17:13

## 2023-06-20 RX ADMIN — MYCOPHENOLATE MOFETIL 1000 MILLIGRAM(S): 250 CAPSULE ORAL at 08:32

## 2023-06-20 RX ADMIN — TACROLIMUS 0.5 MILLIGRAM(S): 5 CAPSULE ORAL at 20:00

## 2023-06-20 RX ADMIN — Medication 81 MILLIGRAM(S): at 11:36

## 2023-06-20 RX ADMIN — TRAMADOL HYDROCHLORIDE 50 MILLIGRAM(S): 50 TABLET ORAL at 21:08

## 2023-06-20 RX ADMIN — Medication 1 TABLET(S): at 05:34

## 2023-06-20 RX ADMIN — Medication 20 MILLIEQUIVALENT(S): at 13:04

## 2023-06-20 RX ADMIN — TRAMADOL HYDROCHLORIDE 50 MILLIGRAM(S): 50 TABLET ORAL at 05:00

## 2023-06-20 RX ADMIN — PIPERACILLIN AND TAZOBACTAM 25 GRAM(S): 4; .5 INJECTION, POWDER, LYOPHILIZED, FOR SOLUTION INTRAVENOUS at 15:53

## 2023-06-20 RX ADMIN — Medication 20 MILLIGRAM(S): at 05:34

## 2023-06-20 RX ADMIN — TRAMADOL HYDROCHLORIDE 25 MILLIGRAM(S): 50 TABLET ORAL at 00:33

## 2023-06-20 RX ADMIN — VALGANCICLOVIR 450 MILLIGRAM(S): 450 TABLET, FILM COATED ORAL at 18:22

## 2023-06-20 RX ADMIN — PIPERACILLIN AND TAZOBACTAM 25 GRAM(S): 4; .5 INJECTION, POWDER, LYOPHILIZED, FOR SOLUTION INTRAVENOUS at 05:39

## 2023-06-20 RX ADMIN — DAPTOMYCIN 116 MILLIGRAM(S): 500 INJECTION, POWDER, LYOPHILIZED, FOR SOLUTION INTRAVENOUS at 15:52

## 2023-06-20 RX ADMIN — Medication 100 MICROGRAM(S): at 05:34

## 2023-06-20 RX ADMIN — HEPARIN SODIUM 5000 UNIT(S): 5000 INJECTION INTRAVENOUS; SUBCUTANEOUS at 17:13

## 2023-06-20 RX ADMIN — MAGNESIUM OXIDE 400 MG ORAL TABLET 400 MILLIGRAM(S): 241.3 TABLET ORAL at 11:55

## 2023-06-20 RX ADMIN — PANTOPRAZOLE SODIUM 40 MILLIGRAM(S): 20 TABLET, DELAYED RELEASE ORAL at 05:34

## 2023-06-20 RX ADMIN — HEPARIN SODIUM 5000 UNIT(S): 5000 INJECTION INTRAVENOUS; SUBCUTANEOUS at 05:39

## 2023-06-20 RX ADMIN — MAGNESIUM OXIDE 400 MG ORAL TABLET 400 MILLIGRAM(S): 241.3 TABLET ORAL at 08:32

## 2023-06-20 RX ADMIN — TRAMADOL HYDROCHLORIDE 25 MILLIGRAM(S): 50 TABLET ORAL at 01:33

## 2023-06-20 RX ADMIN — MYCOPHENOLATE MOFETIL 1000 MILLIGRAM(S): 250 CAPSULE ORAL at 19:59

## 2023-06-20 RX ADMIN — URSODIOL 300 MILLIGRAM(S): 250 TABLET, FILM COATED ORAL at 05:33

## 2023-06-20 RX ADMIN — LIDOCAINE 1 PATCH: 4 CREAM TOPICAL at 07:23

## 2023-06-20 RX ADMIN — URSODIOL 300 MILLIGRAM(S): 250 TABLET, FILM COATED ORAL at 17:13

## 2023-06-20 RX ADMIN — FLUCONAZOLE 200 MILLIGRAM(S): 150 TABLET ORAL at 11:36

## 2023-06-20 RX ADMIN — CHLORHEXIDINE GLUCONATE 1 APPLICATION(S): 213 SOLUTION TOPICAL at 08:35

## 2023-06-20 RX ADMIN — TACROLIMUS 0.5 MILLIGRAM(S): 5 CAPSULE ORAL at 08:32

## 2023-06-20 RX ADMIN — Medication 1 TABLET(S): at 17:13

## 2023-06-20 RX ADMIN — TRAMADOL HYDROCHLORIDE 50 MILLIGRAM(S): 50 TABLET ORAL at 04:27

## 2023-06-20 RX ADMIN — TRAMADOL HYDROCHLORIDE 50 MILLIGRAM(S): 50 TABLET ORAL at 08:37

## 2023-06-20 RX ADMIN — Medication 20 MILLIEQUIVALENT(S): at 08:32

## 2023-06-20 RX ADMIN — TRAMADOL HYDROCHLORIDE 50 MILLIGRAM(S): 50 TABLET ORAL at 09:00

## 2023-06-20 RX ADMIN — TRAMADOL HYDROCHLORIDE 50 MILLIGRAM(S): 50 TABLET ORAL at 22:00

## 2023-06-20 NOTE — PROVIDER CONTACT NOTE (CRITICAL VALUE NOTIFICATION) - SITUATION
Urine Culture from 6/18/23 positive for E. Faecium
+BC 1st bottle growth in anaerobic/aerobic bottle enterococcus facium  2nd bottle growth anaerobic bottle enterococcus facium
Bronchial cx from 6/5 alveolar lavage NGTD at 48hours.
Patient has critical lab value of 2.9 for potassium.
cbc results hb/hct 6.8/20.5
Pt Potassium 2.6
Pt's K= level 2.8 with am labs
CQBAL-gram negative rods per il power field-polymorphonuclear leukocytes   Squamous epithelial cells per low power field
CMV PCR positive at 1500
hemoglobin 6.8 hematocrit 19.8
patient blood cultures from 6/17 showed enterococcus Faecium in anerobic bottle
CMV PCR Value 1430
Pt had urine culture obtained 6/18. [facility] lab called and state the preliminary results demonstrated rare enterococcus faecium
patient had OLT POD #9. Patient's blood cultures from 6/18/2023 preliminary results showed positive in both anaerobic and aerobic bottles gram positive cocci in chains  and pairs
patient had blood cultures drawn on 6/17 and came back positive in anerobic botle gram + cocci in pairs and chains

## 2023-06-20 NOTE — PROVIDER CONTACT NOTE (CRITICAL VALUE NOTIFICATION) - ASSESSMENT
Vitals charted WNL
Pt A&OX4, VS at baseline. Pt shows no s/sx of bleeding.
Pt afebrile. Pt in no distress. VS as charted. resting comfortably receiving IV abx
patient VS as charted. Afebrile. Pt c/o increased pain today in LLQ abdomen. Transplant team came to assess Pt at bedside
pt Aox4 ,vss afebrile asymptomatic
pt is A&OX4 VSS as charted
Pt. remains stable, no distress noted.
Patient is A&Ox4, vitals WNL. No complaints of SOB or chest pain. Patient complains of abdominal pain and is awaiting paracentesis.
Pt a&o x4. VS as charted. Resting comfortably in bed.
Pt is asymptomatic @ this time.
see flowsheet
pt A&O x4, no acute distress noted
VS as charted. Pt afebrile.
patient afebrile. All VS as charted. Surgical incision eccymotic with drainage from suture previous MONSE site. PT in ni distress. Resting comfortably in bed
Pt NAD.
+ infrequent dry cough, but NAD.

## 2023-06-20 NOTE — PROVIDER CONTACT NOTE (CRITICAL VALUE NOTIFICATION) - TEST AND RESULT REPORTED:
CMV PCR 1500
+BC 1st bottle growth in anaerobic/aerobic bottle enterococcus facium  2nd bottle growth anaerobic bottle enterococcus facium
Potassium 2.9
Preliminary BC from 6/17 showed enterococcus faecium
Urine culture preliminary results- rare enterococcus faecium
prelim blood cultures positive from 6/18/2023
Bronichial cx NGTD at 48hrs
CMV PCR value 1430
K+ 2.6
Low Pottassium
hb/hct 6.8/20.5
Potassium 2.6
Urine Culture from 6/18/23 positive for E. Faecium
blood cultures anerobic gram + cocci in pains and chains
CQBAL-gram negative rods per il power field-polymorphonuclear leukocytes   Squamous epithelial cells per low power field
hemoglobin 6.8 hematocrit 19.8

## 2023-06-20 NOTE — PROGRESS NOTE ADULT - ASSESSMENT
38F with Hx of hypothyroidism (on Levothyroxine), liver cirrhosis 2/2 ETOH abuse---->  presented to [facility] on 5/13/23 with ~ 2 weeks Hx of progressively worsening abdominal pain and distention.  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  now s/p HCV+ OLT under Simulect induction on 6/10/2023    [] Bacteremia  - Leukocytosis: 6/17 BCx growing VRE. Sensitivities pending. ID following. Continue Zosyn/Daptomycin.  Check CK daily while on Daptomycin  - 6/18 CT a/p reviewed  - Daily BCx until cleared. BCx 6/19 NGTD. BCx from 6/20 pending. UCx pending. Daily skin checks.  - Check Procal in AM -> down to 0.9 from 1.04  - MSSA bacteremia (5/17) - completed Ancef 6/11.   - TTE completed 6/20, no evidence of endocarditis    [] s/p HCV+ OLT    - HCV + donor.  Recipient now with HCV Viremia, Genotype 1A.  Ordered for Epclusa, awaiting arrival.  - Good graft function  - Subcutaneous hematoma--change dressing QD or as needed. No concerns for active bleeding at this time  - Continue Actigal  - ASA 81mg po qd  - VIVIANA: improving  - Regular diet  - Pain management: oxycodone prn/ lidocaine patch for now and involve pain management if acute issues have resolved  - IS/SCDs/SQH  - Strict I&Os: keep MONSE #2 for now  - bowel regimen  - 1u PRBC today for anemia, no evidence of bleeding    [] Immunosuppression  - FK by level, MMF 1/1, pred 20  - Simulect completed  - CMV viremia pre-op.  Valcyte to TIW for renal dosing.   - Check CMV PCR 6/21  - PPX: Bactrim to TIW for renal dosing. Fluconazole QD (renal dosing)    Transplant Surgery  [id number]

## 2023-06-20 NOTE — PROGRESS NOTE ADULT - ASSESSMENT
39 yo F with hypothyroidism (on Levothyroxine) who initially presented to [facility] on 5/13 with abd pain. 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;  MSSA bacteremia secondary to SSTI and Organizing pneumonia of undetermined etiology now s/p OLT 6/9/23  S/p OLT6/9/23  CMV D+/R+, EBV D+/R+, Toxoplasma D?/R-  No fever, has rising WBC  5/13 2/2 BC pos MSSA (s/p treatment course)  6/8 ascitic fluid cx P; cellk  count; 36 cells  BCX 6/17 with 1/2 VRE (other BCX pending)--new  UA neg, UCX pending  CXR reviewed personally clear  US liver 6/11 - Large heterogeneous fluid collection in Morison's pouch measuring 11.4 x 2.4 x 7.7 cm  Concern for possible infected fluid collection as source bacteremia/rising leukocytosis  TTE (6/20) No evidence of vegetations  Potential life threatening infection    #VRE Bacteremia, Leukocytosis, Abdominal Fluid Collections  - Continue Dapto 400mg q 24 (monitor CrCl, check creatine kinase weekly, next on 6/26)  - continue Zosyn 3.375g q 8  - If persisting bacteremia or leukocytosis would evaluate deeper collection/s for drainage  - Trend WBC to normal  - Repeat BCXs q 48 until clear  - Direct care for hematoma/wounds per team    #CMV Viremia, OLT Recipient, Prophylactic Antibiotic  --Recommend increase in Valcyte to 450 mg PO Q48H  --Check repeat CMV PCR on 6/27  --Continue Bactrim SS M/W/F for PCP PPx  --Continue Fluconazole 200 mg PO Q24H for Fungal PPx per protocol    I will continue to follow. Please feel free to contact me with any further questions.    Irving Deras M.D.  [facility] Division of Infectious Disease  8AM-5PM Monday - Friday: Available on [employer] Teams  After Hours and Holidays (or if no response on [employer] Teams): Please contact the Infectious Diseases Office at [phone]    The above assessment and plan were discussed with Sulema, transplant surgery NP

## 2023-06-20 NOTE — PROVIDER CONTACT NOTE (CRITICAL VALUE NOTIFICATION) - NAME OF MD/NP/PA/DO NOTIFIED:
Thea Concepcion, NP
MD Guadalupe
Aminata Mace
Aminata Mace
Len Siu
Transplant NP Aminata Mace
Aminata Mace
SALO Kebede
Sulema Abreu
Transplant NP Aminata Mace
SALO Erazo
SALO Leone
SALO Yip
SCAR Coates
SCAR Lackey
SALO Abreu

## 2023-06-20 NOTE — PROGRESS NOTE ADULT - SUBJECTIVE AND OBJECTIVE BOX
Follow Up:      Interval History:    REVIEW OF SYSTEMS  [  ] ROS unobtainable because:    [  ] All other systems negative except as noted below    Constitutional:  [ ] fever [ ] chills  [ ] weight loss  [ ] weakness  Skin:  [ ] rash [ ] phlebitis	  Eyes: [ ] icterus [ ] pain  [ ] discharge	  ENMT: [ ] sore throat  [ ] thrush [ ] ulcers [ ] exudates  Respiratory: [ ] dyspnea [ ] hemoptysis [ ] cough [ ] sputum	  Cardiovascular:  [ ] chest pain [ ] palpitations [ ] edema	  Gastrointestinal:  [ ] nausea [ ] vomiting [ ] diarrhea [ ] constipation [ ] pain	  Genitourinary:  [ ] dysuria [ ] frequency [ ] hematuria [ ] discharge [ ] flank pain  [ ] incontinence  Musculoskeletal:  [ ] myalgias [ ] arthralgias [ ] arthritis  [ ] back pain  Neurological:  [ ] headache [ ] seizures  [ ] confusion/altered mental status    Allergies  No Known Allergies        ANTIMICROBIALS:  DAPTOmycin IVPB 400 every 24 hours  fluconAZOLE   Tablet 200 daily  piperacillin/tazobactam IVPB.. 3.375 every 8 hours  trimethoprim   80 mG/sulfamethoxazole 400 mG 1 <User Schedule>  valGANciclovir 450 <User Schedule>      OTHER MEDS:  MEDICATIONS  (STANDING):  aspirin enteric coated 81 daily  heparin   Injectable 5000 every 12 hours  HYDROmorphone  Injectable 0.2 once  levothyroxine 100 daily  mycophenolate mofetil 1000 <User Schedule>  pantoprazole    Tablet 40 before breakfast  polyethylene glycol 3350 17 daily  predniSONE   Tablet 20 daily  senna 2 at bedtime  tacrolimus 0.5 <User Schedule>  traMADol 25 every 12 hours PRN  traMADol 50 every 12 hours PRN  ursodiol Capsule 300 every 12 hours      Vital Signs Last 24 Hrs  T(C): 36.7 (2023 13:55), Max: 36.8 (2023 17:08)  T(F): 98 (2023 13:55), Max: 98.3 (2023 17:08)  HR: 77 (2023 13:55) (76 - 79)  BP: 118/77 (2023 13:55) (110/72 - 124/79)  BP(mean): 87 (2023 00:54) (87 - 87)  RR: 18 (2023 13:55) (18 - 20)  SpO2: 100% (2023 13:55) (97% - 100%)    Parameters below as of 2023 09:40  Patient On (Oxygen Delivery Method): room air        PHYSICAL EXAMINATION:  General: Alert and Awake, NAD  HEENT: PERRL, EOMI  Neck: Supple  Cardiac: RRR, No M/R/G  Resp: CTAB, No Wh/Rh/Ra  Abdomen: NBS, NT/ND, No HSM, No rigidity or guarding  MSK: No LE edema. No Calf tenderness  : No montenegro  Skin: No rashes or lesions. Skin is warm and dry to the touch.   Neuro: Alert and Awake. CN 2-12 Grossly intact. Moves all four extremities spontaneously.  Psych: Calm, Pleasant, Cooperative                          7.0    18.01 )-----------( 239      ( 2023 06:56 )             21.2       06-20    130<L>  |  92<L>  |  36<H>  ----------------------------<  82  3.4<L>   |  21<L>  |  2.05<H>    Ca    8.6      2023 06:56  Phos  3.8     20  Mg     2.0         TPro  4.7<L>  /  Alb  2.5<L>  /  TBili  2.4<H>  /  DBili  x   /  AST  9<L>  /  ALT  6<L>  /  AlkPhos  92  -20          MICROBIOLOGY:  v  .Blood Blood  23   No growth to date.  --  --      .Blood Blood-Peripheral  23   Growth in anaerobic bottle: Enterococcus faecium (vancomycin resistant)  See previous culture [id number]  --    Growth in anaerobic bottle: Gram Positive Cocci in Pairs and Chains      .Blood Blood-Peripheral  23   Growth in aerobic and anaerobic bottles: Enterococcus faecium (vancomycin  resistant)  See previous culture [id number]  --    Growth in anaerobic bottle: Gram Positive Cocci in Pairs and Chains  Growth in aerobic bottle: Gram Positive Cocci in Pairs and Chains      OR Collect Bladder (from O.R.)  23   Rare Enterococcus faecium  --  --      .Blood Blood  23   No growth to date.  --  --      .Blood Blood  23   Growth in anaerobic bottle: Enterococcus faecium (vancomycin resistant)  ***Blood Panel PCR results on this specimen are available  approximately 3 hours after the Gram stain result.***  Gram stain, PCR, and/or culture results may not always  correspond due to difference in methodologies.  ************************************************************  This PCR assay was performed by multiplex PCR. This  Assay tests for 66 bacterial and resistance gene targets.  Please refer to the [facility] Labs test directory  at [email or web address] for details.  --  Blood Culture PCR  Enterococcus faecium (vancomycin resistant)      Abdominal Fl Abdominal Fluid  06-10-23   No growth at 5 days  --    No polymorphonuclear cells seen per low power field  No organisms seen per oil power field      Ascites Fl Ascites Fluid  23   No growth at 5 days  --    No polymorphonuclear cells seen  No organisms seen  by cytocentrifuge      .Blood Blood-Peripheral  23   No Growth Final  --  --      .Blood Blood  23   No Growth Final  --  --      .Bronchial Bronchoalveolar Lavage  23   No growth at 48 hours  --    polymorphonuclear leukocytes per low power field  No squamous epithelial cells per low power field  Gram Negative Rods per oil power field  by cytocentrifuge      .Bronchial Bronchial Lavage  23   No acid-fast bacilli isolated at 1 week. ****Acid-fast cultures are held  for 6 weeks.****  --    Rare polymorphonuclear leukocytes per low power field  No squamous epithelial cells per low power field  No organisms seen per oil power field      .Blood Blood-Peripheral  23   No Growth Final  --  --      .Blood Blood-Peripheral  23   No Growth Final  --  --      Abdominal Fl Abdominal Fluid  23   No growth  --    polymorphonuclear leukocytes seen  No organisms seen  by cytocentrifuge      Clean Catch Clean Catch (Midstream)  23   <10,000 CFU/mL Normal Urogenital Aga  --  --      .Blood Blood-Peripheral  23   No Growth Final  --  --      .Blood Blood-Peripheral  23   No Growth Final  --  --      .Sputum Sputum  23   No acid-fast bacilli isolated at 3 weeks.  --    Few polymorphonuclear leukocytes per low power field  Moderate Squamous epithelial cells per low power field  Moderate Yeast like cells seen per oil power field      .Blood Blood-Peripheral  23   No Growth Final  --  --      .Blood Blood-Peripheral  23   No Growth Final  --  --      Peritoneal Peritoneal Fluid  23   No growth at 5 days  --    polymorphonuclear leukocytes seen  No organisms seen  by cytocentrifuge      .Blood Blood-Peripheral  23   No Growth Final  --  --      .Blood Blood  23   No Growth Final  --  --      Clean Catch Clean Catch (Midstream)  23   50,000 - 99,000 CFU/mL Enterococcus faecium (vancomycin resistant)  --  Enterococcus faecium (vancomycin resistant)        CMV IgG Antibody: >10.00 U/mL (23 @ 00:45)  Toxoplasma IgG Screen: <3.0 IU/mL (23 @ 07:14)  CMV IgG Antibody: >10.00 U/mL (23 @ 07:14)  Toxoplasma IgG Screen: <3.0 IU/mL (23 @ 07:14)    CMVPCR Lo.64 Dsb67KC/mL ( @ 06:08)        RADIOLOGY:    <The imaging below has been reviewed and visualized by me independently. Findings as detailed in report below> Follow Up:  bacteremia    Interval History: afebrile. abdominal pain improving.    REVIEW OF SYSTEMS  [  ] ROS unobtainable because:    [ x] All other systems negative except as noted below    Constitutional:  [ ] fever [ ] chills  [ ] weight loss  [ ] weakness  Skin:  [ ] rash [ ] phlebitis	  Eyes: [ ] icterus [ ] pain  [ ] discharge	  ENMT: [ ] sore throat  [ ] thrush [ ] ulcers [ ] exudates  Respiratory: [ ] dyspnea [ ] hemoptysis [ ] cough [ ] sputum	  Cardiovascular:  [ ] chest pain [ ] palpitations [ ] edema	  Gastrointestinal:  [ ] nausea [ ] vomiting [ ] diarrhea [ ] constipation [ x pain	  Genitourinary:  [ ] dysuria [ ] frequency [ ] hematuria [ ] discharge [ ] flank pain  [ ] incontinence  Musculoskeletal:  [ ] myalgias [ ] arthralgias [ ] arthritis  [ ] back pain  Neurological:  [ ] headache [ ] seizures  [ ] confusion/altered mental status    Allergies  No Known Allergies        ANTIMICROBIALS:  DAPTOmycin IVPB 400 every 24 hours  fluconAZOLE   Tablet 200 daily  piperacillin/tazobactam IVPB.. 3.375 every 8 hours  trimethoprim   80 mG/sulfamethoxazole 400 mG 1 <User Schedule>  valGANciclovir 450 <User Schedule>      OTHER MEDS:  MEDICATIONS  (STANDING):  aspirin enteric coated 81 daily  heparin   Injectable 5000 every 12 hours  HYDROmorphone  Injectable 0.2 once  levothyroxine 100 daily  mycophenolate mofetil 1000 <User Schedule>  pantoprazole    Tablet 40 before breakfast  polyethylene glycol 3350 17 daily  predniSONE   Tablet 20 daily  senna 2 at bedtime  tacrolimus 0.5 <User Schedule>  traMADol 25 every 12 hours PRN  traMADol 50 every 12 hours PRN  ursodiol Capsule 300 every 12 hours      Vital Signs Last 24 Hrs  T(C): 36.7 (2023 13:55), Max: 36.8 (2023 17:08)  T(F): 98 (2023 13:55), Max: 98.3 (2023 17:08)  HR: 77 (2023 13:55) (76 - 79)  BP: 118/77 (2023 13:55) (110/72 - 124/79)  BP(mean): 87 (2023 00:54) (87 - 87)  RR: 18 (2023 13:55) (18 - 20)  SpO2: 100% (2023 13:55) (97% - 100%)    Parameters below as of 2023 09:40  Patient On (Oxygen Delivery Method): room air    PHYSICAL EXAMINATION:  General: Alert and Awake, NAD  HEENT: Normocephalic / Atraumatic  Resp: No accessory muscles of respiration utilized  Abdomen: Abdominal wound with packing at left side of OLT incision. RLQ Drain with SS output. Moderate diffuse tenderness to palpation.  MSK: No LE edema.   : No monten[place]ro  Skin: As per abdominal section.   Neuro: Alert and Awake. CN 2-12 Grossly intact. Moves all four extremities spontaneously.  Psych: Calm, Pleasant, Cooperative                          7.0    18.01 )-----------( 239      ( 2023 06:56 )             21.2       06-20    130<L>  |  92<L>  |  36<H>  ----------------------------<  82  3.4<L>   |  21<L>  |  2.05<H>    Ca    8.6      2023 06:56  Phos  3.8       Mg     2.0         TPro  4.7<L>  /  Alb  2.5<L>  /  TBili  2.4<H>  /  DBili  x   /  AST  9<L>  /  ALT  6<L>  /  AlkPhos  92            MICROBIOLOGY:  v  .Blood Blood  23   No growth to date.  --  --      .Blood Blood-Peripheral  23   Growth in anaerobic bottle: Enterococcus faecium (vancomycin resistant)  See previous culture [id number]  --    Growth in anaerobic bottle: Gram Positive Cocci in Pairs and Chains      .Blood Blood-Peripheral  23   Growth in aerobic and anaerobic bottles: Enterococcus faecium (vancomycin  resistant)  See previous culture [id number]  --    Growth in anaerobic bottle: Gram Positive Cocci in Pairs and Chains  Growth in aerobic bottle: Gram Positive Cocci in Pairs and Chains      OR Collect Bladder (from O.R.)  23   Rare Enterococcus faecium  --  --      .Blood Blood  23   No growth to date.  --  --      .Blood Blood  23   Growth in anaerobic bottle: Enterococcus faecium (vancomycin resistant)  ***Blood Panel PCR results on this specimen are available  approximately 3 hours after the Gram stain result.***  Gram stain, PCR, and/or culture results may not always  correspond due to difference in methodologies.  ************************************************************  This PCR assay was performed by multiplex PCR. This  Assay tests for 66 bacterial and resistance gene targets.  Please refer to the [facility] Labs test directory  at [email or web address] for details.  --  Blood Culture PCR  Enterococcus faecium (vancomycin resistant)      Abdominal Fl Abdominal Fluid  06-10-23   No growth at 5 days  --    No polymorphonuclear cells seen per low power field  No organisms seen per oil power field      Ascites Fl Ascites Fluid  23   No growth at 5 days  --    No polymorphonuclear cells seen  No organisms seen  by cytocentrifuge      .Blood Blood-Peripheral  23   No Growth Final  --  --      .Blood Blood  23   No Growth Final  --  --      .Bronchial Bronchoalveolar Lavage  23   No growth at 48 hours  --    polymorphonuclear leukocytes per low power field  No squamous epithelial cells per low power field  Gram Negative Rods per oil power field  by cytocentrifuge      .Bronchial Bronchial Lavage  23   No acid-fast bacilli isolated at 1 week. ****Acid-fast cultures are held  for 6 weeks.****  --    Rare polymorphonuclear leukocytes per low power field  No squamous epithelial cells per low power field  No organisms seen per oil power field      .Blood Blood-Peripheral  23   No Growth Final  --  --      .Blood Blood-Peripheral  23   No Growth Final  --  --      Abdominal Fl Abdominal Fluid  23   No growth  --    polymorphonuclear leukocytes seen  No organisms seen  by cytocentrifuge      Clean Catch Clean Catch (Midstream)  23   <10,000 CFU/mL Normal Urogenital Aga  --  --      .Blood Blood-Peripheral  23   No Growth Final  --  --      .Blood Blood-Peripheral  23   No Growth Final  --  --      .Sputum Sputum  23   No acid-fast bacilli isolated at 3 weeks.  --    Few polymorphonuclear leukocytes per low power field  Moderate Squamous epithelial cells per low power field  Moderate Yeast like cells seen per oil power field      .Blood Blood-Peripheral  23   No Growth Final  --  --      .Blood Blood-Peripheral  23   No Growth Final  --  --      Peritoneal Peritoneal Fluid  23   No growth at 5 days  --    polymorphonuclear leukocytes seen  No organisms seen  by cytocentrifuge      .Blood Blood-Peripheral  23   No Growth Final  --  --      .Blood Blood  23   No Growth Final  --  --      Clean Catch Clean Catch (Midstream)  23   50,000 - 99,000 CFU/mL Enterococcus faecium (vancomycin resistant)  --  Enterococcus faecium (vancomycin resistant)        CMV IgG Antibody: >10.00 U/mL (23 @ 00:45)  Toxoplasma IgG Screen: <3.0 IU/mL (23 @ 07:14)  CMV IgG Antibody: >10.00 U/mL (23 @ 07:14)  Toxoplasma IgG Screen: <3.0 IU/mL (23 @ 07:14)    CMVPCR Lo.64 Ute94IO/mL ( @ 06:08)        RADIOLOGY:    <The imaging below has been reviewed and visualized by me independently. Findings as detailed in report below>    < from: CT Abdomen and Pelvis No Cont (23 @ 17:55) >  IMPRESSION:  Limited evaluation of intra-abdominal contents in the absence of IV   contrast.    Status postliver transplant. Mixed attenuation subdiaphragmatic mass   collection with drainage catheter in place. Additional complex fluid   along the inferior right hepatic lobe and gallbladder fossa, suspicious   for blood products.    Hyperdense appearance of the portal vessels, of uncertain etiology.   Correlation with abdominal Doppler is recommended.    Mural thickening of the ascending colon with mild surrounding   inflammation, possibly colitis.    < end of copied text >

## 2023-06-20 NOTE — PROVIDER CONTACT NOTE (CRITICAL VALUE NOTIFICATION) - ACTION/TREATMENT ORDERED:
Transplant NP Aminata Mace made aware. No interventions.
Transplant provider Aminata Mace made aware. No interventions.
Potassium 40 meq po x1 given as per order.
continue to treatment following susceptibilities
MD aware. Will order blood.
Administer Zosyn STAT
Order put in for potassium tablets and potassium IV replenishment. Reassess after replenishment.
no orders, pt care continues
ordered 1 unit PRBC over 3hrs
provider made aware. awaiting further orders at this time
Continue to monitor pt, supplement with Potassium
ID followin and adjusting abx accordingly. Monitoring for s/s of infection
continue iv abx
No further action indicated by provider thus far. Pt on IV antibiotics. Will continue to monitor
continue IV abx. No further action at this time

## 2023-06-20 NOTE — PROGRESS NOTE ADULT - SUBJECTIVE AND OBJECTIVE BOX
POD #11    SUBJECTIVE:  Endorses occasional pain. Denies nausea, vomiting. Tolerating diet.    OBJECTIVE:  Vital Signs Last 24 Hrs  T(C): 36.6 (20 Jun 2023 09:40), Max: 36.8 (19 Jun 2023 17:08)  T(F): 97.8 (20 Jun 2023 09:40), Max: 98.3 (19 Jun 2023 17:08)  HR: 76 (20 Jun 2023 09:40) (76 - 79)  BP: 119/78 (20 Jun 2023 09:40) (110/72 - 124/79)  BP(mean): 87 (20 Jun 2023 00:54) (87 - 87)  RR: 20 (20 Jun 2023 09:40) (18 - 20)  SpO2: 100% (20 Jun 2023 09:40) (97% - 100%)    Parameters below as of 20 Jun 2023 09:40  Patient On (Oxygen Delivery Method): room air          06-19-23 @ 07:01  -  06-20-23 @ 07:00  --------------------------------------------------------  IN: 1520 mL / OUT: 265 mL / NET: 1255 mL        Physical Examination:  GEN: NAD, resting quietly  PULM: symmetric chest rise bilaterally, no increased WOB  ABD: soft, appropriately tender, nondistended, no rebound or guarding, incision CDI, MONSE ss x2  EXTR: no LE erythema, moving all extremities      LABS:                        7.0    18.01 )-----------( 239      ( 20 Jun 2023 06:56 )             21.2       06-20    130<L>  |  92<L>  |  36<H>  ----------------------------<  82  3.4<L>   |  21<L>  |  2.05<H>    Ca    8.6      20 Jun 2023 06:56  Phos  3.8     06-20  Mg     2.0     06-20    TPro  4.7<L>  /  Alb  2.5<L>  /  TBili  2.4<H>  /  DBili  x   /  AST  9<L>  /  ALT  6<L>  /  AlkPhos  92  06-20         POD #11    SUBJECTIVE:  Endorses occasional pain. Denies nausea, vomiting. Tolerating diet.    OBJECTIVE:  Vital Signs Last 24 Hrs  T(C): 36.6 (20 Jun 2023 09:40), Max: 36.8 (19 Jun 2023 17:08).  T(F): 97.8 (20 Jun 2023 09:40), Max: 98.3 (19 Jun 2023 17:08)  HR: 76 (20 Jun 2023 09:40) (76 - 79)  BP: 119/78 (20 Jun 2023 09:40) (110/72 - 124/79)  BP(mean): 87 (20 Jun 2023 00:54) (87 - 87)  RR: 20 (20 Jun 2023 09:40) (18 - 20)  SpO2: 100% (20 Jun 2023 09:40) (97% - 100%)    Parameters below as of 20 Jun 2023 09:40  Patient On (Oxygen Delivery Method): room air          06-19-23 @ 07:01  -  06-20-23 @ 07:00  --------------------------------------------------------  IN: 1520 mL / OUT: 265 mL / NET: 1255 mL        Physical Examination:  GEN: NAD, resting quietly  PULM: symmetric chest rise bilaterally, no increased WOB  ABD: soft, appropriately tender, nondistended, no rebound or guarding, incision CDI, MONSE ss x2  EXTR: no LE erythema, moving all extremities      LABS:                        7.0    18.01 )-----------( 239      ( 20 Jun 2023 06:56 )             21.2       06-20    130<L>  |  92<L>  |  36<H>  ----------------------------<  82  3.4<L>   |  21<L>  |  2.05<H>    Ca    8.6      20 Jun 2023 06:56  Phos  3.8     06-20  Mg     2.0     06-20    TPro  4.7<L>  /  Alb  2.5<L>  /  TBili  2.4<H>  /  DBili  x   /  AST  9<L>  /  ALT  6<L>  /  AlkPhos  92  06-20

## 2023-06-20 NOTE — PROVIDER CONTACT NOTE (CRITICAL VALUE NOTIFICATION) - BACKGROUND
patient had OLT
38F with Hx of hypothyroidism (on Levothyroxine), liver cirrhosis 2/2 ETOH abuse---->  presented to [facility] ED on 5/13/23 with ~ 2 weeks Hx of progressively worsening abdominal pain and distention. Liver workup
Admitted for liver transplant eval
Pt admitted for acute/subacute hepatic failure w/o coma.
Pt post op Day 10 for OLT
OLT POD 10, alcoholic hepatitis, acute pna, mssa bacteremia
admitted w/hepatic failure s/p paracentesis
patient had OLT d/t ETOH cirrhosis
pt S/P OLT
37yo F hx ETOH cirrhosis admitted for liver workup
Pt s/p OLT on 6/10. H/p ETOH Cirrhosis. anemia,
Patient is diagnosed for subacute liver failure without coma. She has a PMH of anemia, SOB, hyponatremia, alcohol abuse, and liver cirrhosis.
patient had OLT POD #9. history of ETOH cirrhosis
admitted for liver transplant evaluation

## 2023-06-20 NOTE — PROVIDER CONTACT NOTE (CRITICAL VALUE NOTIFICATION) - RECOMMENDATIONS
see if provider wants to order antibiotics
Notify provider
SALO Yip made aware
continue abx and see if abx need to be adjusted
Notify provider
Potassium order to be administered.
continue iv abx
continue antibiotics
susceptibilities are available- continue to treatment following susceptibilities
Continue to monitor pt, supplement with Potassium
KCL 40 meq Pox1
Patient already on antibiotics. See if providers want to change antibiotic regimen. Follow up with blood culture results from 6/19. Monitor for s/s associated with infection
continue to monitor cbc and provider notification
no active orders

## 2023-06-20 NOTE — PROGRESS NOTE ADULT - NS ATTEND AMEND GEN_ALL_CORE FT
bacteremia  vre - on zosyn dapto  epclusa for hcv    immuno plan  fk per level (0.5mg bid) mmf 1g bid, pred 20

## 2023-06-20 NOTE — PROVIDER CONTACT NOTE (CRITICAL VALUE NOTIFICATION) - PERSON GIVING RESULT:
Kelsey Ac
Katie Muñiz
Navid Chen/ Lab
Aminata Mace
Emelyn Colon
Angie brian from lab
Klaudia/ [facility]
Octavia Kennedy/ Lab
Robbin Byrnes
antonieta suggs
Efrem Ascencio-JUDY
Robbin Mckeon
Sabino Martinez/[facility]
miguelito/John Almanza
Jaya Dockery
Peg Kennedy

## 2023-06-21 LAB
-  DAPTOMYCIN: SIGNIFICANT CHANGE UP
-  LINEZOLID: SIGNIFICANT CHANGE UP
ALBUMIN SERPL ELPH-MCNC: 2.7 G/DL — LOW (ref 3.3–5)
ALP SERPL-CCNC: 85 U/L — SIGNIFICANT CHANGE UP (ref 40–120)
ALT FLD-CCNC: <5 U/L — LOW (ref 10–45)
ANION GAP SERPL CALC-SCNC: 15 MMOL/L — SIGNIFICANT CHANGE UP (ref 5–17)
APTT BLD: 26.3 SEC — LOW (ref 27.5–35.5)
AST SERPL-CCNC: 9 U/L — LOW (ref 10–40)
BILIRUB SERPL-MCNC: 2.3 MG/DL — HIGH (ref 0.2–1.2)
BUN SERPL-MCNC: 34 MG/DL — HIGH (ref 7–23)
CALCIUM SERPL-MCNC: 8.6 MG/DL — SIGNIFICANT CHANGE UP (ref 8.4–10.5)
CHLORIDE SERPL-SCNC: 94 MMOL/L — LOW (ref 96–108)
CO2 SERPL-SCNC: 22 MMOL/L — SIGNIFICANT CHANGE UP (ref 22–31)
CREAT SERPL-MCNC: 2.08 MG/DL — HIGH (ref 0.5–1.3)
CULTURE RESULTS: NO GROWTH — SIGNIFICANT CHANGE UP
CULTURE RESULTS: SIGNIFICANT CHANGE UP
EGFR: 31 ML/MIN/1.73M2 — LOW
GLUCOSE SERPL-MCNC: 92 MG/DL — SIGNIFICANT CHANGE UP (ref 70–99)
HCT VFR BLD CALC: 25.8 % — LOW (ref 34.5–45)
HGB BLD-MCNC: 8.5 G/DL — LOW (ref 11.5–15.5)
INR BLD: 0.92 RATIO — SIGNIFICANT CHANGE UP (ref 0.88–1.16)
MAGNESIUM SERPL-MCNC: 1.9 MG/DL — SIGNIFICANT CHANGE UP (ref 1.6–2.6)
MCHC RBC-ENTMCNC: 30.8 PG — SIGNIFICANT CHANGE UP (ref 27–34)
MCHC RBC-ENTMCNC: 32.9 GM/DL — SIGNIFICANT CHANGE UP (ref 32–36)
MCV RBC AUTO: 93.5 FL — SIGNIFICANT CHANGE UP (ref 80–100)
NRBC # BLD: 0 /100 WBCS — SIGNIFICANT CHANGE UP (ref 0–0)
PHOSPHATE SERPL-MCNC: 3.7 MG/DL — SIGNIFICANT CHANGE UP (ref 2.5–4.5)
PLATELET # BLD AUTO: 291 K/UL — SIGNIFICANT CHANGE UP (ref 150–400)
POTASSIUM SERPL-MCNC: 4.1 MMOL/L — SIGNIFICANT CHANGE UP (ref 3.5–5.3)
POTASSIUM SERPL-SCNC: 4.1 MMOL/L — SIGNIFICANT CHANGE UP (ref 3.5–5.3)
PROT SERPL-MCNC: 4.8 G/DL — LOW (ref 6–8.3)
PROTHROM AB SERPL-ACNC: 10.6 SEC — SIGNIFICANT CHANGE UP (ref 10.5–13.4)
RBC # BLD: 2.76 M/UL — LOW (ref 3.8–5.2)
RBC # FLD: 21.7 % — HIGH (ref 10.3–14.5)
SODIUM SERPL-SCNC: 131 MMOL/L — LOW (ref 135–145)
SPECIMEN SOURCE: SIGNIFICANT CHANGE UP
SPECIMEN SOURCE: SIGNIFICANT CHANGE UP
TACROLIMUS SERPL-MCNC: 8 NG/ML — SIGNIFICANT CHANGE UP
WBC # BLD: 15.84 K/UL — HIGH (ref 3.8–10.5)
WBC # FLD AUTO: 15.84 K/UL — HIGH (ref 3.8–10.5)

## 2023-06-21 PROCEDURE — 99232 SBSQ HOSP IP/OBS MODERATE 35: CPT

## 2023-06-21 PROCEDURE — 99232 SBSQ HOSP IP/OBS MODERATE 35: CPT | Mod: GC,24

## 2023-06-21 RX ORDER — VELPATASVIR AND SOFOSBUVIR 37.5; 15 MG/1; MG/1
1 PELLET ORAL
Refills: 0 | Status: DISCONTINUED | OUTPATIENT
Start: 2023-06-21 | End: 2023-06-26

## 2023-06-21 RX ORDER — LIDOCAINE HCL 20 MG/ML
5 VIAL (ML) INJECTION ONCE
Refills: 0 | Status: COMPLETED | OUTPATIENT
Start: 2023-06-21 | End: 2023-06-21

## 2023-06-21 RX ADMIN — URSODIOL 300 MILLIGRAM(S): 250 TABLET, FILM COATED ORAL at 05:57

## 2023-06-21 RX ADMIN — VELPATASVIR AND SOFOSBUVIR 1 TABLET(S): 37.5; 15 PELLET ORAL at 16:56

## 2023-06-21 RX ADMIN — HYDROMORPHONE HYDROCHLORIDE 0.2 MILLIGRAM(S): 2 INJECTION INTRAMUSCULAR; INTRAVENOUS; SUBCUTANEOUS at 05:00

## 2023-06-21 RX ADMIN — FLUCONAZOLE 200 MILLIGRAM(S): 150 TABLET ORAL at 11:35

## 2023-06-21 RX ADMIN — PIPERACILLIN AND TAZOBACTAM 25 GRAM(S): 4; .5 INJECTION, POWDER, LYOPHILIZED, FOR SOLUTION INTRAVENOUS at 17:04

## 2023-06-21 RX ADMIN — DAPTOMYCIN 116 MILLIGRAM(S): 500 INJECTION, POWDER, LYOPHILIZED, FOR SOLUTION INTRAVENOUS at 13:29

## 2023-06-21 RX ADMIN — Medication 5 MILLILITER(S): at 14:02

## 2023-06-21 RX ADMIN — Medication 20 MILLIGRAM(S): at 05:57

## 2023-06-21 RX ADMIN — PIPERACILLIN AND TAZOBACTAM 25 GRAM(S): 4; .5 INJECTION, POWDER, LYOPHILIZED, FOR SOLUTION INTRAVENOUS at 08:31

## 2023-06-21 RX ADMIN — Medication 1 TABLET(S): at 11:35

## 2023-06-21 RX ADMIN — TRAMADOL HYDROCHLORIDE 50 MILLIGRAM(S): 50 TABLET ORAL at 22:15

## 2023-06-21 RX ADMIN — Medication 100 MICROGRAM(S): at 05:57

## 2023-06-21 RX ADMIN — HYDROMORPHONE HYDROCHLORIDE 0.2 MILLIGRAM(S): 2 INJECTION INTRAMUSCULAR; INTRAVENOUS; SUBCUTANEOUS at 04:35

## 2023-06-21 RX ADMIN — MAGNESIUM OXIDE 400 MG ORAL TABLET 400 MILLIGRAM(S): 241.3 TABLET ORAL at 08:31

## 2023-06-21 RX ADMIN — TRAMADOL HYDROCHLORIDE 50 MILLIGRAM(S): 50 TABLET ORAL at 08:31

## 2023-06-21 RX ADMIN — TRAMADOL HYDROCHLORIDE 25 MILLIGRAM(S): 50 TABLET ORAL at 17:09

## 2023-06-21 RX ADMIN — PANTOPRAZOLE SODIUM 40 MILLIGRAM(S): 20 TABLET, DELAYED RELEASE ORAL at 05:57

## 2023-06-21 RX ADMIN — MYCOPHENOLATE MOFETIL 1000 MILLIGRAM(S): 250 CAPSULE ORAL at 08:31

## 2023-06-21 RX ADMIN — Medication 81 MILLIGRAM(S): at 11:34

## 2023-06-21 RX ADMIN — LIDOCAINE 1 PATCH: 4 CREAM TOPICAL at 19:44

## 2023-06-21 RX ADMIN — CHLORHEXIDINE GLUCONATE 1 APPLICATION(S): 213 SOLUTION TOPICAL at 05:58

## 2023-06-21 RX ADMIN — HEPARIN SODIUM 5000 UNIT(S): 5000 INJECTION INTRAVENOUS; SUBCUTANEOUS at 05:56

## 2023-06-21 RX ADMIN — HEPARIN SODIUM 5000 UNIT(S): 5000 INJECTION INTRAVENOUS; SUBCUTANEOUS at 17:04

## 2023-06-21 RX ADMIN — MAGNESIUM OXIDE 400 MG ORAL TABLET 400 MILLIGRAM(S): 241.3 TABLET ORAL at 17:04

## 2023-06-21 RX ADMIN — TRAMADOL HYDROCHLORIDE 25 MILLIGRAM(S): 50 TABLET ORAL at 18:00

## 2023-06-21 RX ADMIN — Medication 1 TABLET(S): at 05:57

## 2023-06-21 RX ADMIN — MAGNESIUM OXIDE 400 MG ORAL TABLET 400 MILLIGRAM(S): 241.3 TABLET ORAL at 11:35

## 2023-06-21 RX ADMIN — TRAMADOL HYDROCHLORIDE 50 MILLIGRAM(S): 50 TABLET ORAL at 21:17

## 2023-06-21 RX ADMIN — LIDOCAINE 1 PATCH: 4 CREAM TOPICAL at 22:09

## 2023-06-21 RX ADMIN — URSODIOL 300 MILLIGRAM(S): 250 TABLET, FILM COATED ORAL at 17:04

## 2023-06-21 RX ADMIN — Medication 1 TABLET(S): at 17:05

## 2023-06-21 RX ADMIN — TRAMADOL HYDROCHLORIDE 25 MILLIGRAM(S): 50 TABLET ORAL at 01:50

## 2023-06-21 RX ADMIN — PIPERACILLIN AND TAZOBACTAM 25 GRAM(S): 4; .5 INJECTION, POWDER, LYOPHILIZED, FOR SOLUTION INTRAVENOUS at 00:22

## 2023-06-21 RX ADMIN — TACROLIMUS 0.5 MILLIGRAM(S): 5 CAPSULE ORAL at 20:04

## 2023-06-21 RX ADMIN — TRAMADOL HYDROCHLORIDE 50 MILLIGRAM(S): 50 TABLET ORAL at 09:15

## 2023-06-21 RX ADMIN — TACROLIMUS 0.5 MILLIGRAM(S): 5 CAPSULE ORAL at 08:31

## 2023-06-21 RX ADMIN — MYCOPHENOLATE MOFETIL 1000 MILLIGRAM(S): 250 CAPSULE ORAL at 20:04

## 2023-06-21 RX ADMIN — LIDOCAINE 1 PATCH: 4 CREAM TOPICAL at 11:35

## 2023-06-21 RX ADMIN — TRAMADOL HYDROCHLORIDE 25 MILLIGRAM(S): 50 TABLET ORAL at 00:51

## 2023-06-21 NOTE — CHART NOTE - NSCHARTNOTEFT_GEN_A_CORE
Nutrition Follow Up Note  Patient seen for: malnutrition/post-transplant follow up     Chart reviewed, events noted.    Source: Electronic Medical Record, Team (Rounds), Patient      Per chart, "38F with Hx of hypothyroidism (on Levothyroxine), liver cirrhosis 2/2 ETOH abuse---->  presented to [facility] ED on 23 with ~ 2 weeks Hx of progressively worsening abdominal pain and distention.  Transferred on  for management of alcohol hepatitis.  now s/p HCV+ OLT under Simulect induction on 6/10/2023"    Diet Order:   Diet, Consistent Carbohydrate Renal w/Evening Snack:   Supplement Feeding Modality:  Oral  Nepro Cans or Servings Per Day:  1       Frequency:  Two Times a day (23)    Nutrition-related events:  -Liver Transplant: Cellcept and Prograf ordered; Recipient now with HCV Viremia, Genotype 1A  -Intake: Pt reports good PO intake of meals; endorses consuming x2 Nepro most days   -GI: last BM documented ; bowel regimen ordered (Miralax, Senna); Protonix ordered   -Endo: post-transplant steroid regimen ordered (Prednisone); no insulin regimen ordered  -Renal: hyponatremic      Weights:   Daily Weight in k.3 (-), Weight in k.1 (-20), Weight in k.9 (-19), Weight in k.7 (-18), Weight in k.1 (-17), Weight in k.1 (-16), Weight in k.8 (-15)  - Weight fluctuation likely in the setting of fluid shifts vs true weight loss/gain; continue to monitor weights daily    Nutritionally Pertinent MEDICATIONS  (STANDING):  calcium carbonate 1250 mG  + Vitamin D (OsCal 500 + D)  DAPTOmycin IVPB  fluconAZOLE   Tablet  levothyroxine  magnesium oxide  pantoprazole    Tablet  piperacillin/tazobactam IVPB..  polyethylene glycol 3350  predniSONE   Tablet  senna  trimethoprim   80 mG/sulfamethoxazole 400 mG  ursodiol Capsule  valGANciclovir    Pertinent Labs:  @ 06:03: Na 131<L>, BUN 34<H>, Cr 2.08<H>, BG 92, K+ 4.1, Phos 3.7, Mg 1.9, Alk Phos 85, ALT/SGPT <5<L>, AST/SGOT 9<L>, HbA1c --    A1C with Estimated Average Glucose Result: 4.6 % (17-23 @ 07:12)      (Per flowsheet)  Pressure Injuries: none noted     Edema:   -2+ L/R foot, ankle, leg     Estimated Needs: based on 53kg (6/3 -lowest daily weight)    [x] continued   -Energy: 35-40kcal/kg (1855-2120kcal/day)   -Protein: 1.5-1.7g/kg (79-90g/day)   -Defer fluids to Team     Previous Nutrition Diagnosis: underweight, severe acute on chronic malnutrition, increased protein-energy needs  Nutrition Diagnosis is: ongoing     Nutrition Care Plan:  [x] In Progress  [] Achieved  [] Not applicable    Nutrition Interventions:     Education Provided:       [x] Yes: Provided education on post transplant nutrition therapy and food safety guidelines for transplant recipients. Discussed importance of thoroughly washing all fresh fruits/vegetables, importance of avoiding uncooked/raw/unpasteurized foods, avoiding pre-made deli/buffet/salad bar meals. Foods recommended as healthy well balanced diet and importance of adequate protein intakes for proper post-surgical healing discussed. Reviewed recommendations to avoid grapefruit, pomegranate and star fruit while taking immunosuppressant medication. Reviewed recommendations for moderate intake of sodium and carbohydrates with transplant medications. Reviewed effect of steroids on BG levels and importance of limiting concentrated sweets. Pt was receptive and expressed understanding. All questions answered. Provided nutrition handout: USDA Food Safety for Transplant Recipients booklet.     Recommendations:      1) Liberalize to regular diet   2) Continue Nepro BID - pt prefers; carb steady   3) Continue [id number]; add multivitamin daily   4) Reinforce education PRN      Monitoring and Evaluation:   Continue to monitor nutritional intake, tolerance to diet prescription, weights, labs, skin integrity      RD remains available upon request and will follow up per protocol    Katerina Day, MS, RDN, CDN (Teams/Pager #[phone])

## 2023-06-21 NOTE — PROGRESS NOTE ADULT - ASSESSMENT
37 yo F with hypothyroidism (on Levothyroxine) who initially presented to [facility] on 5/13 with abd pain. 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;  MSSA bacteremia secondary to SSTI and Organizing pneumonia of undetermined etiology now s/p OLT 6/9/23  S/p OLT6/9/23  CMV D+/R+, EBV D+/R+, Toxoplasma D?/R-  No fever, has rising WBC  5/13 2/2 BC pos MSSA (s/p treatment course)  6/8 ascitic fluid cx P; cellk  count; 36 cells      BCx (6/17, 6/18) VRE  Abdominal Wall hematoma I&D Culture (6/18) Enterococcus faecium (Non-VRE)  BCx (6/19, 6/20) NGTD  UCx (6/20) No Growth     TTE (6/20) No evidence of vegetations    CT A/P (6/18) Diaphragmatic collection, bilobed fluid collection along the inferior right hepatic lobe, measuring 7.8 x 2.9 cm, Additional complex fluid in the region of the gallbladder, Hyperdense material in the left abdominal wall with foci of air, measuring 5.9 x 1.7 cm possibly hemorrhage/packing material.     #VRE Bacteremia, Leukocytosis, Abdominal Fluid Collections  - Continue Dapto 400mg q 24 (monitor CrCl, check creatine kinase weekly, next on 6/26)  - Stop Zosyn by tomorrow if no growth of further organisms  - Concern that I&D Enterococcus faecium susceptibilities do NOT match the blood culture - ?deeper collection involved? Would favor evaluating deeper collections for drainage  - Trend WBC to normal  - Direct care for hematoma/wounds per team    #CMV Viremia, OLT Recipient, Prophylactic Antibiotic  --Continue Valcyte 450 mg PO Q48H  --Check repeat CMV PCR on 6/27  --Continue Bactrim SS M/W/F for PCP PPx  --Continue Fluconazole 200 mg PO Q24H for Fungal PPx per protocol    I will continue to follow. Please feel free to contact me with any further questions.    Irving Deras M.D.  [facility] Division of Infectious Disease  8AM-5PM Monday - Friday: Available on [employer] Teams  After Hours and Holidays (or if no response on [employer] Teams): Please contact the Infectious Diseases Office at [phone]    The above assessment and plan were discussed with Rosalba, Transplant surgery PA

## 2023-06-21 NOTE — PHARMACY EDUCATION NOTE - EDUCATION SUMMARY
Met with patient at bedside for post-transplant medication education. Counseling on indications and side-effects was provided. Patient was fully engaged throughout the session. All questions were clarified. Patient was able to answer all questions related to the regimen. Will continue to follow as needed.    Miguel Leonard, PharmD

## 2023-06-21 NOTE — CHART NOTE - NSCHARTNOTESELECT_GEN_ALL_CORE
Bronchoscopy note/Event Note
Event Note
ID/Event Note
Nutrition Services
Nutrition Services
PULM note/Event Note
R2-GYN Update
R4 GYN Colpo
LFT
LFT/Event Note
Nutrition Services
Nutrition Services
PULM/Event Note
Pathology Review

## 2023-06-21 NOTE — PROGRESS NOTE ADULT - SUBJECTIVE AND OBJECTIVE BOX
POD #12    SUBJECTIVE:  No acute complaints    OBJECTIVE:  Vital Signs Last 24 Hrs  T(C): 36.5 (21 Jun 2023 09:48), Max: 36.7 (20 Jun 2023 13:55)  T(F): 97.7 (21 Jun 2023 09:48), Max: 98.1 (20 Jun 2023 21:17)  HR: 71 (21 Jun 2023 09:48) (65 - 88)  BP: 118/80 (21 Jun 2023 09:48) (118/77 - 136/81)  BP(mean): 104 (21 Jun 2023 01:00) (104 - 104)  RR: 20 (21 Jun 2023 09:48) (18 - 20)  SpO2: 100% (21 Jun 2023 09:48) (96% - 100%)    Parameters below as of 21 Jun 2023 09:48  Patient On (Oxygen Delivery Method): room air          06-20-23 @ 07:01  -  06-21-23 @ 07:00  --------------------------------------------------------  IN: 650 mL / OUT: 285 mL / NET: 365 mL    06-21-23 @ 07:01  -  06-21-23 @ 12:26  --------------------------------------------------------  IN: 240 mL / OUT: 200 mL / NET: 40 mL        Physical Examination:  GEN: NAD, resting quietly  PULM: symmetric chest rise bilaterally, no increased WOB  ABD: soft, appropriately tender, nondistended, no rebound or guarding, incision CDI, MONSE ss  EXTR: no LE erythema, moving all extremities      LABS:                        8.5    15.84 )-----------( 291      ( 21 Jun 2023 06:04 )             25.8       06-21    131<L>  |  94<L>  |  34<H>  ----------------------------<  92  4.1   |  22  |  2.08<H>    Ca    8.6      21 Jun 2023 06:03  Phos  3.7     06-21  Mg     1.9     06-21    TPro  4.8<L>  /  Alb  2.7<L>  /  TBili  2.3<H>  /  DBili  x   /  AST  9<L>  /  ALT  <5<L>  /  AlkPhos  85  06-21       Transplant Surgery Multidisciplinary Note  --------------------------------------------------------------  HCV+ OLT    Date: 6/9/23       POD#12    Present:   Patient seen and examined with multidisciplinary Transplant team including Surgeon: Dr. Byrnes, Hepatologist: SALO Manning, during AM rounds.   Disciplines not in attendance will be notified of the plan.     HPI: 38F with Hx of hypothyroidism (on Levothyroxine), liver cirrhosis 2/2 ETOH abuse---->  presented to [facility] ED on 5/13/23 with ~ 2 weeks Hx of progressively worsening abdominal pain and distention.  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  now s/p HCV+ OLT under Simulect induction on 6/10/2023    Interval Events:  - no acute events overnight  - bld cxs from 6/18 grew VRE: on dapto/zosyn  - TTE neg for vegetation  - 1u PRBC     Immunosuppression:  Induction: Simulect completed  Maint:  FK by level, MMF 1/1, SST  ongoing monitoring for signs of rejection    Potential Discharge date: TBD  Education:  Medications  Plan of care:  See Below      MEDICATIONS  (STANDING):  aspirin enteric coated 81 milliGRAM(s) Oral daily  calcium carbonate 1250 mG  + Vitamin D (OsCal 500 + D) 1 Tablet(s) Oral two times a day  chlorhexidine 2% Cloths 1 Application(s) Topical <User Schedule>  DAPTOmycin IVPB 400 milliGRAM(s) IV Intermittent every 24 hours  fluconAZOLE   Tablet 200 milliGRAM(s) Oral daily  heparin   Injectable 5000 Unit(s) SubCutaneous every 12 hours  levothyroxine 100 MICROGram(s) Oral daily  lidocaine   4% Patch 1 Patch Transdermal every 24 hours  magnesium oxide 400 milliGRAM(s) Oral three times a day with meals  mycophenolate mofetil 1000 milliGRAM(s) Oral <User Schedule>  pantoprazole    Tablet 40 milliGRAM(s) Oral before breakfast  piperacillin/tazobactam IVPB.. 3.375 Gram(s) IV Intermittent every 8 hours  polyethylene glycol 3350 17 Gram(s) Oral daily  predniSONE   Tablet 20 milliGRAM(s) Oral daily  senna 2 Tablet(s) Oral at bedtime  sofosbuvir 400 mG/velpatasvir 100 mG (EPCLUSA) 1 Tablet(s) Oral <User Schedule>  tacrolimus 0.5 milliGRAM(s) Oral <User Schedule>  trimethoprim   80 mG/sulfamethoxazole 400 mG 1 Tablet(s) Oral <User Schedule>  ursodiol Capsule 300 milliGRAM(s) Oral every 12 hours  valGANciclovir 450 milliGRAM(s) Oral every 48 hours    MEDICATIONS  (PRN):  traMADol 25 milliGRAM(s) Oral every 12 hours PRN Mild Pain (1 - 3)  traMADol 50 milliGRAM(s) Oral every 12 hours PRN Moderate Pain (4 - 6)      PAST MEDICAL & SURGICAL HISTORY:  Hypothyroidism  Alcoholic liver disease  No significant past surgical history      Vital Signs Last 24 Hrs  T(C): 37.1 (21 Jun 2023 14:16), Max: 37.1 (21 Jun 2023 14:16)  T(F): 98.7 (21 Jun 2023 14:16), Max: 98.7 (21 Jun 2023 14:16)  HR: 78 (21 Jun 2023 14:16) (65 - 88)  BP: 117/78 (21 Jun 2023 14:16) (117/78 - 136/81)  BP(mean): 104 (21 Jun 2023 01:00) (104 - 104)  RR: 20 (21 Jun 2023 14:16) (18 - 20)  SpO2: 100% (21 Jun 2023 14:16) (99% - 100%)    Parameters below as of 21 Jun 2023 14:16  Patient On (Oxygen Delivery Method): room air    I&O's Summary    20 Jun 2023 07:01  -  21 Jun 2023 07:00  --------------------------------------------------------  IN: 650 mL / OUT: 285 mL / NET: 365 mL    21 Jun 2023 07:01  -  21 Jun 2023 19:02  --------------------------------------------------------  IN: 720 mL / OUT: 600 mL / NET: 120 mL                        8.5    15.84 )-----------( 291      ( 21 Jun 2023 06:04 )             25.8     06-21    131<L>  |  94<L>  |  34<H>  ----------------------------<  92  4.1   |  22  |  2.08<H>    Ca    8.6      21 Jun 2023 06:03  Phos  3.7     06-21  Mg     1.9     06-21    TPro  4.8<L>  /  Alb  2.7<L>  /  TBili  2.3<H>  /  DBili  x   /  AST  9<L>  /  ALT  <5<L>  /  AlkPhos  85  06-21    Tacrolimus (), Serum: 8.0 ng/mL (06-21 @ 06:03)        Culture - Blood (collected 06-20-23 @ 06:56)  Source: .Blood Blood-Peripheral  Preliminary Report (06-21-23 @ 12:02):    No growth to date.    Culture - Blood (collected 06-20-23 @ 06:56)  Source: .Blood Blood-Peripheral  Preliminary Report (06-21-23 @ 12:02):    No growth to date.    Culture - Urine (collected 06-20-23 @ 06:43)  Source: Clean Catch Clean Catch (Midstream)  Final Report (06-21-23 @ 09:37):    No growth    Culture - Blood (collected 06-19-23 @ 06:19)  Source: .Blood Blood  Preliminary Report (06-20-23 @ 11:01):    No growth to date.    Culture - Blood (collected 06-19-23 @ 06:19)  Source: .Blood Blood  Preliminary Report (06-20-23 @ 11:01):    No growth to date.    Culture - Blood (collected 06-18-23 @ 13:56)  Source: .Blood Blood-Peripheral  Gram Stain (06-19-23 @ 07:58):    Growth in anaerobic bottle: Gram Positive Cocci in Pairs and Chains  Final Report (06-20-23 @ 07:36):    Growth in anaerobic bottle: Enterococcus faecium (vancomycin resistant)    See previous culture [id number]    Culture - Blood (collected 06-18-23 @ 13:47)  Source: .Blood Blood-Peripheral  Gram Stain (06-19-23 @ 09:15):    Growth in anaerobic bottle: Gram Positive Cocci in Pairs and Chains    Growth in aerobic bottle: Gram Positive Cocci in Pairs and Chains  Final Report (06-20-23 @ 07:35):    Growth in aerobic and anaerobic bottles: Enterococcus faecium (vancomycin    resistant)    See previous culture [id number]    Culture - Urine (collected 06-18-23 @ 09:11)  Source: OR Collect Bladder (from O.R.)  Final Report (06-20-23 @ 15:47):    Rare Enterococcus faecium  Organism: Enterococcus faecium (06-21-23 @ 14:07)  Organism: Enterococcus faecium (06-20-23 @ 15:47)    Culture - Blood (collected 06-17-23 @ 13:30)  Source: .Blood Blood  Preliminary Report (06-18-23 @ 19:01):    No growth to date.    Culture - Blood (collected 06-17-23 @ 13:25)  Source: .Blood Blood  Gram Stain (06-18-23 @ 10:30):    Growth in anaerobic bottle: Gram Positive Cocci in Pairs and Chains  Final Report (06-20-23 @ 07:34):    Growth in anaerobic bottle: Enterococcus faecium (vancomycin resistant)    ***Blood Panel PCR results on this specimen are available    approximately 3 hours after the Gram stain result.***    Gram stain, PCR, and/or culture results may not always    correspond due to difference in methodologies.    ************************************************************    This PCR assay was performed by multiplex PCR. This    Assay tests for 66 bacterial and resistance gene targets.    Please refer to the [facility] test directory    at [email or web address] for details.  Organism: Blood Culture PCR  Enterococcus faecium (vancomycin resistant) (06-20-23 @ 07:34)  Organism: Enterococcus faecium (vancomycin resistant) (06-20-23 @ 07:34)  Organism: Blood Culture PCR (06-20-23 @ 07:34)    Review of systems  All other systems were reviewed and are negative, except as noted.      PHYSICAL EXAM:  Constitutional: NAD  Eyes: anicteric, PERRLA  ENMT: nc/at, no thrush  Neck: supple, no lines. prior sites c/d/i.    Respiratory: CTA B/L  Cardiovascular: RRR  Gastrointestinal: Soft abdomen, ND, appropriate incisional TTP. chevron incision with stable ecchymosis.  m No signs of infection. MONSE#2 ss, leaking around site  Genitourinary: voiding  Extremities: SCD's in place and working bilaterally  Vascular: Palpable dp pulses bilaterally.   Neurological: A&O x3  Skin: no rashes, ulcerations, lesions  Musculoskeletal: Moving all extremities  Psychiatric: Responsive

## 2023-06-21 NOTE — PROGRESS NOTE ADULT - SUBJECTIVE AND OBJECTIVE BOX
Follow Up:      Interval History:    REVIEW OF SYSTEMS  [  ] ROS unobtainable because:    [  ] All other systems negative except as noted below    Constitutional:  [ ] fever [ ] chills  [ ] weight loss  [ ] weakness  Skin:  [ ] rash [ ] phlebitis	  Eyes: [ ] icterus [ ] pain  [ ] discharge	  ENMT: [ ] sore throat  [ ] thrush [ ] ulcers [ ] exudates  Respiratory: [ ] dyspnea [ ] hemoptysis [ ] cough [ ] sputum	  Cardiovascular:  [ ] chest pain [ ] palpitations [ ] edema	  Gastrointestinal:  [ ] nausea [ ] vomiting [ ] diarrhea [ ] constipation [ ] pain	  Genitourinary:  [ ] dysuria [ ] frequency [ ] hematuria [ ] discharge [ ] flank pain  [ ] incontinence  Musculoskeletal:  [ ] myalgias [ ] arthralgias [ ] arthritis  [ ] back pain  Neurological:  [ ] headache [ ] seizures  [ ] confusion/altered mental status    Allergies  No Known Allergies        ANTIMICROBIALS:  DAPTOmycin IVPB 400 every 24 hours  fluconAZOLE   Tablet 200 daily  piperacillin/tazobactam IVPB.. 3.375 every 8 hours  sofosbuvir 400 mG/velpatasvir 100 mG (EPCLUSA) 1 <User Schedule>  trimethoprim   80 mG/sulfamethoxazole 400 mG 1 <User Schedule>  valGANciclovir 450 every 48 hours      OTHER MEDS:  MEDICATIONS  (STANDING):  aspirin enteric coated 81 daily  heparin   Injectable 5000 every 12 hours  levothyroxine 100 daily  mycophenolate mofetil 1000 <User Schedule>  pantoprazole    Tablet 40 before breakfast  polyethylene glycol 3350 17 daily  predniSONE   Tablet 20 daily  senna 2 at bedtime  tacrolimus 0.5 <User Schedule>  traMADol 25 every 12 hours PRN  traMADol 50 every 12 hours PRN  ursodiol Capsule 300 every 12 hours      Vital Signs Last 24 Hrs  T(C): 37.1 (21 Jun 2023 14:16), Max: 37.1 (21 Jun 2023 14:16)  T(F): 98.7 (21 Jun 2023 14:16), Max: 98.7 (21 Jun 2023 14:16)  HR: 78 (21 Jun 2023 14:16) (65 - 88)  BP: 117/78 (21 Jun 2023 14:16) (117/78 - 136/81)  BP(mean): 104 (21 Jun 2023 01:00) (104 - 104)  RR: 20 (21 Jun 2023 14:16) (18 - 20)  SpO2: 100% (21 Jun 2023 14:16) (99% - 100%)    Parameters below as of 21 Jun 2023 14:16  Patient On (Oxygen Delivery Method): room air        PHYSICAL EXAMINATION:  General: Alert and Awake, NAD  HEENT: PERRL, EOMI  Neck: Supple  Cardiac: RRR, No M/R/G  Resp: CTAB, No Wh/Rh/Ra  Abdomen: NBS, NT/ND, No HSM, No rigidity or guarding  MSK: No LE edema. No Calf tenderness  : No monten[place]ro  Skin: No rashes or lesions. Skin is warm and dry to the touch.   Neuro: Alert and Awake. CN 2-12 Grossly intact. Moves all four extremities spontaneously.  Psych: Calm, Pleasant, Cooperative                          8.5    15.84 )-----------( 291      ( 21 Jun 2023 06:04 )             25.8       06-21    131<L>  |  94<L>  |  34<H>  ----------------------------<  92  4.1   |  22  |  2.08<H>    Ca    8.6      21 Jun 2023 06:03  Phos  3.7     06-21  Mg     1.9     06-21    TPro  4.8<L>  /  Alb  2.7<L>  /  TBili  2.3<H>  /  DBili  x   /  AST  9<L>  /  ALT  <5<L>  /  AlkPhos  85  06-21      Urinalysis Basic - ( 21 Jun 2023 06:03 )    Color: x / Appearance: x / SG: x / pH: x  Gluc: 92 mg/dL / Ketone: x  / Bili: x / Urobili: x   Blood: x / Protein: x / Nitrite: x   Leuk Esterase: x / RBC: x / WBC x   Sq Epi: x / Non Sq Epi: x / Bacteria: x        MICROBIOLOGY:  v  .Blood Blood-Peripheral  06-20-23   No growth to date.  --  --      Clean Catch Clean Catch (Midstream)  06-20-23   No growth  --  --      .Blood Blood  06-19-23   No growth to date.  --  --      .Blood Blood-Peripheral  06-18-23   Growth in anaerobic bottle: Enterococcus faecium (vancomycin resistant)  See previous culture [id number]  --    Growth in anaerobic bottle: Gram Positive Cocci in Pairs and Chains      .Blood Blood-Peripheral  06-18-23   Growth in aerobic and anaerobic bottles: Enterococcus faecium (vancomycin  resistant)  See previous culture [id number]  --    Growth in anaerobic bottle: Gram Positive Cocci in Pairs and Chains  Growth in aerobic bottle: Gram Positive Cocci in Pairs and Chains      OR Collect Bladder (from O.R.)  06-18-23   Rare Enterococcus faecium  --  Enterococcus faecium      .Blood Blood  06-17-23   No growth to date.  --  --      .Blood Blood  06-17-23   Growth in anaerobic bottle: Enterococcus faecium (vancomycin resistant)  ***Blood Panel PCR results on this specimen are available  approximately 3 hours after the Gram stain result.***  Gram stain, PCR, and/or culture results may not always  correspond due to difference in methodologies.  ************************************************************  This PCR assay was performed by multiplex PCR. This  Assay tests for 66 bacterial and resistance gene targets.  Please refer to the [facility] Labs test directory  at [email or web address] for details.  --  Blood Culture PCR  Enterococcus faecium (vancomycin resistant)      Abdominal Fl Abdominal Fluid  06-10-23   No growth at 5 days  --    No polymorphonuclear cells seen per low power field  No organisms seen per oil power field      Ascites Fl Ascites Fluid  06-08-23   No growth at 5 days  --    No polymorphonuclear cells seen  No organisms seen  by cytocentrifuge      .Blood Blood-Peripheral  06-07-23   No Growth Final  --  --      .Blood Blood  06-06-23   No Growth Final  --  --      .Bronchial Bronchoalveolar Lavage  06-05-23   No growth at 48 hours  --    polymorphonuclear leukocytes per low power field  No squamous epithelial cells per low power field  Gram Negative Rods per oil power field  by cytocentrifuge      .Bronchial Bronchial Lavage  06-05-23   No acid-fast bacilli isolated at 2 weeks.  --    Rare polymorphonuclear leukocytes per low power field  No squamous epithelial cells per low power field  No organisms seen per oil power field      .Blood Blood-Peripheral  06-02-23   No Growth Final  --  --      .Blood Blood-Peripheral  06-02-23   No Growth Final  --  --      Abdominal Fl Abdominal Fluid  05-31-23   No growth  --    polymorphonuclear leukocytes seen  No organisms seen  by cytocentrifuge      Clean Catch Clean Catch (Midstream)  05-28-23   <10,000 CFU/mL Normal Urogenital Aga  --  --      .Blood Blood-Peripheral  05-28-23   No Growth Final  --  --      .Blood Blood-Peripheral  05-28-23   No Growth Final  --  --      .Sputum Sputum  05-26-23   No acid-fast bacilli isolated at 3 weeks.  --    Few polymorphonuclear leukocytes per low power field  Moderate Squamous epithelial cells per low power field  Moderate Yeast like cells seen per oil power field      .Blood Blood-Peripheral  05-26-23   No Growth Final  --  --      .Blood Blood-Peripheral  05-24-23   No Growth Final  --  --      Peritoneal Peritoneal Fluid  05-23-23   No growth at 5 days  --    polymorphonuclear leukocytes seen  No organisms seen  by cytocentrifuge      .Blood Blood-Peripheral  05-23-23   No Growth Final  --  --      .Blood Blood  05-23-23   No Growth Final  --  --      Clean Catch Clean Catch (Midstream)  05-22-23   50,000 - 99,000 CFU/mL Enterococcus faecium (vancomycin resistant)  --  Enterococcus faecium (vancomycin resistant)        CMV IgG Antibody: >10.00 U/mL (06-01-23 @ 00:45)  Toxoplasma IgG Screen: <3.0 IU/mL (05-17-23 @ 07:14)  CMV IgG Antibody: >10.00 U/mL (05-17-23 @ 07:14)  Toxoplasma IgG Screen: <3.0 IU/mL (05-17-23 @ 07:14)    CMVPCR Log: 3.16 Dcp82QX/mL (06-20 @ 06:56)  CMVPCR Log: 3.27 Ntc23PX/mL (06-20 @ 06:56)        RADIOLOGY:    <The imaging below has been reviewed and visualized by me independently. Findings as detailed in report below> Follow Up:  bacteremia    Interval History: afebrile. continued but stable pain at [facility]    REVIEW OF SYSTEMS  [  ] ROS unobtainable because:    [ x] All other systems negative except as noted below    Constitutional:  [ ] fever [ ] chills  [ ] weight loss  [ ] weakness  Skin:  [ ] rash [ ] phlebitis	  Eyes: [ ] icterus [ ] pain  [ ] discharge	  ENMT: [ ] sore throat  [ ] thrush [ ] ulcers [ ] exudates  Respiratory: [ ] dyspnea [ ] hemoptysis [ ] cough [ ] sputum	  Cardiovascular:  [ ] chest pain [ ] palpitations [ ] edema	  Gastrointestinal:  [ ] nausea [ ] vomiting [ ] diarrhea [ ] constipation [ x pain	  Genitourinary:  [ ] dysuria [ ] frequency [ ] hematuria [ ] discharge [ ] flank pain  [ ] incontinence  Musculoskeletal:  [ ] myalgias [ ] arthralgias [ ] arthritis  [ ] back pain  Neurological:  [ ] headache [ ] seizures  [ ] confusion/altered mental status    Allergies  No Known Allergies        ANTIMICROBIALS:  DAPTOmycin IVPB 400 every 24 hours  fluconAZOLE   Tablet 200 daily  piperacillin/tazobactam IVPB.. 3.375 every 8 hours  sofosbuvir 400 mG/velpatasvir 100 mG (EPCLUSA) 1 <User Schedule>  trimethoprim   80 mG/sulfamethoxazole 400 mG 1 <User Schedule>  valGANciclovir 450 every 48 hours      OTHER MEDS:  MEDICATIONS  (STANDING):  aspirin enteric coated 81 daily  heparin   Injectable 5000 every 12 hours  levothyroxine 100 daily  mycophenolate mofetil 1000 <User Schedule>  pantoprazole    Tablet 40 before breakfast  polyethylene glycol 3350 17 daily  predniSONE   Tablet 20 daily  senna 2 at bedtime  tacrolimus 0.5 <User Schedule>  traMADol 25 every 12 hours PRN  traMADol 50 every 12 hours PRN  ursodiol Capsule 300 every 12 hours      Vital Signs Last 24 Hrs  T(C): 37.1 (21 Jun 2023 14:16), Max: 37.1 (21 Jun 2023 14:16)  T(F): 98.7 (21 Jun 2023 14:16), Max: 98.7 (21 Jun 2023 14:16)  HR: 78 (21 Jun 2023 14:16) (65 - 88)  BP: 117/78 (21 Jun 2023 14:16) (117/78 - 136/81)  BP(mean): 104 (21 Jun 2023 01:00) (104 - 104)  RR: 20 (21 Jun 2023 14:16) (18 - 20)  SpO2: 100% (21 Jun 2023 14:16) (99% - 100%)    Parameters below as of 21 Jun 2023 14:16  Patient On (Oxygen Delivery Method): room air      PHYSICAL EXAMINATION:  General: Alert and Awake, NAD  HEENT: Normocephalic / Atraumatic  Resp: No accessory muscles of respiration utilized  Abdomen: Abdominal wound with packing at left side of OLT incision. RLQ Drain with SS output. Moderate diffuse tenderness to palpation.  MSK: No LE edema.   : No [place]ro  Skin: As per abdominal section.   Neuro: Alert and Awake. CN 2-12 Grossly intact. Moves all four extremities spontaneously.  Psych: Calm, Pleasant, Cooperative                          8.5    15.84 )-----------( 291      ( 21 Jun 2023 06:04 )             25.8       06-21    131<L>  |  94<L>  |  34<H>  ----------------------------<  92  4.1   |  22  |  2.08<H>    Ca    8.6      21 Jun 2023 06:03  Phos  3.7     06-21  Mg     1.9     06-21    TPro  4.8<L>  /  Alb  2.7<L>  /  TBili  2.3<H>  /  DBili  x   /  AST  9<L>  /  ALT  <5<L>  /  AlkPhos  85  06-21      Urinalysis Basic - ( 21 Jun 2023 06:03 )    Color: x / Appearance: x / SG: x / pH: x  Gluc: 92 mg/dL / Ketone: x  / Bili: x / Urobili: x   Blood: x / Protein: x / Nitrite: x   Leuk Esterase: x / RBC: x / WBC x   Sq Epi: x / Non Sq Epi: x / Bacteria: x        MICROBIOLOGY:  v  .Blood Blood-Peripheral  06-20-23   No growth to date.  --  --      Clean Catch Clean Catch (Midstream)  06-20-23   No growth  --  --      .Blood Blood  06-19-23   No growth to date.  --  --      .Blood Blood-Peripheral  06-18-23   Growth in anaerobic bottle: Enterococcus faecium (vancomycin resistant)  See previous culture [id number]  --    Growth in anaerobic bottle: Gram Positive Cocci in Pairs and Chains      .Blood Blood-Peripheral  06-18-23   Growth in aerobic and anaerobic bottles: Enterococcus faecium (vancomycin  resistant)  See previous culture [id number]  --    Growth in anaerobic bottle: Gram Positive Cocci in Pairs and Chains  Growth in aerobic bottle: Gram Positive Cocci in Pairs and Chains      OR Collect Bladder (from O.R.)  06-18-23   Rare Enterococcus faecium  --  Enterococcus faecium      .Blood Blood  06-17-23   No growth to date.  --  --      .Blood Blood  06-17-23   Growth in anaerobic bottle: Enterococcus faecium (vancomycin resistant)  ***Blood Panel PCR results on this specimen are available  approximately 3 hours after the Gram stain result.***  Gram stain, PCR, and/or culture results may not always  correspond due to difference in methodologies.  ************************************************************  This PCR assay was performed by multiplex PCR. This  Assay tests for 66 bacterial and resistance gene targets.  Please refer to the [facility] Labs test directory  at [email or web address] for details.  --  Blood Culture PCR  Enterococcus faecium (vancomycin resistant)      Abdominal Fl Abdominal Fluid  06-10-23   No growth at 5 days  --    No polymorphonuclear cells seen per low power field  No organisms seen per oil power field      Ascites Fl Ascites Fluid  06-08-23   No growth at 5 days  --    No polymorphonuclear cells seen  No organisms seen  by cytocentrifuge      .Blood Blood-Peripheral  06-07-23   No Growth Final  --  --      .Blood Blood  06-06-23   No Growth Final  --  --      .Bronchial Bronchoalveolar Lavage  06-05-23   No growth at 48 hours  --    polymorphonuclear leukocytes per low power field  No squamous epithelial cells per low power field  Gram Negative Rods per oil power field  by cytocentrifuge      .Bronchial Bronchial Lavage  06-05-23   No acid-fast bacilli isolated at 2 weeks.  --    Rare polymorphonuclear leukocytes per low power field  No squamous epithelial cells per low power field  No organisms seen per oil power field      .Blood Blood-Peripheral  06-02-23   No Growth Final  --  --      .Blood Blood-Peripheral  06-02-23   No Growth Final  --  --      Abdominal Fl Abdominal Fluid  05-31-23   No growth  --    polymorphonuclear leukocytes seen  No organisms seen  by cytocentrifuge      Clean Catch Clean Catch (Midstream)  05-28-23   <10,000 CFU/mL Normal Urogenital Aga  --  --      .Blood Blood-Peripheral  05-28-23   No Growth Final  --  --      .Blood Blood-Peripheral  05-28-23   No Growth Final  --  --      .Sputum Sputum  05-26-23   No acid-fast bacilli isolated at 3 weeks.  --    Few polymorphonuclear leukocytes per low power field  Moderate Squamous epithelial cells per low power field  Moderate Yeast like cells seen per oil power field      .Blood Blood-Peripheral  05-26-23   No Growth Final  --  --      .Blood Blood-Peripheral  05-24-23   No Growth Final  --  --      Peritoneal Peritoneal Fluid  05-23-23   No growth at 5 days  --    polymorphonuclear leukocytes seen  No organisms seen  by cytocentrifuge      .Blood Blood-Peripheral  05-23-23   No Growth Final  --  --      .Blood Blood  05-23-23   No Growth Final  --  --      Clean Catch Clean Catch (Midstream)  05-22-23   50,000 - 99,000 CFU/mL Enterococcus faecium (vancomycin resistant)  --  Enterococcus faecium (vancomycin resistant)        CMV IgG Antibody: >10.00 U/mL (06-01-23 @ 00:45)  Toxoplasma IgG Screen: <3.0 IU/mL (05-17-23 @ 07:14)  CMV IgG Antibody: >10.00 U/mL (05-17-23 @ 07:14)  Toxoplasma IgG Screen: <3.0 IU/mL (05-17-23 @ 07:14)    CMVPCR Log: 3.16 Sqy60HM/mL (06-20 @ 06:56)  CMVPCR Log: 3.27 Oqp98LG/mL (06-20 @ 06:56)        RADIOLOGY:    <The imaging below has been reviewed and visualized by me independently. Findings as detailed in report below>    < from: CT Abdomen and Pelvis No Cont (06.18.23 @ 17:55) >  IMPRESSION:  Limited evaluation of intra-abdominal contents in the absence of IV   contrast.    Status postliver transplant. Mixed attenuation subdiaphragmatic mass   collection with drainage catheter in place. Additional complex fluid   along the inferior right hepatic lobe and gallbladder fossa, suspicious   for blood products.    Hyperdense appearance of the portal vessels, of uncertain etiology.   Correlation with abdominal Doppler is recommended.    Mural thickening of the ascending colon with mild surrounding   inflammation, possibly colitis.    < end of copied text >

## 2023-06-21 NOTE — PROGRESS NOTE ADULT - ASSESSMENT
38F with Hx of hypothyroidism (on Levothyroxine), liver cirrhosis 2/2 ETOH abuse---->  presented to [facility] ED on 5/13/23 with ~ 2 weeks Hx of progressively worsening abdominal pain and distention.  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  now s/p HCV+ OLT under Simulect induction on 6/10/2023    [] Bacteremia  - Leukocytosis: 6/17 BCx growing VRE. ID following. Continue Zosyn/Daptomycin.  Check CK daily while on Daptomycin  - 6/18 CT a/p reviewed  - Daily BCx until cleared. BCx 6/19 NGTD. BCx from 6/20 pending. UCx pending. Daily skin checks.  - MSSA bacteremia (5/17) - completed Ancef 6/11.   - TTE completed 6/20, no evidence of endocarditis    [] s/p HCV+ OLT    - HCV + donor.  Recipient now with HCV Viremia, Genotype 1A.  Ordered for Epclusa, awaiting arrival.  - Good graft function  - Subcutaneous hematoma--change dressing QD or as needed. No concerns for active bleeding at this time  - Continue Actigal  - ASA 81mg po qd  - VIVIANA with fluctuating Cr. Will continue to monitor.  - Regular diet  - Pain management: oxycodone prn/ lidocaine patch for now and involve pain management if acute issues have resolved  - IS/SCDs/SQH  - Strict I&Os  - D/C MONSE#2 today  - bowel regimen    [] Immunosuppression  - FK by level, MMF 1/1, pred 20  - Simulect completed  - CMV viremia pre-op.  Valcyte to TIW for renal dosing.   - Check CMV PCR 6/21  - PPX: Bactrim to TIW for renal dosing. Fluconazole QD (renal dosing)    Transplant Surgery  [id number]   38F with Hx of hypothyroidism (on Levothyroxine), liver cirrhosis 2/2 ETOH abuse---->  presented to [facility] ED on 5/13/23 with ~ 2 weeks Hx of progressively worsening abdominal pain and distention.  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  now s/p HCV+ OLT under Simulect induction on 6/10/2023    [] Bacteremia  - Leukocytosis: 6/17 BCx growing VRE. ID following. Continue Zosyn/Daptomycin.  Check CK daily while on Daptomycin  - 6/18 CT a/p reviewed  - Daily BCx until cleared. BCx 6/19 NGTD. BCx from 6/20 pending. UCx pending. Daily skin checks.  - MSSA bacteremia (5/17) - completed Ancef 6/11.   - TTE completed 6/20, no evidence of endocarditis    [] s/p HCV+ OLT    - HCV + donor.  Recipient now with HCV Viremia, Genotype 1A.  started Epclusa   - Good graft function  - Subcutaneous hematoma--change dressing QD or as needed. No concerns for active bleeding at this time  - Continue Actigal  - ASA 81mg po qd  - VIVIANA with fluctuating Cr. Will continue to monitor.  - Regular diet  - Pain management: oxycodone prn/ lidocaine patch for now and involve pain management if acute issues have resolved  - IS/SCDs/SQH  - Strict I&Os  - D/C MONSE#2 today  - bowel regimen    [] Immunosuppression  - FK by level, decrease MMF 500mg bid in setting of bacteremia, pred 20  - Simulect completed  - CMV viremia pre-op.  Valcyte to TIW for renal dosing.   - Check CMV PCR 6/21  - PPX: Bactrim to TIW for renal dosing. Fluconazole QD (renal dosing)

## 2023-06-21 NOTE — PHARMACY EDUCATION NOTE - SERVICE
[facility] TELEPHONE ENCOUNTER      HISTORY OF PRESENT ILLNESS:  Telephone visit in follow-up of her left direct anterior hip replacement.  The hip is doing great.  In fact she would like to do the right hip but does not want to do it during the current virus pandemic.  She does have chronic back pain.  She had an injury where she fell off a ladder 30 years ago.  She fell onto a copier.    PLAN OF CARE:  I did advise her to let the virus plan out and to get in touch with our office later in the year.  When she comes in AP pelvis and lateral view of both the right and left hips.    DIAGNOSIS  Status post total replacement of left hip       11 minutes    CODIN  Modifier GT Time Spent 11-20 minutes  in direct consultation with the patient occurring by telephone.              
Solid Organ Transplant

## 2023-06-22 LAB
ALBUMIN SERPL ELPH-MCNC: 2.5 G/DL — LOW (ref 3.3–5)
ALP SERPL-CCNC: 76 U/L — SIGNIFICANT CHANGE UP (ref 40–120)
ALT FLD-CCNC: <5 U/L — LOW (ref 10–45)
ANION GAP SERPL CALC-SCNC: 13 MMOL/L — SIGNIFICANT CHANGE UP (ref 5–17)
APTT BLD: 26.3 SEC — LOW (ref 27.5–35.5)
AST SERPL-CCNC: 10 U/L — SIGNIFICANT CHANGE UP (ref 10–40)
BILIRUB SERPL-MCNC: 2.2 MG/DL — HIGH (ref 0.2–1.2)
BUN SERPL-MCNC: 27 MG/DL — HIGH (ref 7–23)
CALCIUM SERPL-MCNC: 8.4 MG/DL — SIGNIFICANT CHANGE UP (ref 8.4–10.5)
CHLORIDE SERPL-SCNC: 95 MMOL/L — LOW (ref 96–108)
CO2 SERPL-SCNC: 21 MMOL/L — LOW (ref 22–31)
CREAT SERPL-MCNC: 1.73 MG/DL — HIGH (ref 0.5–1.3)
CULTURE RESULTS: NO GROWTH — SIGNIFICANT CHANGE UP
CULTURE RESULTS: SIGNIFICANT CHANGE UP
EGFR: 38 ML/MIN/1.73M2 — LOW
GLUCOSE SERPL-MCNC: 102 MG/DL — HIGH (ref 70–99)
HCT VFR BLD CALC: 26.9 % — LOW (ref 34.5–45)
HGB BLD-MCNC: 8.7 G/DL — LOW (ref 11.5–15.5)
INR BLD: 0.91 RATIO — SIGNIFICANT CHANGE UP (ref 0.88–1.16)
MAGNESIUM SERPL-MCNC: 1.8 MG/DL — SIGNIFICANT CHANGE UP (ref 1.6–2.6)
MCHC RBC-ENTMCNC: 30.7 PG — SIGNIFICANT CHANGE UP (ref 27–34)
MCHC RBC-ENTMCNC: 32.3 GM/DL — SIGNIFICANT CHANGE UP (ref 32–36)
MCV RBC AUTO: 95.1 FL — SIGNIFICANT CHANGE UP (ref 80–100)
NRBC # BLD: 0 /100 WBCS — SIGNIFICANT CHANGE UP (ref 0–0)
PHOSPHATE SERPL-MCNC: 3.1 MG/DL — SIGNIFICANT CHANGE UP (ref 2.5–4.5)
PLATELET # BLD AUTO: 317 K/UL — SIGNIFICANT CHANGE UP (ref 150–400)
POTASSIUM SERPL-MCNC: 3.7 MMOL/L — SIGNIFICANT CHANGE UP (ref 3.5–5.3)
POTASSIUM SERPL-SCNC: 3.7 MMOL/L — SIGNIFICANT CHANGE UP (ref 3.5–5.3)
PROT SERPL-MCNC: 4.7 G/DL — LOW (ref 6–8.3)
PROTHROM AB SERPL-ACNC: 10.6 SEC — SIGNIFICANT CHANGE UP (ref 10.5–13.4)
RBC # BLD: 2.83 M/UL — LOW (ref 3.8–5.2)
RBC # FLD: 22 % — HIGH (ref 10.3–14.5)
SODIUM SERPL-SCNC: 129 MMOL/L — LOW (ref 135–145)
SPECIMEN SOURCE: SIGNIFICANT CHANGE UP
SPECIMEN SOURCE: SIGNIFICANT CHANGE UP
TACROLIMUS SERPL-MCNC: 7.4 NG/ML — SIGNIFICANT CHANGE UP
WBC # BLD: 10.82 K/UL — HIGH (ref 3.8–10.5)
WBC # FLD AUTO: 10.82 K/UL — HIGH (ref 3.8–10.5)

## 2023-06-22 PROCEDURE — 99232 SBSQ HOSP IP/OBS MODERATE 35: CPT | Mod: GC,24

## 2023-06-22 RX ORDER — TRAMADOL HYDROCHLORIDE 50 MG/1
50 TABLET ORAL ONCE
Refills: 0 | Status: DISCONTINUED | OUTPATIENT
Start: 2023-06-22 | End: 2023-06-22

## 2023-06-22 RX ORDER — MYCOPHENOLATE MOFETIL 250 MG/1
500 CAPSULE ORAL
Refills: 0 | Status: DISCONTINUED | OUTPATIENT
Start: 2023-06-22 | End: 2023-06-26

## 2023-06-22 RX ADMIN — Medication 20 MILLIGRAM(S): at 05:48

## 2023-06-22 RX ADMIN — PIPERACILLIN AND TAZOBACTAM 25 GRAM(S): 4; .5 INJECTION, POWDER, LYOPHILIZED, FOR SOLUTION INTRAVENOUS at 17:47

## 2023-06-22 RX ADMIN — TRAMADOL HYDROCHLORIDE 25 MILLIGRAM(S): 50 TABLET ORAL at 05:49

## 2023-06-22 RX ADMIN — LIDOCAINE 1 PATCH: 4 CREAM TOPICAL at 19:32

## 2023-06-22 RX ADMIN — TACROLIMUS 0.5 MILLIGRAM(S): 5 CAPSULE ORAL at 20:20

## 2023-06-22 RX ADMIN — PIPERACILLIN AND TAZOBACTAM 25 GRAM(S): 4; .5 INJECTION, POWDER, LYOPHILIZED, FOR SOLUTION INTRAVENOUS at 01:02

## 2023-06-22 RX ADMIN — TRAMADOL HYDROCHLORIDE 25 MILLIGRAM(S): 50 TABLET ORAL at 18:22

## 2023-06-22 RX ADMIN — TRAMADOL HYDROCHLORIDE 50 MILLIGRAM(S): 50 TABLET ORAL at 01:34

## 2023-06-22 RX ADMIN — MAGNESIUM OXIDE 400 MG ORAL TABLET 400 MILLIGRAM(S): 241.3 TABLET ORAL at 07:49

## 2023-06-22 RX ADMIN — URSODIOL 300 MILLIGRAM(S): 250 TABLET, FILM COATED ORAL at 05:49

## 2023-06-22 RX ADMIN — Medication 1 TABLET(S): at 05:48

## 2023-06-22 RX ADMIN — MYCOPHENOLATE MOFETIL 1000 MILLIGRAM(S): 250 CAPSULE ORAL at 07:49

## 2023-06-22 RX ADMIN — PIPERACILLIN AND TAZOBACTAM 25 GRAM(S): 4; .5 INJECTION, POWDER, LYOPHILIZED, FOR SOLUTION INTRAVENOUS at 07:49

## 2023-06-22 RX ADMIN — CHLORHEXIDINE GLUCONATE 1 APPLICATION(S): 213 SOLUTION TOPICAL at 05:48

## 2023-06-22 RX ADMIN — FLUCONAZOLE 200 MILLIGRAM(S): 150 TABLET ORAL at 11:42

## 2023-06-22 RX ADMIN — TRAMADOL HYDROCHLORIDE 25 MILLIGRAM(S): 50 TABLET ORAL at 17:46

## 2023-06-22 RX ADMIN — HEPARIN SODIUM 5000 UNIT(S): 5000 INJECTION INTRAVENOUS; SUBCUTANEOUS at 05:48

## 2023-06-22 RX ADMIN — LIDOCAINE 1 PATCH: 4 CREAM TOPICAL at 11:43

## 2023-06-22 RX ADMIN — HEPARIN SODIUM 5000 UNIT(S): 5000 INJECTION INTRAVENOUS; SUBCUTANEOUS at 17:46

## 2023-06-22 RX ADMIN — URSODIOL 300 MILLIGRAM(S): 250 TABLET, FILM COATED ORAL at 17:46

## 2023-06-22 RX ADMIN — PANTOPRAZOLE SODIUM 40 MILLIGRAM(S): 20 TABLET, DELAYED RELEASE ORAL at 05:48

## 2023-06-22 RX ADMIN — Medication 100 MICROGRAM(S): at 05:49

## 2023-06-22 RX ADMIN — Medication 81 MILLIGRAM(S): at 11:42

## 2023-06-22 RX ADMIN — TRAMADOL HYDROCHLORIDE 50 MILLIGRAM(S): 50 TABLET ORAL at 12:22

## 2023-06-22 RX ADMIN — TACROLIMUS 0.5 MILLIGRAM(S): 5 CAPSULE ORAL at 07:49

## 2023-06-22 RX ADMIN — DAPTOMYCIN 116 MILLIGRAM(S): 500 INJECTION, POWDER, LYOPHILIZED, FOR SOLUTION INTRAVENOUS at 13:17

## 2023-06-22 RX ADMIN — TRAMADOL HYDROCHLORIDE 25 MILLIGRAM(S): 50 TABLET ORAL at 06:38

## 2023-06-22 RX ADMIN — LIDOCAINE 1 PATCH: 4 CREAM TOPICAL at 22:51

## 2023-06-22 RX ADMIN — TRAMADOL HYDROCHLORIDE 50 MILLIGRAM(S): 50 TABLET ORAL at 02:30

## 2023-06-22 RX ADMIN — VELPATASVIR AND SOFOSBUVIR 1 TABLET(S): 37.5; 15 PELLET ORAL at 17:48

## 2023-06-22 RX ADMIN — MAGNESIUM OXIDE 400 MG ORAL TABLET 400 MILLIGRAM(S): 241.3 TABLET ORAL at 17:46

## 2023-06-22 RX ADMIN — MYCOPHENOLATE MOFETIL 500 MILLIGRAM(S): 250 CAPSULE ORAL at 20:20

## 2023-06-22 RX ADMIN — Medication 1 TABLET(S): at 17:46

## 2023-06-22 RX ADMIN — TRAMADOL HYDROCHLORIDE 50 MILLIGRAM(S): 50 TABLET ORAL at 13:09

## 2023-06-22 RX ADMIN — MAGNESIUM OXIDE 400 MG ORAL TABLET 400 MILLIGRAM(S): 241.3 TABLET ORAL at 11:42

## 2023-06-22 RX ADMIN — VALGANCICLOVIR 450 MILLIGRAM(S): 450 TABLET, FILM COATED ORAL at 17:46

## 2023-06-22 NOTE — PROGRESS NOTE ADULT - ASSESSMENT
38F with Hx of hypothyroidism (on Levothyroxine), liver cirrhosis 2/2 ETOH abuse---->  presented to [facility] ED on 5/13/23 with ~ 2 weeks Hx of progressively worsening abdominal pain and distention.  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  now s/p HCV+ OLT under Simulect induction on 6/10/2023    [] VRE Bacteremia  - ID following: cont dapto/zosyn. recommend stopping zosyn tomorrow if bld cxs remain neg to date  - Plan to repeat CT a/p non contrast tomorrow to re-evaluate collections   - MSSA bacteremia (5/17) - completed Ancef 6/11.   - TTE completed 6/20, no evidence of endocarditis    [] s/p HCV+ OLT    - Good graft function  - HCV Viremia, Genotype 1A.  started Epclusa on 6/21  - Subcutaneous hematoma--change dressing QD or as needed. No concerns for active bleeding at this time  - Continue Actigal  - ASA 81mg po qd  - VIVIANA with fluctuating Cr.   - Regular diet  - Pain management: oxycodone prn/ lidocaine patch   - IS/SCDs/SQH  - Strict I&Os  - bowel regimen    [] Immunosuppression  - FK by level, MMF 500mg bid, pred 20  - Simulect completed  - CMV viremia pre-op.  Valcyte to TIW for renal dosing.   - PPX: Bactrim to TIW for renal dosing. Fluconazole QD (renal dosing)

## 2023-06-22 NOTE — PROGRESS NOTE ADULT - SUBJECTIVE AND OBJECTIVE BOX
Transplant Surgery Multidisciplinary Note  --------------------------------------------------------------  HCV+ OLT    Date: 6/9/23       POD#13    Present:   Patient seen and examined with multidisciplinary Transplant team including Surgeon: Dr. Byrnes, Hepatologist: SALO Manning, during AM rounds.   Disciplines not in attendance will be notified of the plan.     HPI: 38F with Hx of hypothyroidism (on Levothyroxine), liver cirrhosis 2/2 ETOH abuse---->  presented to [facility] ED on 5/13/23 with ~ 2 weeks Hx of progressively worsening abdominal pain and distention.  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  now s/p HCV+ OLT under Simulect induction on 6/10/2023    Interval Events:  - POD 13 s/p OLT  - MONSE remvoed  - started Epclusa  - Decr MMF 500mg bid in setting of bacteremia    Immunosuppression:  Induction: Simulect completed  Maint:  FK by level, MMF 500mg bid, SST  ongoing monitoring for signs of rejection    Potential Discharge date: TBD  Education:  Medications  Plan of care:  See Below    MEDICATIONS  (STANDING):  aspirin enteric coated 81 milliGRAM(s) Oral daily  calcium carbonate 1250 mG  + Vitamin D (OsCal 500 + D) 1 Tablet(s) Oral two times a day  chlorhexidine 2% Cloths 1 Application(s) Topical <User Schedule>  DAPTOmycin IVPB 400 milliGRAM(s) IV Intermittent every 24 hours  fluconAZOLE   Tablet 200 milliGRAM(s) Oral daily  heparin   Injectable 5000 Unit(s) SubCutaneous every 12 hours  levothyroxine 100 MICROGram(s) Oral daily  lidocaine   4% Patch 1 Patch Transdermal every 24 hours  magnesium oxide 400 milliGRAM(s) Oral three times a day with meals  mycophenolate mofetil 500 milliGRAM(s) Oral <User Schedule>  pantoprazole    Tablet 40 milliGRAM(s) Oral before breakfast  piperacillin/tazobactam IVPB.. 3.375 Gram(s) IV Intermittent every 8 hours  polyethylene glycol 3350 17 Gram(s) Oral daily  predniSONE   Tablet 20 milliGRAM(s) Oral daily  senna 2 Tablet(s) Oral at bedtime  sofosbuvir 400 mG/velpatasvir 100 mG (EPCLUSA) 1 Tablet(s) Oral <User Schedule>  tacrolimus 0.5 milliGRAM(s) Oral <User Schedule>  trimethoprim   80 mG/sulfamethoxazole 400 mG 1 Tablet(s) Oral <User Schedule>  ursodiol Capsule 300 milliGRAM(s) Oral every 12 hours  valGANciclovir 450 milliGRAM(s) Oral every 48 hours    MEDICATIONS  (PRN):  traMADol 25 milliGRAM(s) Oral every 12 hours PRN Mild Pain (1 - 3)  traMADol 50 milliGRAM(s) Oral every 12 hours PRN Moderate Pain (4 - 6)      PAST MEDICAL & SURGICAL HISTORY:  Hypothyroidism  Alcoholic liver disease  No significant past surgical history    Vital Signs Last 24 Hrs  T(C): 36.9 (22 Jun 2023 17:00), Max: 37.1 (21 Jun 2023 21:01)  T(F): 98.4 (22 Jun 2023 17:00), Max: 98.7 (21 Jun 2023 21:01)  HR: 59 (22 Jun 2023 17:00) (59 - 69)  BP: 142/88 (22 Jun 2023 17:00) (127/82 - 143/84)  BP(mean): --  RR: 18 (22 Jun 2023 17:00) (18 - 20)  SpO2: 99% (22 Jun 2023 17:00) (99% - 100%)    Parameters below as of 22 Jun 2023 17:00  Patient On (Oxygen Delivery Method): room air    I&O's Summary    21 Jun 2023 07:01  -  22 Jun 2023 07:00  --------------------------------------------------------  IN: 1320 mL / OUT: 1075 mL / NET: 245 mL    22 Jun 2023 07:01  -  22 Jun 2023 20:05  --------------------------------------------------------  IN: 720 mL / OUT: 940 mL / NET: -220 mL                        8.7    10.82 )-----------( 317      ( 22 Jun 2023 06:34 )             26.9     06-22    129<L>  |  95<L>  |  27<H>  ----------------------------<  102<H>  3.7   |  21<L>  |  1.73<H>    Ca    8.4      22 Jun 2023 06:34  Phos  3.1     06-22  Mg     1.8     06-22    TPro  4.7<L>  /  Alb  2.5<L>  /  TBili  2.2<H>  /  DBili  x   /  AST  10  /  ALT  <5<L>  /  AlkPhos  76  06-22    Tacrolimus (), Serum: 7.4 ng/mL (06-22 @ 06:34)    Culture - Blood (collected 06-20-23 @ 06:56)  Source: .Blood Blood-Peripheral  Preliminary Report (06-21-23 @ 12:02):    No growth to date.    Culture - Blood (collected 06-20-23 @ 06:56)  Source: .Blood Blood-Peripheral  Preliminary Report (06-21-23 @ 12:02):    No growth to date.    Culture - Urine (collected 06-20-23 @ 06:43)  Source: Clean Catch Clean Catch (Midstream)  Final Report (06-21-23 @ 09:37):    No growth    Culture - Blood (collected 06-19-23 @ 06:19)  Source: .Blood Blood  Preliminary Report (06-20-23 @ 11:01):    No growth to date.    Culture - Blood (collected 06-19-23 @ 06:19)  Source: .Blood Blood  Preliminary Report (06-20-23 @ 11:01):    No growth to date.    Culture - Blood (collected 06-18-23 @ 13:56)  Source: .Blood Blood-Peripheral  Gram Stain (06-19-23 @ 07:58):    Growth in anaerobic bottle: Gram Positive Cocci in Pairs and Chains  Final Report (06-20-23 @ 07:36):    Growth in anaerobic bottle: Enterococcus faecium (vancomycin resistant)    See previous culture [id number]    Culture - Blood (collected 06-18-23 @ 13:47)  Source: .Blood Blood-Peripheral  Gram Stain (06-19-23 @ 09:15):    Growth in anaerobic bottle: Gram Positive Cocci in Pairs and Chains    Growth in aerobic bottle: Gram Positive Cocci in Pairs and Chains  Final Report (06-20-23 @ 07:35):    Growth in aerobic and anaerobic bottles: Enterococcus faecium (vancomycin    resistant)    See previous culture [id number]    Culture - Urine (collected 06-18-23 @ 09:11)  Source: OR Collect Bladder (from O.R.)  Final Report (06-20-23 @ 15:47):    Rare Enterococcus faecium  Organism: Enterococcus faecium (06-21-23 @ 14:07)  Organism: Enterococcus faecium (06-20-23 @ 15:47)    Culture - Blood (collected 06-17-23 @ 13:30)  Source: .Blood Blood  Final Report (06-22-23 @ 19:01):    No Growth Final    Culture - Blood (collected 06-17-23 @ 13:25)  Source: .Blood Blood  Gram Stain (06-18-23 @ 10:30):    Growth in anaerobic bottle: Gram Positive Cocci in Pairs and Chains  Final Report (06-20-23 @ 07:34):    Growth in anaerobic bottle: Enterococcus faecium (vancomycin resistant)    ***Blood Panel PCR results on this specimen are available    approximately 3 hours after the Gram stain result.***    Gram stain, PCR, and/or culture results may not always    correspond due to difference in methodologies.    ************************************************************    This PCR assay was performed by multiplex PCR. This    Assay tests for 66 bacterial and resistance gene targets.    Please refer to the [facility] test directory    at [email or web address] for details.  Organism: Blood Culture PCR  Enterococcus faecium (vancomycin resistant) (06-20-23 @ 07:34)  Organism: Enterococcus faecium (vancomycin resistant) (06-20-23 @ 07:34)  Organism: Blood Culture PCR (06-20-23 @ 07:34)    Review of systems  All other systems were reviewed and are negative, except as noted.      PHYSICAL EXAM:  Constitutional: NAD  Eyes: anicteric, PERRLA  ENMT: nc/at, no thrush  Neck: supple, no lines. prior sites c/d/i.    Respiratory: CTA B/L  Cardiovascular: RRR  Gastrointestinal: Soft abdomen, ND, appropriate incisional TTP. chevron incision with stable ecchymosis. No signs of infection.   Genitourinary: voiding  Extremities: SCD's in place and working bilaterally  Vascular: Palpable dp pulses bilaterally.   Neurological: A&O x3  Skin: no rashes, ulcerations, lesions  Musculoskeletal: Moving all extremities  Psychiatric: Responsive

## 2023-06-23 LAB
ALBUMIN SERPL ELPH-MCNC: 2.5 G/DL — LOW (ref 3.3–5)
ALP SERPL-CCNC: 73 U/L — SIGNIFICANT CHANGE UP (ref 40–120)
ALT FLD-CCNC: <5 U/L — LOW (ref 10–45)
ANION GAP SERPL CALC-SCNC: 15 MMOL/L — SIGNIFICANT CHANGE UP (ref 5–17)
APTT BLD: 26.2 SEC — LOW (ref 27.5–35.5)
AST SERPL-CCNC: 10 U/L — SIGNIFICANT CHANGE UP (ref 10–40)
BILIRUB SERPL-MCNC: 2.1 MG/DL — HIGH (ref 0.2–1.2)
BLD GP AB SCN SERPL QL: NEGATIVE — SIGNIFICANT CHANGE UP
BUN SERPL-MCNC: 20 MG/DL — SIGNIFICANT CHANGE UP (ref 7–23)
CALCIUM SERPL-MCNC: 8.4 MG/DL — SIGNIFICANT CHANGE UP (ref 8.4–10.5)
CHLORIDE SERPL-SCNC: 98 MMOL/L — SIGNIFICANT CHANGE UP (ref 96–108)
CO2 SERPL-SCNC: 21 MMOL/L — LOW (ref 22–31)
CREAT SERPL-MCNC: 1.48 MG/DL — HIGH (ref 0.5–1.3)
EGFR: 46 ML/MIN/1.73M2 — LOW
GLUCOSE SERPL-MCNC: 88 MG/DL — SIGNIFICANT CHANGE UP (ref 70–99)
HCT VFR BLD CALC: 26.8 % — LOW (ref 34.5–45)
HGB BLD-MCNC: 8.7 G/DL — LOW (ref 11.5–15.5)
INR BLD: 0.91 RATIO — SIGNIFICANT CHANGE UP (ref 0.88–1.16)
MAGNESIUM SERPL-MCNC: 1.7 MG/DL — SIGNIFICANT CHANGE UP (ref 1.6–2.6)
MCHC RBC-ENTMCNC: 31.1 PG — SIGNIFICANT CHANGE UP (ref 27–34)
MCHC RBC-ENTMCNC: 32.5 GM/DL — SIGNIFICANT CHANGE UP (ref 32–36)
MCV RBC AUTO: 95.7 FL — SIGNIFICANT CHANGE UP (ref 80–100)
NRBC # BLD: 0 /100 WBCS — SIGNIFICANT CHANGE UP (ref 0–0)
PHOSPHATE SERPL-MCNC: 2.9 MG/DL — SIGNIFICANT CHANGE UP (ref 2.5–4.5)
PLATELET # BLD AUTO: 334 K/UL — SIGNIFICANT CHANGE UP (ref 150–400)
POTASSIUM SERPL-MCNC: 3.9 MMOL/L — SIGNIFICANT CHANGE UP (ref 3.5–5.3)
POTASSIUM SERPL-SCNC: 3.9 MMOL/L — SIGNIFICANT CHANGE UP (ref 3.5–5.3)
PROT SERPL-MCNC: 4.7 G/DL — LOW (ref 6–8.3)
PROTHROM AB SERPL-ACNC: 10.5 SEC — SIGNIFICANT CHANGE UP (ref 10.5–13.4)
RBC # BLD: 2.8 M/UL — LOW (ref 3.8–5.2)
RBC # FLD: 22.2 % — HIGH (ref 10.3–14.5)
RH IG SCN BLD-IMP: POSITIVE — SIGNIFICANT CHANGE UP
SODIUM SERPL-SCNC: 134 MMOL/L — LOW (ref 135–145)
TACROLIMUS SERPL-MCNC: 6.8 NG/ML — SIGNIFICANT CHANGE UP
WBC # BLD: 9.72 K/UL — SIGNIFICANT CHANGE UP (ref 3.8–10.5)
WBC # FLD AUTO: 9.72 K/UL — SIGNIFICANT CHANGE UP (ref 3.8–10.5)

## 2023-06-23 PROCEDURE — 99232 SBSQ HOSP IP/OBS MODERATE 35: CPT | Mod: GC,24

## 2023-06-23 PROCEDURE — 93975 VASCULAR STUDY: CPT | Mod: 26

## 2023-06-23 PROCEDURE — 76705 ECHO EXAM OF ABDOMEN: CPT | Mod: 26,59

## 2023-06-23 PROCEDURE — 74176 CT ABD & PELVIS W/O CONTRAST: CPT | Mod: 26

## 2023-06-23 PROCEDURE — 99232 SBSQ HOSP IP/OBS MODERATE 35: CPT

## 2023-06-23 RX ORDER — MAGNESIUM SULFATE 500 MG/ML
1 VIAL (ML) INJECTION ONCE
Refills: 0 | Status: COMPLETED | OUTPATIENT
Start: 2023-06-23 | End: 2023-06-23

## 2023-06-23 RX ADMIN — Medication 81 MILLIGRAM(S): at 11:31

## 2023-06-23 RX ADMIN — HEPARIN SODIUM 5000 UNIT(S): 5000 INJECTION INTRAVENOUS; SUBCUTANEOUS at 05:22

## 2023-06-23 RX ADMIN — Medication 100 GRAM(S): at 20:46

## 2023-06-23 RX ADMIN — Medication 1 TABLET(S): at 05:22

## 2023-06-23 RX ADMIN — TRAMADOL HYDROCHLORIDE 50 MILLIGRAM(S): 50 TABLET ORAL at 13:59

## 2023-06-23 RX ADMIN — MYCOPHENOLATE MOFETIL 500 MILLIGRAM(S): 250 CAPSULE ORAL at 08:31

## 2023-06-23 RX ADMIN — DAPTOMYCIN 116 MILLIGRAM(S): 500 INJECTION, POWDER, LYOPHILIZED, FOR SOLUTION INTRAVENOUS at 15:56

## 2023-06-23 RX ADMIN — CHLORHEXIDINE GLUCONATE 1 APPLICATION(S): 213 SOLUTION TOPICAL at 05:22

## 2023-06-23 RX ADMIN — PIPERACILLIN AND TAZOBACTAM 25 GRAM(S): 4; .5 INJECTION, POWDER, LYOPHILIZED, FOR SOLUTION INTRAVENOUS at 08:39

## 2023-06-23 RX ADMIN — TRAMADOL HYDROCHLORIDE 50 MILLIGRAM(S): 50 TABLET ORAL at 00:43

## 2023-06-23 RX ADMIN — Medication 20 MILLIGRAM(S): at 05:22

## 2023-06-23 RX ADMIN — MAGNESIUM OXIDE 400 MG ORAL TABLET 400 MILLIGRAM(S): 241.3 TABLET ORAL at 08:31

## 2023-06-23 RX ADMIN — VELPATASVIR AND SOFOSBUVIR 1 TABLET(S): 37.5; 15 PELLET ORAL at 17:46

## 2023-06-23 RX ADMIN — PIPERACILLIN AND TAZOBACTAM 25 GRAM(S): 4; .5 INJECTION, POWDER, LYOPHILIZED, FOR SOLUTION INTRAVENOUS at 16:22

## 2023-06-23 RX ADMIN — TRAMADOL HYDROCHLORIDE 25 MILLIGRAM(S): 50 TABLET ORAL at 06:20

## 2023-06-23 RX ADMIN — TRAMADOL HYDROCHLORIDE 25 MILLIGRAM(S): 50 TABLET ORAL at 05:21

## 2023-06-23 RX ADMIN — URSODIOL 300 MILLIGRAM(S): 250 TABLET, FILM COATED ORAL at 05:22

## 2023-06-23 RX ADMIN — HEPARIN SODIUM 5000 UNIT(S): 5000 INJECTION INTRAVENOUS; SUBCUTANEOUS at 17:45

## 2023-06-23 RX ADMIN — Medication 1 TABLET(S): at 11:32

## 2023-06-23 RX ADMIN — Medication 1 TABLET(S): at 17:45

## 2023-06-23 RX ADMIN — PANTOPRAZOLE SODIUM 40 MILLIGRAM(S): 20 TABLET, DELAYED RELEASE ORAL at 05:22

## 2023-06-23 RX ADMIN — TRAMADOL HYDROCHLORIDE 50 MILLIGRAM(S): 50 TABLET ORAL at 01:40

## 2023-06-23 RX ADMIN — MAGNESIUM OXIDE 400 MG ORAL TABLET 400 MILLIGRAM(S): 241.3 TABLET ORAL at 17:47

## 2023-06-23 RX ADMIN — PIPERACILLIN AND TAZOBACTAM 25 GRAM(S): 4; .5 INJECTION, POWDER, LYOPHILIZED, FOR SOLUTION INTRAVENOUS at 00:41

## 2023-06-23 RX ADMIN — URSODIOL 300 MILLIGRAM(S): 250 TABLET, FILM COATED ORAL at 17:46

## 2023-06-23 RX ADMIN — TRAMADOL HYDROCHLORIDE 25 MILLIGRAM(S): 50 TABLET ORAL at 18:55

## 2023-06-23 RX ADMIN — MYCOPHENOLATE MOFETIL 500 MILLIGRAM(S): 250 CAPSULE ORAL at 20:47

## 2023-06-23 RX ADMIN — TRAMADOL HYDROCHLORIDE 25 MILLIGRAM(S): 50 TABLET ORAL at 17:55

## 2023-06-23 RX ADMIN — Medication 100 MICROGRAM(S): at 05:22

## 2023-06-23 RX ADMIN — FLUCONAZOLE 200 MILLIGRAM(S): 150 TABLET ORAL at 11:31

## 2023-06-23 RX ADMIN — TACROLIMUS 0.5 MILLIGRAM(S): 5 CAPSULE ORAL at 08:31

## 2023-06-23 RX ADMIN — TACROLIMUS 0.5 MILLIGRAM(S): 5 CAPSULE ORAL at 20:47

## 2023-06-23 RX ADMIN — MAGNESIUM OXIDE 400 MG ORAL TABLET 400 MILLIGRAM(S): 241.3 TABLET ORAL at 11:30

## 2023-06-23 RX ADMIN — TRAMADOL HYDROCHLORIDE 50 MILLIGRAM(S): 50 TABLET ORAL at 12:59

## 2023-06-23 NOTE — PROGRESS NOTE ADULT - ASSESSMENT
38F with Hx of hypothyroidism (on Levothyroxine), liver cirrhosis 2/2 ETOH abuse---->  [facility] with ~ 2 weeks Hx of progressively worsening abdominal pain and distention.  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  now s/p HCV+ OLT under Simulect induction on 6/10/2023    [] VRE Bacteremia  - ID following: cont dapto/zosyn; ?DC ZOSYN  - Repeat CT A/P w/o contrast today   - UCx 6/18- VRE, surveillance from 6/21- NGTD   - MSSA bacteremia (5/17) - completed Ancef 6/11.   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   - Regular diet  - Pain management: oxycodone prn/ lidocaine patch   - IS/SCDs/SQH  - Strict I&Os  - bowel regimen    [] Immunosuppression  - FK by level, MMF 500mg bid, pred 20  - Simulect completed  - CMV viremia: 1860 copies (6/20) --> Valcyte 450 q48hrs (renal dosing)    - PPX: Bactrim to TIW for renal dosing. Fluconazole QD (renal dosing)     38F with Hx of hypothyroidism (on Levothyroxine), liver cirrhosis 2/2 ETOH abuse---->  presented to [facility] ED on 5/13/23 with ~ 2 weeks Hx of progressively worsening abdominal pain and distention.  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  now s/p HCV+ OLT under Simulect induction on 6/10/2023    [] VRE Bacteremia  - ID following: cont dapto/zosyn;  - Repeat CT A/P w/o contrast today   - UCx 6/18- VRE, surveillance from 6/21- NGTD   - MSSA bacteremia (5/17) - completed Ancef 6/11.   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   - Regular diet  - Pain management: oxycodone prn/ lidocaine patch   - IS/SCDs/SQH  - Strict I&Os  - bowel regimen    [] Immunosuppression  - FK by level, MMF 500mg bid, pred 20  - Simulect completed  - CMV viremia: 1860 copies (6/20) --> Valcyte 450 q48hrs (renal dosing)    - PPX: Bactrim to TIW for renal dosing. Fluconazole QD (renal dosing)

## 2023-06-23 NOTE — PROGRESS NOTE ADULT - SUBJECTIVE AND OBJECTIVE BOX
Transplant Surgery Multidisciplinary Note  --------------------------------------------------------------  HCV+ OLT    Date: 6/9/23       POD#14    Present:   Patient seen and examined with multidisciplinary Transplant team including Surgeon: Dr. Byrnes, Hepatologist: SALO Manning [facility], during AM rounds.   Disciplines not in attendance will be notified of the plan.     HPI: 38F with Hx of hypothyroidism (on Levothyroxine), liver cirrhosis 2/2 ETOH abuse---->  presented to [facility] ED on 5/13/23 with ~ 2 weeks Hx of progressively worsening abdominal pain and distention.  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  now s/p HCV+ OLT under Simulect induction on 6/10/2023    Interval Events:  - POD 14 s/p OLT  - On Epclusa, half dose MMF  - Ambulating in hallway  - Pain controlled  - Surveillance UCx from 6/21- NGTD     Immunosuppression:  Induction: Simulect completed  Maint:  FK by level, MMF 500mg bid, SST  ongoing monitoring for signs of rejection    Potential Discharge date: TBD  Education:  Medications  Plan of care:  See Below      MEDICATIONS  (STANDING):  aspirin enteric coated 81 milliGRAM(s) Oral daily  calcium carbonate 1250 mG  + Vitamin D (OsCal 500 + D) 1 Tablet(s) Oral two times a day  chlorhexidine 2% Cloths 1 Application(s) Topical <User Schedule>  DAPTOmycin IVPB 400 milliGRAM(s) IV Intermittent every 24 hours  fluconAZOLE   Tablet 200 milliGRAM(s) Oral daily  heparin   Injectable 5000 Unit(s) SubCutaneous every 12 hours  levothyroxine 100 MICROGram(s) Oral daily  lidocaine   4% Patch 1 Patch Transdermal every 24 hours  magnesium oxide 400 milliGRAM(s) Oral three times a day with meals  mycophenolate mofetil 500 milliGRAM(s) Oral <User Schedule>  pantoprazole    Tablet 40 milliGRAM(s) Oral before breakfast  piperacillin/tazobactam IVPB.. 3.375 Gram(s) IV Intermittent every 8 hours  polyethylene glycol 3350 17 Gram(s) Oral daily  predniSONE   Tablet 20 milliGRAM(s) Oral daily  senna 2 Tablet(s) Oral at bedtime  sofosbuvir 400 mG/velpatasvir 100 mG (EPCLUSA) 1 Tablet(s) Oral <User Schedule>  tacrolimus 0.5 milliGRAM(s) Oral <User Schedule>  trimethoprim   80 mG/sulfamethoxazole 400 mG 1 Tablet(s) Oral <User Schedule>  ursodiol Capsule 300 milliGRAM(s) Oral every 12 hours  valGANciclovir 450 milliGRAM(s) Oral every 48 hours    MEDICATIONS  (PRN):  traMADol 25 milliGRAM(s) Oral every 12 hours PRN Mild Pain (1 - 3)  traMADol 50 milliGRAM(s) Oral every 12 hours PRN Moderate Pain (4 - 6)      PAST MEDICAL & SURGICAL HISTORY:  Hypothyroidism      Alcoholic liver disease      No significant past surgical history          Vital Signs Last 24 Hrs  T(C): 36.8 (23 Jun 2023 01:00), Max: 36.9 (22 Jun 2023 17:00)  T(F): 98.3 (23 Jun 2023 01:00), Max: 98.4 (22 Jun 2023 17:00)  HR: 57 (23 Jun 2023 01:00) (57 - 69)  BP: 137/784 (23 Jun 2023 01:00) (127/84 - 143/84)  BP(mean): --  RR: 18 (23 Jun 2023 01:00) (18 - 20)  SpO2: 99% (23 Jun 2023 01:00) (99% - 100%)    Parameters below as of 23 Jun 2023 01:00  Patient On (Oxygen Delivery Method): room air        I&O's Summary    21 Jun 2023 07:01  -  22 Jun 2023 07:00  --------------------------------------------------------  IN: 1320 mL / OUT: 1075 mL / NET: 245 mL    22 Jun 2023 07:01  -  23 Jun 2023 01:13  --------------------------------------------------------  IN: 720 mL / OUT: 1140 mL / NET: -420 mL                              8.7    10.82 )-----------( 317      ( 22 Jun 2023 06:34 )             26.9     06-22    129<L>  |  95<L>  |  27<H>  ----------------------------<  102<H>  3.7   |  21<L>  |  1.73<H>    Ca    8.4      22 Jun 2023 06:34  Phos  3.1     06-22  Mg     1.8     06-22    TPro  4.7<L>  /  Alb  2.5<L>  /  TBili  2.2<H>  /  DBili  x   /  AST  10  /  ALT  <5<L>  /  AlkPhos  76  06-22    Tacrolimus (), Serum: 7.4 ng/mL (06-22 @ 06:34)        Culture - Urine (collected 06-21-23 @ 10:08)  Source: Clean Catch Clean Catch (Midstream)  Final Report (06-22-23 @ 23:21):    No growth    Culture - Blood (collected 06-20-23 @ 06:56)  Source: .Blood Blood-Peripheral  Preliminary Report (06-21-23 @ 12:02):    No growth to date.    Culture - Blood (collected 06-20-23 @ 06:56)  Source: .Blood Blood-Peripheral  Preliminary Report (06-21-23 @ 12:02):    No growth to date.    Culture - Urine (collected 06-20-23 @ 06:43)  Source: Clean Catch Clean Catch (Midstream)  Final Report (06-21-23 @ 09:37):    No growth    Culture - Blood (collected 06-19-23 @ 06:19)  Source: .Blood Blood  Preliminary Report (06-20-23 @ 11:01):    No growth to date.    Culture - Blood (collected 06-19-23 @ 06:19)  Source: .Blood Blood  Preliminary Report (06-20-23 @ 11:01):    No growth to date.    Culture - Blood (collected 06-18-23 @ 13:56)  Source: .Blood Blood-Peripheral  Gram Stain (06-19-23 @ 07:58):    Growth in anaerobic bottle: Gram Positive Cocci in Pairs and Chains  Final Report (06-20-23 @ 07:36):    Growth in anaerobic bottle: Enterococcus faecium (vancomycin resistant)    See previous culture [id number]    Culture - Blood (collected 06-18-23 @ 13:47)  Source: .Blood Blood-Peripheral  Gram Stain (06-19-23 @ 09:15):    Growth in anaerobic bottle: Gram Positive Cocci in Pairs and Chains    Growth in aerobic bottle: Gram Positive Cocci in Pairs and Chains  Final Report (06-20-23 @ 07:35):    Growth in aerobic and anaerobic bottles: Enterococcus faecium (vancomycin    resistant)    See previous culture [id number]    Culture - Urine (collected 06-18-23 @ 09:11)  Source: OR Collect Bladder (from O.R.)  Final Report (06-20-23 @ 15:47):    Rare Enterococcus faecium  Organism: Enterococcus faecium (06-21-23 @ 14:07)  Organism: Enterococcus faecium (06-20-23 @ 15:47)    Culture - Blood (collected 06-17-23 @ 13:30)  Source: .Blood Blood  Final Report (06-22-23 @ 19:01):    No Growth Final    Culture - Blood (collected 06-17-23 @ 13:25)  Source: .Blood Blood  Gram Stain (06-18-23 @ 10:30):    Growth in anaerobic bottle: Gram Positive Cocci in Pairs and Chains  Final Report (06-20-23 @ 07:34):    Growth in anaerobic bottle: Enterococcus faecium (vancomycin resistant)    ***Blood Panel PCR results on this specimen are available    approximately 3 hours after the Gram stain result.***    Gram stain, PCR, and/or culture results may not always    correspond due to difference in methodologies.    ************************************************************    This PCR assay was performed by multiplex PCR. This    Assay tests for 66 bacterial and resistance gene targets.    Please refer to the [facility] test directory    at [email or web address] for details.  Organism: Blood Culture PCR  Enterococcus faecium (vancomycin resistant) (06-20-23 @ 07:34)  Organism: Enterococcus faecium (vancomycin resistant) (06-20-23 @ 07:34)  Organism: Blood Culture PCR (06-20-23 @ 07:34)      Review of systems  All other systems were reviewed and are negative, except as noted.      PHYSICAL EXAM:  Constitutional: NAD  Eyes: anicteric, PERRLA  ENMT: nc/at, no thrush  Neck: supple, no lines. prior sites c/d/i.    Respiratory: CTA B/L  Cardiovascular: RRR  Gastrointestinal: Soft abdomen, ND, appropriate incisional TTP. chevron incision with stable ecchymosis. No signs of infection.   Genitourinary: voiding  Extremities: SCD's in place and working bilaterally  Vascular: Palpable dp pulses bilaterally.   Neurological: A&O x3  Skin: no rashes, ulcerations, lesions  Musculoskeletal: Moving all extremities  Psychiatric: Responsive

## 2023-06-23 NOTE — PROGRESS NOTE ADULT - SUBJECTIVE AND OBJECTIVE BOX
Follow Up:      Interval History:    REVIEW OF SYSTEMS  [  ] ROS unobtainable because:    [  ] All other systems negative except as noted below    Constitutional:  [ ] fever [ ] chills  [ ] weight loss  [ ] weakness  Skin:  [ ] rash [ ] phlebitis	  Eyes: [ ] icterus [ ] pain  [ ] discharge	  ENMT: [ ] sore throat  [ ] thrush [ ] ulcers [ ] exudates  Respiratory: [ ] dyspnea [ ] hemoptysis [ ] cough [ ] sputum	  Cardiovascular:  [ ] chest pain [ ] palpitations [ ] edema	  Gastrointestinal:  [ ] nausea [ ] vomiting [ ] diarrhea [ ] constipation [ ] pain	  Genitourinary:  [ ] dysuria [ ] frequency [ ] hematuria [ ] discharge [ ] flank pain  [ ] incontinence  Musculoskeletal:  [ ] myalgias [ ] arthralgias [ ] arthritis  [ ] back pain  Neurological:  [ ] headache [ ] seizures  [ ] confusion/altered mental status    Allergies  No Known Allergies        ANTIMICROBIALS:  DAPTOmycin IVPB 400 every 24 hours  fluconAZOLE   Tablet 200 daily  piperacillin/tazobactam IVPB.. 3.375 every 8 hours  sofosbuvir 400 mG/velpatasvir 100 mG (EPCLUSA) 1 <User Schedule>  trimethoprim   80 mG/sulfamethoxazole 400 mG 1 <User Schedule>  valGANciclovir 450 every 48 hours      OTHER MEDS:  MEDICATIONS  (STANDING):  aspirin enteric coated 81 daily  heparin   Injectable 5000 every 12 hours  levothyroxine 100 daily  mycophenolate mofetil 500 <User Schedule>  pantoprazole    Tablet 40 before breakfast  polyethylene glycol 3350 17 daily  predniSONE   Tablet 20 daily  senna 2 at bedtime  tacrolimus 0.5 <User Schedule>  traMADol 25 every 12 hours PRN  traMADol 50 every 12 hours PRN  ursodiol Capsule 300 every 12 hours      Vital Signs Last 24 Hrs  T(C): 36.6 (23 Jun 2023 17:00), Max: 36.8 (23 Jun 2023 01:00)  T(F): 97.9 (23 Jun 2023 17:00), Max: 98.3 (23 Jun 2023 01:00)  HR: 61 (23 Jun 2023 17:00) (57 - 73)  BP: 150/89 (23 Jun 2023 17:00) (121/81 - 150/89)  BP(mean): --  RR: 18 (23 Jun 2023 17:00) (18 - 18)  SpO2: 100% (23 Jun 2023 17:00) (99% - 100%)    Parameters below as of 23 Jun 2023 17:00  Patient On (Oxygen Delivery Method): room air        PHYSICAL EXAMINATION:  General: Alert and Awake, NAD  HEENT: PERRL, EOMI  Neck: Supple  Cardiac: RRR, No M/R/G  Resp: CTAB, No Wh/Rh/Ra  Abdomen: NBS, NT/ND, No HSM, No rigidity or guarding  MSK: No LE edema. No Calf tenderness  : No [place]  Skin: No rashes or lesions. Skin is warm and dry to the touch.   Neuro: Alert and Awake. CN 2-12 Grossly intact. Moves all four extremities spontaneously.  Psych: Calm, Pleasant, Cooperative                          8.7    9.72  )-----------( 334      ( 23 Jun 2023 06:42 )             26.8       06-23    134<L>  |  98  |  20  ----------------------------<  88  3.9   |  21<L>  |  1.48<H>    Ca    8.4      23 Jun 2023 06:45  Phos  2.9     06-23  Mg     1.7     06-23    TPro  4.7<L>  /  Alb  2.5<L>  /  TBili  2.1<H>  /  DBili  x   /  AST  10  /  ALT  <5<L>  /  AlkPhos  73  06-23      Urinalysis Basic - ( 23 Jun 2023 06:45 )    Color: x / Appearance: x / SG: x / pH: x  Gluc: 88 mg/dL / Ketone: x  / Bili: x / Urobili: x   Blood: x / Protein: x / Nitrite: x   Leuk Esterase: x / RBC: x / WBC x   Sq Epi: x / Non Sq Epi: x / Bacteria: x        MICROBIOLOGY:  v  Clean Catch Clean Catch (Midstream)  06-21-23   No growth  --  --      .Blood Blood-Peripheral  06-20-23   No growth to date.  --  --      Clean Catch Clean Catch (Midstream)  06-20-23   No growth  --  --      .Blood Blood  06-19-23   No growth to date.  --  --      .Blood Blood-Peripheral  06-18-23   Growth in anaerobic bottle: Enterococcus faecium (vancomycin resistant)  See previous culture [id number]  --    Growth in anaerobic bottle: Gram Positive Cocci in Pairs and Chains      .Blood Blood-Peripheral  06-18-23   Growth in aerobic and anaerobic bottles: Enterococcus faecium (vancomycin  resistant)  See previous culture [id number]  --    Growth in anaerobic bottle: Gram Positive Cocci in Pairs and Chains  Growth in aerobic bottle: Gram Positive Cocci in Pairs and Chains      OR Collect Bladder (from O.R.)  06-18-23   Rare Enterococcus faecium  --  Enterococcus faecium      .Blood Blood  06-17-23   No Growth Final  --  --      .Blood Blood  06-17-23   Growth in anaerobic bottle: Enterococcus faecium (vancomycin resistant)  ***Blood Panel PCR results on this specimen are available  approximately 3 hours after the Gram stain result.***  Gram stain, PCR, and/or culture results may not always  correspond due to difference in methodologies.  ************************************************************  This PCR assay was performed by multiplex PCR. This  Assay tests for 66 bacterial and resistance gene targets.  Please refer to the [facility] Labs test directory  at [email or web address] for details.  --  Blood Culture PCR  Enterococcus faecium (vancomycin resistant)      Abdominal Fl Abdominal Fluid  06-10-23   No growth at 5 days  --    No polymorphonuclear cells seen per low power field  No organisms seen per oil power field      Ascites Fl Ascites Fluid  06-08-23   No growth at 5 days  --    No polymorphonuclear cells seen  No organisms seen  by cytocentrifuge      .Blood Blood-Peripheral  06-07-23   No Growth Final  --  --      .Blood Blood  06-06-23   No Growth Final  --  --      .Bronchial Bronchoalveolar Lavage  06-05-23   No growth at 48 hours  --    polymorphonuclear leukocytes per low power field  No squamous epithelial cells per low power field  Gram Negative Rods per oil power field  by cytocentrifuge      .Bronchial Bronchial Lavage  06-05-23   No acid-fast bacilli isolated at 2 weeks.  --    Rare polymorphonuclear leukocytes per low power field  No squamous epithelial cells per low power field  No organisms seen per oil power field      .Blood Blood-Peripheral  06-02-23   No Growth Final  --  --      .Blood Blood-Peripheral  06-02-23   No Growth Final  --  --      Abdominal Fl Abdominal Fluid  05-31-23   No growth  --    polymorphonuclear leukocytes seen  No organisms seen  by cytocentrifuge      Clean Catch Clean Catch (Midstream)  05-28-23   <10,000 CFU/mL Normal Urogenital Aga  --  --      .Blood Blood-Peripheral  05-28-23   No Growth Final  --  --      .Blood Blood-Peripheral  05-28-23   No Growth Final  --  --      .Sputum Sputum  05-26-23   No acid-fast bacilli isolated at 3 weeks.  --    Few polymorphonuclear leukocytes per low power field  Moderate Squamous epithelial cells per low power field  Moderate Yeast like cells seen per oil power field      .Blood Blood-Peripheral  05-26-23   No Growth Final  --  --      .Blood Blood-Peripheral  05-24-23   No Growth Final  --  --        CMV IgG Antibody: >10.00 U/mL (06-01-23 @ 00:45)  Toxoplasma IgG Screen: <3.0 IU/mL (05-17-23 @ 07:14)  CMV IgG Antibody: >10.00 U/mL (05-17-23 @ 07:14)  Toxoplasma IgG Screen: <3.0 IU/mL (05-17-23 @ 07:14)    CMVPCR Log: 3.16 Wgb70EB/mL (06-20 @ 06:56)  CMVPCR Log: 3.27 Tts61SF/mL (06-20 @ 06:56)        RADIOLOGY:    <The imaging below has been reviewed and visualized by me independently. Findings as detailed in report below> Follow Up:  bacteremia    Interval History: afebrile. abdominal pain improving.     REVIEW OF SYSTEMS  [  ] ROS unobtainable because:    [ x] All other systems negative except as noted below    Constitutional:  [ ] fever [ ] chills  [ ] weight loss  [ ] weakness  Skin:  [ ] rash [ ] phlebitis	  Eyes: [ ] icterus [ ] pain  [ ] discharge	  ENMT: [ ] sore throat  [ ] thrush [ ] ulcers [ ] exudates  Respiratory: [ ] dyspnea [ ] hemoptysis [ ] cough [ ] sputum	  Cardiovascular:  [ ] chest pain [ ] palpitations [ ] edema	  Gastrointestinal:  [ ] nausea [ ] vomiting [ ] diarrhea [ ] constipation [ x pain	  Genitourinary:  [ ] dysuria [ ] frequency [ ] hematuria [ ] discharge [ ] flank pain  [ ] incontinence  Musculoskeletal:  [ ] myalgias [ ] arthralgias [ ] arthritis  [ ] back pain  Neurological:  [ ] headache [ ] seizures  [ ] confusion/altered mental status      Allergies  No Known Allergies        ANTIMICROBIALS:  DAPTOmycin IVPB 400 every 24 hours  fluconAZOLE   Tablet 200 daily  piperacillin/tazobactam IVPB.. 3.375 every 8 hours  sofosbuvir 400 mG/velpatasvir 100 mG (EPCLUSA) 1 <User Schedule>  trimethoprim   80 mG/sulfamethoxazole 400 mG 1 <User Schedule>  valGANciclovir 450 every 48 hours      OTHER MEDS:  MEDICATIONS  (STANDING):  aspirin enteric coated 81 daily  heparin   Injectable 5000 every 12 hours  levothyroxine 100 daily  mycophenolate mofetil 500 <User Schedule>  pantoprazole    Tablet 40 before breakfast  polyethylene glycol 3350 17 daily  predniSONE   Tablet 20 daily  senna 2 at bedtime  tacrolimus 0.5 <User Schedule>  traMADol 25 every 12 hours PRN  traMADol 50 every 12 hours PRN  ursodiol Capsule 300 every 12 hours      Vital Signs Last 24 Hrs  T(C): 36.6 (23 Jun 2023 17:00), Max: 36.8 (23 Jun 2023 01:00)  T(F): 97.9 (23 Jun 2023 17:00), Max: 98.3 (23 Jun 2023 01:00)  HR: 61 (23 Jun 2023 17:00) (57 - 73)  BP: 150/89 (23 Jun 2023 17:00) (121/81 - 150/89)  BP(mean): --  RR: 18 (23 Jun 2023 17:00) (18 - 18)  SpO2: 100% (23 Jun 2023 17:00) (99% - 100%)    Parameters below as of 23 Jun 2023 17:00  Patient On (Oxygen Delivery Method): room air      PHYSICAL EXAMINATION:  General: Alert and Awake, NAD  HEENT: Normocephalic / Atraumatic  Resp: No accessory muscles of respiration utilized  Abdomen: Abdominal wound with packing at left side of OLT incision. Mild diffuse tenderness to palpation.  MSK: No LE edema.   : No [place]  Skin: As per abdominal section.   Neuro: Alert and Awake. CN 2-12 Grossly intact. Moves all four extremities spontaneously.  Psych: Calm, Pleasant, Cooperative                            8.7    9.72  )-----------( 334      ( 23 Jun 2023 06:42 )             26.8       06-23    134<L>  |  98  |  20  ----------------------------<  88  3.9   |  21<L>  |  1.48<H>    Ca    8.4      23 Jun 2023 06:45  Phos  2.9     06-23  Mg     1.7     06-23    TPro  4.7<L>  /  Alb  2.5<L>  /  TBili  2.1<H>  /  DBili  x   /  AST  10  /  ALT  <5<L>  /  AlkPhos  73  06-23      Urinalysis Basic - ( 23 Jun 2023 06:45 )    Color: x / Appearance: x / SG: x / pH: x  Gluc: 88 mg/dL / Ketone: x  / Bili: x / Urobili: x   Blood: x / Protein: x / Nitrite: x   Leuk Esterase: x / RBC: x / WBC x   Sq Epi: x / Non Sq Epi: x / Bacteria: x        MICROBIOLOGY:  v  Clean Catch Clean Catch (Midstream)  06-21-23   No growth  --  --      .Blood Blood-Peripheral  06-20-23   No growth to date.  --  --      Clean Catch Clean Catch (Midstream)  06-20-23   No growth  --  --      .Blood Blood  06-19-23   No growth to date.  --  --      .Blood Blood-Peripheral  06-18-23   Growth in anaerobic bottle: Enterococcus faecium (vancomycin resistant)  See previous culture [id number]  --    Growth in anaerobic bottle: Gram Positive Cocci in Pairs and Chains      .Blood Blood-Peripheral  06-18-23   Growth in aerobic and anaerobic bottles: Enterococcus faecium (vancomycin  resistant)  See previous culture [id number]  --    Growth in anaerobic bottle: Gram Positive Cocci in Pairs and Chains  Growth in aerobic bottle: Gram Positive Cocci in Pairs and Chains      OR Collect Bladder (from O.R.)  06-18-23   Rare Enterococcus faecium  --  Enterococcus faecium      .Blood Blood  06-17-23   No Growth Final  --  --      .Blood Blood  06-17-23   Growth in anaerobic bottle: Enterococcus faecium (vancomycin resistant)  ***Blood Panel PCR results on this specimen are available  approximately 3 hours after the Gram stain result.***  Gram stain, PCR, and/or culture results may not always  correspond due to difference in methodologies.  ************************************************************  This PCR assay was performed by multiplex PCR. This  Assay tests for 66 bacterial and resistance gene targets.  Please refer to the [facility] Labs test directory  at [email or web address] for details.  --  Blood Culture PCR  Enterococcus faecium (vancomycin resistant)      Abdominal Fl Abdominal Fluid  06-10-23   No growth at 5 days  --    No polymorphonuclear cells seen per low power field  No organisms seen per oil power field      Ascites Fl Ascites Fluid  06-08-23   No growth at 5 days  --    No polymorphonuclear cells seen  No organisms seen  by cytocentrifuge      .Blood Blood-Peripheral  06-07-23   No Growth Final  --  --      .Blood Blood  06-06-23   No Growth Final  --  --      .Bronchial Bronchoalveolar Lavage  06-05-23   No growth at 48 hours  --    polymorphonuclear leukocytes per low power field  No squamous epithelial cells per low power field  Gram Negative Rods per oil power field  by cytocentrifuge      .Bronchial Bronchial Lavage  06-05-23   No acid-fast bacilli isolated at 2 weeks.  --    Rare polymorphonuclear leukocytes per low power field  No squamous epithelial cells per low power field  No organisms seen per oil power field      .Blood Blood-Peripheral  06-02-23   No Growth Final  --  --      .Blood Blood-Peripheral  06-02-23   No Growth Final  --  --      Abdominal Fl Abdominal Fluid  05-31-23   No growth  --    polymorphonuclear leukocytes seen  No organisms seen  by cytocentrifuge      Clean Catch Clean Catch (Midstream)  05-28-23   <10,000 CFU/mL Normal Urogenital Aga  --  --      .Blood Blood-Peripheral  05-28-23   No Growth Final  --  --      .Blood Blood-Peripheral  05-28-23   No Growth Final  --  --      .Sputum Sputum  05-26-23   No acid-fast bacilli isolated at 3 weeks.  --    Few polymorphonuclear leukocytes per low power field  Moderate Squamous epithelial cells per low power field  Moderate Yeast like cells seen per oil power field      .Blood Blood-Peripheral  05-26-23   No Growth Final  --  --      .Blood Blood-Peripheral  05-24-23   No Growth Final  --  --        CMV IgG Antibody: >10.00 U/mL (06-01-23 @ 00:45)  Toxoplasma IgG Screen: <3.0 IU/mL (05-17-23 @ 07:14)  CMV IgG Antibody: >10.00 U/mL (05-17-23 @ 07:14)  Toxoplasma IgG Screen: <3.0 IU/mL (05-17-23 @ 07:14)    CMVPCR Log: 3.16 Qde97JS/mL (06-20 @ 06:56)  CMVPCR Log: 3.27 Hxp39CQ/mL (06-20 @ 06:56)        RADIOLOGY:    <The imaging below has been reviewed and visualized by me independently. Findings as detailed in report below>    < from: CT Abdomen and Pelvis No Cont (06.18.23 @ 17:55) >  IMPRESSION:  Limited evaluation of intra-abdominal contents in the absence of IV   contrast.    Status postliver transplant. Mixed attenuation subdiaphragmatic mass   collection with drainage catheter in place. Additional complex fluid   along the inferior right hepatic lobe and gallbladder fossa, suspicious   for blood products.    Hyperdense appearance of the portal vessels, of uncertain etiology.   Correlation with abdominal Doppler is recommended.    Mural thickening of the ascending colon with mild surrounding   inflammation, possibly colitis.    < end of copied text >

## 2023-06-23 NOTE — PROGRESS NOTE ADULT - ASSESSMENT
39 yo F with hypothyroidism (on Levothyroxine) who initially presented to [facility] on 5/13 with abd pain. 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;  MSSA bacteremia secondary to SSTI and Organizing pneumonia of undetermined etiology now s/p OLT 6/9/23  S/p OLT6/9/23  CMV D+/R+, EBV D+/R+, Toxoplasma D?/R-  No fever, has rising WBC  5/13 2/2 BC pos MSSA (s/p treatment course)  6/8 ascitic fluid cx P; cellk  count; 36 cells      BCx (6/17, 6/18) VRE  Abdominal Wall hematoma I&D Culture (6/18) Enterococcus faecium (Non-VRE)  BCx (6/19, 6/20) NGTD  UCx (6/20) No Growth     TTE (6/20) No evidence of vegetations    CT A/P (6/18) Diaphragmatic collection, bilobed fluid collection along the inferior right hepatic lobe, measuring 7.8 x 2.9 cm, Additional complex fluid in the region of the gallbladder, Hyperdense material in the left abdominal wall with foci of air, measuring 5.9 x 1.7 cm possibly hemorrhage/packing material.     #VRE Bacteremia, Leukocytosis, Abdominal Fluid Collections  - Continue Dapto 400mg q 24 (monitor CrCl, check creatine kinase weekly, next on 6/26)  - Stop Zosyn  - Follow up on repeat CT A/P  - Concern that I&D Enterococcus faecium susceptibilities do NOT match the blood culture - ?deeper collection involved? Would favor evaluating deeper collections for drainage  - Trend WBC to normal  - Direct care for hematoma/wounds per team    #CMV Viremia, OLT Recipient, Prophylactic Antibiotic  --Increase Valcyte to 450 mg PO Q24H  --Check repeat CMV PCR on 6/27  --Increase Bactrim to SS Q24H for PCP PPx  --Continue Fluconazole 200 mg PO Q24H for Fungal PPx per protocol    I will be away tomorrow. Please contact the Infectious Diseases Office to contact the covering Infectious Diseases Attending.     Irving Deras M.D.  [facility] Division of Infectious Disease  8AM-5PM Monday - Friday: Available on [employer] Teams  After Hours and Holidays (or if no response on [employer] Teams): Please contact the Infectious Diseases Office at [phone]     The above assessment and plan were discussed with Len transplant surgery PA

## 2023-06-24 LAB
ALBUMIN SERPL ELPH-MCNC: 2.5 G/DL — LOW (ref 3.3–5)
ALP SERPL-CCNC: 70 U/L — SIGNIFICANT CHANGE UP (ref 40–120)
ALT FLD-CCNC: <5 U/L — LOW (ref 10–45)
ANION GAP SERPL CALC-SCNC: 13 MMOL/L — SIGNIFICANT CHANGE UP (ref 5–17)
APTT BLD: 26.6 SEC — LOW (ref 27.5–35.5)
AST SERPL-CCNC: 12 U/L — SIGNIFICANT CHANGE UP (ref 10–40)
BILIRUB SERPL-MCNC: 2 MG/DL — HIGH (ref 0.2–1.2)
BUN SERPL-MCNC: 16 MG/DL — SIGNIFICANT CHANGE UP (ref 7–23)
CALCIUM SERPL-MCNC: 8.5 MG/DL — SIGNIFICANT CHANGE UP (ref 8.4–10.5)
CHLORIDE SERPL-SCNC: 98 MMOL/L — SIGNIFICANT CHANGE UP (ref 96–108)
CO2 SERPL-SCNC: 23 MMOL/L — SIGNIFICANT CHANGE UP (ref 22–31)
CREAT SERPL-MCNC: 1.26 MG/DL — SIGNIFICANT CHANGE UP (ref 0.5–1.3)
CULTURE RESULTS: SIGNIFICANT CHANGE UP
CULTURE RESULTS: SIGNIFICANT CHANGE UP
EGFR: 56 ML/MIN/1.73M2 — LOW
GLUCOSE SERPL-MCNC: 86 MG/DL — SIGNIFICANT CHANGE UP (ref 70–99)
HCT VFR BLD CALC: 30.6 % — LOW (ref 34.5–45)
HGB BLD-MCNC: 9.4 G/DL — LOW (ref 11.5–15.5)
INR BLD: 0.87 RATIO — LOW (ref 0.88–1.16)
MAGNESIUM SERPL-MCNC: 1.8 MG/DL — SIGNIFICANT CHANGE UP (ref 1.6–2.6)
MCHC RBC-ENTMCNC: 30.7 GM/DL — LOW (ref 32–36)
MCHC RBC-ENTMCNC: 31.2 PG — SIGNIFICANT CHANGE UP (ref 27–34)
MCV RBC AUTO: 101.7 FL — HIGH (ref 80–100)
NRBC # BLD: 0 /100 WBCS — SIGNIFICANT CHANGE UP (ref 0–0)
PHOSPHATE SERPL-MCNC: 2.5 MG/DL — SIGNIFICANT CHANGE UP (ref 2.5–4.5)
PLATELET # BLD AUTO: 366 K/UL — SIGNIFICANT CHANGE UP (ref 150–400)
POTASSIUM SERPL-MCNC: 3.3 MMOL/L — LOW (ref 3.5–5.3)
POTASSIUM SERPL-SCNC: 3.3 MMOL/L — LOW (ref 3.5–5.3)
PROT SERPL-MCNC: 4.7 G/DL — LOW (ref 6–8.3)
PROTHROM AB SERPL-ACNC: 10 SEC — LOW (ref 10.5–13.4)
RBC # BLD: 3.01 M/UL — LOW (ref 3.8–5.2)
RBC # FLD: 22.5 % — HIGH (ref 10.3–14.5)
SODIUM SERPL-SCNC: 134 MMOL/L — LOW (ref 135–145)
SPECIMEN SOURCE: SIGNIFICANT CHANGE UP
SPECIMEN SOURCE: SIGNIFICANT CHANGE UP
TACROLIMUS SERPL-MCNC: 5.2 NG/ML — SIGNIFICANT CHANGE UP
WBC # BLD: 10.79 K/UL — HIGH (ref 3.8–10.5)
WBC # FLD AUTO: 10.79 K/UL — HIGH (ref 3.8–10.5)

## 2023-06-24 PROCEDURE — 99232 SBSQ HOSP IP/OBS MODERATE 35: CPT | Mod: GC,24

## 2023-06-24 RX ORDER — TRAMADOL HYDROCHLORIDE 50 MG/1
25 TABLET ORAL EVERY 6 HOURS
Refills: 0 | Status: DISCONTINUED | OUTPATIENT
Start: 2023-06-24 | End: 2023-06-26

## 2023-06-24 RX ORDER — POTASSIUM CHLORIDE 20 MEQ
40 PACKET (EA) ORAL ONCE
Refills: 0 | Status: COMPLETED | OUTPATIENT
Start: 2023-06-24 | End: 2023-06-24

## 2023-06-24 RX ORDER — VALGANCICLOVIR 450 MG/1
450 TABLET, FILM COATED ORAL DAILY
Refills: 0 | Status: DISCONTINUED | OUTPATIENT
Start: 2023-06-24 | End: 2023-06-26

## 2023-06-24 RX ADMIN — MAGNESIUM OXIDE 400 MG ORAL TABLET 400 MILLIGRAM(S): 241.3 TABLET ORAL at 07:49

## 2023-06-24 RX ADMIN — URSODIOL 300 MILLIGRAM(S): 250 TABLET, FILM COATED ORAL at 17:15

## 2023-06-24 RX ADMIN — TRAMADOL HYDROCHLORIDE 50 MILLIGRAM(S): 50 TABLET ORAL at 00:37

## 2023-06-24 RX ADMIN — TRAMADOL HYDROCHLORIDE 50 MILLIGRAM(S): 50 TABLET ORAL at 01:30

## 2023-06-24 RX ADMIN — VELPATASVIR AND SOFOSBUVIR 1 TABLET(S): 37.5; 15 PELLET ORAL at 17:17

## 2023-06-24 RX ADMIN — Medication 20 MILLIGRAM(S): at 05:44

## 2023-06-24 RX ADMIN — PIPERACILLIN AND TAZOBACTAM 25 GRAM(S): 4; .5 INJECTION, POWDER, LYOPHILIZED, FOR SOLUTION INTRAVENOUS at 07:48

## 2023-06-24 RX ADMIN — TRAMADOL HYDROCHLORIDE 25 MILLIGRAM(S): 50 TABLET ORAL at 06:44

## 2023-06-24 RX ADMIN — TRAMADOL HYDROCHLORIDE 50 MILLIGRAM(S): 50 TABLET ORAL at 13:38

## 2023-06-24 RX ADMIN — HEPARIN SODIUM 5000 UNIT(S): 5000 INJECTION INTRAVENOUS; SUBCUTANEOUS at 17:16

## 2023-06-24 RX ADMIN — TRAMADOL HYDROCHLORIDE 25 MILLIGRAM(S): 50 TABLET ORAL at 22:43

## 2023-06-24 RX ADMIN — Medication 100 MICROGRAM(S): at 05:45

## 2023-06-24 RX ADMIN — Medication 81 MILLIGRAM(S): at 11:40

## 2023-06-24 RX ADMIN — Medication 1 TABLET(S): at 15:09

## 2023-06-24 RX ADMIN — TRAMADOL HYDROCHLORIDE 25 MILLIGRAM(S): 50 TABLET ORAL at 19:50

## 2023-06-24 RX ADMIN — URSODIOL 300 MILLIGRAM(S): 250 TABLET, FILM COATED ORAL at 05:48

## 2023-06-24 RX ADMIN — TRAMADOL HYDROCHLORIDE 25 MILLIGRAM(S): 50 TABLET ORAL at 23:43

## 2023-06-24 RX ADMIN — MYCOPHENOLATE MOFETIL 500 MILLIGRAM(S): 250 CAPSULE ORAL at 07:49

## 2023-06-24 RX ADMIN — VALGANCICLOVIR 450 MILLIGRAM(S): 450 TABLET, FILM COATED ORAL at 15:08

## 2023-06-24 RX ADMIN — MAGNESIUM OXIDE 400 MG ORAL TABLET 400 MILLIGRAM(S): 241.3 TABLET ORAL at 17:16

## 2023-06-24 RX ADMIN — TACROLIMUS 0.5 MILLIGRAM(S): 5 CAPSULE ORAL at 07:50

## 2023-06-24 RX ADMIN — PIPERACILLIN AND TAZOBACTAM 25 GRAM(S): 4; .5 INJECTION, POWDER, LYOPHILIZED, FOR SOLUTION INTRAVENOUS at 00:37

## 2023-06-24 RX ADMIN — Medication 40 MILLIEQUIVALENT(S): at 11:43

## 2023-06-24 RX ADMIN — TRAMADOL HYDROCHLORIDE 25 MILLIGRAM(S): 50 TABLET ORAL at 05:44

## 2023-06-24 RX ADMIN — TRAMADOL HYDROCHLORIDE 25 MILLIGRAM(S): 50 TABLET ORAL at 18:32

## 2023-06-24 RX ADMIN — MYCOPHENOLATE MOFETIL 500 MILLIGRAM(S): 250 CAPSULE ORAL at 20:09

## 2023-06-24 RX ADMIN — TRAMADOL HYDROCHLORIDE 50 MILLIGRAM(S): 50 TABLET ORAL at 12:38

## 2023-06-24 RX ADMIN — TACROLIMUS 0.5 MILLIGRAM(S): 5 CAPSULE ORAL at 20:10

## 2023-06-24 RX ADMIN — Medication 1 TABLET(S): at 05:44

## 2023-06-24 RX ADMIN — CHLORHEXIDINE GLUCONATE 1 APPLICATION(S): 213 SOLUTION TOPICAL at 05:44

## 2023-06-24 RX ADMIN — DAPTOMYCIN 116 MILLIGRAM(S): 500 INJECTION, POWDER, LYOPHILIZED, FOR SOLUTION INTRAVENOUS at 15:02

## 2023-06-24 RX ADMIN — FLUCONAZOLE 200 MILLIGRAM(S): 150 TABLET ORAL at 11:40

## 2023-06-24 RX ADMIN — Medication 1 TABLET(S): at 17:17

## 2023-06-24 RX ADMIN — PANTOPRAZOLE SODIUM 40 MILLIGRAM(S): 20 TABLET, DELAYED RELEASE ORAL at 05:45

## 2023-06-24 RX ADMIN — HEPARIN SODIUM 5000 UNIT(S): 5000 INJECTION INTRAVENOUS; SUBCUTANEOUS at 05:44

## 2023-06-24 RX ADMIN — MAGNESIUM OXIDE 400 MG ORAL TABLET 400 MILLIGRAM(S): 241.3 TABLET ORAL at 11:40

## 2023-06-24 NOTE — PROGRESS NOTE ADULT - NS ATTEND AMEND GEN_ALL_CORE FT
bacteremia  vre - on zosyn dapto  epclusa for hcv    immuno plan  fk per level (0.5mg bid) mmf 1g bid, pred 20 bacteremia  vre - on dapto (zosyn d/c'd)  epclusa for hcv    immuno plan  fk level 7.3 (0.5mg bid) mmf 1g bid, pred 20

## 2023-06-24 NOTE — PROGRESS NOTE ADULT - SUBJECTIVE AND OBJECTIVE BOX
Transplant Surgery Multidisciplinary Note  --------------------------------------------------------------  HCV+ OLT    Date: 6/9/23       POD#15    Present:   Patient seen and examined with multidisciplinary Transplant team including Surgeon: Dr. Byrnes, Hepatologist: Dr. Taylor, SALO Argueta, and unit RN during AM rounds.   Disciplines not in attendance will be notified of the plan.     HPI: 38F with Hx of hypothyroidism (on Levothyroxine), liver cirrhosis 2/2 ETOH abuse---->  presented to [facility] ED on 5/13/23 with ~ 2 weeks Hx of progressively worsening abdominal pain and distention.  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      Now s/p HCV+ OLT under Simulect induction on 6/10/2023  Post op course c/b VRE bacteremia    Interval Events:  - POD 15 s/p OLT  - CT a/p  with stable perihepatic hematomas.   - Liver doppler with patent vessels .  - no acute overnight events     Immunosuppression:  Induction: Simulect completed  Maint:  FK by level, MMF 500mg bid, SST  ongoing monitoring for signs of rejection    Potential Discharge date: TBD  Education:  Medications  Plan of care:  See Below    MEDICATIONS  (STANDING):  aspirin enteric coated 81 milliGRAM(s) Oral daily  calcium carbonate 1250 mG  + Vitamin D (OsCal 500 + D) 1 Tablet(s) Oral two times a day  chlorhexidine 2% Cloths 1 Application(s) Topical <User Schedule>  DAPTOmycin IVPB 400 milliGRAM(s) IV Intermittent every 24 hours  fluconAZOLE   Tablet 200 milliGRAM(s) Oral daily  heparin   Injectable 5000 Unit(s) SubCutaneous every 12 hours  levothyroxine 100 MICROGram(s) Oral daily  lidocaine   4% Patch 1 Patch Transdermal every 24 hours  magnesium oxide 400 milliGRAM(s) Oral three times a day with meals  mycophenolate mofetil 500 milliGRAM(s) Oral <User Schedule>  pantoprazole    Tablet 40 milliGRAM(s) Oral before breakfast  polyethylene glycol 3350 17 Gram(s) Oral daily  predniSONE   Tablet 20 milliGRAM(s) Oral daily  senna 2 Tablet(s) Oral at bedtime  sofosbuvir 400 mG/velpatasvir 100 mG (EPCLUSA) 1 Tablet(s) Oral <User Schedule>  tacrolimus 0.5 milliGRAM(s) Oral <User Schedule>  trimethoprim   80 mG/sulfamethoxazole 400 mG 1 Tablet(s) Oral daily  ursodiol Capsule 300 milliGRAM(s) Oral every 12 hours  valGANciclovir 450 milliGRAM(s) Oral daily    MEDICATIONS  (PRN):  Salonpas 1 Patch 1 Patch Topical every 8 hours PRN Pain  traMADol 25 milliGRAM(s) Oral every 12 hours PRN Mild Pain (1 - 3)      PAST MEDICAL & SURGICAL HISTORY:  Hypothyroidism  Alcoholic liver disease  No significant past surgical history    Vital Signs Last 24 Hrs  T(C): 36.7 (24 Jun 2023 12:36), Max: 36.7 (23 Jun 2023 20:42)  T(F): 98 (24 Jun 2023 12:36), Max: 98.1 (23 Jun 2023 20:42)  HR: 61 (24 Jun 2023 12:36) (54 - 73)  BP: 135/84 (24 Jun 2023 12:36) (121/81 - 150/89)  BP(mean): --  RR: 18 (24 Jun 2023 12:36) (18 - 18)  SpO2: 99% (24 Jun 2023 12:36) (99% - 100%)    Parameters below as of 24 Jun 2023 12:36  Patient On (Oxygen Delivery Method): room air    I&O's Summary    23 Jun 2023 07:01  -  24 Jun 2023 07:00  --------------------------------------------------------  IN: 1280 mL / OUT: 1100 mL / NET: 180 mL    24 Jun 2023 07:01  -  24 Jun 2023 12:50  --------------------------------------------------------  IN: 220 mL / OUT: 201 mL / NET: 19 mL                        9.4    10.79 )-----------( 366      ( 24 Jun 2023 06:41 )             30.6     06-24    134<L>  |  98  |  16  ----------------------------<  86  3.3<L>   |  23  |  1.26    Ca    8.5      24 Jun 2023 06:40  Phos  2.5     06-24  Mg     1.8     06-24    TPro  4.7<L>  /  Alb  2.5<L>  /  TBili  2.0<H>  /  DBili  x   /  AST  12  /  ALT  <5<L>  /  AlkPhos  70  06-24    Tacrolimus (), Serum: 5.2 ng/mL (06-24 @ 06:43)    Culture - Urine (collected 06-21-23 @ 10:08)  Source: Clean Catch Clean Catch (Midstream)  Final Report (06-22-23 @ 23:21):    No growth    Culture - Blood (collected 06-20-23 @ 06:56)  Source: .Blood Blood-Peripheral  Preliminary Report (06-21-23 @ 12:02):    No growth to date.    Culture - Blood (collected 06-20-23 @ 06:56)  Source: .Blood Blood-Peripheral  Preliminary Report (06-21-23 @ 12:02):    No growth to date.    Culture - Urine (collected 06-20-23 @ 06:43)  Source: Clean Catch Clean Catch (Midstream)  Final Report (06-21-23 @ 09:37):    No growth    Culture - Blood (collected 06-19-23 @ 06:19)  Source: .Blood Blood  Final Report (06-24-23 @ 11:00):    No Growth Final    Culture - Blood (collected 06-19-23 @ 06:19)  Source: .Blood Blood  Final Report (06-24-23 @ 11:00):    No Growth Final    Culture - Blood (collected 06-18-23 @ 13:56)  Source: .Blood Blood-Peripheral  Gram Stain (06-19-23 @ 07:58):    Growth in anaerobic bottle: Gram Positive Cocci in Pairs and Chains  Final Report (06-20-23 @ 07:36):    Growth in anaerobic bottle: Enterococcus faecium (vancomycin resistant)    See previous culture [id number]    Culture - Blood (collected 06-18-23 @ 13:47)  Source: .Blood Blood-Peripheral  Gram Stain (06-19-23 @ 09:15):    Growth in anaerobic bottle: Gram Positive Cocci in Pairs and Chains    Growth in aerobic bottle: Gram Positive Cocci in Pairs and Chains  Final Report (06-20-23 @ 07:35):    Growth in aerobic and anaerobic bottles: Enterococcus faecium (vancomycin    resistant)    See previous culture [id number]    Culture - Urine (collected 06-18-23 @ 09:11)  Source: OR Collect Bladder (from O.R.)  Final Report (06-20-23 @ 15:47):    Rare Enterococcus faecium  Organism: Enterococcus faecium (06-21-23 @ 14:07)  Organism: Enterococcus faecium (06-20-23 @ 15:47)    Culture - Blood (collected 06-17-23 @ 13:30)  Source: .Blood Blood  Final Report (06-22-23 @ 19:01):    No Growth Final    Culture - Blood (collected 06-17-23 @ 13:25)  Source: .Blood Blood  Gram Stain (06-18-23 @ 10:30):    Growth in anaerobic bottle: Gram Positive Cocci in Pairs and Chains  Final Report (06-20-23 @ 07:34):    Growth in anaerobic bottle: Enterococcus faecium (vancomycin resistant)    ***Blood Panel PCR results on this specimen are available    approximately 3 hours after the Gram stain result.***    Gram stain, PCR, and/or culture results may not always    correspond due to difference in methodologies.    ************************************************************    This PCR assay was performed by multiplex PCR. This    Assay tests for 66 bacterial and resistance gene targets.    Please refer to the [facility] test directory    at [email or web address] for details.  Organism: Blood Culture PCR  Enterococcus faecium (vancomycin resistant) (06-20-23 @ 07:34)  Organism: Enterococcus faecium (vancomycin resistant) (06-20-23 @ 07:34)  Organism: Blood Culture PCR (06-20-23 @ 07:34)      Review of systems  All other systems were reviewed and are negative, except as noted.      PHYSICAL EXAM:  Constitutional: NAD  Eyes: anicteric, PERRLA  ENMT: nc/at, no thrush  Neck: supple, no lines. prior sites c/d/i.    Respiratory: CTA B/L  Cardiovascular: RRR  Gastrointestinal: Soft abdomen, ND, appropriate incisional TTP. chevron incision with stable ecchymosis. No signs of infection.   Genitourinary: voiding  Extremities: SCD's in place and working bilaterally  Vascular: Palpable dp pulses bilaterally.   Neurological: A&O x3  Skin: no rashes, ulcerations, lesions  Musculoskeletal: Moving all extremities  Psychiatric: Responsive

## 2023-06-24 NOTE — PROGRESS NOTE ADULT - NS ATTEND OPT1 GEN_ALL_CORE

## 2023-06-24 NOTE — PROGRESS NOTE ADULT - ASSESSMENT
38F with Hx of hypothyroidism (on Levothyroxine), liver cirrhosis 2/2 ETOH abuse---->  presented to [facility] ED on 5/13/23 with ~ 2 weeks Hx of progressively worsening abdominal pain and distention.  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  now s/p HCV+ OLT under Simulect induction on 6/10/2023    [] VRE Bacteremia  - ID following: Cont dapto; DC zosyn   - Repeat CT A/P reviewed;  - UCx 6/18- VRE, surveillance from 6/21- NGTD   - MSSA bacteremia (5/17) - completed Ancef 6/11.   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

## 2023-06-25 LAB
ALBUMIN SERPL ELPH-MCNC: 3.2 G/DL — LOW (ref 3.3–5)
ALP SERPL-CCNC: 89 U/L — SIGNIFICANT CHANGE UP (ref 40–120)
ALT FLD-CCNC: <5 U/L — LOW (ref 10–45)
ANION GAP SERPL CALC-SCNC: 16 MMOL/L — SIGNIFICANT CHANGE UP (ref 5–17)
APPEARANCE UR: CLEAR — SIGNIFICANT CHANGE UP
APTT BLD: 27.8 SEC — SIGNIFICANT CHANGE UP (ref 27.5–35.5)
AST SERPL-CCNC: 19 U/L — SIGNIFICANT CHANGE UP (ref 10–40)
BACTERIA # UR AUTO: NEGATIVE — SIGNIFICANT CHANGE UP
BILIRUB SERPL-MCNC: 2.4 MG/DL — HIGH (ref 0.2–1.2)
BILIRUB UR-MCNC: NEGATIVE — SIGNIFICANT CHANGE UP
BUN SERPL-MCNC: 13 MG/DL — SIGNIFICANT CHANGE UP (ref 7–23)
CALCIUM SERPL-MCNC: 9.3 MG/DL — SIGNIFICANT CHANGE UP (ref 8.4–10.5)
CHLORIDE SERPL-SCNC: 98 MMOL/L — SIGNIFICANT CHANGE UP (ref 96–108)
CO2 SERPL-SCNC: 20 MMOL/L — LOW (ref 22–31)
COLOR SPEC: YELLOW — SIGNIFICANT CHANGE UP
CREAT SERPL-MCNC: 1.09 MG/DL — SIGNIFICANT CHANGE UP (ref 0.5–1.3)
CULTURE RESULTS: SIGNIFICANT CHANGE UP
CULTURE RESULTS: SIGNIFICANT CHANGE UP
DIFF PNL FLD: NEGATIVE — SIGNIFICANT CHANGE UP
EGFR: 66 ML/MIN/1.73M2 — SIGNIFICANT CHANGE UP
EPI CELLS # UR: 2 /HPF — SIGNIFICANT CHANGE UP
GLUCOSE BLDC GLUCOMTR-MCNC: 101 MG/DL — HIGH (ref 70–99)
GLUCOSE SERPL-MCNC: 69 MG/DL — LOW (ref 70–99)
GLUCOSE UR QL: NEGATIVE — SIGNIFICANT CHANGE UP
HCT VFR BLD CALC: 29.2 % — LOW (ref 34.5–45)
HCT VFR BLD CALC: 35.5 % — SIGNIFICANT CHANGE UP (ref 34.5–45)
HGB BLD-MCNC: 11.2 G/DL — LOW (ref 11.5–15.5)
HGB BLD-MCNC: 9.2 G/DL — LOW (ref 11.5–15.5)
HYALINE CASTS # UR AUTO: 3 /LPF — HIGH (ref 0–2)
INR BLD: 0.83 RATIO — LOW (ref 0.88–1.16)
KETONES UR-MCNC: NEGATIVE — SIGNIFICANT CHANGE UP
LEUKOCYTE ESTERASE UR-ACNC: NEGATIVE — SIGNIFICANT CHANGE UP
MAGNESIUM SERPL-MCNC: 1.8 MG/DL — SIGNIFICANT CHANGE UP (ref 1.6–2.6)
MCHC RBC-ENTMCNC: 31.2 PG — SIGNIFICANT CHANGE UP (ref 27–34)
MCHC RBC-ENTMCNC: 31.5 GM/DL — LOW (ref 32–36)
MCHC RBC-ENTMCNC: 31.5 GM/DL — LOW (ref 32–36)
MCHC RBC-ENTMCNC: 31.5 PG — SIGNIFICANT CHANGE UP (ref 27–34)
MCV RBC AUTO: 99 FL — SIGNIFICANT CHANGE UP (ref 80–100)
MCV RBC AUTO: 99.7 FL — SIGNIFICANT CHANGE UP (ref 80–100)
NITRITE UR-MCNC: NEGATIVE — SIGNIFICANT CHANGE UP
NRBC # BLD: 0 /100 WBCS — SIGNIFICANT CHANGE UP (ref 0–0)
NRBC # BLD: 0 /100 WBCS — SIGNIFICANT CHANGE UP (ref 0–0)
PH UR: 6.5 — SIGNIFICANT CHANGE UP (ref 5–8)
PHOSPHATE SERPL-MCNC: 2.5 MG/DL — SIGNIFICANT CHANGE UP (ref 2.5–4.5)
PLATELET # BLD AUTO: 471 K/UL — HIGH (ref 150–400)
PLATELET # BLD AUTO: 501 K/UL — HIGH (ref 150–400)
POTASSIUM SERPL-MCNC: 4.5 MMOL/L — SIGNIFICANT CHANGE UP (ref 3.5–5.3)
POTASSIUM SERPL-SCNC: 4.5 MMOL/L — SIGNIFICANT CHANGE UP (ref 3.5–5.3)
PROT SERPL-MCNC: 6.2 G/DL — SIGNIFICANT CHANGE UP (ref 6–8.3)
PROT UR-MCNC: ABNORMAL
PROTHROM AB SERPL-ACNC: 9.5 SEC — LOW (ref 10.5–13.4)
RBC # BLD: 2.95 M/UL — LOW (ref 3.8–5.2)
RBC # BLD: 3.56 M/UL — LOW (ref 3.8–5.2)
RBC # FLD: 22.7 % — HIGH (ref 10.3–14.5)
RBC # FLD: 22.9 % — HIGH (ref 10.3–14.5)
RBC CASTS # UR COMP ASSIST: 4 /HPF — SIGNIFICANT CHANGE UP (ref 0–4)
SODIUM SERPL-SCNC: 134 MMOL/L — LOW (ref 135–145)
SP GR SPEC: 1.02 — SIGNIFICANT CHANGE UP (ref 1.01–1.02)
SPECIMEN SOURCE: SIGNIFICANT CHANGE UP
SPECIMEN SOURCE: SIGNIFICANT CHANGE UP
TACROLIMUS SERPL-MCNC: 7.3 NG/ML — SIGNIFICANT CHANGE UP
UROBILINOGEN FLD QL: NEGATIVE — SIGNIFICANT CHANGE UP
WBC # BLD: 13.78 K/UL — HIGH (ref 3.8–10.5)
WBC # BLD: 15.22 K/UL — HIGH (ref 3.8–10.5)
WBC # FLD AUTO: 13.78 K/UL — HIGH (ref 3.8–10.5)
WBC # FLD AUTO: 15.22 K/UL — HIGH (ref 3.8–10.5)
WBC UR QL: 2 /HPF — SIGNIFICANT CHANGE UP (ref 0–5)

## 2023-06-25 PROCEDURE — 99233 SBSQ HOSP IP/OBS HIGH 50: CPT

## 2023-06-25 PROCEDURE — 71045 X-RAY EXAM CHEST 1 VIEW: CPT | Mod: 26

## 2023-06-25 PROCEDURE — 99232 SBSQ HOSP IP/OBS MODERATE 35: CPT | Mod: GC,24

## 2023-06-25 RX ORDER — SIMETHICONE 80 MG/1
80 TABLET, CHEWABLE ORAL ONCE
Refills: 0 | Status: COMPLETED | OUTPATIENT
Start: 2023-06-25 | End: 2023-06-25

## 2023-06-25 RX ADMIN — TACROLIMUS 0.5 MILLIGRAM(S): 5 CAPSULE ORAL at 19:53

## 2023-06-25 RX ADMIN — TRAMADOL HYDROCHLORIDE 25 MILLIGRAM(S): 50 TABLET ORAL at 22:30

## 2023-06-25 RX ADMIN — VALGANCICLOVIR 450 MILLIGRAM(S): 450 TABLET, FILM COATED ORAL at 12:10

## 2023-06-25 RX ADMIN — MAGNESIUM OXIDE 400 MG ORAL TABLET 400 MILLIGRAM(S): 241.3 TABLET ORAL at 17:15

## 2023-06-25 RX ADMIN — MAGNESIUM OXIDE 400 MG ORAL TABLET 400 MILLIGRAM(S): 241.3 TABLET ORAL at 12:11

## 2023-06-25 RX ADMIN — VELPATASVIR AND SOFOSBUVIR 1 TABLET(S): 37.5; 15 PELLET ORAL at 17:16

## 2023-06-25 RX ADMIN — TRAMADOL HYDROCHLORIDE 25 MILLIGRAM(S): 50 TABLET ORAL at 21:47

## 2023-06-25 RX ADMIN — TRAMADOL HYDROCHLORIDE 25 MILLIGRAM(S): 50 TABLET ORAL at 08:07

## 2023-06-25 RX ADMIN — TRAMADOL HYDROCHLORIDE 25 MILLIGRAM(S): 50 TABLET ORAL at 15:46

## 2023-06-25 RX ADMIN — Medication 81 MILLIGRAM(S): at 12:10

## 2023-06-25 RX ADMIN — TACROLIMUS 0.5 MILLIGRAM(S): 5 CAPSULE ORAL at 08:07

## 2023-06-25 RX ADMIN — HEPARIN SODIUM 5000 UNIT(S): 5000 INJECTION INTRAVENOUS; SUBCUTANEOUS at 05:15

## 2023-06-25 RX ADMIN — URSODIOL 300 MILLIGRAM(S): 250 TABLET, FILM COATED ORAL at 17:15

## 2023-06-25 RX ADMIN — TRAMADOL HYDROCHLORIDE 25 MILLIGRAM(S): 50 TABLET ORAL at 14:46

## 2023-06-25 RX ADMIN — CHLORHEXIDINE GLUCONATE 1 APPLICATION(S): 213 SOLUTION TOPICAL at 05:15

## 2023-06-25 RX ADMIN — TRAMADOL HYDROCHLORIDE 25 MILLIGRAM(S): 50 TABLET ORAL at 09:07

## 2023-06-25 RX ADMIN — MYCOPHENOLATE MOFETIL 500 MILLIGRAM(S): 250 CAPSULE ORAL at 08:08

## 2023-06-25 RX ADMIN — Medication 1 TABLET(S): at 12:10

## 2023-06-25 RX ADMIN — SIMETHICONE 80 MILLIGRAM(S): 80 TABLET, CHEWABLE ORAL at 12:08

## 2023-06-25 RX ADMIN — PANTOPRAZOLE SODIUM 40 MILLIGRAM(S): 20 TABLET, DELAYED RELEASE ORAL at 05:15

## 2023-06-25 RX ADMIN — Medication 1 TABLET(S): at 05:15

## 2023-06-25 RX ADMIN — DAPTOMYCIN 116 MILLIGRAM(S): 500 INJECTION, POWDER, LYOPHILIZED, FOR SOLUTION INTRAVENOUS at 14:47

## 2023-06-25 RX ADMIN — Medication 1 TABLET(S): at 17:15

## 2023-06-25 RX ADMIN — FLUCONAZOLE 200 MILLIGRAM(S): 150 TABLET ORAL at 12:10

## 2023-06-25 RX ADMIN — URSODIOL 300 MILLIGRAM(S): 250 TABLET, FILM COATED ORAL at 05:14

## 2023-06-25 RX ADMIN — HEPARIN SODIUM 5000 UNIT(S): 5000 INJECTION INTRAVENOUS; SUBCUTANEOUS at 17:15

## 2023-06-25 RX ADMIN — MAGNESIUM OXIDE 400 MG ORAL TABLET 400 MILLIGRAM(S): 241.3 TABLET ORAL at 08:07

## 2023-06-25 RX ADMIN — Medication 100 MICROGRAM(S): at 05:15

## 2023-06-25 RX ADMIN — MYCOPHENOLATE MOFETIL 500 MILLIGRAM(S): 250 CAPSULE ORAL at 19:53

## 2023-06-25 RX ADMIN — Medication 20 MILLIGRAM(S): at 05:15

## 2023-06-25 NOTE — PROGRESS NOTE ADULT - ASSESSMENT
38F with Hx of hypothyroidism (on Levothyroxine), liver cirrhosis 2/2 ETOH abuse---->  presented to [facility] ED on 5/13/23 with ~ 2 weeks Hx of progressively worsening abdominal pain and distention.  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  now s/p HCV+ OLT under Simulect induction on 6/10/2023    [] VRE Bacteremia  - ID following: Cont dapto  - Repeat CT A/P reviewed;  - UCx 6/18- VRE, surveillance from 6/21- NGTD   - MSSA bacteremia (5/17) - completed Ancef 6/11.   - TTE completed 6/20, no evidence of endocarditis  - Discuss LOT with ID    [] s/p HCV+ OLT    - Good graft function  - HCV Viremia, Genotype 1A -->on Epclusa starting 6/21  - Subcutaneous hematoma--change dressing QD or as needed   - Continue Actigal  - ASA 81mg po qd  - Renal function improving   - Regular diet  - Pain management: oxycodone prn/ lidocaine patch   - IS/SCDs/SQH  - Strict I&Os  - bowel regimen    [] Immunosuppression  - FK by level, MMF 500mg bid, pred 20  - Simulect completed  - CMV viremia: 1860 copies (6/20) --> Valcyte 450 daily     - PPX: Change Bactrim to daily and valcyte daily (renal function improved), Fluconazole QD (renal dosing)     38F with Hx of hypothyroidism (on Levothyroxine), liver cirrhosis 2/2 ETOH abuse---->  presented to [facility] ED on 5/13/23 with ~ 2 weeks Hx of progressively worsening abdominal pain and distention.  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  now s/p HCV+ OLT under Simulect induction on 6/10/2023    [] VRE Bacteremia  - ID following: Cont dapto  - Repeat CT A/P reviewed;  - UCx 6/18- VRE, surveillance from 6/21- NGTD   - MSSA bacteremia (5/17) - completed Ancef 6/11.   - TTE completed 6/20, no evidence of endocarditis  - Discuss LOT with ID    [] s/p HCV+ OLT    - Good graft function  - HCV Viremia, Genotype 1A -->started Epclusa 6/21, check HCV PCR with am labs  - Subcutaneous hematoma--change dressing QD or as needed   - Continue Actigal  - ASA 81mg po qd  - Renal function improving   - Regular diet  - Pain management: oxycodone prn/ lidocaine patch   - IS/SCDs/SQH  - Strict I&Os  - bowel regimen  - Leukocytosis: repeat bld cxs    [] Immunosuppression  - FK by level, MMF 500mg bid, pred 20  - Simulect completed  - CMV viremia: 1860 copies (6/20) --> Valcyte 450 daily     - PPX: Change Bactrim to daily and valcyte daily (renal function improved), Fluconazole QD (renal dosing)

## 2023-06-25 NOTE — PROGRESS NOTE ADULT - SUBJECTIVE AND OBJECTIVE BOX
Follow Up:      Interval History:    REVIEW OF SYSTEMS  [  ] ROS unobtainable because:    [  ] All other systems negative except as noted below    Constitutional:  [ ] fever [ ] chills  [ ] weight loss  [ ] weakness  Skin:  [ ] rash [ ] phlebitis	  Eyes: [ ] icterus [ ] pain  [ ] discharge	  ENMT: [ ] sore throat  [ ] thrush [ ] ulcers [ ] exudates  Respiratory: [ ] dyspnea [ ] hemoptysis [ ] cough [ ] sputum	  Cardiovascular:  [ ] chest pain [ ] palpitations [ ] edema	  Gastrointestinal:  [ ] nausea [ ] vomiting [ ] diarrhea [ ] constipation [ ] pain	  Genitourinary:  [ ] dysuria [ ] frequency [ ] hematuria [ ] discharge [ ] flank pain  [ ] incontinence  Musculoskeletal:  [ ] myalgias [ ] arthralgias [ ] arthritis  [ ] back pain  Neurological:  [ ] headache [ ] seizures  [ ] confusion/altered mental status    Allergies  No Known Allergies        ANTIMICROBIALS:  DAPTOmycin IVPB 400 every 24 hours  fluconAZOLE   Tablet 200 daily  sofosbuvir 400 mG/velpatasvir 100 mG (EPCLUSA) 1 <User Schedule>  trimethoprim   80 mG/sulfamethoxazole 400 mG 1 daily  valGANciclovir 450 daily      OTHER MEDS:  MEDICATIONS  (STANDING):  aspirin enteric coated 81 daily  heparin   Injectable 5000 every 12 hours  levothyroxine 100 daily  mycophenolate mofetil 500 <User Schedule>  pantoprazole    Tablet 40 before breakfast  polyethylene glycol 3350 17 daily  predniSONE   Tablet 20 daily  senna 2 at bedtime  tacrolimus 0.5 <User Schedule>  traMADol 25 every 6 hours PRN  ursodiol Capsule 300 every 12 hours      Vital Signs Last 24 Hrs  T(C): 36.8 (25 Jun 2023 12:21), Max: 37.1 (25 Jun 2023 01:00)  T(F): 98.2 (25 Jun 2023 12:21), Max: 98.7 (25 Jun 2023 01:00)  HR: 70 (25 Jun 2023 12:21) (60 - 74)  BP: 137/88 (25 Jun 2023 12:21) (127/80 - 150/93)  BP(mean): --  RR: 18 (25 Jun 2023 12:21) (18 - 18)  SpO2: 100% (25 Jun 2023 12:21) (98% - 100%)    Parameters below as of 25 Jun 2023 12:21  Patient On (Oxygen Delivery Method): room air        PHYSICAL EXAMINATION:  General: Alert and Awake, NAD  HEENT: PERRL, EOMI  Neck: Supple  Cardiac: RRR, No M/R/G  Resp: CTAB, No Wh/Rh/Ra  Abdomen: NBS, NT/ND, No HSM, No rigidity or guarding  MSK: No LE edema. No Calf tenderness  : No monten[place]ro  Skin: No rashes or lesions. Skin is warm and dry to the touch.   Neuro: Alert and Awake. CN 2-12 Grossly intact. Moves all four extremities spontaneously.  Psych: Calm, Pleasant, Cooperative                          9.2    15.22 )-----------( 471      ( 25 Jun 2023 11:36 )             29.2       06-25    134<L>  |  98  |  13  ----------------------------<  69<L>  4.5   |  20<L>  |  1.09    Ca    9.3      25 Jun 2023 06:55  Phos  2.5     06-25  Mg     1.8     06-25    TPro  6.2  /  Alb  3.2<L>  /  TBili  2.4<H>  /  DBili  x   /  AST  19  /  ALT  <5<L>  /  AlkPhos  89  06-25      Urinalysis Basic - ( 25 Jun 2023 06:55 )    Color: x / Appearance: x / SG: x / pH: x  Gluc: 69 mg/dL / Ketone: x  / Bili: x / Urobili: x   Blood: x / Protein: x / Nitrite: x   Leuk Esterase: x / RBC: x / WBC x   Sq Epi: x / Non Sq Epi: x / Bacteria: x        MICROBIOLOGY:  v  Clean Catch Clean Catch (Midstream)  06-21-23   No growth  --  --      .Blood Blood-Peripheral  06-20-23   No Growth Final  --  --      Clean Catch Clean Catch (Midstream)  06-20-23   No growth  --  --      .Blood Blood  06-19-23   No Growth Final  --  --      .Blood Blood-Peripheral  06-18-23   Growth in anaerobic bottle: Enterococcus faecium (vancomycin resistant)  See previous culture [id number]  --    Growth in anaerobic bottle: Gram Positive Cocci in Pairs and Chains      .Blood Blood-Peripheral  06-18-23   Growth in aerobic and anaerobic bottles: Enterococcus faecium (vancomycin  resistant)  See previous culture [id number]  --    Growth in anaerobic bottle: Gram Positive Cocci in Pairs and Chains  Growth in aerobic bottle: Gram Positive Cocci in Pairs and Chains      OR Collect Bladder (from O.R.)  06-18-23   Rare Enterococcus faecium  --  Enterococcus faecium      .Blood Blood  06-17-23   No Growth Final  --  --      .Blood Blood  06-17-23   Growth in anaerobic bottle: Enterococcus faecium (vancomycin resistant)  ***Blood Panel PCR results on this specimen are available  approximately 3 hours after the Gram stain result.***  Gram stain, PCR, and/or culture results may not always  correspond due to difference in methodologies.  ************************************************************  This PCR assay was performed by multiplex PCR. This  Assay tests for 66 bacterial and resistance gene targets.  Please refer to the [facility] test directory  at [email or web address] for details.  --  Blood Culture PCR  Enterococcus faecium (vancomycin resistant)      Abdominal Fl Abdominal Fluid  06-10-23   No growth at 5 days  --    No polymorphonuclear cells seen per low power field  No organisms seen per oil power field      Ascites Fl Ascites Fluid  06-08-23   No growth at 5 days  --    No polymorphonuclear cells seen  No organisms seen  by cytocentrifuge      .Blood Blood-Peripheral  06-07-23   No Growth Final  --  --      .Blood Blood  06-06-23   No Growth Final  --  --      .Bronchial Bronchoalveolar Lavage  06-05-23   No growth at 48 hours  --    polymorphonuclear leukocytes per low power field  No squamous epithelial cells per low power field  Gram Negative Rods per oil power field  by cytocentrifuge      .Bronchial Bronchial Lavage  06-05-23   No acid-fast bacilli isolated at 2 weeks.  --    Rare polymorphonuclear leukocytes per low power field  No squamous epithelial cells per low power field  No organisms seen per oil power field      .Blood Blood-Peripheral  06-02-23   No Growth Final  --  --      .Blood Blood-Peripheral  06-02-23   No Growth Final  --  --      Abdominal Fl Abdominal Fluid  05-31-23   No growth  --    polymorphonuclear leukocytes seen  No organisms seen  by cytocentrifuge      Clean Catch Clean Catch (Midstream)  05-28-23   <10,000 CFU/mL Normal Urogenital Aga  --  --      .Blood Blood-Peripheral  05-28-23   No Growth Final  --  --      .Blood Blood-Peripheral  05-28-23   No Growth Final  --  --      .Sputum Sputum  05-26-23   No acid-fast bacilli isolated at 4 weeks.  --    Few polymorphonuclear leukocytes per low power field  Moderate Squamous epithelial cells per low power field  Moderate Yeast like cells seen per oil power field        CMV IgG Antibody: >10.00 U/mL (06-01-23 @ 00:45)  Toxoplasma IgG Screen: <3.0 IU/mL (05-17-23 @ 07:14)  CMV IgG Antibody: >10.00 U/mL (05-17-23 @ 07:14)  Toxoplasma IgG Screen: <3.0 IU/mL (05-17-23 @ 07:14)    CMVPCR Log: 3.16 [id number]/mL (06-20 @ 06:56)  CMVPCR Log: 3.27 Xca83YN/mL (06-20 @ 06:56)        RADIOLOGY:    <The imaging below has been reviewed and visualized by me independently. Findings as detailed in report below> Follow Up:  bacteremia    Interval History: afebrile. abdominal pain improving. noted diarrhea over last 48H. None yet today    REVIEW OF SYSTEMS  [  ] ROS unobtainable because:    [ x] All other systems negative except as noted below    Constitutional:  [ ] fever [ ] chills  [ ] weight loss  [ ] weakness  Skin:  [ ] rash [ ] phlebitis	  Eyes: [ ] icterus [ ] pain  [ ] discharge	  ENMT: [ ] sore throat  [ ] thrush [ ] ulcers [ ] exudates  Respiratory: [ ] dyspnea [ ] hemoptysis [ ] cough [ ] sputum	  Cardiovascular:  [ ] chest pain [ ] palpitations [ ] edema	  Gastrointestinal:  [ ] nausea [ ] vomiting [ x] diarrhea [ ] constipation [ x pain	  Genitourinary:  [ ] dysuria [ ] frequency [ ] hematuria [ ] discharge [ ] flank pain  [ ] incontinence  Musculoskeletal:  [ ] myalgias [ ] arthralgias [ ] arthritis  [ ] back pain  Neurological:  [ ] headache [ ] seizures  [ ] confusion/altered mental status      Allergies  No Known Allergies        ANTIMICROBIALS:  DAPTOmycin IVPB 400 every 24 hours  fluconAZOLE   Tablet 200 daily  sofosbuvir 400 mG/velpatasvir 100 mG (EPCLUSA) 1 <User Schedule>  trimethoprim   80 mG/sulfamethoxazole 400 mG 1 daily  valGANciclovir 450 daily      OTHER MEDS:  MEDICATIONS  (STANDING):  aspirin enteric coated 81 daily  heparin   Injectable 5000 every 12 hours  levothyroxine 100 daily  mycophenolate mofetil 500 <User Schedule>  pantoprazole    Tablet 40 before breakfast  polyethylene glycol 3350 17 daily  predniSONE   Tablet 20 daily  senna 2 at bedtime  tacrolimus 0.5 <User Schedule>  traMADol 25 every 6 hours PRN  ursodiol Capsule 300 every 12 hours      Vital Signs Last 24 Hrs  T(C): 36.8 (25 Jun 2023 12:21), Max: 37.1 (25 Jun 2023 01:00)  T(F): 98.2 (25 Jun 2023 12:21), Max: 98.7 (25 Jun 2023 01:00)  HR: 70 (25 Jun 2023 12:21) (60 - 74)  BP: 137/88 (25 Jun 2023 12:21) (127/80 - 150/93)  BP(mean): --  RR: 18 (25 Jun 2023 12:21) (18 - 18)  SpO2: 100% (25 Jun 2023 12:21) (98% - 100%)    Parameters below as of 25 Jun 2023 12:21  Patient On (Oxygen Delivery Method): room air    PHYSICAL EXAMINATION:  General: Alert and Awake, NAD  HEENT: Normocephalic / Atraumatic  Resp: No accessory muscles of respiration utilized  Abdomen: Abdominal wound with packing at left side of OLT incision. Mild diffuse tenderness to palpation.  MSK: No LE edema.   : No [place]  Skin: As per abdominal section.   Neuro: Alert and Awake. CN 2-12 Grossly intact. Moves all four extremities spontaneously.  Psych: Calm, Pleasant, Cooperative                            9.2    15.22 )-----------( 471      ( 25 Jun 2023 11:36 )             29.2       06-25    134<L>  |  98  |  13  ----------------------------<  69<L>  4.5   |  20<L>  |  1.09    Ca    9.3      25 Jun 2023 06:55  Phos  2.5     06-25  Mg     1.8     06-25    TPro  6.2  /  Alb  3.2<L>  /  TBili  2.4<H>  /  DBili  x   /  AST  19  /  ALT  <5<L>  /  AlkPhos  89  06-25      Urinalysis Basic - ( 25 Jun 2023 06:55 )    Color: x / Appearance: x / SG: x / pH: x  Gluc: 69 mg/dL / Ketone: x  / Bili: x / Urobili: x   Blood: x / Protein: x / Nitrite: x   Leuk Esterase: x / RBC: x / WBC x   Sq Epi: x / Non Sq Epi: x / Bacteria: x        MICROBIOLOGY:  v  Clean Catch Clean Catch (Midstream)  06-21-23   No growth  --  --      .Blood Blood-Peripheral  06-20-23   No Growth Final  --  --      Clean Catch Clean Catch (Midstream)  06-20-23   No growth  --  --      .Blood Blood  06-19-23   No Growth Final  --  --      .Blood Blood-Peripheral  06-18-23   Growth in anaerobic bottle: Enterococcus faecium (vancomycin resistant)  See previous culture [id number]  --    Growth in anaerobic bottle: Gram Positive Cocci in Pairs and Chains      .Blood Blood-Peripheral  06-18-23   Growth in aerobic and anaerobic bottles: Enterococcus faecium (vancomycin  resistant)  See previous culture [id number]  --    Growth in anaerobic bottle: Gram Positive Cocci in Pairs and Chains  Growth in aerobic bottle: Gram Positive Cocci in Pairs and Chains      OR Collect Bladder (from O.R.)  06-18-23   Rare Enterococcus faecium  --  Enterococcus faecium      .Blood Blood  06-17-23   No Growth Final  --  --      .Blood Blood  06-17-23   Growth in anaerobic bottle: Enterococcus faecium (vancomycin resistant)  ***Blood Panel PCR results on this specimen are available  approximately 3 hours after the Gram stain result.***  Gram stain, PCR, and/or culture results may not always  correspond due to difference in methodologies.  ************************************************************  This PCR assay was performed by multiplex PCR. This  Assay tests for 66 bacterial and resistance gene targets.  Please refer to the [facility] Labs test directory  at [email or web address] for details.  --  Blood Culture PCR  Enterococcus faecium (vancomycin resistant)      Abdominal Fl Abdominal Fluid  06-10-23   No growth at 5 days  --    No polymorphonuclear cells seen per low power field  No organisms seen per oil power field      Ascites Fl Ascites Fluid  06-08-23   No growth at 5 days  --    No polymorphonuclear cells seen  No organisms seen  by cytocentrifuge      .Blood Blood-Peripheral  06-07-23   No Growth Final  --  --      .Blood Blood  06-06-23   No Growth Final  --  --      .Bronchial Bronchoalveolar Lavage  06-05-23   No growth at 48 hours  --    polymorphonuclear leukocytes per low power field  No squamous epithelial cells per low power field  Gram Negative Rods per oil power field  by cytocentrifuge      .Bronchial Bronchial Lavage  06-05-23   No acid-fast bacilli isolated at 2 weeks.  --    Rare polymorphonuclear leukocytes per low power field  No squamous epithelial cells per low power field  No organisms seen per oil power field      .Blood Blood-Peripheral  06-02-23   No Growth Final  --  --      .Blood Blood-Peripheral  06-02-23   No Growth Final  --  --      Abdominal Fl Abdominal Fluid  05-31-23   No growth  --    polymorphonuclear leukocytes seen  No organisms seen  by cytocentrifuge      Clean Catch Clean Catch (Midstream)  05-28-23   <10,000 CFU/mL Normal Urogenital Aga  --  --      .Blood Blood-Peripheral  05-28-23   No Growth Final  --  --      .Blood Blood-Peripheral  05-28-23   No Growth Final  --  --      .Sputum Sputum  05-26-23   No acid-fast bacilli isolated at 4 weeks.  --    Few polymorphonuclear leukocytes per low power field  Moderate Squamous epithelial cells per low power field  Moderate Yeast like cells seen per oil power field        CMV IgG Antibody: >10.00 U/mL (06-01-23 @ 00:45)  Toxoplasma IgG Screen: <3.0 IU/mL (05-17-23 @ 07:14)  CMV IgG Antibody: >10.00 U/mL (05-17-23 @ 07:14)  Toxoplasma IgG Screen: <3.0 IU/mL (05-17-23 @ 07:14)    CMVPCR Log: 3.16 Bnu67FP/mL (06-20 @ 06:56)  CMVPCR Log: 3.27 Atk32BJ/mL (06-20 @ 06:56)        RADIOLOGY:    <The imaging below has been reviewed and visualized by me independently. Findings as detailed in report below>    < from: US Trans Liver W/ Doppler (06.23.23 @ 20:17) >  IMPRESSION:  Redemonstration of perihepatic fluid collections as seen on CT   abdomen/pelvis performed earlier today.    No evidence of portal or hepatic vein thrombosis.  No evidence of hepatic   artery stenosis.    < end of copied text >

## 2023-06-25 NOTE — PROGRESS NOTE ADULT - ASSESSMENT
39 yo F with hypothyroidism (on Levothyroxine) who initially presented to [facility] on 5/13 with abd pain. 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;  MSSA bacteremia secondary to SSTI and Organizing pneumonia of undetermined etiology now s/p OLT 6/9/23  S/p OLT6/9/23  CMV D+/R+, EBV D+/R+, Toxoplasma D?/R-  No fever, has rising WBC  5/13 2/2 BC pos MSSA (s/p treatment course)  6/8 ascitic fluid cx P; cellk  count; 36 cells    BCx (6/17, 6/18) VRE  Abdominal Wall hematoma I&D Culture (6/18) Enterococcus faecium (Non-VRE)  BCx (6/19, 6/20) NGTD  UCx (6/20) No Growth  UCx (6/21) No Growth    TTE (6/20) No evidence of vegetations    CT A/P (6/18) Diaphragmatic collection, bilobed fluid collection along the inferior right hepatic lobe, measuring 7.8 x 2.9 cm, Additional complex fluid in the region of the gallbladder, Hyperdense material in the left abdominal wall with foci of air, measuring 5.9 x 1.7 cm possibly hemorrhage/packing material.     CT A/P (6/23) Status post liver transplant with stable perihepatic hematomas. No abdominal wall collection. Hyperdensity in the haider hepatis along the portal vein    Abdominal US Doppler (6/23) No evidence of portal or hepatic vein thrombosis.  No evidence of hepatic artery stenosis.    #Leukocytosis, Diarrhea  --Recommend 2x blood cultures  --Recommend GI PCR and C diff toxin assay if continued diarrhea  --If continued uptrend in WBC and stool testing as above unrevealing / no alternative explanation would favor evaluating deeper collections for drainage    #VRE Bacteremia, Abdominal Fluid Collections  --Continue Dapto 400mg q 24 (monitor CrCl, check creatine kinase weekly, next on 6/26)  --Continue to follow CBC with diff  --Continue to follow temperature curve  --Follow up on preliminary blood cultures    #CMV Viremia, OLT Recipient, Prophylactic Antibiotic  --Continue Valcyte to 450 mg PO Q24H  --Check repeat CMV PCR on 6/27  --Continue Bactrim SS Q24H for PCP PPx  --Continue Fluconazole 200 mg PO Q24H for Fungal PPx per protocol    I will continue to follow. Please feel free to contact me with any further questions.    Irving Deras M.D.  [facility] Division of Infectious Disease  8AM-5PM Monday - Friday: Available on [employer] Teams  After Hours and Holidays (or if no response on [employer] Teams): Please contact the Infectious Diseases Office at [phone]    The above assessment and plan were discussed with Rosalba, transplant surgery PA   37 yo F with hypothyroidism (on Levothyroxine) who initially presented to [facility] on 5/13 with abd pain. 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;  MSSA bacteremia secondary to SSTI and Organizing pneumonia of undetermined etiology now s/p OLT 6/9/23  S/p OLT6/9/23  CMV D+/R+, EBV D+/R+, Toxoplasma D?/R-  No fever, has rising WBC  5/13 2/2 BC pos MSSA (s/p treatment course)  6/8 ascitic fluid cx P; cellk  count; 36 cells    BCx (6/17, 6/18) VRE  Abdominal Wall hematoma I&D Culture (6/18) Enterococcus faecium (Non-VRE)  BCx (6/19, 6/20) NGTD  UCx (6/20) No Growth  UCx (6/21) No Growth    TTE (6/20) No evidence of vegetations    CT A/P (6/18) Diaphragmatic collection, bilobed fluid collection along the inferior right hepatic lobe, measuring 7.8 x 2.9 cm, Additional complex fluid in the region of the gallbladder, Hyperdense material in the left abdominal wall with foci of air, measuring 5.9 x 1.7 cm possibly hemorrhage/packing material.     CT A/P (6/23) Status post liver transplant with stable perihepatic hematomas. No abdominal wall collection. Hyperdensity in the haider hepatis along the portal vein    Abdominal US Doppler (6/23) No evidence of portal or hepatic vein thrombosis.  No evidence of hepatic artery stenosis.    #Leukocytosis, Diarrhea  --Recommend 2x blood cultures  --Recommend GI PCR and C diff toxin assay if continued diarrhea  --If continued uptrend in WBC and stool testing as above unrevealing / no alternative explanation would favor evaluating deeper collections for drainage    #VRE Bacteremia, Abdominal Fluid Collections  --Continue Dapto 400mg q 24 (monitor CrCl, check creatine kinase weekly, next on 6/26)  --Continue to follow CBC with diff  --Continue to follow temperature curve  --Follow up on preliminary blood cultures    #CMV Viremia, OLT Recipient, Prophylactic Antibiotic\  CMV PCR (6/20) 1,860   --Continue Valcyte 450 mg PO Q24H  --Check repeat CMV PCR on 6/27  --Continue Bactrim SS Q24H for PCP PPx  --Continue Fluconazole 200 mg PO Q24H for Fungal PPx per protocol    I will continue to follow. Please feel free to contact me with any further questions.    Irving Deras M.D.  [facility] Division of Infectious Disease  8AM-5PM Monday - Friday: Available on [employer] Teams  After Hours and Holidays (or if no response on [employer] Teams): Please contact the Infectious Diseases Office at [phone]    The above assessment and plan were discussed with Rosalba, transplant surgery PA

## 2023-06-25 NOTE — PROGRESS NOTE ADULT - SUBJECTIVE AND OBJECTIVE BOX
Transplant Surgery Multidisciplinary Note  --------------------------------------------------------------  HCV+ OLT    Date: 6/9/23       POD#16    Present:   Patient seen and examined with multidisciplinary Transplant team including Surgeon: Dr. Byrnes, Hepatologist: Dr. Taylor, SALO Argueta, and unit RN during AM rounds.   Disciplines not in attendance will be notified of the plan.     HPI: 38F with Hx of hypothyroidism (on Levothyroxine), liver cirrhosis 2/2 ETOH abuse---->  presented to [facility] ED on 5/13/23 with ~ 2 weeks Hx of progressively worsening abdominal pain and distention.  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      Now s/p HCV+ OLT under Simulect induction on 6/10/2023  Post op course c/b VRE bacteremia    Interval Events:  - POD 16 s/p OLT  - no acute overnight events  - dced zosyn  - valcyte/bactrim daily (renally dosed)    Immunosuppression:  Induction: Simulect completed  Maint:  FK by level, MMF 500mg bid, SST  ongoing monitoring for signs of rejection    Potential Discharge date: TBD  Education:  Medications  Plan of care:  See Below              Review of systems  All other systems were reviewed and are negative, except as noted.      PHYSICAL EXAM:  Constitutional: NAD  Eyes: anicteric, PERRLA  ENMT: nc/at, no thrush  Neck: supple, no lines. prior sites c/d/i.    Respiratory: CTA B/L  Cardiovascular: RRR  Gastrointestinal: Soft abdomen, ND, appropriate incisional TTP. chevron incision with stable ecchymosis. No signs of infection.   Genitourinary: voiding  Extremities: SCD's in place and working bilaterally  Vascular: Palpable dp pulses bilaterally.   Neurological: A&O x3  Skin: no rashes, ulcerations, lesions  Musculoskeletal: Moving all extremities  Psychiatric: Responsive Transplant Surgery Multidisciplinary Note  --------------------------------------------------------------  HCV+ OLT    Date: 6/9/23       POD#16    Present:   Patient seen and examined with multidisciplinary Transplant team including Surgeon: Dr. Byrnes, Hepatologist: Dr. Taylor, SALO Argueta, and unit RN during AM rounds.   Disciplines not in attendance will be notified of the plan.     HPI: 38F with Hx of hypothyroidism (on Levothyroxine), liver cirrhosis 2/2 ETOH abuse---->  presented to [facility] ED on 5/13/23 with ~ 2 weeks Hx of progressively worsening abdominal pain and distention.  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      Now s/p HCV+ OLT under Simulect induction on 6/10/2023  Post op course c/b VRE bacteremia    Interval Events:  - POD 16 s/p OLT  - no acute overnight events  - dced zosyn  - valcyte/bactrim daily (renally dosed)  - rising WBC    Immunosuppression:  Induction: Simulect completed  Maint:  FK by level, MMF 500mg bid, SST  ongoing monitoring for signs of rejection    Potential Discharge date: TBD  Education:  Medications  Plan of care:  See Below      MEDICATIONS  (STANDING):  aspirin enteric coated 81 milliGRAM(s) Oral daily  calcium carbonate 1250 mG  + Vitamin D (OsCal 500 + D) 1 Tablet(s) Oral two times a day  chlorhexidine 2% Cloths 1 Application(s) Topical <User Schedule>  DAPTOmycin IVPB 400 milliGRAM(s) IV Intermittent every 24 hours  fluconAZOLE   Tablet 200 milliGRAM(s) Oral daily  heparin   Injectable 5000 Unit(s) SubCutaneous every 12 hours  levothyroxine 100 MICROGram(s) Oral daily  lidocaine   4% Patch 1 Patch Transdermal every 24 hours  magnesium oxide 400 milliGRAM(s) Oral three times a day with meals  mycophenolate mofetil 500 milliGRAM(s) Oral <User Schedule>  pantoprazole    Tablet 40 milliGRAM(s) Oral before breakfast  polyethylene glycol 3350 17 Gram(s) Oral daily  predniSONE   Tablet 20 milliGRAM(s) Oral daily  senna 2 Tablet(s) Oral at bedtime  sofosbuvir 400 mG/velpatasvir 100 mG (EPCLUSA) 1 Tablet(s) Oral <User Schedule>  tacrolimus 0.5 milliGRAM(s) Oral <User Schedule>  trimethoprim   80 mG/sulfamethoxazole 400 mG 1 Tablet(s) Oral daily  ursodiol Capsule 300 milliGRAM(s) Oral every 12 hours  valGANciclovir 450 milliGRAM(s) Oral daily    MEDICATIONS  (PRN):  Salonpas 1 Patch 1 Patch Topical every 8 hours PRN Pain  traMADol 25 milliGRAM(s) Oral every 6 hours PRN Severe Pain (7 - 10)      PAST MEDICAL & SURGICAL HISTORY:  Hypothyroidism  Alcoholic liver disease  No significant past surgical history    Vital Signs Last 24 Hrs  T(C): 36.7 (25 Jun 2023 08:00), Max: 37.1 (25 Jun 2023 01:00)  T(F): 98 (25 Jun 2023 08:00), Max: 98.7 (25 Jun 2023 01:00)  HR: 74 (25 Jun 2023 08:00) (60 - 74)  BP: 137/91 (25 Jun 2023 08:00) (127/80 - 150/93)  BP(mean): --  RR: 18 (25 Jun 2023 08:00) (18 - 18)  SpO2: 99% (25 Jun 2023 08:00) (98% - 100%)    Parameters below as of 25 Jun 2023 08:00  Patient On (Oxygen Delivery Method): room air    I&O's Summary    24 Jun 2023 07:01  -  25 Jun 2023 07:00  --------------------------------------------------------  IN: 510 mL / OUT: 800 mL / NET: -290 mL                        11.2   13.78 )-----------( 501      ( 25 Jun 2023 06:56 )             35.5     06-25    134<L>  |  98  |  13  ----------------------------<  69<L>  4.5   |  20<L>  |  1.09    Ca    9.3      25 Jun 2023 06:55  Phos  2.5     06-25  Mg     1.8     06-25    TPro  6.2  /  Alb  3.2<L>  /  TBili  2.4<H>  /  DBili  x   /  AST  19  /  ALT  <5<L>  /  AlkPhos  89  06-25    Tacrolimus (), Serum: 5.2 ng/mL (06-24 @ 06:43)    Culture - Urine (collected 06-21-23 @ 10:08)  Source: Clean Catch Clean Catch (Midstream)  Final Report (06-22-23 @ 23:21):    No growth    Culture - Blood (collected 06-20-23 @ 06:56)  Source: .Blood Blood-Peripheral  Preliminary Report (06-21-23 @ 12:02):    No growth to date.    Culture - Blood (collected 06-20-23 @ 06:56)  Source: .Blood Blood-Peripheral  Preliminary Report (06-21-23 @ 12:02):    No growth to date.    Culture - Urine (collected 06-20-23 @ 06:43)  Source: Clean Catch Clean Catch (Midstream)  Final Report (06-21-23 @ 09:37):    No growth    Culture - Blood (collected 06-19-23 @ 06:19)  Source: .Blood Blood  Final Report (06-24-23 @ 11:00):    No Growth Final    Culture - Blood (collected 06-19-23 @ 06:19)  Source: .Blood Blood  Final Report (06-24-23 @ 11:00):    No Growth Final    Culture - Blood (collected 06-18-23 @ 13:56)  Source: .Blood Blood-Peripheral  Gram Stain (06-19-23 @ 07:58):    Growth in anaerobic bottle: Gram Positive Cocci in Pairs and Chains  Final Report (06-20-23 @ 07:36):    Growth in anaerobic bottle: Enterococcus faecium (vancomycin resistant)    See previous culture [id number]    Culture - Blood (collected 06-18-23 @ 13:47)  Source: .Blood Blood-Peripheral  Gram Stain (06-19-23 @ 09:15):    Growth in anaerobic bottle: Gram Positive Cocci in Pairs and Chains    Growth in aerobic bottle: Gram Positive Cocci in Pairs and Chains  Final Report (06-20-23 @ 07:35):    Growth in aerobic and anaerobic bottles: Enterococcus faecium (vancomycin    resistant)    See previous culture [id number]    Culture - Urine (collected 06-18-23 @ 09:11)  Source: OR Collect Bladder (from O.R.)  Final Report (06-20-23 @ 15:47):    Rare Enterococcus faecium  Organism: Enterococcus faecium (06-21-23 @ 14:07)  Organism: Enterococcus faecium (06-20-23 @ 15:47)      Review of systems  All other systems were reviewed and are negative, except as noted.      PHYSICAL EXAM:  Constitutional: NAD  Eyes: anicteric, PERRLA  ENMT: nc/at, no thrush  Neck: supple, no lines. prior sites c/d/i.    Respiratory: CTA B/L  Cardiovascular: RRR  Gastrointestinal: Soft abdomen, ND, appropriate incisional TTP. chevron incision with stable ecchymosis. No signs of infection.   Genitourinary: voiding  Extremities: SCD's in place and working bilaterally  Vascular: Palpable dp pulses bilaterally.   Neurological: A&O x3  Skin: no rashes, ulcerations, lesions  Musculoskeletal: Moving all extremities  Psychiatric: Responsive

## 2023-06-26 ENCOUNTER — TRANSCRIPTION ENCOUNTER (OUTPATIENT)
Age: 38
End: 2023-06-26

## 2023-06-26 VITALS
HEART RATE: 70 BPM | RESPIRATION RATE: 18 BRPM | OXYGEN SATURATION: 98 % | TEMPERATURE: 98 F | DIASTOLIC BLOOD PRESSURE: 85 MMHG | SYSTOLIC BLOOD PRESSURE: 149 MMHG

## 2023-06-26 DIAGNOSIS — R78.81 BACTEREMIA: ICD-10-CM

## 2023-06-26 LAB
ALBUMIN SERPL ELPH-MCNC: 2.7 G/DL — LOW (ref 3.3–5)
ALP SERPL-CCNC: 71 U/L — SIGNIFICANT CHANGE UP (ref 40–120)
ALT FLD-CCNC: 5 U/L — LOW (ref 10–45)
ANION GAP SERPL CALC-SCNC: 13 MMOL/L — SIGNIFICANT CHANGE UP (ref 5–17)
APTT BLD: 27.6 SEC — SIGNIFICANT CHANGE UP (ref 27.5–35.5)
AST SERPL-CCNC: 10 U/L — SIGNIFICANT CHANGE UP (ref 10–40)
BILIRUB SERPL-MCNC: 1.7 MG/DL — HIGH (ref 0.2–1.2)
BUN SERPL-MCNC: 11 MG/DL — SIGNIFICANT CHANGE UP (ref 7–23)
CALCIUM SERPL-MCNC: 8.5 MG/DL — SIGNIFICANT CHANGE UP (ref 8.4–10.5)
CHLORIDE SERPL-SCNC: 104 MMOL/L — SIGNIFICANT CHANGE UP (ref 96–108)
CO2 SERPL-SCNC: 18 MMOL/L — LOW (ref 22–31)
CREAT SERPL-MCNC: 1.18 MG/DL — SIGNIFICANT CHANGE UP (ref 0.5–1.3)
CULTURE RESULTS: NO GROWTH — SIGNIFICANT CHANGE UP
EGFR: 60 ML/MIN/1.73M2 — SIGNIFICANT CHANGE UP
GLUCOSE SERPL-MCNC: 71 MG/DL — SIGNIFICANT CHANGE UP (ref 70–99)
HCT VFR BLD CALC: 30.1 % — LOW (ref 34.5–45)
HCV RNA SERPL NAA DL=5-ACNC: SIGNIFICANT CHANGE UP IU/ML
HCV RNA SPEC NAA+PROBE-LOG IU: 4.43 LOGIU/ML — SIGNIFICANT CHANGE UP
HCV RNA SPEC NAA+PROBE-LOG IU: DETECTED
HGB BLD-MCNC: 9.2 G/DL — LOW (ref 11.5–15.5)
INR BLD: 0.9 RATIO — SIGNIFICANT CHANGE UP (ref 0.88–1.16)
MAGNESIUM SERPL-MCNC: 1.5 MG/DL — LOW (ref 1.6–2.6)
MCHC RBC-ENTMCNC: 30.6 GM/DL — LOW (ref 32–36)
MCHC RBC-ENTMCNC: 30.9 PG — SIGNIFICANT CHANGE UP (ref 27–34)
MCV RBC AUTO: 101 FL — HIGH (ref 80–100)
NRBC # BLD: 0 /100 WBCS — SIGNIFICANT CHANGE UP (ref 0–0)
PHOSPHATE SERPL-MCNC: 2.5 MG/DL — SIGNIFICANT CHANGE UP (ref 2.5–4.5)
PLATELET # BLD AUTO: 452 K/UL — HIGH (ref 150–400)
POTASSIUM SERPL-MCNC: 4.4 MMOL/L — SIGNIFICANT CHANGE UP (ref 3.5–5.3)
POTASSIUM SERPL-SCNC: 4.4 MMOL/L — SIGNIFICANT CHANGE UP (ref 3.5–5.3)
PROCALCITONIN SERPL-MCNC: 0.19 NG/ML — HIGH (ref 0.02–0.1)
PROT SERPL-MCNC: 4.9 G/DL — LOW (ref 6–8.3)
PROTHROM AB SERPL-ACNC: 10.3 SEC — LOW (ref 10.5–13.4)
RBC # BLD: 2.98 M/UL — LOW (ref 3.8–5.2)
RBC # FLD: 23.2 % — HIGH (ref 10.3–14.5)
SODIUM SERPL-SCNC: 135 MMOL/L — SIGNIFICANT CHANGE UP (ref 135–145)
SPECIMEN SOURCE: SIGNIFICANT CHANGE UP
SURGICAL PATHOLOGY STUDY: SIGNIFICANT CHANGE UP
TACROLIMUS SERPL-MCNC: 5.2 NG/ML — SIGNIFICANT CHANGE UP
WBC # BLD: 10.62 K/UL — HIGH (ref 3.8–10.5)
WBC # FLD AUTO: 10.62 K/UL — HIGH (ref 3.8–10.5)

## 2023-06-26 PROCEDURE — 86706 HEP B SURFACE ANTIBODY: CPT

## 2023-06-26 PROCEDURE — 84133 ASSAY OF URINE POTASSIUM: CPT

## 2023-06-26 PROCEDURE — 84156 ASSAY OF PROTEIN URINE: CPT

## 2023-06-26 PROCEDURE — 93975 VASCULAR STUDY: CPT

## 2023-06-26 PROCEDURE — 83935 ASSAY OF URINE OSMOLALITY: CPT

## 2023-06-26 PROCEDURE — 83930 ASSAY OF BLOOD OSMOLALITY: CPT

## 2023-06-26 PROCEDURE — 94640 AIRWAY INHALATION TREATMENT: CPT

## 2023-06-26 PROCEDURE — 88175 CYTOPATH C/V AUTO FLUID REDO: CPT

## 2023-06-26 PROCEDURE — 87206 SMEAR FLUORESCENT/ACID STAI: CPT

## 2023-06-26 PROCEDURE — 87077 CULTURE AEROBIC IDENTIFY: CPT

## 2023-06-26 PROCEDURE — 86664 EPSTEIN-BARR NUCLEAR ANTIGEN: CPT

## 2023-06-26 PROCEDURE — 97112 NEUROMUSCULAR REEDUCATION: CPT

## 2023-06-26 PROCEDURE — 87449 NOS EACH ORGANISM AG IA: CPT

## 2023-06-26 PROCEDURE — 86900 BLOOD TYPING SEROLOGIC ABO: CPT

## 2023-06-26 PROCEDURE — 87521 HEPATITIS C PROBE&RVRS TRNSC: CPT

## 2023-06-26 PROCEDURE — 80197 ASSAY OF TACROLIMUS: CPT

## 2023-06-26 PROCEDURE — 86777 TOXOPLASMA ANTIBODY: CPT

## 2023-06-26 PROCEDURE — 88184 FLOWCYTOMETRY/ TC 1 MARKER: CPT

## 2023-06-26 PROCEDURE — 82977 ASSAY OF GGT: CPT

## 2023-06-26 PROCEDURE — 86880 COOMBS TEST DIRECT: CPT

## 2023-06-26 PROCEDURE — 87102 FUNGUS ISOLATION CULTURE: CPT

## 2023-06-26 PROCEDURE — 87075 CULTR BACTERIA EXCEPT BLOOD: CPT

## 2023-06-26 PROCEDURE — 86803 HEPATITIS C AB TEST: CPT

## 2023-06-26 PROCEDURE — 0225U NFCT DS DNA&RNA 21 SARSCOV2: CPT

## 2023-06-26 PROCEDURE — 85730 THROMBOPLASTIN TIME PARTIAL: CPT

## 2023-06-26 PROCEDURE — 93325 DOPPLER ECHO COLOR FLOW MAPG: CPT

## 2023-06-26 PROCEDURE — 87305 ASPERGILLUS AG IA: CPT

## 2023-06-26 PROCEDURE — 74176 CT ABD & PELVIS W/O CONTRAST: CPT

## 2023-06-26 PROCEDURE — 82947 ASSAY GLUCOSE BLOOD QUANT: CPT

## 2023-06-26 PROCEDURE — 86644 CMV ANTIBODY: CPT

## 2023-06-26 PROCEDURE — 88309 TISSUE EXAM BY PATHOLOGIST: CPT

## 2023-06-26 PROCEDURE — 86780 TREPONEMA PALLIDUM: CPT

## 2023-06-26 PROCEDURE — 82140 ASSAY OF AMMONIA: CPT

## 2023-06-26 PROCEDURE — 99232 SBSQ HOSP IP/OBS MODERATE 35: CPT

## 2023-06-26 PROCEDURE — 88185 FLOWCYTOMETRY/TC ADD-ON: CPT

## 2023-06-26 PROCEDURE — 85301 ANTITHROMBIN III ANTIGEN: CPT

## 2023-06-26 PROCEDURE — 83550 IRON BINDING TEST: CPT

## 2023-06-26 PROCEDURE — 86480 TB TEST CELL IMMUN MEASURE: CPT

## 2023-06-26 PROCEDURE — 86985 SPLIT BLOOD OR PRODUCTS: CPT

## 2023-06-26 PROCEDURE — 83735 ASSAY OF MAGNESIUM: CPT

## 2023-06-26 PROCEDURE — 86965 POOLING BLOOD PLATELETS: CPT

## 2023-06-26 PROCEDURE — 80307 DRUG TEST PRSMV CHEM ANLYZR: CPT

## 2023-06-26 PROCEDURE — 83615 LACTATE (LD) (LDH) ENZYME: CPT

## 2023-06-26 PROCEDURE — 93306 TTE W/DOPPLER COMPLETE: CPT

## 2023-06-26 PROCEDURE — 86769 SARS-COV-2 COVID-19 ANTIBODY: CPT

## 2023-06-26 PROCEDURE — 87186 SC STD MICRODIL/AGAR DIL: CPT

## 2023-06-26 PROCEDURE — 85302 CLOT INHIBIT PROT C ANTIGEN: CPT

## 2023-06-26 PROCEDURE — 87340 HEPATITIS B SURFACE AG IA: CPT

## 2023-06-26 PROCEDURE — 87040 BLOOD CULTURE FOR BACTERIA: CPT

## 2023-06-26 PROCEDURE — 86762 RUBELLA ANTIBODY: CPT

## 2023-06-26 PROCEDURE — 87594 PNEUMCYSTS JIROVECII AMP PRB: CPT

## 2023-06-26 PROCEDURE — 97535 SELF CARE MNGMENT TRAINING: CPT

## 2023-06-26 PROCEDURE — 85396 CLOTTING ASSAY WHOLE BLOOD: CPT

## 2023-06-26 PROCEDURE — 76705 ECHO EXAM OF ABDOMEN: CPT

## 2023-06-26 PROCEDURE — 86901 BLOOD TYPING SEROLOGIC RH(D): CPT

## 2023-06-26 PROCEDURE — 80053 COMPREHEN METABOLIC PANEL: CPT

## 2023-06-26 PROCEDURE — 83540 ASSAY OF IRON: CPT

## 2023-06-26 PROCEDURE — 87385 HISTOPLASMA CAPSUL AG IA: CPT

## 2023-06-26 PROCEDURE — 84100 ASSAY OF PHOSPHORUS: CPT

## 2023-06-26 PROCEDURE — 87507 IADNA-DNA/RNA PROBE TQ 12-25: CPT

## 2023-06-26 PROCEDURE — 84153 ASSAY OF PSA TOTAL: CPT

## 2023-06-26 PROCEDURE — 88313 SPECIAL STAINS GROUP 2: CPT

## 2023-06-26 PROCEDURE — 70450 CT HEAD/BRAIN W/O DYE: CPT

## 2023-06-26 PROCEDURE — 87522 HEPATITIS C REVRS TRNSCRPJ: CPT

## 2023-06-26 PROCEDURE — 86704 HEP B CORE ANTIBODY TOTAL: CPT

## 2023-06-26 PROCEDURE — 81001 URINALYSIS AUTO W/SCOPE: CPT

## 2023-06-26 PROCEDURE — 86905 BLOOD TYPING RBC ANTIGENS: CPT

## 2023-06-26 PROCEDURE — 93005 ELECTROCARDIOGRAM TRACING: CPT

## 2023-06-26 PROCEDURE — 82390 ASSAY OF CERULOPLASMIN: CPT

## 2023-06-26 PROCEDURE — 97530 THERAPEUTIC ACTIVITIES: CPT

## 2023-06-26 PROCEDURE — 82435 ASSAY OF BLOOD CHLORIDE: CPT

## 2023-06-26 PROCEDURE — 86860 RBC ANTIBODY ELUTION: CPT

## 2023-06-26 PROCEDURE — G0480: CPT

## 2023-06-26 PROCEDURE — 80048 BASIC METABOLIC PNL TOTAL CA: CPT

## 2023-06-26 PROCEDURE — 82805 BLOOD GASES W/O2 SATURATION: CPT

## 2023-06-26 PROCEDURE — 87799 DETECT AGENT NOS DNA QUANT: CPT

## 2023-06-26 PROCEDURE — 84540 ASSAY OF URINE/UREA-N: CPT

## 2023-06-26 PROCEDURE — 86038 ANTINUCLEAR ANTIBODIES: CPT

## 2023-06-26 PROCEDURE — 81025 URINE PREGNANCY TEST: CPT

## 2023-06-26 PROCEDURE — 85306 CLOT INHIBIT PROT S FREE: CPT

## 2023-06-26 PROCEDURE — 83036 HEMOGLOBIN GLYCOSYLATED A1C: CPT

## 2023-06-26 PROCEDURE — 86923 COMPATIBILITY TEST ELECTRIC: CPT

## 2023-06-26 PROCEDURE — 86381 MITOCHONDRIAL ANTIBODY EACH: CPT

## 2023-06-26 PROCEDURE — 82570 ASSAY OF URINE CREATININE: CPT

## 2023-06-26 PROCEDURE — 49083 ABD PARACENTESIS W/IMAGING: CPT

## 2023-06-26 PROCEDURE — 84443 ASSAY THYROID STIM HORMONE: CPT

## 2023-06-26 PROCEDURE — 82728 ASSAY OF FERRITIN: CPT

## 2023-06-26 PROCEDURE — P9059: CPT

## 2023-06-26 PROCEDURE — 84295 ASSAY OF SERUM SODIUM: CPT

## 2023-06-26 PROCEDURE — P9045: CPT

## 2023-06-26 PROCEDURE — 82330 ASSAY OF CALCIUM: CPT

## 2023-06-26 PROCEDURE — 85384 FIBRINOGEN ACTIVITY: CPT

## 2023-06-26 PROCEDURE — C1729: CPT

## 2023-06-26 PROCEDURE — P9016: CPT

## 2023-06-26 PROCEDURE — C1889: CPT

## 2023-06-26 PROCEDURE — 87252 VIRUS INOCULATION TISSUE: CPT

## 2023-06-26 PROCEDURE — 82550 ASSAY OF CK (CPK): CPT

## 2023-06-26 PROCEDURE — 82945 GLUCOSE OTHER FLUID: CPT

## 2023-06-26 PROCEDURE — 87798 DETECT AGENT NOS DNA AMP: CPT

## 2023-06-26 PROCEDURE — 88312 SPECIAL STAINS GROUP 1: CPT

## 2023-06-26 PROCEDURE — 87086 URINE CULTURE/COLONY COUNT: CPT

## 2023-06-26 PROCEDURE — 85610 PROTHROMBIN TIME: CPT

## 2023-06-26 PROCEDURE — 88307 TISSUE EXAM BY PATHOLOGIST: CPT

## 2023-06-26 PROCEDURE — 86665 EPSTEIN-BARR CAPSID VCA: CPT

## 2023-06-26 PROCEDURE — 84439 ASSAY OF FREE THYROXINE: CPT

## 2023-06-26 PROCEDURE — C1769: CPT

## 2023-06-26 PROCEDURE — 87635 SARS-COV-2 COVID-19 AMP PRB: CPT

## 2023-06-26 PROCEDURE — 97161 PT EVAL LOW COMPLEX 20 MIN: CPT

## 2023-06-26 PROCEDURE — 88304 TISSUE EXAM BY PATHOLOGIST: CPT

## 2023-06-26 PROCEDURE — 85303 CLOT INHIBIT PROT C ACTIVITY: CPT

## 2023-06-26 PROCEDURE — 87070 CULTURE OTHR SPECIMN AEROBIC: CPT

## 2023-06-26 PROCEDURE — 83090 ASSAY OF HOMOCYSTEINE: CPT

## 2023-06-26 PROCEDURE — 85220 BLOOC CLOT FACTOR V TEST: CPT

## 2023-06-26 PROCEDURE — 82248 BILIRUBIN DIRECT: CPT

## 2023-06-26 PROCEDURE — 87205 SMEAR GRAM STAIN: CPT

## 2023-06-26 PROCEDURE — P9037: CPT

## 2023-06-26 PROCEDURE — 84702 CHORIONIC GONADOTROPIN TEST: CPT

## 2023-06-26 PROCEDURE — 88112 CYTOPATH CELL ENHANCE TECH: CPT

## 2023-06-26 PROCEDURE — 88305 TISSUE EXAM BY PATHOLOGIST: CPT

## 2023-06-26 PROCEDURE — 85027 COMPLETE CBC AUTOMATED: CPT

## 2023-06-26 PROCEDURE — 93321 DOPPLER ECHO F-UP/LMTD STD: CPT

## 2023-06-26 PROCEDURE — 87150 DNA/RNA AMPLIFIED PROBE: CPT

## 2023-06-26 PROCEDURE — 84300 ASSAY OF URINE SODIUM: CPT

## 2023-06-26 PROCEDURE — P9047: CPT

## 2023-06-26 PROCEDURE — 82042 OTHER SOURCE ALBUMIN QUAN EA: CPT

## 2023-06-26 PROCEDURE — 93970 EXTREMITY STUDY: CPT

## 2023-06-26 PROCEDURE — 86147 CARDIOLIPIN ANTIBODY EA IG: CPT

## 2023-06-26 PROCEDURE — 89051 BODY FLUID CELL COUNT: CPT

## 2023-06-26 PROCEDURE — 84157 ASSAY OF PROTEIN OTHER: CPT

## 2023-06-26 PROCEDURE — P9011: CPT

## 2023-06-26 PROCEDURE — 93308 TTE F-UP OR LMTD: CPT

## 2023-06-26 PROCEDURE — 83605 ASSAY OF LACTIC ACID: CPT

## 2023-06-26 PROCEDURE — 85240 CLOT FACTOR VIII AHG 1 STAGE: CPT

## 2023-06-26 PROCEDURE — 87451 POLYVALENT MULT ORG EA AG IA: CPT

## 2023-06-26 PROCEDURE — P9040: CPT

## 2023-06-26 PROCEDURE — 85018 HEMOGLOBIN: CPT

## 2023-06-26 PROCEDURE — 97166 OT EVAL MOD COMPLEX 45 MIN: CPT

## 2023-06-26 PROCEDURE — 93320 DOPPLER ECHO COMPLETE: CPT

## 2023-06-26 PROCEDURE — 71275 CT ANGIOGRAPHY CHEST: CPT

## 2023-06-26 PROCEDURE — 86735 MUMPS ANTIBODY: CPT

## 2023-06-26 PROCEDURE — 93356 MYOCRD STRAIN IMG SPCKL TRCK: CPT

## 2023-06-26 PROCEDURE — 85014 HEMATOCRIT: CPT

## 2023-06-26 PROCEDURE — 71250 CT THORAX DX C-: CPT

## 2023-06-26 PROCEDURE — C1751: CPT

## 2023-06-26 PROCEDURE — 87116 MYCOBACTERIA CULTURE: CPT

## 2023-06-26 PROCEDURE — 74183 MRI ABD W/O CNTR FLWD CNTR: CPT

## 2023-06-26 PROCEDURE — 86682 HELMINTH ANTIBODY: CPT

## 2023-06-26 PROCEDURE — 82962 GLUCOSE BLOOD TEST: CPT

## 2023-06-26 PROCEDURE — 36415 COLL VENOUS BLD VENIPUNCTURE: CPT

## 2023-06-26 PROCEDURE — P9012: CPT

## 2023-06-26 PROCEDURE — 86790 VIRUS ANTIBODY NOS: CPT

## 2023-06-26 PROCEDURE — 93312 ECHO TRANSESOPHAGEAL: CPT

## 2023-06-26 PROCEDURE — 76770 US EXAM ABDO BACK WALL COMP: CPT

## 2023-06-26 PROCEDURE — 85025 COMPLETE CBC W/AUTO DIFF WBC: CPT

## 2023-06-26 PROCEDURE — 86696 HERPES SIMPLEX TYPE 2 TEST: CPT

## 2023-06-26 PROCEDURE — 82247 BILIRUBIN TOTAL: CPT

## 2023-06-26 PROCEDURE — 86695 HERPES SIMPLEX TYPE 1 TEST: CPT

## 2023-06-26 PROCEDURE — 86255 FLUORESCENT ANTIBODY SCREEN: CPT

## 2023-06-26 PROCEDURE — 82803 BLOOD GASES ANY COMBINATION: CPT

## 2023-06-26 PROCEDURE — 87389 HIV-1 AG W/HIV-1&-2 AB AG IA: CPT

## 2023-06-26 PROCEDURE — 36430 TRANSFUSION BLD/BLD COMPNT: CPT

## 2023-06-26 PROCEDURE — 71045 X-RAY EXAM CHEST 1 VIEW: CPT

## 2023-06-26 PROCEDURE — 86663 EPSTEIN-BARR ANTIBODY: CPT

## 2023-06-26 PROCEDURE — 87624 HPV HI-RISK TYP POOLED RSLT: CPT

## 2023-06-26 PROCEDURE — 86765 RUBEOLA ANTIBODY: CPT

## 2023-06-26 PROCEDURE — 97110 THERAPEUTIC EXERCISES: CPT

## 2023-06-26 PROCEDURE — 84132 ASSAY OF SERUM POTASSIUM: CPT

## 2023-06-26 PROCEDURE — 86787 VARICELLA-ZOSTER ANTIBODY: CPT

## 2023-06-26 PROCEDURE — 94002 VENT MGMT INPAT INIT DAY: CPT

## 2023-06-26 PROCEDURE — 86850 RBC ANTIBODY SCREEN: CPT

## 2023-06-26 PROCEDURE — P9100: CPT

## 2023-06-26 PROCEDURE — 97116 GAIT TRAINING THERAPY: CPT

## 2023-06-26 PROCEDURE — 84145 PROCALCITONIN (PCT): CPT

## 2023-06-26 PROCEDURE — 87517 HEPATITIS B DNA QUANT: CPT

## 2023-06-26 PROCEDURE — 82565 ASSAY OF CREATININE: CPT

## 2023-06-26 PROCEDURE — A9585: CPT

## 2023-06-26 PROCEDURE — 85300 ANTITHROMBIN III ACTIVITY: CPT

## 2023-06-26 PROCEDURE — 80061 LIPID PANEL: CPT

## 2023-06-26 PROCEDURE — 86403 PARTICLE AGGLUT ANTBDY SCRN: CPT

## 2023-06-26 PROCEDURE — 87902 NFCT AGT GNTYP ALYS HEP C: CPT

## 2023-06-26 PROCEDURE — 84550 ASSAY OF BLOOD/URIC ACID: CPT

## 2023-06-26 PROCEDURE — 87015 SPECIMEN INFECT AGNT CONCNTJ: CPT

## 2023-06-26 RX ORDER — MAGNESIUM OXIDE 400 MG ORAL TABLET 241.3 MG
1 TABLET ORAL
Qty: 0 | Refills: 0 | DISCHARGE
Start: 2023-06-26

## 2023-06-26 RX ORDER — PANTOPRAZOLE SODIUM 20 MG/1
1 TABLET, DELAYED RELEASE ORAL
Qty: 0 | Refills: 0 | DISCHARGE
Start: 2023-06-26

## 2023-06-26 RX ORDER — URSODIOL 250 MG/1
1 TABLET, FILM COATED ORAL
Qty: 0 | Refills: 0 | DISCHARGE
Start: 2023-06-26

## 2023-06-26 RX ORDER — VELPATASVIR AND SOFOSBUVIR 37.5; 15 MG/1; MG/1
1 PELLET ORAL
Refills: 0 | DISCHARGE

## 2023-06-26 RX ORDER — HYDROMORPHONE HYDROCHLORIDE 2 MG/ML
0.2 INJECTION INTRAMUSCULAR; INTRAVENOUS; SUBCUTANEOUS ONCE
Refills: 0 | Status: DISCONTINUED | OUTPATIENT
Start: 2023-06-26 | End: 2023-06-26

## 2023-06-26 RX ORDER — POLYETHYLENE GLYCOL 3350 17 G/17G
17 POWDER, FOR SOLUTION ORAL
Qty: 0 | Refills: 0 | DISCHARGE
Start: 2023-06-26

## 2023-06-26 RX ORDER — TACROLIMUS 5 MG/1
1 CAPSULE ORAL
Qty: 0 | Refills: 0 | DISCHARGE
Start: 2023-06-26

## 2023-06-26 RX ORDER — LINEZOLID 600 MG/300ML
1 INJECTION, SOLUTION INTRAVENOUS
Qty: 14 | Refills: 0
Start: 2023-06-26 | End: 2023-07-02

## 2023-06-26 RX ORDER — LEVOTHYROXINE SODIUM 125 MCG
1 TABLET ORAL
Qty: 0 | Refills: 0 | DISCHARGE
Start: 2023-06-26

## 2023-06-26 RX ORDER — SENNA PLUS 8.6 MG/1
2 TABLET ORAL
Qty: 0 | Refills: 0 | DISCHARGE
Start: 2023-06-26

## 2023-06-26 RX ORDER — LINEZOLID 600 MG/300ML
600 INJECTION, SOLUTION INTRAVENOUS EVERY 12 HOURS
Refills: 0 | Status: DISCONTINUED | OUTPATIENT
Start: 2023-06-26 | End: 2023-06-26

## 2023-06-26 RX ORDER — LINEZOLID 600 MG/300ML
600 INJECTION, SOLUTION INTRAVENOUS ONCE
Refills: 0 | Status: DISCONTINUED | OUTPATIENT
Start: 2023-06-26 | End: 2023-06-26

## 2023-06-26 RX ORDER — MYCOPHENOLATE MOFETIL 250 MG/1
500 CAPSULE ORAL
Qty: 0 | Refills: 0 | DISCHARGE
Start: 2023-06-26

## 2023-06-26 RX ORDER — FLUCONAZOLE 150 MG/1
1 TABLET ORAL
Qty: 0 | Refills: 0 | DISCHARGE
Start: 2023-06-26

## 2023-06-26 RX ORDER — VELPATASVIR AND SOFOSBUVIR 37.5; 15 MG/1; MG/1
1 PELLET ORAL
Qty: 0 | Refills: 0 | DISCHARGE
Start: 2023-06-26

## 2023-06-26 RX ORDER — MAGNESIUM SULFATE 500 MG/ML
2 VIAL (ML) INJECTION ONCE
Refills: 0 | Status: COMPLETED | OUTPATIENT
Start: 2023-06-26 | End: 2023-06-26

## 2023-06-26 RX ORDER — ASPIRIN/CALCIUM CARB/MAGNESIUM 324 MG
1 TABLET ORAL
Qty: 0 | Refills: 0 | DISCHARGE
Start: 2023-06-26

## 2023-06-26 RX ORDER — VALGANCICLOVIR 450 MG/1
1 TABLET, FILM COATED ORAL
Qty: 0 | Refills: 0 | DISCHARGE
Start: 2023-06-26

## 2023-06-26 RX ADMIN — TRAMADOL HYDROCHLORIDE 25 MILLIGRAM(S): 50 TABLET ORAL at 16:00

## 2023-06-26 RX ADMIN — TACROLIMUS 0.5 MILLIGRAM(S): 5 CAPSULE ORAL at 08:08

## 2023-06-26 RX ADMIN — Medication 81 MILLIGRAM(S): at 11:50

## 2023-06-26 RX ADMIN — TRAMADOL HYDROCHLORIDE 25 MILLIGRAM(S): 50 TABLET ORAL at 03:38

## 2023-06-26 RX ADMIN — TRAMADOL HYDROCHLORIDE 25 MILLIGRAM(S): 50 TABLET ORAL at 16:45

## 2023-06-26 RX ADMIN — Medication 1 TABLET(S): at 05:17

## 2023-06-26 RX ADMIN — TRAMADOL HYDROCHLORIDE 25 MILLIGRAM(S): 50 TABLET ORAL at 10:25

## 2023-06-26 RX ADMIN — Medication 100 MICROGRAM(S): at 05:17

## 2023-06-26 RX ADMIN — HEPARIN SODIUM 5000 UNIT(S): 5000 INJECTION INTRAVENOUS; SUBCUTANEOUS at 05:17

## 2023-06-26 RX ADMIN — MAGNESIUM OXIDE 400 MG ORAL TABLET 400 MILLIGRAM(S): 241.3 TABLET ORAL at 08:08

## 2023-06-26 RX ADMIN — TRAMADOL HYDROCHLORIDE 25 MILLIGRAM(S): 50 TABLET ORAL at 09:53

## 2023-06-26 RX ADMIN — Medication 1 TABLET(S): at 11:50

## 2023-06-26 RX ADMIN — Medication 25 GRAM(S): at 11:49

## 2023-06-26 RX ADMIN — TRAMADOL HYDROCHLORIDE 25 MILLIGRAM(S): 50 TABLET ORAL at 04:56

## 2023-06-26 RX ADMIN — CHLORHEXIDINE GLUCONATE 1 APPLICATION(S): 213 SOLUTION TOPICAL at 08:09

## 2023-06-26 RX ADMIN — HYDROMORPHONE HYDROCHLORIDE 0.2 MILLIGRAM(S): 2 INJECTION INTRAMUSCULAR; INTRAVENOUS; SUBCUTANEOUS at 05:18

## 2023-06-26 RX ADMIN — LIDOCAINE 1 PATCH: 4 CREAM TOPICAL at 11:49

## 2023-06-26 RX ADMIN — HYDROMORPHONE HYDROCHLORIDE 0.2 MILLIGRAM(S): 2 INJECTION INTRAMUSCULAR; INTRAVENOUS; SUBCUTANEOUS at 06:14

## 2023-06-26 RX ADMIN — VALGANCICLOVIR 450 MILLIGRAM(S): 450 TABLET, FILM COATED ORAL at 11:52

## 2023-06-26 RX ADMIN — PANTOPRAZOLE SODIUM 40 MILLIGRAM(S): 20 TABLET, DELAYED RELEASE ORAL at 05:17

## 2023-06-26 RX ADMIN — MAGNESIUM OXIDE 400 MG ORAL TABLET 400 MILLIGRAM(S): 241.3 TABLET ORAL at 11:50

## 2023-06-26 RX ADMIN — Medication 20 MILLIGRAM(S): at 05:17

## 2023-06-26 RX ADMIN — MYCOPHENOLATE MOFETIL 500 MILLIGRAM(S): 250 CAPSULE ORAL at 08:08

## 2023-06-26 RX ADMIN — DAPTOMYCIN 116 MILLIGRAM(S): 500 INJECTION, POWDER, LYOPHILIZED, FOR SOLUTION INTRAVENOUS at 16:43

## 2023-06-26 RX ADMIN — FLUCONAZOLE 200 MILLIGRAM(S): 150 TABLET ORAL at 11:50

## 2023-06-26 RX ADMIN — URSODIOL 300 MILLIGRAM(S): 250 TABLET, FILM COATED ORAL at 05:17

## 2023-06-26 NOTE — PROGRESS NOTE ADULT - NUTRITIONAL ASSESSMENT
This patient has been assessed with a concern for Malnutrition and has been determined to have a diagnosis/diagnoses of Severe protein-calorie malnutrition and Underweight (BMI < 19).    This patient is being managed with:   Diet Consistent Carbohydrate Renal w/Evening Snack-  Supplement Feeding Modality:  Oral  Nepro Cans or Servings Per Day:  1       Frequency:  Two Times a day  Entered: Jun 13 2023 12:22PM  
This patient has been assessed with a concern for Malnutrition and has been determined to have a diagnosis/diagnoses of Severe protein-calorie malnutrition and Underweight (BMI < 19).    This patient is being managed with:   Diet NPO after Midnight-     NPO Start Date: 04-Jun-2023   NPO Start Time: 23:59  Except Medications  Entered: Jun 4 2023  1:08PM    Diet Regular-  1500mL Fluid Restriction ([id number])  Low Sodium  Supplement Feeding Modality:  Oral  Ensure Plus High Protein Cans or Servings Per Day:  2       Frequency:  Daily  Entered: May 22 2023  4:03PM  
This patient has been assessed with a concern for Malnutrition and has been determined to have a diagnosis/diagnoses of Severe protein-calorie malnutrition and Underweight (BMI < 19).    This patient is being managed with:   Diet NPO after Midnight-     NPO Start Date: 22-May-2023   NPO Start Time: 23:59  Entered: May 22 2023  7:50AM    Diet Regular-  Supplement Feeding Modality:  Oral  Ensure Plus High Protein Cans or Servings Per Day:  2       Frequency:  Daily  Entered: May 18 2023 12:49PM  
This patient has been assessed with a concern for Malnutrition and has been determined to have a diagnosis/diagnoses of Severe protein-calorie malnutrition and Underweight (BMI < 19).    This patient is being managed with:   Diet Regular-  1500mL Fluid Restriction ([id number])  Low Sodium  Supplement Feeding Modality:  Oral  Ensure Plus High Protein Cans or Servings Per Day:  2       Frequency:  Daily  Entered: May 22 2023  4:03PM  
38F with Hx of hypothyroidism (on Levothyroxine), liver cirrhosis 2/2 ETOH abuse---->  presented to [facility] ED on 5/13/23 with ~ 2 weeks Hx of progressively worsening abdominal pain and distention.  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     -alcoholic hepaitis c/b ascites, varices;   -s/p para 5/17 (2L), 5/23 (1.7L);   -s/p EGD 5/25, MRCP, SAHARA)  -Elevated INR (3.9); s/p vitamin K x3 days  -MSSA bacteremia (unclear source); c/w ancef x4 weeks; SAHARA negative  - PNA on zosyn  - Hyponatremia (hypervolemic); lasix restarted per hepatology; improving  -Anemia (baseline Hgb 8); s/p 1u pRBC 5/20
This patient has been assessed with a concern for Malnutrition and has been determined to have a diagnosis/diagnoses of Severe protein-calorie malnutrition and Underweight (BMI < 19).    This patient is being managed with:   Diet Consistent Carbohydrate Renal w/Evening Snack-  Supplement Feeding Modality:  Oral  Nepro Cans or Servings Per Day:  1       Frequency:  Two Times a day  Entered: Jun 13 2023 12:22PM  
This patient has been assessed with a concern for Malnutrition and has been determined to have a diagnosis/diagnoses of Severe protein-calorie malnutrition and Underweight (BMI < 19).    This patient is being managed with:   Diet Consistent Carbohydrate Renal w/Evening Snack-  Supplement Feeding Modality:  Oral  Nepro Cans or Servings Per Day:  1       Frequency:  Two Times a day  Entered: Jun 13 2023 12:22PM    The following pending diet order is being considered for treatment of Severe protein-calorie malnutrition and Underweight (BMI < 19):  Diet Regular-  Supplement Feeding Modality:  Oral  Nepro Cans or Servings Per Day:  2       Frequency:  Daily  Entered: Jun 21 2023 10:19AM  
This patient has been assessed with a concern for Malnutrition and has been determined to have a diagnosis/diagnoses of Severe protein-calorie malnutrition and Underweight (BMI < 19).    This patient is being managed with:   Diet NPO after Midnight-     NPO Start Date: 04-Jun-2023   NPO Start Time: 23:59  Except Medications  Entered: Jun 4 2023  1:08PM    Diet Regular-  1500mL Fluid Restriction ([id number])  Low Sodium  Supplement Feeding Modality:  Oral  Ensure Plus High Protein Cans or Servings Per Day:  2       Frequency:  Daily  Entered: May 22 2023  4:03PM  
This patient has been assessed with a concern for Malnutrition and has been determined to have a diagnosis/diagnoses of Severe protein-calorie malnutrition and Underweight (BMI < 19).    This patient is being managed with:   Diet Consistent Carbohydrate Renal w/Evening Snack-  Supplement Feeding Modality:  Oral  Nepro Cans or Servings Per Day:  1       Frequency:  Two Times a day  Entered: Jun 13 2023 12:22PM  
This patient has been assessed with a concern for Malnutrition and has been determined to have a diagnosis/diagnoses of Severe protein-calorie malnutrition and Underweight (BMI < 19).    This patient is being managed with:   Diet NPO after Midnight-     NPO Start Date: 04-Jun-2023   NPO Start Time: 23:59  Except Medications  Entered: Jun 4 2023  1:08PM    Diet Regular-  1500mL Fluid Restriction ([id number])  Low Sodium  Supplement Feeding Modality:  Oral  Ensure Plus High Protein Cans or Servings Per Day:  2       Frequency:  Daily  Entered: May 22 2023  4:03PM  
This patient has been assessed with a concern for Malnutrition and has been determined to have a diagnosis/diagnoses of Severe protein-calorie malnutrition and Underweight (BMI < 19).    This patient is being managed with:   Diet NPO after Midnight-     NPO Start Date: 04-Jun-2023   NPO Start Time: 23:59  Except Medications  Entered: Jun 4 2023  1:08PM    Diet Regular-  1500mL Fluid Restriction ([id number])  Low Sodium  Supplement Feeding Modality:  Oral  Ensure Plus High Protein Cans or Servings Per Day:  2       Frequency:  Daily  Entered: May 22 2023  4:03PM  
This patient has been assessed with a concern for Malnutrition and has been determined to have a diagnosis/diagnoses of Severe protein-calorie malnutrition and Underweight (BMI < 19).    This patient is being managed with:   Diet NPO-  Except Medications  With Ice Chips/Sips of Water  Entered: Jun 11 2023 11:30AM  
This patient has been assessed with a concern for Malnutrition and has been determined to have a diagnosis/diagnoses of Severe protein-calorie malnutrition and Underweight (BMI < 19).    This patient is being managed with:   Diet Regular-  1500mL Fluid Restriction ([id number])  Low Sodium  Supplement Feeding Modality:  Oral  Ensure Plus High Protein Cans or Servings Per Day:  2       Frequency:  Daily  Entered: May 22 2023  4:03PM  
This patient has been assessed with a concern for Malnutrition and has been determined to have a diagnosis/diagnoses of Severe protein-calorie malnutrition and Underweight (BMI < 19).    This patient is being managed with:   Diet Regular-  1500mL Fluid Restriction ([id number])  Low Sodium  Supplement Feeding Modality:  Oral  Ensure Plus High Protein Cans or Servings Per Day:  2       Frequency:  Daily  Entered: May 22 2023  4:03PM  
This patient has been assessed with a concern for Malnutrition and has been determined to have a diagnosis/diagnoses of Severe protein-calorie malnutrition and Underweight (BMI < 19).    This patient is being managed with:   Diet Clear Liquid-  Consistent Carbohydrate {Evening Snack} (CSTCHOSN)  Entered: Jun 12 2023 11:42AM  
This patient has been assessed with a concern for Malnutrition and has been determined to have a diagnosis/diagnoses of Severe protein-calorie malnutrition and Underweight (BMI < 19).    This patient is being managed with:   Diet Consistent Carbohydrate Renal w/Evening Snack-  Supplement Feeding Modality:  Oral  Nepro Cans or Servings Per Day:  1       Frequency:  Two Times a day  Entered: Jun 13 2023 12:22PM  
This patient has been assessed with a concern for Malnutrition and has been determined to have a diagnosis/diagnoses of Severe protein-calorie malnutrition and Underweight (BMI < 19).    This patient is being managed with:   Diet Consistent Carbohydrate Renal w/Evening Snack-  Supplement Feeding Modality:  Oral  Nepro Cans or Servings Per Day:  1       Frequency:  Two Times a day  Entered: Jun 13 2023 12:22PM  
This patient has been assessed with a concern for Malnutrition and has been determined to have a diagnosis/diagnoses of Severe protein-calorie malnutrition and Underweight (BMI < 19).    This patient is being managed with:   Diet Consistent Carbohydrate Renal w/Evening Snack-  Supplement Feeding Modality:  Oral  Nepro Cans or Servings Per Day:  1       Frequency:  Two Times a day  Entered: Jun 13 2023 12:22PM    The following pending diet order is being considered for treatment of Severe protein-calorie malnutrition and Underweight (BMI < 19):  Diet Regular-  Supplement Feeding Modality:  Oral  Nepro Cans or Servings Per Day:  2       Frequency:  Daily  Entered: Jun 21 2023 10:19AM  
This patient has been assessed with a concern for Malnutrition and has been determined to have a diagnosis/diagnoses of Severe protein-calorie malnutrition and Underweight (BMI < 19).    This patient is being managed with:   Diet NPO after Midnight-     NPO Start Date: 04-Jun-2023   NPO Start Time: 23:59  Except Medications  Entered: Jun 4 2023  1:08PM    Diet Regular-  1500mL Fluid Restriction ([id number])  Low Sodium  Supplement Feeding Modality:  Oral  Ensure Plus High Protein Cans or Servings Per Day:  2       Frequency:  Daily  Entered: May 22 2023  4:03PM  
This patient has been assessed with a concern for Malnutrition and has been determined to have a diagnosis/diagnoses of Severe protein-calorie malnutrition and Underweight (BMI < 19).    This patient is being managed with:   Diet NPO after Midnight-     NPO Start Date: 04-Jun-2023   NPO Start Time: 23:59  Except Medications  Entered: Jun 4 2023  1:08PM    Diet Regular-  1500mL Fluid Restriction ([id number])  Low Sodium  Supplement Feeding Modality:  Oral  Ensure Plus High Protein Cans or Servings Per Day:  2       Frequency:  Daily  Entered: May 22 2023  4:03PM  
This patient has been assessed with a concern for Malnutrition and has been determined to have a diagnosis/diagnoses of Severe protein-calorie malnutrition and Underweight (BMI < 19).    This patient is being managed with:   Diet NPO-  Entered: Sukhjinder 10 2023  4:25AM  
This patient has been assessed with a concern for Malnutrition and has been determined to have a diagnosis/diagnoses of Severe protein-calorie malnutrition and Underweight (BMI < 19).    This patient is being managed with:   Diet Regular-  1500mL Fluid Restriction ([id number])  Low Sodium  Supplement Feeding Modality:  Oral  Ensure Plus High Protein Cans or Servings Per Day:  2       Frequency:  Daily  Entered: May 22 2023  4:03PM  
This patient has been assessed with a concern for Malnutrition and has been determined to have a diagnosis/diagnoses of Severe protein-calorie malnutrition and Underweight (BMI < 19).    This patient is being managed with:   Diet Regular-  1500mL Fluid Restriction ([id number])  Low Sodium  Supplement Feeding Modality:  Oral  Ensure Plus High Protein Cans or Servings Per Day:  2       Frequency:  Daily  Entered: May 22 2023  4:03PM  
This patient has been assessed with a concern for Malnutrition and has been determined to have a diagnosis/diagnoses of Severe protein-calorie malnutrition and Underweight (BMI < 19).    This patient is being managed with:   Diet Consistent Carbohydrate Renal w/Evening Snack-  Supplement Feeding Modality:  Oral  Nepro Cans or Servings Per Day:  1       Frequency:  Two Times a day  Entered: Jun 13 2023 12:22PM  
This patient has been assessed with a concern for Malnutrition and has been determined to have a diagnosis/diagnoses of Severe protein-calorie malnutrition and Underweight (BMI < 19).    This patient is being managed with:   Diet NPO-  NPO for Procedure/Test     NPO Start Date: 22-May-2023   NPO Start Time: 23:59  Except Medications  Entered: May 19 2023  6:20PM    Diet Regular-  Supplement Feeding Modality:  Oral  Ensure Plus High Protein Cans or Servings Per Day:  2       Frequency:  Daily  Entered: May 18 2023 12:49PM  
This patient has been assessed with a concern for Malnutrition and has been determined to have a diagnosis/diagnoses of Severe protein-calorie malnutrition and Underweight (BMI < 19).    This patient is being managed with:   Diet NPO after Midnight-     NPO Start Date: 24-May-2023   NPO Start Time: 23:59  Entered: May 24 2023  1:12PM    Diet Regular-  1500mL Fluid Restriction ([id number])  Low Sodium  Supplement Feeding Modality:  Oral  Ensure Plus High Protein Cans or Servings Per Day:  2       Frequency:  Daily  Entered: May 22 2023  4:03PM  
This patient has been assessed with a concern for Malnutrition and has been determined to have a diagnosis/diagnoses of Severe protein-calorie malnutrition and Underweight (BMI < 19).    This patient is being managed with:   Diet NPO-  NPO for Procedure/Test     NPO Start Date: 22-May-2023   NPO Start Time: 23:59  Except Medications  Entered: May 19 2023  6:20PM    Diet Regular-  Supplement Feeding Modality:  Oral  Ensure Plus High Protein Cans or Servings Per Day:  2       Frequency:  Daily  Entered: May 18 2023 12:49PM  
This patient has been assessed with a concern for Malnutrition and has been determined to have a diagnosis/diagnoses of Severe protein-calorie malnutrition and Underweight (BMI < 19).    This patient is being managed with:   Diet NPO-  NPO for Procedure/Test     NPO Start Date: 22-May-2023   NPO Start Time: 23:59  Except Medications  Entered: May 19 2023  6:20PM    Diet Regular-  Supplement Feeding Modality:  Oral  Ensure Plus High Protein Cans or Servings Per Day:  2       Frequency:  Daily  Entered: May 18 2023 12:49PM  
This patient has been assessed with a concern for Malnutrition and has been determined to have a diagnosis/diagnoses of Severe protein-calorie malnutrition and Underweight (BMI < 19).    This patient is being managed with:   Diet Regular-  1500mL Fluid Restriction ([id number])  Low Sodium  Supplement Feeding Modality:  Oral  Ensure Plus High Protein Cans or Servings Per Day:  2       Frequency:  Daily  Entered: May 22 2023  4:03PM  
This patient has been assessed with a concern for Malnutrition and has been determined to have a diagnosis/diagnoses of Severe protein-calorie malnutrition and Underweight (BMI < 19).    This patient is being managed with:   Diet Regular-  1500mL Fluid Restriction ([id number])  Low Sodium  Supplement Feeding Modality:  Oral  Ensure Plus High Protein Cans or Servings Per Day:  2       Frequency:  Daily  Entered: May 22 2023  4:03PM  
This patient has been assessed with a concern for Malnutrition and has been determined to have a diagnosis/diagnoses of Severe protein-calorie malnutrition and Underweight (BMI < 19).    This patient is being managed with:   Diet Regular-  1500mL Fluid Restriction ([id number])  Low Sodium  Supplement Feeding Modality:  Oral  Ensure Plus High Protein Cans or Servings Per Day:  2       Frequency:  Daily  Entered: May 22 2023  4:03PM

## 2023-06-26 NOTE — DISCHARGE NOTE PROVIDER - HOSPITAL COURSE
38F with Hx of hypothyroidism (on Levothyroxine), liver cirrhosis 2/2 ETOH abuse---->  presented to [facility] ED on 5/13/23 with ~ 2 weeks Hx of progressively worsening abdominal pain and distention.  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      Now s/p HCV+ OLT under Simulect induction on 6/10/2023  Post op course c/b VRE bacteremia on antibiotics per ID will be DC home on zyvox ending 7/2/2023    Did well from surgical perspective. Tolerated PO, ambulated, had bowel function. passed TOV after [place] removal. Excellent graft function good flow on doppler. Completed Simulect    Was evaluated daily by our multidisciplinary transplant team of surgeons, hepatologists, pharmacists, ACPs, RNs, Nutrition and deemed safe for d/c home with the following plan:  -Tacrolimus 0.5mg bid, MMF 500mg BID, Pred 20mg QD  -standard PPx Fluconazole/Bactrim/Valcyte  -Zyvox 600mg PO BID till 7/2/23  -RPP rehab basil for 7/3/23 7pm  -f/u with surgeon Dr. Wolfe on Thursday 6/29/23  -f/u with ID in one week   38F with Hx of hypothyroidism (on Levothyroxine), liver cirrhosis 2/2 ETOH abuse---->  presented to [facility] ED on 5/13/23 with ~ 2 weeks Hx of progressively worsening abdominal pain and distention.  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      Now s/p HCV+ OLT under Simulect induction on 6/10/2023  Post op course c/b VRE bacteremia on antibiotics per ID will be DC home on zyvox ending 7/2/2023    Did well from surgical perspective. Tolerated PO, ambulated, had bowel function. passed TOV after [place] removal. Excellent graft function good flow on doppler. Completed Simulect    Was evaluated daily by our multidisciplinary transplant team of surgeons, hepatologists, pharmacists, ACPs, RNs, Nutrition and deemed safe for d/c home with the following plan:  -Tacrolimus 0.5mg bid, MMF 500mg BID, Pred 20mg QD  -standard PPx Fluconazole/Bactrim/Valcyte  -Zyvox 600mg PO BID till 7/2/23  -RPP rehab basil for 7/3/23 7pm  -f/u with surgeon Dr. Wolfe on Wednesday 6/28/23  -f/u with ID in one week

## 2023-06-26 NOTE — PROGRESS NOTE ADULT - ASSESSMENT
39 yo F with hypothyroidism (on Levothyroxine) who initially presented to [facility] on 5/13 with abd pain. 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;  MSSA bacteremia secondary to SSTI and Organizing pneumonia of undetermined etiology now s/p OLT 6/9/23  S/p OLT6/9/23  CMV D+/R+, EBV D+/R+, Toxoplasma D?/R-  No fever, has rising WBC  5/13 2/2 BC pos MSSA (s/p treatment course)  6/8 ascitic fluid cx P; cellk  count; 36 cells    BCx (6/17, 6/18) VRE  Abdominal Wall hematoma I&D Culture (6/18) Enterococcus faecium (Non-VRE)  BCx (6/19, 6/20) NGTD  UCx (6/20) No Growth  UCx (6/21) No Growth    TTE (6/20) No evidence of vegetations    CT A/P (6/18) Diaphragmatic collection, bilobed fluid collection along the inferior right hepatic lobe, measuring 7.8 x 2.9 cm, Additional complex fluid in the region of the gallbladder, Hyperdense material in the left abdominal wall with foci of air, measuring 5.9 x 1.7 cm possibly hemorrhage/packing material.     CT A/P (6/23) Status post liver transplant with stable perihepatic hematomas. No abdominal wall collection. Hyperdensity in the haider hepatis along the portal vein    Abdominal US Doppler (6/23) No evidence of portal or hepatic vein thrombosis.  No evidence of hepatic artery stenosis.    #Leukocytosis, Diarrhea  Diarrhea and leukocytosis spontaneously resolving  --Continue to monitor    #VRE Bacteremia, Abdominal Fluid Collections  --Continue Dapto 400mg q 24 (monitor CrCl, check creatine kinase weekly, next on 6/26), for d/c purposes would utilize Linezolid 600 mg Q12H (tentative end date: 7/2/23)  --After antibiotic completion will need to monitor closely as unclear if deeper hematomas by the liver are involved with infection.   --Continue to follow CBC with diff  --Continue to follow temperature curve  --Follow up on preliminary blood cultures    #CMV Viremia, OLT Recipient, Prophylactic Antibiotic\  CMV PCR (6/20) 1,860   --Continue Valcyte 450 mg PO Q24H  --Check repeat CMV PCR on 6/27  --Continue Bactrim SS Q24H for PCP PPx  --Continue Fluconazole 200 mg PO Q24H for Fungal PPx per protocol    I will follow the patient as needed. Please feel free to contact me with any further questions or concerns.    Irving Deras M.D.  [facility] Division of Infectious Disease  8AM-5PM Monday - Friday: Available on [employer] Teams  After Hours and Holidays (or if no response on [employer] Teams): Please contact the Infectious Diseases Office at [phone]     The above assessment and plan were discussed with Ismael, transplant surgery NP No

## 2023-06-26 NOTE — DISCHARGE NOTE NURSING/CASE MANAGEMENT/SOCIAL WORK - NSDCPEFALRISK_GEN_ALL_CORE
For information on Fall & Injury Prevention, visit: [email or web address] OR  [email or web address] OR  https://www.cdc.gov/steadi/patient.html

## 2023-06-26 NOTE — DISCHARGE NOTE PROVIDER - CARE PROVIDER_API CALL
Jacquelyn Wolfe [facility]  Surgery  [address] [place] [id number]  Phone: [phone]  Fax: [phone]  Follow Up Time:

## 2023-06-26 NOTE — PROGRESS NOTE ADULT - SUBJECTIVE AND OBJECTIVE BOX
Follow Up:      Interval History:    REVIEW OF SYSTEMS  [  ] ROS unobtainable because:    [  ] All other systems negative except as noted below    Constitutional:  [ ] fever [ ] chills  [ ] weight loss  [ ] weakness  Skin:  [ ] rash [ ] phlebitis	  Eyes: [ ] icterus [ ] pain  [ ] discharge	  ENMT: [ ] sore throat  [ ] thrush [ ] ulcers [ ] exudates  Respiratory: [ ] dyspnea [ ] hemoptysis [ ] cough [ ] sputum	  Cardiovascular:  [ ] chest pain [ ] palpitations [ ] edema	  Gastrointestinal:  [ ] nausea [ ] vomiting [ ] diarrhea [ ] constipation [ ] pain	  Genitourinary:  [ ] dysuria [ ] frequency [ ] hematuria [ ] discharge [ ] flank pain  [ ] incontinence  Musculoskeletal:  [ ] myalgias [ ] arthralgias [ ] arthritis  [ ] back pain  Neurological:  [ ] headache [ ] seizures  [ ] confusion/altered mental status    Allergies  No Known Allergies        ANTIMICROBIALS:  DAPTOmycin IVPB 400 every 24 hours  fluconAZOLE   Tablet 200 daily  sofosbuvir 400 mG/velpatasvir 100 mG (EPCLUSA) 1 <User Schedule>  trimethoprim   80 mG/sulfamethoxazole 400 mG 1 daily  valGANciclovir 450 daily      OTHER MEDS:  MEDICATIONS  (STANDING):  aspirin enteric coated 81 daily  heparin   Injectable 5000 every 12 hours  levothyroxine 100 daily  mycophenolate mofetil 500 <User Schedule>  pantoprazole    Tablet 40 before breakfast  polyethylene glycol 3350 17 daily  predniSONE   Tablet 20 daily  senna 2 at bedtime  tacrolimus 0.5 <User Schedule>  traMADol 25 every 6 hours PRN  ursodiol Capsule 300 every 12 hours      Vital Signs Last 24 Hrs  T(C): 36.6 (26 Jun 2023 13:00), Max: 36.8 (25 Jun 2023 21:33)  T(F): 97.8 (26 Jun 2023 13:00), Max: 98.3 (25 Jun 2023 21:33)  HR: 70 (26 Jun 2023 13:00) (61 - 70)  BP: 149/85 (26 Jun 2023 13:00) (125/84 - 154/91)  BP(mean): --  RR: 18 (26 Jun 2023 13:00) (18 - 18)  SpO2: 98% (26 Jun 2023 13:00) (98% - 100%)    Parameters below as of 26 Jun 2023 13:00  Patient On (Oxygen Delivery Method): room air        PHYSICAL EXAMINATION:  General: Alert and Awake, NAD  HEENT: PERRL, EOMI  Neck: Supple  Cardiac: RRR, No M/R/G  Resp: CTAB, No Wh/Rh/Ra  Abdomen: NBS, NT/ND, No HSM, No rigidity or guarding  MSK: No LE edema. No Calf tenderness  : No [place]  Skin: No rashes or lesions. Skin is warm and dry to the touch.   Neuro: Alert and Awake. CN 2-12 Grossly intact. Moves all four extremities spontaneously.  Psych: Calm, Pleasant, Cooperative                          9.2    10.62 )-----------( 452      ( 26 Jun 2023 06:54 )             30.1       06-26    135  |  104  |  11  ----------------------------<  71  4.4   |  18<L>  |  1.18    Ca    8.5      26 Jun 2023 06:58  Phos  2.5     06-26  Mg     1.5     06-26    TPro  4.9<L>  /  Alb  2.7<L>  /  TBili  1.7<H>  /  DBili  x   /  AST  10  /  ALT  5<L>  /  AlkPhos  71  06-26      Urinalysis Basic - ( 26 Jun 2023 06:58 )    Color: x / Appearance: x / SG: x / pH: x  Gluc: 71 mg/dL / Ketone: x  / Bili: x / Urobili: x   Blood: x / Protein: x / Nitrite: x   Leuk Esterase: x / RBC: x / WBC x   Sq Epi: x / Non Sq Epi: x / Bacteria: x        MICROBIOLOGY:  v  .Blood Blood  06-25-23   No growth to date.  --  --      .Blood Blood  06-25-23   No growth to date.  --  --      Clean Catch Clean Catch (Midstream)  06-21-23   No growth  --  --      .Blood Blood-Peripheral  06-20-23   No Growth Final  --  --      Clean Catch Clean Catch (Midstream)  06-20-23   No growth  --  --      .Blood Blood  06-19-23   No Growth Final  --  --      .Blood Blood-Peripheral  06-18-23   Growth in anaerobic bottle: Enterococcus faecium (vancomycin resistant)  See previous culture [id number]  --    Growth in anaerobic bottle: Gram Positive Cocci in Pairs and Chains      .Blood Blood-Peripheral  06-18-23   Growth in aerobic and anaerobic bottles: Enterococcus faecium (vancomycin  resistant)  See previous culture [id number]  --    Growth in anaerobic bottle: Gram Positive Cocci in Pairs and Chains  Growth in aerobic bottle: Gram Positive Cocci in Pairs and Chains      OR Collect Bladder (from O.R.)  06-18-23   Rare Enterococcus faecium  --  Enterococcus faecium      .Blood Blood  06-17-23   No Growth Final  --  --      .Blood Blood  06-17-23   Growth in anaerobic bottle: Enterococcus faecium (vancomycin resistant)  ***Blood Panel PCR results on this specimen are available  approximately 3 hours after the Gram stain result.***  Gram stain, PCR, and/or culture results may not always  correspond due to difference in methodologies.  ************************************************************  This PCR assay was performed by multiplex PCR. This  Assay tests for 66 bacterial and resistance gene targets.  Please refer to the [facility] Labs test directory  at [email or web address] for details.  --  Blood Culture PCR  Enterococcus faecium (vancomycin resistant)      Abdominal Fl Abdominal Fluid  06-10-23   No growth at 5 days  --    No polymorphonuclear cells seen per low power field  No organisms seen per oil power field      Ascites Fl Ascites Fluid  06-08-23   No growth at 5 days  --    No polymorphonuclear cells seen  No organisms seen  by cytocentrifuge      .Blood Blood-Peripheral  06-07-23   No Growth Final  --  --      .Blood Blood  06-06-23   No Growth Final  --  --      .Bronchial Bronchoalveolar Lavage  06-05-23   No growth at 48 hours  --    polymorphonuclear leukocytes per low power field  No squamous epithelial cells per low power field  Gram Negative Rods per oil power field  by cytocentrifuge      .Bronchial Bronchial Lavage  06-05-23   No acid-fast bacilli isolated at 2 weeks.  --    Rare polymorphonuclear leukocytes per low power field  No squamous epithelial cells per low power field  No organisms seen per oil power field      .Blood Blood-Peripheral  06-02-23   No Growth Final  --  --      .Blood Blood-Peripheral  06-02-23   No Growth Final  --  --      Abdominal Fl Abdominal Fluid  05-31-23   No growth  --    polymorphonuclear leukocytes seen  No organisms seen  by cytocentrifuge      Clean Catch Clean Catch (Midstream)  05-28-23   <10,000 CFU/mL Normal Urogenital Aga  --  --      .Blood Blood-Peripheral  05-28-23   No Growth Final  --  --      .Blood Blood-Peripheral  05-28-23   No Growth Final  --  --        CMV IgG Antibody: >10.00 U/mL (06-01-23 @ 00:45)  Toxoplasma IgG Screen: <3.0 IU/mL (05-17-23 @ 07:14)  CMV IgG Antibody: >10.00 U/mL (05-17-23 @ 07:14)  Toxoplasma IgG Screen: <3.0 IU/mL (05-17-23 @ 07:14)    CMVPCR Log: 3.16 Iyu96TY/mL (06-20 @ 06:56)  CMVPCR Log: 3.27 Fcg05VF/mL (06-20 @ 06:56)        RADIOLOGY:    <The imaging below has been reviewed and visualized by me independently. Findings as detailed in report below> Follow Up:  Bacteremia    Interval History: diarrhea spontaneously resolving. afebrile.     REVIEW OF SYSTEMS  [  ] ROS unobtainable because:    [ x] All other systems negative except as noted below    Constitutional:  [ ] fever [ ] chills  [ ] weight loss  [ ] weakness  Skin:  [ ] rash [ ] phlebitis	  Eyes: [ ] icterus [ ] pain  [ ] discharge	  ENMT: [ ] sore throat  [ ] thrush [ ] ulcers [ ] exudates  Respiratory: [ ] dyspnea [ ] hemoptysis [ ] cough [ ] sputum	  Cardiovascular:  [ ] chest pain [ ] palpitations [ ] edema	  Gastrointestinal:  [ ] nausea [ ] vomiting [ x] diarrhea [ ] constipation [ x pain	  Genitourinary:  [ ] dysuria [ ] frequency [ ] hematuria [ ] discharge [ ] flank pain  [ ] incontinence  Musculoskeletal:  [ ] myalgias [ ] arthralgias [ ] arthritis  [ ] back pain  Neurological:  [ ] headache [ ] seizures  [ ] confusion/altered mental status    Allergies  No Known Allergies        ANTIMICROBIALS:  DAPTOmycin IVPB 400 every 24 hours  fluconAZOLE   Tablet 200 daily  sofosbuvir 400 mG/velpatasvir 100 mG (EPCLUSA) 1 <User Schedule>  trimethoprim   80 mG/sulfamethoxazole 400 mG 1 daily  valGANciclovir 450 daily      OTHER MEDS:  MEDICATIONS  (STANDING):  aspirin enteric coated 81 daily  heparin   Injectable 5000 every 12 hours  levothyroxine 100 daily  mycophenolate mofetil 500 <User Schedule>  pantoprazole    Tablet 40 before breakfast  polyethylene glycol 3350 17 daily  predniSONE   Tablet 20 daily  senna 2 at bedtime  tacrolimus 0.5 <User Schedule>  traMADol 25 every 6 hours PRN  ursodiol Capsule 300 every 12 hours      Vital Signs Last 24 Hrs  T(C): 36.6 (26 Jun 2023 13:00), Max: 36.8 (25 Jun 2023 21:33)  T(F): 97.8 (26 Jun 2023 13:00), Max: 98.3 (25 Jun 2023 21:33)  HR: 70 (26 Jun 2023 13:00) (61 - 70)  BP: 149/85 (26 Jun 2023 13:00) (125/84 - 154/91)  BP(mean): --  RR: 18 (26 Jun 2023 13:00) (18 - 18)  SpO2: 98% (26 Jun 2023 13:00) (98% - 100%)    Parameters below as of 26 Jun 2023 13:00  Patient On (Oxygen Delivery Method): room air    PHYSICAL EXAMINATION:  General: Alert and Awake, NAD  HEENT: Normocephalic / Atraumatic  Resp: No accessory muscles of respiration utilized  Abdomen: Not distended.  MSK: No LE edema.   : No [place]  Skin: No rashes or lesions.    Neuro: Alert and Awake. CN 2-12 Grossly intact. Moves all four extremities spontaneously.  Psych: Calm, Pleasant, Cooperative                        9.2    10.62 )-----------( 452      ( 26 Jun 2023 06:54 )             30.1       06-26    135  |  104  |  11  ----------------------------<  71  4.4   |  18<L>  |  1.18    Ca    8.5      26 Jun 2023 06:58  Phos  2.5     06-26  Mg     1.5     06-26    TPro  4.9<L>  /  Alb  2.7<L>  /  TBili  1.7<H>  /  DBili  x   /  AST  10  /  ALT  5<L>  /  AlkPhos  71  06-26      Urinalysis Basic - ( 26 Jun 2023 06:58 )    Color: x / Appearance: x / SG: x / pH: x  Gluc: 71 mg/dL / Ketone: x  / Bili: x / Urobili: x   Blood: x / Protein: x / Nitrite: x   Leuk Esterase: x / RBC: x / WBC x   Sq Epi: x / Non Sq Epi: x / Bacteria: x        MICROBIOLOGY:  v  .Blood Blood  06-25-23   No growth to date.  --  --      .Blood Blood  06-25-23   No growth to date.  --  --      Clean Catch Clean Catch (Midstream)  06-21-23   No growth  --  --      .Blood Blood-Peripheral  06-20-23   No Growth Final  --  --      Clean Catch Clean Catch (Midstream)  06-20-23   No growth  --  --      .Blood Blood  06-19-23   No Growth Final  --  --      .Blood Blood-Peripheral  06-18-23   Growth in anaerobic bottle: Enterococcus faecium (vancomycin resistant)  See previous culture [id number]  --    Growth in anaerobic bottle: Gram Positive Cocci in Pairs and Chains      .Blood Blood-Peripheral  06-18-23   Growth in aerobic and anaerobic bottles: Enterococcus faecium (vancomycin  resistant)  See previous culture [id number]  --    Growth in anaerobic bottle: Gram Positive Cocci in Pairs and Chains  Growth in aerobic bottle: Gram Positive Cocci in Pairs and Chains      OR Collect Bladder (from O.R.)  06-18-23   Rare Enterococcus faecium  --  Enterococcus faecium      .Blood Blood  06-17-23   No Growth Final  --  --      .Blood Blood  06-17-23   Growth in anaerobic bottle: Enterococcus faecium (vancomycin resistant)  ***Blood Panel PCR results on this specimen are available  approximately 3 hours after the Gram stain result.***  Gram stain, PCR, and/or culture results may not always  correspond due to difference in methodologies.  ************************************************************  This PCR assay was performed by multiplex PCR. This  Assay tests for 66 bacterial and resistance gene targets.  Please refer to the [facility] Labs test directory  at [email or web address] for details.  --  Blood Culture PCR  Enterococcus faecium (vancomycin resistant)      Abdominal Fl Abdominal Fluid  06-10-23   No growth at 5 days  --    No polymorphonuclear cells seen per low power field  No organisms seen per oil power field      Ascites Fl Ascites Fluid  06-08-23   No growth at 5 days  --    No polymorphonuclear cells seen  No organisms seen  by cytocentrifuge      .Blood Blood-Peripheral  06-07-23   No Growth Final  --  --      .Blood Blood  06-06-23   No Growth Final  --  --      .Bronchial Bronchoalveolar Lavage  06-05-23   No growth at 48 hours  --    polymorphonuclear leukocytes per low power field  No squamous epithelial cells per low power field  Gram Negative Rods per oil power field  by cytocentrifuge      .Bronchial Bronchial Lavage  06-05-23   No acid-fast bacilli isolated at 2 weeks.  --    Rare polymorphonuclear leukocytes per low power field  No squamous epithelial cells per low power field  No organisms seen per oil power field      .Blood Blood-Peripheral  06-02-23   No Growth Final  --  --      .Blood Blood-Peripheral  06-02-23   No Growth Final  --  --      Abdominal Fl Abdominal Fluid  05-31-23   No growth  --    polymorphonuclear leukocytes seen  No organisms seen  by cytocentrifuge      Clean Catch Clean Catch (Midstream)  05-28-23   <10,000 CFU/mL Normal Urogenital Aga  --  --      .Blood Blood-Peripheral  05-28-23   No Growth Final  --  --      .Blood Blood-Peripheral  05-28-23   No Growth Final  --  --        CMV IgG Antibody: >10.00 U/mL (06-01-23 @ 00:45)  Toxoplasma IgG Screen: <3.0 IU/mL (05-17-23 @ 07:14)  CMV IgG Antibody: >10.00 U/mL (05-17-23 @ 07:14)  Toxoplasma IgG Screen: <3.0 IU/mL (05-17-23 @ 07:14)    CMVPCR Log: 3.16 Gxr62NK/mL (06-20 @ 06:56)  CMVPCR Log: 3.27 Wax58KS/mL (06-20 @ 06:56)        RADIOLOGY:    <The imaging below has been reviewed and visualized by me independently. Findings as detailed in report below>    < from: Xray Chest 1 View- PORTABLE-Urgent (Xray Chest 1 View- PORTABLE-Urgent .) (06.25.23 @ 13:53) >  IMPRESSION:  Trace bilateral pleural effusions and bibasilar atelectasis    < end of copied text >

## 2023-06-26 NOTE — DISCHARGE NOTE NURSING/CASE MANAGEMENT/SOCIAL WORK - NSDCPEEMAIL_GEN_ALL_CORE
[facility] for Tobacco Control email [email or web address]

## 2023-06-26 NOTE — PROGRESS NOTE ADULT - SUBJECTIVE AND OBJECTIVE BOX
Transplant Surgery Multidisciplinary Note  --------------------------------------------------------------  HCV+ OLT    Date: 6/9/23       POD#17    Present:   Patient seen and examined with multidisciplinary Transplant team including  Surgeon: Dr. Dagher, Dr Noel, PGY 3 Dr. Suazo. Hepatologist: Dr. Taylor, Hepatology Fellow Dr. Ryan CLAY NP Shayne and bedside RN during AM rounds.   Disciplines not in attendance will be notified of the plan.      HPI: 38F with Hx of hypothyroidism (on Levothyroxine), liver cirrhosis 2/2 ETOH abuse->  presented to [facility] ED on 5/13/23 with ~ 2 weeks Hx of progressively worsening abdominal pain and distention.  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      Now s/p HCV+ OLT under Simulect induction on 6/10/2023  Post op course c/b VRE bacteremia    Interval Events:  - POD 17 s/p OLT  - no acute overnight events  - valcyte/bactrim daily (renally dosed)  - rising WBC cultures repeated    Immunosuppression:  Induction: Simulect completed  Maint:  FK by level, MMF 500mg bid, SST  ongoing monitoring for signs of rejection    Potential Discharge date: TBD  Education:  Medications  Plan of care:  See Below  MEDICATIONS  (STANDING):  aspirin enteric coated 81 milliGRAM(s) Oral daily  calcium carbonate 1250 mG  + Vitamin D (OsCal 500 + D) 1 Tablet(s) Oral two times a day  chlorhexidine 2% Cloths 1 Application(s) Topical <User Schedule>  DAPTOmycin IVPB 400 milliGRAM(s) IV Intermittent every 24 hours  fluconAZOLE   Tablet 200 milliGRAM(s) Oral daily  heparin   Injectable 5000 Unit(s) SubCutaneous every 12 hours  levothyroxine 100 MICROGram(s) Oral daily  lidocaine   4% Patch 1 Patch Transdermal every 24 hours  magnesium oxide 400 milliGRAM(s) Oral three times a day with meals  mycophenolate mofetil 500 milliGRAM(s) Oral <User Schedule>  pantoprazole    Tablet 40 milliGRAM(s) Oral before breakfast  polyethylene glycol 3350 17 Gram(s) Oral daily  predniSONE   Tablet 20 milliGRAM(s) Oral daily  senna 2 Tablet(s) Oral at bedtime  sofosbuvir 400 mG/velpatasvir 100 mG (EPCLUSA) 1 Tablet(s) Oral <User Schedule>  tacrolimus 0.5 milliGRAM(s) Oral <User Schedule>  trimethoprim   80 mG/sulfamethoxazole 400 mG 1 Tablet(s) Oral daily  ursodiol Capsule 300 milliGRAM(s) Oral every 12 hours  valGANciclovir 450 milliGRAM(s) Oral daily    MEDICATIONS  (PRN):  Salonpas 1 Patch 1 Patch Topical every 8 hours PRN Pain  traMADol 25 milliGRAM(s) Oral every 6 hours PRN Severe Pain (7 - 10)      PAST MEDICAL & SURGICAL HISTORY:  Hypothyroidism      Alcoholic liver disease      No significant past surgical history          Vital Signs Last 24 Hrs  T(C): 36.6 (26 Jun 2023 13:00), Max: 36.8 (25 Jun 2023 21:33)  T(F): 97.8 (26 Jun 2023 13:00), Max: 98.3 (25 Jun 2023 21:33)  HR: 70 (26 Jun 2023 13:00) (61 - 70)  BP: 149/85 (26 Jun 2023 13:00) (125/84 - 154/91)  BP(mean): --  RR: 18 (26 Jun 2023 13:00) (18 - 18)  SpO2: 98% (26 Jun 2023 13:00) (98% - 100%)    Parameters below as of 26 Jun 2023 13:00  Patient On (Oxygen Delivery Method): room air        I&O's Summary    25 Jun 2023 07:01  -  26 Jun 2023 07:00  --------------------------------------------------------  IN: 1200 mL / OUT: 600 mL / NET: 600 mL    26 Jun 2023 07:01  -  26 Jun 2023 17:21  --------------------------------------------------------  IN: 580 mL / OUT: 450 mL / NET: 130 mL                              9.2    10.62 )-----------( 452      ( 26 Jun 2023 06:54 )             30.1     06-26    135  |  104  |  11  ----------------------------<  71  4.4   |  18<L>  |  1.18    Ca    8.5      26 Jun 2023 06:58  Phos  2.5     06-26  Mg     1.5     06-26    TPro  4.9<L>  /  Alb  2.7<L>  /  TBili  1.7<H>  /  DBili  x   /  AST  10  /  ALT  5<L>  /  AlkPhos  71  06-26    Tacrolimus (), Serum: 5.2 ng/mL (06-26 @ 06:57)        Culture - Blood (collected 06-25-23 @ 12:50)  Source: .Blood Blood  Preliminary Report (06-26-23 @ 15:02):    No growth to date.    Culture - Blood (collected 06-25-23 @ 11:36)  Source: .Blood Blood  Preliminary Report (06-26-23 @ 15:02):    No growth to date.    Culture - Urine (collected 06-21-23 @ 10:08)  Source: Clean Catch Clean Catch (Midstream)  Final Report (06-22-23 @ 23:21):    No growth    Culture - Blood (collected 06-20-23 @ 06:56)  Source: .Blood Blood-Peripheral  Final Report (06-25-23 @ 12:00):    No Growth Final    Culture - Blood (collected 06-20-23 @ 06:56)  Source: .Blood Blood-Peripheral  Final Report (06-25-23 @ 12:00):    No Growth Final    Culture - Urine (collected 06-20-23 @ 06:43)  Source: Clean Catch Clean Catch (Midstream)  Final Report (06-21-23 @ 09:37):    No growth         Review of systems  All other systems were reviewed and are negative, except as noted.      PHYSICAL EXAM:  Constitutional: NAD  Eyes: anicteric, PERRLA  ENMT: nc/at, no thrush  Neck: supple, no lines. prior sites c/d/i.    Respiratory: CTA B/L  Cardiovascular: RRR  Gastrointestinal: Soft abdomen, ND, appropriate incisional TTP. chevron incision with stable ecchymosis. No signs of infection.   Genitourinary: voiding  Extremities: SCD's in place and working bilaterally  Vascular: Palpable dp pulses bilaterally.   Neurological: A&O x3  Skin: no rashes, ulcerations, lesions  Musculoskeletal: Moving all extremities  Psychiatric: Responsive

## 2023-06-26 NOTE — DISCHARGE NOTE PROVIDER - NSDCFUADDAPPT_GEN_ALL_CORE_FT
FOLLOW-UP:  -Please call to make a follow-up appointment with Dr. Wolfe on Thursday 6/29/23  -[facility] rehab basil for 7/3/23 7pm  -f/u with ID in one week   FOLLOW-UP:  -Please call to make a follow-up appointment with Dr. Wolfe on Wednesday 6/28/23  -[facility] re[facility] for 7/3/23 7pm  -f/u with ID in one week

## 2023-06-26 NOTE — DISCHARGE NOTE NURSING/CASE MANAGEMENT/SOCIAL WORK - PATIENT PORTAL LINK FT
You can access the FollowMyHealth Patient Portal offered by [facility] by registering at the following website: [email or web address]. By joining [facility]’s FollowMyHealth portal, you will also be able to view your health information using other applications (apps) compatible with our system.

## 2023-06-26 NOTE — PROGRESS NOTE ADULT - REASON FOR ADMISSION
Liver Failure

## 2023-06-26 NOTE — DISCHARGE NOTE NURSING/CASE MANAGEMENT/SOCIAL WORK - NSDCFUADDAPPT_GEN_ALL_CORE_FT
FOLLOW-UP:  -Please call to make a follow-up appointment with Dr. Wolfe on Wednesday 6/28/23  -[facility] re[facility] for 7/3/23 7pm  -f/u with ID in one week

## 2023-06-26 NOTE — DISCHARGE NOTE NURSING/CASE MANAGEMENT/SOCIAL WORK - NSDCPEWEB_GEN_ALL_CORE
[facility] for Tobacco Control website --- [email or web address] website --- [email or web address]

## 2023-06-26 NOTE — DISCHARGE NOTE PROVIDER - NSDCCPCAREPLAN_GEN_ALL_CORE_FT
PRINCIPAL DISCHARGE DIAGNOSIS  Diagnosis: S/P liver transplant  Assessment and Plan of Treatment: No heavy lifting anything more than 10-15lbs or straining. Otherwise, you may return to your usual level of physical activity. If you are taking narcotic pain medication (such as Percocet), do NOT drive a car, operate machinery or make important decisions.  Call trnansplant clinic If you developed any of the following, fever, pain, redness, swelling at incision site, cough, nausea, vomiting, painful urination, difficulty urination, or not making any urine.  NOTIFY YOUR SURGEON IF: You have any bleeding that does not stop, any pus draining from your wound, any fever (over 100.4 F) or chills, persistent nausea/vomiting with inability to tolerate food or liquids, persistent diarrhea, or if your pain is not controlled on your discharge pain medications.  Immunosuppression:   Keep away from people who have cough, cold, and symptom of flu.  Only take medications that are on your discharge list  If you missed your medications call the transplant office, do not double up medication because you missed a dose.  If you have any question regarding your medication please call transplant office.      SECONDARY DISCHARGE DIAGNOSES  Diagnosis: VRE bacteremia  Assessment and Plan of Treatment: Take all antibiotics as ordered.  Call you Health care provider upon arrival home to make a one week follow up appointment.  If you develop fever, chills, malaise, or change in mental status call your Health Care Provider or go to the Emergency Department.  Nutrition is important, eat small frequent meals to help ensure you get adequate calories.  Do not stay in bed all day!  Increase your activity daily as tolerated.

## 2023-06-26 NOTE — DISCHARGE NOTE PROVIDER - NSDCMRMEDTOKEN_GEN_ALL_CORE_FT
acetylcysteine 20% intravenous solution: 40 milliliter(s) intravenous every 24 hours  cefTRIAXone: 2 gram(s) intravenously once a day  Constulose 10 g/15 mL oral liquid: 30 milliliter(s) orally 3 times a day  Epclusa 400 mg-100 mg oral tablet: 1 orally once a day  folic acid 1 mg oral tablet: 1 tab(s) orally once a day  guaiFENesin 100 mg/5 mL oral liquid: 5 milliliter(s) orally every 6 hours As needed Cough  levothyroxine 100 mcg (0.1 mg) oral tablet: 1 tab(s) orally once a day  LORazepam: 1 milliliter(s) intravenous every 4 hours as needed for Anxiety  morphine 15 mg oral tablet: 1 tab(s) orally every 8 hours  Multiple Vitamins oral tablet: 1 tab(s) orally once a day  mupirocin 2% topical ointment: Apply topically to affected area 2 times a day to both nostrils  ondansetron 2 mg/mL injectable solution: 4 milligram(s) injectable every 8 hours as needed for as needed  Outpatient Physical Therapy: Use as directed MDD: 1  Ling Dunn: use as directed MDD: 1  Ling Dunn: use as directed  sodium/potassium/phosphorus 160 mg-280 mg-250 mg oral powder for reconstitution: 1 packet(s) orally 3 times a day (with meals)  sofosbuvir-velpatasvir 400 mg-100 mg oral tablet: 1 orally once a day  sofosbuvir-velpatasvir 400 mg-100 mg oral tablet: 1 tab(s) orally once a day  thiamine 100 mg oral tablet: 1 tab(s) orally once a day   aspirin 81 mg oral delayed release tablet: 1 tab(s) orally once a day  calcium-vitamin D 500 mg-5 mcg (200 intl units) oral tablet: 1 tab(s) orally 2 times a day  fluconazole 200 mg oral tablet: 1 tab(s) orally once a day  levothyroxine 100 mcg (0.1 mg) oral tablet: 1 tab(s) orally once a day  magnesium oxide 400 mg oral tablet: 1 tab(s) orally 3 times a day (with meals)  mycophenolate mofetil 250 mg oral capsule: 500 milligram(s) orally 2 times a day  pantoprazole 40 mg oral delayed release tablet: 1 tab(s) orally once a day (before a meal)  polyethylene glycol 3350 oral powder for reconstitution: 17 gram(s) orally once a day  predniSONE 20 mg oral tablet: 1 tab(s) orally once a day  senna leaf extract oral tablet: 2 tab(s) orally once a day (at bedtime)  sofosbuvir-velpatasvir 400 mg-100 mg oral tablet: 1 tab(s) orally once a day  sulfamethoxazole-trimethoprim 400 mg-80 mg oral tablet: 1 tab(s) orally once a day  tacrolimus 0.5 mg oral capsule: 1 cap(s) orally 2 times a day  ursodiol 300 mg oral capsule: 1 cap(s) orally every 12 hours  valGANciclovir 450 mg oral tablet: 1 tab(s) orally once a day   aspirin 81 mg oral delayed release tablet: 1 tab(s) orally once a day  calcium-vitamin D 500 mg-5 mcg (200 intl units) oral tablet: 1 tab(s) orally 2 times a day  fluconazole 200 mg oral tablet: 1 tab(s) orally once a day  levothyroxine 100 mcg (0.1 mg) oral tablet: 1 tab(s) orally once a day  magnesium oxide 400 mg oral tablet: 1 tab(s) orally 3 times a day (with meals)  mycophenolate mofetil 250 mg oral capsule: 500 milligram(s) orally 2 times a day  pantoprazole 40 mg oral delayed release tablet: 1 tab(s) orally once a day (before a meal)  polyethylene glycol 3350 oral powder for reconstitution: 17 gram(s) orally once a day  predniSONE 20 mg oral tablet: 1 tab(s) orally once a day  senna leaf extract oral tablet: 2 tab(s) orally once a day (at bedtime)  sofosbuvir-velpatasvir 400 mg-100 mg oral tablet: 1 tab(s) orally once a day  sulfamethoxazole-trimethoprim 400 mg-80 mg oral tablet: 1 tab(s) orally once a day  tacrolimus 0.5 mg oral capsule: 1 cap(s) orally 2 times a day  ursodiol 300 mg oral capsule: 1 cap(s) orally every 12 hours  valGANciclovir 450 mg oral tablet: 1 tab(s) orally once a day  Zyvox 600 mg oral tablet: 1 tab(s) orally 2 times a day

## 2023-06-26 NOTE — DISCHARGE NOTE PROVIDER - DETAILS OF MALNUTRITION DIAGNOSIS/DIAGNOSES
This patient has been assessed with a concern for Malnutrition and was treated during this hospitalization for the following Nutrition diagnosis/diagnoses:     -  05/18/2023: Severe protein-calorie malnutrition   -  05/18/2023: Underweight (BMI < 19)

## 2023-06-26 NOTE — DISCHARGE NOTE NURSING/CASE MANAGEMENT/SOCIAL WORK - TOBACCO CESSATION EDUCATION/COUNSELLING(PROVIDED IF TOBACCO USED IN THE PAST 30 DAYS) NON CORE MEASURE SITES
Virtual Brief Visit    Assessment/Plan:    Problem List Items Addressed This Visit        Other    Generalized anxiety disorder (Chronic)    Relevant Medications    lithium 600 MG capsule    gabapentin (NEURONTIN) 300 mg capsule    DULoxetine (CYMBALTA) 60 mg delayed release capsule    busPIRone (BUSPAR) 15 mg tablet    Other Relevant Orders    Lithium level    Major depressive disorder, recurrent, severe without psychotic features ([facility] [address] ) - Primary (Chronic)    Relevant Medications    lithium 600 MG capsule    DULoxetine (CYMBALTA) 60 mg delayed release capsule    busPIRone (BUSPAR) 15 mg tablet    Other Relevant Orders    Lithium level      Other Visit Diagnoses     Encounter for lithium monitoring                    Reason for visit is   Chief Complaint   Patient presents with    Follow-up    Medication Management    Virtual Brief Visit        Encounter provider ANNETTA Ashley    Provider located at [address] [id number]    Recent Visits  No visits were found meeting these conditions  Showing recent visits within past 7 days and meeting all other requirements     Today's Visits  Date Type Provider Dept   06/07/21 Telemedicine ANNETTA Ashley 71 today's visits and meeting all other requirements     Future Appointments  No visits were found meeting these conditions  Showing future appointments within next 150 days and meeting all other requirements        After connecting through telephone, the patient was identified by name and date of birth  Smooth Whelan was informed that this is a telemedicine visit and that the visit is being conducted through telephone  My office door was closed  No one else was in the room  He acknowledged consent and understanding of privacy and security of the platform   The patient has agreed to participate and understands he can discontinue the visit at any time  Patient is aware this is a billable service  Subjective    Vlad Malik is a 51 y o  male  With history of depression and anxiety  HPI    Hawa Bayshore was seen for follow-up of anxiety, depression and medication check  He had hiatal hernia surgery and has recuperated well  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  Some challenges coping with different personalities  Has some irritability/ anger that starts at work and can carry over when he gets home  Discussed coping skills and considering seeing a therapist again to work on this  15year old dog passed away which was also hard  overall he appears at his baseline though with his medications and treatment plan  Sleeping well at night with trazodone  Appetite has been improved since hiatal hernia has been repaired  States that times he does get depressed when he is bored  Has a significant other which has been going well  Also resides with his son but states his son is not home very often  Can have fleeting suicidal ideation at times but denies any plan or intention  Taking his medications as prescribed  No adverse effects noted with his medications at this time  Medication list was reviewed and updated          Past Medical History:   Diagnosis Date    Anxiety     Chest pain     after eating- seen in ER several x - believes secondary to hernia    CPAP (continuous positive airway pressure) dependence     Depression     Hiatal hernia     Hyperlipidemia     PONV (postoperative nausea and vomiting)     Sleep apnea     Suicidal thoughts     Wears glasses        Past Surgical History:   Procedure Laterality Date    COLONOSCOPY      INGUINAL HERNIA REPAIR Right     LA LAP, REPAIR PARAESOPHAGEAL HERNIA, INCL FUNDOPLASTY W/ MESH N/A 4/1/2021    Procedure: REPAIR HERNIA PARAESOPHAGEAL LAPAROSCOPIC W ROBOTICS with TOUPET FUNDOPLICATION cruroplasty with mesh;  Surgeon: Lorena Monreal MD;  Location: [facility] OR;  Service: General    SHOULDER SURGERY      left       Current Outpatient Medications   Medication Sig Dispense Refill    atorvastatin (LIPITOR) 40 mg tablet Take 20 mg by mouth daily       busPIRone (BUSPAR) 15 mg tablet Take 1 tablet (15 mg total) by mouth 3 (three) times a day 90 tablet 2    docusate sodium (COLACE) 50 mg capsule Take 50 mg by mouth 2 (two) times a day      DULoxetine (CYMBALTA) 60 mg delayed release capsule Take 1 capsule (60 mg total) by mouth daily 30 capsule 2    gabapentin (NEURONTIN) 300 mg capsule Take 1 capsule (300 mg total) by mouth 3 (three) times a day 90 capsule 2    lithium 600 MG capsule Take 1 capsule (600 mg total) by mouth daily at bedtime 30 capsule 2    Multiple Vitamin (multivitamin) tablet Take 1 tablet by mouth daily      traZODone (DESYREL) 50 mg tablet Take 1 tablet (50 mg total) by mouth daily at bedtime 30 tablet 2     No current facility-administered medications for this visit  No Known Allergies    Review of Systems     As noted in HPI     Mental status exam:   Speech is clear and coherent, normal rate and volume   Mood is euthymic  Linear and coherent thought process   No suicidal or homicidal ideation   No psychotic signs or symptoms noted, no hallucinations or delusions   Cognition is intact  Insight and judgment is intact     Medications as follows:  Lithium 600 mg at bedtime   Cymbalta 60 mg daily   BuSpar 15 mg t i d    Gabapentin 300 mg t i d    Trazodone 50 mg at bedtime as needed   check lithium level as soon as able ,  Lab slip will be mailed to him  Reviewed symptoms of elevated lithium level with Charm Lefort as well as importance of avoiding and send diuretics  Will follow-up in three months and he will call sooner if any questions or concerns    There were no vitals filed for this visit        I spent 25 minutes directly with the patient during this visit    VIRTUAL VISIT [facility] acknowledges that he has consented to an online visit or consultation  He understands that the online visit is based solely on information provided by him, and that, in the absence of a face-to-face physical evaluation by the physician, the diagnosis he receives is both limited and provisional in terms of accuracy and completeness  This is not intended to replace a full medical face-to-face evaluation by the physician  Vlad Malik understands and accepts these terms  Offered and provided

## 2023-06-26 NOTE — PROGRESS NOTE ADULT - ASSESSMENT
38F with Hx of hypothyroidism (on Levothyroxine), liver cirrhosis 2/2 ETOH abuse---->  presented to [facility] ED on 5/13/23 with ~ 2 weeks Hx of progressively worsening abdominal pain and distention.  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  now s/p HCV+ OLT under Simulect induction on 6/10/2023    [] VRE Bacteremia  - ID following: CHange to zyvox at discharge today to complete full course on 7/2/23  - Repeat CT A/P reviewed;  - UCx 6/18- VRE, surveillance from 6/21- NGTD   - MSSA bacteremia (5/17) - completed Ancef 6/11.   - TTE completed 6/20, no evidence of endocarditis    [] s/p HCV+ OLT    - Good graft function  - HCV Viremia, Genotype 1A -->started Epclusa 6/21   - Subcutaneous hematoma--change dressing QD or as needed   - Continue Actigal  - ASA 81mg po qd  - Renal function improving   - Regular diet  - Pain management: oxycodone prn/ lidocaine patch   - IS/SCDs/SQH  - Strict I&Os  - bowel regimen  - Leukocytosis: repeat bld cxs  - SW: set up RPP outpt    [] Immunosuppression  - FK by level, MMF 500mg bid, pred 20  - Simulect completed  - CMV viremia: 1860 copies (6/20) --> Valcyte 450 daily     - PPX: Change Bactrim to daily and valcyte daily (renal function improved), Fluconazole QD (renal dosing)    [] Dispo:  Plan for Discharge home today with follow up at clinic this Wednesday

## 2023-06-27 LAB
CMV DNA CSF QL NAA+PROBE: 401 IU/ML — HIGH
CMV DNA SPEC NAA+PROBE-LOG#: 2.6 LOG10IU/ML — HIGH

## 2023-06-28 ENCOUNTER — APPOINTMENT (OUTPATIENT)
Dept: TRANSPLANT | Facility: CLINIC | Age: 38
End: 2023-06-28
Payer: MEDICAID

## 2023-06-28 ENCOUNTER — LABORATORY RESULT (OUTPATIENT)
Age: 38
End: 2023-06-28

## 2023-06-28 VITALS
DIASTOLIC BLOOD PRESSURE: 86 MMHG | SYSTOLIC BLOOD PRESSURE: 131 MMHG | BODY MASS INDEX: 17.3 KG/M2 | HEART RATE: 69 BPM | RESPIRATION RATE: 14 BRPM | WEIGHT: 110.23 LBS | OXYGEN SATURATION: 99 % | HEIGHT: 67 IN | TEMPERATURE: 98.2 F

## 2023-06-28 LAB
ALBUMIN SERPL ELPH-MCNC: 3.2 G/DL
ALP BLD-CCNC: 78 U/L
ALT SERPL-CCNC: <5 U/L
ANION GAP SERPL CALC-SCNC: 12 MMOL/L
AST SERPL-CCNC: 10 U/L
BILIRUB SERPL-MCNC: 1.9 MG/DL
BUN SERPL-MCNC: 15 MG/DL
CALCIUM SERPL-MCNC: 9.1 MG/DL
CHLORIDE SERPL-SCNC: 107 MMOL/L
CO2 SERPL-SCNC: 20 MMOL/L
CREAT SERPL-MCNC: 1.12 MG/DL
EGFR: 64 ML/MIN/1.73M2
GGT SERPL-CCNC: 105 U/L
GLUCOSE SERPL-MCNC: 75 MG/DL
MAGNESIUM SERPL-MCNC: 1.4 MG/DL
PHOSPHATE SERPL-MCNC: 3.9 MG/DL
POTASSIUM SERPL-SCNC: 4.7 MMOL/L
PROT SERPL-MCNC: 5.2 G/DL
SODIUM SERPL-SCNC: 139 MMOL/L
TACROLIMUS SERPL-MCNC: 3.9 NG/ML

## 2023-06-28 PROCEDURE — 99214 OFFICE O/P EST MOD 30 MIN: CPT

## 2023-06-30 ENCOUNTER — NON-APPOINTMENT (OUTPATIENT)
Age: 38
End: 2023-06-30

## 2023-06-30 LAB
CMV DNA SPEC QL NAA+PROBE: 104 IU/ML
CMVPCR LOG: 2.02 LOG10IU/ML
CULTURE RESULTS: SIGNIFICANT CHANGE UP
CULTURE RESULTS: SIGNIFICANT CHANGE UP
SPECIMEN SOURCE: SIGNIFICANT CHANGE UP
SPECIMEN SOURCE: SIGNIFICANT CHANGE UP

## 2023-07-01 LAB
CULTURE RESULTS: SIGNIFICANT CHANGE UP
SPECIMEN SOURCE: SIGNIFICANT CHANGE UP

## 2023-07-03 ENCOUNTER — LABORATORY RESULT (OUTPATIENT)
Age: 38
End: 2023-07-03

## 2023-07-05 ENCOUNTER — APPOINTMENT (OUTPATIENT)
Dept: TRANSPLANT | Facility: CLINIC | Age: 38
End: 2023-07-05
Payer: MEDICAID

## 2023-07-05 ENCOUNTER — APPOINTMENT (OUTPATIENT)
Dept: INFECTIOUS DISEASE | Facility: CLINIC | Age: 38
End: 2023-07-05
Payer: MEDICAID

## 2023-07-05 ENCOUNTER — OUTPATIENT (OUTPATIENT)
Dept: OUTPATIENT SERVICES | Facility: HOSPITAL | Age: 38
LOS: 1 days | End: 2023-07-05
Payer: MEDICAID

## 2023-07-05 VITALS
WEIGHT: 100 LBS | BODY MASS INDEX: 15.7 KG/M2 | DIASTOLIC BLOOD PRESSURE: 86 MMHG | TEMPERATURE: 98 F | HEART RATE: 91 BPM | HEIGHT: 67 IN | RESPIRATION RATE: 14 BRPM | SYSTOLIC BLOOD PRESSURE: 131 MMHG | OXYGEN SATURATION: 10 %

## 2023-07-05 VITALS
TEMPERATURE: 97 F | HEIGHT: 67 IN | HEART RATE: 84 BPM | WEIGHT: 100 LBS | BODY MASS INDEX: 15.7 KG/M2 | OXYGEN SATURATION: 100 % | DIASTOLIC BLOOD PRESSURE: 88 MMHG | SYSTOLIC BLOOD PRESSURE: 144 MMHG

## 2023-07-05 DIAGNOSIS — B97.89 OTHER VIRAL AGENTS AS THE CAUSE OF DISEASES CLASSIFIED ELSEWHERE: ICD-10-CM

## 2023-07-05 LAB
ALBUMIN SERPL ELPH-MCNC: 3.9 G/DL
ALP BLD-CCNC: 141 U/L
ALT SERPL-CCNC: 8 U/L
ANION GAP SERPL CALC-SCNC: 20 MMOL/L
AST SERPL-CCNC: 14 U/L
BILIRUB SERPL-MCNC: 1.9 MG/DL
BUN SERPL-MCNC: 18 MG/DL
CALCIUM SERPL-MCNC: 9.6 MG/DL
CHLORIDE SERPL-SCNC: 96 MMOL/L
CMV DNA SPEC QL NAA+PROBE: ABNORMAL IU/ML
CMVPCR LOG: ABNORMAL LOG10IU/ML
CO2 SERPL-SCNC: 19 MMOL/L
CREAT SERPL-MCNC: 1.17 MG/DL
CULTURE RESULTS: SIGNIFICANT CHANGE UP
EGFR: 61 ML/MIN/1.73M2
GGT SERPL-CCNC: 215 U/L
GLUCOSE SERPL-MCNC: 79 MG/DL
MAGNESIUM SERPL-MCNC: 1.2 MG/DL
PHOSPHATE SERPL-MCNC: 3.3 MG/DL
POTASSIUM SERPL-SCNC: 4.7 MMOL/L
PROT SERPL-MCNC: 6 G/DL
SODIUM SERPL-SCNC: 135 MMOL/L
SPECIMEN SOURCE: SIGNIFICANT CHANGE UP
TACROLIMUS SERPL-MCNC: <2 NG/ML

## 2023-07-05 PROCEDURE — 99213 OFFICE O/P EST LOW 20 MIN: CPT

## 2023-07-05 PROCEDURE — G0463: CPT

## 2023-07-05 NOTE — PHYSICAL EXAM
[Alert] : alert [No Acute Distress] : no acute distress [Scleral Icterus] : scleral icterus [Breathing Comfortably on room air] : breathing comfortably on room air [Regular Rhythm] : regular rhythm [No Skin Discoloration] : no skin discoloration [Jaundice] : jaundice [de-identified] : wound clean. stapled removed. 1.5cm area on left side open with packing. no erythema [FreeTextEntry1] : 2-3+ pedal edema

## 2023-07-05 NOTE — ASSESSMENT
[FreeTextEntry1] : 38-year-old female PMH EtOH Cirrhosis s/p OLT 6/9/23 (CMV D+/R+, EBV D+/R+) who presents to ID office for follow up. \par \par Admitted to [facility] 5/16/23 - 6/26/23 for EtOH hepatitis, MSSA bacteremia secondary to SSTI s/p 4 weeks of targeted coverage and Organizing pneumonia of undetermined etiology \par \par s/p OLT 6/9/23 CMV D+/R+, EBV D+/R+. \par Developed leukocytosis post-transplant and was found to have VRE Bacteremia. \par Was also noted to have abdominal wall hematoma (s/p I&D)\par \par BCx (6/17, 6/18) VRE\par Abdominal Wall hematoma I&D Culture (6/18) Enterococcus faecium (Non-VRE)\par BCx (6/19, 6/20) NGTD\par UCx (6/20) No Growth\par UCx (6/21) No Growth\par \par TTE (6/20) No evidence of vegetations\par \par CT A/P (6/23) Status post liver transplant with stable perihepatic hematomas. No abdominal wall collection. Hyperdensity in the haider hepatis along the portal vein\par \par Abdominal US Doppler (6/23) No evidence of portal or hepatic vein thrombosis. No evidence of hepatic artery stenosis.\par \par #VRE Bacteremia, Abdominal Fluid Collections\par Completed 2 week of antibiotic coverage for VRE from cleared cultures ending on 7/2/23.\par --Continue to monitor abdominal examinations and WBC count (planned for ~weekly labwork)\par --Will monitor off of treatment dose antibiotics\par \par #CMV Viremia, OLT Recipient, Prophylactic Antibiotic \par CMV PCR (6/20) 1,860 \par CMV PCR (6/28) 104 \par CMV PCR (7/3) <34.5 \par --Continue Valcyte 450 mg PO Q24H\par --Check repeat CMV PCR in one week\par --Continue Bactrim SS Q24H for PCP PPx\par --Continue Fluconazole 200 mg PO Q24H for Fungal PPx per protocol

## 2023-07-05 NOTE — REVIEW OF SYSTEMS
[Fever] : no fever [Chills] : no chills [Fatigue] : fatigue [Abdominal Pain] : no abdominal pain [Nausea] : no nausea [Diarrhea] : diarrhea [Vomiting] : no vomiting [Negative] : Respiratory

## 2023-07-05 NOTE — HISTORY OF PRESENT ILLNESS
[FreeTextEntry1] : 38-year-old female PMH EtOH Cirrhosis s/p OLT 6/9/23 (CMV D+/R+, EBV D+/R+) who presents to ID office for follow up. Admitted to [facility] 5/16/23 - 6/26/23 for alcohol associated hepatitis, MSSA bacteremia secondary to SSTI s/p 4 weeks of targeted coverage and Organizing pneumonia of undetermined etiology s/p OLT 6/9/23 CMV D+/R+, EBV D+/R+. Developed leukocytosis post-transplant and was found to have VRE Bacteremia. Was also noted to have abdominal wall hematoma. BCx (6/17, 6/18) VRE. Abdominal Wall hematoma I&D Culture (6/18) Enterococcus faecium (Non-VRE). BCx (6/19, 6/20) NGTD. UCx (6/20) No Growth. UCx (6/21) No Growth. TTE (6/20) No evidence of vegetations. CT A/P (6/23) Status post liver transplant with stable perihepatic hematomas. No abdominal wall collection. Hyperdensity in the haider hepatis along the portal vein. Abdominal US Doppler (6/23) No evidence of portal or hepatic vein thrombosis.  No evidence of hepatic artery stenosis. Treated with IV Daptomycin during her admission for the VRE and discharged on Linezolid which she completed on 7/2/23. Notes intermittent RUQ discomfort but denies any other more prominent pains. Denies chills or fevers.

## 2023-07-05 NOTE — HISTORY OF PRESENT ILLNESS
[Alcoholic Liver Disease] : Alcoholic Liver Disease [Liver] : Liver [Donor after brain death (DBD] : Donor after brain death (DBD) [Basiliximab] : Basiliximab [Steroids] : Steroids [Positive/Positive] : Positive/Positive [PHS Increased Risk] : [facility] increased risk [TextBox_52] : HCV +  [FreeTextEntry1] : 38F with Hx of hypothyroidism (on Levothyroxine), liver cirrhosis 2/2 ETOH abuse- presented to [facility] ED on 5/13/23 \par with ~ 2 weeks Hx of progressively worsening abdominal pain and distention.  Transferred on 5/16 for management of alcohol hepatitis.\par \par Now s/p HCV+ OLT under Simulect induction on 6/10/2023\par Post op course c/b VRE bacteremia on antibiotics per ID will be DC home on zyvox ending 7/2/2023 \par s/p Subcutaneous hematoma evacuation with wound packing \par \par Interim Hx: 7-5-2023\par 3.5 weeks post op \par d/c 6/26/2023\par doing well\par feels better; eating and ambulating, no pain \par Zyvox stopped  7/2/23 ( for wound infection and positive blood cultures)\par increasing ambulation \par finished intake into [facility] on  7/3/23  \par \par \par Current Immuno: \par -Tacrolimus 0.5mg bid, MMF 500mg BID (2/2 bacteremia), Pred 15mg QD\par -PPx Fluconazole/Bactrim/Valcyte\par \par

## 2023-07-05 NOTE — PHYSICAL EXAM
[General Appearance - Alert] : alert [General Appearance - In No Acute Distress] : in no acute distress [Sclera] : the sclera and conjunctiva were normal [Outer Ear] : the ears and nose were normal in appearance [Neck Appearance] : the appearance of the neck was normal [Auscultation Breath Sounds / Voice Sounds] : lungs were clear to auscultation bilaterally [Heart Rate And Rhythm] : heart rate was normal and rhythm regular [Heart Sounds] : normal S1 and S2 [Heart Sounds Gallop] : no gallops [Murmurs] : no murmurs [Heart Sounds Pericardial Friction Rub] : no pericardial rub [Edema] : there was no peripheral edema [Bowel Sounds] : normal bowel sounds [Abdomen Soft] : soft [Abdomen Tenderness] : non-tender [Abdomen Mass (___ Cm)] : no abdominal mass palpated [No Palpable Adenopathy] : no palpable adenopathy [Musculoskeletal - Swelling] : no joint swelling [Nail Clubbing] : no clubbing  or cyanosis of the fingernails [Motor Tone] : muscle strength and tone were normal [Skin Color & Pigmentation] : normal skin color and pigmentation [] : no rash [Affect] : the affect was normal

## 2023-07-05 NOTE — ASSESSMENT
[FreeTextEntry1] : 38F with Hx of hypothyroidism (on Levothyroxine), liver cirrhosis 2/2 ETOH abuse. \par s/p HCV+ OLT under Simulect induction on 6/10/2023\par  \par # HCV + GRAFT: - TB 1.9, AST 14, ALT 8, ALKP 141. Will repeat labs on friday \par \par # Immunosuppression ; increase  FK to 1mg bid. ( level <2)   , MMF 500mg bid (bacteremia), pred 15 no change\par \par # PPX: Bactrim, valcyte daily, Fluconazole QD (renal dosing)\par \par #  zyvox completed on 7/2/23\par  \par # HCV Viremia, Genotype 1A -->started Epclusa 6/21 \par \par # WOUND: Subcutaneous hematoma--change dressing QD or as needed- visiting nurse arranged to come to pt's home, wound supplies provided to pt. 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 \par

## 2023-07-06 DIAGNOSIS — R78.81 BACTEREMIA: ICD-10-CM

## 2023-07-07 ENCOUNTER — LABORATORY RESULT (OUTPATIENT)
Age: 38
End: 2023-07-07

## 2023-07-12 ENCOUNTER — APPOINTMENT (OUTPATIENT)
Dept: TRANSPLANT | Facility: CLINIC | Age: 38
End: 2023-07-12
Payer: MEDICAID

## 2023-07-12 VITALS
HEIGHT: 67 IN | WEIGHT: 106 LBS | TEMPERATURE: 98 F | DIASTOLIC BLOOD PRESSURE: 100 MMHG | SYSTOLIC BLOOD PRESSURE: 143 MMHG | HEART RATE: 81 BPM | RESPIRATION RATE: 14 BRPM | OXYGEN SATURATION: 100 % | BODY MASS INDEX: 16.64 KG/M2

## 2023-07-12 LAB
ALBUMIN SERPL ELPH-MCNC: 3.9 G/DL
ALP BLD-CCNC: 152 U/L
ALT SERPL-CCNC: 10 U/L
ANION GAP SERPL CALC-SCNC: 16 MMOL/L
AST SERPL-CCNC: 18 U/L
BILIRUB SERPL-MCNC: 1.5 MG/DL
BUN SERPL-MCNC: 17 MG/DL
CALCIUM SERPL-MCNC: 9.8 MG/DL
CHLORIDE SERPL-SCNC: 95 MMOL/L
CMV DNA SPEC QL NAA+PROBE: NOT DETECTED IU/ML
CMVPCR LOG: NOT DETECTED LOG10IU/ML
CO2 SERPL-SCNC: 23 MMOL/L
CREAT SERPL-MCNC: 1.12 MG/DL
CULTURE RESULTS: SIGNIFICANT CHANGE UP
EGFR: 64 ML/MIN/1.73M2
GGT SERPL-CCNC: 218 U/L
GLUCOSE SERPL-MCNC: 79 MG/DL
MAGNESIUM SERPL-MCNC: 1.3 MG/DL
PETH 16:0/18:1: NEGATIVE NG/ML
PETH 16:0/18:2: NEGATIVE NG/ML
PETH COMMENTS: NORMAL
PHOSPHATE SERPL-MCNC: 3.5 MG/DL
POTASSIUM SERPL-SCNC: 4 MMOL/L
PROT SERPL-MCNC: 6.1 G/DL
SODIUM SERPL-SCNC: 134 MMOL/L
SPECIMEN SOURCE: SIGNIFICANT CHANGE UP
TACROLIMUS SERPL-MCNC: 2.5 NG/ML

## 2023-07-12 PROCEDURE — 99215 OFFICE O/P EST HI 40 MIN: CPT | Mod: 24

## 2023-07-12 NOTE — ASSESSMENT
[FreeTextEntry1] : \par 38F with Hx of hypothyroidism (on Levothyroxine), liver cirrhosis 2/2 ETOH abuse. \par s/p HCV+ OLT under Simulect induction on 6/10/2023\par  \par # HCV + GRAFT: - TB 1.5, ast 18, alt 10, alk phos 152\par \par # Immunosuppression ; increase FK to 2/2mg bid. 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 \par

## 2023-07-12 NOTE — HISTORY OF PRESENT ILLNESS
[FreeTextEntry1] : \par \par Cause(s) of Liver Disease: Alcoholic Liver Disease \par Transplant Organ(s): Liver \par Transplant Type: Donor after brain death (DBD) \par Induction Agent: Basiliximab, Steroids \par CMV Status (Donor/Recipient): Positive/Positive \par Donor Characteristics: [facility] increased risk \par HCV + \par 38F with Hx of hypothyroidism (on Levothyroxine), liver cirrhosis 2/2 ETOH abuse- presented to [facility] ED on 5/13/23 \par with ~ 2 weeks Hx of progressively worsening abdominal pain and distention. Transferred on 5/16 for management of alcohol hepatitis.\par \par Now s/p HCV+ OLT under Simulect induction on 6/10/2023\par Post op course c/b VRE bacteremia on antibiotics per ID will be DC home on zyvox ending 7/2/2023 \par s/p Subcutaneous hematoma evacuation with wound packing \par \par Interim Hx: 7-\par d/c 6/26/2023\par doing well\par feels better; eating and ambulating, no pain \par complaining of fascial stitch in incision, no infection\par increasing ambulation \par [facility] to start next week\par \par

## 2023-07-12 NOTE — PHYSICAL EXAM
[Alert] : alert [Healthy Appearing] : healthy appearing [No Acute Distress] : no acute distress [Scleral Icterus] : no scleral icterus [EOMI] : extra ocular movement intact [PERRLA] : pupils equal, round, reactive to light and accomodation [Hepatojugular Reflux] : no hepatojugular reflux [Full ROM] : full range of motion [No Lymphadenopathy] : no lymphadenopathy [Clear to Auscultation] : lungs were clear to auscultation bilaterally [Breathing Comfortably on room air] : breathing comfortably on room air [Normal Rate] : normal rate [Regular Rhythm] : regular rhythm [Abdominal Bruit] : no abdominal bruit [Ascites Fluid Wave] : no ascites fluid wave [Splenomegaly] : no splenomegaly [Soft] : soft [Normal Bowel Sounds] : normal bowel sounds [No Edema] : no edema [No Skin Discoloration] : no skin discoloration [No Ulcers] : no ulcers [Strength in Tact] : strength in tact [Spider Angioma] : no spider angioma [Jaundice] : no jaundice [Palmar Erythema] : no palmar erythema [No Rash] : no rash [Asterixis] : no asterixis [Hepatic Encephalopathy] : hepatic encephalopathy [de-identified] : 2cm gap on left subcostal incision, packed, no infection

## 2023-07-14 ENCOUNTER — LABORATORY RESULT (OUTPATIENT)
Age: 38
End: 2023-07-14

## 2023-07-19 LAB
ALBUMIN SERPL ELPH-MCNC: 3.8 G/DL
ALP BLD-CCNC: 101 U/L
ALT SERPL-CCNC: 9 U/L
ANION GAP SERPL CALC-SCNC: 12 MMOL/L
AST SERPL-CCNC: 12 U/L
BILIRUB SERPL-MCNC: 0.9 MG/DL
BUN SERPL-MCNC: 16 MG/DL
CALCIUM SERPL-MCNC: 9.6 MG/DL
CHLORIDE SERPL-SCNC: 105 MMOL/L
CMV DNA SPEC QL NAA+PROBE: NOT DETECTED IU/ML
CMVPCR LOG: NOT DETECTED LOG10IU/ML
CO2 SERPL-SCNC: 22 MMOL/L
CREAT SERPL-MCNC: 1.18 MG/DL
EGFR: 60 ML/MIN/1.73M2
GGT SERPL-CCNC: 126 U/L
GLUCOSE SERPL-MCNC: 78 MG/DL
MAGNESIUM SERPL-MCNC: 1.7 MG/DL
PHOSPHATE SERPL-MCNC: 4.2 MG/DL
POTASSIUM SERPL-SCNC: 4.6 MMOL/L
PROT SERPL-MCNC: 5.9 G/DL
SODIUM SERPL-SCNC: 138 MMOL/L
TACROLIMUS SERPL-MCNC: 5.4 NG/ML

## 2023-07-20 LAB
PETH 16:0/18:1: NEGATIVE NG/ML
PETH 16:0/18:2: NEGATIVE NG/ML
PETH COMMENTS: NORMAL

## 2023-07-21 ENCOUNTER — LABORATORY RESULT (OUTPATIENT)
Age: 38
End: 2023-07-21

## 2023-07-22 LAB
CULTURE RESULTS: SIGNIFICANT CHANGE UP
SPECIMEN SOURCE: SIGNIFICANT CHANGE UP

## 2023-07-26 ENCOUNTER — APPOINTMENT (OUTPATIENT)
Dept: TRANSPLANT | Facility: CLINIC | Age: 38
End: 2023-07-26
Payer: MEDICAID

## 2023-07-26 VITALS
BODY MASS INDEX: 16.95 KG/M2 | SYSTOLIC BLOOD PRESSURE: 140 MMHG | HEART RATE: 87 BPM | WEIGHT: 108 LBS | DIASTOLIC BLOOD PRESSURE: 97 MMHG | RESPIRATION RATE: 14 BRPM | HEIGHT: 67 IN | OXYGEN SATURATION: 100 %

## 2023-07-26 LAB
ALBUMIN SERPL ELPH-MCNC: 4 G/DL
ALP BLD-CCNC: 79 U/L
ALT SERPL-CCNC: 6 U/L
ANION GAP SERPL CALC-SCNC: 12 MMOL/L
AST SERPL-CCNC: 11 U/L
BILIRUB SERPL-MCNC: 0.7 MG/DL
BUN SERPL-MCNC: 14 MG/DL
CALCIUM SERPL-MCNC: 9.3 MG/DL
CHLORIDE SERPL-SCNC: 104 MMOL/L
CO2 SERPL-SCNC: 23 MMOL/L
CREAT SERPL-MCNC: 1.2 MG/DL
EGFR: 59 ML/MIN/1.73M2
GGT SERPL-CCNC: 84 U/L
GLUCOSE SERPL-MCNC: 90 MG/DL
MAGNESIUM SERPL-MCNC: 1.5 MG/DL
POTASSIUM SERPL-SCNC: 4.4 MMOL/L
PROT SERPL-MCNC: 6.2 G/DL
SODIUM SERPL-SCNC: 139 MMOL/L

## 2023-07-26 PROCEDURE — 99213 OFFICE O/P EST LOW 20 MIN: CPT

## 2023-07-26 NOTE — HISTORY OF PRESENT ILLNESS
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 Transferred on 5/16 for management of alcohol hepatitis.\par \par Now s/p HCV+ OLT under Simulect induction on 6/10/2023\par Post op course c/b VRE bacteremia on antibiotics per ID will be DC home on zyvox ending 7/2/2023 \par s/p Subcutaneous hematoma evacuation with wound packing \par \par Interim Hx: 7-\par doing well, active\par feels better; eating and ambulating, no pain \par increasing ambulation \par [facility] started with program\par

## 2023-07-26 NOTE — ASSESSMENT
[FreeTextEntry1] : \par 38F with Hx of hypothyroidism (on Levothyroxine), liver cirrhosis 2/2 ETOH abuse. \par s/p HCV+ OLT under Simulect induction on 6/10/2023\par  \par # HCV + GRAFT:; good graft function - TB 1.5, ast 11, alt 6, alk phos 152\par \par # Immunosuppression ; increase FK to 3/2mg bid from 2/2 . 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 \par

## 2023-07-26 NOTE — PHYSICAL EXAM
[Alert] : alert [Healthy Appearing] : healthy appearing [No Acute Distress] : no acute distress [Scleral Icterus] : no scleral icterus [EOMI] : extra ocular movement intact [PERRLA] : pupils equal, round, reactive to light and accomodation [Hepatojugular Reflux] : no hepatojugular reflux [Full ROM] : full range of motion [No Lymphadenopathy] : no lymphadenopathy [Clear to Auscultation] : lungs were clear to auscultation bilaterally [Breathing Comfortably on room air] : breathing comfortably on room air [Normal Rate] : normal rate [Regular Rhythm] : regular rhythm [Abdominal Bruit] : no abdominal bruit [Ascites Fluid Wave] : no ascites fluid wave [Splenomegaly] : no splenomegaly [Soft] : soft [Normal Bowel Sounds] : normal bowel sounds [No Edema] : no edema [No Skin Discoloration] : no skin discoloration [No Ulcers] : no ulcers [Strength in Tact] : strength in tact [Spider Angioma] : no spider angioma [Jaundice] : no jaundice [Palmar Erythema] : no palmar erythema [No Rash] : no rash [Asterixis] : no asterixis [Hepatic Encephalopathy] : hepatic encephalopathy [de-identified] : 2cm gap on left subcostal incision, packed, no infection

## 2023-07-28 ENCOUNTER — LABORATORY RESULT (OUTPATIENT)
Age: 38
End: 2023-07-28

## 2023-08-02 ENCOUNTER — APPOINTMENT (OUTPATIENT)
Dept: TRANSPLANT | Facility: CLINIC | Age: 38
End: 2023-08-02
Payer: MEDICAID

## 2023-08-02 VITALS
DIASTOLIC BLOOD PRESSURE: 88 MMHG | HEART RATE: 84 BPM | RESPIRATION RATE: 14 BRPM | OXYGEN SATURATION: 100 % | SYSTOLIC BLOOD PRESSURE: 125 MMHG | WEIGHT: 109 LBS | HEIGHT: 67 IN | BODY MASS INDEX: 17.11 KG/M2

## 2023-08-02 PROCEDURE — 99215 OFFICE O/P EST HI 40 MIN: CPT | Mod: 24

## 2023-08-02 NOTE — HISTORY OF PRESENT ILLNESS
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    Now s/p HCV+ OLT under Simulect induction on 6/10/2023  Post op course c/b VRE bacteremia on antibiotics per ID will be DC home on zyvox ending 7/2/2023  s/p Subcutaneous hematoma evacuation with wound packing    Interim Hx: 8-2-2023  doing well, active  feels better; eating and ambulating, no pain  increasing ambulation  [facility] started with program  BP elevated 130/90, has not started nifedipine

## 2023-08-02 NOTE — ASSESSMENT
[FreeTextEntry1] : 38F with Hx of hypothyroidism (on Levothyroxine), liver cirrhosis 2/2 ETOH abuse.  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

## 2023-08-03 ENCOUNTER — LABORATORY RESULT (OUTPATIENT)
Age: 38
End: 2023-08-03

## 2023-08-03 LAB
ALBUMIN SERPL ELPH-MCNC: 4.4 G/DL
ALP BLD-CCNC: 105 U/L
ALT SERPL-CCNC: 7 U/L
ANION GAP SERPL CALC-SCNC: 14 MMOL/L
AST SERPL-CCNC: 15 U/L
BILIRUB SERPL-MCNC: 0.6 MG/DL
BUN SERPL-MCNC: 12 MG/DL
CALCIUM SERPL-MCNC: 9.8 MG/DL
CHLORIDE SERPL-SCNC: 104 MMOL/L
CMV DNA SPEC QL NAA+PROBE: NOT DETECTED IU/ML
CMV DNA SPEC QL NAA+PROBE: NOT DETECTED IU/ML
CMVPCR LOG: NOT DETECTED LOG10IU/ML
CMVPCR LOG: NOT DETECTED LOG10IU/ML
CO2 SERPL-SCNC: 23 MMOL/L
CREAT SERPL-MCNC: 1.4 MG/DL
EGFR: 49 ML/MIN/1.73M2
GGT SERPL-CCNC: 69 U/L
GLUCOSE SERPL-MCNC: 72 MG/DL
HBV CORE IGG+IGM SER QL: NONREACTIVE
HBV SURFACE AB SER QL: REACTIVE
HBV SURFACE AB SERPL IA-ACNC: 135.4 MIU/ML
HBV SURFACE AG SER QL: NONREACTIVE
HCV AB SER QL: REACTIVE
HCV RNA SERPL NAA DL=5-ACNC: <10 IU/ML
HCV RNA SERPL NAA+PROBE-LOG IU: <1.08 LOGIU/ML
HCV RNA SERPL NAA+PROBE-LOG IU: <1.08 LOGIU/ML
HCV S/CO RATIO: 11.72 S/CO
HEPB DNA PCR INT: NOT DETECTED
HEPB DNA PCR LOG: NOT DETECTED LOGIU/ML
HEPC RNA INTERP: DETECTED
HEPC RNA INTERP: DETECTED
HIV1 RNA # SERPL NAA+PROBE: NORMAL
HIV1 RNA # SERPL NAA+PROBE: NORMAL COPIES/ML
HIV1+2 AB SPEC QL IA.RAPID: NONREACTIVE
MAGNESIUM SERPL-MCNC: 1.9 MG/DL
PETH 16:0/18:1: NEGATIVE NG/ML
PETH 16:0/18:1: NEGATIVE NG/ML
PETH 16:0/18:2: NEGATIVE NG/ML
PETH 16:0/18:2: NEGATIVE NG/ML
PETH COMMENTS: NORMAL
PETH COMMENTS: NORMAL
PHOSPHATE SERPL-MCNC: 3.9 MG/DL
PHOSPHATE SERPL-MCNC: 4.4 MG/DL
POTASSIUM SERPL-SCNC: 3.9 MMOL/L
PROT SERPL-MCNC: 6.4 G/DL
SODIUM SERPL-SCNC: 141 MMOL/L
TACROLIMUS SERPL-MCNC: 5 NG/ML
TACROLIMUS SERPL-MCNC: 6.2 NG/ML
VIRAL LOAD INTERP: NORMAL
VIRAL LOAD LOG: NORMAL LG COP/ML

## 2023-08-04 LAB
ALBUMIN SERPL ELPH-MCNC: 4.2 G/DL
ALP BLD-CCNC: 133 U/L
ALT SERPL-CCNC: 6 U/L
ANION GAP SERPL CALC-SCNC: 14 MMOL/L
AST SERPL-CCNC: 12 U/L
BILIRUB SERPL-MCNC: 0.4 MG/DL
BUN SERPL-MCNC: 13 MG/DL
CALCIUM SERPL-MCNC: 9.6 MG/DL
CHLORIDE SERPL-SCNC: 102 MMOL/L
CMV DNA SPEC QL NAA+PROBE: NOT DETECTED IU/ML
CMVPCR LOG: NOT DETECTED LOG10IU/ML
CO2 SERPL-SCNC: 25 MMOL/L
CREAT SERPL-MCNC: 1.19 MG/DL
EGFR: 60 ML/MIN/1.73M2
GGT SERPL-CCNC: 55 U/L
GLUCOSE SERPL-MCNC: 83 MG/DL
MAGNESIUM SERPL-MCNC: 1.8 MG/DL
PHOSPHATE SERPL-MCNC: 5.1 MG/DL
POTASSIUM SERPL-SCNC: 4.7 MMOL/L
PROT SERPL-MCNC: 6.2 G/DL
SODIUM SERPL-SCNC: 142 MMOL/L
TACROLIMUS SERPL-MCNC: 4.2 NG/ML

## 2023-08-11 ENCOUNTER — LABORATORY RESULT (OUTPATIENT)
Age: 38
End: 2023-08-11

## 2023-08-11 ENCOUNTER — NON-APPOINTMENT (OUTPATIENT)
Age: 38
End: 2023-08-11

## 2023-08-11 LAB
ALBUMIN SERPL ELPH-MCNC: 4.4 G/DL
ALP BLD-CCNC: 162 U/L
ALT SERPL-CCNC: <5 U/L
ANION GAP SERPL CALC-SCNC: 11 MMOL/L
AST SERPL-CCNC: 8 U/L
BILIRUB SERPL-MCNC: 0.4 MG/DL
BUN SERPL-MCNC: 8 MG/DL
CALCIUM SERPL-MCNC: 9.6 MG/DL
CHLORIDE SERPL-SCNC: 104 MMOL/L
CO2 SERPL-SCNC: 24 MMOL/L
CREAT SERPL-MCNC: 0.96 MG/DL
EGFR: 77 ML/MIN/1.73M2
GGT SERPL-CCNC: 38 U/L
GLUCOSE SERPL-MCNC: 90 MG/DL
MAGNESIUM SERPL-MCNC: 1.7 MG/DL
PHOSPHATE SERPL-MCNC: 4.2 MG/DL
POTASSIUM SERPL-SCNC: 4 MMOL/L
PROT SERPL-MCNC: 6.2 G/DL
SODIUM SERPL-SCNC: 140 MMOL/L
TACROLIMUS SERPL-MCNC: 2.5 NG/ML

## 2023-08-13 NOTE — END OF VISIT
[FreeTextEntry3] : Patient seen and examined with NP.  Plan of care made as outlined.  [Time Spent: ___ minutes] : I have spent [unfilled] minutes of time on the encounter. [] : Coordinator / NP

## 2023-08-13 NOTE — ASSESSMENT
[FreeTextEntry1] : 38F with Hx of hypothyroidism (on Levothyroxine), liver cirrhosis 2/2 ETOH abuse. \par s/p HCV+ OLT under Simulect induction on 6/10/2023\par  \par # HCV + GRAFT: good graft function  TB downtrending - TB 1.7, AST 10, ALT 5, ALKP 71. 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 \par

## 2023-08-13 NOTE — HISTORY OF PRESENT ILLNESS
[Alcoholic Liver Disease] : Alcoholic Liver Disease [Liver] : Liver [Donor after brain death (DBD] : Donor after brain death (DBD) [Basiliximab] : Basiliximab [Steroids] : Steroids [Positive/Positive] : Positive/Positive [PHS Increased Risk] : [facility] increased risk [TextBox_52] : HCV +  [FreeTextEntry1] : 38F with Hx of hypothyroidism (on Levothyroxine), liver cirrhosis 2/2 ETOH abuse- presented to [facility] ED on 5/13/23 \par with ~ 2 weeks Hx of progressively worsening abdominal pain and distention.  Transferred on 5/16 for management of alcohol hepatitis.\par \par Now s/p HCV+ OLT under Simulect induction on 6/10/2023\par Post op course c/b VRE bacteremia on antibiotics per ID will be DC home on zyvox ending 7/2/2023 \par s/p Subcutaneous hematoma evacuation with wound packing \par \par Interim Hx: \par 2.5 weeks post op \par d/c 6/26/2023\par c/o incisional pain not relieved by tylenol \par Is taking Zyvox untill 7/2/23 \par tolerating well no diarrhea, cramping n/v \par increasing ambulation \par [facility] for 7/3/23 7pm \par \par \par Current Immuno: \par -Tacrolimus 0.5mg bid, MMF 500mg BID (2/2 bacteremia), Pred 20mg QD\par -PPx Fluconazole/Bactrim/Valcyte\par \par

## 2023-08-13 NOTE — PHYSICAL EXAM
[Alert] : alert [No Acute Distress] : no acute distress [Scleral Icterus] : scleral icterus [Breathing Comfortably on room air] : breathing comfortably on room air [Regular Rhythm] : regular rhythm [No Skin Discoloration] : no skin discoloration [Jaundice] : jaundice [de-identified] : Staples in place left distal wound with packing  [FreeTextEntry1] : 2-3+ pedal edema

## 2023-08-13 NOTE — REVIEW OF SYSTEMS
[Fatigue] : fatigue [Negative] : Respiratory [Fever] : no fever [Chills] : no chills [Abdominal Pain] : no abdominal pain [Nausea] : no nausea [Diarrhea] : diarrhea [Vomiting] : no vomiting

## 2023-08-14 LAB
CMV DNA SPEC QL NAA+PROBE: NOT DETECTED IU/ML
CMVPCR LOG: NOT DETECTED LOG10IU/ML

## 2023-08-16 ENCOUNTER — LABORATORY RESULT (OUTPATIENT)
Age: 38
End: 2023-08-16

## 2023-08-16 ENCOUNTER — APPOINTMENT (OUTPATIENT)
Dept: TRANSPLANT | Facility: CLINIC | Age: 38
End: 2023-08-16
Payer: MEDICAID

## 2023-08-16 VITALS
BODY MASS INDEX: 16.95 KG/M2 | DIASTOLIC BLOOD PRESSURE: 87 MMHG | HEART RATE: 72 BPM | WEIGHT: 108 LBS | HEIGHT: 67 IN | TEMPERATURE: 97.4 F | SYSTOLIC BLOOD PRESSURE: 121 MMHG | OXYGEN SATURATION: 100 % | RESPIRATION RATE: 14 BRPM

## 2023-08-16 LAB
ALBUMIN SERPL ELPH-MCNC: 4.2 G/DL
ALP BLD-CCNC: 221 U/L
ALT SERPL-CCNC: 5 U/L
ANION GAP SERPL CALC-SCNC: 12 MMOL/L
AST SERPL-CCNC: 8 U/L
BILIRUB SERPL-MCNC: 0.3 MG/DL
BUN SERPL-MCNC: 7 MG/DL
CALCIUM SERPL-MCNC: 9.8 MG/DL
CHLORIDE SERPL-SCNC: 105 MMOL/L
CO2 SERPL-SCNC: 23 MMOL/L
CREAT SERPL-MCNC: 0.95 MG/DL
EGFR: 78 ML/MIN/1.73M2
GGT SERPL-CCNC: 33 U/L
GLUCOSE SERPL-MCNC: 83 MG/DL
MAGNESIUM SERPL-MCNC: 1.6 MG/DL
PHOSPHATE SERPL-MCNC: 4.2 MG/DL
POTASSIUM SERPL-SCNC: 4.4 MMOL/L
PROT SERPL-MCNC: 6.3 G/DL
SODIUM SERPL-SCNC: 141 MMOL/L
TACROLIMUS SERPL-MCNC: 4.2 NG/ML

## 2023-08-16 PROCEDURE — 99213 OFFICE O/P EST LOW 20 MIN: CPT

## 2023-08-16 NOTE — ASSESSMENT
[FreeTextEntry1] : 38F with Hx of hypothyroidism (on Levothyroxine), liver cirrhosis 2/2 ETOH abuse.  s/p HCV+ OLT under Simulect induction on 6/10/2023  # HCV + GRAFT:; good graft function - TB 0.4, ast 8, alt 5, alk phos 162; sCr  0.9; alk phos up ; will monitor  # Immunosuppression ;  FK was increased to 4/3 for a level of 2.5. 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

## 2023-08-16 NOTE — PHYSICAL EXAM
[Alert] : alert [No Acute Distress] : no acute distress [No Thyromegaly] : no thyromegaly [No Abnormal Masses] : no abnormal masses [Clear to Auscultation] : lungs were clear to auscultation bilaterally [Breathing Comfortably on room air] : breathing comfortably on room air [Normal Rate] : normal rate [Regular Rhythm] : regular rhythm [Soft] : soft [Normal Bowel Sounds] : normal bowel sounds [No Masses] : no abdominal mass palpated [Clean] : clean [Dry] : dry [Healing Well] : healing well [No Edema] : no edema

## 2023-08-16 NOTE — HISTORY OF PRESENT ILLNESS
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    Now s/p HCV+ OLT under Simulect induction on 6/10/2023  Post op course c/b VRE bacteremia on antibiotics per ID will be DC home on zyvox ending 7/2/2023  s/p Subcutaneous hematoma evacuation with wound packing    Interim Hx: 8-  doing well, active  feels better; eating and ambulating, no pain  increasing ambulation  [facility] started with program  BP elevated  better controlled on nifedipine

## 2023-08-18 ENCOUNTER — LABORATORY RESULT (OUTPATIENT)
Age: 38
End: 2023-08-18

## 2023-08-18 LAB
PETH 16:0/18:1: NEGATIVE NG/ML
PETH 16:0/18:2: NEGATIVE NG/ML
PETH COMMENTS: NORMAL

## 2023-08-23 ENCOUNTER — LABORATORY RESULT (OUTPATIENT)
Age: 38
End: 2023-08-23

## 2023-08-24 LAB
ALBUMIN SERPL ELPH-MCNC: 4.2 G/DL
ALBUMIN SERPL ELPH-MCNC: 4.7 G/DL
ALP BLD-CCNC: 235 U/L
ALP BLD-CCNC: 287 U/L
ALT SERPL-CCNC: 5 U/L
ALT SERPL-CCNC: 5 U/L
ANION GAP SERPL CALC-SCNC: 13 MMOL/L
ANION GAP SERPL CALC-SCNC: 16 MMOL/L
AST SERPL-CCNC: 7 U/L
AST SERPL-CCNC: 9 U/L
BILIRUB SERPL-MCNC: 0.3 MG/DL
BILIRUB SERPL-MCNC: 0.4 MG/DL
BUN SERPL-MCNC: 8 MG/DL
BUN SERPL-MCNC: 9 MG/DL
CALCIUM SERPL-MCNC: 10.2 MG/DL
CALCIUM SERPL-MCNC: 9.7 MG/DL
CHLORIDE SERPL-SCNC: 104 MMOL/L
CHLORIDE SERPL-SCNC: 106 MMOL/L
CMV DNA SPEC QL NAA+PROBE: NOT DETECTED IU/ML
CMVPCR LOG: NOT DETECTED LOG10IU/ML
CO2 SERPL-SCNC: 22 MMOL/L
CO2 SERPL-SCNC: 23 MMOL/L
CREAT SERPL-MCNC: 1.07 MG/DL
CREAT SERPL-MCNC: 1.09 MG/DL
EGFR: 66 ML/MIN/1.73M2
EGFR: 68 ML/MIN/1.73M2
GGT SERPL-CCNC: 27 U/L
GGT SERPL-CCNC: 30 U/L
GLUCOSE SERPL-MCNC: 81 MG/DL
GLUCOSE SERPL-MCNC: 93 MG/DL
HBV CORE IGG+IGM SER QL: NONREACTIVE
HBV SURFACE AB SER QL: REACTIVE
HBV SURFACE AB SERPL IA-ACNC: 126.8 MIU/ML
HBV SURFACE AG SER QL: NONREACTIVE
HCV AB SER QL: REACTIVE
HCV RNA SERPL NAA+PROBE-LOG IU: NOT DETECTED LOGIU/ML
HCV S/CO RATIO: 12.16 S/CO
HEPB DNA PCR INT: NOT DETECTED
HEPB DNA PCR LOG: NOT DETECTED LOGIU/ML
HEPC RNA INTERP: NOT DETECTED
HIV1 RNA # SERPL NAA+PROBE: NORMAL
HIV1 RNA # SERPL NAA+PROBE: NORMAL COPIES/ML
MAGNESIUM SERPL-MCNC: 1.7 MG/DL
MAGNESIUM SERPL-MCNC: 1.8 MG/DL
PETH 16:0/18:1: NEGATIVE NG/ML
PETH 16:0/18:2: NEGATIVE NG/ML
PETH COMMENTS: NORMAL
PHOSPHATE SERPL-MCNC: 5.2 MG/DL
POTASSIUM SERPL-SCNC: 4.1 MMOL/L
POTASSIUM SERPL-SCNC: 4.2 MMOL/L
PROT SERPL-MCNC: 6.4 G/DL
PROT SERPL-MCNC: 6.9 G/DL
SODIUM SERPL-SCNC: 142 MMOL/L
SODIUM SERPL-SCNC: 142 MMOL/L
TACROLIMUS SERPL-MCNC: 5.2 NG/ML
TACROLIMUS SERPL-MCNC: 5.9 NG/ML
VIRAL LOAD INTERP: NORMAL
VIRAL LOAD LOG: NORMAL LG COP/ML

## 2023-08-25 LAB
CMV DNA SPEC QL NAA+PROBE: NOT DETECTED IU/ML
CMVPCR LOG: NOT DETECTED LOG10IU/ML

## 2023-08-29 ENCOUNTER — LABORATORY RESULT (OUTPATIENT)
Age: 38
End: 2023-08-29

## 2023-08-30 ENCOUNTER — APPOINTMENT (OUTPATIENT)
Dept: TRANSPLANT | Facility: CLINIC | Age: 38
End: 2023-08-30
Payer: MEDICAID

## 2023-08-30 VITALS
RESPIRATION RATE: 14 BRPM | HEART RATE: 82 BPM | OXYGEN SATURATION: 100 % | SYSTOLIC BLOOD PRESSURE: 113 MMHG | TEMPERATURE: 98 F | DIASTOLIC BLOOD PRESSURE: 78 MMHG | BODY MASS INDEX: 16.01 KG/M2 | WEIGHT: 102 LBS | HEIGHT: 67 IN

## 2023-08-30 PROCEDURE — 99215 OFFICE O/P EST HI 40 MIN: CPT | Mod: 24

## 2023-08-30 NOTE — ED PROVIDER NOTE - CADM POA PRESS ULCER
fatal adverse events, such as blood cytopenia, rhabdomyolysis or myopathy, peripheral neuropathy, liver failure, or severe cutaneous eruption. Patient was educated on above symptoms and advised to notify clinic if feeling unwell following initiation of colchicine therapy. Patient Instructions  Increase plaquenil to 1.5 tablet daily   Continue colchicine 1 tablet   RTC in 3 months       -  The patient indicates understanding of these issues and agrees with the plan. I spent 22 minutes on the date of service, preparing to see the patient (eg, review of tests), obtaining and/or reviewing separately obtained history, counseling and educating the family/caregiver, ordering medications, tests, or procedures, referring and communicating with other health care professionals, documenting clinical information in the electronic or other health record, care coordination (not separately reported)      Sean Ceron MD    Portions of this note was copied forward from the note written by me on 5/31/2023. I have reviewed and updated the history, physical exam, data, assessment and plan of the note so that it reflects the current evaluation and management of the patient. This note was dictated with voice recognition software. No

## 2023-09-05 ENCOUNTER — LABORATORY RESULT (OUTPATIENT)
Age: 38
End: 2023-09-05

## 2023-09-05 NOTE — HISTORY OF PRESENT ILLNESS
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    Now s/p HCV+ OLT under Simulect induction on 6/10/2023  Post op course c/b VRE bacteremia on antibiotics per ID will be DC home on zyvox ending 7/2/2023  s/p Subcutaneous hematoma evacuation with wound packing    Interim Hx: 8-  doing well, active  feels better; eating and ambulating, no pain  increasing ambulation  [facility] started with program  BP elevated better controlled on nifedipine

## 2023-09-05 NOTE — ASSESSMENT
[FreeTextEntry1] : s/p HCV+ OLT under Simulect induction on 6/10/2023  # HCV + GRAFT:; good graft function - TB 0.2, ast 12, alt 7; sCr 1.2; alk phos improving 232 ; will monitor  # Immunosuppression ; FK  4/3 . 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

## 2023-09-06 ENCOUNTER — APPOINTMENT (OUTPATIENT)
Dept: HEPATOLOGY | Facility: CLINIC | Age: 38
End: 2023-09-06
Payer: MEDICAID

## 2023-09-06 VITALS
HEART RATE: 86 BPM | DIASTOLIC BLOOD PRESSURE: 77 MMHG | HEIGHT: 67 IN | WEIGHT: 100 LBS | TEMPERATURE: 97.7 F | OXYGEN SATURATION: 100 % | BODY MASS INDEX: 15.7 KG/M2 | SYSTOLIC BLOOD PRESSURE: 112 MMHG

## 2023-09-06 DIAGNOSIS — B95.2 BACTEREMIA: ICD-10-CM

## 2023-09-06 DIAGNOSIS — F10.21 ALCOHOL DEPENDENCE, IN REMISSION: ICD-10-CM

## 2023-09-06 DIAGNOSIS — K70.11 ALCOHOLIC HEPATITIS WITH ASCITES: ICD-10-CM

## 2023-09-06 DIAGNOSIS — B95.61 BACTEREMIA: ICD-10-CM

## 2023-09-06 DIAGNOSIS — J90 PLEURAL EFFUSION, NOT ELSEWHERE CLASSIFIED: ICD-10-CM

## 2023-09-06 DIAGNOSIS — Z16.21 BACTEREMIA: ICD-10-CM

## 2023-09-06 DIAGNOSIS — R78.81 BACTEREMIA: ICD-10-CM

## 2023-09-06 PROCEDURE — 99215 OFFICE O/P EST HI 40 MIN: CPT

## 2023-09-06 RX ORDER — FUROSEMIDE 40 MG/1
40 TABLET ORAL
Qty: 30 | Refills: 0 | Status: DISCONTINUED | COMMUNITY
Start: 2023-06-28 | End: 2023-09-06

## 2023-09-06 RX ORDER — FLUCONAZOLE 200 MG/1
200 TABLET ORAL
Qty: 30 | Refills: 0 | Status: DISCONTINUED | COMMUNITY
Start: 2023-06-14 | End: 2023-09-06

## 2023-09-06 RX ORDER — VALGANCICLOVIR HYDROCHLORIDE 450 MG/1
450 TABLET ORAL
Qty: 30 | Refills: 2 | Status: DISCONTINUED | COMMUNITY
Start: 2023-06-14 | End: 2023-09-06

## 2023-09-06 RX ORDER — NIFEDIPINE 30 MG/1
30 TABLET, EXTENDED RELEASE ORAL
Qty: 30 | Refills: 1 | Status: DISCONTINUED | COMMUNITY
Start: 2023-07-26 | End: 2023-09-06

## 2023-09-06 RX ORDER — PREDNISONE 10 MG/1
10 TABLET ORAL DAILY
Qty: 60 | Refills: 1 | Status: DISCONTINUED | COMMUNITY
Start: 2023-06-14 | End: 2023-09-06

## 2023-09-06 RX ORDER — VALGANCICLOVIR HYDROCHLORIDE 450 MG/1
450 TABLET ORAL
Qty: 30 | Refills: 1 | Status: DISCONTINUED | COMMUNITY
Start: 2023-08-31 | End: 2023-09-06

## 2023-09-06 RX ORDER — TACROLIMUS 1 MG/1
1 CAPSULE ORAL DAILY
Qty: 210 | Refills: 3 | Status: DISCONTINUED | COMMUNITY
Start: 2023-08-31 | End: 2023-09-06

## 2023-09-06 RX ORDER — ACETAMINOPHEN 325 MG/1
325 TABLET, FILM COATED ORAL
Qty: 240 | Refills: 0 | Status: DISCONTINUED | COMMUNITY
Start: 2023-06-14 | End: 2023-09-06

## 2023-09-06 RX ORDER — TACROLIMUS 1 MG/1
1 CAPSULE ORAL TWICE DAILY
Qty: 240 | Refills: 5 | Status: DISCONTINUED | COMMUNITY
Start: 2023-08-16 | End: 2023-09-06

## 2023-09-06 RX ORDER — TACROLIMUS 0.5 MG/1
0.5 CAPSULE ORAL
Qty: 60 | Refills: 1 | Status: DISCONTINUED | COMMUNITY
Start: 2023-06-14 | End: 2023-09-06

## 2023-09-06 RX ORDER — URSODIOL 300 MG/1
300 CAPSULE ORAL
Qty: 60 | Refills: 2 | Status: DISCONTINUED | COMMUNITY
Start: 2023-06-14 | End: 2023-09-06

## 2023-09-06 RX ORDER — ACETAMINOPHEN 325 MG/1
325 TABLET, FILM COATED ORAL
Qty: 30 | Refills: 0 | Status: DISCONTINUED | COMMUNITY
Start: 2023-07-26 | End: 2023-09-06

## 2023-09-06 NOTE — HISTORY OF PRESENT ILLNESS
[Alcoholic Liver Disease] : Alcoholic Liver Disease [Liver] : Liver [Donor after brain death (DBD] : Donor after brain death (DBD) [Basiliximab] : Basiliximab [Steroids] : Steroids [Positive/Positive] : Positive/Positive [FreeTextEntry1] : Toshia Miller is a 38-year-old female with PMHx of hypothyroidism (on Levothyroxine); hx of EtOH liver cirrhosis/ hepatitis s/p liver transplant (6/9/2023) on Cellcept, Prograf and prednisone who presents to establish care post-transplant.   She has been compliant with all medications. Reports she is 6 months sober. She follows with [facility] and has counselor Nirav through [facility] ([facility] in [place]), no psychiatrist. She complains of difficulty sleeping. Reports some nights she only sleeps 2 hours. Has not tried any medications. She is open to trying Gabapentin.  Pt reports 2 broken ribs from surgery in June. She reports some pain on R thoracic region when walking. Recent x-ray done at [facility].   Denies any fevers, chills, nausea, vomiting, abdominal pain or cramping.

## 2023-09-06 NOTE — PHYSICAL EXAM
[Well Nourished] : well nourished [Healthy Appearing] : healthy appearing [No Acute Distress] : no acute distress [Scleral Icterus] : no scleral icterus [EOMI] : extra ocular movement intact [Normal Hearing] : normal hearing [Hepatojugular Reflux] : no hepatojugular reflux [No Neck Mass] : no neck mass [No Respiratory Distress] : no respiratory distress [No Accessory Muscle Use] : no accessory muscle use [Clear to Auscultation] : lungs were clear to auscultation bilaterally [Normal S1, S2] : normal S1 and S2 [No Murmurs] : no murmurs heard [Normal Rate/Rhythm] : normal rate/rhythm [Ascites Tense] : no ascites tense [Caput Medusae] : no caput medusae [Normal Bowel Sounds] : normal bowel sounds [Non Tender] : non-tender [No Masses] : no abdominal mass palpated [Soft] : soft [Previous Abdominal Surgery] : previous abdominal surgery [Clean] : clean [Dry] : dry [Healing Well] : healing well [Jaundice] : no jaundice [Palmar Erythema] : no palmar erythema [No Rash] : no rash [Asterixis] : no asterixis [No Focal Deficits] : no focal deficits [Depression] : no depression [Hallucinations] : no hallucinations [Normal Affect] : normal affect [Remote Memory Intact] : remote memory intact [Normal Insight/Judgement] : normal insight/judgement

## 2023-09-06 NOTE — ASSESSMENT
[FreeTextEntry1] : Toshia Miller is a 38-year-old female with PMHx of hypothyroidism (on Levothyroxine); hx of EtOH liver cirrhosis/ hepatitis s/p liver transplant (6/9/2023) on Cellcept, Prograf and prednisone who presents to establish care post-transplant.   #s/p Liver Transplant  - Continue Prograf 4 tablets in AM and 3 tablets in PM.  - Decrease Cellcept to 1 tablet BID. - Continue Prednisone 5 mg daily.  - Continue Ursodiol.  - Continue prophylaxis with Bactrim.  - Continue Epclusa until 8/13/2023. Donor Hep C positive.  - D/c Valganciclovir.  - Follow up in 2 weeks.

## 2023-09-06 NOTE — REVIEW OF SYSTEMS
[Fever] : no fever [Chills] : no chills [Fatigue] : no fatigue [Night Sweats] : no night sweats [Sore throat] : no sore throat [Pain/Stiffness] : no pain/stiffness [Rhinorrhea] : no rhinorrhea [Sclera anicteric] : sclera icteric [Blurred Vision] : no blurred vision [Chest Pain] : no chest pain [Palpitations] : no palpitations [SOB] : no shortness of breath [Wheezing] : no wheezing [Cough] : no cough [Pleuritic Chest Pain] : no pleuritic chest pain [Abdominal Pain] : no abdominal pain [Nausea] : no nausea [Constipation] : no constipation [Diarrhea] : diarrhea [Vomiting] : no vomiting [Joint Pain] : no joint pain [Joint Stiffness] : no joint stiffness [Muscle Pain] : no muscle pain [Muscle Weakness] : no muscle weakness [Headache] : no headache [Dizziness] : no dizziness [Fainting] : no fainting [Confusion] : no confusion [Memory Loss] : no memory loss [Unsteady Gait] : steady gait [Seizures] : no seizures [Suicidal] : not suicidal [Hallucinations] : no hallucinations [Anxiety] : no anxiety [Anemia] : no anemia [Adenopathy] : no adenopathy [Easy Bleeding] : no easy bleeding [Easy Bruising] : no easy bruising

## 2023-09-08 LAB
ALBUMIN SERPL ELPH-MCNC: 4.6 G/DL
ALBUMIN SERPL ELPH-MCNC: 5 G/DL
ALP BLD-CCNC: 195 U/L
ALP BLD-CCNC: 232 U/L
ALT SERPL-CCNC: 7 U/L
ALT SERPL-CCNC: 7 U/L
ANION GAP SERPL CALC-SCNC: 13 MMOL/L
ANION GAP SERPL CALC-SCNC: 14 MMOL/L
AST SERPL-CCNC: 12 U/L
AST SERPL-CCNC: 12 U/L
BILIRUB SERPL-MCNC: 0.2 MG/DL
BILIRUB SERPL-MCNC: 0.2 MG/DL
BUN SERPL-MCNC: 16 MG/DL
BUN SERPL-MCNC: 9 MG/DL
CALCIUM SERPL-MCNC: 10.5 MG/DL
CALCIUM SERPL-MCNC: 9.8 MG/DL
CHLORIDE SERPL-SCNC: 105 MMOL/L
CHLORIDE SERPL-SCNC: 106 MMOL/L
CMV DNA SPEC QL NAA+PROBE: NOT DETECTED IU/ML
CMV DNA SPEC QL NAA+PROBE: NOT DETECTED IU/ML
CMVPCR LOG: NOT DETECTED LOG10IU/ML
CMVPCR LOG: NOT DETECTED LOG10IU/ML
CO2 SERPL-SCNC: 24 MMOL/L
CO2 SERPL-SCNC: 26 MMOL/L
CREAT SERPL-MCNC: 1.14 MG/DL
CREAT SERPL-MCNC: 1.19 MG/DL
EGFR: 60 ML/MIN/1.73M2
EGFR: 63 ML/MIN/1.73M2
GGT SERPL-CCNC: 18 U/L
GGT SERPL-CCNC: 23 U/L
GLUCOSE SERPL-MCNC: 107 MG/DL
GLUCOSE SERPL-MCNC: 91 MG/DL
MAGNESIUM SERPL-MCNC: 1.6 MG/DL
MAGNESIUM SERPL-MCNC: 1.6 MG/DL
PHOSPHATE SERPL-MCNC: 4.4 MG/DL
PHOSPHATE SERPL-MCNC: 4.7 MG/DL
POTASSIUM SERPL-SCNC: 4.1 MMOL/L
POTASSIUM SERPL-SCNC: 4.3 MMOL/L
PROT SERPL-MCNC: 6.8 G/DL
PROT SERPL-MCNC: 7 G/DL
SODIUM SERPL-SCNC: 143 MMOL/L
SODIUM SERPL-SCNC: 146 MMOL/L
TACROLIMUS SERPL-MCNC: 5.8 NG/ML
TACROLIMUS SERPL-MCNC: 5.9 NG/ML

## 2023-09-11 ENCOUNTER — LABORATORY RESULT (OUTPATIENT)
Age: 38
End: 2023-09-11

## 2023-09-12 LAB
ALBUMIN SERPL ELPH-MCNC: 4.6 G/DL
ALP BLD-CCNC: 243 U/L
ALT SERPL-CCNC: 11 U/L
ANION GAP SERPL CALC-SCNC: 11 MMOL/L
AST SERPL-CCNC: 18 U/L
BASOPHILS # BLD AUTO: 0.09 K/UL
BASOPHILS NFR BLD AUTO: 2.6 %
BILIRUB SERPL-MCNC: 0.2 MG/DL
BUN SERPL-MCNC: 10 MG/DL
CALCIUM SERPL-MCNC: 9.7 MG/DL
CHLORIDE SERPL-SCNC: 105 MMOL/L
CHOLEST SERPL-MCNC: 155 MG/DL
CMV DNA SPEC QL NAA+PROBE: NOT DETECTED IU/ML
CMVPCR LOG: NOT DETECTED LOG10IU/ML
CO2 SERPL-SCNC: 27 MMOL/L
CREAT SERPL-MCNC: 1.05 MG/DL
EGFR: 69 ML/MIN/1.73M2
EOSINOPHIL # BLD AUTO: 0.21 K/UL
EOSINOPHIL NFR BLD AUTO: 6 %
ESTIMATED AVERAGE GLUCOSE: 105 MG/DL
GGT SERPL-CCNC: 15 U/L
GLUCOSE SERPL-MCNC: 84 MG/DL
HBA1C MFR BLD HPLC: 5.3 %
HCT VFR BLD CALC: 36.3 %
HDLC SERPL-MCNC: 62 MG/DL
HGB BLD-MCNC: 11 G/DL
LDLC SERPL CALC-MCNC: 67 MG/DL
LYMPHOCYTES # BLD AUTO: 1.24 K/UL
LYMPHOCYTES NFR BLD AUTO: 35.3 %
MAGNESIUM SERPL-MCNC: 1.6 MG/DL
MAN DIFF?: NORMAL
MCHC RBC-ENTMCNC: 30.3 GM/DL
MCHC RBC-ENTMCNC: 32.5 PG
MCV RBC AUTO: 107.4 FL
MONOCYTES # BLD AUTO: 0.37 K/UL
MONOCYTES NFR BLD AUTO: 10.4 %
NEUTROPHILS # BLD AUTO: 1.45 K/UL
NEUTROPHILS NFR BLD AUTO: 37.1 %
NONHDLC SERPL-MCNC: 93 MG/DL
PHOSPHATE SERPL-MCNC: 4.4 MG/DL
PLATELET # BLD AUTO: 260 K/UL
POTASSIUM SERPL-SCNC: 4.4 MMOL/L
PROT SERPL-MCNC: 6.6 G/DL
RBC # BLD: 3.38 M/UL
RBC # FLD: 13.3 %
SODIUM SERPL-SCNC: 144 MMOL/L
TACROLIMUS SERPL-MCNC: 5.3 NG/ML
TRIGL SERPL-MCNC: 156 MG/DL
TSH SERPL-ACNC: 14.2 UIU/ML
WBC # FLD AUTO: 3.51 K/UL

## 2023-09-15 LAB
ALP BLD-CCNC: 239 IU/L
ALP BONE CFR SERPL: 83 %
ALP INTEST CFR SERPL: 0 %
ALP LIVER CFR SERPL: 17 %

## 2023-09-18 ENCOUNTER — LABORATORY RESULT (OUTPATIENT)
Age: 38
End: 2023-09-18

## 2023-09-18 LAB
PETH 16:0/18:1: NEGATIVE NG/ML
PETH 16:0/18:2: NEGATIVE NG/ML
PETH COMMENTS: NORMAL

## 2023-09-19 PROBLEM — J90 PLEURAL EFFUSION, RIGHT: Status: RESOLVED | Noted: 2023-09-19 | Resolved: 2023-09-19

## 2023-09-19 PROBLEM — R78.81 VRE BACTEREMIA: Status: RESOLVED | Noted: 2023-09-19 | Resolved: 2023-09-19

## 2023-09-19 PROBLEM — K70.11 ALCOHOLIC HEPATITIS WITH ASCITES: Status: RESOLVED | Noted: 2023-09-19 | Resolved: 2023-09-19

## 2023-09-19 PROBLEM — R78.81 MSSA BACTEREMIA: Status: RESOLVED | Noted: 2023-09-19 | Resolved: 2023-09-19

## 2023-09-19 LAB
ALBUMIN SERPL ELPH-MCNC: 4.6 G/DL
ALP BLD-CCNC: 266 U/L
ALT SERPL-CCNC: 9 U/L
ANION GAP SERPL CALC-SCNC: 11 MMOL/L
AST SERPL-CCNC: 14 U/L
BASOPHILS # BLD AUTO: 0.06 K/UL
BASOPHILS NFR BLD AUTO: 1.7 %
BILIRUB SERPL-MCNC: <0.2 MG/DL
BUN SERPL-MCNC: 14 MG/DL
CALCIUM SERPL-MCNC: 10 MG/DL
CHLORIDE SERPL-SCNC: 105 MMOL/L
CMV DNA SPEC QL NAA+PROBE: NOT DETECTED IU/ML
CMVPCR LOG: NOT DETECTED LOG10IU/ML
CO2 SERPL-SCNC: 27 MMOL/L
CREAT SERPL-MCNC: 0.99 MG/DL
EGFR: 74 ML/MIN/1.73M2
EOSINOPHIL # BLD AUTO: 0.16 K/UL
EOSINOPHIL NFR BLD AUTO: 4.3 %
GGT SERPL-CCNC: 17 U/L
GLUCOSE SERPL-MCNC: 84 MG/DL
HCT VFR BLD CALC: 35.6 %
HGB BLD-MCNC: 11 G/DL
LYMPHOCYTES # BLD AUTO: 0.81 K/UL
LYMPHOCYTES NFR BLD AUTO: 22.2 %
MAN DIFF?: NORMAL
MCHC RBC-ENTMCNC: 30.9 GM/DL
MCHC RBC-ENTMCNC: 32.2 PG
MCV RBC AUTO: 104.1 FL
MONOCYTES # BLD AUTO: 0.79 K/UL
MONOCYTES NFR BLD AUTO: 21.4 %
NEUTROPHILS # BLD AUTO: 1.66 K/UL
NEUTROPHILS NFR BLD AUTO: 34.2 %
PLATELET # BLD AUTO: 297 K/UL
POTASSIUM SERPL-SCNC: 4.2 MMOL/L
PROT SERPL-MCNC: 6.7 G/DL
RBC # BLD: 3.42 M/UL
RBC # FLD: 12.8 %
SODIUM SERPL-SCNC: 142 MMOL/L
TACROLIMUS SERPL-MCNC: 4.1 NG/ML
WBC # FLD AUTO: 3.67 K/UL

## 2023-09-20 ENCOUNTER — APPOINTMENT (OUTPATIENT)
Dept: HEPATOLOGY | Facility: CLINIC | Age: 38
End: 2023-09-20
Payer: MEDICAID

## 2023-09-20 VITALS
SYSTOLIC BLOOD PRESSURE: 125 MMHG | HEART RATE: 80 BPM | HEIGHT: 67 IN | BODY MASS INDEX: 16.32 KG/M2 | TEMPERATURE: 97.6 F | WEIGHT: 104 LBS | DIASTOLIC BLOOD PRESSURE: 70 MMHG | OXYGEN SATURATION: 99 %

## 2023-09-20 PROCEDURE — 99214 OFFICE O/P EST MOD 30 MIN: CPT

## 2023-09-20 RX ORDER — VELPATASVIR AND SOFOSBUVIR 100; 400 MG/1; MG/1
400-100 TABLET, FILM COATED ORAL DAILY
Qty: 30 | Refills: 1 | Status: DISCONTINUED | COMMUNITY
Start: 2023-06-19 | End: 2023-09-20

## 2023-09-20 RX ORDER — GABAPENTIN 300 MG/1
300 CAPSULE ORAL
Qty: 90 | Refills: 3 | Status: DISCONTINUED | COMMUNITY
Start: 2023-09-06 | End: 2023-09-20

## 2023-09-20 RX ORDER — URSODIOL 300 MG/1
300 CAPSULE ORAL
Qty: 180 | Refills: 3 | Status: DISCONTINUED | COMMUNITY
Start: 2023-08-31 | End: 2023-09-20

## 2023-09-25 ENCOUNTER — APPOINTMENT (OUTPATIENT)
Dept: RADIOLOGY | Facility: CLINIC | Age: 38
End: 2023-09-25
Payer: MEDICAID

## 2023-09-25 PROCEDURE — 77080 DXA BONE DENSITY AXIAL: CPT

## 2023-09-28 ENCOUNTER — LABORATORY RESULT (OUTPATIENT)
Age: 38
End: 2023-09-28

## 2023-10-02 ENCOUNTER — LABORATORY RESULT (OUTPATIENT)
Age: 38
End: 2023-10-02

## 2023-10-02 ENCOUNTER — NON-APPOINTMENT (OUTPATIENT)
Age: 38
End: 2023-10-02

## 2023-10-02 ENCOUNTER — APPOINTMENT (OUTPATIENT)
Dept: FAMILY MEDICINE | Facility: CLINIC | Age: 38
End: 2023-10-02
Payer: MEDICAID

## 2023-10-02 VITALS
HEART RATE: 89 BPM | BODY MASS INDEX: 16.01 KG/M2 | WEIGHT: 102 LBS | SYSTOLIC BLOOD PRESSURE: 117 MMHG | DIASTOLIC BLOOD PRESSURE: 77 MMHG | HEIGHT: 67 IN | TEMPERATURE: 97.5 F | OXYGEN SATURATION: 98 %

## 2023-10-02 LAB
ALBUMIN SERPL ELPH-MCNC: 4.5 G/DL
ALP BLD-CCNC: 196 U/L
ALT SERPL-CCNC: 16 U/L
ANION GAP SERPL CALC-SCNC: 14 MMOL/L
AST SERPL-CCNC: 20 U/L
BILIRUB SERPL-MCNC: 0.2 MG/DL
BUN SERPL-MCNC: 18 MG/DL
CALCIUM SERPL-MCNC: 9.4 MG/DL
CHLORIDE SERPL-SCNC: 105 MMOL/L
CMV DNA SPEC QL NAA+PROBE: NOT DETECTED IU/ML
CMVPCR LOG: NOT DETECTED LOG10IU/ML
CO2 SERPL-SCNC: 22 MMOL/L
CREAT SERPL-MCNC: 1.03 MG/DL
EGFR: 71 ML/MIN/1.73M2
GGT SERPL-CCNC: 22 U/L
GLUCOSE SERPL-MCNC: 90 MG/DL
MAGNESIUM SERPL-MCNC: 1.7 MG/DL
PHOSPHATE SERPL-MCNC: 4.7 MG/DL
POTASSIUM SERPL-SCNC: 3.7 MMOL/L
PROT SERPL-MCNC: 6.5 G/DL
SODIUM SERPL-SCNC: 142 MMOL/L
TACROLIMUS SERPL-MCNC: 6.9 NG/ML

## 2023-10-02 PROCEDURE — 99203 OFFICE O/P NEW LOW 30 MIN: CPT

## 2023-10-02 RX ORDER — SENNOSIDES 8.6 MG
8.6 TABLET ORAL
Qty: 30 | Refills: 0 | Status: DISCONTINUED | COMMUNITY
Start: 2023-06-14 | End: 2023-10-02

## 2023-10-03 LAB
25(OH)D3 SERPL-MCNC: 44.4 NG/ML
ALBUMIN SERPL ELPH-MCNC: 5 G/DL
ALP BLD-CCNC: 140 U/L
ALT SERPL-CCNC: 14 U/L
ANION GAP SERPL CALC-SCNC: 13 MMOL/L
AST SERPL-CCNC: 21 U/L
BASOPHILS # BLD AUTO: 0.05 K/UL
BASOPHILS NFR BLD AUTO: 1.7 %
BILIRUB SERPL-MCNC: 0.2 MG/DL
BUN SERPL-MCNC: 17 MG/DL
CALCIUM SERPL-MCNC: 10 MG/DL
CALCIUM SERPL-MCNC: 10 MG/DL
CHLORIDE SERPL-SCNC: 106 MMOL/L
CMV DNA SPEC QL NAA+PROBE: NOT DETECTED IU/ML
CMVPCR LOG: NOT DETECTED LOG10IU/ML
CO2 SERPL-SCNC: 25 MMOL/L
CREAT SERPL-MCNC: 1.19 MG/DL
EGFR: 60 ML/MIN/1.73M2
EOSINOPHIL # BLD AUTO: 0.21 K/UL
EOSINOPHIL NFR BLD AUTO: 6.9 %
GGT SERPL-CCNC: 27 U/L
GLUCOSE SERPL-MCNC: 84 MG/DL
HCT VFR BLD CALC: 35.2 %
HGB BLD-MCNC: 11.2 G/DL
LYMPHOCYTES # BLD AUTO: 1.67 K/UL
LYMPHOCYTES NFR BLD AUTO: 54.3 %
MAN DIFF?: NORMAL
MCHC RBC-ENTMCNC: 31.5 PG
MCHC RBC-ENTMCNC: 31.8 GM/DL
MCV RBC AUTO: 99.2 FL
MONOCYTES # BLD AUTO: 0.16 K/UL
MONOCYTES NFR BLD AUTO: 5.2 %
NEUTROPHILS # BLD AUTO: 0.98 K/UL
NEUTROPHILS NFR BLD AUTO: 31.9 %
PARATHYROID HORMONE INTACT: 21 PG/ML
PLATELET # BLD AUTO: 264 K/UL
POTASSIUM SERPL-SCNC: 4.4 MMOL/L
PROT SERPL-MCNC: 6.9 G/DL
RBC # BLD: 3.55 M/UL
RBC # FLD: 12.2 %
SODIUM SERPL-SCNC: 144 MMOL/L
TACROLIMUS SERPL-MCNC: 6.9 NG/ML
TSH SERPL-ACNC: 5.23 UIU/ML
WBC # FLD AUTO: 3.07 K/UL

## 2023-10-04 ENCOUNTER — LABORATORY RESULT (OUTPATIENT)
Age: 38
End: 2023-10-04

## 2023-10-04 ENCOUNTER — APPOINTMENT (OUTPATIENT)
Dept: OBGYN | Facility: CLINIC | Age: 38
End: 2023-10-04
Payer: MEDICAID

## 2023-10-04 ENCOUNTER — RESULT REVIEW (OUTPATIENT)
Age: 38
End: 2023-10-04

## 2023-10-04 ENCOUNTER — OUTPATIENT (OUTPATIENT)
Dept: OUTPATIENT SERVICES | Facility: HOSPITAL | Age: 38
LOS: 1 days | End: 2023-10-04
Payer: MEDICAID

## 2023-10-04 VITALS — BODY MASS INDEX: 164.45 KG/M2 | WEIGHT: 293 LBS | DIASTOLIC BLOOD PRESSURE: 74 MMHG | SYSTOLIC BLOOD PRESSURE: 108 MMHG

## 2023-10-04 DIAGNOSIS — N76.0 ACUTE VAGINITIS: ICD-10-CM

## 2023-10-04 DIAGNOSIS — Z01.419 ENCOUNTER FOR GYNECOLOGICAL EXAMINATION (GENERAL) (ROUTINE) W/OUT ABNORMAL FINDINGS: ICD-10-CM

## 2023-10-04 PROCEDURE — 87491 CHLMYD TRACH DNA AMP PROBE: CPT

## 2023-10-04 PROCEDURE — G0463: CPT

## 2023-10-04 PROCEDURE — 87624 HPV HI-RISK TYP POOLED RSLT: CPT

## 2023-10-04 PROCEDURE — 87591 N.GONORRHOEAE DNA AMP PROB: CPT

## 2023-10-04 PROCEDURE — 87625 HPV TYPES 16 & 18 ONLY: CPT

## 2023-10-04 PROCEDURE — 88175 CYTOPATH C/V AUTO FLUID REDO: CPT

## 2023-10-04 PROCEDURE — 99385 PREV VISIT NEW AGE 18-39: CPT

## 2023-10-04 PROCEDURE — 88141 CYTOPATH C/V INTERPRET: CPT

## 2023-10-05 LAB
C TRACH RRNA SPEC QL NAA+PROBE: SIGNIFICANT CHANGE UP
HPV HIGH+LOW RISK DNA PNL CVX: DETECTED
N GONORRHOEA RRNA SPEC QL NAA+PROBE: SIGNIFICANT CHANGE UP
PETH 16:0/18:1: NEGATIVE NG/ML
PETH 16:0/18:2: NEGATIVE NG/ML
PETH COMMENTS: NORMAL
SPECIMEN SOURCE: SIGNIFICANT CHANGE UP

## 2023-10-06 DIAGNOSIS — R93.89 ABNORMAL FINDINGS ON DIAGNOSTIC IMAGING OF OTHER SPECIFIED BODY STRUCTURES: ICD-10-CM

## 2023-10-07 LAB
HPV GENOTYPE 16: SIGNIFICANT CHANGE UP
HPV GENOTYPE 18/45: SIGNIFICANT CHANGE UP

## 2023-10-09 LAB — CYTOLOGY SPEC DOC CYTO: SIGNIFICANT CHANGE UP

## 2023-10-12 DIAGNOSIS — Z01.419 ENCOUNTER FOR GYNECOLOGICAL EXAMINATION (GENERAL) (ROUTINE) WITHOUT ABNORMAL FINDINGS: ICD-10-CM

## 2023-10-15 LAB
ALBUMIN SERPL ELPH-MCNC: 4.8 G/DL
ALP BLD-CCNC: 113 U/L
ALT SERPL-CCNC: 28 U/L
ANION GAP SERPL CALC-SCNC: 11 MMOL/L
AST SERPL-CCNC: 23 U/L
BASOPHILS # BLD AUTO: 0.04 K/UL
BASOPHILS NFR BLD AUTO: 0.8 %
BILIRUB SERPL-MCNC: 0.2 MG/DL
BUN SERPL-MCNC: 14 MG/DL
CALCIUM SERPL-MCNC: 10 MG/DL
CHLORIDE SERPL-SCNC: 104 MMOL/L
CO2 SERPL-SCNC: 28 MMOL/L
CREAT SERPL-MCNC: 1.22 MG/DL
EGFR: 58 ML/MIN/1.73M2
EOSINOPHIL # BLD AUTO: 0.17 K/UL
EOSINOPHIL NFR BLD AUTO: 3.3 %
GGT SERPL-CCNC: 104 U/L
GLUCOSE SERPL-MCNC: 94 MG/DL
HCT VFR BLD CALC: 34.7 %
HGB BLD-MCNC: 11.2 G/DL
IMM GRANULOCYTES NFR BLD AUTO: 0.4 %
LYMPHOCYTES # BLD AUTO: 1.27 K/UL
LYMPHOCYTES NFR BLD AUTO: 24.3 %
MAN DIFF?: NORMAL
MCHC RBC-ENTMCNC: 32.3 GM/DL
MCHC RBC-ENTMCNC: 32.4 PG
MCV RBC AUTO: 100.3 FL
MONOCYTES # BLD AUTO: 0.64 K/UL
MONOCYTES NFR BLD AUTO: 12.3 %
NEUTROPHILS # BLD AUTO: 3.08 K/UL
NEUTROPHILS NFR BLD AUTO: 58.9 %
PLATELET # BLD AUTO: 210 K/UL
POTASSIUM SERPL-SCNC: 3.9 MMOL/L
PROT SERPL-MCNC: 6.8 G/DL
RBC # BLD: 3.46 M/UL
RBC # FLD: 12.2 %
SODIUM SERPL-SCNC: 143 MMOL/L
TACROLIMUS SERPL-MCNC: 7.6 NG/ML
WBC # FLD AUTO: 5.22 K/UL

## 2023-10-16 ENCOUNTER — APPOINTMENT (OUTPATIENT)
Dept: ENDOCRINOLOGY | Facility: CLINIC | Age: 38
End: 2023-10-16
Payer: MEDICAID

## 2023-10-16 VITALS
HEIGHT: 67 IN | WEIGHT: 106 LBS | OXYGEN SATURATION: 100 % | HEART RATE: 77 BPM | RESPIRATION RATE: 14 BRPM | DIASTOLIC BLOOD PRESSURE: 70 MMHG | SYSTOLIC BLOOD PRESSURE: 110 MMHG | BODY MASS INDEX: 16.64 KG/M2

## 2023-10-16 DIAGNOSIS — Z63.5 DISRUPTION OF FAMILY BY SEPARATION AND DIVORCE: ICD-10-CM

## 2023-10-16 DIAGNOSIS — F17.290 NICOTINE DEPENDENCE, OTHER TOBACCO PRODUCT, UNCOMPLICATED: ICD-10-CM

## 2023-10-16 PROCEDURE — 99205 OFFICE O/P NEW HI 60 MIN: CPT | Mod: 25

## 2023-10-16 PROCEDURE — 99406 BEHAV CHNG SMOKING 3-10 MIN: CPT

## 2023-10-16 PROCEDURE — G0444 DEPRESSION SCREEN ANNUAL: CPT | Mod: 59

## 2023-10-16 SDOH — SOCIAL STABILITY - SOCIAL INSECURITY: DISRUPTION OF FAMILY BY SEPARATION AND DIVORCE: Z63.5

## 2023-10-19 ENCOUNTER — APPOINTMENT (OUTPATIENT)
Dept: HEPATOLOGY | Facility: CLINIC | Age: 38
End: 2023-10-19
Payer: MEDICAID

## 2023-10-19 VITALS
OXYGEN SATURATION: 100 % | WEIGHT: 108 LBS | SYSTOLIC BLOOD PRESSURE: 111 MMHG | DIASTOLIC BLOOD PRESSURE: 73 MMHG | BODY MASS INDEX: 16.95 KG/M2 | TEMPERATURE: 97.7 F | HEIGHT: 67 IN | HEART RATE: 82 BPM

## 2023-10-19 DIAGNOSIS — R74.8 ABNORMAL LEVELS OF OTHER SERUM ENZYMES: ICD-10-CM

## 2023-10-19 PROCEDURE — 99214 OFFICE O/P EST MOD 30 MIN: CPT

## 2023-10-24 DIAGNOSIS — F51.04 PSYCHOPHYSIOLOGIC INSOMNIA: ICD-10-CM

## 2023-10-24 RX ORDER — TRAZODONE HYDROCHLORIDE 50 MG/1
50 TABLET ORAL AT BEDTIME
Qty: 60 | Refills: 11 | Status: DISCONTINUED | COMMUNITY
Start: 2023-09-20 | End: 2023-10-24

## 2023-10-27 ENCOUNTER — LABORATORY RESULT (OUTPATIENT)
Age: 38
End: 2023-10-27

## 2023-10-30 LAB
ALBUMIN SERPL ELPH-MCNC: 4.4 G/DL
ALP BLD-CCNC: 72 U/L
ALT SERPL-CCNC: 14 U/L
ANION GAP SERPL CALC-SCNC: 10 MMOL/L
AST SERPL-CCNC: 14 U/L
BASOPHILS # BLD AUTO: 0.02 K/UL
BASOPHILS NFR BLD AUTO: 0.4 %
BILIRUB SERPL-MCNC: <0.2 MG/DL
BUN SERPL-MCNC: 20 MG/DL
CALCIUM SERPL-MCNC: 9.5 MG/DL
CHLORIDE SERPL-SCNC: 104 MMOL/L
CO2 SERPL-SCNC: 26 MMOL/L
CREAT SERPL-MCNC: 0.89 MG/DL
EGFR: 85 ML/MIN/1.73M2
EOSINOPHIL # BLD AUTO: 0.15 K/UL
EOSINOPHIL NFR BLD AUTO: 2.8 %
GGT SERPL-CCNC: 63 U/L
GLUCOSE SERPL-MCNC: 92 MG/DL
HCT VFR BLD CALC: 29.6 %
HGB BLD-MCNC: 9.8 G/DL
IMM GRANULOCYTES NFR BLD AUTO: 0.2 %
LYMPHOCYTES # BLD AUTO: 1.28 K/UL
LYMPHOCYTES NFR BLD AUTO: 24.1 %
MAN DIFF?: NORMAL
MCHC RBC-ENTMCNC: 31.6 PG
MCHC RBC-ENTMCNC: 33.1 GM/DL
MCV RBC AUTO: 95.5 FL
MONOCYTES # BLD AUTO: 0.55 K/UL
MONOCYTES NFR BLD AUTO: 10.4 %
NEUTROPHILS # BLD AUTO: 3.3 K/UL
NEUTROPHILS NFR BLD AUTO: 62.1 %
PLATELET # BLD AUTO: 213 K/UL
POTASSIUM SERPL-SCNC: 3.6 MMOL/L
PROT SERPL-MCNC: 6.2 G/DL
RBC # BLD: 3.1 M/UL
RBC # FLD: 12.5 %
SODIUM SERPL-SCNC: 141 MMOL/L
WBC # FLD AUTO: 5.31 K/UL

## 2023-10-31 ENCOUNTER — OUTPATIENT (OUTPATIENT)
Dept: OUTPATIENT SERVICES | Facility: HOSPITAL | Age: 38
LOS: 1 days | End: 2023-10-31
Payer: MEDICAID

## 2023-10-31 VITALS
OXYGEN SATURATION: 98 % | SYSTOLIC BLOOD PRESSURE: 118 MMHG | HEIGHT: 67 IN | RESPIRATION RATE: 16 BRPM | TEMPERATURE: 98 F | WEIGHT: 108.91 LBS | HEART RATE: 79 BPM | DIASTOLIC BLOOD PRESSURE: 77 MMHG

## 2023-10-31 DIAGNOSIS — Z94.4 LIVER TRANSPLANT STATUS: ICD-10-CM

## 2023-10-31 DIAGNOSIS — Z01.818 ENCOUNTER FOR OTHER PREPROCEDURAL EXAMINATION: ICD-10-CM

## 2023-10-31 DIAGNOSIS — Z94.4 LIVER TRANSPLANT STATUS: Chronic | ICD-10-CM

## 2023-10-31 LAB
ALBUMIN SERPL ELPH-MCNC: 4.5 G/DL — SIGNIFICANT CHANGE UP (ref 3.3–5)
ALBUMIN SERPL ELPH-MCNC: 4.5 G/DL — SIGNIFICANT CHANGE UP (ref 3.3–5)
ALP SERPL-CCNC: 64 U/L — SIGNIFICANT CHANGE UP (ref 40–120)
ALP SERPL-CCNC: 64 U/L — SIGNIFICANT CHANGE UP (ref 40–120)
ALT FLD-CCNC: 11 U/L — SIGNIFICANT CHANGE UP (ref 10–45)
ALT FLD-CCNC: 11 U/L — SIGNIFICANT CHANGE UP (ref 10–45)
ANION GAP SERPL CALC-SCNC: 12 MMOL/L — SIGNIFICANT CHANGE UP (ref 5–17)
ANION GAP SERPL CALC-SCNC: 12 MMOL/L — SIGNIFICANT CHANGE UP (ref 5–17)
AST SERPL-CCNC: 16 U/L — SIGNIFICANT CHANGE UP (ref 10–40)
AST SERPL-CCNC: 16 U/L — SIGNIFICANT CHANGE UP (ref 10–40)
BILIRUB SERPL-MCNC: 0.2 MG/DL — SIGNIFICANT CHANGE UP (ref 0.2–1.2)
BILIRUB SERPL-MCNC: 0.2 MG/DL — SIGNIFICANT CHANGE UP (ref 0.2–1.2)
BUN SERPL-MCNC: 15 MG/DL — SIGNIFICANT CHANGE UP (ref 7–23)
BUN SERPL-MCNC: 15 MG/DL — SIGNIFICANT CHANGE UP (ref 7–23)
CALCIUM SERPL-MCNC: 9.8 MG/DL — SIGNIFICANT CHANGE UP (ref 8.4–10.5)
CALCIUM SERPL-MCNC: 9.8 MG/DL — SIGNIFICANT CHANGE UP (ref 8.4–10.5)
CHLORIDE SERPL-SCNC: 103 MMOL/L — SIGNIFICANT CHANGE UP (ref 96–108)
CHLORIDE SERPL-SCNC: 103 MMOL/L — SIGNIFICANT CHANGE UP (ref 96–108)
CO2 SERPL-SCNC: 25 MMOL/L — SIGNIFICANT CHANGE UP (ref 22–31)
CO2 SERPL-SCNC: 25 MMOL/L — SIGNIFICANT CHANGE UP (ref 22–31)
CREAT SERPL-MCNC: 0.82 MG/DL — SIGNIFICANT CHANGE UP (ref 0.5–1.3)
CREAT SERPL-MCNC: 0.82 MG/DL — SIGNIFICANT CHANGE UP (ref 0.5–1.3)
EGFR: 93 ML/MIN/1.73M2 — SIGNIFICANT CHANGE UP
EGFR: 93 ML/MIN/1.73M2 — SIGNIFICANT CHANGE UP
GLUCOSE SERPL-MCNC: 82 MG/DL — SIGNIFICANT CHANGE UP (ref 70–99)
GLUCOSE SERPL-MCNC: 82 MG/DL — SIGNIFICANT CHANGE UP (ref 70–99)
HCT VFR BLD CALC: 32.5 % — LOW (ref 34.5–45)
HCT VFR BLD CALC: 32.5 % — LOW (ref 34.5–45)
HGB BLD-MCNC: 10.6 G/DL — LOW (ref 11.5–15.5)
HGB BLD-MCNC: 10.6 G/DL — LOW (ref 11.5–15.5)
MCHC RBC-ENTMCNC: 31.6 PG — SIGNIFICANT CHANGE UP (ref 27–34)
MCHC RBC-ENTMCNC: 31.6 PG — SIGNIFICANT CHANGE UP (ref 27–34)
MCHC RBC-ENTMCNC: 32.6 GM/DL — SIGNIFICANT CHANGE UP (ref 32–36)
MCHC RBC-ENTMCNC: 32.6 GM/DL — SIGNIFICANT CHANGE UP (ref 32–36)
MCV RBC AUTO: 97 FL — SIGNIFICANT CHANGE UP (ref 80–100)
MCV RBC AUTO: 97 FL — SIGNIFICANT CHANGE UP (ref 80–100)
NRBC # BLD: 0 /100 WBCS — SIGNIFICANT CHANGE UP (ref 0–0)
NRBC # BLD: 0 /100 WBCS — SIGNIFICANT CHANGE UP (ref 0–0)
PLATELET # BLD AUTO: 235 K/UL — SIGNIFICANT CHANGE UP (ref 150–400)
PLATELET # BLD AUTO: 235 K/UL — SIGNIFICANT CHANGE UP (ref 150–400)
POTASSIUM SERPL-MCNC: 4 MMOL/L — SIGNIFICANT CHANGE UP (ref 3.5–5.3)
POTASSIUM SERPL-MCNC: 4 MMOL/L — SIGNIFICANT CHANGE UP (ref 3.5–5.3)
POTASSIUM SERPL-SCNC: 4 MMOL/L — SIGNIFICANT CHANGE UP (ref 3.5–5.3)
POTASSIUM SERPL-SCNC: 4 MMOL/L — SIGNIFICANT CHANGE UP (ref 3.5–5.3)
PROT SERPL-MCNC: 6.7 G/DL — SIGNIFICANT CHANGE UP (ref 6–8.3)
PROT SERPL-MCNC: 6.7 G/DL — SIGNIFICANT CHANGE UP (ref 6–8.3)
RBC # BLD: 3.35 M/UL — LOW (ref 3.8–5.2)
RBC # BLD: 3.35 M/UL — LOW (ref 3.8–5.2)
RBC # FLD: 12.4 % — SIGNIFICANT CHANGE UP (ref 10.3–14.5)
RBC # FLD: 12.4 % — SIGNIFICANT CHANGE UP (ref 10.3–14.5)
SODIUM SERPL-SCNC: 140 MMOL/L — SIGNIFICANT CHANGE UP (ref 135–145)
SODIUM SERPL-SCNC: 140 MMOL/L — SIGNIFICANT CHANGE UP (ref 135–145)
WBC # BLD: 7.24 K/UL — SIGNIFICANT CHANGE UP (ref 3.8–10.5)
WBC # BLD: 7.24 K/UL — SIGNIFICANT CHANGE UP (ref 3.8–10.5)
WBC # FLD AUTO: 7.24 K/UL — SIGNIFICANT CHANGE UP (ref 3.8–10.5)
WBC # FLD AUTO: 7.24 K/UL — SIGNIFICANT CHANGE UP (ref 3.8–10.5)

## 2023-10-31 PROCEDURE — G0463: CPT

## 2023-10-31 PROCEDURE — 80053 COMPREHEN METABOLIC PANEL: CPT

## 2023-10-31 PROCEDURE — 85027 COMPLETE CBC AUTOMATED: CPT

## 2023-10-31 RX ORDER — SODIUM CHLORIDE 9 MG/ML
1000 INJECTION, SOLUTION INTRAVENOUS
Refills: 0 | Status: DISCONTINUED | OUTPATIENT
Start: 2023-11-02 | End: 2023-11-16

## 2023-10-31 RX ORDER — SODIUM CHLORIDE 9 MG/ML
3 INJECTION INTRAMUSCULAR; INTRAVENOUS; SUBCUTANEOUS EVERY 8 HOURS
Refills: 0 | Status: DISCONTINUED | OUTPATIENT
Start: 2023-11-02 | End: 2023-11-16

## 2023-10-31 RX ORDER — LIDOCAINE HCL 20 MG/ML
0.2 VIAL (ML) INJECTION ONCE
Refills: 0 | Status: DISCONTINUED | OUTPATIENT
Start: 2023-11-02 | End: 2023-11-16

## 2023-10-31 NOTE — H&P PST ADULT - ASSESSMENT
DASI score: 8.2 METS   DASI activity: Brisk walking, moderate house chores  Loose teeth or denture: No loose teeth or dentures  Mallampati: Class 1

## 2023-10-31 NOTE — H&P PST ADULT - HISTORY OF PRESENT ILLNESS
39 y/o F with PMHx significant for Hypothyroidism, HTN, AUD, last drink 6 months ago, Alcohol Associated Hepatitis/Cirrhosis, s/p liver transplant (06/09/23, w/ HCV QUINTEN+ donor). She reports experiencing increased sensitivity to palpation/touch to overlying abdominal scar from transplant surgery in June. She is scheduled for Abdominal Stitch Removal/Removal of sutures under anesthesia with Dr. Byrnes on 11/02/2023.

## 2023-10-31 NOTE — H&P PST ADULT - PROBLEM SELECTOR PLAN 1
Scheduled for Abdominal Stitch Removal/ Removal of sutures under anesthesia   Pre-op labs obtained. Surgical soap given to patient. PST instructions provided. Patient verbalized understanding of instructions.     Advised continue Antirejection/Transplant medication  Hold Aspirin 81mg Today.

## 2023-11-01 ENCOUNTER — TRANSCRIPTION ENCOUNTER (OUTPATIENT)
Age: 38
End: 2023-11-01

## 2023-11-02 ENCOUNTER — OUTPATIENT (OUTPATIENT)
Dept: OUTPATIENT SERVICES | Facility: HOSPITAL | Age: 38
LOS: 1 days | End: 2023-11-02
Payer: MEDICAID

## 2023-11-02 ENCOUNTER — APPOINTMENT (OUTPATIENT)
Dept: TRANSPLANT | Facility: HOSPITAL | Age: 38
End: 2023-11-02

## 2023-11-02 ENCOUNTER — TRANSCRIPTION ENCOUNTER (OUTPATIENT)
Age: 38
End: 2023-11-02

## 2023-11-02 VITALS
RESPIRATION RATE: 18 BRPM | SYSTOLIC BLOOD PRESSURE: 104 MMHG | HEART RATE: 75 BPM | DIASTOLIC BLOOD PRESSURE: 57 MMHG | OXYGEN SATURATION: 99 %

## 2023-11-02 VITALS
HEART RATE: 78 BPM | OXYGEN SATURATION: 100 % | RESPIRATION RATE: 18 BRPM | HEIGHT: 67 IN | SYSTOLIC BLOOD PRESSURE: 115 MMHG | DIASTOLIC BLOOD PRESSURE: 78 MMHG | TEMPERATURE: 97 F | WEIGHT: 108.91 LBS

## 2023-11-02 DIAGNOSIS — Z94.4 LIVER TRANSPLANT STATUS: ICD-10-CM

## 2023-11-02 DIAGNOSIS — Z94.4 LIVER TRANSPLANT STATUS: Chronic | ICD-10-CM

## 2023-11-02 PROCEDURE — 15851 REMOVAL SUTR/STAPLE REQ ANES: CPT

## 2023-11-02 PROCEDURE — 15851 REMOVAL SUTR/STAPLE REQ ANES: CPT | Mod: GC

## 2023-11-02 RX ORDER — CEFAZOLIN SODIUM 1 G
2000 VIAL (EA) INJECTION ONCE
Refills: 0 | Status: COMPLETED | OUTPATIENT
Start: 2023-11-02 | End: 2023-11-02

## 2023-11-02 RX ORDER — CHLORHEXIDINE GLUCONATE 213 G/1000ML
1 SOLUTION TOPICAL ONCE
Refills: 0 | Status: COMPLETED | OUTPATIENT
Start: 2023-11-02 | End: 2023-11-02

## 2023-11-02 RX ORDER — ONDANSETRON 8 MG/1
4 TABLET, FILM COATED ORAL ONCE
Refills: 0 | Status: DISCONTINUED | OUTPATIENT
Start: 2023-11-02 | End: 2023-11-16

## 2023-11-02 RX ORDER — FENTANYL CITRATE 50 UG/ML
25 INJECTION INTRAVENOUS
Refills: 0 | Status: DISCONTINUED | OUTPATIENT
Start: 2023-11-02 | End: 2023-11-02

## 2023-11-02 RX ADMIN — SODIUM CHLORIDE 100 MILLILITER(S): 9 INJECTION, SOLUTION INTRAVENOUS at 10:43

## 2023-11-02 RX ADMIN — CHLORHEXIDINE GLUCONATE 1 APPLICATION(S): 213 SOLUTION TOPICAL at 10:43

## 2023-11-02 NOTE — ASU DISCHARGE PLAN (ADULT/PEDIATRIC) - NURSING INSTRUCTIONS
OK to take Tylenol/Acetaminophen at 5:30 PM TODAY for pain and every 6 hours after as needed. OK to take Motrin/Ibuprofen at 5:30 PM TODAY for pain and every 6 hours after as needed.

## 2023-11-02 NOTE — BRIEF OPERATIVE NOTE - NSICDXBRIEFOPLAUNCH_GEN_ALL_CORE
What Type Of Note Output Would You Prefer (Optional)?: Standard Output Hpi Title: Evaluation of a Skin Lesion Family Member: Father, grandfather <--- Click to Launch ICDx for PreOp, PostOp and Procedure

## 2023-11-07 ENCOUNTER — APPOINTMENT (OUTPATIENT)
Dept: FAMILY MEDICINE | Facility: CLINIC | Age: 38
End: 2023-11-07

## 2023-11-07 VITALS
HEART RATE: 78 BPM | WEIGHT: 112 LBS | BODY MASS INDEX: 17.58 KG/M2 | DIASTOLIC BLOOD PRESSURE: 65 MMHG | OXYGEN SATURATION: 97 % | SYSTOLIC BLOOD PRESSURE: 109 MMHG | HEIGHT: 67 IN

## 2023-11-07 PROBLEM — I10 ESSENTIAL (PRIMARY) HYPERTENSION: Chronic | Status: ACTIVE | Noted: 2023-10-31

## 2023-11-08 ENCOUNTER — RESULT REVIEW (OUTPATIENT)
Age: 38
End: 2023-11-08

## 2023-11-08 ENCOUNTER — APPOINTMENT (OUTPATIENT)
Dept: MAMMOGRAPHY | Facility: CLINIC | Age: 38
End: 2023-11-08
Payer: MEDICAID

## 2023-11-08 PROCEDURE — 77063 BREAST TOMOSYNTHESIS BI: CPT

## 2023-11-08 PROCEDURE — 77067 SCR MAMMO BI INCL CAD: CPT

## 2023-11-14 ENCOUNTER — APPOINTMENT (OUTPATIENT)
Dept: FAMILY MEDICINE | Facility: CLINIC | Age: 38
End: 2023-11-14
Payer: MEDICAID

## 2023-11-14 VITALS
HEIGHT: 67 IN | OXYGEN SATURATION: 97 % | HEART RATE: 77 BPM | DIASTOLIC BLOOD PRESSURE: 72 MMHG | WEIGHT: 113 LBS | SYSTOLIC BLOOD PRESSURE: 114 MMHG | TEMPERATURE: 97.2 F | BODY MASS INDEX: 17.74 KG/M2

## 2023-11-14 DIAGNOSIS — Z86.79 PERSONAL HISTORY OF OTHER DISEASES OF THE CIRCULATORY SYSTEM: ICD-10-CM

## 2023-11-14 DIAGNOSIS — J30.2 OTHER SEASONAL ALLERGIC RHINITIS: ICD-10-CM

## 2023-11-14 DIAGNOSIS — Z00.00 ENCOUNTER FOR GENERAL ADULT MEDICAL EXAMINATION W/OUT ABNORMAL FINDINGS: ICD-10-CM

## 2023-11-14 PROCEDURE — 99395 PREV VISIT EST AGE 18-39: CPT

## 2023-11-14 RX ORDER — ZOLPIDEM TARTRATE 6.25 MG/1
6.25 TABLET, EXTENDED RELEASE ORAL
Qty: 30 | Refills: 1 | Status: DISCONTINUED | COMMUNITY
Start: 2023-10-24 | End: 2023-11-14

## 2023-11-14 RX ORDER — FLUTICASONE PROPIONATE 50 UG/1
50 SPRAY, METERED NASAL
Qty: 1 | Refills: 1 | Status: ACTIVE | COMMUNITY
Start: 2023-11-14 | End: 1900-01-01

## 2023-11-14 RX ORDER — NIFEDIPINE 30 MG/1
30 TABLET, EXTENDED RELEASE ORAL
Qty: 90 | Refills: 3 | Status: DISCONTINUED | COMMUNITY
Start: 2023-08-31 | End: 2023-11-14

## 2023-11-15 LAB
ALBUMIN SERPL ELPH-MCNC: 4.2 G/DL
ALP BLD-CCNC: 48 U/L
ALT SERPL-CCNC: 8 U/L
ANION GAP SERPL CALC-SCNC: 12 MMOL/L
AST SERPL-CCNC: 13 U/L
BASOPHILS # BLD AUTO: 0.02 K/UL
BASOPHILS NFR BLD AUTO: 0.4 %
BILIRUB SERPL-MCNC: 0.2 MG/DL
BUN SERPL-MCNC: 12 MG/DL
CALCIUM SERPL-MCNC: 9.3 MG/DL
CHLORIDE SERPL-SCNC: 106 MMOL/L
CMV DNA SPEC QL NAA+PROBE: ABNORMAL IU/ML
CMVPCR LOG: ABNORMAL LOG10IU/ML
CO2 SERPL-SCNC: 24 MMOL/L
CREAT SERPL-MCNC: 0.88 MG/DL
EGFR: 86 ML/MIN/1.73M2
EOSINOPHIL # BLD AUTO: 0.09 K/UL
EOSINOPHIL NFR BLD AUTO: 1.6 %
FERRITIN SERPL-MCNC: 32 NG/ML
FOLATE SERPL-MCNC: 5.4 NG/ML
GGT SERPL-CCNC: 28 U/L
GLUCOSE SERPL-MCNC: 108 MG/DL
HCT VFR BLD CALC: 32.1 %
HGB BLD-MCNC: 10.3 G/DL
IMM GRANULOCYTES NFR BLD AUTO: 0.2 %
IRON SATN MFR SERPL: 41 %
IRON SERPL-MCNC: 106 UG/DL
LYMPHOCYTES # BLD AUTO: 1.3 K/UL
LYMPHOCYTES NFR BLD AUTO: 23.6 %
MAN DIFF?: NORMAL
MCHC RBC-ENTMCNC: 31.2 PG
MCHC RBC-ENTMCNC: 32.1 GM/DL
MCV RBC AUTO: 97.3 FL
MONOCYTES # BLD AUTO: 0.54 K/UL
MONOCYTES NFR BLD AUTO: 9.8 %
NEUTROPHILS # BLD AUTO: 3.55 K/UL
NEUTROPHILS NFR BLD AUTO: 64.4 %
PLATELET # BLD AUTO: 231 K/UL
POTASSIUM SERPL-SCNC: 3.7 MMOL/L
PROT SERPL-MCNC: 6 G/DL
RBC # BLD: 3.3 M/UL
RBC # FLD: 13.6 %
SODIUM SERPL-SCNC: 142 MMOL/L
TACROLIMUS SERPL-MCNC: 4.7 NG/ML
TIBC SERPL-MCNC: 258 UG/DL
UIBC SERPL-MCNC: 152 UG/DL
VIT B12 SERPL-MCNC: 442 PG/ML
WBC # FLD AUTO: 5.51 K/UL

## 2023-11-16 LAB — TSH SERPL-ACNC: 6.92 UIU/ML

## 2023-11-18 LAB
PETH 16:0/18:1: NEGATIVE NG/ML
PETH 16:0/18:2: NEGATIVE NG/ML
PETH COMMENTS: NORMAL

## 2023-11-20 ENCOUNTER — TRANSCRIPTION ENCOUNTER (OUTPATIENT)
Age: 38
End: 2023-11-20

## 2023-11-20 ENCOUNTER — APPOINTMENT (OUTPATIENT)
Dept: HEPATOLOGY | Facility: CLINIC | Age: 38
End: 2023-11-20
Payer: MEDICAID

## 2023-11-20 VITALS
DIASTOLIC BLOOD PRESSURE: 83 MMHG | SYSTOLIC BLOOD PRESSURE: 143 MMHG | BODY MASS INDEX: 17.74 KG/M2 | HEART RATE: 75 BPM | HEIGHT: 67 IN | WEIGHT: 113 LBS | OXYGEN SATURATION: 100 %

## 2023-11-20 DIAGNOSIS — Z87.42 PERSONAL HISTORY OF OTHER DISEASES OF THE FEMALE GENITAL TRACT: ICD-10-CM

## 2023-11-20 LAB
ESTRADIOL SERPL-MCNC: 261 PG/ML
FSH SERPL-MCNC: 8.3 IU/L
HEMOCCULT STL QL IA: NEGATIVE
LH SERPL-ACNC: 23.7 IU/L
PROLACTIN SERPL-MCNC: 21 NG/ML
T4 FREE SERPL-MCNC: 1.1 NG/DL
THYROGLOB AB SERPL-ACNC: 61.1 IU/ML
THYROPEROXIDASE AB SERPL IA-ACNC: 58.2 IU/ML

## 2023-11-20 PROCEDURE — 99214 OFFICE O/P EST MOD 30 MIN: CPT

## 2023-11-20 RX ORDER — SULFAMETHOXAZOLE AND TRIMETHOPRIM 400; 80 MG/1; MG/1
400-80 TABLET ORAL
Qty: 90 | Refills: 3 | Status: DISCONTINUED | COMMUNITY
Start: 2023-06-14 | End: 2023-11-20

## 2023-11-20 RX ORDER — PREDNISONE 5 MG/1
5 TABLET ORAL
Qty: 90 | Refills: 3 | Status: DISCONTINUED | COMMUNITY
Start: 2023-08-08 | End: 2023-11-20

## 2023-11-28 ENCOUNTER — NON-APPOINTMENT (OUTPATIENT)
Age: 38
End: 2023-11-28

## 2023-11-28 LAB
ALBUMIN SERPL ELPH-MCNC: 4.3 G/DL
ALP BLD-CCNC: 59 U/L
ALT SERPL-CCNC: 10 U/L
ANION GAP SERPL CALC-SCNC: 12 MMOL/L
AST SERPL-CCNC: 15 U/L
BASOPHILS # BLD AUTO: 0.03 K/UL
BASOPHILS NFR BLD AUTO: 0.5 %
BILIRUB SERPL-MCNC: 0.2 MG/DL
BUN SERPL-MCNC: 12 MG/DL
CALCIUM SERPL-MCNC: 9.2 MG/DL
CHLORIDE SERPL-SCNC: 105 MMOL/L
CHOLEST SERPL-MCNC: 180 MG/DL
CO2 SERPL-SCNC: 22 MMOL/L
CREAT SERPL-MCNC: 0.8 MG/DL
EGFR: 96 ML/MIN/1.73M2
EOSINOPHIL # BLD AUTO: 0.08 K/UL
EOSINOPHIL NFR BLD AUTO: 1.2 %
GGT SERPL-CCNC: 24 U/L
GLUCOSE SERPL-MCNC: 89 MG/DL
HCT VFR BLD CALC: 32.3 %
HDLC SERPL-MCNC: 49 MG/DL
HGB BLD-MCNC: 10.5 G/DL
IMM GRANULOCYTES NFR BLD AUTO: 0.3 %
LDLC SERPL CALC-MCNC: 88 MG/DL
LYMPHOCYTES # BLD AUTO: 1.66 K/UL
LYMPHOCYTES NFR BLD AUTO: 25.5 %
MAN DIFF?: NORMAL
MCHC RBC-ENTMCNC: 30.6 PG
MCHC RBC-ENTMCNC: 32.5 GM/DL
MCV RBC AUTO: 94.2 FL
MONOCYTES # BLD AUTO: 0.64 K/UL
MONOCYTES NFR BLD AUTO: 9.8 %
NEUTROPHILS # BLD AUTO: 4.09 K/UL
NEUTROPHILS NFR BLD AUTO: 62.7 %
NONHDLC SERPL-MCNC: 131 MG/DL
PLATELET # BLD AUTO: 259 K/UL
POTASSIUM SERPL-SCNC: 4.1 MMOL/L
PROT SERPL-MCNC: 6.1 G/DL
RBC # BLD: 3.43 M/UL
RBC # FLD: 12.8 %
SODIUM SERPL-SCNC: 139 MMOL/L
TACROLIMUS SERPL-MCNC: 6.2 NG/ML
TRIGL SERPL-MCNC: 260 MG/DL
WBC # FLD AUTO: 6.52 K/UL

## 2023-11-28 NOTE — CONSULT NOTE ADULT - RALES
Pathology results reviewed      Colonoscopy:-   Patient has 3 benign/precancerous polyp (s) on Colonoscopy   Random colon biopsies - No  Recommend repeat colonoscopy in 5 years due to hyperplastic polyps     Please inform patient and update Health Maintenance    PCP informed     Richard Holloway MD   lower L

## 2023-11-29 ENCOUNTER — OUTPATIENT (OUTPATIENT)
Dept: OUTPATIENT SERVICES | Facility: HOSPITAL | Age: 38
LOS: 1 days | End: 2023-11-29
Payer: MEDICAID

## 2023-11-29 DIAGNOSIS — Z94.4 LIVER TRANSPLANT STATUS: ICD-10-CM

## 2023-11-29 DIAGNOSIS — R05.1 ACUTE COUGH: ICD-10-CM

## 2023-11-29 DIAGNOSIS — Z79.60 LONG TERM (CURRENT) USE OF UNSPECIFIED IMMUNOMODULATORS AND IMMUNOSUPPRESSANTS: ICD-10-CM

## 2023-11-29 DIAGNOSIS — Z94.4 LIVER TRANSPLANT STATUS: Chronic | ICD-10-CM

## 2023-11-29 PROCEDURE — 71046 X-RAY EXAM CHEST 2 VIEWS: CPT

## 2023-11-29 PROCEDURE — 71046 X-RAY EXAM CHEST 2 VIEWS: CPT | Mod: 26

## 2023-12-01 ENCOUNTER — APPOINTMENT (OUTPATIENT)
Dept: FAMILY MEDICINE | Facility: CLINIC | Age: 38
End: 2023-12-01
Payer: MEDICAID

## 2023-12-01 VITALS
DIASTOLIC BLOOD PRESSURE: 81 MMHG | SYSTOLIC BLOOD PRESSURE: 127 MMHG | HEIGHT: 67 IN | HEART RATE: 73 BPM | WEIGHT: 115 LBS | OXYGEN SATURATION: 100 % | BODY MASS INDEX: 18.05 KG/M2

## 2023-12-01 LAB
ALBUMIN SERPL ELPH-MCNC: 3.9 G/DL
ALP BLD-CCNC: 60 U/L
ALT SERPL-CCNC: 9 U/L
ANION GAP SERPL CALC-SCNC: 10 MMOL/L
AST SERPL-CCNC: 13 U/L
BASOPHILS # BLD AUTO: 0.03 K/UL
BASOPHILS NFR BLD AUTO: 0.5 %
BILIRUB SERPL-MCNC: 0.2 MG/DL
BUN SERPL-MCNC: 12 MG/DL
CALCIUM SERPL-MCNC: 9.4 MG/DL
CHLORIDE SERPL-SCNC: 106 MMOL/L
CMV DNA SPEC QL NAA+PROBE: ABNORMAL IU/ML
CMVPCR LOG: ABNORMAL LOG10IU/ML
CO2 SERPL-SCNC: 24 MMOL/L
CREAT SERPL-MCNC: 0.94 MG/DL
EGFR: 79 ML/MIN/1.73M2
EOSINOPHIL # BLD AUTO: 0.05 K/UL
EOSINOPHIL NFR BLD AUTO: 0.9 %
GLUCOSE SERPL-MCNC: 104 MG/DL
HCT VFR BLD CALC: 29.8 %
HGB BLD-MCNC: 9.9 G/DL
IMM GRANULOCYTES NFR BLD AUTO: 0.4 %
LYMPHOCYTES # BLD AUTO: 1.35 K/UL
LYMPHOCYTES NFR BLD AUTO: 23.9 %
MAN DIFF?: NORMAL
MCHC RBC-ENTMCNC: 31.8 PG
MCHC RBC-ENTMCNC: 33.2 GM/DL
MCV RBC AUTO: 95.8 FL
MONOCYTES # BLD AUTO: 0.59 K/UL
MONOCYTES NFR BLD AUTO: 10.5 %
NEUTROPHILS # BLD AUTO: 3.6 K/UL
NEUTROPHILS NFR BLD AUTO: 63.8 %
PLATELET # BLD AUTO: 243 K/UL
POTASSIUM SERPL-SCNC: 4.2 MMOL/L
PROT SERPL-MCNC: 6.1 G/DL
RAPID RVP RESULT: NOT DETECTED
RBC # BLD: 3.11 M/UL
RBC # FLD: 13.1 %
S PYO AG SPEC QL IA: NEGATIVE
SARS-COV-2 RNA PNL RESP NAA+PROBE: NOT DETECTED
SODIUM SERPL-SCNC: 140 MMOL/L
TACROLIMUS SERPL-MCNC: 5.2 NG/ML
WBC # FLD AUTO: 5.64 K/UL

## 2023-12-01 PROCEDURE — 99213 OFFICE O/P EST LOW 20 MIN: CPT | Mod: 25

## 2023-12-01 PROCEDURE — 87880 STREP A ASSAY W/OPTIC: CPT | Mod: QW

## 2023-12-02 LAB — LEGIONELLA AG UR QL: NEGATIVE

## 2023-12-04 RX ORDER — ASPIRIN ENTERIC COATED TABLETS 81 MG 81 MG/1
81 TABLET, DELAYED RELEASE ORAL
Qty: 90 | Refills: 3 | Status: ACTIVE | COMMUNITY
Start: 2023-06-14 | End: 1900-01-01

## 2023-12-04 RX ORDER — DEXTROMETHORPHAN POLISTIREX 30 MG/5 ML
500-1000-40 SUSPENSION, EXTENDED RELEASE 12 HR ORAL
Qty: 180 | Refills: 3 | Status: ACTIVE | COMMUNITY
Start: 2023-06-14 | End: 1900-01-01

## 2023-12-04 RX ORDER — LEVOTHYROXINE SODIUM 112 UG/1
112 TABLET ORAL DAILY
Qty: 90 | Refills: 1 | Status: ACTIVE | COMMUNITY
Start: 2023-06-14 | End: 1900-01-01

## 2023-12-05 LAB — BACTERIA BLD CULT: NORMAL

## 2023-12-13 ENCOUNTER — APPOINTMENT (OUTPATIENT)
Dept: OBGYN | Facility: CLINIC | Age: 38
End: 2023-12-13
Payer: MEDICAID

## 2023-12-13 ENCOUNTER — OUTPATIENT (OUTPATIENT)
Dept: OUTPATIENT SERVICES | Facility: HOSPITAL | Age: 38
LOS: 1 days | End: 2023-12-13
Payer: MEDICAID

## 2023-12-13 ENCOUNTER — RESULT REVIEW (OUTPATIENT)
Age: 38
End: 2023-12-13

## 2023-12-13 DIAGNOSIS — N76.0 ACUTE VAGINITIS: ICD-10-CM

## 2023-12-13 DIAGNOSIS — Z94.4 LIVER TRANSPLANT STATUS: Chronic | ICD-10-CM

## 2023-12-13 LAB
ALBUMIN SERPL ELPH-MCNC: 4.3 G/DL
ALP BLD-CCNC: 60 U/L
ALT SERPL-CCNC: 8 U/L
ANION GAP SERPL CALC-SCNC: 12 MMOL/L
AST SERPL-CCNC: 13 U/L
BASOPHILS # BLD AUTO: 0.03 K/UL
BASOPHILS NFR BLD AUTO: 0.6 %
BILIRUB SERPL-MCNC: 0.2 MG/DL
BUN SERPL-MCNC: 16 MG/DL
CALCIUM SERPL-MCNC: 9.6 MG/DL
CHLORIDE SERPL-SCNC: 105 MMOL/L
CMV DNA SPEC QL NAA+PROBE: ABNORMAL IU/ML
CMVPCR LOG: ABNORMAL LOG10IU/ML
CO2 SERPL-SCNC: 22 MMOL/L
CREAT SERPL-MCNC: 0.83 MG/DL
EGFR: 92 ML/MIN/1.73M2
EOSINOPHIL # BLD AUTO: 0.08 K/UL
EOSINOPHIL NFR BLD AUTO: 1.5 %
GGT SERPL-CCNC: 19 U/L
GLUCOSE SERPL-MCNC: 103 MG/DL
HCG UR QL: NEGATIVE
HCT VFR BLD CALC: 29.7 %
HCV RNA SERPL NAA+PROBE-LOG IU: NOT DETECTED LOGIU/ML
HEPC RNA INTERP: NOT DETECTED
HGB BLD-MCNC: 10.2 G/DL
IMM GRANULOCYTES NFR BLD AUTO: 0.4 %
LYMPHOCYTES # BLD AUTO: 1.89 K/UL
LYMPHOCYTES NFR BLD AUTO: 35.7 %
MAN DIFF?: NORMAL
MCHC RBC-ENTMCNC: 32.2 PG
MCHC RBC-ENTMCNC: 34.3 GM/DL
MCV RBC AUTO: 93.7 FL
MONOCYTES # BLD AUTO: 0.45 K/UL
MONOCYTES NFR BLD AUTO: 8.5 %
NEUTROPHILS # BLD AUTO: 2.83 K/UL
NEUTROPHILS NFR BLD AUTO: 53.3 %
PLATELET # BLD AUTO: 249 K/UL
POTASSIUM SERPL-SCNC: 4.1 MMOL/L
PROT SERPL-MCNC: 6.4 G/DL
RBC # BLD: 3.17 M/UL
RBC # FLD: 12.3 %
SODIUM SERPL-SCNC: 139 MMOL/L
TACROLIMUS SERPL-MCNC: 5.3 NG/ML
WBC # FLD AUTO: 5.3 K/UL

## 2023-12-13 PROCEDURE — 81025 URINE PREGNANCY TEST: CPT

## 2023-12-13 PROCEDURE — 57454 BX/CURETT OF CERVIX W/SCOPE: CPT

## 2023-12-13 NOTE — PROCEDURE
[ASCUS] : atypical squamous cells of undetermined significance [HPV high risk] : PCR positive for high risk HPV [Patient] : patient [Risks] : risks [Benefits] : benefits [Alternatives] : alternatives [Bleeding] : bleeding [SCJ Fully Visulized] : the squamocolumnar junction was fully visualized [Acetowhite ___ o'clock] : ascetowhite changes at [unfilled] ~Uo'clock [No Abnormalities] : no abnormalities seen [Biopsies Taken: # ___] : [unfilled] biopsies taken of the cervix [Biopsy Locations ___ o'clock] : the biopsies were taken at [unfilled] o'clock [ECC Done] : Endocervical curettage was performed.  [Itzel's] : Itzel's solution [Direct Pressure] : direct pressure [Tolerated Well] : the patient tolerated the procedure well [No Complications] : there were no complications [Pap Performed] : a cervical Pap smear was not performed [FreeTextEntry9] : 16/18 Neg

## 2023-12-18 ENCOUNTER — APPOINTMENT (OUTPATIENT)
Dept: OBGYN | Facility: CLINIC | Age: 38
End: 2023-12-18
Payer: MEDICAID

## 2023-12-18 VITALS — SYSTOLIC BLOOD PRESSURE: 112 MMHG | DIASTOLIC BLOOD PRESSURE: 80 MMHG

## 2023-12-18 DIAGNOSIS — Z30.09 ENCOUNTER FOR OTHER GENERAL COUNSELING AND ADVICE ON CONTRACEPTION: ICD-10-CM

## 2023-12-18 DIAGNOSIS — Z30.430 ENCOUNTER FOR INSERTION OF INTRAUTERINE CONTRACEPTIVE DEVICE: ICD-10-CM

## 2023-12-18 PROCEDURE — 58300 INSERT INTRAUTERINE DEVICE: CPT

## 2023-12-18 PROCEDURE — 99213 OFFICE O/P EST LOW 20 MIN: CPT | Mod: 25

## 2023-12-18 PROCEDURE — 76998 US GUIDE INTRAOP: CPT

## 2023-12-19 LAB
C TRACH RRNA SPEC QL NAA+PROBE: NOT DETECTED
N GONORRHOEA RRNA SPEC QL NAA+PROBE: NOT DETECTED
SOURCE AMPLIFICATION: NORMAL

## 2023-12-26 DIAGNOSIS — R87.610 ATYPICAL SQUAMOUS CELLS OF UNDETERMINED SIGNIFICANCE ON CYTOLOGIC SMEAR OF CERVIX (ASC-US): ICD-10-CM

## 2023-12-28 LAB
ALBUMIN SERPL ELPH-MCNC: 4.4 G/DL
ALP BLD-CCNC: 50 U/L
ALT SERPL-CCNC: 10 U/L
ANION GAP SERPL CALC-SCNC: 12 MMOL/L
AST SERPL-CCNC: 14 U/L
BASOPHILS # BLD AUTO: 0.01 K/UL
BASOPHILS NFR BLD AUTO: 0.2 %
BILIRUB SERPL-MCNC: <0.2 MG/DL
BUN SERPL-MCNC: 23 MG/DL
CALCIUM SERPL-MCNC: 9 MG/DL
CHLORIDE SERPL-SCNC: 106 MMOL/L
CMV DNA SPEC QL NAA+PROBE: NOT DETECTED IU/ML
CMVPCR LOG: NOT DETECTED LOG10IU/ML
CO2 SERPL-SCNC: 22 MMOL/L
CREAT SERPL-MCNC: 0.97 MG/DL
EGFR: 76 ML/MIN/1.73M2
EOSINOPHIL # BLD AUTO: 0.05 K/UL
EOSINOPHIL NFR BLD AUTO: 0.9 %
ESTIMATED AVERAGE GLUCOSE: 94 MG/DL
GGT SERPL-CCNC: 15 U/L
GLUCOSE SERPL-MCNC: 83 MG/DL
HBA1C MFR BLD HPLC: 4.9 %
HCT VFR BLD CALC: 28.4 %
HCV RNA SERPL NAA+PROBE-LOG IU: NOT DETECTED LOGIU/ML
HEPC RNA INTERP: NOT DETECTED
HGB BLD-MCNC: 9.4 G/DL
IMM GRANULOCYTES NFR BLD AUTO: 0.2 %
LYMPHOCYTES # BLD AUTO: 1.65 K/UL
LYMPHOCYTES NFR BLD AUTO: 31 %
MAN DIFF?: NORMAL
MCHC RBC-ENTMCNC: 30.7 PG
MCHC RBC-ENTMCNC: 33.1 GM/DL
MCV RBC AUTO: 92.8 FL
MONOCYTES # BLD AUTO: 0.49 K/UL
MONOCYTES NFR BLD AUTO: 9.2 %
NEUTROPHILS # BLD AUTO: 3.11 K/UL
NEUTROPHILS NFR BLD AUTO: 58.5 %
PLATELET # BLD AUTO: 202 K/UL
POTASSIUM SERPL-SCNC: 4.3 MMOL/L
PROT SERPL-MCNC: 6.1 G/DL
RBC # BLD: 3.06 M/UL
RBC # FLD: 12.6 %
SODIUM SERPL-SCNC: 139 MMOL/L
WBC # FLD AUTO: 5.32 K/UL

## 2024-01-08 ENCOUNTER — APPOINTMENT (OUTPATIENT)
Dept: ENDOCRINOLOGY | Facility: CLINIC | Age: 39
End: 2024-01-08
Payer: MEDICAID

## 2024-01-08 VITALS
BODY MASS INDEX: 18.05 KG/M2 | DIASTOLIC BLOOD PRESSURE: 88 MMHG | OXYGEN SATURATION: 100 % | SYSTOLIC BLOOD PRESSURE: 131 MMHG | HEART RATE: 71 BPM | HEIGHT: 67 IN | WEIGHT: 115 LBS

## 2024-01-08 DIAGNOSIS — K70.31 ALCOHOLIC CIRRHOSIS OF LIVER WITH ASCITES: ICD-10-CM

## 2024-01-08 PROCEDURE — 99214 OFFICE O/P EST MOD 30 MIN: CPT

## 2024-01-08 RX ORDER — AMOXICILLIN AND CLAVULANATE POTASSIUM 875; 125 MG/1; MG/1
875-125 TABLET, COATED ORAL
Qty: 14 | Refills: 0 | Status: COMPLETED | COMMUNITY
Start: 2023-12-01 | End: 2024-01-08

## 2024-01-08 NOTE — ASSESSMENT
[Levothyroxine] : The patient was instructed to take Levothyroxine on an empty stomach, separate from vitamins, and wait at least 30 minutes before eating [FreeTextEntry1] : Hypothyroidism:  2/2 Hashimoto's Thyroiditis +TPO Normal Free T4 on prior labwork with elevated TSH of 6 in Nov Has been taking 112mcg of Levothyroxine for past month, increased from 100mcg  Will repeat TSH, Free T4 today and adjust levothyroxine as needed  Euthyroid at this time  Amenorrhea: resolved- menses resumed  Was functional hypothalamic considering low BMI DEXA scan reviewed, normal May repeat in 2 years Nutrition and Weight gain advised. Recently gained 7 additoinal pounds with an increase in appetite  Underweight: BMI 18kg/m2 attempting to gain weight. well-balanced diet nutritional supplements may be considered if willing

## 2024-01-08 NOTE — HISTORY OF PRESENT ILLNESS
[FreeTextEntry1] :   Interval History: Patient here for hypothyroidism f/u.  Currently taking 112mcg levothyroxine, took 100mcg for the past few days accidently.   changes in family or personal history? Patient has been working on gaining weight and menses has resumed. She has IUD placed by OBGYN. She is feeling well, compliant with all her follow ups. hospitalizations? None recent illness? Has URI , ear infection, treated with antibiotics. SHe is much better now    Clinical Symptoms:   Hypothyroidism: Reports generalized fatigue due to chronic illness.        LIFESTYLE FACTORS:   Eating patterns and weight history:  Has gained about 7 pounds since last visit.   Activity/Sleep: Still having trouble sleeping, reports she feels tired but body feels restless.     Follow up care: PCP: Dr. Coleen Torres GYN: Dr. Stephanie Miller Hep Transplant: Dr. Faby Montes GI: Dr. Argentina Lambert Surgeon: Liver transplant: Dr. Tae Byrnes     Pregnancy Planning? IUD in place

## 2024-01-08 NOTE — PHYSICAL EXAM
[Alert] : alert [No Acute Distress] : no acute distress [Normal Sclera/Conjunctiva] : normal sclera/conjunctiva [EOMI] : extra ocular movement intact [No Proptosis] : no proptosis [Normal Oropharynx] : the oropharynx was normal [Thyroid Not Enlarged] : the thyroid was not enlarged [No Thyroid Nodules] : no palpable thyroid nodules [No Respiratory Distress] : no respiratory distress [No Accessory Muscle Use] : no accessory muscle use [Clear to Auscultation] : lungs were clear to auscultation bilaterally [Normal S1, S2] : normal S1 and S2 [Normal Rate] : heart rate was normal [Regular Rhythm] : with a regular rhythm [No Edema] : no peripheral edema [Pedal Pulses Normal] : the pedal pulses are present [Normal Bowel Sounds] : normal bowel sounds [Not Tender] : non-tender [Not Distended] : not distended [Soft] : abdomen soft [Normal Anterior Cervical Nodes] : no anterior cervical lymphadenopathy [No Spinal Tenderness] : no spinal tenderness [Spine Straight] : spine straight [No Stigmata of Cushings Syndrome] : no stigmata of Cushings Syndrome [Normal Gait] : normal gait [Normal Strength/Tone] : muscle strength and tone were normal [No Rash] : no rash [Acanthosis Nigricans] : no acanthosis nigricans [Normal Reflexes] : deep tendon reflexes were 2+ and symmetric [No Tremors] : no tremors [Oriented x3] : oriented to person, place, and time [de-identified] : thin female  [de-identified] : thin extremities

## 2024-01-09 LAB
T3 SERPL-MCNC: 111 NG/DL
T4 FREE SERPL-MCNC: 1.2 NG/DL
TSH SERPL-ACNC: 5.72 UIU/ML

## 2024-01-12 ENCOUNTER — APPOINTMENT (OUTPATIENT)
Dept: GYNECOLOGIC ONCOLOGY | Facility: CLINIC | Age: 39
End: 2024-01-12
Payer: MEDICAID

## 2024-01-12 VITALS
DIASTOLIC BLOOD PRESSURE: 87 MMHG | WEIGHT: 115 LBS | HEIGHT: 67 IN | BODY MASS INDEX: 18.05 KG/M2 | SYSTOLIC BLOOD PRESSURE: 138 MMHG | HEART RATE: 67 BPM

## 2024-01-12 DIAGNOSIS — F17.210 NICOTINE DEPENDENCE, CIGARETTES, UNCOMPLICATED: ICD-10-CM

## 2024-01-12 DIAGNOSIS — B97.7 PAPILLOMAVIRUS AS THE CAUSE OF DISEASES CLASSIFIED ELSEWHERE: ICD-10-CM

## 2024-01-12 PROCEDURE — 99204 OFFICE O/P NEW MOD 45 MIN: CPT

## 2024-01-12 NOTE — HISTORY OF PRESENT ILLNESS
[FreeTextEntry1] : Referred from [facility] clinic  Dr Torres [phone] Dr Mitchell [phone]  39 yo liver transplant recipient with abnormal pap and CIN2 colpo.  Pap 10/4/23 ASCUS HPV 16/18 neg Other HPV HR +  Colpo Bx ANNABEL 2 ECC negative   Had normal menses since last month. Prior to that she was not having menses after her transplant.   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 Her immediate post-transplant course was complicated by VRE bacteremia. Current immunosuppression is:  mg po bid, and tacrolimus 4 mg po q12h. Normal LFTs most recently.   HCM: Pap: 10/4/23  PMH: Liver failure s/p transplant as above, hypothyroid PSH: Liver transplant  Gyn Hx: Denies cysts, fibroids, endo, STIs  Ob Hx:  NSVDx2 Meds:  mg po bid, tacrolimus 4 mg po q12h, levothyroxine, ASA Allergies: NKDA FH: denies SH: Currently vaping, >20 year smoking history, no ETOH  Path 23 Specimen(s) Submitted 1  ECC 2  12:00 o'clock Final Diagnosis 1. Endocervix; curettings: - Benign endocervical tissue. 2. Cervix, 12 o'clock; biopsy: - Focal high grade squamous intraepithelial lesion (ANNABEL 2). Note: Immunostain for p16 was performed, and finding supports the diagnosis.

## 2024-01-12 NOTE — DISCUSSION/SUMMARY
[Reviewed Clinical Lab Test(s)] : Results of clinical tests were reviewed. [Discuss Tests w/Referring Providers] : Results of labs/radiology studies and the treatment recommendations were discussed with performing/referring physician. [Discuss Alternatives/Risks/Benefits w/Patient] : All alternatives, risks, and benefits were discussed with the patient/family and all questions were answered.  Patient expressed good understanding and appreciates the importance of follow up as recommended. [FreeTextEntry1] : 39 yo with abnormal pap, colpo with ANNABEL 2   The natural history of, risks, prognosis and standard of care treatment options for cervical dysplasia were reviewed at length. The association of cervical cancer with the Human Papilloma Virus was discussed.    Loop electrode excision procedure (LEEP) of the cervix  Cold knife conization of the cervix  No treatment  We discussed the risks of excisional procedures including infection, intraoperative or delayed hemorrhage requiring a return to the operating room, blood transfusion, cervical incompetence, cervical stenosis, as well as the potential need for repeat excision. The potential risk of identifying an invasive cancer as well as no residual dysplasia in the excision specimen was also reviewed.  I discussed that she will need medical clearance prior to proceeding with general anesthesia.   I discussed that I may need to remove and replace IUD if strings encountered during procedure.   PST Med Clearance Booking  Yu Wolf MD, FACOG  Gynecologic Oncology

## 2024-01-12 NOTE — PHYSICAL EXAM
[Abnormal] : Cervix: Abnormal [Normal] : Anus and perineum: Normal sphincter tone, no masses, no prolapse. [Fully active, able to carry on all pre-disease performance without restriction] : Status 0 - Fully active, able to carry on all pre-disease performance without restriction

## 2024-01-18 ENCOUNTER — NON-APPOINTMENT (OUTPATIENT)
Age: 39
End: 2024-01-18

## 2024-01-18 ENCOUNTER — APPOINTMENT (OUTPATIENT)
Dept: FAMILY MEDICINE | Facility: CLINIC | Age: 39
End: 2024-01-18
Payer: MEDICAID

## 2024-01-18 VITALS
WEIGHT: 115 LBS | HEART RATE: 86 BPM | OXYGEN SATURATION: 98 % | BODY MASS INDEX: 18.05 KG/M2 | TEMPERATURE: 98 F | SYSTOLIC BLOOD PRESSURE: 122 MMHG | DIASTOLIC BLOOD PRESSURE: 82 MMHG | HEIGHT: 67 IN

## 2024-01-18 LAB
ALBUMIN SERPL ELPH-MCNC: 4.8 G/DL
ALP BLD-CCNC: 56 U/L
ALT SERPL-CCNC: 7 U/L
ANION GAP SERPL CALC-SCNC: 12 MMOL/L
AST SERPL-CCNC: 13 U/L
BASOPHILS # BLD AUTO: 0.04 K/UL
BASOPHILS NFR BLD AUTO: 0.9 %
BILIRUB SERPL-MCNC: 0.2 MG/DL
BUN SERPL-MCNC: 19 MG/DL
CALCIUM SERPL-MCNC: 9.9 MG/DL
CHLORIDE SERPL-SCNC: 104 MMOL/L
CMV DNA SPEC QL NAA+PROBE: NOT DETECTED IU/ML
CMVPCR LOG: NOT DETECTED LOG10IU/ML
CO2 SERPL-SCNC: 24 MMOL/L
CREAT SERPL-MCNC: 1.01 MG/DL
EGFR: 73 ML/MIN/1.73M2
EOSINOPHIL # BLD AUTO: 0.09 K/UL
EOSINOPHIL NFR BLD AUTO: 1.9 %
GGT SERPL-CCNC: 14 U/L
GLUCOSE SERPL-MCNC: 91 MG/DL
HCT VFR BLD CALC: 31.9 %
HGB BLD-MCNC: 10.2 G/DL
IMM GRANULOCYTES NFR BLD AUTO: 0.2 %
LYMPHOCYTES # BLD AUTO: 1.73 K/UL
LYMPHOCYTES NFR BLD AUTO: 37.1 %
MAN DIFF?: NORMAL
MCHC RBC-ENTMCNC: 29.2 PG
MCHC RBC-ENTMCNC: 32 GM/DL
MCV RBC AUTO: 91.4 FL
MONOCYTES # BLD AUTO: 0.47 K/UL
MONOCYTES NFR BLD AUTO: 10.1 %
NEUTROPHILS # BLD AUTO: 2.32 K/UL
NEUTROPHILS NFR BLD AUTO: 49.8 %
PLATELET # BLD AUTO: 270 K/UL
POTASSIUM SERPL-SCNC: 4.4 MMOL/L
PROT SERPL-MCNC: 7.1 G/DL
RBC # BLD: 3.49 M/UL
RBC # FLD: 11.5 %
SODIUM SERPL-SCNC: 140 MMOL/L
TACROLIMUS SERPL-MCNC: 7.3 NG/ML
WBC # FLD AUTO: 4.66 K/UL

## 2024-01-18 PROCEDURE — 93000 ELECTROCARDIOGRAM COMPLETE: CPT | Mod: 59

## 2024-01-18 PROCEDURE — 99214 OFFICE O/P EST MOD 30 MIN: CPT | Mod: 25

## 2024-01-18 NOTE — HISTORY OF PRESENT ILLNESS
[No Adverse Anesthesia Reaction] : no adverse anesthesia reaction in self or family member [(Patient denies any chest pain, claudication, dyspnea on exertion, orthopnea, palpitations or syncope)] : Patient denies any chest pain, claudication, dyspnea on exertion, orthopnea, palpitations or syncope [____ METs%] : [unfilled] METs% [Aortic Stenosis] : no aortic stenosis [Atrial Fibrillation] : no atrial fibrillation [Recent Myocardial Infarction] : no recent myocardial infarction [Implantable Device/Pacemaker] : no implantable device/pacemaker [Asthma] : no asthma [COPD] : no COPD [Sleep Apnea] : no sleep apnea [Smoker] : not a smoker [Self] : no previous adverse anesthesia reaction [Chronic Anticoagulation] : no chronic anticoagulation [Chronic Kidney Disease] : no chronic kidney disease [Diabetes] : no diabetes [FreeTextEntry1] : LEEP of the cervix under general anesthesia  [FreeTextEntry3] : Dr. Wolf [FreeTextEntry2] : 2/16/24 [FreeTextEntry4] : The patient presents today for medical optimization for LEEP of cervix to be done under general anesthesia. Feels well today  Pt denies cp or SOB with going up 2 flights of stairs. Denies snoring or hx of sleep apnea. Denies previous adverse reaction with anesthesia. Denies history of structural or arrhythmic cardiac disease.    [FreeTextEntry7] : EKG: SR, no ST elevations TTE 6/2023 with normal LVEF

## 2024-01-18 NOTE — PHYSICAL EXAM
[No Acute Distress] : no acute distress [Well Nourished] : well nourished [Well Developed] : well developed [Well-Appearing] : well-appearing [Normal Sclera/Conjunctiva] : normal sclera/conjunctiva [EOMI] : extraocular movements intact [Normal Outer Ear/Nose] : the outer ears and nose were normal in appearance [No Lymphadenopathy] : no lymphadenopathy [Supple] : supple [No Respiratory Distress] : no respiratory distress  [No Accessory Muscle Use] : no accessory muscle use [Clear to Auscultation] : lungs were clear to auscultation bilaterally [Normal Rate] : normal rate  [Regular Rhythm] : with a regular rhythm [Normal S1, S2] : normal S1 and S2 [No Edema] : there was no peripheral edema [Soft] : abdomen soft [Non Tender] : non-tender [Non-distended] : non-distended [Normal Bowel Sounds] : normal bowel sounds [Normal Posterior Cervical Nodes] : no posterior cervical lymphadenopathy [Normal Anterior Cervical Nodes] : no anterior cervical lymphadenopathy [No CVA Tenderness] : no CVA  tenderness [No Spinal Tenderness] : no spinal tenderness [No Joint Swelling] : no joint swelling [Grossly Normal Strength/Tone] : grossly normal strength/tone [Coordination Grossly Intact] : coordination grossly intact [Normal Gait] : normal gait [Normal Affect] : the affect was normal [Normal Insight/Judgement] : insight and judgment were intact

## 2024-01-18 NOTE — PLAN
18-Dec-2022 22:30
None
[FreeTextEntry1] : 1. Pre-op Eval - LEEP of cervix under general anesthesia on 2/16 - No known hx of AS or arrhythmic cardiac conditions, sleep apnea, previous adverse reactions to anesthesia  - Able to complete >4 METs - CBC and CMP from 1/17 reviewed -- significant for anemia -- Hgb 10.2; stable but may potentially benefit from IV iron prior to procedure -- she has f/u appt with hematology coming and will obtain hematology clearance prior to procedure at that time - EKG reviewed -- no ST elevations; TTE 6/2023 with normal LVEF - Optimization pending above

## 2024-01-18 NOTE — REVIEW OF SYSTEMS
[Fever] : no fever [Chills] : no chills [Pain] : no pain [Vision Problems] : no vision problems [Nasal Discharge] : no nasal discharge [Sore Throat] : no sore throat [Chest Pain] : no chest pain [Palpitations] : no palpitations [Leg Claudication] : no leg claudication [Lower Ext Edema] : no lower extremity edema [Orthopnea] : no orthopnea [Paroxysmal Nocturnal Dyspnea] : no paroxysmal nocturnal dyspnea [Shortness Of Breath] : no shortness of breath [Wheezing] : no wheezing [Cough] : no cough [Dyspnea on Exertion] : no dyspnea on exertion [Abdominal Pain] : no abdominal pain [Diarrhea] : diarrhea [Vomiting] : no vomiting [Melena] : no melena [Dysuria] : no dysuria [Hematuria] : no hematuria [Frequency] : no frequency [Muscle Weakness] : no muscle weakness [Muscle Pain] : no muscle pain [Itching] : no itching [Skin Rash] : no skin rash [Headache] : no headache [Dizziness] : no dizziness [Easy Bleeding] : no easy bleeding [Easy Bruising] : no easy bruising

## 2024-01-25 ENCOUNTER — APPOINTMENT (OUTPATIENT)
Dept: HEPATOLOGY | Facility: CLINIC | Age: 39
End: 2024-01-25
Payer: MEDICAID

## 2024-01-25 VITALS
HEART RATE: 83 BPM | SYSTOLIC BLOOD PRESSURE: 123 MMHG | TEMPERATURE: 97.7 F | BODY MASS INDEX: 17.89 KG/M2 | WEIGHT: 114 LBS | HEIGHT: 67 IN | DIASTOLIC BLOOD PRESSURE: 81 MMHG | OXYGEN SATURATION: 97 %

## 2024-01-25 DIAGNOSIS — R63.6 UNDERWEIGHT: ICD-10-CM

## 2024-01-25 DIAGNOSIS — J32.9 CHRONIC SINUSITIS, UNSPECIFIED: ICD-10-CM

## 2024-01-25 DIAGNOSIS — R05.1 ACUTE COUGH: ICD-10-CM

## 2024-01-25 DIAGNOSIS — Z86.19 PERSONAL HISTORY OF OTHER INFECTIOUS AND PARASITIC DISEASES: ICD-10-CM

## 2024-01-25 PROCEDURE — 99214 OFFICE O/P EST MOD 30 MIN: CPT

## 2024-01-25 RX ORDER — OMEGA-3/DHA/EPA/FISH OIL 300-1000MG
400 CAPSULE ORAL TWICE DAILY
Qty: 360 | Refills: 3 | Status: ACTIVE | COMMUNITY
Start: 2023-06-14 | End: 1900-01-01

## 2024-01-25 RX ORDER — MYCOPHENOLATE MOFETIL 500 MG/1
500 TABLET ORAL
Qty: 180 | Refills: 3 | Status: DISCONTINUED | COMMUNITY
Start: 2023-06-14 | End: 2024-01-25

## 2024-02-05 NOTE — PHYSICAL EXAM
[Scleral Icterus] : no scleral icterus [Splenomegaly] : no splenomegaly [Abdominal Ascites] : no abdominal ascites [Caput Medusae] : no caput medusae [Jaundice] : no jaundice [Palmar Erythema] : no palmar erythema [de-identified] : +healed chevron scar with faint healing ecchymosis near the lateral aspect

## 2024-02-05 NOTE — HISTORY OF PRESENT ILLNESS
[ABO Incompatible] : ABO compatible [Hepatitis B Core Ab Positive] : not hepatitis B core Ab positive [FreeTextEntry1] : Ms. Miller is a 39 yo F with hypothyroidism, AUD (with last reported alcohol use in 5/2023), history of MSSA bacteremia (5/2023), and a history of severe alcohol-associated hepatitis and decompensated alcohol-associated cirrhosis s/p DDLT (6/9/23, with HCV QUINTEN+ donor). Her immediate post-transplant course was complicated by VRE bacteremia (s/p linezolid completed on 7/2/23), a subcutaneous hematoma evacuation with subsequent wound packing (resolved), and early post-operative CMV viremia (resolved).  PCP: Dr. Elvira Martinez ([facility])  She was last seen by me on 11/20/23 and is being seen today for routine post-transplant follow-up. Her current immunosuppression is:  mg po bid and tacrolimus 4 mg po q12h (both unchanged since her last visit). Prednisone 5 mg po daily was discontinued at her last visit. Today, she reports feeling well with no complaints. She took a course of Augmentin in 12/2023 for rhinosinusitis. She has CIN2 and is scheduled for LEEP on 2/21/24 under general anesthesia. No alcohol cravings or slips.

## 2024-02-05 NOTE — ASSESSMENT
[FreeTextEntry1] : # Hepatic allograft, now >7 months s/p DDLT (6/9/23) due to alcohol-associated liver disease (ALD): - Explant pathology with no HCC. - Recent labs with normal liver chemistries indicating stable excellent hepatic allograft function. - She is now off prednisone since 11/20/23. - Discontinue MMF today. - Continue tacrolimus 4 mg po q12h, for goal trough level 6-8 ng/mL. - Re-check labs q2 weeks.  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  # History of CMV viremia (resolved): - CMV D+/R+ (intermediate risk) but had early CMV viremia post-operatively that subsequently resolved. - No further CMV viremia thus far post-transplant and off prophylaxis due to prior leukopenia and now >6 months post-transplant. - Re-check CMV PCRs with routine labs periodically until 1 year post-transplant and will re-treat if viremic.  # Prophylaxis: - Fungal: She completed 1 month of post-transplant fungal prophylaxis with fluconazole. - PJP: She completed Bactrim prophylaxis. - HAT: Continue aspirin 81 mg po daily. - GI: Continue pantoprazole 40 mg po daily.  # Hypertension: - BP near goal of <130/80 mmHg. - Continue nifedipine ER 30 mg po daily.  # Hypothyroidism: Continue levothyroxine and follow-up with Endocrinology.  # AUD, in early remission: - No known slips or relapses post-transplant. Continue periodic alcohol biomarker surveillance. - She is continuing participation in her alcohol RPP [facility] [place]. - She did not find gabapentin helpful for AUD pharmacotherapy or insomnia, now discontinued.  # Chronic insomnia: - Not alleviated by gabapentin, trazodone, or Ambien. She prefers to manage it non-pharmacologically now.  # Health maintenance in the liver transplant recipient: - Renal function: Stable based on recent labs. Will continue to monitor. - Glycemic control: Last HbA1c within normal limits (12/2023). Re-check every 3 months until 1 year post-transplant (next due in 3/2024), then annually if no post-transplant DM. - Lipids: Last lipid panel (11/27/23) with LDL at goal of <100 mg/dL but Tg borderline elevated above goal of <250 mg/dL. Re-check as fasting study in 3 months (3/2024) as may need to start fenofibrate or fish oil. - Bone health: Continue calcium/vitamin D 1 tab po bid. DEXA (9/25/23) with osteopenia. Re-check in 2 years. - Vaccinations: She declined influenza vaccine or COVID-19 vaccine (from this year). She received Tdap on 2/5/21. She needs pneumococcal and Shingrix vaccinations. - Cancer screening: She was advised to use sun protection measures daily (sunscreen, hat, and long sleeves) and to have a full skin evaluation annually by Dermatology. She was advised to continue age-appropriate screening for other malignancies. She has CIN2 and is scheduled to undergo LEEP under general anesthesia on 2/21/24. - Contraception: She had an IUD placed 12/18/23. - Other: Ms. ESTES was counseled to: abstain from alcohol and all illicit drugs; avoid use of herbal and dietary supplements due to potential hepatotoxicity; limit use of acetaminophen to <2 grams per day; avoid eating any unpasteurized dairy products; avoid eating any raw or undercooked eggs, fish, poultry, or meat; and avoid eating raw/steamed oysters or other shellfish.  Next follow-up: 2 months

## 2024-02-08 ENCOUNTER — NON-APPOINTMENT (OUTPATIENT)
Age: 39
End: 2024-02-08

## 2024-02-08 LAB
ALBUMIN SERPL ELPH-MCNC: 4.4 G/DL
ALP BLD-CCNC: 52 U/L
ALT SERPL-CCNC: 9 U/L
ANION GAP SERPL CALC-SCNC: 13 MMOL/L
AST SERPL-CCNC: 12 U/L
BASOPHILS # BLD AUTO: 0.03 K/UL
BASOPHILS NFR BLD AUTO: 0.5 %
BILIRUB SERPL-MCNC: 0.2 MG/DL
BUN SERPL-MCNC: 22 MG/DL
CALCIUM SERPL-MCNC: 8.9 MG/DL
CHLORIDE SERPL-SCNC: 105 MMOL/L
CMV DNA SPEC QL NAA+PROBE: NOT DETECTED IU/ML
CMVPCR LOG: NOT DETECTED LOG10IU/ML
CO2 SERPL-SCNC: 21 MMOL/L
CREAT SERPL-MCNC: 0.92 MG/DL
EGFR: 81 ML/MIN/1.73M2
EOSINOPHIL # BLD AUTO: 0.11 K/UL
EOSINOPHIL NFR BLD AUTO: 2 %
GGT SERPL-CCNC: 12 U/L
GLUCOSE SERPL-MCNC: 81 MG/DL
HCT VFR BLD CALC: 29.7 %
HGB BLD-MCNC: 9.4 G/DL
IMM GRANULOCYTES NFR BLD AUTO: 0.7 %
LYMPHOCYTES # BLD AUTO: 1.82 K/UL
LYMPHOCYTES NFR BLD AUTO: 32.6 %
MAGNESIUM SERPL-MCNC: 1.7 MG/DL
MAN DIFF?: NORMAL
MCHC RBC-ENTMCNC: 28.2 PG
MCHC RBC-ENTMCNC: 31.6 GM/DL
MCV RBC AUTO: 89.2 FL
MONOCYTES # BLD AUTO: 0.54 K/UL
MONOCYTES NFR BLD AUTO: 9.7 %
NEUTROPHILS # BLD AUTO: 3.04 K/UL
NEUTROPHILS NFR BLD AUTO: 54.5 %
PLATELET # BLD AUTO: 229 K/UL
POTASSIUM SERPL-SCNC: 4.3 MMOL/L
PROT SERPL-MCNC: 6.4 G/DL
RBC # BLD: 3.33 M/UL
RBC # FLD: 12.1 %
SODIUM SERPL-SCNC: 139 MMOL/L
TACROLIMUS SERPL-MCNC: 5 NG/ML
WBC # FLD AUTO: 5.58 K/UL

## 2024-02-13 ENCOUNTER — OUTPATIENT (OUTPATIENT)
Dept: OUTPATIENT SERVICES | Facility: HOSPITAL | Age: 39
LOS: 1 days | End: 2024-02-13

## 2024-02-13 VITALS
HEART RATE: 67 BPM | SYSTOLIC BLOOD PRESSURE: 110 MMHG | OXYGEN SATURATION: 97 % | RESPIRATION RATE: 16 BRPM | HEIGHT: 66 IN | WEIGHT: 115.96 LBS | TEMPERATURE: 98 F | DIASTOLIC BLOOD PRESSURE: 74 MMHG

## 2024-02-13 DIAGNOSIS — N87.1 MODERATE CERVICAL DYSPLASIA: ICD-10-CM

## 2024-02-13 DIAGNOSIS — Z94.4 LIVER TRANSPLANT STATUS: Chronic | ICD-10-CM

## 2024-02-13 DIAGNOSIS — E03.9 HYPOTHYROIDISM, UNSPECIFIED: ICD-10-CM

## 2024-02-13 DIAGNOSIS — Z94.4 LIVER TRANSPLANT STATUS: ICD-10-CM

## 2024-02-13 LAB — HCG UR QL: NEGATIVE — SIGNIFICANT CHANGE UP

## 2024-02-13 RX ORDER — SODIUM CHLORIDE 9 MG/ML
1000 INJECTION, SOLUTION INTRAVENOUS
Refills: 0 | Status: DISCONTINUED | OUTPATIENT
Start: 2024-02-21 | End: 2024-03-06

## 2024-02-13 RX ORDER — MYCOPHENOLATE MOFETIL 250 MG/1
1 CAPSULE ORAL
Refills: 0 | DISCHARGE

## 2024-02-13 RX ORDER — PANTOPRAZOLE SODIUM 20 MG/1
1 TABLET, DELAYED RELEASE ORAL
Refills: 0 | DISCHARGE

## 2024-02-13 RX ORDER — TACROLIMUS 5 MG/1
4 CAPSULE ORAL
Refills: 0 | DISCHARGE

## 2024-02-13 RX ORDER — LEVOTHYROXINE SODIUM 125 MCG
1 TABLET ORAL
Refills: 0 | DISCHARGE

## 2024-02-13 RX ORDER — NIFEDIPINE 30 MG
1 TABLET, EXTENDED RELEASE 24 HR ORAL
Refills: 0 | DISCHARGE

## 2024-02-13 RX ORDER — MAGNESIUM OXIDE 400 MG ORAL TABLET 241.3 MG
2 TABLET ORAL
Refills: 0 | DISCHARGE

## 2024-02-13 NOTE — H&P PST ADULT - HISTORY OF PRESENT ILLNESS
38 yo female with h/o of severe alcohol-associated hepatitis and decompensated alcohol-associated cirrhosis s/p donor transplant 6/9/23. Her immediate post-transplant course was complicated by VRE bacteremia.  Pt s/p Pap 10/4/23 ASCUS HPV 16/18 neg Other HPV HR +  Colpo Bx ANNABEL 2 ECC negative.  Pt is scheduled for loop electrosurgical excision procedure of cervix remoal and insertion of mirena IUD,      Pt currently undergoing iron infusion for anemia, completed 2 doses, next dose tomorrow.  40 yo female with h/o of severe alcohol-associated hepatitis and decompensated alcohol-associated cirrhosis s/p donor transplant 6/9/23. Her immediate post-transplant course was complicated by VRE bacteremia.  Pt s/p Pap 10/4/23 ASCUS HPV 16/18 neg Other HPV HR +  Colpo Bx ANNABEL 2 ECC negative.  Pt is scheduled for loop electrosurgical excision procedure of cervix removal and insertion of mirena IUD,      Pt currently undergoing iron infusion for anemia, completed 2 doses, next dose tomorrow.

## 2024-02-13 NOTE — H&P PST ADULT - TOBACCO USE
20 mg once a day, please update chart
Provider [address]   11/17/2023 11:00 AM Jurgen Bray [facility] [address] [place]   12/6/2023 10:00 AM MD Shannon Cotton [facility]            Patient Active Problem List:     Hyperlipidemia     Hypothyroidism     Migraine without status migrainosus, not intractable     Primary localized osteoarthrosis of ankle and foot     S/P TAVR (transcatheter aortic valve replacement)     Hypotension due to hypovolemia     Hypotension
Former smoker

## 2024-02-13 NOTE — H&P PST ADULT - PRO ARRIVE FROM
POAG, OU: INTRAOCULAR PRESSURE IS WITHIN ACCEPTABLE LIMITS. PT INSTRUCTED TO CONTINUE TRAVATAN OU QHS AND RETURN FOR FOLLOW-UP AS SCHEDULED. home

## 2024-02-13 NOTE — H&P PST ADULT - PROBLEM SELECTOR PLAN 2
Last hep note in chart.  Pt instructed to take pantoprazole AM of surgery with a sip of water, pt able to verbalize understanding.  Per pt, ASA prescribed by hep, pt instructed to obtain preop aspirin instructions from hepatology, pt able to verbalize understanding.

## 2024-02-13 NOTE — H&P PST ADULT - COMMENTS
briefly on anti-HTN meds post transplant, not currently on anti-HTN  Donor liver Hep c - treated post transplant

## 2024-02-13 NOTE — H&P PST ADULT - OTHER CARE PROVIDERS
(Hepatologist) Faby Stewart [phone] (Hepatologist) Faby Stewart [phone]       [facility] [phone]

## 2024-02-13 NOTE — H&P PST ADULT - NS SC CAGE ALCOHOL ANNOYED YOU
Denis was seen by STUART Kearns on  for sinusitis and right otitis media.  She was started on Doxycycline Hyclate 100 mg BID for 10 days.    She is having severe heart burn.  She does take Prilosec and tried Gaviscon, both without relief.    She is requesting MD paged for different antibiotic.      MD paged, awaiting call back.  Verbal order received from Dr. ADAN Chowdhury for the following:    azithromycin (ZITHROMAX Z-ERON) 250 MG tablet 6 tablet 0 8/10/2019 8/15/2019    Si tablets day one, then 1 tablet days 2-5    Sent to pharmacy as: Azithromycin 250 MG Oral Tablet    Class: Eprescribe    E-Prescribing Status: Receipt confirmed by pharmacy (8/10/2019 11:05 AM CDT)       Patient to discontinue Doxycycline.      Reason for Disposition  • [1] Request for urgent new prescription or refill of \"essential\" medication (i.e., likelihood of harm to patient if not taken) AND [2] triager unable to fill per unit policy    Protocols used: MEDICATION QUESTION CALL-A-      
Regarding: Medication (doxycycline hyclate (VIBRAMYCIN) 100 MG capsule) causing heartburn  ----- Message from Maryann Ryder sent at 8/10/2019  9:55 AM CDT -----  Patient Name: Deanna Aguilar  Specialist or PCP:Wesley Cano/Jenna Kearns NP  Pregnant (If Yes, how long?):N/A  Symptoms:Medication (doxycycline hyclate (VIBRAMYCIN) 100 MG capsule) causing heartburn  Call Back #:[phone]  Is the patient’s permanent residence located in [place], [place], or a [facility]? Yes [facility] [id number]  Call Center Account #:[id number]              
no longer using

## 2024-02-13 NOTE — H&P PST ADULT - NSICDXPASTMEDICALHX_GEN_ALL_CORE_FT
PAST MEDICAL HISTORY:  Alcoholic liver disease     Anemia     History of hepatitis C     History of osteopenia     HTN (hypertension)     Hypothyroidism     Moderate dysplasia of cervix (ANNABEL II)

## 2024-02-13 NOTE — H&P PST ADULT - PROBLEM SELECTOR PLAN 1
Pt is scheduled for loop electrosurgical excision procedure of cervix removal and insertion of mirena IUD on 2/21/24.  Verbal and written pre op instructions reviewed with patient and pt able to verbalize understanding.   Verbal and written instructions given with chlorhexidine wash, pt able to verbalize understanding via teach back method.

## 2024-02-13 NOTE — H&P PST ADULT - ATTENDING COMMENTS
R/B/A discussed at length in office  Consent signed   Proceed as scheduled    Yu Wolf MD, FACOG  Gynecologic Oncology

## 2024-02-16 ENCOUNTER — APPOINTMENT (OUTPATIENT)
Dept: FAMILY MEDICINE | Facility: CLINIC | Age: 39
End: 2024-02-16
Payer: MEDICAID

## 2024-02-16 VITALS
BODY MASS INDEX: 18.05 KG/M2 | DIASTOLIC BLOOD PRESSURE: 72 MMHG | SYSTOLIC BLOOD PRESSURE: 107 MMHG | OXYGEN SATURATION: 99 % | TEMPERATURE: 98 F | HEIGHT: 67 IN | WEIGHT: 115 LBS | HEART RATE: 77 BPM

## 2024-02-16 DIAGNOSIS — Z01.818 ENCOUNTER FOR OTHER PREPROCEDURAL EXAMINATION: ICD-10-CM

## 2024-02-16 PROBLEM — D64.9 ANEMIA, UNSPECIFIED: Chronic | Status: ACTIVE | Noted: 2024-02-13

## 2024-02-16 PROBLEM — Z87.39 PERSONAL HISTORY OF OTHER DISEASES OF THE MUSCULOSKELETAL SYSTEM AND CONNECTIVE TISSUE: Chronic | Status: ACTIVE | Noted: 2024-02-13

## 2024-02-16 PROBLEM — Z86.19 PERSONAL HISTORY OF OTHER INFECTIOUS AND PARASITIC DISEASES: Chronic | Status: ACTIVE | Noted: 2024-02-13

## 2024-02-16 PROBLEM — N87.1 MODERATE CERVICAL DYSPLASIA: Chronic | Status: ACTIVE | Noted: 2024-02-13

## 2024-02-16 PROCEDURE — 99214 OFFICE O/P EST MOD 30 MIN: CPT

## 2024-02-16 PROCEDURE — G2211 COMPLEX E/M VISIT ADD ON: CPT | Mod: NC,1L

## 2024-02-16 NOTE — PHYSICAL EXAM
[No Acute Distress] : no acute distress [Well Nourished] : well nourished [Well Developed] : well developed [Well-Appearing] : well-appearing [Normal Sclera/Conjunctiva] : normal sclera/conjunctiva [EOMI] : extraocular movements intact [Normal Outer Ear/Nose] : the outer ears and nose were normal in appearance [Normal TMs] : both tympanic membranes were normal [Normal Nasal Mucosa] : the nasal mucosa was normal [No Respiratory Distress] : no respiratory distress  [No Accessory Muscle Use] : no accessory muscle use [Clear to Auscultation] : lungs were clear to auscultation bilaterally [Normal Rate] : normal rate  [Regular Rhythm] : with a regular rhythm [Normal S1, S2] : normal S1 and S2 [No Edema] : there was no peripheral edema [Soft] : abdomen soft [Non Tender] : non-tender [Non-distended] : non-distended [Normal Bowel Sounds] : normal bowel sounds [No CVA Tenderness] : no CVA  tenderness [No Spinal Tenderness] : no spinal tenderness [No Joint Swelling] : no joint swelling [Grossly Normal Strength/Tone] : grossly normal strength/tone [Coordination Grossly Intact] : coordination grossly intact [Normal Gait] : normal gait [Normal Affect] : the affect was normal [Normal Insight/Judgement] : insight and judgment were intact

## 2024-02-16 NOTE — PLAN
[FreeTextEntry1] : 1. Pre-op Eval - LEEP of cervix under general anesthesia on 2/21 - No known hx of AS or arrhythmic cardiac conditions, sleep apnea, previous adverse reactions to anesthesia  - Able to complete >4 METs - Labs reviewed -- significant for anemia -- Was previously recommended pt f/u with her hematologist for IV iron prior to procedure --She states she is now s/p 2 IV iron infusion over the last 2 weeks for anemia; she is scheduled for 1 more infusion on Tuesday prior to  LEEP of cervix to be done under general anesthesia - EKG reviewed -- no ST elevations; TTE 6/2023 with normal LVEF - Patient is medically optimized for low risk procedure of LEEP of cervix under general anesthesia pending hematology clearance   2. Hypothyroidism - asymptomatic - recommended pt take levothyroxine on an empty stomach and not with any of her other medications - Repeat TSH ordered - Thyroid US reviewed with pt: nonenlarged and suspicious for thyroiditis - C/w levothyroxine 112mcg - Follow with endocrinology, Dr. Valladares  3. S/P Liver Transplant - 2/2 alcoholic liver disease - s/p prednisone 5mg daily, MMF 500mg BID - on Tacrolimus 4mg BID - s/p Bactrim for PJP prophylaxis  4. Underweight - pt states she has been eating well - she is gaining weight  5. Anemia - baseline Hgb per chart review is about 10 - Denies signs or symptoms of active bleeding; FOBI negative - She states she is now s/p 2 IV iron infusion over the last 2 weeks for anemia; she is scheduled for 1 more infusion on Tuesday prior to LEEP of cervix to be done under general anesthesia - Follows with Hematology -- to have records sent to office  6. Osteopenia - c/w vitamin D and calcium - next DEXA due 9/2025   3 month f/u

## 2024-02-20 ENCOUNTER — TRANSCRIPTION ENCOUNTER (OUTPATIENT)
Age: 39
End: 2024-02-20

## 2024-02-20 LAB — TSH SERPL-ACNC: 1.99 UIU/ML

## 2024-02-21 ENCOUNTER — RESULT REVIEW (OUTPATIENT)
Age: 39
End: 2024-02-21

## 2024-02-21 ENCOUNTER — OUTPATIENT (OUTPATIENT)
Dept: OUTPATIENT SERVICES | Facility: HOSPITAL | Age: 39
LOS: 1 days | Discharge: HOME | End: 2024-02-21
Payer: MEDICAID

## 2024-02-21 ENCOUNTER — APPOINTMENT (OUTPATIENT)
Dept: GYNECOLOGIC ONCOLOGY | Facility: HOSPITAL | Age: 39
End: 2024-02-21

## 2024-02-21 ENCOUNTER — TRANSCRIPTION ENCOUNTER (OUTPATIENT)
Age: 39
End: 2024-02-21

## 2024-02-21 VITALS
OXYGEN SATURATION: 100 % | DIASTOLIC BLOOD PRESSURE: 85 MMHG | RESPIRATION RATE: 16 BRPM | SYSTOLIC BLOOD PRESSURE: 130 MMHG | TEMPERATURE: 98 F | HEART RATE: 71 BPM

## 2024-02-21 VITALS
SYSTOLIC BLOOD PRESSURE: 106 MMHG | HEIGHT: 66 IN | DIASTOLIC BLOOD PRESSURE: 73 MMHG | RESPIRATION RATE: 15 BRPM | WEIGHT: 115.96 LBS | HEART RATE: 62 BPM | OXYGEN SATURATION: 100 % | TEMPERATURE: 99 F

## 2024-02-21 DIAGNOSIS — Z94.4 LIVER TRANSPLANT STATUS: Chronic | ICD-10-CM

## 2024-02-21 DIAGNOSIS — N87.1 MODERATE CERVICAL DYSPLASIA: ICD-10-CM

## 2024-02-21 LAB
ALBUMIN SERPL ELPH-MCNC: 4.6 G/DL
ALP BLD-CCNC: 54 U/L
ALT SERPL-CCNC: 8 U/L
ANION GAP SERPL CALC-SCNC: 10 MMOL/L
AST SERPL-CCNC: 12 U/L
BASOPHILS # BLD AUTO: 0.02 K/UL
BASOPHILS NFR BLD AUTO: 0.5 %
BILIRUB SERPL-MCNC: 0.2 MG/DL
BUN SERPL-MCNC: 22 MG/DL
CALCIUM SERPL-MCNC: 9.8 MG/DL
CHLORIDE SERPL-SCNC: 106 MMOL/L
CO2 SERPL-SCNC: 25 MMOL/L
CREAT SERPL-MCNC: 1.05 MG/DL
EGFR: 69 ML/MIN/1.73M2
EOSINOPHIL # BLD AUTO: 0.12 K/UL
EOSINOPHIL NFR BLD AUTO: 3.3 %
GGT SERPL-CCNC: 13 U/L
GLUCOSE SERPL-MCNC: 87 MG/DL
HCG UR QL: NEGATIVE — SIGNIFICANT CHANGE UP
HCT VFR BLD CALC: 31.8 %
HGB BLD-MCNC: 10.2 G/DL
IMM GRANULOCYTES NFR BLD AUTO: 0.8 %
LYMPHOCYTES # BLD AUTO: 1.46 K/UL
LYMPHOCYTES NFR BLD AUTO: 39.6 %
MAGNESIUM SERPL-MCNC: 1.8 MG/DL
MAN DIFF?: NORMAL
MCHC RBC-ENTMCNC: 28.5 PG
MCHC RBC-ENTMCNC: 32.1 GM/DL
MCV RBC AUTO: 88.8 FL
MONOCYTES # BLD AUTO: 0.35 K/UL
MONOCYTES NFR BLD AUTO: 9.5 %
NEUTROPHILS # BLD AUTO: 1.71 K/UL
NEUTROPHILS NFR BLD AUTO: 46.3 %
PHOSPHATE SERPL-MCNC: 3.6 MG/DL
PLATELET # BLD AUTO: 193 K/UL
POTASSIUM SERPL-SCNC: 4.4 MMOL/L
PROT SERPL-MCNC: 6.5 G/DL
RBC # BLD: 3.58 M/UL
RBC # FLD: 14.4 %
SODIUM SERPL-SCNC: 141 MMOL/L
TACROLIMUS SERPL-MCNC: 6.2 NG/ML
WBC # FLD AUTO: 3.69 K/UL

## 2024-02-21 PROCEDURE — 88300 SURGICAL PATH GROSS: CPT | Mod: 26,59

## 2024-02-21 PROCEDURE — 58300 INSERT INTRAUTERINE DEVICE: CPT

## 2024-02-21 PROCEDURE — 57460 BX OF CERVIX W/SCOPE LEEP: CPT

## 2024-02-21 PROCEDURE — 88342 IMHCHEM/IMCYTCHM 1ST ANTB: CPT | Mod: 26

## 2024-02-21 PROCEDURE — 58301 REMOVE INTRAUTERINE DEVICE: CPT

## 2024-02-21 PROCEDURE — 88305 TISSUE EXAM BY PATHOLOGIST: CPT | Mod: 26

## 2024-02-21 DEVICE — IUD MIRENA: Type: IMPLANTABLE DEVICE | Status: FUNCTIONAL

## 2024-02-21 RX ORDER — IBUPROFEN 200 MG
1 TABLET ORAL
Qty: 0 | Refills: 0 | DISCHARGE

## 2024-02-21 RX ORDER — PANTOPRAZOLE SODIUM 20 MG/1
1 TABLET, DELAYED RELEASE ORAL
Refills: 0 | DISCHARGE

## 2024-02-21 RX ORDER — CALCIUM CARBONATE 500(1250)
0 TABLET ORAL
Refills: 0 | DISCHARGE

## 2024-02-21 RX ORDER — SODIUM CHLORIDE 9 MG/ML
1000 INJECTION, SOLUTION INTRAVENOUS
Refills: 0 | Status: DISCONTINUED | OUTPATIENT
Start: 2024-02-21 | End: 2024-03-06

## 2024-02-21 RX ORDER — ASPIRIN/CALCIUM CARB/MAGNESIUM 324 MG
1 TABLET ORAL
Refills: 0 | DISCHARGE

## 2024-02-21 RX ORDER — TACROLIMUS 5 MG/1
4 CAPSULE ORAL
Refills: 0 | DISCHARGE

## 2024-02-21 RX ORDER — LEVOTHYROXINE SODIUM 125 MCG
1 TABLET ORAL
Refills: 0 | DISCHARGE

## 2024-02-21 RX ORDER — ACETAMINOPHEN 500 MG
1 TABLET ORAL
Qty: 0 | Refills: 0 | DISCHARGE

## 2024-02-21 NOTE — BRIEF OPERATIVE NOTE - OPERATION/FINDINGS
EUA: Normal external female genitalia. Normal vaginal walls. After application of Lugol's solution, nonstaining area noted at 12 o'clock. Non-staining area was excised. Cervical bed hemostatic following electrocautery and application of Monsel's solution. EUA: Normal external female genitalia. Normal vaginal walls. After application of Lugol's solution to the cervix, nonstaining area noted at 12 o'clock. Non-staining area was excised. Cervical bed hemostatic following electrocautery and application of Monsel's solution.

## 2024-02-21 NOTE — ASU DISCHARGE PLAN (ADULT/PEDIATRIC) - ASU DC SPECIAL INSTRUCTIONSFT
Postoperative Instructions    For pain control, take the followin. Ibuprofen 600mg every 6 hours, take with food  2. Add Tylenol 500mg every 6 hours, alternated with ibuprofen  Tylenol and ibuprofen may be obtained over the counter.    Return to your regular way of eating.     Resume normal activity as tolerated. Complete vaginal rest, no tampons, no douching, no tub bathing, no sexual activities for 6 weeks unless otherwise instructed by your doctor.      Call your doctor with any signs and symptoms of infection such as fever (>100.4 F), chills, nausea or vomiting.  Call your doctor if you're unable to tolerate food or have difficulty urinating.  Call your doctor if you have pain that is not relieved by your prescribed medications. Call your doctor with heavy vaginal bleeding including saturating >1 pad in 2 hours.    Notify your doctor with any other concerns. Follow up with your doctor for a post-operative appointment on  at 1:30pm. Postoperative Instructions    For pain control, take the followin. Ibuprofen 600mg every 6 hours, take with food  2. Add Tylenol 500mg every 6 hours, alternated with ibuprofen  Tylenol and ibuprofen may be obtained over the counter.    Return to your regular way of eating.     Resume normal activity as tolerated. Complete vaginal rest, no tampons, no douching, no tub bathing, no sexual activities until instructed by your doctor.      Call your doctor with any signs and symptoms of infection such as fever (>100.4 F), chills, nausea or vomiting.  Call your doctor if you're unable to tolerate food or have difficulty urinating.  Call your doctor if you have pain that is not relieved by your prescribed medications. Call your doctor with heavy vaginal bleeding including saturating >1 pad in 2 hours.    Notify your doctor with any other concerns. Follow up with your doctor for a post-operative appointment on  at 1:30pm.

## 2024-02-21 NOTE — ASU DISCHARGE PLAN (ADULT/PEDIATRIC) - NS MD DC FALL RISK RISK
For information on Fall & Injury Prevention, visit: [email or web address] OR  [email or web address] OR  https://www.cdc.gov/steadi/patient.html

## 2024-02-21 NOTE — BRIEF OPERATIVE NOTE - NSICDXBRIEFPROCEDURE_GEN_ALL_CORE_FT
PROCEDURES:  Pelvic examination under anesthesia 21-Feb-2024 12:40:45  Dolly Abbott  Removal of IUD 21-Feb-2024 12:40:54  Dolly Abbott  Insertion of IUD 21-Feb-2024 12:41:02  Dolly Abbott  LEEP, cervix 21-Feb-2024 12:41:16  Dolly Abbott

## 2024-02-21 NOTE — ASU DISCHARGE PLAN (ADULT/PEDIATRIC) - CARE PROVIDER_API CALL
Yu Wolf  Gynecologic Oncology  [address] [place] [address]  Phone: [phone]  Fax: [phone]  Follow Up Time:

## 2024-02-21 NOTE — ASU DISCHARGE PLAN (ADULT/PEDIATRIC) - NURSING INSTRUCTIONS
Last dose of TYLENOL for pain management was at 12:00 PM. Next dose of TYLENOL may be taken at or after 6:00 PM if needed. DO NOT take any additional products containing TYLENOL or ACETAMINOPHEN, such as VICODIN, PERCOCET, NORCO, EXCEDRIN, and any over-the-counter cold medications. DO NOT CONSUME MORE THAN 0813-6778 MG OF TYLENOL (acetaminophen) in a 24-hour period.

## 2024-02-22 ENCOUNTER — NON-APPOINTMENT (OUTPATIENT)
Age: 39
End: 2024-02-22

## 2024-03-04 ENCOUNTER — APPOINTMENT (OUTPATIENT)
Dept: GYNECOLOGIC ONCOLOGY | Facility: CLINIC | Age: 39
End: 2024-03-04
Payer: MEDICAID

## 2024-03-04 VITALS
WEIGHT: 115 LBS | HEART RATE: 76 BPM | TEMPERATURE: 98.2 F | DIASTOLIC BLOOD PRESSURE: 72 MMHG | SYSTOLIC BLOOD PRESSURE: 118 MMHG | HEIGHT: 67 IN | BODY MASS INDEX: 18.05 KG/M2

## 2024-03-04 DIAGNOSIS — N87.1 MODERATE CERVICAL DYSPLASIA: ICD-10-CM

## 2024-03-04 LAB — SURGICAL PATHOLOGY STUDY: SIGNIFICANT CHANGE UP

## 2024-03-04 PROCEDURE — 99212 OFFICE O/P EST SF 10 MIN: CPT

## 2024-03-04 NOTE — DISCUSSION/SUMMARY
[Cervical Abnormality] : normal cervix [Vaginal Exam Abnormal] : normal vaginal exam [Doing Well] : is doing well [0] : 0 [Reviewed] : reviewed [de-identified] : Healing well, strings visible [de-identified] : Pelvic rest x 1 wk

## 2024-03-04 NOTE — ASSESSMENT
[FreeTextEntry1] : 38 yo s/p LEEP  Healing well Pelvic rest x 1 wk   Awaiting pathology report Will call with results and discuss next steps  Yu Wolf MD, FACOG  Gynecologic Oncology

## 2024-03-06 LAB
ALBUMIN SERPL ELPH-MCNC: 4.3 G/DL
ALP BLD-CCNC: 61 U/L
ALT SERPL-CCNC: 6 U/L
ANION GAP SERPL CALC-SCNC: 10 MMOL/L
AST SERPL-CCNC: 11 U/L
BASOPHILS # BLD AUTO: 0.03 K/UL
BASOPHILS NFR BLD AUTO: 0.5 %
BILIRUB SERPL-MCNC: <0.2 MG/DL
BUN SERPL-MCNC: 18 MG/DL
CALCIUM SERPL-MCNC: 9.3 MG/DL
CHLORIDE SERPL-SCNC: 106 MMOL/L
CO2 SERPL-SCNC: 23 MMOL/L
CREAT SERPL-MCNC: 0.87 MG/DL
EGFR: 86 ML/MIN/1.73M2
EOSINOPHIL # BLD AUTO: 0.11 K/UL
EOSINOPHIL NFR BLD AUTO: 1.8 %
GGT SERPL-CCNC: 14 U/L
GLUCOSE SERPL-MCNC: 97 MG/DL
HCT VFR BLD CALC: 31.2 %
HGB BLD-MCNC: 10.4 G/DL
IMM GRANULOCYTES NFR BLD AUTO: 0.5 %
INR PPP: 0.94 RATIO
LYMPHOCYTES # BLD AUTO: 1.52 K/UL
LYMPHOCYTES NFR BLD AUTO: 24.6 %
MAGNESIUM SERPL-MCNC: 1.7 MG/DL
MAN DIFF?: NORMAL
MCHC RBC-ENTMCNC: 29.1 PG
MCHC RBC-ENTMCNC: 33.3 GM/DL
MCV RBC AUTO: 87.2 FL
MONOCYTES # BLD AUTO: 0.44 K/UL
MONOCYTES NFR BLD AUTO: 7.1 %
NEUTROPHILS # BLD AUTO: 4.06 K/UL
NEUTROPHILS NFR BLD AUTO: 65.5 %
PHOSPHATE SERPL-MCNC: 2.2 MG/DL
PLATELET # BLD AUTO: 198 K/UL
POTASSIUM SERPL-SCNC: 4 MMOL/L
PROT SERPL-MCNC: 6.6 G/DL
PT BLD: 10.7 SEC
RBC # BLD: 3.58 M/UL
RBC # FLD: 15.5 %
SODIUM SERPL-SCNC: 140 MMOL/L
TACROLIMUS SERPL-MCNC: 6.4 NG/ML
WBC # FLD AUTO: 6.19 K/UL

## 2024-03-21 ENCOUNTER — NON-APPOINTMENT (OUTPATIENT)
Age: 39
End: 2024-03-21

## 2024-03-21 LAB
ALBUMIN SERPL ELPH-MCNC: 4.7 G/DL
ALP BLD-CCNC: 67 U/L
ALT SERPL-CCNC: 10 U/L
ANION GAP SERPL CALC-SCNC: 12 MMOL/L
AST SERPL-CCNC: 12 U/L
BASOPHILS # BLD AUTO: 0.03 K/UL
BASOPHILS NFR BLD AUTO: 0.7 %
BILIRUB SERPL-MCNC: 0.2 MG/DL
BUN SERPL-MCNC: 17 MG/DL
CALCIUM SERPL-MCNC: 9.6 MG/DL
CHLORIDE SERPL-SCNC: 106 MMOL/L
CO2 SERPL-SCNC: 23 MMOL/L
CREAT SERPL-MCNC: 0.92 MG/DL
EGFR: 81 ML/MIN/1.73M2
EOSINOPHIL # BLD AUTO: 0.1 K/UL
EOSINOPHIL NFR BLD AUTO: 2.5 %
GGT SERPL-CCNC: 18 U/L
GLUCOSE SERPL-MCNC: 66 MG/DL
HCT VFR BLD CALC: 33.4 %
HGB BLD-MCNC: 10.9 G/DL
IMM GRANULOCYTES NFR BLD AUTO: 0.2 %
LYMPHOCYTES # BLD AUTO: 1.69 K/UL
LYMPHOCYTES NFR BLD AUTO: 41.8 %
MAGNESIUM SERPL-MCNC: 1.9 MG/DL
MAN DIFF?: NORMAL
MCHC RBC-ENTMCNC: 29.5 PG
MCHC RBC-ENTMCNC: 32.6 GM/DL
MCV RBC AUTO: 90.3 FL
MONOCYTES # BLD AUTO: 0.33 K/UL
MONOCYTES NFR BLD AUTO: 8.2 %
NEUTROPHILS # BLD AUTO: 1.88 K/UL
NEUTROPHILS NFR BLD AUTO: 46.6 %
PHOSPHATE SERPL-MCNC: 2.5 MG/DL
PLATELET # BLD AUTO: 200 K/UL
POTASSIUM SERPL-SCNC: 4.3 MMOL/L
PROT SERPL-MCNC: 6.7 G/DL
RBC # BLD: 3.7 M/UL
RBC # FLD: 15.9 %
SODIUM SERPL-SCNC: 140 MMOL/L
TACROLIMUS SERPL-MCNC: 7.7 NG/ML
WBC # FLD AUTO: 4.04 K/UL

## 2024-03-25 NOTE — DISCHARGE NOTE PROVIDER - ATTENDING ATTESTATION STATEMENT
Date of Service: 3/25/2024    Dx: Sprain of anterior cruciate ligament of left knee, subsequent encounter (S83.512D)            DOI/S: 10/7/23  Insurance: [facility]  Insurance Limits: auth required   Visit #:13/18  Authorized # of Visits: 10   POC/Auth Expiration: 1/14/24  Date of Last PN: 11/15/23 (Visit #1)  Authorizing Physician/Provider: Driss Thomas MD visit: N/A  Fall Risk: Standard         Precautions: None              Subjective: Pt reports no major pains or issues. Has been doing exercises and normal activities no restrictions or limitations. She felt ok after last therapy session. She has been trying to sit more with knees bent, however, if in that position too long will feel the pain when getting up. She felt after stretching out. She tried doing cartwheel.   Chief complaints: She felt like her knee was not strong enough. She states can not \"sit back on my heels\". Can not put full weight on knee for kneeling. Struggles with crossing legs/coming out of that position    Objective:   LEFS Score  LEFS Score: 57.5 % (3/11/2024  6:27 PM)    (LEFS-50% at eval)    Shuttle single leg press level 6: *fatigue on the LLE, unremarkable on the RLE - improved TKE on the L with less VCs    Pain/Symptom Presentation:   Pain at rest: 0/10  Pain at worst: 5/10    Strength:   Knee flexion: R 5/5; L 5-/5  Knee extension: R 5/5; L; 4/5*  SLHR: subjectively easier on the RLE: R; 20 reps; L; 10 reps      ASLR; ~5  deg lag on the L; unremarkable on the R     ROM:          Knee Motion PROM AROM    Right Left Right Left   Knee Extension 0 0 0 -3   Knee Flexion 150 150 148 146   *indicates activity was associated with pain    Provider Interactions With Patient:   Added therapeutic exercises as documented, with cueing provided throughout performance to ensure correct technique during exercise.    Assessment: Jimmy had less extensor lag today. More quickly, easily and with less pain able to achieve the full 0 deg PROM extension. She  still struggles actively for 0 deg extension pre treatment, however, by end of therapy session AROM 0 deg. She has improved CKC knee extension tolerance, improved strength/endurance for shuttle but still lacking on the L. Tib fem glides impaired posterior and anterior. S/p glides pt improved end range flexion/extension tolerance with OP- goals to improve ability to kneel/sit back on feet.     Goals: Progressing toward goals 3/25/2024     Short-Term Goals:  Patient will improve knee extension mobility to 0 deg to facilitate normal resting lying and TKE at heel strike for normalized gait pattern. Timeframe: 3 weeks.   Patient will improve knee flexion AROM to 135deg to normalize ROM to functional range for household and community ambulation. Timeframe: 3 weeks.  The patient will improve quad strength and single-leg stance stability to demonstrate non-antalgic gait pattern with walking medium-length]distances for community ambulation. Timeframe: 4-6 weeks. (IN PROGRESS 12/13/23. Patient still demonstrates antalgic gait pattern due to lack of TKE at left knee during left stance.)   Long-Term Goals:  The patient will improve LE strength and endurance to facilitate walking distances of 2 mile(s) daily for household and community ambulation. Timeframe: 6-8 weeks.  Patient will improve lower motor control to perform double-leg squat with symmetrical loading, without asymmetries, and with proper posterior chain loading [as an indicator for adequate and normalized strength,to facilitate squatting and lifting ADL's,to facilitate strength for safe sit to stand transfers]. Timeframe: 8-10 weeks.  Patient will improve LE mobility, strength, and single-leg stabilization to meet strength requirements for reciprocal stair navigation pattern with no asymmetries for ascending and descending 5 flights of stairs daily for household and community ambulation. Timeframe:10-12 weeks.  Patient will improve dynamic LE strength and  stabilization to facilitate running intermittent short distances for return to recreational sports participation. Timeframe: 14-16 weeks.    Plan: Pt will continue per plan of care. Plan for next therapy session< assess kneeling tolerance, assess knee extension lag     HEP: Access Code: [email or web address]  URL: [email or web address]  Date: 03/25/2024  Prepared by: Latonya Khan    Program Notes  In the AM: try starting with heat and then follow with 1 set of the starred exercises below. Kneeling weight shift 10 reps on the L- consider doing this on soft surface if it causes pain     Exercises  - Supine Hamstring Stretch with Strap  - 1 x daily - 7 x weekly - 3 sets - 1 reps - 30  hold  - Long Sitting Quad Set  - 1 x daily - 7 x weekly - 1 sets - 10 reps  - Long Sitting Quad Set with Towel Roll Under Heel  - 1 x daily - 7 x weekly - 1 sets - 10 reps  - Small Range Straight Leg Raise  - 1 x daily - 7 x weekly - 2 sets - 10 reps  - Standing Single Leg Heel Raise  - 1 x daily - 7 x weekly - 2 sets - 10 reps  - Side Stepping with Resistance at Ankles  - 1 x daily - 7 x weekly - 2 sets - 10 reps  - Lateral Lunge  - 1 x daily - 7 x weekly - 2 sets - 10 reps  - Supine Bridge with Heels on Swiss Ball and Knees Bent  - 1 x daily - 7 x weekly - 2 sets - 10 reps  - Squat  - 1 x daily - 7 x weekly - 3 sets - 10 reps  - Prone Quadriceps Stretch with Strap  - 1 x daily - 7 x weekly - 3 sets - 1 reps - 30 hold  - Supine Knee Posterior Glide Self-Mobilization  - 1 x daily - 7 x weekly - 2 sets - 10 reps  - Supine Single Knee to Chest Stretch  - 1 x daily - 7 x weekly - 3 sets - 10 reps    Treatment:  Date:  1/31/2024                TX#: 9/10 Date: 2/5/2024                 TX#: 10/10 11/12 12/12 3/11/2024  3/25/2024   13/18 auth to 5/12          Therapeutic Exercise: x 30min  Supine quad set 15 reps- 2 towel rolls, 1 towel roll x 2 sets- long sitting and supine - TCs provided t/o   Supine ASLR with manual assist 50 %  Supine SAQ with  bolster manual assist 50%  for distal 1/2 range   Prone knee hang with manual support and cues for relaxation into progressively more range 30 sec bout x 4 reps   Standing TKE with blue band resistance 20 reps   DLHR with cues for TKE 20 reps   SLS with cues for TKE 30 sec hold x 2 sets   Shuttle DLP level 4 with cues for TKE 10 reps x 2 sets   Shuttle DLHR with cues for TKE 10 reps x 2 sets - FULL range and depth   Standing DLHR 10 reps    Therapeutic Exercise: x 30min  TM walking 2.8 mph; 5 min   SAQ 10 reps manual assist 20%   ASLR 5 reps x 2 sets manual assist 40%  Supine HS stretch 30 sec hold   Prone quad stretch 30 sec hold x 3 sets   Piriformis stretch 30 sec hold x 3 sets R/L   Standing gastroc stretch on slant board 30 sec hold x 2 sets- TCs for knee extension  Standing TKE black band 20 reps   Shuttle DLP level 4 with cues for TKE 10 reps x 2 sets   SLP level 3 10 reps x 2 sets   Standing DLHR 20 reps   SLS with cues for TKE 30 sec hold x 2 sets   Quad set with towel under ankle 10 reps      Therapeutic Exercise: x 40min  LAQ 10 reps Self assist 20%  end range  Seated piriformis stretch 30 sec hold x 3 sets   Supine HS stretch 30 sec hold   Prone quad stretch 30 sec hold x 3 sets   STanding HS stretch 30 sec hold x 3 sets   Supine HS stretch with manual OP 30 sec hold x 2 sets   Shuttle level 4 SLP 10 reps x 2 sets   SLHR 10 reps   Standing TKE green band 20 reps   Lateral walk red band at ankles 30' x 4 laps   Monster walks red band at ankles 30' x 4 laps- forward/retro   SLS 30 sec hold x 2 sets on the L   Pt education; standing position- HEP review, updates, bands provided again , importance of compliance and what to expect in the next 2 weeks.    Therapeutic Exercise: x 38 min  Supine quad set against tactile cues in TKE full 20 reps   Supine ASLR <10% manual assist 10 reps   Supine bent knee bridge on ball 10 reps   Shuttle DLP level 6 20 reps; SLP level 5 10 reps R/L - cues for TKE   SLS 30 sec hold  R/L   Squats to full TKE 10 reps   SLHR 10 reps R/L   Objective assessment 10 min   Subjective reassessment   Pt education: HEP updates, handouts provided, plan of care    Therapeutic Exercise: x 30 min  Long sitting quad set 10 reps x 2 sets - with visual cues, tactile cues   Long sitting ASLR without assist 5 deg lag 3 reps x 2 sets R/L   Swiss ball hip and knee flexion 20 reps   Shuttle DLP level 7 20 reps; level 6 10 reps R/L x 2 sets   Shuttle DLHR level 6 20 reps   Kneeling WS 10 reps L     Self gapping knee flexion OP with towel under knee 10 reps L  Self knee to chest OP knee flexion stretch 10 sec hold 5 reps on the L             Manual Therapy: x 8min  Soft tissue mobilization: proximal gastroc, distal HS on the L in prone position   Patellar glides all directions zaina superior 10 reps    Manual Therapy: x 8min  Soft tissue mobilization: proximal gastroc, distal HS on the L in prone position   Patellar glides all directions zaina superior 10 reps    Manual Therapy: x 10 min L only   Patellar glides all directions zaina superior 10 reps   Posterior tibial glide in flexed position gr IV 10 reps x 2 sets   Anterior tibial glide in knee extension gr IV 10 reps x 2 sets on the L         Charges: Therex x 2 manual: 1    Total Timed Treatment: 40 min    Total Treatment Time:40 min    I have personally seen and examined the patient. I have collaborated with and supervised the

## 2024-03-28 ENCOUNTER — APPOINTMENT (OUTPATIENT)
Dept: HEPATOLOGY | Facility: CLINIC | Age: 39
End: 2024-03-28
Payer: MEDICAID

## 2024-03-28 VITALS
HEART RATE: 82 BPM | WEIGHT: 118 LBS | TEMPERATURE: 97.3 F | BODY MASS INDEX: 18.52 KG/M2 | OXYGEN SATURATION: 98 % | SYSTOLIC BLOOD PRESSURE: 107 MMHG | HEIGHT: 67 IN | RESPIRATION RATE: 14 BRPM | DIASTOLIC BLOOD PRESSURE: 68 MMHG

## 2024-03-28 DIAGNOSIS — D50.9 IRON DEFICIENCY ANEMIA, UNSPECIFIED: ICD-10-CM

## 2024-03-28 DIAGNOSIS — D64.9 ANEMIA, UNSPECIFIED: ICD-10-CM

## 2024-03-28 PROCEDURE — 99214 OFFICE O/P EST MOD 30 MIN: CPT

## 2024-03-28 RX ORDER — ACETAMINOPHEN 325 MG/1
325 TABLET ORAL
Qty: 30 | Refills: 11 | Status: DISCONTINUED | COMMUNITY
Start: 2023-07-19 | End: 2024-03-28

## 2024-03-28 RX ORDER — PANTOPRAZOLE 40 MG/1
40 TABLET, DELAYED RELEASE ORAL DAILY
Qty: 90 | Refills: 3 | Status: DISCONTINUED | COMMUNITY
Start: 2023-06-14 | End: 2024-03-28

## 2024-03-28 NOTE — ASSESSMENT
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 - Recent labs (3/20/24 with normal liver chemistries indicating stable excellent hepatic allograft function. - She is now off prednisone since 11/20/23 and off MMF since 1/25/24. - Continue tacrolimus 4 mg po q12h, for goal trough level 6-8 ng/mL. - Re-check labs q2 weeks.  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  # History of CMV viremia (resolved): - CMV D+/R+ (intermediate risk) but had early CMV viremia post-operatively that subsequently resolved. - No further CMV viremia thus far post-transplant and off prophylaxis due to prior leukopenia and now >6 months post-transplant. - Re-check CMV PCRs with routine labs periodically until 1 year post-transplant and will re-treat if viremic.  # Prophylaxis: - Fungal: She completed 1 month of post-transplant fungal prophylaxis with fluconazole. - PJP: She completed Bactrim prophylaxis. - HAT prophylaxis: Continue aspirin 81 mg po daily until 1 year post-transplant. - GI: Asymptomatic. Stop pantoprazole today.  # Hypertension: - BP at goal of <130/80 mmHg. - Continue nifedipine ER 30 mg po daily.  # Acute right knee pain: - No abnormalities palpable on exam today, with XR ordered. - Uric acid, ESR, and CRP added to next labs. - Advised to follow-up with PCP pending the lab and XR results or will refer to Orthopedics if needed.  # Iron deficiency anemia: - S/p IV iron infusions with Hematology, but source of blood loss unclear as she does not report menorrhagia or any overt GI bleeding. - Re-check CBC and iron studies with next labs. She may need EGD/colonoscopy if she has persistent STEVEN necessitating ongoing IV iron.  # Hypothyroidism: Continue levothyroxine and follow-up with Endocrinology.  # AUD, in early remission: - No known slips or relapses post-transplant. Continue periodic alcohol biomarker surveillance. - She is continuing participation in her [facility] [place]. - She did not find gabapentin helpful for AUD pharmacotherapy or insomnia, now discontinued.  # Chronic insomnia: - Not alleviated by gabapentin, trazodone, or Ambien. She prefers to manage it non-pharmacologically now.  # Health maintenance in the liver transplant recipient: - Renal function: Stable based on recent labs. Will continue to monitor. - Glycemic control: Last HbA1c within normal limits (12/2023). Re-check every 3 months until 1 year post-transplant (ordered with next labs), then annually if no post-transplant DM. - Lipids: Last lipid panel (11/27/23) with LDL at goal of <100 mg/dL but Tg borderline elevated above goal of <250 mg/dL. Re-check as fasting study in 3 months (ordered with next labs) as may need to start fenofibrate or fish oil. - Bone health: Continue calcium/vitamin D 1 tab po bid. DEXA (9/25/23) with osteopenia. Re-check in 2 years. - Vaccinations: She declined influenza vaccine or COVID-19 vaccine (from this year). She received Tdap on 2/5/21. She needs pneumococcal and Shingrix vaccinations (varicella non-immune pre-transplant but this can still offer some benefits per Transplant ID) and will receive these at her next visit in 6/2024 here. Based on pre-transplant labs, she is immune to rubella but non-immune to measles and mumps; she is unable to get the MMR vaccine as it is a live vaccine. She is HBV immune. Will check HAV IgG with next labs. - Cancer screening: She was advised to use sun protection measures daily (sunscreen, hat, and long sleeves) and to have a full skin evaluation annually by Dermatology (she will try to find one locally in [place], otherwise we can also refer her, discussed today). She was advised to continue age-appropriate screening for other malignancies. 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 - Contraception: She had a Mirenua IUD replaced 2/21/24. - Other: Ms. ESTES was counseled to: abstain from alcohol and all illicit drugs; avoid use of herbal and dietary supplements due to potential hepatotoxicity; limit use of acetaminophen to <2 grams per day; avoid eating any unpasteurized dairy products; avoid eating any raw or undercooked eggs, fish, poultry, or meat; and avoid eating raw/steamed oysters or other shellfish.  Next follow-up: 2 months (6/12/24)

## 2024-03-28 NOTE — PHYSICAL EXAM
[Well Nourished] : well nourished [Healthy Appearing] : healthy appearing [No Acute Distress] : no acute distress [Normal Voice/Communication] : normal voice communication [EOMI] : extra ocular movement intact [Normal Hearing] : normal hearing [Normal Oropharynx] : normal oropharynx [Normal Appearance] : normal appearance [No Neck Mass] : no neck mass [Supple] : the neck was supple [No JVD] : no jugular venous distention [No Respiratory Distress] : no respiratory distress [No Accessory Muscle Use] : no accessory muscle use [Normal Rhythm and Effort] : normal rhythm and effort [Clear to Auscultation] : lungs were clear to auscultation bilaterally [Normal S1, S2] : normal S1 and S2 [No Murmurs] : no murmurs heard [Normal Rate/Rhythm] : normal rate/rhythm [No Gallops] : no gallops [No Rubs] : no rubs [No Edema] : no edema [Non-Tender] : Liver Edge: non-tender [Smooth] : Liver Edge: smooth [Normal Bowel Sounds] : normal bowel sounds [Non Tender] : non-tender [No Masses] : no abdominal mass palpated [Previous Abdominal Surgery] : previous abdominal surgery [Soft] : soft [Normal Gait] : normal gait [No Clubbing, Cyanosis] : no clubbing  or cyanosis of the fingernails [No Involuntary Movements] : no involuntary movements [Normal Color/Pigmentation] : normal color/pigmentation [No Rash] : no rash [Normal Turgor] : normal turgor [No Skin Lesions] : no skin lesions [No Focal Deficits] : no focal deficits [Normal Mood] : the mood was normal [Normal Affect] : normal affect [Remote Memory Intact] : remote memory intact [Normal Insight/Judgement] : normal insight/judgement [Scleral Icterus] : no scleral icterus [Splenomegaly] : no splenomegaly [Abdominal Ascites] : no abdominal ascites [Caput Medusae] : no caput medusae [Jaundice] : no jaundice [No Joint Swelling] : no joint swelling seen [Palmar Erythema] : no palmar erythema [de-identified] : +healed chevron scar [de-identified] : knees symmetric without palpable effusion and FROM

## 2024-03-28 NOTE — HISTORY OF PRESENT ILLNESS
[Alcoholic Liver Disease] : Alcoholic Liver Disease [Liver] : Liver [Donor after brain death (DBD] : Donor after brain death (DBD) [None] : None [Basiliximab] : Basiliximab [Steroids] : Steroids [Positive/Positive] : Positive/Positive [PHS Increased Risk] : [facility] increased risk [Hepatitis C (QUINTEN+)] : hepatitis c (QUINTEN+) [Not Working] : Not working [80: Normal activity with effort; some signs or symptoms of disease.] : 80: Normal activity with effort; some signs or symptoms of disease.  [ABO Incompatible] : ABO compatible [Hepatitis B Core Ab Positive] : not hepatitis B core Ab positive [FreeTextEntry1] : Ms. 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 Her immediate post-transplant course was complicated by VRE bacteremia (s/p linezolid completed on 7/2/23), a subcutaneous hematoma evacuation with subsequent wound packing (resolved), and early post-operative CMV viremia (resolved).  PCP: Dr. Elvira Martinez ([facility]) -> Dr. Coleen Torres Hematology: Dr. Jackie Devlin Endocrinology: Dr. Manoj Valladares Gynecology: Dr. Haydee García Gynecology Oncology: Dr. Yu Wolf  She was last seen by me on 1/25/24 and is being seen today for routine post-transplant follow-up. Her current immunosuppression is: tacrolimus 4 mg po q12h (unchanged since her last visit). She is now off MMF since her last visit. She underwent LEEP under general anesthesia on 2/21/24 in addition to replacement of a Mirena IUD, with surgical pathology showing CIN2 and plan for Pap smear in 6 months. Prior to that surgery and after, she had received IV iron infusions for her anemia (x8 in total, with last one last week and planned for follow-up again in 3 months). Today, she reports feeling well with no complaints. No alcohol cravings or slips. She has been menstruating again for the past 3 months and menses were "pretty heavy" for the first month but are lighter now, especially since the Mirena IUD was placed. No overt GI bleeding. She has noticed occasional right knee swelling with associated pain for the past 2 weeks.

## 2024-04-03 ENCOUNTER — APPOINTMENT (OUTPATIENT)
Dept: RADIOLOGY | Facility: CLINIC | Age: 39
End: 2024-04-03
Payer: MEDICAID

## 2024-04-03 ENCOUNTER — NON-APPOINTMENT (OUTPATIENT)
Age: 39
End: 2024-04-03

## 2024-04-03 PROCEDURE — 73562 X-RAY EXAM OF KNEE 3: CPT | Mod: RT

## 2024-04-04 LAB
ALBUMIN SERPL ELPH-MCNC: 4.8 G/DL
ALP BLD-CCNC: 68 U/L
ALT SERPL-CCNC: 9 U/L
ANION GAP SERPL CALC-SCNC: 12 MMOL/L
AST SERPL-CCNC: 11 U/L
BASOPHILS # BLD AUTO: 0.02 K/UL
BASOPHILS NFR BLD AUTO: 0.3 %
BILIRUB SERPL-MCNC: 0.3 MG/DL
BUN SERPL-MCNC: 25 MG/DL
CALCIUM SERPL-MCNC: 9.4 MG/DL
CHLORIDE SERPL-SCNC: 106 MMOL/L
CHOLEST SERPL-MCNC: 151 MG/DL
CMV DNA SPEC QL NAA+PROBE: NOT DETECTED IU/ML
CMVPCR LOG: NOT DETECTED LOG10IU/ML
CO2 SERPL-SCNC: 21 MMOL/L
CREAT SERPL-MCNC: 1 MG/DL
CRP SERPL-MCNC: <3 MG/L
EGFR: 73 ML/MIN/1.73M2
EOSINOPHIL # BLD AUTO: 0.13 K/UL
EOSINOPHIL NFR BLD AUTO: 2.2 %
ERYTHROCYTE [SEDIMENTATION RATE] IN BLOOD BY WESTERGREN METHOD: 4 MM/HR
ESTIMATED AVERAGE GLUCOSE: 105 MG/DL
FERRITIN SERPL-MCNC: 234 NG/ML
GGT SERPL-CCNC: 17 U/L
GLUCOSE SERPL-MCNC: 94 MG/DL
HBA1C MFR BLD HPLC: 5.3 %
HCT VFR BLD CALC: 32.8 %
HDLC SERPL-MCNC: 54 MG/DL
HEPATITIS A IGG ANTIBODY: NONREACTIVE
HGB BLD-MCNC: 11.3 G/DL
IMM GRANULOCYTES NFR BLD AUTO: 0.3 %
IRON SATN MFR SERPL: 40 %
IRON SERPL-MCNC: 95 UG/DL
LDLC SERPL CALC-MCNC: 79 MG/DL
LYMPHOCYTES # BLD AUTO: 1.71 K/UL
LYMPHOCYTES NFR BLD AUTO: 28.7 %
MAN DIFF?: NORMAL
MCHC RBC-ENTMCNC: 29.9 PG
MCHC RBC-ENTMCNC: 34.5 GM/DL
MCV RBC AUTO: 86.8 FL
MONOCYTES # BLD AUTO: 0.51 K/UL
MONOCYTES NFR BLD AUTO: 8.6 %
NEUTROPHILS # BLD AUTO: 3.57 K/UL
NEUTROPHILS NFR BLD AUTO: 59.9 %
NONHDLC SERPL-MCNC: 97 MG/DL
PLATELET # BLD AUTO: 184 K/UL
POTASSIUM SERPL-SCNC: 4.2 MMOL/L
PROT SERPL-MCNC: 6.9 G/DL
RBC # BLD: 3.78 M/UL
RBC # FLD: 15.9 %
SODIUM SERPL-SCNC: 139 MMOL/L
TACROLIMUS SERPL-MCNC: 7.3 NG/ML
TIBC SERPL-MCNC: 236 UG/DL
TRIGL SERPL-MCNC: 100 MG/DL
UIBC SERPL-MCNC: 141 UG/DL
URATE SERPL-MCNC: 6.5 MG/DL
WBC # FLD AUTO: 5.96 K/UL

## 2024-04-08 ENCOUNTER — NON-APPOINTMENT (OUTPATIENT)
Age: 39
End: 2024-04-08

## 2024-04-08 LAB
PETH 16:0/18:1: NEGATIVE NG/ML
PETH 16:0/18:2: NEGATIVE NG/ML
PETH COMMENTS: NORMAL

## 2024-04-11 ENCOUNTER — APPOINTMENT (OUTPATIENT)
Dept: ENDOCRINOLOGY | Facility: CLINIC | Age: 39
End: 2024-04-11
Payer: MEDICAID

## 2024-04-11 VITALS
WEIGHT: 116 LBS | TEMPERATURE: 98.3 F | SYSTOLIC BLOOD PRESSURE: 122 MMHG | BODY MASS INDEX: 18.21 KG/M2 | OXYGEN SATURATION: 99 % | DIASTOLIC BLOOD PRESSURE: 79 MMHG | RESPIRATION RATE: 14 BRPM | HEART RATE: 66 BPM | HEIGHT: 67 IN

## 2024-04-11 PROCEDURE — 99214 OFFICE O/P EST MOD 30 MIN: CPT

## 2024-04-11 NOTE — HISTORY OF PRESENT ILLNESS
[FreeTextEntry1] :   Interval History: 39 year old female presenting for f/u hypothyroidism.  changes in family or personal history? NO changes hospitalizations? none recent illness?  none   Clinical Symptoms:   Hypothyroidism: HAs generalized fatigue but denies any symptoms of hypothyroidism      LIFESTYLE FACTORS:   Eating patterns and weight history:  Weight is stable and eating what she wants now to maintain her weight.  Activity/Sleep: Has been slightly improved, has been getting iron infusion and ferritin levels have increased       Follow up care: PCP: Dr. Coleen Torres GYN: Dr. Stephanie Miller Hep Transplant: Dr. Faby Montes GI: Dr. Argentina Lambert Surgeon: Liver transplant: Dr. Tae Byrnes

## 2024-04-11 NOTE — ASSESSMENT
[Levothyroxine] : The patient was instructed to take Levothyroxine on an empty stomach, separate from vitamins, and wait at least 30 minutes before eating [FreeTextEntry1] : Hypothyroidism:  2/2 Hashimoto's Thyroiditis +TPO Normal TSH in Feb 2024 on repeat blood work with PCP Has been taking 112mcg of Levothyroxine Will repeat TSH, Free T4 , will do with routine blood work next week.  Euthyroid at this time  Amenorrhea: resolved- menses resumed  Was functional hypothalamic considering low BMI DEXA scan reviewed, normal May repeat in Sept 2025 Nutrition and Weight gain advised.   Underweight: BMI 18kg/m2 attempting to gain weight but is happy to be close to 120 pounds. well-balanced diet nutritional supplements may be considered if willing.

## 2024-04-11 NOTE — PHYSICAL EXAM
[Alert] : alert [No Acute Distress] : no acute distress [Normal Sclera/Conjunctiva] : normal sclera/conjunctiva [EOMI] : extra ocular movement intact [No Proptosis] : no proptosis [Normal Oropharynx] : the oropharynx was normal [Thyroid Not Enlarged] : the thyroid was not enlarged [No Thyroid Nodules] : no palpable thyroid nodules [No Respiratory Distress] : no respiratory distress [No Accessory Muscle Use] : no accessory muscle use [Clear to Auscultation] : lungs were clear to auscultation bilaterally [Normal S1, S2] : normal S1 and S2 [Normal Rate] : heart rate was normal [Regular Rhythm] : with a regular rhythm [No Edema] : no peripheral edema [Pedal Pulses Normal] : the pedal pulses are present [Normal Bowel Sounds] : normal bowel sounds [Not Tender] : non-tender [Not Distended] : not distended [Soft] : abdomen soft [Normal Anterior Cervical Nodes] : no anterior cervical lymphadenopathy [No Spinal Tenderness] : no spinal tenderness [Spine Straight] : spine straight [No Stigmata of Cushings Syndrome] : no stigmata of Cushings Syndrome [Normal Gait] : normal gait [Normal Strength/Tone] : muscle strength and tone were normal [No Rash] : no rash [Acanthosis Nigricans] : no acanthosis nigricans [Normal Reflexes] : deep tendon reflexes were 2+ and symmetric [No Tremors] : no tremors [Oriented x3] : oriented to person, place, and time [de-identified] : thin female  [de-identified] : thin extremities

## 2024-04-19 LAB
ALBUMIN SERPL ELPH-MCNC: 4.7 G/DL
ALP BLD-CCNC: 75 U/L
ALT SERPL-CCNC: 10 U/L
ANION GAP SERPL CALC-SCNC: 13 MMOL/L
AST SERPL-CCNC: 13 U/L
BASOPHILS # BLD AUTO: 0.03 K/UL
BASOPHILS NFR BLD AUTO: 0.6 %
BILIRUB SERPL-MCNC: 0.2 MG/DL
BUN SERPL-MCNC: 23 MG/DL
CALCIUM SERPL-MCNC: 9.3 MG/DL
CHLORIDE SERPL-SCNC: 106 MMOL/L
CO2 SERPL-SCNC: 22 MMOL/L
CREAT SERPL-MCNC: 0.96 MG/DL
EGFR: 77 ML/MIN/1.73M2
EOSINOPHIL # BLD AUTO: 0.13 K/UL
EOSINOPHIL NFR BLD AUTO: 2.5 %
GGT SERPL-CCNC: 19 U/L
GLUCOSE SERPL-MCNC: 87 MG/DL
HCT VFR BLD CALC: 35.9 %
HGB BLD-MCNC: 11.8 G/DL
IMM GRANULOCYTES NFR BLD AUTO: 0.2 %
LYMPHOCYTES # BLD AUTO: 1.83 K/UL
LYMPHOCYTES NFR BLD AUTO: 34.9 %
MAGNESIUM SERPL-MCNC: 1.9 MG/DL
MAN DIFF?: NORMAL
MCHC RBC-ENTMCNC: 30.5 PG
MCHC RBC-ENTMCNC: 32.9 GM/DL
MCV RBC AUTO: 92.8 FL
MONOCYTES # BLD AUTO: 0.5 K/UL
MONOCYTES NFR BLD AUTO: 9.5 %
NEUTROPHILS # BLD AUTO: 2.75 K/UL
NEUTROPHILS NFR BLD AUTO: 52.3 %
PHOSPHATE SERPL-MCNC: 3 MG/DL
PLATELET # BLD AUTO: 224 K/UL
POTASSIUM SERPL-SCNC: 4.5 MMOL/L
PROT SERPL-MCNC: 7.2 G/DL
RBC # BLD: 3.87 M/UL
RBC # FLD: 15.8 %
SODIUM SERPL-SCNC: 141 MMOL/L
TACROLIMUS SERPL-MCNC: 7.7 NG/ML
WBC # FLD AUTO: 5.25 K/UL

## 2024-04-22 LAB
T4 FREE SERPL-MCNC: 1.6 NG/DL
TSH SERPL-ACNC: 2.56 UIU/ML

## 2024-04-24 ENCOUNTER — APPOINTMENT (OUTPATIENT)
Dept: ORTHOPEDIC SURGERY | Facility: CLINIC | Age: 39
End: 2024-04-24
Payer: MEDICAID

## 2024-04-24 VITALS
BODY MASS INDEX: 18.21 KG/M2 | DIASTOLIC BLOOD PRESSURE: 73 MMHG | SYSTOLIC BLOOD PRESSURE: 119 MMHG | HEART RATE: 71 BPM | WEIGHT: 116 LBS | HEIGHT: 67 IN

## 2024-04-24 DIAGNOSIS — M25.461 EFFUSION, RIGHT KNEE: ICD-10-CM

## 2024-04-24 DIAGNOSIS — M25.561 PAIN IN RIGHT KNEE: ICD-10-CM

## 2024-04-24 PROCEDURE — 99203 OFFICE O/P NEW LOW 30 MIN: CPT | Mod: 25

## 2024-04-24 PROCEDURE — 20610 DRAIN/INJ JOINT/BURSA W/O US: CPT | Mod: RT

## 2024-05-02 LAB
ALBUMIN SERPL ELPH-MCNC: 4.6 G/DL
ALP BLD-CCNC: 62 U/L
ALT SERPL-CCNC: 8 U/L
ANION GAP SERPL CALC-SCNC: 10 MMOL/L
AST SERPL-CCNC: 12 U/L
BASOPHILS # BLD AUTO: 0.03 K/UL
BASOPHILS NFR BLD AUTO: 0.6 %
BILIRUB SERPL-MCNC: 0.3 MG/DL
BUN SERPL-MCNC: 17 MG/DL
CALCIUM SERPL-MCNC: 9 MG/DL
CHLORIDE SERPL-SCNC: 107 MMOL/L
CO2 SERPL-SCNC: 22 MMOL/L
CREAT SERPL-MCNC: 0.96 MG/DL
EGFR: 77 ML/MIN/1.73M2
EOSINOPHIL # BLD AUTO: 0.07 K/UL
EOSINOPHIL NFR BLD AUTO: 1.5 %
GGT SERPL-CCNC: 18 U/L
GLUCOSE SERPL-MCNC: 91 MG/DL
HCT VFR BLD CALC: 31.2 %
HGB BLD-MCNC: 10.4 G/DL
IMM GRANULOCYTES NFR BLD AUTO: 0.2 %
LYMPHOCYTES # BLD AUTO: 1.89 K/UL
LYMPHOCYTES NFR BLD AUTO: 40.6 %
MAGNESIUM SERPL-MCNC: 1.8 MG/DL
MAN DIFF?: NORMAL
MCHC RBC-ENTMCNC: 31 PG
MCHC RBC-ENTMCNC: 33.3 GM/DL
MCV RBC AUTO: 92.9 FL
MONOCYTES # BLD AUTO: 0.42 K/UL
MONOCYTES NFR BLD AUTO: 9 %
NEUTROPHILS # BLD AUTO: 2.24 K/UL
NEUTROPHILS NFR BLD AUTO: 48.1 %
PHOSPHATE SERPL-MCNC: 2.9 MG/DL
PLATELET # BLD AUTO: 178 K/UL
POTASSIUM SERPL-SCNC: 4.4 MMOL/L
PROT SERPL-MCNC: 6.7 G/DL
RBC # BLD: 3.36 M/UL
RBC # FLD: 14.6 %
SODIUM SERPL-SCNC: 139 MMOL/L
TACROLIMUS SERPL-MCNC: 5 NG/ML
WBC # FLD AUTO: 4.66 K/UL

## 2024-05-09 ENCOUNTER — APPOINTMENT (OUTPATIENT)
Dept: FAMILY MEDICINE | Facility: CLINIC | Age: 39
End: 2024-05-09
Payer: MEDICAID

## 2024-05-09 ENCOUNTER — APPOINTMENT (OUTPATIENT)
Dept: ORTHOPEDIC SURGERY | Facility: CLINIC | Age: 39
End: 2024-05-09

## 2024-05-09 VITALS
SYSTOLIC BLOOD PRESSURE: 135 MMHG | BODY MASS INDEX: 18.83 KG/M2 | HEART RATE: 61 BPM | HEIGHT: 67 IN | WEIGHT: 120 LBS | OXYGEN SATURATION: 100 % | DIASTOLIC BLOOD PRESSURE: 80 MMHG | TEMPERATURE: 98.1 F

## 2024-05-09 DIAGNOSIS — M85.80 OTHER SPECIFIED DISORDERS OF BONE DENSITY AND STRUCTURE, UNSPECIFIED SITE: ICD-10-CM

## 2024-05-09 DIAGNOSIS — M79.10 MYALGIA, UNSPECIFIED SITE: ICD-10-CM

## 2024-05-09 DIAGNOSIS — R21 RASH AND OTHER NONSPECIFIC SKIN ERUPTION: ICD-10-CM

## 2024-05-09 DIAGNOSIS — E03.9 HYPOTHYROIDISM, UNSPECIFIED: ICD-10-CM

## 2024-05-09 DIAGNOSIS — R10.11 RIGHT UPPER QUADRANT PAIN: ICD-10-CM

## 2024-05-09 PROCEDURE — 99214 OFFICE O/P EST MOD 30 MIN: CPT

## 2024-05-09 RX ORDER — LIDOCAINE 40 MG/G
4 PATCH TOPICAL
Qty: 1 | Refills: 0 | Status: ACTIVE | COMMUNITY
Start: 2024-05-09 | End: 1900-01-01

## 2024-05-09 NOTE — HISTORY OF PRESENT ILLNESS
[FreeTextEntry1] : Follow Up [de-identified] : Patient presents today for a follow up. She has c/o of RUQ pain radiating to right shoulder with walking for about 3 weeks. She made her GI aware of this and completed BW and abd US. She is pending results of Abd US.  She is s/p IV iron infusions for anemia. Follows with hematology   She follows with GI for hx of liver transplant  She follows with endocrinology for hx of hypothyroidism   She has established care with orthopedics for knee pain -- now s/p knee joint aspiration

## 2024-05-09 NOTE — REVIEW OF SYSTEMS
[Fever] : no fever [Chills] : no chills [Pain] : no pain [Vision Problems] : no vision problems [Nasal Discharge] : no nasal discharge [Sore Throat] : no sore throat [Chest Pain] : no chest pain [Palpitations] : no palpitations [Lower Ext Edema] : no lower extremity edema [Shortness Of Breath] : no shortness of breath [Cough] : no cough [Abdominal Pain] : abdominal pain [Diarrhea] : diarrhea [Vomiting] : no vomiting [Melena] : no melena [Dysuria] : no dysuria [Hematuria] : no hematuria [Frequency] : no frequency [Muscle Weakness] : no muscle weakness [Muscle Pain] : muscle pain [Skin Rash] : skin rash [Headache] : no headache [Dizziness] : no dizziness [FreeTextEntry7] : RUQ pain radiating to right shoulder when walking [FreeTextEntry9] : RUQ pain radiating to right shoulder when walking

## 2024-05-09 NOTE — PLAN
[FreeTextEntry1] : 1. Hypothyroidism - asymptomatic - recommended pt take levothyroxine on an empty stomach and not with any of her other medications - Thyroid US reviewed with pt: nonenlarged and suspicious for thyroiditis - C/w levothyroxine 112mcg - Follow with endocrinology, Dr. Valladares  2. S/P Liver Transplant - 2/2 alcoholic liver disease - s/p prednisone 5mg daily, MMF 500mg BID - on Tacrolimus 4mg BID - s/p Bactrim for PJP prophylaxis - CMP from 5/2 reviewed  3. RUQ Pain - x3 weeks; only when walking. Radiates to right shoulder - pt made GI aware -- CMP and GGT wnl - Abd US pending - suspect may be MSK in origin -- recommended rest, ice/heat, rx for lidocaine patch. She admits to lifting more heavy objects lately -- recommended she avoid heavy lifting  4. Underweight  - BMI now 18.79 --  now at normal weight  - pt states she has been eating well - she is gaining weight  5. Anemia - baseline Hgb per chart review is about 10 - Denies signs or symptoms of active bleeding; FOBI negative - CBC from 5/2 reviewed  - s/p IV iron - Follows with Hematology -- to have records sent to office  6. Osteopenia - c/w vitamin D and calcium - next DEXA due 9/2025  7. Rash - dry flaky rah on b/l palms  - she has appt with dermatology scheduled for Monday -- referral provided    3 month f/u

## 2024-05-09 NOTE — PHYSICAL EXAM
[No Acute Distress] : no acute distress [Well-Appearing] : well-appearing [Normal Sclera/Conjunctiva] : normal sclera/conjunctiva [EOMI] : extraocular movements intact [Normal Outer Ear/Nose] : the outer ears and nose were normal in appearance [Normal TMs] : both tympanic membranes were normal [Normal Nasal Mucosa] : the nasal mucosa was normal [No Respiratory Distress] : no respiratory distress  [No Accessory Muscle Use] : no accessory muscle use [Clear to Auscultation] : lungs were clear to auscultation bilaterally [Normal Rate] : normal rate  [Regular Rhythm] : with a regular rhythm [Normal S1, S2] : normal S1 and S2 [No Edema] : there was no peripheral edema [Soft] : abdomen soft [Non Tender] : non-tender [Non-distended] : non-distended [Normal Bowel Sounds] : normal bowel sounds [No CVA Tenderness] : no CVA  tenderness [No Spinal Tenderness] : no spinal tenderness [No Joint Swelling] : no joint swelling [Grossly Normal Strength/Tone] : grossly normal strength/tone [Coordination Grossly Intact] : coordination grossly intact [Normal Gait] : normal gait [Normal Affect] : the affect was normal [Normal Insight/Judgement] : insight and judgment were intact [de-identified] : +dry flaky rash on palms

## 2024-05-17 LAB
ALBUMIN SERPL ELPH-MCNC: 4.6 G/DL
ALP BLD-CCNC: 66 U/L
ALT SERPL-CCNC: 9 U/L
ANION GAP SERPL CALC-SCNC: 12 MMOL/L
AST SERPL-CCNC: 13 U/L
BILIRUB SERPL-MCNC: 0.2 MG/DL
BUN SERPL-MCNC: 17 MG/DL
CALCIUM SERPL-MCNC: 9 MG/DL
CHLORIDE SERPL-SCNC: 105 MMOL/L
CO2 SERPL-SCNC: 22 MMOL/L
CREAT SERPL-MCNC: 1 MG/DL
EGFR: 73 ML/MIN/1.73M2
GGT SERPL-CCNC: 22 U/L
GLUCOSE SERPL-MCNC: 99 MG/DL
HCT VFR BLD CALC: 33 %
HGB BLD-MCNC: 11.2 G/DL
MAGNESIUM SERPL-MCNC: 2 MG/DL
MCHC RBC-ENTMCNC: 31.4 PG
MCHC RBC-ENTMCNC: 33.9 GM/DL
MCV RBC AUTO: 92.4 FL
PHOSPHATE SERPL-MCNC: 3.3 MG/DL
PLATELET # BLD AUTO: 178 K/UL
POTASSIUM SERPL-SCNC: 4.4 MMOL/L
PROT SERPL-MCNC: 6.9 G/DL
RBC # BLD: 3.57 M/UL
RBC # FLD: 13.3 %
SODIUM SERPL-SCNC: 139 MMOL/L
TACROLIMUS SERPL-MCNC: 8 NG/ML
WBC # FLD AUTO: 4.45 K/UL

## 2024-05-23 ENCOUNTER — APPOINTMENT (OUTPATIENT)
Dept: ORTHOPEDIC SURGERY | Facility: CLINIC | Age: 39
End: 2024-05-23

## 2024-05-31 LAB
ALBUMIN SERPL ELPH-MCNC: 4.8 G/DL
ALP BLD-CCNC: 67 U/L
ALT SERPL-CCNC: 7 U/L
ANION GAP SERPL CALC-SCNC: 13 MMOL/L
AST SERPL-CCNC: 13 U/L
BILIRUB SERPL-MCNC: 0.4 MG/DL
BUN SERPL-MCNC: 24 MG/DL
CALCIUM SERPL-MCNC: 9.4 MG/DL
CHLORIDE SERPL-SCNC: 103 MMOL/L
CO2 SERPL-SCNC: 22 MMOL/L
CREAT SERPL-MCNC: 1.21 MG/DL
EGFR: 58 ML/MIN/1.73M2
GGT SERPL-CCNC: 26 U/L
GLUCOSE SERPL-MCNC: 87 MG/DL
HCT VFR BLD CALC: 32.2 %
HGB BLD-MCNC: 11 G/DL
MAGNESIUM SERPL-MCNC: 2 MG/DL
MCHC RBC-ENTMCNC: 32.1 PG
MCHC RBC-ENTMCNC: 34.2 GM/DL
MCV RBC AUTO: 93.9 FL
PLATELET # BLD AUTO: 202 K/UL
POTASSIUM SERPL-SCNC: 4.2 MMOL/L
PROT SERPL-MCNC: 7.2 G/DL
RBC # BLD: 3.43 M/UL
RBC # FLD: 12.6 %
SODIUM SERPL-SCNC: 139 MMOL/L
TACROLIMUS SERPL-MCNC: 7 NG/ML
WBC # FLD AUTO: 5.87 K/UL

## 2024-06-13 ENCOUNTER — LABORATORY RESULT (OUTPATIENT)
Age: 39
End: 2024-06-13

## 2024-06-19 LAB — TACROLIMUS SERPL-MCNC: 5.7 NG/ML

## 2024-06-27 ENCOUNTER — LABORATORY RESULT (OUTPATIENT)
Age: 39
End: 2024-06-27

## 2024-06-27 ENCOUNTER — APPOINTMENT (OUTPATIENT)
Dept: HEPATOLOGY | Facility: CLINIC | Age: 39
End: 2024-06-27
Payer: MEDICAID

## 2024-06-27 VITALS
HEART RATE: 69 BPM | TEMPERATURE: 97.3 F | SYSTOLIC BLOOD PRESSURE: 143 MMHG | DIASTOLIC BLOOD PRESSURE: 87 MMHG | BODY MASS INDEX: 18.83 KG/M2 | HEIGHT: 67 IN | OXYGEN SATURATION: 100 % | WEIGHT: 120 LBS | RESPIRATION RATE: 16 BRPM

## 2024-06-27 DIAGNOSIS — Z79.60 LONG TERM (CURRENT) USE OF UNSPECIFIED IMMUNOMODULATORS AND IMMUNOSUPPRESSANTS: ICD-10-CM

## 2024-06-27 DIAGNOSIS — Z94.4 LIVER TRANSPLANT STATUS: ICD-10-CM

## 2024-06-27 LAB
ALBUMIN SERPL ELPH-MCNC: 4.8 G/DL
ALP BLD-CCNC: 72 U/L
ALT SERPL-CCNC: 9 U/L
ANION GAP SERPL CALC-SCNC: 12 MMOL/L
AST SERPL-CCNC: 12 U/L
BASOPHILS # BLD AUTO: 0.04 K/UL
BASOPHILS NFR BLD AUTO: 0.8 %
BILIRUB SERPL-MCNC: 0.4 MG/DL
BUN SERPL-MCNC: 17 MG/DL
CALCIUM SERPL-MCNC: 9.5 MG/DL
CHLORIDE SERPL-SCNC: 105 MMOL/L
CO2 SERPL-SCNC: 23 MMOL/L
CREAT SERPL-MCNC: 1.15 MG/DL
EGFR: 62 ML/MIN/1.73M2
EOSINOPHIL # BLD AUTO: 0.17 K/UL
EOSINOPHIL NFR BLD AUTO: 3.2 %
GGT SERPL-CCNC: 32 U/L
GLUCOSE SERPL-MCNC: 99 MG/DL
HCT VFR BLD CALC: 33.5 %
HGB BLD-MCNC: 11.2 G/DL
IMM GRANULOCYTES NFR BLD AUTO: 0.4 %
LYMPHOCYTES # BLD AUTO: 2.29 K/UL
LYMPHOCYTES NFR BLD AUTO: 43.5 %
MAGNESIUM SERPL-MCNC: 2 MG/DL
MAN DIFF?: NORMAL
MCHC RBC-ENTMCNC: 32.9 PG
MCHC RBC-ENTMCNC: 33.4 GM/DL
MCV RBC AUTO: 98.5 FL
MONOCYTES # BLD AUTO: 0.38 K/UL
MONOCYTES NFR BLD AUTO: 7.2 %
NEUTROPHILS # BLD AUTO: 2.37 K/UL
NEUTROPHILS NFR BLD AUTO: 44.9 %
PHOSPHATE SERPL-MCNC: 2.9 MG/DL
PLATELET # BLD AUTO: 222 K/UL
POTASSIUM SERPL-SCNC: 4.6 MMOL/L
PROT SERPL-MCNC: 7.4 G/DL
RBC # BLD: 3.4 M/UL
RBC # FLD: 13.9 %
SODIUM SERPL-SCNC: 140 MMOL/L
TACROLIMUS SERPL-MCNC: 9.3 NG/ML
WBC # FLD AUTO: 5.27 K/UL

## 2024-06-27 PROCEDURE — G2211 COMPLEX E/M VISIT ADD ON: CPT | Mod: NC,1L

## 2024-06-27 PROCEDURE — 99214 OFFICE O/P EST MOD 30 MIN: CPT

## 2024-06-28 LAB
HBV SURFACE AG SER QL: NONREACTIVE
HCV AB SER QL: ABNORMAL
HCV RNA SERPL NAA+PROBE-LOG IU: NOT DETECTED LOGIU/ML
HCV S/CO RATIO: 0.94 S/CO
HEPATITIS A IGG ANTIBODY: NONREACTIVE
HEPB DNA PCR INT: NOT DETECTED
HEPB DNA PCR LOG: NOT DETECTED LOGIU/ML
HEPC RNA INTERP: NOT DETECTED
HIV1 RNA # SERPL NAA+PROBE: NORMAL
HIV1 RNA # SERPL NAA+PROBE: NORMAL COPIES/ML
HIV1+2 AB SPEC QL IA.RAPID: NONREACTIVE
VIRAL LOAD INTERP: NORMAL
VIRAL LOAD LOG: NORMAL LG COP/ML

## 2024-07-19 DIAGNOSIS — Z94.4 LIVER TRANSPLANT STATUS: ICD-10-CM

## 2024-08-02 DIAGNOSIS — Z94.4 LIVER TRANSPLANT STATUS: ICD-10-CM

## 2024-08-15 ENCOUNTER — APPOINTMENT (OUTPATIENT)
Dept: FAMILY MEDICINE | Facility: CLINIC | Age: 39
End: 2024-08-15
Payer: MEDICAID

## 2024-08-15 VITALS
WEIGHT: 123 LBS | BODY MASS INDEX: 19.3 KG/M2 | DIASTOLIC BLOOD PRESSURE: 82 MMHG | OXYGEN SATURATION: 99 % | TEMPERATURE: 98.7 F | RESPIRATION RATE: 16 BRPM | HEIGHT: 67 IN | HEART RATE: 81 BPM | SYSTOLIC BLOOD PRESSURE: 123 MMHG

## 2024-08-15 DIAGNOSIS — M85.80 OTHER SPECIFIED DISORDERS OF BONE DENSITY AND STRUCTURE, UNSPECIFIED SITE: ICD-10-CM

## 2024-08-15 DIAGNOSIS — Z01.818 ENCOUNTER FOR OTHER PREPROCEDURAL EXAMINATION: ICD-10-CM

## 2024-08-15 DIAGNOSIS — D50.9 IRON DEFICIENCY ANEMIA, UNSPECIFIED: ICD-10-CM

## 2024-08-15 DIAGNOSIS — Z94.4 LIVER TRANSPLANT STATUS: ICD-10-CM

## 2024-08-15 DIAGNOSIS — E03.9 HYPOTHYROIDISM, UNSPECIFIED: ICD-10-CM

## 2024-08-15 DIAGNOSIS — R10.11 RIGHT UPPER QUADRANT PAIN: ICD-10-CM

## 2024-08-15 PROCEDURE — 99214 OFFICE O/P EST MOD 30 MIN: CPT

## 2024-08-15 PROCEDURE — G2211 COMPLEX E/M VISIT ADD ON: CPT | Mod: NC,1L

## 2024-08-15 NOTE — PLAN
[FreeTextEntry1] : 1. Hypothyroidism - asymptomatic - recommended pt take levothyroxine on an empty stomach and not with any of her other medications - Thyroid US: nonenlarged and suspicious for thyroiditis - C/w levothyroxine 112mcg - TSH ordered - Follows with endocrinology, Dr. Valladares  2. S/P Liver Transplant - 2/2 alcoholic liver disease - s/p prednisone 5mg daily, MMF 500mg BID - on Tacrolimus 1mg BID - s/p Bactrim for PJP prophylaxis - CMP from 7/31 reviewed  3. H/O Anemia - 2/2 iron deficiency  - baseline Hgb per chart review is about 10 - Denies signs or symptoms of active bleeding; FOBI negative - CBC from 7/31 reviewed -- Hgb 11.6 - s/p IV iron - Follows with Hematology  4. Osteopenia - c/w vitamin D and calcium - next DEXA due 9/2025   f/u for CPE

## 2024-08-15 NOTE — HISTORY OF PRESENT ILLNESS
[FreeTextEntry1] : Follow Up [de-identified] : Patient presents today for a follow up. Feels well.  She is s/p IV iron infusions for anemia. Follows with hematology   She follows with GI for hx of liver transplant  She follows with endocrinology for hx of hypothyroidism   She has established care with orthopedics for knee pain -- now s/p knee joint aspiration

## 2024-08-16 LAB
25(OH)D3 SERPL-MCNC: 36.8 NG/ML
TSH SERPL-ACNC: 1.96 UIU/ML

## 2024-09-05 DIAGNOSIS — Z94.4 LIVER TRANSPLANT STATUS: ICD-10-CM

## 2024-09-30 ENCOUNTER — LABORATORY RESULT (OUTPATIENT)
Age: 39
End: 2024-09-30

## 2024-09-30 ENCOUNTER — APPOINTMENT (OUTPATIENT)
Dept: GYNECOLOGIC ONCOLOGY | Facility: CLINIC | Age: 39
End: 2024-09-30
Payer: MEDICAID

## 2024-09-30 VITALS
BODY MASS INDEX: 19.46 KG/M2 | SYSTOLIC BLOOD PRESSURE: 152 MMHG | OXYGEN SATURATION: 100 % | TEMPERATURE: 98.2 F | HEIGHT: 67 IN | WEIGHT: 124 LBS | HEART RATE: 80 BPM | DIASTOLIC BLOOD PRESSURE: 96 MMHG | RESPIRATION RATE: 17 BRPM

## 2024-09-30 DIAGNOSIS — N87.1 MODERATE CERVICAL DYSPLASIA: ICD-10-CM

## 2024-09-30 DIAGNOSIS — B97.7 PAPILLOMAVIRUS AS THE CAUSE OF DISEASES CLASSIFIED ELSEWHERE: ICD-10-CM

## 2024-09-30 PROCEDURE — 99459 PELVIC EXAMINATION: CPT

## 2024-09-30 PROCEDURE — 57454 BX/CURETT OF CERVIX W/SCOPE: CPT

## 2024-09-30 PROCEDURE — 99214 OFFICE O/P EST MOD 30 MIN: CPT | Mod: 25

## 2024-09-30 NOTE — PHYSICAL EXAM
[Chaperone Present] : A chaperone was present in the examining room during all aspects of the physical examination [[id number]] : A chaperone was present during the pelvic exam. [Abnormal] : Cervix: Abnormal [Normal] : Recto-Vaginal Exam: Normal [Fully active, able to carry on all pre-disease performance without restriction] : Status 0 - Fully active, able to carry on all pre-disease performance without restriction [de-identified] : IUD strings visualized, prominent transformation zone at 12:00

## 2024-09-30 NOTE — DISCUSSION/SUMMARY
[FreeTextEntry1] : 40 yo with history of ANNABEL 2 treated with LEEP   Here for 6 month post treatment pap/colpo Pap, ECC, Cx Bx follow up path  If benign rtc 6 months   Yu Wolf MD, FACOG  Gynecologic Oncology

## 2024-09-30 NOTE — HISTORY OF PRESENT ILLNESS
[FreeTextEntry1] : 40 yo liver transplant recipient with abnormal pap and CIN2 s/p treatment with LEEP, negative margins Here for pap/colpo post treatment    History  Pap 10/4/23 ASCUS HPV 16/18 neg Other HPV HR +  Colpo Bx ANNABEL 2 ECC negative  LEEP 2/21/24 ANNABEL 2 negative margibs  History of severe alcohol-associated hepatitis and decompensated alcohol-associated cirrhosis s/p donor transplant 6/9/23.

## 2024-09-30 NOTE — PROCEDURE
[Colposcopy] : colposcopy [Cervical Dysplasia] : cervical dysplasia [HPV high risk] : PCR positive for high risk HPV [Patient] : the patient [Cervical Pap Performed] : a cervical pap smear was performed [Mosaicism ___ o'clock] : mosaicism at the [unfilled] ~Uo'clock position [No Abnormalities] : no abnormalities seen [Biopsies Taken: # ___] : [unfilled] biopsies taken of the cervix [ECC Done] : an Endocervical curettage was performed.  [Direct Pressure] : direct pressure [No Complications] : none [Tolerated Well] : the patient tolerated the procedure well [FreeTextEntry2] : prominent transfromation zone at 12:00

## 2024-10-01 LAB — HPV HIGH+LOW RISK DNA PNL CVX: DETECTED

## 2024-10-02 LAB — CORE LAB BIOPSY: NORMAL

## 2024-10-07 ENCOUNTER — APPOINTMENT (OUTPATIENT)
Dept: ENDOCRINOLOGY | Facility: CLINIC | Age: 39
End: 2024-10-07

## 2024-10-10 LAB — CYTOLOGY CVX/VAG DOC THIN PREP: ABNORMAL

## 2024-10-24 ENCOUNTER — APPOINTMENT (OUTPATIENT)
Dept: HEPATOLOGY | Facility: CLINIC | Age: 39
End: 2024-10-24
Payer: MEDICAID

## 2024-10-24 VITALS
WEIGHT: 120 LBS | OXYGEN SATURATION: 100 % | BODY MASS INDEX: 18.83 KG/M2 | HEIGHT: 67 IN | TEMPERATURE: 97.7 F | DIASTOLIC BLOOD PRESSURE: 95 MMHG | HEART RATE: 70 BPM | SYSTOLIC BLOOD PRESSURE: 144 MMHG

## 2024-10-24 DIAGNOSIS — F10.21 ALCOHOL DEPENDENCE, IN REMISSION: ICD-10-CM

## 2024-10-24 DIAGNOSIS — Z94.4 LIVER TRANSPLANT STATUS: ICD-10-CM

## 2024-10-24 DIAGNOSIS — I10 ESSENTIAL (PRIMARY) HYPERTENSION: ICD-10-CM

## 2024-10-24 DIAGNOSIS — Z79.60 LONG TERM (CURRENT) USE OF UNSPECIFIED IMMUNOMODULATORS AND IMMUNOSUPPRESSANTS: ICD-10-CM

## 2024-10-24 DIAGNOSIS — M25.461 EFFUSION, RIGHT KNEE: ICD-10-CM

## 2024-10-24 DIAGNOSIS — D50.9 IRON DEFICIENCY ANEMIA, UNSPECIFIED: ICD-10-CM

## 2024-10-24 LAB
FERRITIN SERPL-MCNC: 254 NG/ML
FOLATE SERPL-MCNC: 10.6 NG/ML
IRON SATN MFR SERPL: 35 %
IRON SERPL-MCNC: 95 UG/DL
RBC # BLD: 3.38 M/UL
RETICS # AUTO: 3.1 %
RETICS AGGREG/RBC NFR: 106.1 K/UL
TIBC SERPL-MCNC: 275 UG/DL
UIBC SERPL-MCNC: 180 UG/DL
VIT B12 SERPL-MCNC: 225 PG/ML

## 2024-10-24 PROCEDURE — G2211 COMPLEX E/M VISIT ADD ON: CPT | Mod: NC

## 2024-10-24 PROCEDURE — 99214 OFFICE O/P EST MOD 30 MIN: CPT

## 2024-10-24 RX ORDER — NALTREXONE HYDROCHLORIDE 50 MG/1
50 TABLET, FILM COATED ORAL
Qty: 90 | Refills: 3 | Status: ACTIVE | COMMUNITY
Start: 2024-10-24 | End: 1900-01-01

## 2024-10-24 RX ORDER — AMLODIPINE BESYLATE 5 MG/1
5 TABLET ORAL
Qty: 90 | Refills: 3 | Status: ACTIVE | COMMUNITY
Start: 2024-10-24 | End: 1900-01-01

## 2024-10-25 ENCOUNTER — APPOINTMENT (OUTPATIENT)
Dept: INFECTIOUS DISEASE | Facility: CLINIC | Age: 39
End: 2024-10-25

## 2024-10-25 DIAGNOSIS — Z23 ENCOUNTER FOR IMMUNIZATION: ICD-10-CM

## 2024-10-25 PROCEDURE — 90750 HZV VACC RECOMBINANT IM: CPT

## 2024-10-25 PROCEDURE — 90677 PCV20 VACCINE IM: CPT

## 2024-10-25 PROCEDURE — 90472 IMMUNIZATION ADMIN EACH ADD: CPT

## 2024-10-25 PROCEDURE — 90471 IMMUNIZATION ADMIN: CPT

## 2024-10-28 PROBLEM — E53.8 VITAMIN B12 DEFICIENCY: Status: ACTIVE | Noted: 2024-10-28

## 2024-10-28 LAB
PETH 16:0/18:1: >400 NG/ML
PETH 16:0/18:2: 237 NG/ML
PETH COMMENTS: NORMAL

## 2024-11-05 ENCOUNTER — APPOINTMENT (OUTPATIENT)
Dept: ENDOCRINOLOGY | Facility: CLINIC | Age: 39
End: 2024-11-05
Payer: MEDICAID

## 2024-11-05 VITALS
SYSTOLIC BLOOD PRESSURE: 145 MMHG | DIASTOLIC BLOOD PRESSURE: 91 MMHG | OXYGEN SATURATION: 100 % | HEIGHT: 67 IN | TEMPERATURE: 97.9 F | BODY MASS INDEX: 19.62 KG/M2 | HEART RATE: 64 BPM | WEIGHT: 125 LBS

## 2024-11-05 DIAGNOSIS — E03.9 HYPOTHYROIDISM, UNSPECIFIED: ICD-10-CM

## 2024-11-05 DIAGNOSIS — E53.8 DEFICIENCY OF OTHER SPECIFIED B GROUP VITAMINS: ICD-10-CM

## 2024-11-05 DIAGNOSIS — M85.80 OTHER SPECIFIED DISORDERS OF BONE DENSITY AND STRUCTURE, UNSPECIFIED SITE: ICD-10-CM

## 2024-11-05 PROCEDURE — G2211 COMPLEX E/M VISIT ADD ON: CPT | Mod: NC

## 2024-11-05 PROCEDURE — 99214 OFFICE O/P EST MOD 30 MIN: CPT

## 2024-11-08 ENCOUNTER — APPOINTMENT (OUTPATIENT)
Dept: MAMMOGRAPHY | Facility: CLINIC | Age: 39
End: 2024-11-08

## 2024-11-08 ENCOUNTER — RESULT REVIEW (OUTPATIENT)
Age: 39
End: 2024-11-08

## 2024-11-08 PROCEDURE — 77067 SCR MAMMO BI INCL CAD: CPT

## 2024-11-08 PROCEDURE — 77063 BREAST TOMOSYNTHESIS BI: CPT

## 2024-11-18 ENCOUNTER — APPOINTMENT (OUTPATIENT)
Dept: FAMILY MEDICINE | Facility: CLINIC | Age: 39
End: 2024-11-18
Payer: MEDICAID

## 2024-11-18 VITALS
TEMPERATURE: 97.4 F | DIASTOLIC BLOOD PRESSURE: 72 MMHG | WEIGHT: 127 LBS | BODY MASS INDEX: 24.94 KG/M2 | HEART RATE: 77 BPM | OXYGEN SATURATION: 97 % | SYSTOLIC BLOOD PRESSURE: 119 MMHG | HEIGHT: 60 IN

## 2024-11-18 DIAGNOSIS — Z00.00 ENCOUNTER FOR GENERAL ADULT MEDICAL EXAMINATION W/OUT ABNORMAL FINDINGS: ICD-10-CM

## 2024-11-18 DIAGNOSIS — M85.80 OTHER SPECIFIED DISORDERS OF BONE DENSITY AND STRUCTURE, UNSPECIFIED SITE: ICD-10-CM

## 2024-11-18 DIAGNOSIS — Z94.4 LIVER TRANSPLANT STATUS: ICD-10-CM

## 2024-11-18 DIAGNOSIS — E03.9 HYPOTHYROIDISM, UNSPECIFIED: ICD-10-CM

## 2024-11-18 DIAGNOSIS — D50.9 IRON DEFICIENCY ANEMIA, UNSPECIFIED: ICD-10-CM

## 2024-11-18 DIAGNOSIS — I10 ESSENTIAL (PRIMARY) HYPERTENSION: ICD-10-CM

## 2024-11-18 DIAGNOSIS — E53.8 DEFICIENCY OF OTHER SPECIFIED B GROUP VITAMINS: ICD-10-CM

## 2024-11-18 PROCEDURE — 99396 PREV VISIT EST AGE 40-64: CPT

## 2024-11-18 PROCEDURE — 36415 COLL VENOUS BLD VENIPUNCTURE: CPT

## 2024-11-19 ENCOUNTER — NON-APPOINTMENT (OUTPATIENT)
Age: 39
End: 2024-11-19

## 2024-11-19 DIAGNOSIS — E78.1 PURE HYPERGLYCERIDEMIA: ICD-10-CM

## 2024-11-19 LAB
25(OH)D3 SERPL-MCNC: 36.3 NG/ML
CHOLEST SERPL-MCNC: 187 MG/DL
ESTIMATED AVERAGE GLUCOSE: 82 MG/DL
HBA1C MFR BLD HPLC: 4.5 %
HDLC SERPL-MCNC: 54 MG/DL
LDLC SERPL CALC-MCNC: 54 MG/DL
NONHDLC SERPL-MCNC: 133 MG/DL
TRIGL SERPL-MCNC: 536 MG/DL
TSH SERPL-ACNC: 3.21 UIU/ML

## 2024-12-03 NOTE — H&P PST ADULT - PROBLEM SELECTOR PROBLEM 1
Restart prednisone 10 mg daily. Schedule follow up appt with me in 1-2 months. I will order the prednisone now. Thanks   Moderate cervical dysplasia

## 2025-01-06 DIAGNOSIS — Z94.4 LIVER TRANSPLANT STATUS: ICD-10-CM

## 2025-01-06 RX ORDER — ACETAMINOPHEN 325 MG/1
325 TABLET ORAL
Qty: 240 | Refills: 1 | Status: ACTIVE | COMMUNITY
Start: 2025-01-06 | End: 1900-01-01

## 2025-01-30 ENCOUNTER — LABORATORY RESULT (OUTPATIENT)
Age: 40
End: 2025-01-30

## 2025-02-05 DIAGNOSIS — E78.5 HYPERLIPIDEMIA, UNSPECIFIED: ICD-10-CM

## 2025-02-05 RX ORDER — PRAVASTATIN SODIUM 10 MG/1
10 TABLET ORAL
Qty: 30 | Refills: 11 | Status: ACTIVE | COMMUNITY
Start: 2025-02-05 | End: 1900-01-01

## 2025-02-06 DIAGNOSIS — Z94.4 LIVER TRANSPLANT STATUS: ICD-10-CM

## 2025-03-03 ENCOUNTER — APPOINTMENT (OUTPATIENT)
Dept: FAMILY MEDICINE | Facility: CLINIC | Age: 40
End: 2025-03-03
Payer: MEDICAID

## 2025-03-03 VITALS
WEIGHT: 130 LBS | HEART RATE: 70 BPM | OXYGEN SATURATION: 99 % | HEIGHT: 60 IN | BODY MASS INDEX: 25.52 KG/M2 | TEMPERATURE: 97.1 F | DIASTOLIC BLOOD PRESSURE: 85 MMHG | SYSTOLIC BLOOD PRESSURE: 130 MMHG

## 2025-03-03 DIAGNOSIS — E78.5 HYPERLIPIDEMIA, UNSPECIFIED: ICD-10-CM

## 2025-03-03 DIAGNOSIS — E53.8 DEFICIENCY OF OTHER SPECIFIED B GROUP VITAMINS: ICD-10-CM

## 2025-03-03 DIAGNOSIS — E03.9 HYPOTHYROIDISM, UNSPECIFIED: ICD-10-CM

## 2025-03-03 DIAGNOSIS — D50.9 IRON DEFICIENCY ANEMIA, UNSPECIFIED: ICD-10-CM

## 2025-03-03 DIAGNOSIS — I10 ESSENTIAL (PRIMARY) HYPERTENSION: ICD-10-CM

## 2025-03-03 DIAGNOSIS — M85.80 OTHER SPECIFIED DISORDERS OF BONE DENSITY AND STRUCTURE, UNSPECIFIED SITE: ICD-10-CM

## 2025-03-03 PROCEDURE — 99214 OFFICE O/P EST MOD 30 MIN: CPT

## 2025-03-03 PROCEDURE — G2211 COMPLEX E/M VISIT ADD ON: CPT | Mod: NC

## 2025-03-05 ENCOUNTER — LABORATORY RESULT (OUTPATIENT)
Age: 40
End: 2025-03-05

## 2025-03-06 ENCOUNTER — APPOINTMENT (OUTPATIENT)
Dept: HEPATOLOGY | Facility: CLINIC | Age: 40
End: 2025-03-06
Payer: MEDICAID

## 2025-03-06 VITALS
TEMPERATURE: 97.3 F | RESPIRATION RATE: 16 BRPM | SYSTOLIC BLOOD PRESSURE: 141 MMHG | HEIGHT: 60 IN | OXYGEN SATURATION: 100 % | HEART RATE: 72 BPM | BODY MASS INDEX: 25.91 KG/M2 | WEIGHT: 132 LBS | DIASTOLIC BLOOD PRESSURE: 84 MMHG

## 2025-03-06 DIAGNOSIS — Z94.4 LIVER TRANSPLANT STATUS: ICD-10-CM

## 2025-03-06 DIAGNOSIS — F10.21 ALCOHOL DEPENDENCE, IN REMISSION: ICD-10-CM

## 2025-03-06 PROCEDURE — 99214 OFFICE O/P EST MOD 30 MIN: CPT

## 2025-03-28 ENCOUNTER — APPOINTMENT (OUTPATIENT)
Dept: INFECTIOUS DISEASE | Facility: CLINIC | Age: 40
End: 2025-03-28
Payer: MEDICAID

## 2025-03-28 VITALS
TEMPERATURE: 97.6 F | HEIGHT: 60 IN | BODY MASS INDEX: 25.13 KG/M2 | RESPIRATION RATE: 16 BRPM | HEART RATE: 90 BPM | WEIGHT: 128 LBS | OXYGEN SATURATION: 99 % | DIASTOLIC BLOOD PRESSURE: 75 MMHG | SYSTOLIC BLOOD PRESSURE: 108 MMHG

## 2025-03-28 DIAGNOSIS — Z79.60 LONG TERM (CURRENT) USE OF UNSPECIFIED IMMUNOMODULATORS AND IMMUNOSUPPRESSANTS: ICD-10-CM

## 2025-03-28 DIAGNOSIS — Z94.4 LIVER TRANSPLANT STATUS: ICD-10-CM

## 2025-03-28 DIAGNOSIS — Z23 ENCOUNTER FOR IMMUNIZATION: ICD-10-CM

## 2025-03-28 PROCEDURE — 90472 IMMUNIZATION ADMIN EACH ADD: CPT

## 2025-03-28 PROCEDURE — 99214 OFFICE O/P EST MOD 30 MIN: CPT | Mod: 25

## 2025-03-28 PROCEDURE — 90632 HEPA VACCINE ADULT IM: CPT

## 2025-03-28 PROCEDURE — 90471 IMMUNIZATION ADMIN: CPT

## 2025-03-28 PROCEDURE — 90750 HZV VACC RECOMBINANT IM: CPT

## 2025-04-17 ENCOUNTER — APPOINTMENT (OUTPATIENT)
Dept: ENDOCRINOLOGY | Facility: CLINIC | Age: 40
End: 2025-04-17

## 2025-04-21 ENCOUNTER — LABORATORY RESULT (OUTPATIENT)
Age: 40
End: 2025-04-21

## 2025-04-21 ENCOUNTER — APPOINTMENT (OUTPATIENT)
Dept: GYNECOLOGIC ONCOLOGY | Facility: CLINIC | Age: 40
End: 2025-04-21
Payer: MEDICAID

## 2025-04-21 VITALS
HEIGHT: 60 IN | SYSTOLIC BLOOD PRESSURE: 123 MMHG | BODY MASS INDEX: 25.72 KG/M2 | HEART RATE: 85 BPM | TEMPERATURE: 97.2 F | DIASTOLIC BLOOD PRESSURE: 76 MMHG | OXYGEN SATURATION: 98 % | WEIGHT: 131 LBS

## 2025-04-21 DIAGNOSIS — Z94.4 LIVER TRANSPLANT STATUS: ICD-10-CM

## 2025-04-21 DIAGNOSIS — N87.1 MODERATE CERVICAL DYSPLASIA: ICD-10-CM

## 2025-04-21 DIAGNOSIS — B97.7 PAPILLOMAVIRUS AS THE CAUSE OF DISEASES CLASSIFIED ELSEWHERE: ICD-10-CM

## 2025-04-21 PROCEDURE — 99213 OFFICE O/P EST LOW 20 MIN: CPT | Mod: 25

## 2025-04-21 PROCEDURE — 57454 BX/CURETT OF CERVIX W/SCOPE: CPT

## 2025-04-21 PROCEDURE — 99459 PELVIC EXAMINATION: CPT

## 2025-04-23 LAB — HPV HIGH+LOW RISK DNA PNL CVX: DETECTED

## 2025-04-28 LAB
CORE LAB BIOPSY: NORMAL
CYTOLOGY CVX/VAG DOC THIN PREP: ABNORMAL

## 2025-05-27 ENCOUNTER — NON-APPOINTMENT (OUTPATIENT)
Age: 40
End: 2025-05-27

## 2025-06-09 ENCOUNTER — APPOINTMENT (OUTPATIENT)
Dept: FAMILY MEDICINE | Facility: CLINIC | Age: 40
End: 2025-06-09

## 2025-06-30 ENCOUNTER — APPOINTMENT (OUTPATIENT)
Dept: HEPATOLOGY | Facility: CLINIC | Age: 40
End: 2025-06-30
Payer: MEDICAID

## 2025-06-30 VITALS
TEMPERATURE: 98.3 F | HEART RATE: 68 BPM | HEIGHT: 60 IN | WEIGHT: 133 LBS | OXYGEN SATURATION: 98 % | RESPIRATION RATE: 16 BRPM | BODY MASS INDEX: 26.11 KG/M2 | DIASTOLIC BLOOD PRESSURE: 90 MMHG | SYSTOLIC BLOOD PRESSURE: 135 MMHG

## 2025-06-30 PROCEDURE — G2211 COMPLEX E/M VISIT ADD ON: CPT | Mod: NC

## 2025-06-30 PROCEDURE — 99214 OFFICE O/P EST MOD 30 MIN: CPT

## 2025-07-08 ENCOUNTER — APPOINTMENT (OUTPATIENT)
Dept: HEPATOLOGY | Facility: CLINIC | Age: 40
End: 2025-07-08

## 2025-07-09 NOTE — ED ADULT NURSE NOTE - PAIN: PRECIPITATING/AGGRAVATING FACTORS
Called pt to review results, notified of results. She reports she has called vascular medicine and has not been able to schedule, new order placed with physician name, notified her their office should contact her to schedule and if not call the clinic.    A1C- 5.7 same as before, still pre- DM. Work on diet changes and weight loss.     Alk phos remains elevated and stable. Isoenzymes were normal for bone and liver.       unknown

## 2025-07-10 ENCOUNTER — RX RENEWAL (OUTPATIENT)
Age: 40
End: 2025-07-10

## (undated) DEVICE — Device

## (undated) DEVICE — WARMING BLANKET UPPER ADULT

## (undated) DEVICE — BRUSH CYTO ENDO

## (undated) DEVICE — POSITIONER FOAM EGG CRATE ULNAR 2PCS (PINK)

## (undated) DEVICE — ELCTR BOVIE PENCIL SMOKE EVACUATION

## (undated) DEVICE — DRAIN JACKSON PRATT 10MM FLAT FULL NO TROCAR

## (undated) DEVICE — SUT PROLENE 3-0 36" SH

## (undated) DEVICE — MASK O2 NON REBREATH 3IN1 ADULT

## (undated) DEVICE — SUT SURGIPRO 1 60" GS-26

## (undated) DEVICE — FOLEY HOLDER STATLOCK 2 WAY ADULT

## (undated) DEVICE — LAP PAD 4 X 18"

## (undated) DEVICE — CONNECTOR 5 IN1 SUCTION TUBING

## (undated) DEVICE — POSITIONER STRAP ARMBOARD VELCRO TS-30

## (undated) DEVICE — TUBING IV EXTENSION MACRO 2Y-CLAVE 32"

## (undated) DEVICE — SENSOR O2 FINGER ADULT

## (undated) DEVICE — PACK CV SPLIT PACK II

## (undated) DEVICE — TUBING SUCTION NONCONDUCTIVE 6MM X 12FT

## (undated) DEVICE — DRAIN RESERVOIR FOR JACKSON PRATT 100CC CARDINAL

## (undated) DEVICE — SUT SILK 0 30" TIES

## (undated) DEVICE — SUT PDS II 6-0 30" C-1

## (undated) DEVICE — SUCTION CATH ARGYLE WHISTLE TIP 14FR STRAIGHT PACKED

## (undated) DEVICE — GOWN TRIMAX LG

## (undated) DEVICE — SUT PDS II 6-0 24" BV-1

## (undated) DEVICE — GLV 8.5 PROTEXIS (WHITE)

## (undated) DEVICE — SYR ASEPTO

## (undated) DEVICE — GLV 7 PROTEXIS (WHITE)

## (undated) DEVICE — SUT BOOT STANDARD (ASSORTED) 5 PAIR

## (undated) DEVICE — SUCTION YANKAUER OPEN TIP NO VENT CURVE

## (undated) DEVICE — DRAPE ISOLATION BAG 20X20"

## (undated) DEVICE — GLV 7.5 PROTEXIS (WHITE)

## (undated) DEVICE — TRAP SPECIMEN SPUTUM 40CC

## (undated) DEVICE — BALLOON US ENDO

## (undated) DEVICE — TUBING SUCTION CONN 6FT STERILE

## (undated) DEVICE — SOL IRR POUR NS 0.9% 500ML

## (undated) DEVICE — SYR LUER LOK 50CC

## (undated) DEVICE — NDL COUNTER FOAM AND MAGNET 40-70

## (undated) DEVICE — BALLOON SINGLE FOR BF-UC160F

## (undated) DEVICE — BLADE SCALPEL SAFETYLOCK #10

## (undated) DEVICE — STAPLER SKIN VISI-STAT 35 WIDE

## (undated) DEVICE — VISITEC 4X4

## (undated) DEVICE — FILTERLINE ET TUBE PED/ADLT ETCO2

## (undated) DEVICE — SYR ALLIANCE II INFLATION 60ML

## (undated) DEVICE — PACK PERI GYN

## (undated) DEVICE — MARKING PEN W RULER

## (undated) DEVICE — SOL INJ NS 0.9% 500ML 2 PORT

## (undated) DEVICE — SUT SOFSILK 3-0 18" V-20 (POP-OFF)

## (undated) DEVICE — SYR LUER LOK 10CC

## (undated) DEVICE — BLADE SCALPEL SAFETYLOCK #15

## (undated) DEVICE — FORCEP RADIAL JAW 4 PEDIATRIC W NDL 1.8MM 2MM 160CM DISP

## (undated) DEVICE — ELCTR HANDPIECE ARGON BEND A BEAM 6"

## (undated) DEVICE — DRSG KLING 4"

## (undated) DEVICE — SPECIMEN CONTAINER 100ML

## (undated) DEVICE — STAPLER ETHICON ECHELON FLEX 45MM X 340MM

## (undated) DEVICE — ELCTR GROUNDING PAD ADULT COVIDIEN

## (undated) DEVICE — DRSG CURITY GAUZE SPONGE 4 X 4" 12-PLY

## (undated) DEVICE — WARMING BLANKET FULL UNDERBODY

## (undated) DEVICE — DRAPE SLUSH / WARMER 44 X 66"

## (undated) DEVICE — VENODYNE/SCD SLEEVE CALF LARGE

## (undated) DEVICE — NDL SPINAL 22G X 3.5" (BLACK)

## (undated) DEVICE — VENODYNE/SCD SLEEVE CALF MEDIUM

## (undated) DEVICE — ELCTR BALL LLETZ LG 5MM

## (undated) DEVICE — PREP CHLORAPREP HI-LITE ORANGE 26ML

## (undated) DEVICE — TUBING SUCTION 20FT

## (undated) DEVICE — SOL IRR POUR H2O 250ML

## (undated) DEVICE — TUBING TUR 2 PRONG

## (undated) DEVICE — NDL ASPIRATION 22G W SYR

## (undated) DEVICE — SUT MONOSOF 3-0 18" C-14

## (undated) DEVICE — GLV 5.5 PROTEXIS (WHITE)

## (undated) DEVICE — SUT PDS II 5-0 30" C-1

## (undated) DEVICE — DRSG OPSITE 13.75 X 4"

## (undated) DEVICE — MEDICATION LABELS W MARKER

## (undated) DEVICE — PACK BASIC

## (undated) DEVICE — SUT SOFSILK 2-0 30" TIES

## (undated) DEVICE — BLADE SURGICAL #15 CARBON

## (undated) DEVICE — SOL INJ NS 0.9% 500ML 1-PORT

## (undated) DEVICE — PACK BREAST MAJOR

## (undated) DEVICE — DRAPE TOWEL BLUE 17" X 24"

## (undated) DEVICE — GOWN LG

## (undated) DEVICE — GLV 6 PROTEXIS (WHITE)

## (undated) DEVICE — DRAPE MAYO STAND 30"

## (undated) DEVICE — NDL HYPO SAFE 18G X 1.5" (PINK)

## (undated) DEVICE — WARMING BLANKET LOWER ADULT

## (undated) DEVICE — DRAPE IOBAN 23" X 23"

## (undated) DEVICE — SUT SOFSILK 4-0 30" TIES

## (undated) DEVICE — VALVE SUCTION EVIS 160/200/240

## (undated) DEVICE — ATF 40 FAST START KIT (AT3)

## (undated) DEVICE — DRSG STERISTRIPS 0.5 X 4"

## (undated) DEVICE — TUBING IV SET GRAVITY 3Y 100" MACRO

## (undated) DEVICE — DRSG TELFA 3 X 8

## (undated) DEVICE — TUBING ALARIS PUMP MODULE NON-DEHP

## (undated) DEVICE — SUCTION YANKAUER NO CONTROL VENT

## (undated) DEVICE — FILTERLINE NASAL O2 CO2 ADLT

## (undated) DEVICE — NDL SPINAL 25G X 3.5" (BLUE)

## (undated) DEVICE — CHEST DRAIN PLEUR-EVAC WET/WET ADULT-PEDS SINGLE (QUICK)

## (undated) DEVICE — ELCTR BOVIE PENCIL BLADE 10FT

## (undated) DEVICE — VALVE BIOPSY BRONCHOVIDEOSCOPE

## (undated) DEVICE — SOL INJ NS 0.9% 250ML

## (undated) DEVICE — GLV 8 DERMAPRENE ULTRA

## (undated) DEVICE — BITE BLOCK ADULT 20 X 27MM (GREEN)

## (undated) DEVICE — ELCTR LOOP FOR LLETZ 20MM X 12MM

## (undated) DEVICE — GLV 8 PROTEXIS (WHITE)

## (undated) DEVICE — SUT SOFSILK 2-0 18" TIES

## (undated) DEVICE — BAG DECANTER IV STERILE

## (undated) DEVICE — SUT SOFSILK 3-0 30" TIES

## (undated) DEVICE — PACK IV START WITH CHG

## (undated) DEVICE — BLADE SCALPEL SAFETYLOCK #11

## (undated) DEVICE — ELCTR DEFIB PAD PRO-PADZ W 10FT LEAD WIRES ADULT

## (undated) DEVICE — DRAPE 1/2 SHEET 40X57"

## (undated) DEVICE — GLV 6.5 PROTEXIS (WHITE)

## (undated) DEVICE — DRSG TEGADERM 6"X8"

## (undated) DEVICE — VESSEL LOOP MAXI-RED  0.120" X 16"

## (undated) DEVICE — SUT PROLENE 4-0 30" SH-1

## (undated) DEVICE — DRAPE LAPAROTOMY W VELCRO CORD TABS

## (undated) DEVICE — SUT SILK 2-0 18" FS

## (undated) DEVICE — APPLICATOR Q TIP 6" WOOD STEM

## (undated) DEVICE — BLADE SURGICAL #11 CARBON

## (undated) DEVICE — DRSG PAD SANITARY OB

## (undated) DEVICE — FOLEY TRAY 16FR SURESTEP LF URINE METER TEMP SENSING STATLOCK

## (undated) DEVICE — DRAPE 3/4 SHEET W REINFORCEMENT 56X77"

## (undated) DEVICE — CATH IV SAFE BC 20G X 1.16" (PINK)

## (undated) DEVICE — GLV 7.5 PROTEXIS (BLUE)

## (undated) DEVICE — BASIN SET SINGLE

## (undated) DEVICE — PACK MAJOR ABDOMINAL SUPINE

## (undated) DEVICE — SUT PROLENE 6-0 30" C-1

## (undated) DEVICE — LAP PAD 18 X 18"

## (undated) DEVICE — DRSG XEROFORM 5 X 9"

## (undated) DEVICE — FOLEY TRAY 16FR 5CC LTX UMETER CLOSED

## (undated) DEVICE — SYR LUER LOK 20CC

## (undated) DEVICE — PACK BASIN SPECIAL PROCEDURE

## (undated) DEVICE — LIGASURE SMALL JAW

## (undated) DEVICE — SUT SOFSILK 4-0 18" TIES

## (undated) DEVICE — LIA-ESU FORCE FX T2E29332EX: Type: DURABLE MEDICAL EQUIPMENT

## (undated) DEVICE — CATH IV SAFE BC 22G X 1" (BLUE)

## (undated) DEVICE — POSITIONER PINK PAD PIGAZZI SYSTEM

## (undated) DEVICE — DRAPE INSTRUMENT POUCH 6.75" X 11"

## (undated) DEVICE — LABELS BLANK W PEN

## (undated) DEVICE — SUT SOFSILK 2-0 18" V-20 (POP-OFF)

## (undated) DEVICE — NDL ASPIRATION 21G

## (undated) DEVICE — SYR LUER LOK 3CC